# Patient Record
Sex: MALE | Race: BLACK OR AFRICAN AMERICAN | Employment: UNEMPLOYED | URBAN - METROPOLITAN AREA
[De-identification: names, ages, dates, MRNs, and addresses within clinical notes are randomized per-mention and may not be internally consistent; named-entity substitution may affect disease eponyms.]

---

## 2021-07-22 ENCOUNTER — HOSPITAL ENCOUNTER (EMERGENCY)
Age: 56
Discharge: HOME | End: 2021-07-22
Attending: EMERGENCY MEDICINE
Payer: COMMERCIAL

## 2021-07-22 VITALS
HEART RATE: 84 BPM | HEIGHT: 73 IN | SYSTOLIC BLOOD PRESSURE: 173 MMHG | WEIGHT: 315 LBS | BODY MASS INDEX: 41.75 KG/M2 | RESPIRATION RATE: 16 BRPM | TEMPERATURE: 99.1 F | OXYGEN SATURATION: 96 % | DIASTOLIC BLOOD PRESSURE: 93 MMHG

## 2021-07-22 DIAGNOSIS — K85.90 ACUTE PANCREATITIS, UNSPECIFIED COMPLICATION STATUS, UNSPECIFIED PANCREATITIS TYPE: ICD-10-CM

## 2021-07-22 DIAGNOSIS — R10.13 EPIGASTRIC PAIN: Primary | ICD-10-CM

## 2021-07-22 LAB
A/G RATIO: 0.9 (ref 1.1–2.2)
ALBUMIN SERPL-MCNC: 3.9 G/DL (ref 3.4–5)
ALP BLD-CCNC: 61 U/L (ref 40–129)
ALT SERPL-CCNC: 7 U/L (ref 10–40)
ANION GAP SERPL CALCULATED.3IONS-SCNC: 14 MMOL/L (ref 3–16)
AST SERPL-CCNC: 9 U/L (ref 15–37)
BILIRUB SERPL-MCNC: 0.5 MG/DL (ref 0–1)
BUN BLDV-MCNC: 14 MG/DL (ref 7–20)
CALCIUM SERPL-MCNC: 9.6 MG/DL (ref 8.3–10.6)
CHLORIDE BLD-SCNC: 98 MMOL/L (ref 99–110)
CO2: 23 MMOL/L (ref 21–32)
CREAT SERPL-MCNC: 1.1 MG/DL (ref 0.9–1.3)
EKG ATRIAL RATE: 88 BPM
EKG DIAGNOSIS: NORMAL
EKG P AXIS: 29 DEGREES
EKG P-R INTERVAL: 144 MS
EKG Q-T INTERVAL: 388 MS
EKG QRS DURATION: 94 MS
EKG QTC CALCULATION (BAZETT): 469 MS
EKG R AXIS: -12 DEGREES
EKG T AXIS: 183 DEGREES
EKG VENTRICULAR RATE: 88 BPM
GFR AFRICAN AMERICAN: >60
GFR NON-AFRICAN AMERICAN: >60
GLOBULIN: 4.3 G/DL
GLUCOSE BLD-MCNC: 280 MG/DL (ref 70–99)
LACTIC ACID: 1.2 MMOL/L (ref 0.4–2)
LIPASE: 433 U/L (ref 13–60)
POTASSIUM REFLEX MAGNESIUM: 3.9 MMOL/L (ref 3.5–5.1)
SODIUM BLD-SCNC: 135 MMOL/L (ref 136–145)
TOTAL PROTEIN: 8.2 G/DL (ref 6.4–8.2)
TROPONIN: <0.01 NG/ML

## 2021-07-22 PROCEDURE — 99285 EMERGENCY DEPT VISIT HI MDM: CPT

## 2021-07-22 PROCEDURE — 93005 ELECTROCARDIOGRAM TRACING: CPT | Performed by: EMERGENCY MEDICINE

## 2021-07-22 PROCEDURE — 6370000000 HC RX 637 (ALT 250 FOR IP): Performed by: EMERGENCY MEDICINE

## 2021-07-22 PROCEDURE — 84484 ASSAY OF TROPONIN QUANT: CPT

## 2021-07-22 PROCEDURE — 93010 ELECTROCARDIOGRAM REPORT: CPT | Performed by: INTERNAL MEDICINE

## 2021-07-22 PROCEDURE — 80053 COMPREHEN METABOLIC PANEL: CPT

## 2021-07-22 PROCEDURE — 36415 COLL VENOUS BLD VENIPUNCTURE: CPT

## 2021-07-22 PROCEDURE — 83690 ASSAY OF LIPASE: CPT

## 2021-07-22 PROCEDURE — 83605 ASSAY OF LACTIC ACID: CPT

## 2021-07-22 RX ORDER — ONDANSETRON 4 MG/1
4 TABLET, ORALLY DISINTEGRATING ORAL EVERY 8 HOURS PRN
Qty: 20 TABLET | Refills: 0 | Status: ON HOLD | OUTPATIENT
Start: 2021-07-22 | End: 2021-07-31 | Stop reason: HOSPADM

## 2021-07-22 RX ORDER — OMEPRAZOLE 20 MG/1
20 CAPSULE, DELAYED RELEASE ORAL DAILY
COMMUNITY
End: 2021-07-22 | Stop reason: SDUPTHER

## 2021-07-22 RX ORDER — FAMOTIDINE 20 MG/1
20 TABLET, FILM COATED ORAL 2 TIMES DAILY
Qty: 60 TABLET | Refills: 0 | Status: SHIPPED | OUTPATIENT
Start: 2021-07-22 | End: 2021-08-25 | Stop reason: SDUPTHER

## 2021-07-22 RX ORDER — POLYETHYLENE GLYCOL 3350 17 G/17G
17 POWDER, FOR SOLUTION ORAL DAILY
Qty: 1530 G | Refills: 1 | Status: ON HOLD | OUTPATIENT
Start: 2021-07-22 | End: 2021-07-31 | Stop reason: SDUPTHER

## 2021-07-22 RX ORDER — OMEPRAZOLE 20 MG/1
20 CAPSULE, DELAYED RELEASE ORAL DAILY
Qty: 30 CAPSULE | Refills: 0 | Status: SHIPPED | OUTPATIENT
Start: 2021-07-22 | End: 2021-08-25 | Stop reason: SDUPTHER

## 2021-07-22 RX ORDER — CARVEDILOL 25 MG/1
25 TABLET ORAL 2 TIMES DAILY WITH MEALS
COMMUNITY
Start: 2020-08-21 | End: 2021-07-22 | Stop reason: SDUPTHER

## 2021-07-22 RX ORDER — FAMOTIDINE 20 MG/1
20 TABLET, FILM COATED ORAL ONCE
Status: COMPLETED | OUTPATIENT
Start: 2021-07-22 | End: 2021-07-22

## 2021-07-22 RX ORDER — ONDANSETRON 4 MG/1
4 TABLET, ORALLY DISINTEGRATING ORAL ONCE
Status: COMPLETED | OUTPATIENT
Start: 2021-07-22 | End: 2021-07-22

## 2021-07-22 RX ORDER — CARVEDILOL 25 MG/1
25 TABLET ORAL 2 TIMES DAILY WITH MEALS
Qty: 60 TABLET | Refills: 0 | Status: SHIPPED | OUTPATIENT
Start: 2021-07-22 | End: 2021-08-25 | Stop reason: SDUPTHER

## 2021-07-22 RX ADMIN — FAMOTIDINE 20 MG: 20 TABLET, FILM COATED ORAL at 04:43

## 2021-07-22 RX ADMIN — ONDANSETRON 4 MG: 4 TABLET, ORALLY DISINTEGRATING ORAL at 04:44

## 2021-07-22 ASSESSMENT — ENCOUNTER SYMPTOMS
SHORTNESS OF BREATH: 0
BACK PAIN: 0
DIARRHEA: 0
ABDOMINAL PAIN: 1
RHINORRHEA: 0
CONSTIPATION: 1
COLOR CHANGE: 0
NAUSEA: 0
WHEEZING: 0
PHOTOPHOBIA: 0
VOMITING: 0

## 2021-07-22 ASSESSMENT — PAIN DESCRIPTION - ONSET: ONSET: GRADUAL

## 2021-07-22 ASSESSMENT — PAIN DESCRIPTION - FREQUENCY: FREQUENCY: CONTINUOUS

## 2021-07-22 ASSESSMENT — PAIN DESCRIPTION - LOCATION
LOCATION: ABDOMEN
LOCATION: ABDOMEN

## 2021-07-22 ASSESSMENT — PAIN DESCRIPTION - DESCRIPTORS: DESCRIPTORS: SHARP;ACHING

## 2021-07-22 ASSESSMENT — PAIN DESCRIPTION - PAIN TYPE: TYPE: ACUTE PAIN

## 2021-07-22 ASSESSMENT — PAIN SCALES - GENERAL
PAINLEVEL_OUTOF10: 7
PAINLEVEL_OUTOF10: 4

## 2021-07-22 NOTE — ED PROVIDER NOTES
[address] [facility]  EMERGENCY DEPARTMENTENCOUNTER      Pt Name: Vera Gillespie  MRN: [id number]  [employer] 1965  Date ofevaluation: 7/22/2021  Provider: Rob Bolaños MD    CHIEF COMPLAINT       Chief Complaint   Patient presents with    Abdominal Pain     pt brought in by EMS for abd pain X1 week, pt states he has been out of all of his medications for months. HISTORY OF PRESENT ILLNESS   (Location/Symptom, Timing/Onset,Context/Setting, Quality, Duration, Modifying Factors, Severity)  Note limiting factors. Vera Gillespie is a 61 y.o. male  who  has a past medical history of CHF (congestive heart failure) ([facility] [address] Hyperlipidemia, and Hypertension. who presents to the emergency department for evaluation of abdominal pain. Patient reports epigastric abdominal pain has been ongoing for the past month. He had associated nausea. He reports that it is worse with eating. He denies fevers. He reports that he has been having fewer bowel movements and his last bowel movement was yesterday. He denies fevers chest pains or shortness of breath. Patient reports he was previously on a medication for stomach but is not currently taking it. He reports he has been out of all of his medications over the past month. He states his medications were being filled by a cardiologist from [facility] but he has not followed up or seen the physician in a long time. He does not have a PCP. Patient denies shortness of breath or lower extremity swelling. HPI    NursingNotes were reviewed. REVIEW OF SYSTEMS    (2-9 systems for level 4, 10 or more for level 5)     Review of Systems   Constitutional: Negative for activity change, chills, fatigue and fever. HENT: Negative for congestion and rhinorrhea. Eyes: Negative for photophobia and visual disturbance. Respiratory: Negative for shortness of breath and wheezing. Cardiovascular: Negative for palpitations and leg swelling. Gastrointestinal: Positive for abdominal pain and constipation. Negative for diarrhea, nausea and vomiting. Endocrine: Negative for polydipsia and polyuria. Genitourinary: Negative for difficulty urinating and frequency. Musculoskeletal: Negative for back pain and gait problem. Skin: Negative for color change and rash. Neurological: Negative for light-headedness and headaches. Psychiatric/Behavioral: Negative for confusion. The patient is not nervous/anxious. All other systems reviewed and are negative. Except as noted above the remainder of the review of systems was reviewed and negative. PAST MEDICAL HISTORY     Past Medical History:   Diagnosis Date    CHF (congestive heart failure) (HCC)     Hyperlipidemia     Hypertension          SURGICALHISTORY     History reviewed. No pertinent surgical history. CURRENT MEDICATIONS       Discharge Medication List as of 7/22/2021  5:31 AM               Patient has no known allergies. FAMILY HISTORY     History reviewed. No pertinent family history. SOCIAL HISTORY       Social History     Socioeconomic History    Marital status: Unknown     Spouse name: None    Number of children: None    Years of education: None    Highest education level: None   Occupational History    None   Tobacco Use    Smoking status: Current Every Day Smoker     Packs/day: 0.50     Types: Cigarettes    Smokeless tobacco: Never Used   Substance and Sexual Activity    Alcohol use: None    Drug use: Yes     Types: Marijuana    Sexual activity: None   Other Topics Concern    None   Social History Narrative    None     Social Determinants of Health     Financial Resource Strain:     Difficulty of Paying Living Expenses:    Food Insecurity:     Worried About Running Out of Food in the Last Year:     Ran Out of Food in the Last Year:    Transportation Needs:     Lack of Transportation (Medical):      Lack of Transportation (Non-Medical):    Physical Activity:     Days of Exercise per Week:     Minutes of Exercise per Session:    Stress:     Feeling of Stress :    Social Connections:     Frequency of Communication with Friends and Family:     Frequency of Social Gatherings with Friends and Family:     Attends [employer] Services:     Active Member of Clubs or Organizations:     Attends Club or Organization Meetings:     Marital Status:    Intimate Partner Violence:     Fear of Current or Ex-Partner:     Emotionally Abused:     Physically Abused:     Sexually Abused:        SCREENINGS    Chanelle Coma Scale  Eye Opening: Spontaneous  Best Verbal Response: Oriented  Best Motor Response: Obeys commands  Chanelle Coma Scale Score: 15        PHYSICAL EXAM    (up to 7 for level 4, 8 or more for level 5)     ED Triage Vitals [07/22/21 0431]   BP Temp Temp Source Pulse Resp SpO2 Height Weight   (!) 190/123 99.1 °F (37.3 °C) Oral 92 18 99 % 6' 1\" (1.854 m) (!) 323 lb 9.6 oz (146.8 kg)       Physical Exam  Vitals and nursing note reviewed. Constitutional:       General: He is not in acute distress. Appearance: He is well-developed. HENT:      Head: Normocephalic and atraumatic. Eyes:      Conjunctiva/sclera: Conjunctivae normal.   Neck:      Trachea: No tracheal deviation. Cardiovascular:      Rate and Rhythm: Normal rate and regular rhythm. Pulmonary:      Effort: Pulmonary effort is normal.      Breath sounds: Normal breath sounds. No wheezing or rales. Abdominal:      General: There is no distension. Palpations: Abdomen is soft. Tenderness: There is no abdominal tenderness. Hernia: A hernia is present. Hernia is present in the umbilical area. Comments: Reducible umbilical hernia that was reduced. Musculoskeletal:         General: No deformity. Normal range of motion. Cervical back: Normal range of motion. Skin:     General: Skin is warm and dry.    Neurological:      Mental Status: He is alert and oriented to person, place, and time. RESULTS     EKG: All EKG's are interpreted by the Emergency Department Physician who either signs or Co-signsthis chart in the absence of a cardiologist.    EKG shows a sinus rhythm with a ventricular rate of 88 bpm.  WI interval and QTc interval within normal limits. Patient has normal axis. There are no significant ST elevations or depressions EKG is nondiagnostic for ACS. There are T wave inversions in the lateral leads as well as the high lateral leads of unclear chronicity. No previous EKG to compare to.      RADIOLOGY:   Non-plain filmimages such as CT, Ultrasound and MRI are read by the radiologist. Plain radiographic images are visualized and preliminarily interpreted by the emergency physician with the below findings:        Interpretation per the Radiologist below, if available at the time ofthis note:    No orders to display         ED BEDSIDE ULTRASOUND:   Performed by ED Physician - none    LABS:  Labs Reviewed   COMPREHENSIVE METABOLIC PANEL W/ REFLEX TO MG FOR LOW K - Abnormal; Notable for the following components:       Result Value    Sodium 135 (*)     Chloride 98 (*)     Glucose 280 (*)     Albumin/Globulin Ratio 0.9 (*)     ALT 7 (*)     AST 9 (*)     All other components within normal limits    Narrative:     Performed at:  [facility]  [address] [place], [place]   Phone [phone]   LIPASE - Abnormal; Notable for the following components:    Lipase 433.0 (*)     All other components within normal limits    Narrative:     Performed at:  [facility]  [address] [place], [facility]   Phone [phone]   TROPONIN    Narrative:     Performed at:  [facility]  [address], [place]   Phone [phone]   LACTIC ACID, PLASMA    Narrative:     Performed at:  [facility]  [address], [place] [facility]   Phone [phone]   URINE RT REFLEX TO CULTURE       All other labs were within normal range or not returned as of this dictation. EMERGENCY DEPARTMENT COURSE and DIFFERENTIAL DIAGNOSIS/MDM:   Vitals:    Vitals:    07/22/21 0431 07/22/21 0514   BP: (!) 190/123 (!) 173/93   Pulse: 92 84   Resp: 18 16   Temp: 99.1 °F (37.3 °C)    TempSrc: Oral    SpO2: 99% 96%   Weight: (!) 323 lb 9.6 oz (146.8 kg)    Height: 6' 1\" (1.854 m)        Patient was given thefollowing medications:  Medications   ondansetron (ZOFRAN-ODT) disintegrating tablet 4 mg (4 mg Oral Given 7/22/21 0444)   famotidine (PEPCID) tablet 20 mg (20 mg Oral Given 7/22/21 0443)       ED COURSE & MEDICAL DECISION MAKING    Pertinent Labs & Imaging studies reviewed. (See chart for details)   -  Patient seen and evaluated in the emergency department. -  Triage and nursing notes reviewed and incorporated. -  Old chart records reviewed and incorporated. -  Differential diagnosis includes: Differential diagnosis: Abdominal Aortic Aneurysm, Acute Coronary Syndrome, Ischemic Bowel, Bowel Obstruction (including Gastric Outlet Obstruction), PUD, GERD, Acute Cholecystitis, Pancreatitis, Hepatitis, Colitis, SMA Syndrome, Mesenteric Steal Syndrome, Splanchnic Vein Thrombosis, other    -  Work-up included:  See above  -  ED treatment included: See above  -  Results discussed with patient. Patient presents the ED for evaluation of upper abdominal pain. On exam he does have a reducible umbilical hernia that was able to reduce. He has a negative Dawson sign and no significant tenderness. Labs show elevated lipase. LFTs and alk phosphatase within normal limits. Laboratory work-up otherwise unremarkable. Patient is tolerating p.o. and feels improved and reevaluation.   Discussed the patient the importance of reestablishing care with a primary care physician I did refill some of his long-term medications for short duration until he can be seen. Patient feels improved on reevaluation. Symptomatic treatment with expectant management discussed with the patient and they and/or family members present are amenable to treatment plan and outpatient follow-up. Strict return precautions were discussed with the patient and those present. They demonstrated understanding of when to return to the emergency department for new or worsening symptoms. .  The patient is agreeable with plan of care and disposition. REASSESSMENT          CRITICAL CARE TIME   Total Critical Care time was 0 minutes, excluding separately reportable procedures. There was a high probability of clinically significant/life threatening deterioration in the patient's condition which required my urgent intervention. CONSULTS:  None    PROCEDURES:  Unless otherwise noted below, none     Procedures    FINAL IMPRESSION      1. Epigastric pain    2.  Acute pancreatitis, unspecified complication status, unspecified pancreatitis type          DISPOSITION/PLAN   DISPOSITION Decision To Discharge 07/22/2021 05:26:55 AM      PATIENT REFERREDTO:  [facility]  [phone]          DISCHARGEMEDICATIONS:  Discharge Medication List as of 7/22/2021  5:31 AM      START taking these medications    Details   ondansetron (ZOFRAN ODT) 4 MG disintegrating tablet Take 1 tablet by mouth every 8 hours as needed for Nausea, Disp-20 tablet, R-0Print      famotidine (PEPCID) 20 MG tablet Take 1 tablet by mouth 2 times daily, Disp-60 tablet, R-0Print      polyethylene glycol (GLYCOLAX) 17 GM/SCOOP powder Take 17 g by mouth daily, Disp-1530 g, R-1Print                (Please note that portions of this note were completed with a voice recognition program.  Efforts were made to edit the dictations but occasionally words are mis-transcribed.)    Fransisca Reece MD (electronically signed)  Attending Emergency Physician          Fransisca Reece MD  07/22/21 2091

## 2021-07-22 NOTE — ED NOTES
MD Gus Collazo returns to bedside to discuss testing results and f/u care plan for pt.       Kelsy Berger RN  07/22/21 7277

## 2021-07-29 ENCOUNTER — APPOINTMENT (OUTPATIENT)
Dept: CT IMAGING | Age: 56
DRG: 282 | End: 2021-07-29
Payer: COMMERCIAL

## 2021-07-29 ENCOUNTER — HOSPITAL ENCOUNTER (INPATIENT)
Age: 56
LOS: 2 days | Discharge: HOME | DRG: 282 | End: 2021-07-31
Attending: EMERGENCY MEDICINE | Admitting: HOSPITALIST
Payer: COMMERCIAL

## 2021-07-29 DIAGNOSIS — R10.9 ACUTE ABDOMINAL PAIN: Primary | ICD-10-CM

## 2021-07-29 DIAGNOSIS — E11.9 NEW ONSET TYPE 2 DIABETES MELLITUS (HCC): ICD-10-CM

## 2021-07-29 DIAGNOSIS — K85.90 ACUTE PANCREATITIS, UNSPECIFIED COMPLICATION STATUS, UNSPECIFIED PANCREATITIS TYPE: ICD-10-CM

## 2021-07-29 LAB
A/G RATIO: 0.9 (ref 1.1–2.2)
ALBUMIN SERPL-MCNC: 3.8 G/DL (ref 3.4–5)
ALP BLD-CCNC: 74 U/L (ref 40–129)
ALT SERPL-CCNC: 6 U/L (ref 10–40)
ANION GAP SERPL CALCULATED.3IONS-SCNC: 12 MMOL/L (ref 3–16)
AST SERPL-CCNC: 8 U/L (ref 15–37)
BASOPHILS ABSOLUTE: 0.1 K/UL (ref 0–0.2)
BASOPHILS RELATIVE PERCENT: 1.2 %
BILIRUB SERPL-MCNC: 0.3 MG/DL (ref 0–1)
BUN BLDV-MCNC: 17 MG/DL (ref 7–20)
CALCIUM SERPL-MCNC: 9.6 MG/DL (ref 8.3–10.6)
CHLORIDE BLD-SCNC: 101 MMOL/L (ref 99–110)
CO2: 24 MMOL/L (ref 21–32)
CREAT SERPL-MCNC: 1 MG/DL (ref 0.9–1.3)
EKG ATRIAL RATE: 77 BPM
EKG DIAGNOSIS: NORMAL
EKG P AXIS: 16 DEGREES
EKG P-R INTERVAL: 148 MS
EKG Q-T INTERVAL: 396 MS
EKG QRS DURATION: 98 MS
EKG QTC CALCULATION (BAZETT): 448 MS
EKG R AXIS: 2 DEGREES
EKG T AXIS: 218 DEGREES
EKG VENTRICULAR RATE: 77 BPM
EOSINOPHILS ABSOLUTE: 0.1 K/UL (ref 0–0.6)
EOSINOPHILS RELATIVE PERCENT: 2 %
ETHANOL: NORMAL MG/DL (ref 0–0.08)
GFR AFRICAN AMERICAN: >60
GFR NON-AFRICAN AMERICAN: >60
GLOBULIN: 4.3 G/DL
GLUCOSE BLD-MCNC: 376 MG/DL (ref 70–99)
HCT VFR BLD CALC: 42.2 % (ref 40.5–52.5)
HEMOGLOBIN: 14.4 G/DL (ref 13.5–17.5)
LACTIC ACID: 0.9 MMOL/L (ref 0.4–2)
LIPASE: 369 U/L (ref 13–60)
LYMPHOCYTES ABSOLUTE: 1.3 K/UL (ref 1–5.1)
LYMPHOCYTES RELATIVE PERCENT: 22.5 %
MCH RBC QN AUTO: 30.1 PG (ref 26–34)
MCHC RBC AUTO-ENTMCNC: 34.1 G/DL (ref 31–36)
MCV RBC AUTO: 88.4 FL (ref 80–100)
MONOCYTES ABSOLUTE: 0.6 K/UL (ref 0–1.3)
MONOCYTES RELATIVE PERCENT: 9.6 %
NEUTROPHILS ABSOLUTE: 3.7 K/UL (ref 1.7–7.7)
NEUTROPHILS RELATIVE PERCENT: 64.7 %
PDW BLD-RTO: 12.9 % (ref 12.4–15.4)
PLATELET # BLD: 281 K/UL (ref 135–450)
PMV BLD AUTO: 8.9 FL (ref 5–10.5)
POTASSIUM REFLEX MAGNESIUM: 4.2 MMOL/L (ref 3.5–5.1)
RBC # BLD: 4.78 M/UL (ref 4.2–5.9)
SODIUM BLD-SCNC: 137 MMOL/L (ref 136–145)
TOTAL PROTEIN: 8.1 G/DL (ref 6.4–8.2)
TROPONIN: <0.01 NG/ML
WBC # BLD: 5.8 K/UL (ref 4–11)

## 2021-07-29 PROCEDURE — 2580000003 HC RX 258: Performed by: EMERGENCY MEDICINE

## 2021-07-29 PROCEDURE — 6360000004 HC RX CONTRAST MEDICATION: Performed by: EMERGENCY MEDICINE

## 2021-07-29 PROCEDURE — 2580000003 HC RX 258: Performed by: HOSPITALIST

## 2021-07-29 PROCEDURE — 83605 ASSAY OF LACTIC ACID: CPT

## 2021-07-29 PROCEDURE — 6360000002 HC RX W HCPCS: Performed by: HOSPITALIST

## 2021-07-29 PROCEDURE — 80053 COMPREHEN METABOLIC PANEL: CPT

## 2021-07-29 PROCEDURE — 6370000000 HC RX 637 (ALT 250 FOR IP): Performed by: HOSPITALIST

## 2021-07-29 PROCEDURE — 83690 ASSAY OF LIPASE: CPT

## 2021-07-29 PROCEDURE — 85025 COMPLETE CBC W/AUTO DIFF WBC: CPT

## 2021-07-29 PROCEDURE — 96376 TX/PRO/DX INJ SAME DRUG ADON: CPT

## 2021-07-29 PROCEDURE — 74177 CT ABD & PELVIS W/CONTRAST: CPT

## 2021-07-29 PROCEDURE — 93005 ELECTROCARDIOGRAM TRACING: CPT | Performed by: EMERGENCY MEDICINE

## 2021-07-29 PROCEDURE — 94761 N-INVAS EAR/PLS OXIMETRY MLT: CPT

## 2021-07-29 PROCEDURE — 99285 EMERGENCY DEPT VISIT HI MDM: CPT

## 2021-07-29 PROCEDURE — 84484 ASSAY OF TROPONIN QUANT: CPT

## 2021-07-29 PROCEDURE — 36415 COLL VENOUS BLD VENIPUNCTURE: CPT

## 2021-07-29 PROCEDURE — 1200000000 HC SEMI PRIVATE

## 2021-07-29 PROCEDURE — 93010 ELECTROCARDIOGRAM REPORT: CPT | Performed by: INTERNAL MEDICINE

## 2021-07-29 PROCEDURE — 82077 ASSAY SPEC XCP UR&BREATH IA: CPT

## 2021-07-29 PROCEDURE — 6360000002 HC RX W HCPCS: Performed by: EMERGENCY MEDICINE

## 2021-07-29 PROCEDURE — 96374 THER/PROPH/DIAG INJ IV PUSH: CPT

## 2021-07-29 PROCEDURE — 96375 TX/PRO/DX INJ NEW DRUG ADDON: CPT

## 2021-07-29 PROCEDURE — 2500000003 HC RX 250 WO HCPCS: Performed by: EMERGENCY MEDICINE

## 2021-07-29 RX ORDER — POLYETHYLENE GLYCOL 3350 17 G/17G
17 POWDER, FOR SOLUTION ORAL DAILY
Status: DISCONTINUED | OUTPATIENT
Start: 2021-07-29 | End: 2021-07-31 | Stop reason: HOSPADM

## 2021-07-29 RX ORDER — SODIUM CHLORIDE 0.9 % (FLUSH) 0.9 %
5-40 SYRINGE (ML) INJECTION EVERY 12 HOURS SCHEDULED
Status: DISCONTINUED | OUTPATIENT
Start: 2021-07-29 | End: 2021-07-31 | Stop reason: HOSPADM

## 2021-07-29 RX ORDER — POTASSIUM CHLORIDE 20 MEQ/1
40 TABLET, EXTENDED RELEASE ORAL PRN
Status: DISCONTINUED | OUTPATIENT
Start: 2021-07-29 | End: 2021-07-31 | Stop reason: HOSPADM

## 2021-07-29 RX ORDER — ONDANSETRON 4 MG/1
4 TABLET, ORALLY DISINTEGRATING ORAL EVERY 8 HOURS PRN
Status: DISCONTINUED | OUTPATIENT
Start: 2021-07-29 | End: 2021-07-31 | Stop reason: HOSPADM

## 2021-07-29 RX ORDER — 0.9 % SODIUM CHLORIDE 0.9 %
500 INTRAVENOUS SOLUTION INTRAVENOUS ONCE
Status: COMPLETED | OUTPATIENT
Start: 2021-07-29 | End: 2021-07-29

## 2021-07-29 RX ORDER — SODIUM CHLORIDE 9 MG/ML
25 INJECTION, SOLUTION INTRAVENOUS PRN
Status: DISCONTINUED | OUTPATIENT
Start: 2021-07-29 | End: 2021-07-31 | Stop reason: HOSPADM

## 2021-07-29 RX ORDER — SODIUM CHLORIDE, SODIUM LACTATE, POTASSIUM CHLORIDE, CALCIUM CHLORIDE 600; 310; 30; 20 MG/100ML; MG/100ML; MG/100ML; MG/100ML
INJECTION, SOLUTION INTRAVENOUS CONTINUOUS
Status: DISCONTINUED | OUTPATIENT
Start: 2021-07-29 | End: 2021-07-29

## 2021-07-29 RX ORDER — MORPHINE SULFATE 4 MG/ML
4 INJECTION, SOLUTION INTRAMUSCULAR; INTRAVENOUS
Status: DISCONTINUED | OUTPATIENT
Start: 2021-07-29 | End: 2021-07-29 | Stop reason: ALTCHOICE

## 2021-07-29 RX ORDER — ACETAMINOPHEN 325 MG/1
650 TABLET ORAL EVERY 6 HOURS PRN
Status: DISCONTINUED | OUTPATIENT
Start: 2021-07-29 | End: 2021-07-31 | Stop reason: HOSPADM

## 2021-07-29 RX ORDER — SODIUM CHLORIDE 9 MG/ML
INJECTION, SOLUTION INTRAVENOUS CONTINUOUS
Status: DISCONTINUED | OUTPATIENT
Start: 2021-07-29 | End: 2021-07-31

## 2021-07-29 RX ORDER — ONDANSETRON 2 MG/ML
4 INJECTION INTRAMUSCULAR; INTRAVENOUS EVERY 6 HOURS PRN
Status: DISCONTINUED | OUTPATIENT
Start: 2021-07-29 | End: 2021-07-31 | Stop reason: HOSPADM

## 2021-07-29 RX ORDER — POTASSIUM CHLORIDE 7.45 MG/ML
10 INJECTION INTRAVENOUS PRN
Status: DISCONTINUED | OUTPATIENT
Start: 2021-07-29 | End: 2021-07-31 | Stop reason: HOSPADM

## 2021-07-29 RX ORDER — ACETAMINOPHEN 650 MG/1
650 SUPPOSITORY RECTAL EVERY 6 HOURS PRN
Status: DISCONTINUED | OUTPATIENT
Start: 2021-07-29 | End: 2021-07-31 | Stop reason: HOSPADM

## 2021-07-29 RX ORDER — POLYETHYLENE GLYCOL 3350 17 G/17G
17 POWDER, FOR SOLUTION ORAL DAILY
Status: DISCONTINUED | OUTPATIENT
Start: 2021-07-29 | End: 2021-07-29 | Stop reason: SDUPTHER

## 2021-07-29 RX ORDER — ONDANSETRON 2 MG/ML
4 INJECTION INTRAMUSCULAR; INTRAVENOUS
Status: DISCONTINUED | OUTPATIENT
Start: 2021-07-29 | End: 2021-07-29 | Stop reason: SDUPTHER

## 2021-07-29 RX ORDER — CARVEDILOL 25 MG/1
25 TABLET ORAL 2 TIMES DAILY WITH MEALS
Status: DISCONTINUED | OUTPATIENT
Start: 2021-07-29 | End: 2021-07-31 | Stop reason: HOSPADM

## 2021-07-29 RX ORDER — ONDANSETRON 4 MG/1
4 TABLET, ORALLY DISINTEGRATING ORAL EVERY 8 HOURS PRN
Status: DISCONTINUED | OUTPATIENT
Start: 2021-07-29 | End: 2021-07-29 | Stop reason: SDUPTHER

## 2021-07-29 RX ORDER — SODIUM CHLORIDE 0.9 % (FLUSH) 0.9 %
10 SYRINGE (ML) INJECTION PRN
Status: DISCONTINUED | OUTPATIENT
Start: 2021-07-29 | End: 2021-07-31 | Stop reason: HOSPADM

## 2021-07-29 RX ORDER — POLYETHYLENE GLYCOL 3350 17 G/17G
17 POWDER, FOR SOLUTION ORAL DAILY PRN
Status: DISCONTINUED | OUTPATIENT
Start: 2021-07-29 | End: 2021-07-31 | Stop reason: HOSPADM

## 2021-07-29 RX ADMIN — ONDANSETRON 4 MG: 2 INJECTION INTRAMUSCULAR; INTRAVENOUS at 05:27

## 2021-07-29 RX ADMIN — SODIUM CHLORIDE, PRESERVATIVE FREE 10 ML: 5 INJECTION INTRAVENOUS at 20:46

## 2021-07-29 RX ADMIN — MORPHINE SULFATE 4 MG: 4 INJECTION, SOLUTION INTRAMUSCULAR; INTRAVENOUS at 08:18

## 2021-07-29 RX ADMIN — IOHEXOL 50 ML: 240 INJECTION, SOLUTION INTRATHECAL; INTRAVASCULAR; INTRAVENOUS; ORAL at 05:50

## 2021-07-29 RX ADMIN — IOPAMIDOL 100 ML: 755 INJECTION, SOLUTION INTRAVENOUS at 06:59

## 2021-07-29 RX ADMIN — FAMOTIDINE 20 MG: 10 INJECTION, SOLUTION INTRAVENOUS at 08:13

## 2021-07-29 RX ADMIN — MORPHINE SULFATE 4 MG: 4 INJECTION, SOLUTION INTRAMUSCULAR; INTRAVENOUS at 05:27

## 2021-07-29 RX ADMIN — SODIUM CHLORIDE, POTASSIUM CHLORIDE, SODIUM LACTATE AND CALCIUM CHLORIDE: 600; 310; 30; 20 INJECTION, SOLUTION INTRAVENOUS at 09:20

## 2021-07-29 RX ADMIN — SODIUM CHLORIDE: 9 INJECTION, SOLUTION INTRAVENOUS at 16:36

## 2021-07-29 RX ADMIN — CARVEDILOL 25 MG: 25 TABLET, FILM COATED ORAL at 17:54

## 2021-07-29 RX ADMIN — ENOXAPARIN SODIUM 40 MG: 40 INJECTION SUBCUTANEOUS at 16:53

## 2021-07-29 RX ADMIN — SODIUM CHLORIDE 500 ML: 9 INJECTION, SOLUTION INTRAVENOUS at 08:14

## 2021-07-29 RX ADMIN — CARVEDILOL 25 MG: 25 TABLET, FILM COATED ORAL at 16:37

## 2021-07-29 RX ADMIN — ONDANSETRON 4 MG: 2 INJECTION INTRAMUSCULAR; INTRAVENOUS at 08:18

## 2021-07-29 RX ADMIN — POLYETHYLENE GLYCOL 3350 17 G: 17 POWDER, FOR SOLUTION ORAL at 16:36

## 2021-07-29 ASSESSMENT — PAIN DESCRIPTION - LOCATION
LOCATION: ABDOMEN

## 2021-07-29 ASSESSMENT — PAIN DESCRIPTION - ORIENTATION
ORIENTATION: MID

## 2021-07-29 ASSESSMENT — PAIN SCALES - GENERAL
PAINLEVEL_OUTOF10: 2
PAINLEVEL_OUTOF10: 2
PAINLEVEL_OUTOF10: 4
PAINLEVEL_OUTOF10: 6
PAINLEVEL_OUTOF10: 6
PAINLEVEL_OUTOF10: 3
PAINLEVEL_OUTOF10: 2
PAINLEVEL_OUTOF10: 4
PAINLEVEL_OUTOF10: 2
PAINLEVEL_OUTOF10: 2

## 2021-07-29 ASSESSMENT — PAIN DESCRIPTION - DESCRIPTORS
DESCRIPTORS: SHARP;ACHING

## 2021-07-29 ASSESSMENT — PAIN DESCRIPTION - FREQUENCY
FREQUENCY: CONTINUOUS

## 2021-07-29 ASSESSMENT — PAIN DESCRIPTION - ONSET
ONSET: GRADUAL

## 2021-07-29 ASSESSMENT — PAIN DESCRIPTION - PAIN TYPE
TYPE: OTHER (COMMENT)
TYPE: ACUTE PAIN

## 2021-07-29 NOTE — PROGRESS NOTES
Patient was admitted to the [address] [place] [address] room 4117 at [phone] from [facility] ED. Patient oriented to room and taught how to use call light. Patient is resting comfortably in bed. Will continue to monitor.

## 2021-07-29 NOTE — ED PROVIDER NOTES
I received this patient in signout from Dr. Renetta Coulter. We discussed the patient's initial history, physical, workup thus far, and medical decision making to this point. Briefly, Kallie Amaya is a 61 y.o. male  who presents to the ED complaining of abdominal pain. Initial history/exam also pertinent for recent ED visit for similar. Pending studies at time of signout include: CT abdomen    The patient will be reassessed after workup above is completed. Anticipated disposition at time of signout is admission       ED COURSE/MDM  I introduced myself to the patient and performed my initial assessment on Kallie Amaya. There is no significant change in the patient's history from what is documented initially by Dr. Renetta Coulter  after my evaluation. Old records reviewed. Labs and imaging reviewed and results discussed with patient. Since time of sign out, the patient's ED workup was notable for CT imaging without acute finding    During the patient's ED course, the patient was given:  Medications   morphine (PF) injection 4 mg (4 mg Intravenous Given 7/29/21 0818)   ondansetron (ZOFRAN) injection 4 mg (4 mg Intravenous Given 7/29/21 0818)   0.9 % sodium chloride bolus (500 mLs Intravenous New Bag 7/29/21 0814)   lactated ringers infusion (has no administration in time range)   iohexol (OMNIPAQUE 240) injection 50 mL (50 mLs Oral Given 7/29/21 0550)   iopamidol (ISOVUE-370) 76 % injection 100 mL (100 mLs Intravenous Given 7/29/21 0659)   famotidine (PEPCID) injection 20 mg (20 mg Intravenous Given 7/29/21 0813)        CLINICAL IMPRESSION  1. Acute abdominal pain    2. Acute pancreatitis, unspecified complication status, unspecified pancreatitis type    3. New onset type 2 diabetes mellitus (HCC)        Blood pressure (!) 186/108, pulse 80, temperature 98 °F (36.7 °C), temperature source Oral, resp. rate 17, height 6' 1\" (1.854 m), weight (!) 320 lb (145.2 kg), SpO2 95 %.     DISPOSITION  Kallie Amaya was admitted in stable condition. Patient afebrile and nontoxic. No distress. No peritoneal signs on abdominal exam, no focal findings to suggest acute appendicitis or cholecystitis. CT imaging was reviewed and without evidence of obstruction, perforation or other acute process. Radiographic normal appearance of the pancreas. Lab work does have elevated lipase and patient is most severely tender in midepigastrium, concern for early pancreatitis given these findings. Nothing to suggest infection, no indication for antibiotics. Blood glucose is also significantly elevated, patient denies any history of diabetes and I suspect new onset type II DM. Nothing to suggest DKA/HHS. Patient received gentle IV fluids given his history of CHF. Case discussed with internal medicine team, given this is a recurrent emergency department visit with potential pancreatitis and new onset diabetes I do feel admission is prudent. Patient is agreeable to this plan. Patient remained alert, no distress and hemodynamically stable over his emergency department course. Patient was given scripts for the following medications. I counseled patient how to take these medications. New Prescriptions    No medications on file       This chart was created using Dragon dictation software. Efforts were made by me to ensure accuracy, however some errors may be present due to limitations of this technology.        [address] [place] [address],   07/29/21 0762

## 2021-07-29 NOTE — ED PROVIDER NOTES
Emergency Physician Note        Note Open Time: 5:23 AM EDT    Chief Complaint  Abdominal Pain (reports cont abdominal pain from last visit )       History of Present Illness  Hugo Del Cid is a 61 y.o. male who presents to the ED for abdominal pain. Patient reports that he had continued upper abdominal pain since his last emergency department visit. He denies any vomiting but does have nausea. He states he is continued to have bowel movements but feels constipated. He denies any fevers, chills or sweats. No chest pain or shortness of breath. No urinary symptoms. Patient states when he eats or drinks anything he gets to his abdomen and causes pain. 10 systems reviewed, pertinent positives per HPI otherwise noted to be negative    I have reviewed the following from the nursing documentation:      Prior to Admission medications    Medication Sig Start Date End Date Taking? Authorizing Provider   ondansetron (ZOFRAN ODT) 4 MG disintegrating tablet Take 1 tablet by mouth every 8 hours as needed for Nausea 7/22/21  Yes Chente Akins MD   famotidine (PEPCID) 20 MG tablet Take 1 tablet by mouth 2 times daily 7/22/21  Yes Chente Akins MD   polyethylene glycol [facility]) 17 GM/SCOOP powder Take 17 g by mouth daily 7/22/21 8/21/21 Yes Chente Akins MD   carvedilol (COREG) 25 MG tablet Take 1 tablet by mouth 2 times daily (with meals) 7/22/21  Yes Chente Akins MD   omeprazole (PRILOSEC) 20 MG delayed release capsule Take 1 capsule by mouth daily 7/22/21  Yes Chente Akins MD       Allergies as of 07/29/2021    (No Known Allergies)       Past Medical History:   Diagnosis Date    CHF (congestive heart failure) ([facility] [address]     Hyperlipidemia     Hypertension         Surgical History: History reviewed. No pertinent surgical history. Family History:  History reviewed. No pertinent family history.     Social History     Socioeconomic History    Marital status: Unknown     Spouse name: Not on file    Number of children: Not on file    Years of education: Not on file    Highest education level: Not on file   Occupational History    Not on file   Tobacco Use    Smoking status: Current Every Day Smoker     Packs/day: 0.50     Types: Cigarettes    Smokeless tobacco: Never Used   Vaping Use    Vaping Use: Never used   Substance and Sexual Activity    Alcohol use: Not Currently    Drug use: Yes     Types: Marijuana    Sexual activity: Not Currently   Other Topics Concern    Not on file   Social History Narrative    Not on file     Social Determinants of Health     Financial Resource Strain:     Difficulty of Paying Living Expenses:    Food Insecurity:     Worried About Running Out of Food in the Last Year:     Ran Out of Food in the Last Year:    Transportation Needs:     Lack of Transportation (Medical):  Lack of Transportation (Non-Medical):    Physical Activity:     Days of Exercise per Week:     Minutes of Exercise per Session:    Stress:     Feeling of Stress :    Social Connections:     Frequency of Communication with Friends and Family:     Frequency of Social Gatherings with Friends and Family:     Attends [employer] Services:     Active Member of Clubs or Organizations:     Attends Club or Organization Meetings:     Marital Status:    Intimate Partner Violence:     Fear of Current or Ex-Partner:     Emotionally Abused:     Physically Abused:     Sexually Abused:        Nursing notes reviewed. ED Triage Vitals   Enc Vitals Group      BP 07/29/21 0508 (!) 195/113      Pulse 07/29/21 0506 80      Resp 07/29/21 0506 18      Temp 07/29/21 0506 98 °F (36.7 °C)      Temp Source 07/29/21 0506 Oral      SpO2 07/29/21 0506 98 %      Weight 07/29/21 0506 (!) 320 lb (145.2 kg)      Height 07/29/21 0506 6' 1\" (1.854 m)      Head Circumference --       Peak Flow --       Pain Score --       Pain Loc --       Pain Edu? --       Excl. in GC? --        GENERAL:  Awake, alert.  Well developed, well nourished with no apparent distress. HENT:  Normocephalic, Atraumatic, moist mucous membranes. EYES:  Pupils equal round and reactive to light, Conjunctiva normal, extraocular movements normal.  NECK:  No meningeal signs, Supple. CHEST:  Regular rate and rhythm, chest wall non-tender. LUNGS:  Clear to auscultation bilaterally. ABDOMEN:  Soft, midepigastric tenderness, no rebound, rigidity or guarding, non-distended, normal bowel sounds. No costovertebral angle tenderness to palpation. BACK:  No tenderness. EXTREMITIES:  Normal range of motion, no edema, no bony tenderness, no deformity, distal pulses present. SKIN: Warm, dry and intact. NEUROLOGIC: Normal mental status. Moving all extremities to command. LABS and DIAGNOSTIC RESULTS  EKG  The Ekg interpreted by me shows  normal sinus rhythm with a rate of 77  Axis is   Normal  QTc is  normal  Intervals and Durations are unremarkable. ST Segments: TWI in inferolateral leads  Delta waves, Brugada Syndrome, and Short [place] are not present. No significant change from prior EKG dated 7/22/21    RADIOLOGY  X-RAYS:  I have reviewed radiologic plain film image(s). ALL OTHER NON-PLAIN FILM IMAGES SUCH AS CT, ULTRASOUND AND MRI HAVE BEEN READ BY THE RADIOLOGIST.   CT ABDOMEN PELVIS W IV CONTRAST Additional Contrast? Oral    (Results Pending)        LABS  Labs Reviewed   COMPREHENSIVE METABOLIC PANEL W/ REFLEX TO MG FOR LOW K - Abnormal; Notable for the following components:       Result Value    Glucose 376 (*)     Albumin/Globulin Ratio 0.9 (*)     ALT 6 (*)     AST 8 (*)     All other components within normal limits    Narrative:     Performed at:  [facility]  [address] [facility]   Phone [phone]   LIPASE - Abnormal; Notable for the following components:    Lipase 369.0 (*)     All other components within normal limits    Narrative:     Performed at:  [facility] [address] [facility]  [address], [place]   Phone [phone]   CBC WITH AUTO DIFFERENTIAL    Narrative:     Performed at:  [facility]  [address], [place]   Phone [phone]   ETHANOL    Narrative:     Performed at:  [facility]  [address], [place]   Phone [phone]   LACTIC ACID, PLASMA    Narrative:     Performed at:  [facility]  [address], [place]   Phone [phone]   TROPONIN    Narrative:     Performed at:  [facility]  [address], [place]   Phone [phone]       MEDICAL DECISION MAKING          7:00 AM: I discussed the history, physical, and treatment plan with Dr. Karine Sotelo. Ernestine Clemente was signed out in stable condition. Please see Dr. Sarai Morin note for further details, including diagnosis and disposition. At the time of signout the CT scan is pending. Clinical impression: Abdominal pain and elevated lipase. This chart was generated using the [address] [place] [address] [address]  dictation system. I created this record but it may contain dictation errors.           Pawel Richmond MD  07/29/21 7283

## 2021-07-29 NOTE — Clinical Note
Patient Class: Inpatient [101]   REQUIRED: Diagnosis: Abdominal pain [121964]   Estimated Length of Stay: Estimated stay of more than 2 midnights   Admitting Provider: Rose Abdi [[id number]]   Telemetry/Cardiac Monitoring Required?: No

## 2021-07-29 NOTE — ED NOTES
Report given to [facility] transport team.  Patient transported to [facility] room 6182.      Marilia Arenas RN  07/29/21 [address] MA[address] Rashid  07/29/21 1011

## 2021-07-29 NOTE — PROGRESS NOTES
4 Eyes Skin Assessment     NAME:  Margy Torres  YOB: 1965  MEDICAL RECORD NUMBER:  [id number]    The patient is being assess for  Admission    I agree that 2 RN's have performed a thorough Head to Toe Skin Assessment on the patient. ALL assessment sites listed below have been assessed. Areas assessed by both nurses:    Head, Face, Ears, Shoulders, Back, Chest, Arms, Elbows, Hands, Sacrum. Buttock, Coccyx, Ischium and Legs. Feet and Heels        Does the Patient have a Wound?  No noted wound(s)       Hudson Prevention initiated:  No   Wound Care Orders initiated:  No    Pressure Injury (Stage 3,4, Unstageable, DTI, NWPT, and Complex wounds) if present place consult order under [de-identified] No    New and Established Ostomies if present place consult order under : No      Nurse 1 eSignature: Electronically signed by Shawn Ramirez RN on 7/29/21 at 11:21 AM EDT    **SHARE this note so that the co-signing nurse is able to place an eSignature**    Nurse 2 eSignature: {Esignature:[id number]}

## 2021-07-29 NOTE — H&P
Hospitalist  History and Physical    Patient:  Emily Crawley  MRN: [id number]  PCP: No primary care provider on file. CHIEF COMPLAINT: Abdominal Pain      HISTORY OF PRESENT ILLNESS:   The patient Emily Crawley is a 61 y. o.male with medical history significant for CHF hypertension and hyperlipidemia who presented to the emergency room with nausea and abdominal pain. Patient visited emergency room 2 times in the last 1 week. Patient reports that he has been having upper abdominal pain for the last 1 week. Patient denies any vomiting. No history of hematemesis or melena no history of urinary symptoms patient denies any fever chills no history of abdominal surgeries and patient denies any history of previous episode of pancreatitis. Patient does report that he drinks occasionally. Past Medical History:        Diagnosis Date    CHF (congestive heart failure) (HCC)     Hyperlipidemia     Hypertension        Past Surgical History:    History reviewed. No pertinent surgical history. Medications Prior to Admission:    Prior to Admission medications    Medication Sig Start Date End Date Taking? Authorizing Provider   ondansetron (ZOFRAN ODT) 4 MG disintegrating tablet Take 1 tablet by mouth every 8 hours as needed for Nausea 7/22/21  Yes Tiffany Lr MD   famotidine (PEPCID) 20 MG tablet Take 1 tablet by mouth 2 times daily 7/22/21  Yes Tiffany Lr MD   polyethylene glycol [facility]) 17 GM/SCOOP powder Take 17 g by mouth daily 7/22/21 8/21/21 Yes Tiffany Lr MD   carvedilol (COREG) 25 MG tablet Take 1 tablet by mouth 2 times daily (with meals) 7/22/21  Yes Tiffany Lr MD   omeprazole (PRILOSEC) 20 MG delayed release capsule Take 1 capsule by mouth daily 7/22/21  Yes Tiffany Lr MD       Allergies:  Patient has no known allergies. Social History:   TOBACCO:   reports that he has been smoking cigarettes. He has been smoking about 0.50 packs per day.  He has never used smokeless tobacco.  ETOH: reports previous alcohol use. Family History:   Patient denies history of pancreatitis in the family        REVIEW OF SYSTEMS:     patients reported symptoms are in BOLD all other symptoms are negative. CONSTITUTIONAL:      fatigue, fever, chills or night sweats, recent weight gain, recent wt loss, insomnia,  General weakness, poor appetite, muscle aches and pains    HEAD: headache, dizziness    EYES:      blurriness,  double vision, dryness,  discharge, irritation,diplopia    EARS:      hearing loss, vertigo, ear discharge,  Earache. Ringing in the ears. NOSE:      Rhinorrhea, sneezing, epistaxis. Discharge, sinusitis,     MOUTH/THROAT:         sore throat, mouth ulcers, Hoarseness    RESPIRATORY:        Shortness of breath, wheezing,  cough, sputum, hemoptysis, obstructive sleep apnea,    CARDIOVASCULAR :      chest pain, palpitations, dyspnea on exercise, Lower extrimity edema (swelling),     GASTROINTESTINAL:       Dysphagia, Poor appetite,  Nausea, Vomiting, diarrhea, heartburn, abdominal pain. Blood in the stools, hematemesis. Pain with swallowing, constipation    GENITOURINARY:       Urinary frequency, hesitancy,  urgency, Dysuria, hematuria,  Urinary Incontinence. Urinary Retention. GYNECOLOGICAL: vaginal bleeding , vaginal discharge, menopause    MUSCULOSKELETAL:       joint swelling or stiffness, joint pain, muscle pain, balance problems, low back pain. NEUROLOGICAL:      Gait problems. Tremor. Dizziness. Pain and paresthesias, weakness in extremities. Seizures, memory loss    PSYCHLOGICAL:        Anxiety, depression    SKIN :      Rashes ulcers, skin color changes, easy bruisability, lymphadenopathy      Physical Exam:      Vitals: BP (!) 174/93   Pulse 64   Temp 98 °F (36.7 °C) (Oral)   Resp 17   Ht 6' 1\" (1.854 m)   Wt (!) 320 lb (145.2 kg)   SpO2 98%   BMI 42.22 kg/m²     Gen:          Alert and oriented x 3  Eyes: PERRL. No sclera icterus. No conjunctival injection.    ENT: No discharge. Pharynx clear. External appearance of ears and nose normal.  Neck: Trachea midline. No obvious mass. Resp: No accessory muscle use. No crackles. No wheezes. No rhonchi. CV: Regular rate. Regular rhythm. No murmur or rub. No edema. GI: Abdomen is soft and tender to palpation in the epigastric area  Skin: Warm, dry, normal texture and turgor. Lymph: No cervical LAD. No supraclavicular LAD. M/S: / Ext. No cyanosis. No clubbing. No joint deformity. Neuro: Moves all four extremities. CN 2-12 tested, no deficits noted. Peripheral pulses and capillary refill is intact. CBC:   Recent Labs     07/29/21 0515   WBC 5.8   HGB 14.4        BMP:    Recent Labs     07/29/21 0515      K 4.2      CO2 24   BUN 17   CREATININE 1.0   GLUCOSE 376*     Hepatic:   Recent Labs     07/29/21 0515   AST 8*   ALT 6*   BILITOT 0.3   ALKPHOS 74     Troponin:   Recent Labs     07/29/21 0515   TROPONINI <0.01     BNP: No results for input(s): BNP in the last 72 hours. INR: No results for input(s): INR in the last 72 hours. No results found for: LABA1C        No results for input(s): CKTOTAL in the last 72 hours. -----------------------------------------------------------------  CT ABDOMEN PELVIS W IV CONTRAST  Diverticulosis. EKG  Sinus rhythm with Premature supraventricular complexesNonspecific T wave      Assessment / Plan     Acute pancreatitis k85.90  Start patient on IV fluids  Monitor lipase and liver enzymes  Keep patient nothing by mouth consult GI      Pain management  R52  Continue with the IV and oral pain medication      Hypertension  Continue on home medication      History of CHF  Check echocardiogram          DVT and GI prophylaxis      No Order      Francine Vazquez MD M.D    This note was transcribed using [id number] [employer]. Please disregard any translational errors.

## 2021-07-30 ENCOUNTER — APPOINTMENT (OUTPATIENT)
Dept: ULTRASOUND IMAGING | Age: 56
DRG: 282 | End: 2021-07-30
Payer: COMMERCIAL

## 2021-07-30 LAB
ANION GAP SERPL CALCULATED.3IONS-SCNC: 11 MMOL/L (ref 3–16)
BUN BLDV-MCNC: 14 MG/DL (ref 7–20)
CALCIUM SERPL-MCNC: 8.9 MG/DL (ref 8.3–10.6)
CHLORIDE BLD-SCNC: 102 MMOL/L (ref 99–110)
CHOLESTEROL, TOTAL: 196 MG/DL (ref 0–199)
CO2: 23 MMOL/L (ref 21–32)
CREAT SERPL-MCNC: 1.1 MG/DL (ref 0.9–1.3)
GFR AFRICAN AMERICAN: >60
GFR NON-AFRICAN AMERICAN: >60
GLUCOSE BLD-MCNC: 205 MG/DL (ref 70–99)
GLUCOSE BLD-MCNC: 225 MG/DL (ref 70–99)
GLUCOSE BLD-MCNC: 276 MG/DL (ref 70–99)
HCT VFR BLD CALC: 42.6 % (ref 40.5–52.5)
HDLC SERPL-MCNC: 28 MG/DL (ref 40–60)
HEMOGLOBIN: 14.5 G/DL (ref 13.5–17.5)
LDL CHOLESTEROL CALCULATED: 142 MG/DL
MCH RBC QN AUTO: 30.2 PG (ref 26–34)
MCHC RBC AUTO-ENTMCNC: 34 G/DL (ref 31–36)
MCV RBC AUTO: 88.6 FL (ref 80–100)
PDW BLD-RTO: 13 % (ref 12.4–15.4)
PERFORMED ON: ABNORMAL
PERFORMED ON: ABNORMAL
PLATELET # BLD: 238 K/UL (ref 135–450)
PLATELET SLIDE REVIEW: ADEQUATE
PMV BLD AUTO: 9.5 FL (ref 5–10.5)
POTASSIUM REFLEX MAGNESIUM: 4.1 MMOL/L (ref 3.5–5.1)
RBC # BLD: 4.81 M/UL (ref 4.2–5.9)
SLIDE REVIEW: NORMAL
SODIUM BLD-SCNC: 136 MMOL/L (ref 136–145)
TRIGL SERPL-MCNC: 128 MG/DL (ref 0–150)
VLDLC SERPL CALC-MCNC: 26 MG/DL
WBC # BLD: 4.3 K/UL (ref 4–11)

## 2021-07-30 PROCEDURE — 80048 BASIC METABOLIC PNL TOTAL CA: CPT

## 2021-07-30 PROCEDURE — 85027 COMPLETE CBC AUTOMATED: CPT

## 2021-07-30 PROCEDURE — 80061 LIPID PANEL: CPT

## 2021-07-30 PROCEDURE — 83036 HEMOGLOBIN GLYCOSYLATED A1C: CPT

## 2021-07-30 PROCEDURE — 94761 N-INVAS EAR/PLS OXIMETRY MLT: CPT

## 2021-07-30 PROCEDURE — 2580000003 HC RX 258: Performed by: HOSPITALIST

## 2021-07-30 PROCEDURE — 76705 ECHO EXAM OF ABDOMEN: CPT

## 2021-07-30 PROCEDURE — 6360000002 HC RX W HCPCS: Performed by: HOSPITALIST

## 2021-07-30 PROCEDURE — 1200000000 HC SEMI PRIVATE

## 2021-07-30 PROCEDURE — 36415 COLL VENOUS BLD VENIPUNCTURE: CPT

## 2021-07-30 PROCEDURE — 6370000000 HC RX 637 (ALT 250 FOR IP): Performed by: HOSPITALIST

## 2021-07-30 RX ORDER — ASPIRIN 81 MG/1
81 TABLET, CHEWABLE ORAL DAILY
Status: DISCONTINUED | OUTPATIENT
Start: 2021-07-30 | End: 2021-07-31 | Stop reason: HOSPADM

## 2021-07-30 RX ORDER — HYDRALAZINE HYDROCHLORIDE 25 MG/1
25 TABLET, FILM COATED ORAL 3 TIMES DAILY
Status: DISCONTINUED | OUTPATIENT
Start: 2021-07-30 | End: 2021-07-31 | Stop reason: HOSPADM

## 2021-07-30 RX ORDER — ATORVASTATIN CALCIUM 80 MG/1
80 TABLET, FILM COATED ORAL NIGHTLY
Status: DISCONTINUED | OUTPATIENT
Start: 2021-07-30 | End: 2021-07-31 | Stop reason: HOSPADM

## 2021-07-30 RX ORDER — ATORVASTATIN CALCIUM 80 MG/1
80 TABLET, FILM COATED ORAL NIGHTLY
Status: ON HOLD | COMMUNITY
Start: 2020-08-21 | End: 2021-07-31 | Stop reason: SDUPTHER

## 2021-07-30 RX ORDER — TORSEMIDE 20 MG/1
20 TABLET ORAL DAILY PRN
Status: ON HOLD | COMMUNITY
Start: 2020-08-24 | End: 2021-07-31 | Stop reason: SDUPTHER

## 2021-07-30 RX ORDER — HYDRALAZINE HYDROCHLORIDE 25 MG/1
25 TABLET, FILM COATED ORAL EVERY 8 HOURS
Status: ON HOLD | COMMUNITY
Start: 2020-09-10 | End: 2021-07-31 | Stop reason: SDUPTHER

## 2021-07-30 RX ORDER — ASPIRIN 81 MG/1
81 TABLET, CHEWABLE ORAL DAILY
Status: ON HOLD | COMMUNITY
Start: 2020-09-10 | End: 2021-07-31 | Stop reason: SDUPTHER

## 2021-07-30 RX ADMIN — SODIUM CHLORIDE, PRESERVATIVE FREE 10 ML: 5 INJECTION INTRAVENOUS at 08:51

## 2021-07-30 RX ADMIN — ASPIRIN 81 MG: 81 TABLET, CHEWABLE ORAL at 13:04

## 2021-07-30 RX ADMIN — HYDROMORPHONE HYDROCHLORIDE 0.5 MG: 1 INJECTION, SOLUTION INTRAMUSCULAR; INTRAVENOUS; SUBCUTANEOUS at 00:18

## 2021-07-30 RX ADMIN — SODIUM CHLORIDE, PRESERVATIVE FREE 10 ML: 5 INJECTION INTRAVENOUS at 21:20

## 2021-07-30 RX ADMIN — HYDRALAZINE HYDROCHLORIDE 25 MG: 25 TABLET, FILM COATED ORAL at 21:15

## 2021-07-30 RX ADMIN — CARVEDILOL 25 MG: 25 TABLET, FILM COATED ORAL at 08:49

## 2021-07-30 RX ADMIN — ATORVASTATIN CALCIUM 80 MG: 80 TABLET, FILM COATED ORAL at 21:19

## 2021-07-30 RX ADMIN — INSULIN LISPRO 6 UNITS: 100 INJECTION, SOLUTION INTRAVENOUS; SUBCUTANEOUS at 18:00

## 2021-07-30 RX ADMIN — HYDRALAZINE HYDROCHLORIDE 25 MG: 25 TABLET, FILM COATED ORAL at 14:45

## 2021-07-30 RX ADMIN — POLYETHYLENE GLYCOL 3350 17 G: 17 POWDER, FOR SOLUTION ORAL at 08:49

## 2021-07-30 RX ADMIN — ACETAMINOPHEN 650 MG: 325 TABLET ORAL at 21:21

## 2021-07-30 RX ADMIN — SODIUM CHLORIDE: 9 INJECTION, SOLUTION INTRAVENOUS at 00:21

## 2021-07-30 RX ADMIN — ENOXAPARIN SODIUM 40 MG: 40 INJECTION SUBCUTANEOUS at 08:49

## 2021-07-30 RX ADMIN — SODIUM CHLORIDE: 9 INJECTION, SOLUTION INTRAVENOUS at 14:44

## 2021-07-30 RX ADMIN — CARVEDILOL 25 MG: 25 TABLET, FILM COATED ORAL at 18:00

## 2021-07-30 RX ADMIN — INSULIN LISPRO 2 UNITS: 100 INJECTION, SOLUTION INTRAVENOUS; SUBCUTANEOUS at 21:19

## 2021-07-30 ASSESSMENT — PAIN DESCRIPTION - LOCATION: LOCATION: ABDOMEN

## 2021-07-30 ASSESSMENT — PAIN DESCRIPTION - PROGRESSION: CLINICAL_PROGRESSION: NOT CHANGED

## 2021-07-30 ASSESSMENT — PAIN DESCRIPTION - DESCRIPTORS: DESCRIPTORS: ACHING;SHARP

## 2021-07-30 ASSESSMENT — PAIN SCALES - GENERAL
PAINLEVEL_OUTOF10: 4
PAINLEVEL_OUTOF10: 4

## 2021-07-30 ASSESSMENT — PAIN - FUNCTIONAL ASSESSMENT: PAIN_FUNCTIONAL_ASSESSMENT: PREVENTS OR INTERFERES SOME ACTIVE ACTIVITIES AND ADLS

## 2021-07-30 ASSESSMENT — PAIN DESCRIPTION - FREQUENCY: FREQUENCY: CONTINUOUS

## 2021-07-30 ASSESSMENT — PAIN DESCRIPTION - PAIN TYPE: TYPE: ACUTE PAIN

## 2021-07-30 ASSESSMENT — PAIN DESCRIPTION - ORIENTATION: ORIENTATION: MID

## 2021-07-30 ASSESSMENT — PAIN DESCRIPTION - ONSET: ONSET: GRADUAL

## 2021-07-30 NOTE — PROGRESS NOTES
Hospitalist Progress Note  7/30/2021 10:22 AM    PCP: No primary care provider on file. [phone]     Date of Admission: 7/29/2021                                                                                                                     HOSPITAL COURSE    Patient demographics:  The patient  Felicita Minor is a 61 y.o. male     Significant past medical history:   Patient Active Problem List   Diagnosis    Abdominal pain    Acute pancreatitis         Presenting symptoms:      Diagnostic workup:      CONSULTS DURING ADMISSION :   IP CONSULT TO HOSPITALIST  IP CONSULT TO GI  IP CONSULT TO Coy      Patient was diagnosed with:        Treatment while inpatient:                                                                                         ----------------------------------------------------------      SUBJECTIVE COMPLAINTS- Acute pancreatitis    Diet: Diet NPO      OBJECTIVE:   Patient Active Problem List   Diagnosis    Abdominal pain    Acute pancreatitis       Allergies  Patient has no known allergies.     Medications    Scheduled Meds:   carvedilol  25 mg Oral BID WC    sodium chloride flush  5-40 mL Intravenous 2 times per day    enoxaparin  40 mg Subcutaneous Daily    polyethylene glycol  17 g Oral Daily     Continuous Infusions:   sodium chloride 75 mL/hr at 07/30/21 0731    sodium chloride       PRN Meds:  sodium chloride flush, sodium chloride, potassium chloride **OR** potassium alternative oral replacement **OR** potassium chloride, ondansetron **OR** ondansetron, polyethylene glycol, acetaminophen **OR** acetaminophen, HYDROmorphone **OR** HYDROmorphone    Vitals   Vitals /wt   Patient Vitals for the past 8 hrs:   BP Temp Temp src Pulse Resp SpO2 Weight   07/30/21 0727 -- -- -- -- -- 98 % --   07/30/21 0455 (!) 190/90 -- -- -- -- -- (!) 314 lb 9.5 oz (142.7 kg)   07/30/21 0451 (!) 177/112 98.3 °F (36.8 °C) Oral 71 18 98 % --        72HR INTAKE/OUTPUT: Intake/Output Summary (Last 24 hours) at 7/30/2021 1022  Last data filed at 7/30/2021 0851  Gross per 24 hour   Intake 2081.38 ml   Output --   Net 2081.38 ml       Exam:    Gen:   Alert and oriented ×3  Eyes: PERRL. No sclera icterus. No conjunctival injection. ENT: No discharge. Pharynx clear. External appearance of ears and nose normal.  Neck: Trachea midline. No obvious mass. Resp: No accessory muscle use. No crackles. No wheezes. No rhonchi. CV: Regular rate. Regular rhythm. No murmur or rub. No edema. GI: Abdomen is soft tender in the epigastric area  Skin: Warm, dry, normal texture and turgor. Lymph: No cervical LAD. No supraclavicular LAD. M/S: / Ext. No cyanosis. No clubbing. No joint deformity. Neuro: CN 2-12 are intact,  no neurologic deficits noted. PT/INR: No results for input(s): PROTIME, INR in the last 72 hours. APTT: No results for input(s): APTT in the last 72 hours. CBC:   Recent Labs     07/29/21  0515 07/30/21  0551   WBC 5.8 4.3   HGB 14.4 14.5   HCT 42.2 42.6   MCV 88.4 88.6    238       BMP:   Recent Labs     07/29/21  0515 07/30/21  0551    136   K 4.2 4.1    102   CO2 24 23   BUN 17 14   CREATININE 1.0 1.1       LIVER PROFILE:   Recent Labs     07/29/21 0515   ALKPHOS 74   AST 8*   ALT 6*   BILITOT 0.3     No results for input(s): AMYLASE in the last 72 hours. Recent Labs     07/29/21 0515   LIPASE 369.0*       UA:No results for input(s): NITRITE, LABCAST, WBCUA, RBCUA, MUCUS in the last 72 hours. TROPONIN:   Recent Labs     07/29/21 0515   TROPONINI <0.01       No results found for: URRFLXCULT    No results for input(s): TSHREFLEX in the last 72 hours. No components found for: [id number]  POC GLUCOSE:  No results for input(s): POCGLU in the last 72 hours. No results for input(s): LABA1C in the last 72 hours.  No results found for: LABA1C      ASSESSMENT AND PLAN    Acute pancreatitis k85.90  Start patient on IV fluids  Monitor lipase

## 2021-07-30 NOTE — PLAN OF CARE
Nutrition Problem #1: Inadequate oral intake  Intervention: Food and/or Nutrient Delivery:  (Advance diet as appropriate per MD)  Nutritional Goals:  Tolerate diet advancement with intakes >50%

## 2021-07-30 NOTE — ACP (ADVANCE CARE PLANNING)
Advance Care Planning     Advance Care Planning Activator (Inpatient)  Conversation Note      Date of ACP Conversation: 7/30/2021     Conversation Conducted with: Patient with Decision Making Capacity    ACP Activator: Cecile Escobar, [place]      Health Care Decision Maker:     Current Designated Health Care Decision Maker: Today we discussed Healthcare Decision Makers. The patient is considering options. Care Preferences    Ventilation: \"If you were in your present state of health and suddenly became very ill and were unable to breathe on your own, what would your preference be about the use of a ventilator (breathing machine) if it were available to you? \"      Would the patient desire the use of ventilator (breathing machine)?: yes    \"If your health worsens and it becomes clear that your chance of recovery is unlikely, what would your preference be about the use of a ventilator (breathing machine) if it were available to you? \"     Would the patient desire the use of ventilator (breathing machine)?: Yes      Resuscitation  \"CPR works best to restart the heart when there is a sudden event, like a heart attack, in someone who is otherwise healthy. Unfortunately, CPR does not typically restart the heart for people who have serious health conditions or who are very sick. \"    \"In the event your heart stopped as a result of an underlying serious health condition, would you want attempts to be made to restart your heart (answer \"yes\" for attempt to resuscitate) or would you prefer a natural death (answer \"no\" for do not attempt to resuscitate)? \" yes       [x] Yes   [] No   Educated Patient / Madelyn Ego regarding differences between Advance Directives and portable DNR orders.     Length of ACP Conversation in minutes:  15    Conversation Outcomes:  [] ACP discussion completed  [] Existing advance directive reviewed with patient; no changes to patient's previously recorded wishes  [] New Advance Directive completed  [] Portable Do Not Rescitate prepared for Provider review and signature  [] POLST/POST/MOLST/MOST prepared for Provider review and signature  X   Discussed with pt the needs for his HPOA to be in writing as pt has 3 children and no POA and requesting his brother be is decision maker. Referral to spiritual services. Follow-up plan:    [x] Schedule follow-up conversation to continue planning-referral to spiritual services.   [] Referred individual to Provider for additional questions/concerns   [] Advised patient/agent/surrogate to review completed ACP document and update if needed with changes in condition, patient preferences or care setting    [x] This note routed to one or more involved healthcare providers    Electronically signed by [id number] CHLOE Laguna on 7/30/2021 at 10:00 AM

## 2021-07-30 NOTE — PROGRESS NOTES
Physician Progress Note      PATIENT:               Franco Curtis  CSN #:                  [id number]  :                       1965  ADMIT DATE:       2021 5:09 AM  DISCH DATE:  RESPONDING  PROVIDER #:        Aníbal Plummer MD          QUERY TEXT:    Dear Dr Thelma Maldonado,    Patient admitted with BMI 41.51. If possible, please document in progress   notes and discharge summary if you are evaluating and /or treating any of the   following: The medical record reflects the following:  Risk Factors: hld, chf  Clinical Indicators: Documentation per nursing of Ht-6'1, Wt-314lbs and   calculated BMI-41.51  Treatment: monitor    Thank You, Emperatriz Hernandez RN CDS CRCR  [email or web address]  Options provided:  -- Obesity  -- Morbid obesity  -- Overweight  -- BMI not clinically significant  -- Other - I will add my own diagnosis  -- Disagree - Not applicable / Not valid  -- Disagree - Clinically unable to determine / Unknown  -- Refer to Clinical Documentation Reviewer    PROVIDER RESPONSE TEXT:    This patient has morbid obesity. Query created by:  Trista Hernandez on 2021 8:42 AM      Electronically signed by:  Aníbal Plummer MD 2021 10:17 AM

## 2021-07-30 NOTE — PROGRESS NOTES
Medication Reconciliation    List of medications for Elfego Jara is currently taking is complete. Source of Information:   Epic records  Conversation with patient at bedside     Allergies  Allergy list not thoroughly reviewed with patient at this time  Allergies listed in Epic as follows: Patient has no known allergies. Notes Regarding Home Medications:    Added aspirin, atorvastatin, hydralazine, and PRN torsemide  Patient stated that he does not have a PCP and will need refills of all medications at discharge      Fady Page [facility], PharmD   7/30/2021 10:19 AM

## 2021-07-30 NOTE — CARE COORDINATION
[facility]  Diabetes Education   Progress Note       NAME:  Andrea Puga RECORD NUMBER:  [id number]  AGE: 61 y.o. GENDER: male  : 1965  TODAY'S DATE:  2021    Subjective   Reason for Diabetic Education Evaluation and Assessment: hyperglycemia     Levy agrees to meet with me for diabetes education. Visit Type: evaluation      Angela Alegria is a 61 y.o. male referred by:     [] Physician  [x] Nursing  [] Chart Review   [] Other:     PAST MEDICAL HISTORY        Diagnosis Date    CHF (congestive heart failure) ([facility] [address]     Hyperlipidemia     Hypertension        PAST SURGICAL HISTORY    History reviewed. No pertinent surgical history. FAMILY HISTORY    History reviewed. No pertinent family history. SOCIAL HISTORY    Social History     Tobacco Use    Smoking status: Current Every Day Smoker     Packs/day: 0.50     Types: Cigarettes    Smokeless tobacco: Never Used   Vaping Use    Vaping Use: Never used   Substance Use Topics    Alcohol use: Not Currently    Drug use: Yes     Types: Marijuana       ALLERGIES    No Known Allergies    MEDICATIONS     aspirin  81 mg Oral Daily    atorvastatin  80 mg Oral Nightly    hydrALAZINE  25 mg Oral TID    carvedilol  25 mg Oral BID WC    sodium chloride flush  5-40 mL Intravenous 2 times per day    enoxaparin  40 mg Subcutaneous Daily    polyethylene glycol  17 g Oral Daily       Objective        Patient Active Problem List   Diagnosis Code    Abdominal pain R10.9    Acute pancreatitis K85.90        BP (!) 190/90   Pulse 71   Temp 98.3 °F (36.8 °C) (Oral)   Resp 18   Ht 6' 1\" (1.854 m)   Wt (!) 314 lb 9.5 oz (142.7 kg)   SpO2 98%   BMI 41.51 kg/m²     HgBA1c:  No results found for: LABA1C    No results for input(s): POCGLU in the last 72 hours.     BUN/Creatinine:    Lab Results   Component Value Date    BUN 14 2021    CREATININE 1.1 2021       Assessment        Diabetes Management and Education    Does the patient have a Primary Care Physician? No     Does the patient require new medication instruction? TBD       Does the patient/caregiver monitor Blood Glucoses? No:     Does the patient/caregiver follow a Meal Plan? No:   Recommend making water, unsweetened tea or coffee primary drinks. Reviewed importance of eating three meals per day and plate method for consistent carb intake. Introduced low fat guidelines. Level of patient/caregiver understanding able to:       [] Verbalized Understanding   [] Demonstrated Understanding       [] Teach Back       [x] Needs Reinforcement     []  Other:                    Does the patient/caregiver understand S/S of Hyperglycemia? No:    Reviewed symptoms, prevention and treatment. Level of patient/caregiver understanding able to:        [] Verbalized Understanding   [] Demonstrated Understanding       [] Teach Back       [x] Needs Reinforcement     []  Other:           Plan        Ongoing diabetes education and blood glucose monitoring. Recommend adding A1c.       The following educational and support materials were provided:  · My contact information  · The Diabetes Education Program:  Planning Healthy Meals   · Academy of Nutrition and Dietetics handout - Carbohydrate Counting for People with Diabetes       Teaching Time Diabetes Education:  20 minutes     Electronically signed by Arthur Houston on 7/30/2021 at 1:15 PM

## 2021-07-30 NOTE — PLAN OF CARE
Problem: Falls - Risk of:  Goal: Will remain free from falls  Description: Will remain free from falls  7/30/2021 1104 by Martine Torres RN  Outcome: Met This Shift     Problem: Falls - Risk of:  Goal: Absence of physical injury  Description: Absence of physical injury  7/30/2021 1104 by Martine Torres RN  Outcome: Met This Shift     Problem: Activity:  Goal: Risk for activity intolerance will decrease  Description: Risk for activity intolerance will decrease  7/30/2021 1104 by Martine Torres RN  Outcome: Ongoing     Problem: Bowel/Gastric:  Goal: Bowel function will improve  Description: Bowel function will improve  7/30/2021 1104 by Martine Torres RN  Outcome: Ongoing     Problem: Bowel/Gastric:  Goal: Diagnostic test results will improve  Description: Diagnostic test results will improve  7/30/2021 1104 by Martine Torres RN  Outcome: Ongoing     Problem: Bowel/Gastric:  Goal: Occurrences of nausea will decrease  Description: Occurrences of nausea will decrease  7/30/2021 1104 by Martine Torres RN  Outcome: Ongoing     Problem:  Bowel/Gastric:  Goal: Occurrences of vomiting will decrease  Description: Occurrences of vomiting will decrease  7/30/2021 1104 by Martine Torres RN  Outcome: Ongoing     Problem: Fluid Volume:  Goal: Maintenance of adequate hydration will improve  Description: Maintenance of adequate hydration will improve  7/30/2021 1104 by Martine Torres RN  Outcome: Ongoing     Problem: Health Behavior:  Goal: Ability to state signs and symptoms to report to health care provider will improve  Description: Ability to state signs and symptoms to report to health care provider will improve  7/30/2021 1104 by Martine Torres RN  Outcome: Ongoing     Problem: Physical Regulation:  Goal: Complications related to the disease process, condition or treatment will be avoided or minimized  Description: Complications related to the disease process, condition or treatment will be avoided or minimized  7/30/2021 1104 by Pavel Crowley RN  Outcome: Ongoing     Problem: Physical Regulation:  Goal: Ability to maintain clinical measurements within normal limits will improve  Description: Ability to maintain clinical measurements within normal limits will improve  7/30/2021 1104 by Pavel Crowley RN  Outcome: Ongoing     Problem: Sensory:  Goal: Ability to identify factors that increase the pain will improve  Description: Ability to identify factors that increase the pain will improve  7/30/2021 1104 by Pavel Crowley RN  Outcome: Ongoing     Problem: Sensory:  Goal: Ability to notify healthcare provider of pain before it becomes unmanageable or unbearable will improve  Description: Ability to notify healthcare provider of pain before it becomes unmanageable or unbearable will improve  7/30/2021 1104 by Pavel Crowley RN  Outcome: Ongoing     Problem: Sensory:  Goal: Pain level will decrease  Description: Pain level will decrease  7/30/2021 1104 by Pavel Crowley RN  Outcome: Met This Shift     Problem: Pain:  Description: Pain management should include both nonpharmacologic and pharmacologic interventions. Goal: Pain level will decrease  Description: Pain level will decrease  7/30/2021 1104 by Pavel Crowley RN  Outcome: Met This Shift     Problem: Pain:  Description: Pain management should include both nonpharmacologic and pharmacologic interventions. Goal: Control of acute pain  Description: Control of acute pain  Outcome: Met This Shift     Problem: Pain:  Description: Pain management should include both nonpharmacologic and pharmacologic interventions.   Goal: Control of chronic pain  Description: Control of chronic pain  Outcome: Met This Shift

## 2021-07-30 NOTE — PLAN OF CARE
Problem: Bowel/Gastric:  Goal: Bowel function will improve  Description: Bowel function will improve  Outcome: Ongoing     Problem:  Activity:  Goal: Risk for activity intolerance will decrease  Description: Risk for activity intolerance will decrease  Outcome: Ongoing     Problem: Falls - Risk of:  Goal: Will remain free from falls  Description: Will remain free from falls  7/29/2021 2359 by Susan Siddiqui RN  Outcome: Ongoing  7/29/2021 2359 by Susan Siddiqui RN  Outcome: Ongoing  7/29/2021 2023 by Wanda Sheehan RN  Outcome: Met This Shift

## 2021-07-30 NOTE — ACP (ADVANCE CARE PLANNING)
Advance Care Planning     Advance Care Planning Inpatient Note  [facility] Department    Today's Date: 7/30/2021  Unit: [facility]W MED SURG    Received request from [employer]. Upon review of chart and communication with care team, patient's decision making abilities are not in question. Patientwas/were present in the room during visit. Goals of ACP Conversation:  Discuss advance care planning documents    Health Care Decision Makers:      Click here to complete [place] including selection of the Healthcare Decision Maker Relationship (ie \"Primary\")     Today we:  Next of Kin, per patient report    Gerber 52 (Patient Wishes):  None     Assessment:  Visit w/pt/  Pt stated he wished to take the AD docs home to complete. Pt did say he wants his brother to be the primary decision maker and his children to be secondary. Interventions:  Provided education on documents for clarity and greater understanding. Discussed and provided education on state decision maker hierarchy. Reviewed but did not complete ACP document.     Outcomes/Plan:  ACP Discussion: Delayed  Pt to follow-up on AD docs    Electronically signed by Bard Madrigal on 7/30/2021 at 3:00 PM

## 2021-07-30 NOTE — PROGRESS NOTES
Comprehensive Nutrition Assessment    Type and Reason for Visit:  Initial, Positive Nutrition Screen    Nutrition Recommendations/Plan:   - Advance diet as appropriate per MD  - Monitor tolerance to diet advancement and PO intakes for possible need of ONS    Nutrition Assessment:  Positive nutrition screen. No wt hx provided in EMR. Poor intakes over the past week d/t abdominal pain with eating and drinking. Pt with acute pancreatitis. NPO until GI consult. Will monitor intakes when diet advanced for possible need of ONS. Malnutrition Assessment:  Malnutrition Status:  Insufficient data (No wt hx provided)    Context:  Chronic Illness       Estimated Daily Nutrient Needs:  Energy (kcal):  0470-0754 (8-11kcal/143kg); Weight Used for Energy Requirements:  Current     Protein (g):  101-118 (1.2-1.4g/84kg); Weight Used for Protein Requirements:  Ideal        Fluid (ml/day):1 ml/kcal      Nutrition Related Findings:  Active BS. No edema. Wounds:  None       Current Nutrition Therapies:    Diet NPO    Anthropometric Measures:  · Height: 6' 1\" (185.4 cm)  · Current Body Weight: 314 lb (142.4 kg)   · Admission Body Weight: 320 lb (145.2 kg)    · Ideal Body Weight: 184 lbs; % Ideal Body Weight 170.7 %   · BMI: 41.4  · BMI Categories: Obese Class 3 (BMI 40.0 or greater)       Nutrition Diagnosis:   · Inadequate oral intake related to pain as evidenced by poor intake prior to admission    Nutrition Interventions:   Food and/or Nutrient Delivery:   (Advance diet as appropriate per MD)  Nutrition Education/Counseling:  Education not indicated   Coordination of Nutrition Care:  Continue to monitor while inpatient    Goals:   Tolerate diet advancement with intakes >50%       Nutrition Monitoring and Evaluation:   Behavioral-Environmental Outcomes:  None Identified   Food/Nutrient Intake Outcomes:  Diet Advancement/Tolerance, Food and Nutrient Intake  Physical Signs/Symptoms Outcomes:  Biochemical Data, GI Status, Nausea

## 2021-07-30 NOTE — CARE COORDINATION
INITIAL CASE MANAGEMENT ASSESSMENT    Reviewed chart, met with patient at bedside to assess possible discharge needs. Explained Case Management role/services. Living Situation: lives in apt with steps alone, no issues with steps    ADLs: independent     DME: no equipment    PT/OT Recs: not seeing     Active Services: none     Transportation: takes the bus most places or caresource transport     Medications: [facility] [place], no issues taking or obtaining meds    PCP: no PCP, PCP list provided      HD/PD: na    PLAN/COMMENTS: pt reports he has family support from his brother Issa Jain and his 3 children. Doesn't anticipate any dc needs, will need transport home. Home care list provided if needed, currently pt just wants to be able to order food. The Plan for Transition of Care is related to the following treatment goals: home care    The Patient was provided with a choice of provider and agrees   with the discharge plan. [x] Yes [] No    Freedom of choice list was provided with basic dialogue that supports the patient's individualized plan of care/goals, treatment preferences and shares the quality data associated with the providers. [x] Yes [] No    SW/CM provided contact information for patient or family to call with any questions. SW/CM will follow and assist as needed.   Electronically signed by [id number] CHLOE See on 7/30/2021 at 9:52 AM

## 2021-07-30 NOTE — CONSULTS
[facility]   GI Consult Note      Reason for Consult:  Abdominal pain  Requesting Physician:  Lopez Haynes COMPLAINT:  Abdominal pain    History Obtained From:  patient    HISTORY OF PRESENT ILLNESS:                The patient is a 61 y.o. male with significant past medical history of CHF,HTN and elevated lipids. He is admitted with abdominal pain. US and CT are unremarkable. Lipase was elevated. He used to drink heavy. Past Medical History:        Diagnosis Date    CHF (congestive heart failure) (HCC)     Hyperlipidemia     Hypertension      Past Surgical History:    History reviewed. No pertinent surgical history.   Current Medications:    Current Facility-Administered Medications: aspirin chewable tablet 81 mg, 81 mg, Oral, Daily  atorvastatin (LIPITOR) tablet 80 mg, 80 mg, Oral, Nightly  hydrALAZINE (APRESOLINE) tablet 25 mg, 25 mg, Oral, TID  carvedilol (COREG) tablet 25 mg, 25 mg, Oral, BID WC  0.9 % sodium chloride infusion, , Intravenous, Continuous  sodium chloride flush 0.9 % injection 5-40 mL, 5-40 mL, Intravenous, 2 times per day  sodium chloride flush 0.9 % injection 10 mL, 10 mL, Intravenous, PRN  0.9 % sodium chloride infusion, 25 mL, Intravenous, PRN  potassium chloride (KLOR-CON M) extended release tablet 40 mEq, 40 mEq, Oral, PRN **OR** potassium bicarb-citric acid (EFFER-K) effervescent tablet 40 mEq, 40 mEq, Oral, PRN **OR** potassium chloride 10 mEq/100 mL IVPB (Peripheral Line), 10 mEq, Intravenous, PRN  enoxaparin (LOVENOX) injection 40 mg, 40 mg, Subcutaneous, Daily  ondansetron (ZOFRAN-ODT) disintegrating tablet 4 mg, 4 mg, Oral, Q8H PRN **OR** ondansetron (ZOFRAN) injection 4 mg, 4 mg, Intravenous, Q6H PRN  polyethylene glycol (GLYCOLAX) packet 17 g, 17 g, Oral, Daily PRN  acetaminophen (TYLENOL) tablet 650 mg, 650 mg, Oral, Q6H PRN **OR** acetaminophen (TYLENOL) suppository 650 mg, 650 mg, Rectal, Q6H PRN  polyethylene glycol (GLYCOLAX) packet 17 g, 17 g, Oral, Daily  HYDROmorphone (DILAUDID) injection 0.5 mg, 0.5 mg, Intravenous, Q3H PRN **OR** HYDROmorphone (DILAUDID) injection 1 mg, 1 mg, Intravenous, Q3H PRN  Allergies:  Patient has no known allergies. Social History:    Social History     Socioeconomic History    Marital status: Single     Spouse name: Not on file    Number of children: 3    Years of education: Not on file    Highest education level: Not on file   Occupational History     Employer: DISABLED   Tobacco Use    Smoking status: Current Every Day Smoker     Packs/day: 0.50     Types: Cigarettes    Smokeless tobacco: Never Used   Vaping Use    Vaping Use: Never used   Substance and Sexual Activity    Alcohol use: Not Currently    Drug use: Yes     Types: Marijuana    Sexual activity: Not Currently   Other Topics Concern    Not on file   Social History Narrative    Not on file     Social Determinants of Health     Financial Resource Strain:     Difficulty of Paying Living Expenses:    Food Insecurity:     Worried About Running Out of Food in the Last Year:     Ran Out of Food in the Last Year:    Transportation Needs:     Lack of Transportation (Medical):  Lack of Transportation (Non-Medical):    Physical Activity:     Days of Exercise per Week:     Minutes of Exercise per Session:    Stress:     Feeling of Stress :    Social Connections:     Frequency of Communication with Friends and Family:     Frequency of Social Gatherings with Friends and Family:     Attends Spirit[employer] Services:     Active Member of Clubs or Organizations:     Attends Club or Organization Meetings:     Marital Status:    Intimate Partner Violence:     Fear of Current or Ex-Partner:     Emotionally Abused:     Physically Abused:     Sexually Abused:        Family History:   History reviewed. No pertinent family history.   REVIEW OF SYSTEMS:    CONSTITUTIONAL:  negative  EYES:  negative  HEENT:  negative  RESPIRATORY:  negative  CARDIOVASCULAR: 07/29/2021    BASOSABS 0.1 07/29/2021     CMP:    Lab Results   Component Value Date     07/30/2021    K 4.1 07/30/2021     07/30/2021    CO2 23 07/30/2021    BUN 14 07/30/2021    CREATININE 1.1 07/30/2021    GFRAA >60 07/30/2021    AGRATIO 0.9 07/29/2021    LABGLOM >60 07/30/2021    GLUCOSE 205 07/30/2021    PROT 8.1 07/29/2021    LABALBU 3.8 07/29/2021    CALCIUM 8.9 07/30/2021    BILITOT 0.3 07/29/2021    ALKPHOS 74 07/29/2021    AST 8 07/29/2021    ALT 6 07/29/2021     Hepatic Function Panel:    Lab Results   Component Value Date    ALKPHOS 74 07/29/2021    ALT 6 07/29/2021    AST 8 07/29/2021    PROT 8.1 07/29/2021    BILITOT 0.3 07/29/2021    LABALBU 3.8 07/29/2021     Albumin:    Lab Results   Component Value Date    LABALBU 3.8 07/29/2021     Calcium:    Lab Results   Component Value Date    CALCIUM 8.9 07/30/2021     Ionized Calcium:  No results found for: IONCA  Magnesium:  No results found for: MG  Phosphorus:  No results found for: PHOS  AMYLASE:  No results found for: AMYLASE  LIPASE:    Lab Results   Component Value Date    LIPASE 369.0 07/29/2021       IMPRESSION/RECOMMENDATIONS:      1. Abdominal pain with elevated lipase. CT and US are unremarkable. He wants to eat. Get fasting lipid profile and then start low fat diet. This was discussed with his nurse.     Electronically signed by Randene Soulier, MD on 7/30/2021 at 12:25 PM

## 2021-07-31 VITALS
RESPIRATION RATE: 18 BRPM | SYSTOLIC BLOOD PRESSURE: 177 MMHG | OXYGEN SATURATION: 97 % | HEIGHT: 73 IN | DIASTOLIC BLOOD PRESSURE: 92 MMHG | TEMPERATURE: 98.5 F | WEIGHT: 315 LBS | HEART RATE: 70 BPM | BODY MASS INDEX: 41.75 KG/M2

## 2021-07-31 LAB
ANION GAP SERPL CALCULATED.3IONS-SCNC: 12 MMOL/L (ref 3–16)
BUN BLDV-MCNC: 13 MG/DL (ref 7–20)
CALCIUM SERPL-MCNC: 8.7 MG/DL (ref 8.3–10.6)
CHLORIDE BLD-SCNC: 103 MMOL/L (ref 99–110)
CO2: 22 MMOL/L (ref 21–32)
CREAT SERPL-MCNC: 1 MG/DL (ref 0.9–1.3)
ESTIMATED AVERAGE GLUCOSE: 254.7 MG/DL
GFR AFRICAN AMERICAN: >60
GFR NON-AFRICAN AMERICAN: >60
GLUCOSE BLD-MCNC: 188 MG/DL (ref 70–99)
GLUCOSE BLD-MCNC: 191 MG/DL (ref 70–99)
GLUCOSE BLD-MCNC: 202 MG/DL (ref 70–99)
GLUCOSE BLD-MCNC: 289 MG/DL (ref 70–99)
HBA1C MFR BLD: 10.5 %
HCT VFR BLD CALC: 42.2 % (ref 40.5–52.5)
HEMOGLOBIN: 14.5 G/DL (ref 13.5–17.5)
MCH RBC QN AUTO: 30.3 PG (ref 26–34)
MCHC RBC AUTO-ENTMCNC: 34.4 G/DL (ref 31–36)
MCV RBC AUTO: 88.2 FL (ref 80–100)
PDW BLD-RTO: 12.8 % (ref 12.4–15.4)
PERFORMED ON: ABNORMAL
PLATELET # BLD: 240 K/UL (ref 135–450)
PMV BLD AUTO: 8.8 FL (ref 5–10.5)
POTASSIUM SERPL-SCNC: 4 MMOL/L (ref 3.5–5.1)
RBC # BLD: 4.79 M/UL (ref 4.2–5.9)
SODIUM BLD-SCNC: 137 MMOL/L (ref 136–145)
WBC # BLD: 4.9 K/UL (ref 4–11)

## 2021-07-31 PROCEDURE — 6370000000 HC RX 637 (ALT 250 FOR IP): Performed by: HOSPITALIST

## 2021-07-31 PROCEDURE — 85027 COMPLETE CBC AUTOMATED: CPT

## 2021-07-31 PROCEDURE — 36415 COLL VENOUS BLD VENIPUNCTURE: CPT

## 2021-07-31 PROCEDURE — 94760 N-INVAS EAR/PLS OXIMETRY 1: CPT

## 2021-07-31 PROCEDURE — 6360000002 HC RX W HCPCS: Performed by: HOSPITALIST

## 2021-07-31 PROCEDURE — 2580000003 HC RX 258: Performed by: HOSPITALIST

## 2021-07-31 PROCEDURE — 80048 BASIC METABOLIC PNL TOTAL CA: CPT

## 2021-07-31 RX ORDER — LANCETS 30 GAUGE
EACH MISCELLANEOUS
Qty: 100 EACH | Refills: 0 | Status: ON HOLD | OUTPATIENT
Start: 2021-07-31 | End: 2022-10-11 | Stop reason: HOSPADM

## 2021-07-31 RX ORDER — DEXTROSE MONOHYDRATE 25 G/50ML
12.5 INJECTION, SOLUTION INTRAVENOUS PRN
Status: DISCONTINUED | OUTPATIENT
Start: 2021-07-31 | End: 2021-07-31 | Stop reason: HOSPADM

## 2021-07-31 RX ORDER — DEXTROSE MONOHYDRATE 50 MG/ML
100 INJECTION, SOLUTION INTRAVENOUS PRN
Status: DISCONTINUED | OUTPATIENT
Start: 2021-07-31 | End: 2021-07-31 | Stop reason: HOSPADM

## 2021-07-31 RX ORDER — NICOTINE POLACRILEX 4 MG
15 LOZENGE BUCCAL PRN
Status: DISCONTINUED | OUTPATIENT
Start: 2021-07-31 | End: 2021-07-31 | Stop reason: HOSPADM

## 2021-07-31 RX ORDER — HYDRALAZINE HYDROCHLORIDE 25 MG/1
25 TABLET, FILM COATED ORAL EVERY 8 HOURS
Qty: 90 TABLET | Refills: 3 | Status: SHIPPED | OUTPATIENT
Start: 2021-07-31 | End: 2021-08-25 | Stop reason: SDUPTHER

## 2021-07-31 RX ORDER — POLYETHYLENE GLYCOL 3350 17 G/17G
17 POWDER, FOR SOLUTION ORAL DAILY
Qty: 1530 G | Refills: 1 | Status: SHIPPED | OUTPATIENT
Start: 2021-07-31 | End: 2021-08-30

## 2021-07-31 RX ORDER — LANCING DEVICE
EACH MISCELLANEOUS
Qty: 100 EACH | Refills: 0 | Status: ON HOLD | OUTPATIENT
Start: 2021-07-31 | End: 2022-10-11 | Stop reason: HOSPADM

## 2021-07-31 RX ORDER — TORSEMIDE 20 MG/1
20 TABLET ORAL DAILY PRN
Qty: 30 TABLET | Refills: 3 | Status: SHIPPED | OUTPATIENT
Start: 2021-07-31 | End: 2021-09-01 | Stop reason: SDUPTHER

## 2021-07-31 RX ORDER — INSULIN GLARGINE 100 [IU]/ML
10 INJECTION, SOLUTION SUBCUTANEOUS DAILY
Status: DISCONTINUED | OUTPATIENT
Start: 2021-07-31 | End: 2021-07-31 | Stop reason: HOSPADM

## 2021-07-31 RX ORDER — ATORVASTATIN CALCIUM 80 MG/1
80 TABLET, FILM COATED ORAL NIGHTLY
Qty: 30 TABLET | Refills: 3 | Status: SHIPPED | OUTPATIENT
Start: 2021-07-31 | End: 2021-08-25 | Stop reason: SDUPTHER

## 2021-07-31 RX ORDER — HYDRALAZINE HYDROCHLORIDE 20 MG/ML
10 INJECTION INTRAMUSCULAR; INTRAVENOUS EVERY 4 HOURS PRN
Status: DISCONTINUED | OUTPATIENT
Start: 2021-07-31 | End: 2021-07-31 | Stop reason: HOSPADM

## 2021-07-31 RX ORDER — METFORMIN HYDROCHLORIDE EXTENDED-RELEASE TABLETS 1000 MG/1
1000 TABLET, FILM COATED, EXTENDED RELEASE ORAL
Qty: 30 TABLET | Refills: 1 | Status: SHIPPED | OUTPATIENT
Start: 2021-08-01 | End: 2021-08-25 | Stop reason: SDUPTHER

## 2021-07-31 RX ORDER — ASPIRIN 81 MG/1
81 TABLET, CHEWABLE ORAL DAILY
Qty: 30 TABLET | Refills: 3 | Status: SHIPPED | OUTPATIENT
Start: 2021-07-31 | End: 2021-08-25 | Stop reason: SDUPTHER

## 2021-07-31 RX ORDER — PEN NEEDLE, DIABETIC 32GX 5/32"
1 NEEDLE, DISPOSABLE MISCELLANEOUS 3 TIMES DAILY
Qty: 100 PACKAGE | Refills: 0 | Status: SHIPPED | OUTPATIENT
Start: 2021-07-31

## 2021-07-31 RX ORDER — INSULIN GLARGINE 100 [IU]/ML
10 INJECTION, SOLUTION SUBCUTANEOUS NIGHTLY
Qty: 5 PEN | Refills: 0 | Status: SHIPPED | OUTPATIENT
Start: 2021-07-31 | End: 2021-09-01

## 2021-07-31 RX ORDER — METFORMIN HYDROCHLORIDE 500 MG/1
1000 TABLET, EXTENDED RELEASE ORAL
Status: DISCONTINUED | OUTPATIENT
Start: 2021-08-01 | End: 2021-07-31 | Stop reason: HOSPADM

## 2021-07-31 RX ORDER — CALCIUM CITRATE/VITAMIN D3 200MG-6.25
1 TABLET ORAL DAILY
Qty: 100 EACH | Refills: 0 | Status: SHIPPED | OUTPATIENT
Start: 2021-07-31 | End: 2021-09-01 | Stop reason: SDUPTHER

## 2021-07-31 RX ADMIN — INSULIN GLARGINE 10 UNITS: 100 INJECTION, SOLUTION SUBCUTANEOUS at 13:59

## 2021-07-31 RX ADMIN — INSULIN LISPRO 6 UNITS: 100 INJECTION, SOLUTION INTRAVENOUS; SUBCUTANEOUS at 12:53

## 2021-07-31 RX ADMIN — HYDRALAZINE HYDROCHLORIDE 25 MG: 25 TABLET, FILM COATED ORAL at 08:06

## 2021-07-31 RX ADMIN — ENOXAPARIN SODIUM 40 MG: 40 INJECTION SUBCUTANEOUS at 08:06

## 2021-07-31 RX ADMIN — ASPIRIN 81 MG: 81 TABLET, CHEWABLE ORAL at 08:06

## 2021-07-31 RX ADMIN — INSULIN LISPRO 4 UNITS: 100 INJECTION, SOLUTION INTRAVENOUS; SUBCUTANEOUS at 08:05

## 2021-07-31 RX ADMIN — POLYETHYLENE GLYCOL 3350 17 G: 17 POWDER, FOR SOLUTION ORAL at 08:08

## 2021-07-31 RX ADMIN — SODIUM CHLORIDE: 9 INJECTION, SOLUTION INTRAVENOUS at 04:10

## 2021-07-31 RX ADMIN — HYDRALAZINE HYDROCHLORIDE 25 MG: 25 TABLET, FILM COATED ORAL at 13:59

## 2021-07-31 RX ADMIN — CARVEDILOL 25 MG: 25 TABLET, FILM COATED ORAL at 08:06

## 2021-07-31 RX ADMIN — HYDRALAZINE HYDROCHLORIDE 10 MG: 20 INJECTION INTRAMUSCULAR; INTRAVENOUS at 10:13

## 2021-07-31 NOTE — PLAN OF CARE
Problem: Nutrition  Goal: Optimal nutrition therapy  7/31/2021 0057 by Sarai Gibson RN  Outcome: Ongoing  7/31/2021 0054 by Sarai Gibson RN  Outcome: Ongoing  7/30/2021 1155 by Evaristo Andrews RD, LD  Outcome: Ongoing     Problem: Pain:  Goal: Control of acute pain  Description: Control of acute pain  7/31/2021 0057 by Sarai Gibson RN  Outcome: Ongoing  7/31/2021 0054 by Sarai Gibson RN  Outcome: Ongoing  7/30/2021 1104 by Ilsa Lundborg, RN  Outcome: Met This Shift

## 2021-07-31 NOTE — PROGRESS NOTES
Reviewed dc instructions with pt. Pt verbalized understanding. PIV removed. Dressing clean dry and intact. Pt dc to private residence via [place]. Pt dc with personal belongings.

## 2021-07-31 NOTE — PROGRESS NOTES
Pt /123 this morning. Scheduled morning coreg and hydralazine given. Pt denies any discomfort. Notified Dr. Radha Ibanez. Will continue to monitor.

## 2021-07-31 NOTE — CARE COORDINATION
Spoke with RN, pt is ready for a ride today. Scheduled with nidia.   Pt normally takes the bus for transportation but feels he cannot today  Electronically signed by [id number] CHLOE Duffy on 7/31/2021 at 3:25 PM

## 2021-07-31 NOTE — PROGRESS NOTES
Micky Vargas  Patient is eating solid food  No abdominal pain, N/V  LFTs normal  GB U/S, CT scan normal  TGs normal    IMP:  Probable idiopathic acute pancreatitis, mild; resolving/resolved    REC:  OK for discharge  The patient can f/u with Dr. Pamela Shaw in the office for any recurrent symptoms

## 2021-07-31 NOTE — PROGRESS NOTES
Hospitalist Progress Note  7/31/2021 12:09 PM    PCP: No primary care provider on file. [phone]     Date of Admission: 7/29/2021                                                                                                                     HOSPITAL COURSE    Patient demographics:  The patient  Angela Alegria is a 61 y.o. male     Significant past medical history:   Patient Active Problem List   Diagnosis    Abdominal pain    Acute pancreatitis         Presenting symptoms:  Abdominal pain    Diagnostic workup:      CONSULTS DURING ADMISSION :   IP CONSULT TO HOSPITALIST  IP CONSULT TO GI  IP CONSULT TO SPIRITUAL SERVICES  IP CONSULT TO DIABETES EDUCATOR      Patient was diagnosed with:  Acute pancreatitis      Treatment while inpatient:                                                                                         ----------------------------------------------------------      SUBJECTIVE COMPLAINTS- Acute pancreatitis    Diet: ADULT DIET; Regular; 4 carb choices (60 gm/meal); Low Fat/Low Chol/High Fiber/2 gm Na      OBJECTIVE:   Patient Active Problem List   Diagnosis    Abdominal pain    Acute pancreatitis       Allergies  Patient has no known allergies.     Medications    Scheduled Meds:   aspirin  81 mg Oral Daily    atorvastatin  80 mg Oral Nightly    hydrALAZINE  25 mg Oral TID    insulin lispro  0-12 Units Subcutaneous TID WC    insulin lispro  0-6 Units Subcutaneous Nightly    carvedilol  25 mg Oral BID WC    sodium chloride flush  5-40 mL Intravenous 2 times per day    enoxaparin  40 mg Subcutaneous Daily    polyethylene glycol  17 g Oral Daily     Continuous Infusions:   sodium chloride Stopped (07/31/21 0838)     PRN Meds:  hydrALAZINE, sodium chloride flush, sodium chloride, potassium chloride **OR** potassium alternative oral replacement **OR** potassium chloride, ondansetron **OR** ondansetron, polyethylene glycol, acetaminophen **OR** acetaminophen, HYDROmorphone **OR** HYDROmorphone    Vitals   Vitals /wt   Patient Vitals for the past 8 hrs:   BP Temp Temp src Pulse Resp SpO2 Height Weight   07/31/21 1141 -- -- -- -- 18 99 % -- --   07/31/21 1045 (!) 164/93 -- -- 66 -- -- -- --   07/31/21 1003 (!) 181/97 98.3 °F (36.8 °C) Oral 67 18 97 % -- --   07/31/21 0806 (!) 198/123 -- -- 70 -- -- -- --   07/31/21 0600 (!) 180/90 98.4 °F (36.9 °C) Oral 76 16 96 % 6' 1\" (1.854 m) (!) 326 lb 4.5 oz (148 kg)        72HR INTAKE/OUTPUT:      Intake/Output Summary (Last 24 hours) at 7/31/2021 1209  Last data filed at 7/31/2021 1003  Gross per 24 hour   Intake 2518.28 ml   Output --   Net 2518.28 ml       Exam:    Gen:   Alert and oriented ×3  Eyes: PERRL. No sclera icterus. No conjunctival injection. ENT: No discharge. Pharynx clear. External appearance of ears and nose normal.  Neck: Trachea midline. No obvious mass. Resp: No accessory muscle use. No crackles. No wheezes. No rhonchi. CV: Regular rate. Regular rhythm. No murmur or rub. No edema. GI: Abdomen is soft tender in the epigastric area  Skin: Warm, dry, normal texture and turgor. Lymph: No cervical LAD. No supraclavicular LAD. M/S: / Ext. No cyanosis. No clubbing. No joint deformity. Neuro: CN 2-12 are intact,  no neurologic deficits noted. PT/INR: No results for input(s): PROTIME, INR in the last 72 hours. APTT: No results for input(s): APTT in the last 72 hours. CBC:   Recent Labs     07/29/21  0515 07/30/21  0551 07/31/21  0545   WBC 5.8 4.3 4.9   HGB 14.4 14.5 14.5   HCT 42.2 42.6 42.2   MCV 88.4 88.6 88.2    238 240       BMP:   Recent Labs     07/29/21  0515 07/30/21  0551 07/31/21  0545    136 137   K 4.2 4.1 4.0    102 103   CO2 24 23 22   BUN 17 14 13   CREATININE 1.0 1.1 1.0       LIVER PROFILE:   Recent Labs     07/29/21  0515   ALKPHOS 74   AST 8*   ALT 6*   BILITOT 0.3     No results for input(s): AMYLASE in the last 72 hours.     Recent Labs     07/29/21  0515   LIPASE 369.0*

## 2021-07-31 NOTE — PLAN OF CARE
Problem: Activity:  Goal: Risk for activity intolerance will decrease  Description: Risk for activity intolerance will decrease  7/31/2021 0054 by Steve Marcano RN  Outcome: Ongoing  7/30/2021 1104 by Marian Alba RN  Outcome: Ongoing     Problem:  Bowel/Gastric:  Goal: Bowel function will improve  Description: Bowel function will improve  7/31/2021 0054 by Steve Marcano RN  Outcome: Ongoing  7/30/2021 1104 by Marian Alba RN  Outcome: Ongoing     Problem: Pain:  Goal: Control of acute pain  Description: Control of acute pain  7/31/2021 0054 by Steve Marcano RN  Outcome: Ongoing  7/30/2021 1104 by Marian Alba RN  Outcome: Met This Shift     Problem: Physical Regulation:  Goal: Ability to maintain clinical measurements within normal limits will improve  Description: Ability to maintain clinical measurements within normal limits will improve  7/31/2021 0054 by Steve Marcano RN  Outcome: Ongoing  7/30/2021 1104 by Marian Alba RN  Outcome: Ongoing

## 2021-08-01 NOTE — DISCHARGE SUMMARY
Hospital Medicine Discharge Summary      Patient ID: Darius Ervin , [id number]     Patient's PCP: No primary care provider on file. Admit Date: 7/29/2021     Discharge Date: 7/31/2021      Admitting Physician: Maxi Butler MD    Discharge Physician: Tania Wong MD     Discharge Diagnoses: Active Hospital Problems    Diagnosis Date Noted    Abdominal pain [R10.9] 07/29/2021    Acute pancreatitis [K85.90] 07/29/2021         The patient was seen and examined on the day of discharge and this discharge summary is in conjunction with any daily progress note from day of discharge. HOSPITAL COURSE  Patient demographics:  The patient  Darius Ervin is a 61 y.o. male      Significant past medical history:       Patient Active Problem List   Diagnosis    Abdominal pain    Acute pancreatitis            Presenting symptoms:  Abdominal pain     Diagnostic workup:          CT ABDOMEN PELVIS W IV CONTRAST          US GALLBLADDER RUQ       CONSULTS DURING ADMISSION :   IP CONSULT TO HOSPITALIST  IP CONSULT TO GI  IP CONSULT TO SPIRITUAL SERVICES  IP CONSULT TO DIABETES EDUCATOR        Patient was diagnosed with:  Acute pancreatitis-idiopathic  New onset diabetes mellitus with A1c of 10.5     Treatment while inpatient:                Pt presented to the emergency room with nausea and abdominal pain. Patient's lipase levels were elevated. CT scan and ultrasound were unremarkable. Patient's diet was gradually advanced. Patient was also diagnosed with new onset diabetes mellitus with hemoglobin A1c of 10.5. Diabetic nurse educator was consulted to educate the patient. Patient will be discharged home on both insulin and Metformin. Discharge Condition:  stable     Discharged to:  Home     Activity:   as tolerated: Follow Up: Follow-up with PCP in 1-2 weeks                        Labs:  For convenience and continuity at follow-up the following most recent labs are provided:      CBC: 1 capsule by mouth daily             polyethylene glycol (GLYCOLAX) 17 GM/SCOOP powder  Take 17 g by mouth daily             torsemide (DEMADEX) 20 MG tablet  Take 1 tablet by mouth daily as needed (swelling or weight gain)                    Time Spent on discharge is more than 30 min in the examination, evaluation, counseling and review of medications and discharge plan. Signed:  Kash Levine MD   8/1/2021      Thank you No primary care provider on file. for the opportunity to be involved in this patient's care. If you have any questions or concerns please feel free to contact me at [phone]. This note was transcribed using [id number] [employer]. Please disregard any translational errors.

## 2021-08-25 ENCOUNTER — OFFICE VISIT (OUTPATIENT)
Dept: INTERNAL MEDICINE CLINIC | Age: 56
End: 2021-08-25
Payer: COMMERCIAL

## 2021-08-25 VITALS
HEIGHT: 72 IN | OXYGEN SATURATION: 98 % | SYSTOLIC BLOOD PRESSURE: 156 MMHG | BODY MASS INDEX: 42.39 KG/M2 | DIASTOLIC BLOOD PRESSURE: 90 MMHG | WEIGHT: 313 LBS | HEART RATE: 86 BPM

## 2021-08-25 DIAGNOSIS — I10 DIABETES MELLITUS WITH COINCIDENT HYPERTENSION (HCC): ICD-10-CM

## 2021-08-25 DIAGNOSIS — Z87.891 PERSONAL HISTORY OF TOBACCO USE, PRESENTING HAZARDS TO HEALTH: ICD-10-CM

## 2021-08-25 DIAGNOSIS — I50.9 CHRONIC CONGESTIVE HEART FAILURE, UNSPECIFIED HEART FAILURE TYPE (HCC): ICD-10-CM

## 2021-08-25 DIAGNOSIS — E11.9 DIABETES MELLITUS WITH COINCIDENT HYPERTENSION (HCC): ICD-10-CM

## 2021-08-25 DIAGNOSIS — E11.69 OBESITY, DIABETES, AND HYPERTENSION SYNDROME (HCC): Primary | ICD-10-CM

## 2021-08-25 DIAGNOSIS — E11.59 OBESITY, DIABETES, AND HYPERTENSION SYNDROME (HCC): Primary | ICD-10-CM

## 2021-08-25 DIAGNOSIS — E66.9 OBESITY, DIABETES, AND HYPERTENSION SYNDROME (HCC): Primary | ICD-10-CM

## 2021-08-25 DIAGNOSIS — F17.210 CIGARETTE SMOKER: ICD-10-CM

## 2021-08-25 DIAGNOSIS — Z76.89 ENCOUNTER TO ESTABLISH CARE WITH NEW DOCTOR: ICD-10-CM

## 2021-08-25 DIAGNOSIS — I15.2 OBESITY, DIABETES, AND HYPERTENSION SYNDROME (HCC): Primary | ICD-10-CM

## 2021-08-25 DIAGNOSIS — K21.9 GASTROESOPHAGEAL REFLUX DISEASE, UNSPECIFIED WHETHER ESOPHAGITIS PRESENT: ICD-10-CM

## 2021-08-25 PROBLEM — E78.5 TYPE 2 DIABETES MELLITUS WITH HYPERLIPIDEMIA (HCC): Status: ACTIVE | Noted: 2021-08-25

## 2021-08-25 PROCEDURE — 4004F PT TOBACCO SCREEN RCVD TLK: CPT | Performed by: FAMILY MEDICINE

## 2021-08-25 PROCEDURE — 2022F DILAT RTA XM EVC RTNOPTHY: CPT | Performed by: FAMILY MEDICINE

## 2021-08-25 PROCEDURE — 99406 BEHAV CHNG SMOKING 3-10 MIN: CPT | Performed by: FAMILY MEDICINE

## 2021-08-25 PROCEDURE — G8427 DOCREV CUR MEDS BY ELIG CLIN: HCPCS | Performed by: FAMILY MEDICINE

## 2021-08-25 PROCEDURE — 1111F DSCHRG MED/CURRENT MED MERGE: CPT | Performed by: FAMILY MEDICINE

## 2021-08-25 PROCEDURE — G8417 CALC BMI ABV UP PARAM F/U: HCPCS | Performed by: FAMILY MEDICINE

## 2021-08-25 PROCEDURE — 3017F COLORECTAL CA SCREEN DOC REV: CPT | Performed by: FAMILY MEDICINE

## 2021-08-25 PROCEDURE — 99205 OFFICE O/P NEW HI 60 MIN: CPT | Performed by: FAMILY MEDICINE

## 2021-08-25 PROCEDURE — 3046F HEMOGLOBIN A1C LEVEL >9.0%: CPT | Performed by: FAMILY MEDICINE

## 2021-08-25 RX ORDER — CARVEDILOL 25 MG/1
25 TABLET ORAL 2 TIMES DAILY WITH MEALS
Qty: 60 TABLET | Refills: 0 | Status: SHIPPED | OUTPATIENT
Start: 2021-08-25 | End: 2021-09-17

## 2021-08-25 RX ORDER — METFORMIN HYDROCHLORIDE EXTENDED-RELEASE TABLETS 1000 MG/1
1000 TABLET, FILM COATED, EXTENDED RELEASE ORAL
Qty: 30 TABLET | Refills: 1 | Status: SHIPPED | OUTPATIENT
Start: 2021-08-25 | End: 2021-09-01 | Stop reason: SDUPTHER

## 2021-08-25 RX ORDER — ASPIRIN 81 MG/1
81 TABLET, CHEWABLE ORAL DAILY
Qty: 30 TABLET | Refills: 3 | Status: ON HOLD | OUTPATIENT
Start: 2021-08-25 | End: 2022-03-22 | Stop reason: SDUPTHER

## 2021-08-25 RX ORDER — OMEPRAZOLE 20 MG/1
20 CAPSULE, DELAYED RELEASE ORAL DAILY
Qty: 30 CAPSULE | Refills: 0 | Status: SHIPPED | OUTPATIENT
Start: 2021-08-25 | End: 2021-09-17

## 2021-08-25 RX ORDER — ATORVASTATIN CALCIUM 80 MG/1
80 TABLET, FILM COATED ORAL NIGHTLY
Qty: 30 TABLET | Refills: 3 | Status: SHIPPED | OUTPATIENT
Start: 2021-08-25 | End: 2022-01-13

## 2021-08-25 RX ORDER — HYDRALAZINE HYDROCHLORIDE 25 MG/1
25 TABLET, FILM COATED ORAL EVERY 8 HOURS
Qty: 90 TABLET | Refills: 3 | Status: ON HOLD | OUTPATIENT
Start: 2021-08-25 | End: 2022-03-22 | Stop reason: HOSPADM

## 2021-08-25 RX ORDER — FAMOTIDINE 20 MG/1
20 TABLET, FILM COATED ORAL 2 TIMES DAILY
Qty: 60 TABLET | Refills: 0 | Status: SHIPPED | OUTPATIENT
Start: 2021-08-25 | End: 2021-09-17

## 2021-08-25 ASSESSMENT — PATIENT HEALTH QUESTIONNAIRE - PHQ9
SUM OF ALL RESPONSES TO PHQ QUESTIONS 1-9: 0
1. LITTLE INTEREST OR PLEASURE IN DOING THINGS: 0
2. FEELING DOWN, DEPRESSED OR HOPELESS: 0
SUM OF ALL RESPONSES TO PHQ QUESTIONS 1-9: 0
SUM OF ALL RESPONSES TO PHQ9 QUESTIONS 1 & 2: 0
SUM OF ALL RESPONSES TO PHQ QUESTIONS 1-9: 0

## 2021-08-25 ASSESSMENT — ENCOUNTER SYMPTOMS
SHORTNESS OF BREATH: 0
COUGH: 0
VOMITING: 0
NAUSEA: 0
CONSTIPATION: 0
DIARRHEA: 0

## 2021-08-25 NOTE — PATIENT INSTRUCTIONS
Stopping Smoking: Care Instructions  Your Care Instructions     Cigarette smokers crave the nicotine in cigarettes. Giving it up is much harder than simply changing a habit. Your body has to stop craving the nicotine. It is hard to quit, but you can do it. There are many tools that people use to quit smoking. You may find that combining tools works best for you. There are several steps to quitting. First you get ready to quit. Then you get support to help you. After that, you learn new skills and behaviors to become a nonsmoker. For many people, a necessary step is getting and using medicine. Your doctor will help you set up the plan that best meets your needs. You may want to attend a smoking cessation program to help you quit smoking. When you choose a program, look for one that has proven success. Ask your doctor for ideas. You will greatly increase your chances of success if you take medicine as well as get counseling or join a cessation program.  Some of the changes you feel when you first quit tobacco are uncomfortable. Your body will miss the nicotine at first, and you may feel short-tempered and grumpy. You may have trouble sleeping or concentrating. Medicine can help you deal with these symptoms. You may struggle with changing your smoking habits and rituals. The last step is the tricky one: Be prepared for the smoking urge to continue for a time. This is a lot to deal with, but keep at it. You will feel better. Follow-up care is a key part of your treatment and safety. Be sure to make and go to all appointments, and call your doctor if you are having problems. It's also a good idea to know your test results and keep a list of the medicines you take. How can you care for yourself at home? · Ask your family, friends, and coworkers for support. You have a better chance of quitting if you have help and support.   · Join a support group, such as Nicotine Anonymous, for people who are trying to quit smoking. · Consider signing up for a smoking cessation program, such as the American Lung Association's Freedom from Smoking program.  · Get text messaging support. Go to the website at www.smokefree. gov to sign up for the [facility] program.  · Set a quit date. Pick your date carefully so that it is not right in the middle of a big deadline or stressful time. Once you quit, do not even take a puff. Get rid of all ashtrays and lighters after your last cigarette. Clean your house and your clothes so that they do not smell of smoke. · Learn how to be a nonsmoker. Think about ways you can avoid those things that make you reach for a cigarette. ? Avoid situations that put you at greatest risk for smoking. For some people, it is hard to have a drink with friends without smoking. For others, they might skip a coffee break with coworkers who smoke. ? Change your daily routine. Take a different route to work or eat a meal in a different place. · Cut down on stress. Calm yourself or release tension by doing an activity you enjoy, such as reading a book, taking a hot bath, or gardening. · Talk to your doctor or pharmacist about nicotine replacement therapy, which replaces the nicotine in your body. You still get nicotine but you do not use tobacco. Nicotine replacement products help you slowly reduce the amount of nicotine you need. These products come in several forms, many of them available over-the-counter:  ? Nicotine patches  ? Nicotine gum and lozenges  ? Nicotine inhaler  · Ask your doctor about bupropion (Wellbutrin) or varenicline (Chantix), which are prescription medicines. They do not contain nicotine. They help you by reducing withdrawal symptoms, such as stress and anxiety. · Some people find hypnosis, acupuncture, and massage helpful for ending the smoking habit. · Eat a healthy diet and get regular exercise. Having healthy habits will help your body move past its craving for nicotine.   · Be prepared to keep trying. Most people are not successful the first few times they try to quit. Do not get mad at yourself if you smoke again. Make a list of things you learned and think about when you want to try again, such as next week, next month, or next year. Where can you learn more? Go to https://chpemadiha.AlixaRx. org and sign in to your Cheers In account. Enter M283 in the [employer] box to learn more about \"Stopping Smoking: Care Instructions. \"     If you do not have an account, please click on the \"Sign Up Now\" link. Current as of: February 11, 2021               Content Version: 12.9  © 2006-2021 [employer]. Care instructions adapted under license by [facility] [address] If you have questions about a medical condition or this instruction, always ask your healthcare professional. [place] [address] any warranty or liability for your use of this information. Learning About Benefits From Quitting Smoking  How does quitting smoking make you healthier? If you're thinking about quitting smoking, you may have a few reasons to be smoke-free. Your health may be one of them. · When you quit smoking, you lower your risks for cancer, lung disease, heart attack, stroke, blood vessel disease, and blindness from macular degeneration. · When you're smoke-free, you get sick less often, and you heal faster. You are less likely to get colds, flu, bronchitis, and pneumonia. · As a nonsmoker, you may find that your mood is better and you are less stressed. When and how will you feel healthier? Quitting has real health benefits that start from day 1 of being smoke-free. And the longer you stay smoke-free, the healthier you get and the better you feel. The first hours  · After just 20 minutes, your blood pressure and heart rate go down. That means there's less stress on your heart and blood vessels.   · Within 12 hours, the level of carbon monoxide in your blood drops back to normal. That makes room for more oxygen. With more oxygen in your body, you may notice that you have more energy than when you smoked. After 2 weeks  · Your lungs start to work better. · Your risk of heart attack starts to drop. After 1 month  · When your lungs are clear, you cough less and breathe deeper, so it's easier to be active. · Your sense of taste and smell return. That means you can enjoy food more than you have since you started smoking. Over the years  · Over the years, your risks of heart disease, heart attack, and stroke are lower. · After 10 years, your risk of dying from lung cancer is cut by about half. And your risk for many other types of cancer is lower too. How would quitting help others in your life? When you quit smoking, you improve the health of everyone who now breathes in your smoke. · Their heart, lung, and cancer risks drop, much like yours. · They are sick less. For babies and small children, living smoke-free means they're less likely to have ear infections, pneumonia, and bronchitis. · If you're a woman who is or will be pregnant someday, quitting smoking means a healthier . · Children who are close to you are less likely to become adult smokers. Where can you learn more? Go to https://LoadSpring Solutions.Targazyme. org and sign in to your DriveFactor account. Enter [phone] in the [facility] box to learn more about \"Learning About Benefits From Quitting Smoking. \"     If you do not have an account, please click on the \"Sign Up Now\" link. Current as of: 2021               Content Version: 12.9  © 3932-1200 [employer]. Care instructions adapted under license by [facility] ([facility]). If you have questions about a medical condition or this instruction, always ask your healthcare professional. [place] [address] any warranty or liability for your use of this information.

## 2021-08-25 NOTE — PROGRESS NOTES
Kaur Mena (:  1965) is a 61 y.o. male,New patient, here for evaluation of the following chief complaint(s):  New Patient      SUBJECTIVE:  Was told that he was diabetic when he was in the hospital in July for an episode of pancreatitis. Was unsure about most of the medications that he was supposed to be taking, therefore he did not start them or ran out of the medications and has only been taking the Hydralazine for his blood pressure  Does have hx of CHF, unsure if systolic or diastolic. Diabetes  He presents for his initial diabetic visit. He has type 2 diabetes mellitus. No MedicAlert identification noted. Pertinent negatives for diabetes include no polyphagia and no polyuria. Symptoms are worsening. Diabetic complications include heart disease. Risk factors for coronary artery disease include diabetes mellitus. OBJECTIVE:  Review of Systems   Constitutional: Negative for chills and fever. Respiratory: Negative for cough and shortness of breath. Cardiovascular: Negative for leg swelling. Gastrointestinal: Negative for constipation, diarrhea, nausea and vomiting. Endocrine: Negative for polyphagia and polyuria. Genitourinary: Negative for decreased urine volume, dysuria, frequency, hematuria and urgency. Skin: Negative for rash. Vitals:    21 1335   BP: (!) 156/90   Site: Right Upper Arm   Pulse: 86   SpO2: 98%   Weight: (!) 313 lb (142 kg)   Height: 6' (1.829 m)      Body mass index is 42.45 kg/m². Physical Exam  Constitutional:       Appearance: Normal appearance. He is obese. Cardiovascular:      Rate and Rhythm: Normal rate and regular rhythm. Pulses: Normal pulses. Heart sounds: Normal heart sounds. Pulmonary:      Effort: Pulmonary effort is normal.      Breath sounds: Normal breath sounds. Musculoskeletal:      Right lower leg: No edema. Left lower leg: No edema. Neurological:      General: No focal deficit present.       Mental Status: He is alert. Mental status is at baseline. Psychiatric:         Mood and Affect: Mood normal.         Thought Content: Thought content normal.           1. Obesity, diabetes, and hypertension syndrome (HCC)  -     CBC Auto Differential; Future  -     Hemoglobin A1C; Future  -     Lipid Panel; Future  -     Comprehensive Metabolic Panel; Future  -     Ambulatory Referral to Clinical Pharmacy  -     metFORMIN, OSM, (FORTAMET) 1000 MG extended release tablet; Take 1 tablet by mouth daily (with breakfast), Disp-30 tablet, R-1Normal  -     MICROALBUMIN / CREATININE URINE RATIO; Future  2. Diabetes mellitus with coincident hypertension ([facility]  -     Ambulatory Referral to Clinical Pharmacy  -     aspirin 81 MG chewable tablet; Take 1 tablet by mouth daily, Disp-30 tablet, R-3Normal  -     atorvastatin (LIPITOR) 80 MG tablet; Take 1 tablet by mouth nightly, Disp-30 tablet, R-3Normal  -     metFORMIN, OSM, (FORTAMET) 1000 MG extended release tablet; Take 1 tablet by mouth daily (with breakfast), Disp-30 tablet, R-1Normal  -     MICROALBUMIN / CREATININE URINE RATIO; Future  3. Encounter to establish care with new doctor  -     Hepatitis C Antibody; Future  -     HIV Screen; Future  4. Chronic congestive heart failure, unspecified heart failure type ([facility]  -     Hemoglobin A1C; Future  -     BRAIN NATRIURETIC PEPTIDE (BNP); Future  -     [place] TOBACCO USE CESSATION INTERMEDIATE 3-10 MINUTES [86017]  -     aspirin 81 MG chewable tablet; Take 1 tablet by mouth daily, Disp-30 tablet, R-3Normal  -     carvedilol (COREG) 25 MG tablet; Take 1 tablet by mouth 2 times daily (with meals), Disp-60 tablet, R-0Normal  -     hydrALAZINE (APRESOLINE) 25 MG tablet; Take 1 tablet by mouth every 8 hours, Disp-90 tablet, R-3Normal  5. Gastroesophageal reflux disease, unspecified whether esophagitis present  -     famotidine (PEPCID) 20 MG tablet;  Take 1 tablet by mouth 2 times daily, Disp-60 tablet, R-0Normal  -     omeprazole (PRILOSEC) 20 MG delayed release capsule; Take 1 capsule by mouth daily, Disp-30 capsule, R-0Normal  6. Personal history of tobacco use, presenting hazards to health  -     [place] TOBACCO USE CESSATION INTERMEDIATE 3-10 MINUTES [28761]  7. Cigarette smoker  -     OK TOBACCO USE CESSATION INTERMEDIATE 3-10 MINUTES [25021]    Has been taking the hydralazine only for his BP. Hold off on re prescribing on the insulin and the torsemide. No swelling.     - I independently reviewed any relevant imaging/PFT/EKG and agree with documented report. 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 Return in about 1 week (around 9/1/2021).     [facility] - Internal Medicine and Pediatrics  Dr. Jayna Oakes D.O.  - Family Medicine and OMT      Electronically signed by Jayna Oakes DO on 8/25/2021 at 2:09 PM.

## 2021-08-26 PROBLEM — E11.69 OBESITY, DIABETES, AND HYPERTENSION SYNDROME (HCC): Chronic | Status: ACTIVE | Noted: 2021-08-25

## 2021-08-26 PROBLEM — E66.9 OBESITY, DIABETES, AND HYPERTENSION SYNDROME (HCC): Chronic | Status: ACTIVE | Noted: 2021-08-25

## 2021-08-26 PROBLEM — E78.2 MIXED HYPERLIPIDEMIA: Status: ACTIVE | Noted: 2019-03-28

## 2021-08-26 PROBLEM — E11.59 OBESITY, DIABETES, AND HYPERTENSION SYNDROME (HCC): Chronic | Status: ACTIVE | Noted: 2021-08-25

## 2021-08-26 PROBLEM — K27.9 PUD (PEPTIC ULCER DISEASE): Status: ACTIVE | Noted: 2019-03-01

## 2021-08-26 PROBLEM — E78.5 TYPE 2 DIABETES MELLITUS WITH HYPERLIPIDEMIA (HCC): Chronic | Status: ACTIVE | Noted: 2021-08-25

## 2021-08-26 PROBLEM — I10 DIABETES MELLITUS WITH COINCIDENT HYPERTENSION (HCC): Chronic | Status: ACTIVE | Noted: 2021-08-25

## 2021-08-26 PROBLEM — R73.03 PRE-DIABETES: Status: RESOLVED | Noted: 2017-03-09 | Resolved: 2021-08-26

## 2021-08-26 PROBLEM — K85.90 ACUTE PANCREATITIS: Status: RESOLVED | Noted: 2021-07-29 | Resolved: 2021-08-26

## 2021-08-26 PROBLEM — I50.22 CHRONIC SYSTOLIC HEART FAILURE (HCC): Chronic | Status: ACTIVE | Noted: 2019-06-28

## 2021-08-26 PROBLEM — E11.9 DIABETES MELLITUS WITH COINCIDENT HYPERTENSION (HCC): Chronic | Status: ACTIVE | Noted: 2021-08-25

## 2021-08-26 PROBLEM — R10.9 ABDOMINAL PAIN: Status: RESOLVED | Noted: 2021-07-29 | Resolved: 2021-08-26

## 2021-08-26 PROBLEM — K27.9 PUD (PEPTIC ULCER DISEASE): Status: RESOLVED | Noted: 2019-03-01 | Resolved: 2021-08-26

## 2021-08-26 PROBLEM — K25.5 GASTRIC ULCER WITH PERFORATION (HCC): Status: ACTIVE | Noted: 2018-02-06

## 2021-08-26 PROBLEM — R73.03 PRE-DIABETES: Status: ACTIVE | Noted: 2017-03-09

## 2021-08-26 PROBLEM — E11.69 TYPE 2 DIABETES MELLITUS WITH HYPERLIPIDEMIA (HCC): Chronic | Status: ACTIVE | Noted: 2021-08-25

## 2021-08-26 PROBLEM — F17.210 CIGARETTE SMOKER: Status: RESOLVED | Noted: 2021-08-25 | Resolved: 2021-08-26

## 2021-08-26 PROBLEM — R94.39 ABNORMAL STRESS TEST: Status: ACTIVE | Noted: 2019-09-24

## 2021-08-26 PROBLEM — I15.2 OBESITY, DIABETES, AND HYPERTENSION SYNDROME (HCC): Chronic | Status: ACTIVE | Noted: 2021-08-25

## 2021-08-26 PROBLEM — I50.22 CHRONIC SYSTOLIC HEART FAILURE (HCC): Status: ACTIVE | Noted: 2019-06-28

## 2021-08-26 PROBLEM — K21.9 GASTROESOPHAGEAL REFLUX DISEASE: Chronic | Status: ACTIVE | Noted: 2021-08-25

## 2021-08-27 DIAGNOSIS — E11.59 OBESITY, DIABETES, AND HYPERTENSION SYNDROME (HCC): ICD-10-CM

## 2021-08-27 DIAGNOSIS — E11.69 OBESITY, DIABETES, AND HYPERTENSION SYNDROME (HCC): ICD-10-CM

## 2021-08-27 DIAGNOSIS — E11.9 DIABETES MELLITUS WITH COINCIDENT HYPERTENSION (HCC): Primary | ICD-10-CM

## 2021-08-27 DIAGNOSIS — I15.2 OBESITY, DIABETES, AND HYPERTENSION SYNDROME (HCC): ICD-10-CM

## 2021-08-27 DIAGNOSIS — E66.9 OBESITY, DIABETES, AND HYPERTENSION SYNDROME (HCC): ICD-10-CM

## 2021-08-27 DIAGNOSIS — I10 DIABETES MELLITUS WITH COINCIDENT HYPERTENSION (HCC): Primary | ICD-10-CM

## 2021-09-01 ENCOUNTER — OFFICE VISIT (OUTPATIENT)
Dept: INTERNAL MEDICINE CLINIC | Age: 56
End: 2021-09-01
Payer: COMMERCIAL

## 2021-09-01 VITALS
DIASTOLIC BLOOD PRESSURE: 92 MMHG | BODY MASS INDEX: 42.45 KG/M2 | RESPIRATION RATE: 12 BRPM | WEIGHT: 313 LBS | OXYGEN SATURATION: 97 % | HEART RATE: 78 BPM | TEMPERATURE: 98.2 F | SYSTOLIC BLOOD PRESSURE: 163 MMHG

## 2021-09-01 DIAGNOSIS — E11.69 TYPE 2 DIABETES MELLITUS WITH HYPERLIPIDEMIA (HCC): Chronic | ICD-10-CM

## 2021-09-01 DIAGNOSIS — I10 DIABETES MELLITUS WITH COINCIDENT HYPERTENSION (HCC): ICD-10-CM

## 2021-09-01 DIAGNOSIS — E11.9 DIABETES MELLITUS WITH COINCIDENT HYPERTENSION (HCC): ICD-10-CM

## 2021-09-01 DIAGNOSIS — E78.5 TYPE 2 DIABETES MELLITUS WITH HYPERLIPIDEMIA (HCC): Chronic | ICD-10-CM

## 2021-09-01 DIAGNOSIS — E11.9 DIABETES MELLITUS TYPE 2, INSULIN DEPENDENT (HCC): ICD-10-CM

## 2021-09-01 DIAGNOSIS — E66.9 OBESITY, DIABETES, AND HYPERTENSION SYNDROME (HCC): Primary | ICD-10-CM

## 2021-09-01 DIAGNOSIS — I50.22 CHRONIC SYSTOLIC HEART FAILURE (HCC): Chronic | ICD-10-CM

## 2021-09-01 DIAGNOSIS — E11.59 OBESITY, DIABETES, AND HYPERTENSION SYNDROME (HCC): Primary | ICD-10-CM

## 2021-09-01 DIAGNOSIS — I50.9 CHRONIC CONGESTIVE HEART FAILURE, UNSPECIFIED HEART FAILURE TYPE (HCC): ICD-10-CM

## 2021-09-01 DIAGNOSIS — I15.2 OBESITY, DIABETES, AND HYPERTENSION SYNDROME (HCC): Primary | ICD-10-CM

## 2021-09-01 DIAGNOSIS — Z79.4 DIABETES MELLITUS TYPE 2, INSULIN DEPENDENT (HCC): ICD-10-CM

## 2021-09-01 DIAGNOSIS — K21.9 GASTROESOPHAGEAL REFLUX DISEASE, UNSPECIFIED WHETHER ESOPHAGITIS PRESENT: Chronic | ICD-10-CM

## 2021-09-01 DIAGNOSIS — E11.69 OBESITY, DIABETES, AND HYPERTENSION SYNDROME (HCC): Primary | ICD-10-CM

## 2021-09-01 PROCEDURE — 3017F COLORECTAL CA SCREEN DOC REV: CPT | Performed by: FAMILY MEDICINE

## 2021-09-01 PROCEDURE — 4004F PT TOBACCO SCREEN RCVD TLK: CPT | Performed by: FAMILY MEDICINE

## 2021-09-01 PROCEDURE — 99214 OFFICE O/P EST MOD 30 MIN: CPT | Performed by: FAMILY MEDICINE

## 2021-09-01 PROCEDURE — 2022F DILAT RTA XM EVC RTNOPTHY: CPT | Performed by: FAMILY MEDICINE

## 2021-09-01 PROCEDURE — G8417 CALC BMI ABV UP PARAM F/U: HCPCS | Performed by: FAMILY MEDICINE

## 2021-09-01 PROCEDURE — 3046F HEMOGLOBIN A1C LEVEL >9.0%: CPT | Performed by: FAMILY MEDICINE

## 2021-09-01 PROCEDURE — G8427 DOCREV CUR MEDS BY ELIG CLIN: HCPCS | Performed by: FAMILY MEDICINE

## 2021-09-01 RX ORDER — INSULIN GLARGINE 100 [IU]/ML
20 INJECTION, SOLUTION SUBCUTANEOUS NIGHTLY
Qty: 5 PEN | Refills: 0 | Status: ON HOLD | OUTPATIENT
Start: 2021-09-01 | End: 2022-03-22 | Stop reason: HOSPADM

## 2021-09-01 RX ORDER — CALCIUM CITRATE/VITAMIN D3 200MG-6.25
1 TABLET ORAL DAILY
Qty: 100 EACH | Refills: 0 | Status: ON HOLD | OUTPATIENT
Start: 2021-09-01 | End: 2022-03-22 | Stop reason: HOSPADM

## 2021-09-01 RX ORDER — METFORMIN HYDROCHLORIDE EXTENDED-RELEASE TABLETS 1000 MG/1
1000 TABLET, FILM COATED, EXTENDED RELEASE ORAL
Qty: 30 TABLET | Refills: 1 | Status: SHIPPED | OUTPATIENT
Start: 2021-09-01 | End: 2021-09-03

## 2021-09-01 RX ORDER — TORSEMIDE 20 MG/1
20 TABLET ORAL DAILY PRN
Qty: 30 TABLET | Refills: 1 | Status: ON HOLD | OUTPATIENT
Start: 2021-09-01 | End: 2022-03-22 | Stop reason: HOSPADM

## 2021-09-01 RX ORDER — LOSARTAN POTASSIUM 25 MG/1
50 TABLET ORAL DAILY
Qty: 30 TABLET | Refills: 0 | Status: SHIPPED | OUTPATIENT
Start: 2021-09-01 | End: 2021-09-16 | Stop reason: SDUPTHER

## 2021-09-01 ASSESSMENT — ENCOUNTER SYMPTOMS
VOMITING: 0
SHORTNESS OF BREATH: 0
CONSTIPATION: 0
DIARRHEA: 0
NAUSEA: 0
COUGH: 0

## 2021-09-01 NOTE — ASSESSMENT & PLAN NOTE
Uncontrolled, continue current medications and will start losartan 50mg to help BP control.  Also referring to cardiology due to hx of MI and heart failure

## 2021-09-01 NOTE — PROGRESS NOTES
Reza Singletary (:  1965) is a 61 y.o. male,Established patient, here for evaluation of the following chief complaint(s):  Diabetes (f/u)      SUBJECTIVE:   Was told that he was diabetic when he was in the hospital in July for an episode of pancreatitis. Was unsure about most of the medications that he was supposed to be taking, therefore he did not start them or ran out of the medications and has only been taking the Hydralazine for his blood pressure  Does have hx of CHF, unsure if systolic or diastolic. Was supposed to have appt  Needs metformin and Farxiga  Needs strips    Diabetes  He presents for his initial diabetic visit. He has type 2 diabetes mellitus. No MedicAlert identification noted. Pertinent negatives for diabetes include no polyphagia and no polyuria. Symptoms are worsening. Diabetic complications include heart disease. Risk factors for coronary artery disease include diabetes mellitus. OBJECTIVE:  Review of Systems   Constitutional: Negative for chills and fever. Respiratory: Negative for cough and shortness of breath. Cardiovascular: Negative for leg swelling. Gastrointestinal: Negative for constipation, diarrhea, nausea and vomiting. Endocrine: Negative for polyphagia and polyuria. Genitourinary: Negative for decreased urine volume, dysuria, frequency, hematuria and urgency. Skin: Negative for rash. Vitals:    21 1323   BP: (!) 163/92   Pulse: 78   Resp: 12   Temp: 98.2 °F (36.8 °C)   TempSrc: Infrared   SpO2: 97%   Weight: (!) 313 lb (142 kg)      Body mass index is 42.45 kg/m². Physical Exam  Constitutional:       Appearance: Normal appearance. He is obese. Cardiovascular:      Rate and Rhythm: Normal rate and regular rhythm. Pulses: Normal pulses. Heart sounds: Normal heart sounds. Pulmonary:      Effort: Pulmonary effort is normal.      Breath sounds: Normal breath sounds. Musculoskeletal:      Right lower leg: No edema.       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 Return in about 3 weeks (around 9/22/2021) for recheck.     [facility] - Internal Medicine and Pediatrics  Dr. Pearlie Bence D.O.  - Family Medicine and OMT      Electronically signed by Pearlie Bence, DO on 9/1/2021 at 1:51 PM.

## 2021-09-01 NOTE — ASSESSMENT & PLAN NOTE
Uncontrolled, changes made today: start back on the long acting insuin, 20 units every night.   Sent test strips, refilled the [place] and Metformin

## 2021-09-03 DIAGNOSIS — I10 DIABETES MELLITUS WITH COINCIDENT HYPERTENSION (HCC): ICD-10-CM

## 2021-09-03 DIAGNOSIS — E11.69 OBESITY, DIABETES, AND HYPERTENSION SYNDROME (HCC): Primary | ICD-10-CM

## 2021-09-03 DIAGNOSIS — I15.2 OBESITY, DIABETES, AND HYPERTENSION SYNDROME (HCC): Primary | ICD-10-CM

## 2021-09-03 DIAGNOSIS — E78.5 TYPE 2 DIABETES MELLITUS WITH HYPERLIPIDEMIA (HCC): ICD-10-CM

## 2021-09-03 DIAGNOSIS — E66.9 OBESITY, DIABETES, AND HYPERTENSION SYNDROME (HCC): Primary | ICD-10-CM

## 2021-09-03 DIAGNOSIS — E11.59 OBESITY, DIABETES, AND HYPERTENSION SYNDROME (HCC): Primary | ICD-10-CM

## 2021-09-03 DIAGNOSIS — E11.69 TYPE 2 DIABETES MELLITUS WITH HYPERLIPIDEMIA (HCC): ICD-10-CM

## 2021-09-03 DIAGNOSIS — E11.9 DIABETES MELLITUS WITH COINCIDENT HYPERTENSION (HCC): ICD-10-CM

## 2021-09-16 DIAGNOSIS — I10 DIABETES MELLITUS WITH COINCIDENT HYPERTENSION (HCC): ICD-10-CM

## 2021-09-16 DIAGNOSIS — E11.9 DIABETES MELLITUS WITH COINCIDENT HYPERTENSION (HCC): ICD-10-CM

## 2021-09-16 DIAGNOSIS — I15.2 OBESITY, DIABETES, AND HYPERTENSION SYNDROME (HCC): ICD-10-CM

## 2021-09-16 DIAGNOSIS — E11.59 OBESITY, DIABETES, AND HYPERTENSION SYNDROME (HCC): ICD-10-CM

## 2021-09-16 DIAGNOSIS — E66.9 OBESITY, DIABETES, AND HYPERTENSION SYNDROME (HCC): ICD-10-CM

## 2021-09-16 DIAGNOSIS — E11.69 OBESITY, DIABETES, AND HYPERTENSION SYNDROME (HCC): ICD-10-CM

## 2021-09-16 RX ORDER — LOSARTAN POTASSIUM 25 MG/1
50 TABLET ORAL DAILY
Qty: 180 TABLET | Refills: 0 | Status: SHIPPED | OUTPATIENT
Start: 2021-09-16 | End: 2021-09-29 | Stop reason: SDUPTHER

## 2021-09-16 NOTE — TELEPHONE ENCOUNTER
Requested Prescriptions     Pending Prescriptions Disp Refills    losartan (COZAAR) 25 MG tablet 180 tablet 0     Sig: Take 2 tablets by mouth daily     Patient requesting a medication refill.     Next office visit: 9/22/2021    Last regular office visit: 9/1/2021

## 2021-09-17 DIAGNOSIS — K21.9 GASTROESOPHAGEAL REFLUX DISEASE, UNSPECIFIED WHETHER ESOPHAGITIS PRESENT: ICD-10-CM

## 2021-09-17 DIAGNOSIS — I50.9 CHRONIC CONGESTIVE HEART FAILURE, UNSPECIFIED HEART FAILURE TYPE (HCC): ICD-10-CM

## 2021-09-17 RX ORDER — CARVEDILOL 25 MG/1
TABLET ORAL
Qty: 60 TABLET | Refills: 0 | Status: SHIPPED | OUTPATIENT
Start: 2021-09-17 | End: 2021-10-21 | Stop reason: SDUPTHER

## 2021-09-17 RX ORDER — FAMOTIDINE 20 MG/1
TABLET, FILM COATED ORAL
Qty: 60 TABLET | Refills: 0 | Status: SHIPPED | OUTPATIENT
Start: 2021-09-17 | End: 2021-10-21 | Stop reason: SDUPTHER

## 2021-09-17 RX ORDER — OMEPRAZOLE 20 MG/1
CAPSULE, DELAYED RELEASE ORAL
Qty: 30 CAPSULE | Refills: 0 | Status: SHIPPED | OUTPATIENT
Start: 2021-09-17 | End: 2021-10-21 | Stop reason: SDUPTHER

## 2021-09-20 DIAGNOSIS — E66.9 OBESITY, DIABETES, AND HYPERTENSION SYNDROME (HCC): ICD-10-CM

## 2021-09-20 DIAGNOSIS — I15.2 OBESITY, DIABETES, AND HYPERTENSION SYNDROME (HCC): ICD-10-CM

## 2021-09-20 DIAGNOSIS — I10 DIABETES MELLITUS WITH COINCIDENT HYPERTENSION (HCC): ICD-10-CM

## 2021-09-20 DIAGNOSIS — E11.59 OBESITY, DIABETES, AND HYPERTENSION SYNDROME (HCC): ICD-10-CM

## 2021-09-20 DIAGNOSIS — E11.9 DIABETES MELLITUS WITH COINCIDENT HYPERTENSION (HCC): ICD-10-CM

## 2021-09-20 DIAGNOSIS — E11.69 OBESITY, DIABETES, AND HYPERTENSION SYNDROME (HCC): ICD-10-CM

## 2021-09-21 RX ORDER — LOSARTAN POTASSIUM 25 MG/1
TABLET ORAL
Qty: 30 TABLET | Refills: 1 | OUTPATIENT
Start: 2021-09-21

## 2021-09-23 ENCOUNTER — APPOINTMENT (OUTPATIENT)
Dept: GENERAL RADIOLOGY | Age: 56
DRG: 201 | End: 2021-09-23
Payer: COMMERCIAL

## 2021-09-23 ENCOUNTER — HOSPITAL ENCOUNTER (INPATIENT)
Age: 56
LOS: 2 days | Discharge: HOME | DRG: 201 | End: 2021-09-25
Attending: EMERGENCY MEDICINE | Admitting: INTERNAL MEDICINE
Payer: COMMERCIAL

## 2021-09-23 DIAGNOSIS — I50.9 CONGESTIVE HEART FAILURE, UNSPECIFIED HF CHRONICITY, UNSPECIFIED HEART FAILURE TYPE (HCC): ICD-10-CM

## 2021-09-23 DIAGNOSIS — I48.91 ATRIAL FIBRILLATION WITH RAPID VENTRICULAR RESPONSE (HCC): Primary | ICD-10-CM

## 2021-09-23 DIAGNOSIS — E11.69 TYPE 2 DIABETES MELLITUS WITH HYPERLIPIDEMIA (HCC): ICD-10-CM

## 2021-09-23 DIAGNOSIS — E87.6 HYPOKALEMIA: ICD-10-CM

## 2021-09-23 DIAGNOSIS — E83.42 HYPOMAGNESEMIA: ICD-10-CM

## 2021-09-23 DIAGNOSIS — E78.5 TYPE 2 DIABETES MELLITUS WITH HYPERLIPIDEMIA (HCC): ICD-10-CM

## 2021-09-23 DIAGNOSIS — R79.89 ELEVATED BRAIN NATRIURETIC PEPTIDE (BNP) LEVEL: ICD-10-CM

## 2021-09-23 LAB
AMPHETAMINE SCREEN, URINE: ABNORMAL
ANION GAP SERPL CALCULATED.3IONS-SCNC: 15 MMOL/L (ref 3–16)
BARBITURATE SCREEN URINE: ABNORMAL
BASOPHILS ABSOLUTE: 0 K/UL (ref 0–0.2)
BASOPHILS RELATIVE PERCENT: 0.6 %
BENZODIAZEPINE SCREEN, URINE: ABNORMAL
BILIRUBIN URINE: NEGATIVE
BLOOD, URINE: NEGATIVE
BUN BLDV-MCNC: 11 MG/DL (ref 7–20)
CALCIUM SERPL-MCNC: 8.3 MG/DL (ref 8.3–10.6)
CANNABINOID SCREEN URINE: POSITIVE
CHLORIDE BLD-SCNC: 99 MMOL/L (ref 99–110)
CLARITY: CLEAR
CO2: 23 MMOL/L (ref 21–32)
COCAINE METABOLITE SCREEN URINE: ABNORMAL
COLOR: YELLOW
CREAT SERPL-MCNC: 0.9 MG/DL (ref 0.9–1.3)
EKG ATRIAL RATE: 174 BPM
EKG DIAGNOSIS: NORMAL
EKG Q-T INTERVAL: 276 MS
EKG QRS DURATION: 94 MS
EKG QTC CALCULATION (BAZETT): 455 MS
EKG R AXIS: -15 DEGREES
EKG T AXIS: 192 DEGREES
EKG VENTRICULAR RATE: 164 BPM
EOSINOPHILS ABSOLUTE: 0.2 K/UL (ref 0–0.6)
EOSINOPHILS RELATIVE PERCENT: 2.8 %
GFR AFRICAN AMERICAN: >60
GFR NON-AFRICAN AMERICAN: >60
GLUCOSE BLD-MCNC: 240 MG/DL (ref 70–99)
GLUCOSE BLD-MCNC: 319 MG/DL (ref 70–99)
GLUCOSE URINE: >=1000 MG/DL
HCT VFR BLD CALC: 39.2 % (ref 40.5–52.5)
HEMOGLOBIN: 13.1 G/DL (ref 13.5–17.5)
KETONES, URINE: NEGATIVE MG/DL
LEUKOCYTE ESTERASE, URINE: NEGATIVE
LYMPHOCYTES ABSOLUTE: 1.8 K/UL (ref 1–5.1)
LYMPHOCYTES RELATIVE PERCENT: 27.3 %
Lab: ABNORMAL
MAGNESIUM: 1.6 MG/DL (ref 1.8–2.4)
MCH RBC QN AUTO: 30.4 PG (ref 26–34)
MCHC RBC AUTO-ENTMCNC: 33.5 G/DL (ref 31–36)
MCV RBC AUTO: 91 FL (ref 80–100)
METHADONE SCREEN, URINE: ABNORMAL
MICROSCOPIC EXAMINATION: ABNORMAL
MONOCYTES ABSOLUTE: 0.5 K/UL (ref 0–1.3)
MONOCYTES RELATIVE PERCENT: 7.9 %
NEUTROPHILS ABSOLUTE: 4 K/UL (ref 1.7–7.7)
NEUTROPHILS RELATIVE PERCENT: 61.4 %
NITRITE, URINE: NEGATIVE
OPIATE SCREEN URINE: ABNORMAL
OXYCODONE URINE: ABNORMAL
PDW BLD-RTO: 14 % (ref 12.4–15.4)
PERFORMED ON: ABNORMAL
PH UA: 6
PH UA: 6 (ref 5–8)
PHENCYCLIDINE SCREEN URINE: ABNORMAL
PLATELET # BLD: 243 K/UL (ref 135–450)
PMV BLD AUTO: 9.6 FL (ref 5–10.5)
POTASSIUM REFLEX MAGNESIUM: 3.2 MMOL/L (ref 3.5–5.1)
PRO-BNP: 4071 PG/ML (ref 0–124)
PROPOXYPHENE SCREEN: ABNORMAL
PROTEIN UA: NEGATIVE MG/DL
RBC # BLD: 4.3 M/UL (ref 4.2–5.9)
SODIUM BLD-SCNC: 137 MMOL/L (ref 136–145)
SPECIFIC GRAVITY UA: >1.03 (ref 1–1.03)
TROPONIN: <0.01 NG/ML
TROPONIN: <0.01 NG/ML
TSH REFLEX: 1.29 UIU/ML (ref 0.27–4.2)
TSH REFLEX: 1.64 UIU/ML (ref 0.27–4.2)
URINE REFLEX TO CULTURE: ABNORMAL
URINE TYPE: ABNORMAL
UROBILINOGEN, URINE: 1 E.U./DL
WBC # BLD: 6.5 K/UL (ref 4–11)

## 2021-09-23 PROCEDURE — 2500000003 HC RX 250 WO HCPCS: Performed by: INTERNAL MEDICINE

## 2021-09-23 PROCEDURE — 84443 ASSAY THYROID STIM HORMONE: CPT

## 2021-09-23 PROCEDURE — 2580000003 HC RX 258: Performed by: INTERNAL MEDICINE

## 2021-09-23 PROCEDURE — 96365 THER/PROPH/DIAG IV INF INIT: CPT

## 2021-09-23 PROCEDURE — 93010 ELECTROCARDIOGRAM REPORT: CPT | Performed by: INTERNAL MEDICINE

## 2021-09-23 PROCEDURE — 80307 DRUG TEST PRSMV CHEM ANLYZR: CPT

## 2021-09-23 PROCEDURE — 81003 URINALYSIS AUTO W/O SCOPE: CPT

## 2021-09-23 PROCEDURE — 2580000003 HC RX 258: Performed by: EMERGENCY MEDICINE

## 2021-09-23 PROCEDURE — 2500000003 HC RX 250 WO HCPCS: Performed by: EMERGENCY MEDICINE

## 2021-09-23 PROCEDURE — 6370000000 HC RX 637 (ALT 250 FOR IP): Performed by: EMERGENCY MEDICINE

## 2021-09-23 PROCEDURE — 6370000000 HC RX 637 (ALT 250 FOR IP): Performed by: INTERNAL MEDICINE

## 2021-09-23 PROCEDURE — 6360000002 HC RX W HCPCS: Performed by: INTERNAL MEDICINE

## 2021-09-23 PROCEDURE — 6370000000 HC RX 637 (ALT 250 FOR IP): Performed by: NURSE PRACTITIONER

## 2021-09-23 PROCEDURE — 85025 COMPLETE CBC W/AUTO DIFF WBC: CPT

## 2021-09-23 PROCEDURE — 80048 BASIC METABOLIC PNL TOTAL CA: CPT

## 2021-09-23 PROCEDURE — 83735 ASSAY OF MAGNESIUM: CPT

## 2021-09-23 PROCEDURE — 83880 ASSAY OF NATRIURETIC PEPTIDE: CPT

## 2021-09-23 PROCEDURE — 36415 COLL VENOUS BLD VENIPUNCTURE: CPT

## 2021-09-23 PROCEDURE — 71045 X-RAY EXAM CHEST 1 VIEW: CPT

## 2021-09-23 PROCEDURE — 6360000002 HC RX W HCPCS: Performed by: NURSE PRACTITIONER

## 2021-09-23 PROCEDURE — 93005 ELECTROCARDIOGRAM TRACING: CPT | Performed by: EMERGENCY MEDICINE

## 2021-09-23 PROCEDURE — 2060000000 HC ICU INTERMEDIATE R&B

## 2021-09-23 PROCEDURE — 99285 EMERGENCY DEPT VISIT HI MDM: CPT

## 2021-09-23 PROCEDURE — 84484 ASSAY OF TROPONIN QUANT: CPT

## 2021-09-23 PROCEDURE — 96361 HYDRATE IV INFUSION ADD-ON: CPT

## 2021-09-23 RX ORDER — MAGNESIUM SULFATE IN WATER 40 MG/ML
2000 INJECTION, SOLUTION INTRAVENOUS ONCE
Status: DISCONTINUED | OUTPATIENT
Start: 2021-09-24 | End: 2021-09-23

## 2021-09-23 RX ORDER — ONDANSETRON 4 MG/1
4 TABLET, ORALLY DISINTEGRATING ORAL EVERY 8 HOURS PRN
Status: DISCONTINUED | OUTPATIENT
Start: 2021-09-23 | End: 2021-09-25 | Stop reason: HOSPADM

## 2021-09-23 RX ORDER — 0.9 % SODIUM CHLORIDE 0.9 %
500 INTRAVENOUS SOLUTION INTRAVENOUS ONCE
Status: COMPLETED | OUTPATIENT
Start: 2021-09-23 | End: 2021-09-23

## 2021-09-23 RX ORDER — POTASSIUM CHLORIDE 7.45 MG/ML
10 INJECTION INTRAVENOUS PRN
Status: DISCONTINUED | OUTPATIENT
Start: 2021-09-23 | End: 2021-09-25 | Stop reason: HOSPADM

## 2021-09-23 RX ORDER — SODIUM CHLORIDE 0.9 % (FLUSH) 0.9 %
5-40 SYRINGE (ML) INJECTION EVERY 12 HOURS SCHEDULED
Status: DISCONTINUED | OUTPATIENT
Start: 2021-09-23 | End: 2021-09-25 | Stop reason: HOSPADM

## 2021-09-23 RX ORDER — ONDANSETRON 2 MG/ML
4 INJECTION INTRAMUSCULAR; INTRAVENOUS EVERY 6 HOURS PRN
Status: DISCONTINUED | OUTPATIENT
Start: 2021-09-23 | End: 2021-09-25 | Stop reason: HOSPADM

## 2021-09-23 RX ORDER — PANTOPRAZOLE SODIUM 40 MG/1
40 TABLET, DELAYED RELEASE ORAL
Status: DISCONTINUED | OUTPATIENT
Start: 2021-09-24 | End: 2021-09-25 | Stop reason: HOSPADM

## 2021-09-23 RX ORDER — POLYETHYLENE GLYCOL 3350 17 G/17G
17 POWDER, FOR SOLUTION ORAL DAILY PRN
COMMUNITY
Start: 2021-08-30

## 2021-09-23 RX ORDER — SODIUM CHLORIDE 0.9 % (FLUSH) 0.9 %
5-40 SYRINGE (ML) INJECTION PRN
Status: DISCONTINUED | OUTPATIENT
Start: 2021-09-23 | End: 2021-09-25 | Stop reason: HOSPADM

## 2021-09-23 RX ORDER — DIGOXIN 0.25 MG/ML
500 INJECTION INTRAMUSCULAR; INTRAVENOUS ONCE
Status: DISCONTINUED | OUTPATIENT
Start: 2021-09-23 | End: 2021-09-23

## 2021-09-23 RX ORDER — LANOLIN ALCOHOL/MO/W.PET/CERES
400 CREAM (GRAM) TOPICAL DAILY
Status: DISCONTINUED | OUTPATIENT
Start: 2021-09-23 | End: 2021-09-23

## 2021-09-23 RX ORDER — ASPIRIN 81 MG/1
81 TABLET, CHEWABLE ORAL DAILY
Status: DISCONTINUED | OUTPATIENT
Start: 2021-09-23 | End: 2021-09-25 | Stop reason: HOSPADM

## 2021-09-23 RX ORDER — SODIUM CHLORIDE 9 MG/ML
25 INJECTION, SOLUTION INTRAVENOUS PRN
Status: DISCONTINUED | OUTPATIENT
Start: 2021-09-23 | End: 2021-09-25 | Stop reason: HOSPADM

## 2021-09-23 RX ORDER — ACETAMINOPHEN 650 MG/1
650 SUPPOSITORY RECTAL EVERY 6 HOURS PRN
Status: DISCONTINUED | OUTPATIENT
Start: 2021-09-23 | End: 2021-09-25 | Stop reason: HOSPADM

## 2021-09-23 RX ORDER — FUROSEMIDE 10 MG/ML
40 INJECTION INTRAMUSCULAR; INTRAVENOUS ONCE
Status: COMPLETED | OUTPATIENT
Start: 2021-09-23 | End: 2021-09-23

## 2021-09-23 RX ORDER — MAGNESIUM SULFATE IN WATER 40 MG/ML
4000 INJECTION, SOLUTION INTRAVENOUS ONCE
Status: COMPLETED | OUTPATIENT
Start: 2021-09-23 | End: 2021-09-24

## 2021-09-23 RX ORDER — CARVEDILOL 25 MG/1
25 TABLET ORAL 2 TIMES DAILY WITH MEALS
Status: DISCONTINUED | OUTPATIENT
Start: 2021-09-23 | End: 2021-09-25 | Stop reason: HOSPADM

## 2021-09-23 RX ORDER — FLUTICASONE PROPIONATE 50 MCG
2 SPRAY, SUSPENSION (ML) NASAL DAILY
Status: ON HOLD | COMMUNITY
Start: 2021-08-27 | End: 2022-03-22 | Stop reason: SDUPTHER

## 2021-09-23 RX ORDER — ACETAMINOPHEN 325 MG/1
650 TABLET ORAL EVERY 6 HOURS PRN
Status: DISCONTINUED | OUTPATIENT
Start: 2021-09-23 | End: 2021-09-25 | Stop reason: HOSPADM

## 2021-09-23 RX ORDER — POLYETHYLENE GLYCOL 3350 17 G/17G
17 POWDER, FOR SOLUTION ORAL DAILY PRN
Status: DISCONTINUED | OUTPATIENT
Start: 2021-09-23 | End: 2021-09-25 | Stop reason: HOSPADM

## 2021-09-23 RX ORDER — 0.9 % SODIUM CHLORIDE 0.9 %
1000 INTRAVENOUS SOLUTION INTRAVENOUS ONCE
Status: COMPLETED | OUTPATIENT
Start: 2021-09-23 | End: 2021-09-23

## 2021-09-23 RX ORDER — HYDRALAZINE HYDROCHLORIDE 50 MG/1
50 TABLET, FILM COATED ORAL EVERY 8 HOURS SCHEDULED
Status: DISCONTINUED | OUTPATIENT
Start: 2021-09-23 | End: 2021-09-25 | Stop reason: HOSPADM

## 2021-09-23 RX ORDER — LOSARTAN POTASSIUM 50 MG/1
50 TABLET ORAL DAILY
Status: DISCONTINUED | OUTPATIENT
Start: 2021-09-24 | End: 2021-09-25 | Stop reason: HOSPADM

## 2021-09-23 RX ORDER — METOPROLOL TARTRATE 5 MG/5ML
5 INJECTION INTRAVENOUS EVERY 8 HOURS PRN
Status: DISCONTINUED | OUTPATIENT
Start: 2021-09-23 | End: 2021-09-25 | Stop reason: HOSPADM

## 2021-09-23 RX ORDER — ATORVASTATIN CALCIUM 80 MG/1
80 TABLET, FILM COATED ORAL NIGHTLY
Status: DISCONTINUED | OUTPATIENT
Start: 2021-09-23 | End: 2021-09-25 | Stop reason: HOSPADM

## 2021-09-23 RX ORDER — POTASSIUM CHLORIDE 750 MG/1
40 TABLET, FILM COATED, EXTENDED RELEASE ORAL
Status: DISCONTINUED | OUTPATIENT
Start: 2021-09-24 | End: 2021-09-25 | Stop reason: HOSPADM

## 2021-09-23 RX ORDER — INSULIN GLARGINE 100 [IU]/ML
25 INJECTION, SOLUTION SUBCUTANEOUS NIGHTLY
Status: DISCONTINUED | OUTPATIENT
Start: 2021-09-23 | End: 2021-09-25 | Stop reason: HOSPADM

## 2021-09-23 RX ADMIN — POTASSIUM BICARBONATE 40 MEQ: 782 TABLET, EFFERVESCENT ORAL at 14:35

## 2021-09-23 RX ADMIN — MAGNESIUM SULFATE HEPTAHYDRATE 4000 MG: 40 INJECTION, SOLUTION INTRAVENOUS at 23:09

## 2021-09-23 RX ADMIN — DILTIAZEM HYDROCHLORIDE 5 MG/HR: 5 INJECTION INTRAVENOUS at 16:03

## 2021-09-23 RX ADMIN — HYDRALAZINE HYDROCHLORIDE 50 MG: 50 TABLET, FILM COATED ORAL at 23:00

## 2021-09-23 RX ADMIN — FUROSEMIDE 40 MG: 10 INJECTION, SOLUTION INTRAMUSCULAR; INTRAVENOUS at 23:00

## 2021-09-23 RX ADMIN — ASPIRIN 81 MG CHEWABLE TABLET 81 MG: 81 TABLET CHEWABLE at 23:00

## 2021-09-23 RX ADMIN — ATORVASTATIN CALCIUM 80 MG: 80 TABLET, FILM COATED ORAL at 23:00

## 2021-09-23 RX ADMIN — INSULIN GLARGINE 25 UNITS: 100 INJECTION, SOLUTION SUBCUTANEOUS at 23:03

## 2021-09-23 RX ADMIN — Medication 10 ML: at 23:00

## 2021-09-23 RX ADMIN — ENOXAPARIN SODIUM 150 MG: 150 INJECTION SUBCUTANEOUS at 23:09

## 2021-09-23 RX ADMIN — CARVEDILOL 25 MG: 25 TABLET, FILM COATED ORAL at 23:00

## 2021-09-23 RX ADMIN — INSULIN LISPRO 2 UNITS: 100 INJECTION, SOLUTION INTRAVENOUS; SUBCUTANEOUS at 23:03

## 2021-09-23 RX ADMIN — SODIUM CHLORIDE 1000 ML: 9 INJECTION, SOLUTION INTRAVENOUS at 13:14

## 2021-09-23 RX ADMIN — POTASSIUM BICARBONATE 40 MEQ: 782 TABLET, EFFERVESCENT ORAL at 22:59

## 2021-09-23 RX ADMIN — SODIUM CHLORIDE 500 ML: 9 INJECTION, SOLUTION INTRAVENOUS at 13:16

## 2021-09-23 RX ADMIN — Medication 400 MG: at 15:24

## 2021-09-23 RX ADMIN — DILTIAZEM HYDROCHLORIDE 15 MG/HR: 5 INJECTION INTRAVENOUS at 23:48

## 2021-09-23 ASSESSMENT — PAIN SCALES - GENERAL: PAINLEVEL_OUTOF10: 0

## 2021-09-23 NOTE — ED NOTES
Bed: C-29  Expected date:   Expected time:   Means of arrival: [facility]  Comments:  [id number] [facility]  09/23/21 9436

## 2021-09-23 NOTE — ED PROVIDER NOTES
[address] [facility]      Pt Name: Obed Humphrey  MRN: [id number]  [place] 1965  Date of evaluation: 9/23/2021  Provider: Alexa Velasquez MD    CHIEF COMPLAINT     I've been feeling dizzy as hell for a week, having diarrhea, it all started with taking metformin. I thought it was just side effects of medicine but I was supposed to have a follow up appointment with my doctor tomorrow and they called today and I told them what I'd been having and they told me to come in. HISTORY OF PRESENT ILLNESS  (Location/Symptom, Timing/Onset,Context/Setting, Quality, Duration, Modifying Factors, Severity). Note limiting factors. Chief Complaint   Patient presents with   Debbie Burrell is a 61 y.o. male who presents to the emergency department secondary to concern for multiple symptoms as noted above. He reports he came in today because after talking to his primary care he was told to do so. Here he reports he continues to feel overall unwell. He states he has been feeling this way for a week. He tells me he has no known history of atrial fibrillation and has never had those was before. He states he is not on any blood thinners. He denies chest pain, trouble breathing. No abdominal pain. Denies any urinary symptoms. States overall he has been feeling kind of weak and fatigued. Past medical history noted below, significant for abdominal pain, heart failure, cigarettes smoker, cocaine abuse, dehydration, DM, MI, HLD, HTN, postural dizziness, PUD. Does currently smoking. Aside from what is stated above denies any other symptoms or modifying factors. Nursing Notes reviewed. REVIEW OF SYSTEMS  (2-9 systems for level 4, 10 or more for level 5)   Review of Systems  Pertinent positive and negative findings as documented in the HPI; otherwise all other systems were reviewed and were negative.    PAST MEDICAL HISTORY     Past Medical History:   Diagnosis Date    Abdominal pain 7/29/2021    Abnormal EKG 7/28/2016    Acute pancreatitis 7/29/2021    CHF [facility]congestive heart failure) ([facility])     Cigarette smoker 8/25/2021    Cocaine abuse ([facility] 3/28/2015    Last Assessment & Plan:  Formatting of this note might be different from the original. Counseled on cessation as increases risk of coronary vasospasm and further worsening of heart function.  Dehydration 7/28/2016    Diabetes mellitus ([facility]     History of cocaine abuse ([facility] 7/28/2016    History of MI (myocardial infarction) 7/28/2016    Hyperglycemia 7/28/2016    Hyperlipidemia     Hypertension     Morbid obesity with BMI of 45.0-49.9, adult ([facility] 7/28/2016    Postural dizziness 7/28/2016    Pre-diabetes 3/9/2017    Last Assessment & Plan:  Formatting of this note might be different from the original. Discussed lifestyle changes Refer to [facility] program    PUD (peptic ulcer disease) 3/1/2019       SURGICALHISTORY     No past surgical history on file.   CURRENT MEDICATIONS       Previous Medications    ASPIRIN 81 MG CHEWABLE TABLET    Take 1 tablet by mouth daily    ATORVASTATIN (LIPITOR) 80 MG TABLET    Take 1 tablet by mouth nightly    BLOOD GLUCOSE MONITORING SUPPL (TRUE METRIX METER) W/DEVICE KIT    1 kit by Does not apply route 4 times daily Dispense one kit  Pharmacy cannot do brand substitutions    BLOOD GLUCOSE TEST STRIPS (TRUE METRIX BLOOD GLUCOSE TEST) STRIP    1 each by In Vitro route daily Check blood sugar four times daily  Pharmacy can do brand substitutions    CARVEDILOL (COREG) 25 MG TABLET    TAKE 1 TABLET BY MOUTH TWICE A DAY WITH MEALS    DAPAGLIFLOZIN (FARXIGA) 5 MG TABLET    Take 1 tablet by mouth every morning    FAMOTIDINE (PEPCID) 20 MG TABLET    TAKE 1 TABLET BY MOUTH TWICE A DAY    HYDRALAZINE (APRESOLINE) 25 MG TABLET    Take 1 tablet by mouth every 8 hours    INSULIN GLARGINE (LANTUS SOLOSTAR) 100 UNIT/ML INJECTION PEN    Inject 20 Units into the skin nightly Pharmacy can do brand substitutions    INSULIN PEN NEEDLE (CAREFINE PEN NEEDLES) 32G X 4 MM MISC    1 each by Does not apply route 3 times daily Pharmacy can do brand substitutions    LANCET DEVICES (LANCING DEVICE) MISC    1 lancet device  Pharmacy cannot do brand substitutions    LANCETS MISC    Check blood sugar four times. Pharmacy can do brand substitutions    LOSARTAN (COZAAR) 25 MG TABLET    Take 2 tablets by mouth daily    METFORMIN (GLUCOPHAGE) 1000 MG TABLET    Take 1 tablet by mouth 2 times daily (with meals)    OMEPRAZOLE (PRILOSEC) 20 MG DELAYED RELEASE CAPSULE    TAKE 1 CAPSULE BY MOUTH EVERY DAY    TORSEMIDE (DEMADEX) 20 MG TABLET    Take 1 tablet by mouth daily as needed (swelling or weight gain) Patient states he is taking this 3 days a week MWF      ALLERGIES     Patient has no known allergies. FAMILY HISTORY     No family history on file. SOCIAL HISTORY       Social History     Socioeconomic History    Marital status: Single     Spouse name: Not on file    Number of children: 3    Years of education: Not on file    Highest education level: Not on file   Occupational History     Employer: DISABLED   Tobacco Use    Smoking status: Current Every Day Smoker     Packs/day: 0.50     Years: 25.00     Pack years: 12.50     Types: Cigarettes    Smokeless tobacco: Never Used   Vaping Use    Vaping Use: Never used   Substance and Sexual Activity    Alcohol use: Not Currently    Drug use: Yes     Types: Marijuana    Sexual activity: Not Currently   Other Topics Concern    Not on file   Social History Narrative    Not on file     Social Determinants of Health     Financial Resource Strain:     Difficulty of Paying Living Expenses:    Food Insecurity:     Worried About Running Out of Food in the Last Year:     Ran Out of Food in the Last Year:    Transportation Needs:     Lack of Transportation (Medical):      Lack of Transportation (Non-Medical):    Physical Activity:     Days of Exercise per Week:     Minutes of Exercise per Session:    Stress:     Feeling of Stress :    Social Connections:     Frequency of Communication with Friends and Family:     Frequency of Social Gatherings with Friends and Family:     Attends Yazidism Services:     Active Member of Clubs or Organizations:     Attends Club or Organization Meetings:     Marital Status:    Intimate Partner Violence:     Fear of Current or Ex-Partner:     Emotionally Abused:     Physically Abused:     Sexually Abused:      SCREENINGS         PHYSICAL EXAM  (up to 7 for level 4, 8 or more for level 5)   INITIAL VITALS: BP: (!) 128/103, Temp: 98.5 °F (36.9 °C), Pulse: 165, Resp: 20, SpO2: 100 %   Physical Exam  Vitals reviewed. Constitutional:       Appearance: He is obese. He is not ill-appearing, toxic-appearing or diaphoretic. HENT:      Head: Normocephalic and atraumatic. Right Ear: External ear normal.      Left Ear: External ear normal.      Nose: Nose normal.   Eyes:      General: No scleral icterus. Right eye: No discharge. Left eye: No discharge. Conjunctiva/sclera: Conjunctivae normal.   Neck:      Trachea: No tracheal deviation. Cardiovascular:      Rate and Rhythm: Tachycardia present. Rhythm irregular. Comments: Radial pulses palpable  Pulmonary:      Effort: Pulmonary effort is normal. No respiratory distress. Breath sounds: Rhonchi (Faint bilateral, improved after being asked to cough) present. No wheezing. Chest:      Chest wall: No tenderness. Abdominal:      Tenderness: There is no abdominal tenderness. There is no guarding or rebound. Musculoskeletal:      Cervical back: Normal range of motion. Right lower leg: No edema. Left lower leg: No edema. Skin:     General: Skin is dry. Capillary Refill: Capillary refill takes 2 to 3 seconds. Neurological:      General: No focal deficit present.       Mental Status: He is alert and oriented to person, place, and time. Psychiatric:         Mood and Affect: Mood normal.       DIAGNOSTIC RESULTS   EKG: interpreted by the Emergency Department Physician who either signs or Co-signs this chart in the absence of a cardiologist.  Indication: tachycardia  Interpretation: Rate tachycardic 164 with rhythm atrial fibrillation with RVR, axis normal.  QRS/QTc within normal limits. Nonspecific ST and T wave abnormalities. Comparison to prior EKG from July 29, 2021 shows at that time he was in sinus rhythm with a rate of 77, he did have T wave abnormalities noted then both in the inferior, anterior, and lateral leads. RADIOLOGY:   Interpretation per Radiologist below, if available at the time of this note:  XR CHEST PORTABLE   Final Result   Moderate cardiomegaly. No acute cardiopulmonary process.            LABS:  Labs Reviewed   CBC WITH AUTO DIFFERENTIAL - Abnormal; Notable for the following components:       Result Value    Hemoglobin 13.1 (*)     Hematocrit 39.2 (*)     All other components within normal limits    Narrative:     Performed at:  [facility]  [employer] [place] [address]   Phone [phone]   BASIC METABOLIC PANEL W/ REFLEX TO MG FOR LOW K - Abnormal; Notable for the following components:    Potassium reflex Magnesium 3.2 (*)     Glucose 319 (*)     All other components within normal limits    Narrative:     Performed at:  [facility]  [address] [place], [facility] [place] [address]   Phone [phone]   BRAIN NATRIURETIC PEPTIDE - Abnormal; Notable for the following components:    Pro-BNP 4,071 (*)     All other components within normal limits    Narrative:     Performed at:  [facility]  [address] [place], [facility] [place] [address]   Phone [phone]   URINE RT REFLEX TO CULTURE - Abnormal; Notable for the following components:    Glucose, Ur >=1000 (*)     All other components within normal limits    Narrative:     Performed at:  [facility]  [address] [place], [place] [address]   Phone [phone]   URINE DRUG SCREEN - Abnormal; Notable for the following components:    Cannabinoid Scrn, Ur POSITIVE (*)     All other components within normal limits    Narrative:     Performed at:  [facility]  [address] [facility] [place] [address]   Phone [phone]   MAGNESIUM - Abnormal; Notable for the following components:    Magnesium 1.60 (*)     All other components within normal limits    Narrative:     Performed at:  [facility]  [address] [facility] [address]   Phone [phone]   TROPONIN    Narrative:     Performed at:  [facility]  [address] [place] [address]   Phone [phone]   TSH WITH REFLEX    Narrative:     Performed at:  [address] [address]   Phone [phone]       EMERGENCY DEPARTMENT COURSE and DIFFERENTIAL DIAGNOSIS/MDM:   Patient was given the following medications:  Orders Placed This Encounter   Medications    0.9 % sodium chloride bolus    0.9 % sodium chloride bolus    potassium bicarb-citric acid (EFFER-K) effervescent tablet 40 mEq    magnesium oxide (MAG-OX) tablet 400 mg    dilTIAZem 125 mg in dextrose 5 % 125 mL infusion     CONSULTS:  None    INITIAL VITALS: BP: (!) 128/103, Temp: 98.5 °F (36.9 °C), Pulse: 165, Resp: 20, SpO2: 100 %   RECENT VITALS:  BP: (!) 135/111,Temp: 98.5 °F (36.9 °C), Pulse: 143, Resp: 20, SpO2: 99 %     Misael Rocha is a 61 y.o. male who presents to the emergency department secondary to concern for symptoms as noted in HPI. On arrival he is awake, alert, oriented. Vitals notable for A. fib with RVR rate 165.   Despite his elevated heart rate his blood pressure is stable and he is awake, alert, oriented and able to answer questions appropriately. His face is grossly symmetric, he moves all 4 extremities to command with equal strength and has sensation intact bilaterally. With his history of being on Metformin and diarrhea this week I am concerned for both dehydration as well as electrolyte abnormalities potentially causing the new A. fib with RVR. He also has a history of smoking, 1 pack/day, which increases his risk. A peripheral IV was placed, labs were ordered along with EKG, chest x-ray, IV fluids. EKG A. fib with RVR. Chest x-ray shows moderate cardiomegaly without other acute cardiopulmonary process. Labs mild hypokalemia 3.2, ordered 40 p.o. potassium. BNP elevated 4071 which is increased from his prior. Troponin is negative. He is likely in acute heart failure exacerbation most likely secondary to new A. fib with RVR. In the emergency department despite fluid resuscitation and potassium replacement his heart rate remained elevated in the 150s at which time he was ordered a diltiazem drip. On reassessment discussed results of his labs and imaging with the patient. At that time repeat vitals showed he continued to have good blood pressure with systolic in the 768X. His heart rate had improved though remained elevated 140s to 150s. Discussed starting him on a diltiazem drip and recommendation for admission given he had been having symptoms for a week and it is unclear how long he has been in atrial fibrillation. He is in agreement with admission. I consulted the hospitalist, Dr. Zavala Held at 15:58, for admission to telemetry. CRITICAL CARE TIME   Due to the immediate potential for life-threatening deterioration due to new a.fib with RVR with elevated BNP, I spent 36 minutes providing critical care. There was a high probability of clinically significant/life threatening deterioration in the patient's condition which required my urgent intervention.  This time excludes time spent performing procedures but includes time spent on direct patient care, history retrieval, review of the chart, and discussions with patient, family, and consultant(s). FINAL IMPRESSION      1. Atrial fibrillation with rapid ventricular response (HCC)    2. Elevated brain natriuretic peptide (BNP) level    3. Congestive heart failure, unspecified HF chronicity, unspecified heart failure type ([facility]    4. Hypokalemia    5.  Hypomagnesemia        DISPOSITION/PLAN   DISPOSITION Decision To Admit 09/23/2021 03:55:52 PMadmission          (Please note that portions of this note were completed with a voice recognition program. Efforts were made to edit the dictations but occasionally words are mis-transcribed.)    Cony Robertson MD (electronically signed)  Attending Emergency Physician       Cony Robertson MD  09/23/21 9711

## 2021-09-23 NOTE — ED NOTES
cardizem increased to 15mg/hr at this time HR remains at [address], [place]  09/23/21 5291

## 2021-09-23 NOTE — PROGRESS NOTES
Medication Reconciliation    List of medications for Adrienne Shoemaker is currently taking is complete. Source of Information:   Epic records  Conversation with patient at bedside     Allergies  Allergy list not thoroughly reviewed with patient at this time  Allergies listed in Epic as follows: Patient has no known allergies.      Current Medications:    Current Facility-Administered Medications:     magnesium oxide (MAG-OX) tablet 400 mg, 400 mg, Oral, Daily, Nestor Arellano MD, 400 mg at 09/23/21 1524    dilTIAZem 125 mg in dextrose 5 % 125 mL infusion, 5-15 mg/hr, IntraVENous, Continuous, Nestor Arellano MD, Last Rate: 5 mL/hr at 09/23/21 1603, 5 mg/hr at 09/23/21 1603    Current Outpatient Medications:     fluticasone (FLONASE) 50 MCG/ACT nasal spray, 2 sprays by Nasal route daily, Disp: , Rfl:     polyethylene glycol (GLYCOLAX) 17 GM/SCOOP powder, Take 17 g by mouth daily as needed for Constipation, Disp: , Rfl:     omeprazole (PRILOSEC) 20 MG delayed release capsule, TAKE 1 CAPSULE BY MOUTH EVERY DAY (Patient taking differently: Take 20 mg by mouth Daily ), Disp: 30 capsule, Rfl: 0    carvedilol (COREG) 25 MG tablet, TAKE 1 TABLET BY MOUTH TWICE A DAY WITH MEALS (Patient taking differently: Take 25 mg by mouth 2 times daily (with meals) ), Disp: 60 tablet, Rfl: 0    famotidine (PEPCID) 20 MG tablet, TAKE 1 TABLET BY MOUTH TWICE A DAY (Patient taking differently: Take 20 mg by mouth 2 times daily ), Disp: 60 tablet, Rfl: 0    losartan (COZAAR) 25 MG tablet, Take 2 tablets by mouth daily, Disp: 180 tablet, Rfl: 0    metFORMIN (GLUCOPHAGE) 1000 MG tablet, Take 1 tablet by mouth 2 times daily (with meals), Disp: 60 tablet, Rfl: 0    torsemide (DEMADEX) 20 MG tablet, Take 1 tablet by mouth daily as needed (swelling or weight gain) Patient states he is taking this 3 days a week MWF (Patient taking differently: Take 20 mg by mouth three times a week Patient states he is taking this 3 days a week MWF), Disp: 30 tablet,

## 2021-09-24 ENCOUNTER — APPOINTMENT (OUTPATIENT)
Dept: CARDIAC CATH/INVASIVE PROCEDURES | Age: 56
DRG: 201 | End: 2021-09-24
Payer: COMMERCIAL

## 2021-09-24 LAB
A/G RATIO: 1 (ref 1.1–2.2)
ALBUMIN SERPL-MCNC: 3.3 G/DL (ref 3.4–5)
ALP BLD-CCNC: 61 U/L (ref 40–129)
ALT SERPL-CCNC: 7 U/L (ref 10–40)
ANION GAP SERPL CALCULATED.3IONS-SCNC: 10 MMOL/L (ref 3–16)
AST SERPL-CCNC: 11 U/L (ref 15–37)
BILIRUB SERPL-MCNC: 0.6 MG/DL (ref 0–1)
BUN BLDV-MCNC: 12 MG/DL (ref 7–20)
CALCIUM SERPL-MCNC: 8 MG/DL (ref 8.3–10.6)
CHLORIDE BLD-SCNC: 104 MMOL/L (ref 99–110)
CO2: 25 MMOL/L (ref 21–32)
CREAT SERPL-MCNC: 0.9 MG/DL (ref 0.9–1.3)
EKG ATRIAL RATE: 75 BPM
EKG DIAGNOSIS: NORMAL
EKG P AXIS: 10 DEGREES
EKG P-R INTERVAL: 156 MS
EKG Q-T INTERVAL: 418 MS
EKG QRS DURATION: 98 MS
EKG QTC CALCULATION (BAZETT): 466 MS
EKG R AXIS: 8 DEGREES
EKG T AXIS: 233 DEGREES
EKG VENTRICULAR RATE: 75 BPM
ESTIMATED AVERAGE GLUCOSE: 260.4 MG/DL
GFR AFRICAN AMERICAN: >60
GFR NON-AFRICAN AMERICAN: >60
GLOBULIN: 3.3 G/DL
GLUCOSE BLD-MCNC: 140 MG/DL (ref 70–99)
GLUCOSE BLD-MCNC: 195 MG/DL (ref 70–99)
GLUCOSE BLD-MCNC: 199 MG/DL (ref 70–99)
GLUCOSE BLD-MCNC: 208 MG/DL (ref 70–99)
GLUCOSE BLD-MCNC: 342 MG/DL (ref 70–99)
HBA1C MFR BLD: 10.7 %
INR BLD: 1.05 (ref 0.88–1.12)
MAGNESIUM: 2.4 MG/DL (ref 1.8–2.4)
PERFORMED ON: ABNORMAL
POTASSIUM SERPL-SCNC: 3.4 MMOL/L (ref 3.5–5.1)
PRO-BNP: 2384 PG/ML (ref 0–124)
PROTHROMBIN TIME: 11.9 SEC (ref 9.9–12.7)
SARS-COV-2, NAAT: NOT DETECTED
SODIUM BLD-SCNC: 139 MMOL/L (ref 136–145)
TOTAL PROTEIN: 6.6 G/DL (ref 6.4–8.2)
TROPONIN: <0.01 NG/ML

## 2021-09-24 PROCEDURE — 36415 COLL VENOUS BLD VENIPUNCTURE: CPT

## 2021-09-24 PROCEDURE — 93312 ECHO TRANSESOPHAGEAL: CPT | Performed by: FAMILY MEDICINE

## 2021-09-24 PROCEDURE — 2060000000 HC ICU INTERMEDIATE R&B

## 2021-09-24 PROCEDURE — 84484 ASSAY OF TROPONIN QUANT: CPT

## 2021-09-24 PROCEDURE — 99254 IP/OBS CNSLTJ NEW/EST MOD 60: CPT | Performed by: NURSE PRACTITIONER

## 2021-09-24 PROCEDURE — B24BZZ4 ULTRASONOGRAPHY OF HEART WITH AORTA, TRANSESOPHAGEAL: ICD-10-PCS | Performed by: INTERNAL MEDICINE

## 2021-09-24 PROCEDURE — 92960 CARDIOVERSION ELECTRIC EXT: CPT | Performed by: FAMILY MEDICINE

## 2021-09-24 PROCEDURE — 2500000003 HC RX 250 WO HCPCS: Performed by: INTERNAL MEDICINE

## 2021-09-24 PROCEDURE — 83036 HEMOGLOBIN GLYCOSYLATED A1C: CPT

## 2021-09-24 PROCEDURE — 6360000002 HC RX W HCPCS

## 2021-09-24 PROCEDURE — 6370000000 HC RX 637 (ALT 250 FOR IP): Performed by: NURSE PRACTITIONER

## 2021-09-24 PROCEDURE — 83880 ASSAY OF NATRIURETIC PEPTIDE: CPT

## 2021-09-24 PROCEDURE — 2580000003 HC RX 258: Performed by: INTERNAL MEDICINE

## 2021-09-24 PROCEDURE — 85610 PROTHROMBIN TIME: CPT

## 2021-09-24 PROCEDURE — 99152 MOD SED SAME PHYS/QHP 5/>YRS: CPT | Performed by: INTERNAL MEDICINE

## 2021-09-24 PROCEDURE — 6370000000 HC RX 637 (ALT 250 FOR IP): Performed by: INTERNAL MEDICINE

## 2021-09-24 PROCEDURE — 83735 ASSAY OF MAGNESIUM: CPT

## 2021-09-24 PROCEDURE — 80053 COMPREHEN METABOLIC PANEL: CPT

## 2021-09-24 PROCEDURE — 92960 CARDIOVERSION ELECTRIC EXT: CPT | Performed by: INTERNAL MEDICINE

## 2021-09-24 PROCEDURE — 2500000003 HC RX 250 WO HCPCS

## 2021-09-24 PROCEDURE — 93325 DOPPLER ECHO COLOR FLOW MAPG: CPT | Performed by: FAMILY MEDICINE

## 2021-09-24 PROCEDURE — 94761 N-INVAS EAR/PLS OXIMETRY MLT: CPT

## 2021-09-24 PROCEDURE — 87635 SARS-COV-2 COVID-19 AMP PRB: CPT

## 2021-09-24 PROCEDURE — 2580000003 HC RX 258

## 2021-09-24 PROCEDURE — 93005 ELECTROCARDIOGRAM TRACING: CPT | Performed by: INTERNAL MEDICINE

## 2021-09-24 PROCEDURE — 93010 ELECTROCARDIOGRAM REPORT: CPT | Performed by: INTERNAL MEDICINE

## 2021-09-24 PROCEDURE — 93321 DOPPLER ECHO F-UP/LMTD STD: CPT | Performed by: FAMILY MEDICINE

## 2021-09-24 PROCEDURE — 5A2204Z RESTORATION OF CARDIAC RHYTHM, SINGLE: ICD-10-PCS | Performed by: INTERNAL MEDICINE

## 2021-09-24 RX ORDER — SODIUM CHLORIDE 9 MG/ML
25 INJECTION, SOLUTION INTRAVENOUS PRN
Status: DISCONTINUED | OUTPATIENT
Start: 2021-09-24 | End: 2021-09-25 | Stop reason: HOSPADM

## 2021-09-24 RX ORDER — AMIODARONE HYDROCHLORIDE 200 MG/1
200 TABLET ORAL DAILY
Status: DISCONTINUED | OUTPATIENT
Start: 2021-09-24 | End: 2021-09-25 | Stop reason: HOSPADM

## 2021-09-24 RX ORDER — CALCIUM CITRATE/VITAMIN D3 200MG-6.25
1 TABLET ORAL 2 TIMES DAILY
Qty: 100 EACH | Refills: 3 | Status: ON HOLD | OUTPATIENT
Start: 2021-09-24 | End: 2022-03-22 | Stop reason: HOSPADM

## 2021-09-24 RX ORDER — SODIUM CHLORIDE 0.9 % (FLUSH) 0.9 %
5-40 SYRINGE (ML) INJECTION EVERY 12 HOURS SCHEDULED
Status: DISCONTINUED | OUTPATIENT
Start: 2021-09-24 | End: 2021-09-25 | Stop reason: HOSPADM

## 2021-09-24 RX ORDER — SODIUM CHLORIDE 0.9 % (FLUSH) 0.9 %
5-40 SYRINGE (ML) INJECTION PRN
Status: DISCONTINUED | OUTPATIENT
Start: 2021-09-24 | End: 2021-09-25 | Stop reason: HOSPADM

## 2021-09-24 RX ADMIN — Medication 10 ML: at 21:46

## 2021-09-24 RX ADMIN — POTASSIUM CHLORIDE 40 MEQ: 750 TABLET, FILM COATED, EXTENDED RELEASE ORAL at 06:00

## 2021-09-24 RX ADMIN — INSULIN GLARGINE 25 UNITS: 100 INJECTION, SOLUTION SUBCUTANEOUS at 21:46

## 2021-09-24 RX ADMIN — POTASSIUM CHLORIDE 40 MEQ: 750 TABLET, FILM COATED, EXTENDED RELEASE ORAL at 09:09

## 2021-09-24 RX ADMIN — DILTIAZEM HYDROCHLORIDE 15 MG/HR: 5 INJECTION INTRAVENOUS at 10:39

## 2021-09-24 RX ADMIN — APIXABAN 5 MG: 5 TABLET, FILM COATED ORAL at 21:45

## 2021-09-24 RX ADMIN — AMIODARONE HYDROCHLORIDE 200 MG: 200 TABLET ORAL at 14:52

## 2021-09-24 RX ADMIN — INSULIN LISPRO 2 UNITS: 100 INJECTION, SOLUTION INTRAVENOUS; SUBCUTANEOUS at 18:53

## 2021-09-24 RX ADMIN — CARVEDILOL 25 MG: 25 TABLET, FILM COATED ORAL at 18:52

## 2021-09-24 RX ADMIN — CARVEDILOL 25 MG: 25 TABLET, FILM COATED ORAL at 09:09

## 2021-09-24 RX ADMIN — APIXABAN 5 MG: 5 TABLET, FILM COATED ORAL at 09:40

## 2021-09-24 RX ADMIN — ATORVASTATIN CALCIUM 80 MG: 80 TABLET, FILM COATED ORAL at 21:45

## 2021-09-24 RX ADMIN — ASPIRIN 81 MG CHEWABLE TABLET 81 MG: 81 TABLET CHEWABLE at 09:09

## 2021-09-24 RX ADMIN — INSULIN LISPRO 4 UNITS: 100 INJECTION, SOLUTION INTRAVENOUS; SUBCUTANEOUS at 21:46

## 2021-09-24 RX ADMIN — PANTOPRAZOLE SODIUM 40 MG: 40 TABLET, DELAYED RELEASE ORAL at 05:59

## 2021-09-24 RX ADMIN — INSULIN LISPRO 4 UNITS: 100 INJECTION, SOLUTION INTRAVENOUS; SUBCUTANEOUS at 09:10

## 2021-09-24 RX ADMIN — LOSARTAN POTASSIUM 50 MG: 50 TABLET, FILM COATED ORAL at 09:10

## 2021-09-24 RX ADMIN — HYDRALAZINE HYDROCHLORIDE 50 MG: 50 TABLET, FILM COATED ORAL at 14:52

## 2021-09-24 ASSESSMENT — PAIN SCALES - GENERAL
PAINLEVEL_OUTOF10: 0

## 2021-09-24 ASSESSMENT — PAIN - FUNCTIONAL ASSESSMENT: PAIN_FUNCTIONAL_ASSESSMENT: ACTIVITIES ARE NOT PREVENTED

## 2021-09-24 NOTE — PROGRESS NOTES
4 Eyes Skin Assessment     NAME:  Obed Humphrey  YOB: 1965  MEDICAL RECORD NUMBER:  [id number]    The patient is being assess for  Admission    I agree that 2 RN's have performed a thorough Head to Toe Skin Assessment on the patient. ALL assessment sites listed below have been assessed. Areas assessed by both nurses:    Head, Face, Ears, Shoulders, Back, Chest, Arms, Elbows, Hands, Sacrum. Buttock, Coccyx, Ischium and Legs. Feet and Heels        Does the Patient have a Wound?  No noted wound(s)       [place] Prevention initiated:  No   Wound Care Orders initiated:  No    Pressure Injury (Stage 3,4, Unstageable, DTI, NWPT, and Complex wounds) if present place consult order under [de-identified] No    New and Established Ostomies if present place consult order under : No      Nurse 1 eSignature: Electronically signed by Genie Blanco RN on 9/23/21 at 10:02 PM EDT    **SHARE this note so that the co-signing nurse is able to place an eSignature**    Nurse 2 eSignature: Electronically signed by Richardson Valente RN on 9/24/21 at 5:39 AM EDT

## 2021-09-24 NOTE — PROGRESS NOTES
Pt arrived via stretcher from ED to room . Heart monitor connected and verified with [facility]. VS, assessment, and admission complete. 4 eyes assessment complete. Pt oriented to unit and room. Call light and bedside table in reach. All questions answered. Pt resting quietly in bed with no complaints or voiced needs at this time.  Electronically signed by Kirby Thomas RN on 9/23/2021 at 10:02 PM

## 2021-09-24 NOTE — PLAN OF CARE
Problem: OXYGENATION/RESPIRATORY FUNCTION  Goal: Patient will maintain patent airway  Outcome: Ongoing  Goal: Patient will achieve/maintain normal respiratory rate/effort  Description: Respiratory rate and effort will be within normal limits for the patient  Outcome: Ongoing     Problem: HEMODYNAMIC STATUS  Goal: Patient has stable vital signs and fluid balance  Outcome: Ongoing     Problem: FLUID AND ELECTROLYTE IMBALANCE  Goal: Fluid and electrolyte balance are achieved/maintained  Outcome: Ongoing     Problem: ACTIVITY INTOLERANCE/IMPAIRED MOBILITY  Goal: Mobility/activity is maintained at optimum level for patient  Outcome: Ongoing     Problem:  Activity:  Goal: Ability to tolerate increased activity will improve  Description: Ability to tolerate increased activity will improve  Outcome: Ongoing  Goal: Expression of feelings of increased energy will increase  Description: Expression of feelings of increased energy will increase  Outcome: Ongoing  Goal: Risk for activity intolerance will decrease  Description: Risk for activity intolerance will decrease  Outcome: Ongoing     Problem: Cardiac:  Goal: Ability to maintain an adequate cardiac output will improve  Description: Ability to maintain an adequate cardiac output will improve  Outcome: Ongoing  Goal: Complications related to the disease process, condition or treatment will be avoided or minimized  Description: Complications related to the disease process, condition or treatment will be avoided or minimized  Outcome: Ongoing     Problem: Coping:  Goal: Level of anxiety will decrease  Description: Level of anxiety will decrease  Outcome: Ongoing  Goal: General experience of comfort will improve  Description: General experience of comfort will improve  Outcome: Ongoing  Goal: Ability to adjust to condition or change in health will improve  Description: Ability to adjust to condition or change in health will improve  Outcome: Ongoing     Problem: Health will improve  Description: Adequacy of tissue perfusion will improve  Outcome: Ongoing

## 2021-09-24 NOTE — CONSULTS
The NP's [facility], EP NP) documentation has been prepared under my direction and personally reviewed by me in its entirety. I confirm that the consultation note created by the NP accurately reflects all work, physical examination, the discussion of treatments and procedures, and medical decision making by the NP. In addition, I have personally met with; performed a physical examination on; discussed the diagnosis (-es), treatment options including procedures, and formulated medical decisions for this patient. In brief, Danilo Nip 61 y.o. male h/o NICM, LVEF 30%, NYHA II-III presents with dizziness and fatigue and dyspnea and is diagnosed with new onset persistent atrial fibrillation with v-rates 150-160 bpm. Will start Eliquis 5mg po BID ([id number] score of at least 4 and warrants OAC) and plan for FRANTZ/CV today. Afterwards, he will be started on amiodarone 200mg po daily. Follow-up with General Cardiology to establish care at [facility] and follow-up with me in EP clinic to discuss other treatment options such as ablation. Please refer to the NP's consult note for full details on the assessment and plan. Thank you for allowing us to participate in the care of your patient. If you have any questions, please do not hesitate to contact us.      Emmy Randhawa MD, MS, [facility], [facility]  Cardiac Electrophysiology  [address]  [address] [facility]  [place] [address]  [phone]

## 2021-09-24 NOTE — H&P
H&P Update    I have reviewed the history and physical from [facility] -  [facility], LETICIA NP, on 2021 and examined the patient and find no relevant changes. I have reviewed with the patient and/or family the risks, benefits, and alternatives to the procedure. Pre-sedation Assessment    Patient:  Kallie Amaya   :   1965  Intended Procedure: FRANTZ/CV for persistent atrial fibrillation      Juliana Rincon nurses notes reviewed and agreed. Medications reviewed  Allergies: No Known Allergies      Pre-Procedure Assessment/Plan:  ASA 2 - Patient with mild systemic disease with no functional limitations    Level of Sedation Plan: Moderate sedation    Post Procedure plan: Return to same level of care

## 2021-09-24 NOTE — PROCEDURES
[address]     Electrophysiology Procedure Note       Date of Procedure: 9/24/2021  Patient's Name: Ama Sheppard  YOB: 1965   Medical Record Number: [id number]  Referring Physician: Karen Mcconnell MD  Procedure Performed by: Sherry Guillen MD    Procedures performed:  · Anesthesia: Monitored Anesthesia Care  · Level of sedation plan: Moderate sedation (conscious sedation) with intravenous Midazolam 4 mg, Fentanyl 50 mcg, and Methohexital 40 mg  · Sedation start time: 1344  · Sedation stop time: 1350  · Mallampati airway assessment class: I  · ASA class: 1  · Trans-esophageal echocardiography  · External Electrical cardioversion     Indication of the procedure: Symptomatic, new onset persistent atrial fibrillation      Details of procedure: The patient was brought to the cath lab area in a fasting and non-sedated state. The risks, benefits and alternatives of the procedure were discussed with the patient. The risks including, but not limited to, the risks of vascular injury, bleeding, infection, any pre-existing cardiac implantable electronic device malfunction, any pre-existing cardiac implantable electronic device's lead dislodgement, injury to cardiac and surrounding structures (including pneumothorax), stroke, myocardial infarction and death were discussed in detail. The patient was also counseled at length about the risks of mani Covid-19 in the oneyda-operative and post-operative states including the recovery window of their procedure. The patient was made aware that mani Covid-19 after a surgical procedure may worsen their prognosis for recovering from the virus and lend to a higher morbidity and or mortality risk. The patient was given the option of postponing their procedure. The patient was also presented reasonable alternatives to the proposed care, treatment, and services.  The discussion I have had with the patient encompassed risks, benefits, and side effects related to the alternatives and the risks related to not receiving the proposed care, treatment and services. The patient opted to proceed with the procedure. Written informed consent was signed and placed in the chart. A timeout protocol was completed to identify the patient and the procedure being performed. An independent trained observer assumed the sole responsibility of administering IV sedation medication - Versed, Fentanyl - at my direction and closely monitored the patient. A FRANTZ was performed which did not show any SUZY/LA clot/thrombus. Full FRANTZ reports will be dictated. Patient just started chronic anticoagulation therapy with Eliquis 5mg po BID. The patient was monitored continuously with ECG, pulse oximetry, blood pressure monitoring, and direct observation. We then administered intravenous Methohexital for sedation, and electrical DC cardioversion was performed using 200J, synchronized shock. Patient was converted to sinus rhythm immediately. Specimen collected: none       The patient tolerated the procedure well and there were no complications. Conclusion:   Successful external DC cardioversion of symptomatic, persistent atrial fibrillation. Plan:   The patient can be discharged if remains stable. Will continue with Eliquis 5mg po BID and will start Amiodarone 200mg daily for rhythm control. The patient will follow-up with Dr. Andrew Navarro as an outpatient to discuss other therapeutic options for atrial fibrillation, namely atrial fibrillation ablation. He will also establish general cardiology care as he is transferring care from [facility] to [address] [place] [address]. Thank you for allowing me to participate in the care of this patient. If you have any questions, please feel free to contact me.     Crys Clark MD, MS, [facility], [facility]  Cardiac Electrophysiology  [address]  [address] [facility], [address] [place] [address]  Denilson Quiñones [facility] [address]  [phone]

## 2021-09-24 NOTE — PROGRESS NOTES
Hospitalist Progress Note      PCP: Marilyn Giron DO    Date of Admission: 9/23/2021    Subjective: underwent cardioversion for a fib    Medications:  Reviewed    Infusion Medications    sodium chloride      sodium chloride       Scheduled Medications    apixaban  5 mg Oral BID    amiodarone  200 mg Oral Daily    sodium chloride flush  5-40 mL IntraVENous 2 times per day    aspirin  81 mg Oral Daily    atorvastatin  80 mg Oral Nightly    carvedilol  25 mg Oral BID WC    dapagliflozin  5 mg Oral QAM    hydrALAZINE  50 mg Oral 3 times per day    insulin glargine  25 Units SubCUTAneous Nightly    losartan  50 mg Oral Daily    pantoprazole  40 mg Oral QAM AC    sodium chloride flush  5-40 mL IntraVENous 2 times per day    insulin lispro  0-12 Units SubCUTAneous TID WC    insulin lispro  0-6 Units SubCUTAneous Nightly    potassium chloride  40 mEq Oral Daily with breakfast     PRN Meds: sodium chloride flush, sodium chloride, sodium chloride flush, sodium chloride, ondansetron **OR** ondansetron, polyethylene glycol, acetaminophen **OR** acetaminophen, perflutren lipid microspheres, potassium chloride, metoprolol      Intake/Output Summary (Last 24 hours) at 9/24/2021 1758  Last data filed at 9/24/2021 1158  Gross per 24 hour   Intake 1369.27 ml   Output 2950 ml   Net -1580.73 ml       Physical Exam Performed:    /85   Pulse 75   Temp 97.4 °F (36.3 °C) (Oral)   Resp 20   Ht 6' 1\" (1.854 m)   Wt (!) 320 lb 15.8 oz (145.6 kg)   SpO2 98%   BMI 42.35 kg/m²        General appearance: No apparent distress appears stated age and cooperative. HEENT Normal cephalic, atraumatic without obvious deformity. Pupils equal, round, and reactive to light. Extra ocular muscles intact. Conjunctivae/corneas clear. Neck: Supple, No jugular venous distention/bruits. Trachea midline without thyromegaly or adenopathy with full range of motion.   Lungs: Clear to auscultation, bilaterally without 10.7     HTN - on cardizem gtt, cont home coreg/hydralazine/losartan. Prn IV metoprolol        DVT Prophylaxis: eliquis  Diet: ADULT DIET;  Regular; Low Fat/Low Chol/High Fiber/2 gm Na  Code Status: Full Code    PT/OT Eval Status: ordered    Dispo - poss dc in am    Kleber Ramirez MD

## 2021-09-24 NOTE — PROGRESS NOTES
Patient's cardizem gtt was titrated from 15mg/hr to now 10mg/hr. His heart rate bounces from 's.  Electronically signed by Rebeca Pantoja RN on 9/24/2021 at 3:37 AM

## 2021-09-24 NOTE — PROGRESS NOTES
Hr still elevated in the 140-180's, on full dose lovenox, Cardizem maxed at 15mg/hr, lasix 40 mg iv x1 given. CONSUELO Romo CNP 9/23/2021 10:31 PM     Hr improved to 100-120's.  Diuresed ~ 1.9L     CONSUELO Romo CNP 9/24/2021 1:58 AM

## 2021-09-24 NOTE — H&P
Hospital Medicine History & Physical      PCP: Bladimir Gaytan DO    Date of Admission: 9/23/2021    Date of Service: Pt seen/examined on 9/23/21 and Admitted to Inpatient     Chief Complaint: dizziness    History Of Present Illness: The patient is a 61 y.o. male who presents to [facility] with dizziness. Pt states he has been feeling dizzy for the past week. Was recently started on metformin and has been having diarrhea from that and thought he was dizzy from that. Seen his PCP who found out that pt had irregular rhythm and hence sent to the ER. Pt has no h/o a fib, was found to be in a fib with RVR, started on cardizem gtt and admitted. Past Medical History:        Diagnosis Date    Abdominal pain 7/29/2021    Abnormal EKG 7/28/2016    Acute pancreatitis 7/29/2021    CHF (congestive heart failure) (HCC)     Cigarette smoker 8/25/2021    Cocaine abuse ([facility] 3/28/2015    Last Assessment & Plan:  Formatting of this note might be different from the original. Counseled on cessation as increases risk of coronary vasospasm and further worsening of heart function.  Dehydration 7/28/2016    Diabetes mellitus ([address]     History of cocaine abuse ([facility] 7/28/2016    History of MI (myocardial infarction) 7/28/2016    Hyperglycemia 7/28/2016    Hyperlipidemia     Hypertension     Morbid obesity with BMI of 45.0-49.9, adult ([address] 7/28/2016    Postural dizziness 7/28/2016    Pre-diabetes 3/9/2017    Last Assessment & Plan:  Formatting of this note might be different from the original. Discussed lifestyle changes Refer to [facility] program    PUD (peptic ulcer disease) 3/1/2019       Past Surgical History:    No past surgical history on file. Medications Prior to Admission:    Prior to Admission medications    Medication Sig Start Date End Date Taking? Authorizing Provider   fluticasone (FLONASE) 50 MCG/ACT nasal spray 2 sprays by Nasal route daily 8/27/21  Yes Historical Provider, MD   polyethylene glycol (GLYCOLAX) 17 GM/SCOOP powder Take 17 g by mouth daily as needed for Constipation 8/30/21  Yes Historical Provider, MD   omeprazole (PRILOSEC) 20 MG delayed release capsule TAKE [address]  Patient taking differently: Take 20 mg by mouth Daily  9/17/21  Yes Presley Portillo DO   carvedilol (COREG) 25 MG tablet TAKE 1 TABLET BY MOUTH TWICE A DAY WITH MEALS  Patient taking differently: Take 25 mg by mouth 2 times daily (with meals)  9/17/21  Yes Presley Portillo DO   famotidine (PEPCID) 20 MG tablet TAKE 1 TABLET BY MOUTH TWICE A DAY  Patient taking differently: Take 20 mg by mouth 2 times daily  9/17/21  Yes Presley Portillo DO   losartan (COZAAR) 25 MG tablet Take 2 tablets by mouth daily 9/16/21  Yes Presley Portillo DO   metFORMIN (GLUCOPHAGE) 1000 MG tablet Take 1 tablet by mouth 2 times daily (with meals) 9/3/21  Yes Presley Portillo DO   torsemide (DEMADEX) 20 MG tablet Take 1 tablet by mouth daily as needed (swelling or weight gain) Patient states he is taking this 3 days a week [facility]  Patient taking differently: Take 20 mg by mouth three times a week Patient states he is taking this 3 days a week [facility] 9/1/21  Yes Presley Portillo DO   insulin glargine (LANTUS SOLOSTAR) 100 UNIT/ML injection pen Inject 20 Units into the skin nightly Pharmacy can do brand substitutions  Patient taking differently: Inject 20 Units into the skin every morning Pharmacy can do brand substitutions 9/1/21  Yes Presley Portillo DO   dapagliflozin (FARXIGA) 5 MG tablet Take 1 tablet by mouth every morning 9/1/21  Yes Presley Portillo DO   aspirin 81 MG chewable tablet Take 1 tablet by mouth daily 8/25/21  Yes Presley Portillo DO   atorvastatin (LIPITOR) 80 MG tablet Take 1 tablet by mouth nightly 8/25/21  Yes Presley Portillo DO   hydrALAZINE (APRESOLINE) 25 MG tablet Take 1 tablet by mouth every 8 hours 8/25/21  Yes Daryl Sinclair, DO   blood glucose test strips (TRUE METRIX BLOOD GLUCOSE TEST) strip 1 each by In Vitro route daily Check blood sugar four times daily  Pharmacy can do brand substitutions 9/1/21   Daryl Sinclair, DO   Lancet Devices (LANCING DEVICE) MISC 1 lancet device  Pharmacy cannot do brand substitutions 7/31/21   Caitlin Garcia MD   Lancets MISC Check blood sugar four times. Pharmacy can do brand substitutions 7/31/21   Caitlin Garcia MD   Blood Glucose Monitoring Suppl (TRUE METRIX METER) w/Device KIT 1 kit by Does not apply route 4 times daily Dispense one kit  Pharmacy cannot do brand substitutions 7/31/21   Caitlin Garcia MD   Insulin Pen Needle (CAREFINE PEN NEEDLES) 32G X 4 MM MISC 1 each by Does not apply route 3 times daily Pharmacy can do brand substitutions 7/31/21   Caitlin Garcia MD       Allergies:  Patient has no known allergies. Social History:  The patient currently lives at home    TOBACCO:   reports that he has been smoking cigarettes. He has a 12.50 pack-year smoking history. He has never used smokeless tobacco.  ETOH:   reports previous alcohol use. Family History:  Reviewed in detail and negative for DM, Early CAD, Cancer, CVA. Positive as follows:    No family history on file. REVIEW OF SYSTEMS:   Positive for dizziness and as noted in the HPI. All other systems reviewed and negative. PHYSICAL EXAM:    BP (!) 200/100   Pulse 132   Temp 98.5 °F (36.9 °C) (Oral)   Resp 21   Ht 6' (1.829 m)   Wt (!) 318 lb 9 oz (144.5 kg)   SpO2 98%   BMI 43.20 kg/m²     General appearance: No apparent distress appears stated age and cooperative. HEENT Normal cephalic, atraumatic without obvious deformity. Pupils equal, round, and reactive to light. Extra ocular muscles intact. Conjunctivae/corneas clear. Neck: Supple, No jugular venous distention/bruits.   Trachea midline without thyromegaly or adenopathy with full range of motion. Lungs: Clear to auscultation, bilaterally without Rales/Wheezes/Rhonchi with good respiratory effort. Heart: irregular rate and rhythm with Normal S1/S2  Abdomen: Soft, non-tender or non-distended without rigidity or guarding and positive bowel sounds  Extremities: No clubbing, cyanosis, or edema bilaterally. Skin: Skin color, texture, turgor normal.  No rashes or lesions. Neurologic: Alert and oriented X 3, neurovascularly intact with sensory/motor intact upper extremities/lower extremities, bilaterally. Cranial nerves: II-XII intact, grossly non-focal.  Mental status: Alert, oriented, thought content appropriate. Capillary Refill: Acceptable  < 3 seconds  Peripheral Pulses: +3 Easily felt, not easily obliterated with pressure      CXR:  I have reviewed the CXR with the following interpretation: cardiomegaly    CBC   Recent Labs     09/23/21  1250   WBC 6.5   HGB 13.1*   HCT 39.2*         RENAL  Recent Labs     09/23/21  1250      K 3.2*   CL 99   CO2 23   BUN 11   CREATININE 0.9     LFT'S  No results for input(s): AST, ALT, ALB, BILIDIR, BILITOT, ALKPHOS in the last 72 hours. COAG  No results for input(s): INR in the last 72 hours.   CARDIAC ENZYMES  Recent Labs     09/23/21  1250   TROPONINI <0.01       U/A:    Lab Results   Component Value Date    COLORU YELLOW 09/23/2021    CLARITYU Clear 09/23/2021    SPECGRAV >1.030 09/23/2021    LEUKOCYTESUR Negative 09/23/2021    BLOODU Negative 09/23/2021    GLUCOSEU >=1000 09/23/2021       ABG  No results found for: CMS3JGE, BEART, W6KDKRGF, PHART, THGBART, PBX3XSH, PO2ART, NRG4SFE        Active Hospital Problems    Diagnosis Date Noted    Atrial fibrillation with RVR ([facility] [I48.91] 09/23/2021         PHYSICIANS CERTIFICATION:    I certify that Ama Sheppard is expected to be hospitalized for > than 2 midnights based on the following assessment and plan:      ASSESSMENT/PLAN:    New onset a fib with RVR -

## 2021-09-24 NOTE — PRE SEDATION
Sedation Pre-Procedure Note    Patient Name: Aaliyah Pacheco   YOB: 1965  Room/Bed: E8Q-6333/5125-01  Medical Record Number: [id number]  Date: 9/24/2021   Time: 10:42 AM       Indication:  New-onset persistent atrial fibrillation    Consent: I have discussed with the patient and/or the patient representative the indication, alternatives, and the possible risks and/or complications of the planned procedure and the anesthesia methods. The patient and/or patient representative appear to understand and agree to proceed. Vital Signs:   Vitals:    09/24/21 0914   BP:    Pulse:    Resp:    Temp:    SpO2: 97%       Past Medical History:   has a past medical history of Abdominal pain, Abnormal EKG, Acute pancreatitis, CHF (congestive heart failure) ([facility] Cigarette smoker, Cocaine abuse ([facility] Dehydration, Diabetes mellitus ([facility] History of cocaine abuse ([facility] History of MI (myocardial infarction), Hyperglycemia, Hyperlipidemia, Hypertension, Morbid obesity with BMI of 45.0-49.9, adult ([facility] Postural dizziness, Pre-diabetes, and PUD (peptic ulcer disease). Past Surgical History:   has a past surgical history that includes Cardiac catheterization and Upper gastrointestinal endoscopy.     Medications:   Scheduled Meds:    apixaban  5 mg Oral BID    aspirin  81 mg Oral Daily    atorvastatin  80 mg Oral Nightly    carvedilol  25 mg Oral BID WC    dapagliflozin  5 mg Oral QAM    hydrALAZINE  50 mg Oral 3 times per day    insulin glargine  25 Units SubCUTAneous Nightly    losartan  50 mg Oral Daily    pantoprazole  40 mg Oral QAM AC    sodium chloride flush  5-40 mL IntraVENous 2 times per day    insulin lispro  0-12 Units SubCUTAneous TID WC    insulin lispro  0-6 Units SubCUTAneous Nightly    potassium chloride  40 mEq Oral Daily with breakfast     Continuous Infusions:    dilTIAZem 15 mg/hr (09/24/21 1039)    sodium chloride       PRN Meds: sodium chloride flush, sodium chloride, ondansetron **OR** ondansetron, polyethylene glycol, acetaminophen **OR** acetaminophen, perflutren lipid microspheres, potassium chloride, metoprolol  Home Meds:   Prior to Admission medications    Medication Sig Start Date End Date Taking?  Authorizing Provider   fluticasone (FLONASE) 50 MCG/ACT nasal spray 2 sprays by Nasal route daily 8/27/21  Yes Historical Provider, MD   polyethylene glycol (GLYCOLAX) 17 GM/SCOOP powder Take 17 g by mouth daily as needed for Constipation 8/30/21  Yes Historical Provider, MD   omeprazole (PRILOSEC) 20 MG delayed release capsule TAKE 1 CAPSULE BY MOUTH EVERY DAY  Patient taking differently: Take 20 mg by mouth Daily  9/17/21  Yes Nestor Lockhart DO   carvedilol (COREG) 25 MG tablet TAKE 1 TABLET BY MOUTH TWICE A DAY WITH MEALS  Patient taking differently: Take 25 mg by mouth 2 times daily (with meals)  9/17/21  Yes Nestor Lockhart DO   famotidine (PEPCID) 20 MG tablet TAKE 1 TABLET BY MOUTH TWICE A DAY  Patient taking differently: Take 20 mg by mouth 2 times daily  9/17/21  Yes Nestor Lockhart DO   losartan (COZAAR) 25 MG tablet Take 2 tablets by mouth daily 9/16/21  Yes Nestor Lockhart DO   metFORMIN (GLUCOPHAGE) 1000 MG tablet Take 1 tablet by mouth 2 times daily (with meals) 9/3/21  Yes Nestor Lockhart DO   torsemide (DEMADEX) 20 MG tablet Take 1 tablet by mouth daily as needed (swelling or weight gain) Patient states he is taking this 3 days a week [facility]  Patient taking differently: Take 20 mg by mouth three times a week Patient states he is taking this 3 days a week [facility] 9/1/21  Yes Nestor Lockhart DO   insulin glargine (LANTUS SOLOSTAR) 100 UNIT/ML injection pen Inject 20 Units into the skin nightly Pharmacy can do brand substitutions  Patient taking differently: Inject 20 Units into the skin every morning Pharmacy can do brand substitutions 9/1/21  Yes Nestor Lockhart DO   dapagliflozin (FARXIGA) 5 MG tablet Take 1 tablet by mouth every morning 9/1/21  Yes Nestor Lockhart DO   aspirin 81 MG chewable tablet Take 1 tablet by mouth daily 8/25/21  Yes Roslyn Escobar, DO   atorvastatin (LIPITOR) 80 MG tablet Take 1 tablet by mouth nightly 8/25/21  Yes Roslyn Escobar DO   hydrALAZINE (APRESOLINE) 25 MG tablet Take 1 tablet by mouth every 8 hours 8/25/21  Yes Roslyn Escobar, DO   blood glucose test strips (TRUE METRIX BLOOD GLUCOSE TEST) strip 1 each by In Vitro route daily Check blood sugar four times daily  Pharmacy can do brand substitutions 9/1/21   Roslyn Escobar, DO   Lancet Devices (LANCING DEVICE) MISC 1 lancet device  Pharmacy cannot do brand substitutions 7/31/21   Randal Zavala MD   Lancets MISC Check blood sugar four times. Pharmacy can do brand substitutions 7/31/21   Randal Zavala MD   Blood Glucose Monitoring Suppl (TRUE METRIX METER) w/Device KIT 1 kit by Does not apply route 4 times daily Dispense one kit  Pharmacy cannot do brand substitutions 7/31/21   Randal Zavala MD   Insulin Pen Needle (CAREFINE PEN NEEDLES) 32G X 4 MM MISC 1 each by Does not apply route 3 times daily Pharmacy can do brand substitutions 7/31/21   Randal Zavala MD     Coumadin Use Last 7 Days:  no  Antiplatelet drug therapy use last 7 days: yes - ASA  Other anticoagulant use last 7 days: yes - just started Eliquis 5mg po BID  Additional Medication Information:  n/a      Pre-Sedation Documentation and Exam:   I have personally completed a history, physical exam & review of systems for this patient (see notes).     Mallampati Airway Assessment:  Mallampati Class I - (soft palate, fauces, uvula & anterior/posterior tonsillar pillars are visible)    Prior History of Anesthesia Complications:   none    ASA Classification:  Class 2 - A normal healthy patient with mild systemic disease    Sedation/ Anesthesia Plan:   intravenous sedation    Medications Planned:   midazolam (Versed) intravenously, fentanyl intravenously and Methohexital    Patient is an appropriate candidate for plan of sedation: yes    Electronically signed by Stephon Nascimento MD on 9/24/2021 at 10:42 AM

## 2021-09-24 NOTE — CARE COORDINATION
Attempted to meet with patient to assess for discharge needs. Patient off the floor for procedure. Sw to follow up and assist as needed.    NAE Santiago, CHLOE, Social Work/Case Management   [phone]  Electronically signed by NAE Santiago, CHLOE on 9/24/2021 at 1:44 PM

## 2021-09-24 NOTE — CONSULTS
Cardiac Electrophysiology Consultation     Date: 9/24/2021  Admit Date:  9/23/2021  Admission Diagnosis: Hypokalemia [E87.6]  Hypomagnesemia [E83.42]  Atrial fibrillation with rapid ventricular response (HCC) [I48.91]  Atrial fibrillation with RVR (HCC) [I48.91]  Elevated brain natriuretic peptide (BNP) level [R79.89]  Congestive heart failure, unspecified HF chronicity, unspecified heart failure type ([address] [I50.9]     Reason for Consultation: New A fib with RVR  Consult Requesting Physician: Garrett Alston MD       History of Present Illness  Kelli Quiroz is a 61y.o. year old male with past medical history significant for non-obstructive CAD, [address] (felt to be secondary to HTN and past cocaine abuse), systolic HF, HTN, VENITA on CPAP, DM, DVT and PVD who presented to the ED complaining of dizziness. He has also been more SOB and fatigued for about the last week. Denies any syncope. No chest pain. No increased edema. He was noted to be in A fib with RVR while in the ED. He was started on a Cardizem drip with some improvement in his rates from the 160s down to the 80s at times but continues to have RVR. His IV went bad this morning and his v-rate is currently in the 150s-160s. He was following with cardiology at [facility] but has not been seen in over 2 years. He had an abnormal stress test in 2019 showed reversibility and was supposed to be scheduled for a [facility] but never did so. Past Medical History:   Diagnosis Date    Abdominal pain 7/29/2021    Abnormal EKG 7/28/2016    Acute pancreatitis 7/29/2021    CHF (congestive heart failure) (HCC)     Cigarette smoker 8/25/2021    Cocaine abuse ([address] 3/28/2015    Last Assessment & Plan:  Formatting of this note might be different from the original. Counseled on cessation as increases risk of coronary vasospasm and further worsening of heart function.     Dehydration 7/28/2016    Diabetes mellitus ([address]     History of cocaine abuse ([address] 7/28/2016    History of MI (myocardial infarction) 7/28/2016    Hyperglycemia 7/28/2016    Hyperlipidemia     Hypertension     Morbid obesity with BMI of 45.0-49.9, adult ([address] 7/28/2016    Postural dizziness 7/28/2016    Pre-diabetes 3/9/2017    Last Assessment & Plan:  Formatting of this note might be different from the original. Discussed lifestyle changes Refer to [facility] program    PUD (peptic ulcer disease) 3/1/2019        Past Surgical History:   Procedure Laterality Date    CARDIAC CATHETERIZATION      UPPER GASTROINTESTINAL ENDOSCOPY         Current Outpatient Medications   Medication Instructions    aspirin 81 mg, Oral, DAILY    atorvastatin (LIPITOR) 80 mg, Oral, NIGHTLY    Blood Glucose Monitoring Suppl (TRUE METRIX METER) w/Device KIT 1 kit, Does not apply, 4 TIMES DAILY, Dispense one kitPharmacy cannot do brand substitutions    blood glucose test strips (TRUE METRIX BLOOD GLUCOSE TEST) strip 1 each, In Vitro, DAILY, Check blood sugar four times dailyPharmacy can do brand substitutions    carvedilol (COREG) 25 MG tablet TAKE 1 TABLET BY MOUTH TWICE A DAY WITH MEALS    dapagliflozin (FARXIGA) 5 mg, Oral, EVERY MORNING    famotidine (PEPCID) 20 MG tablet TAKE 1 TABLET BY MOUTH TWICE A DAY    fluticasone (FLONASE) 50 MCG/ACT nasal spray 2 sprays, Nasal, DAILY    hydrALAZINE (APRESOLINE) 25 mg, Oral, EVERY 8 HOURS    Insulin Pen Needle (CAREFINE PEN NEEDLES) 32G X 4 MM MISC 1 each, Does not apply, 3 TIMES DAILY, Pharmacy can do brand substitutions    Lancet Devices (LANCING DEVICE) MISC 1 lancet devicePharmacy cannot do brand substitutions    Lancets MISC Check blood sugar four times. Pharmacy can do brand substitutions    Lantus SoloStar 20 Units, SubCUTAneous, NIGHTLY, Pharmacy can do brand substitutions    losartan (COZAAR) 50 mg, Oral, DAILY    metFORMIN (GLUCOPHAGE) 1,000 mg, Oral, 2 TIMES DAILY WITH MEALS    omeprazole (PRILOSEC) 20 MG delayed release capsule TAKE 1 CAPSULE BY MOUTH EVERY DAY    polyethylene glycol (GLYCOLAX) 17 g, Oral, DAILY PRN    torsemide (DEMADEX) 20 mg, Oral, DAILY PRN, Patient states he is taking this 3 days a week MWF        No Known Allergies    Social History:   reports that he has been smoking cigarettes. He has a 12.50 pack-year smoking history. He has never used smokeless tobacco. He reports previous alcohol use. He reports current drug use. Drug: Marijuana. Family History:  family history is not on file. Review of Systems:  · General: positive for fatigue, negative for fever, chills   · Ophthalmic ROS: negative for eye pain or loss of vision  · ENT ROS: negative for headaches, sore throat, nasal drainage  · Respiratory: positive for SOB, negative for cough, sputum  · Cardiovascular: positive for dizziness, negative for chest pain, palpitations.   · Gastrointestinal: negative for abdominal pain, diarrhea, N/V  · Hematology: negative for bleeding, blood clots, bruising or jaundice  · Genito-Urinary:  negative for dysuria or incontinence  · Musculoskeletal: negative for joint swelling, muscle pain  · Neurological: negative for confusion, headaches   · Psychiatric: negative anxiety, depression  · Dermatological: negative for rash    Medications:  Scheduled Meds:   aspirin  81 mg Oral Daily    atorvastatin  80 mg Oral Nightly    carvedilol  25 mg Oral BID WC    dapagliflozin  5 mg Oral QAM    hydrALAZINE  50 mg Oral 3 times per day    insulin glargine  25 Units SubCUTAneous Nightly    losartan  50 mg Oral Daily    pantoprazole  40 mg Oral QAM AC    sodium chloride flush  5-40 mL IntraVENous 2 times per day    enoxaparin  1 mg/kg SubCUTAneous BID    insulin lispro  0-12 Units SubCUTAneous TID WC    insulin lispro  0-6 Units SubCUTAneous Nightly    potassium chloride  40 mEq Oral Daily with breakfast      Continuous Infusions:   dilTIAZem 10 mg/hr (09/24/21 0426)    sodium chloride       PRN Meds:.sodium chloride flush, sodium chloride, ondansetron **OR** ondansetron, polyethylene glycol, acetaminophen **OR** acetaminophen, perflutren lipid microspheres, potassium chloride, metoprolol     Physical Examination:  Vitals:    21 0708   BP: 106/75   Pulse: 96   Resp:    Temp:    SpO2:         Intake/Output Summary (Last 24 hours) at 2021 0913  Last data filed at 2021 0547  Gross per 24 hour   Intake 1369.27 ml   Output 2500 ml   Net -1130.73 ml     In: 1369.3 [P.O.:240; I.V.:167.7]  Out: 2500    Wt Readings from Last 3 Encounters:   21 (!) 320 lb 15.8 oz (145.6 kg)   21 (!) 313 lb (142 kg)   21 (!) 313 lb (142 kg)     Temp  Av.1 °F (36.7 °C)  Min: 97.8 °F (36.6 °C)  Max: 98.5 °F (36.9 °C)  Pulse  Av.2  Min: 61  Max: 175  BP  Min: 92/59  Max: 200/100  SpO2  Av.9 %  Min: 93 %  Max: 100 %    · Telemetry: Atrial fibrillation v-rates 150s. · Constitutional: Alert, in no acute distress. Appears stated age. · Head: Normocephalic and atraumatic. · Eyes: Conjunctivae normal. EOM are normal.   · Neck: Neck supple. No lymphadenopathy. No rigidity. No JVD present. · Cardiovascular: Fast rate, IRR. No murmurs, rubs or gallops. No S3 or S4.  · Pulmonary/Chest: Clear breath sounds bilaterally. No crackles, wheezes or rhonchi. No respiratory accessory muscle use. · Abdominal: Soft. Normal bowel sounds present. No distension, No tenderness. · Musculoskeletal: No tenderness. No edema    · Lymphadenopathy: Has no cervical adenopathy. · Neurological: Alert and oriented. No gross deficits. · Skin: Skin is warm and dry. No rash, lesions, ulcerations noted. · Psychiatric: No anxiety nor agitation. Labs:  Reviewed.    Recent Labs     21  1250 21  0533    139   K 3.2* 3.4*   CL 99 104   CO2 23 25   BUN 11 12   CREATININE 0.9 0.9     Recent Labs     21  1250   WBC 6.5   HGB 13.1*   HCT 39.2*   MCV 91.0        Lab Results   Component Value Date    TROPONINI <0.01 2021     No results found for: BNP  Lab Results   Component Value Date    PROTIME 11.9 2021    INR 1.05 2021     Lab Results   Component Value Date    CHOL 90 2021    HDL 30 2021    TRIG 92 2021       Diagnostic and imaging results reviewed. EC21  Atrial fibrillation at 164 BPM. Non-specific ST-T wave changes. Echo: 3/4/19   - Left ventricle: The cavity size was moderately dilated. Wall     thickness was normal. Systolic function was moderately to severely     reduced. The estimated ejection fraction was in the range of 30%     to 35%. Severe diffuse hypokinesis. The study is not technically     sufficient to allow evaluation of LV diastolic function.   - Mitral valve: Mild regurgitation.   - Left atrium: The atrium was mildly dilated. - Right ventricle: Systolic function was low normal. TAPSE:     1.8cm. Tricuspid annular systolic velocity: 41UM/F.   - Right atrium: The atrium was mildly dilated. - Atrial septum: A shunt cannot be excluded. - Tricuspid valve: Mild-moderate regurgitation directed along the     right atrial wall. - Pulmonary arteries: Systolic pressure was moderately increased,     estimated to be 53mm Hg assuming that the right atrial pressure     was 5 mmHg. Stress: 19   There is a medium to large sized area of mild to moderately decreased perfusion in the basal inferior, basal inferolateral, mid inferior, mid inferolateral, and apical inferior segments at stress with extensive improvement at rest, consistent with reversible ischemia. Cath: 16    PROCEDURAL FINDINGS:   1. Left main coronary artery was free of any angiographically   significant disease. It gave of the left anterior descending   artery and the left circumflex artery. 2. The left anterior descending had a normal anatomic course.  It   gave off 2 diagonals. 30-40% mid-LAD stenosis.      3. The left circumflex artery gives off 1 large obtuse marginals   and there is 30-40% ostial stenosis of OM1. Left dominant and   supplies posterior descending artery and posterolateral branches   distally. 4. The right coronary artery is a small, non-dominant vessel and   with no angiographically significant disease.      HEMODYNAMICS:   LVEDP: 25   No gradient across the aortic valve       CONCLUSION:   Non-obstructive CAD     PLAN:   -Medical management of CAD     Assessment & Plan:    New atrial fibrillation with RVR   - first seen on EKG on 9/23/21   - rates continue to be rapid, now off Cardizem drip due to loss of IV access but was not well controlled even with Cardizem    - symptomatic with dizziness, SOB and fatigue, started about 1 week ago   - given history of SHF, would recommend restoring sinus rhythm as soon as possible in order to avoid decompensation    - discussed FRANTZ/cardioversion with pt including risks and benefits and he is agreeable to proceed   - likely add AAD after cardioversion, likely amiodarone given abnormal EF   - CHADS2-VASc 4 (HTN, DM, HF and PVD/CAD) and should be anticoagulated, add DOAC, Eliquis 5mg BID   - ultimately should consider ablation as an outpatient, will see Dr. Junito Jay in the office to discuss    Non-ischemic cardiomyopathy   - EF was 30-35% on last echo   - no ICD, likely as he has been non-compliant with both medications and follow ups in the past   - denies any medication non-compliance recently   - echo ordered, if EF still <35% and has been on GDMT, may be candidate for ICD     Chronic systolic heart failure   - currently appears well compensated, proBNP is up and did get a dose of IV Lasix in the ED   - on Coreg 25mg BID, losartan 50mg QD, torsemide 20mg three times a week   - he is wanting to switch from  to here, will arrange outpatient general cardiology f/u    CAD   - non-obstructive disease on LHC in 2016   - had abnormal stress in 2019 and LHC was recommended but pt did not complete   - on aspirin, statin, beta blocker    Discussed with  CONSUELO Felipe  [facility], [address] [facility]  Phone: [phone]  Fax: [phone]    Electronically signed by CONSUELO Wilder CNP on 9/24/2021 at 9:13 AM

## 2021-09-25 VITALS
HEIGHT: 73 IN | HEART RATE: 76 BPM | DIASTOLIC BLOOD PRESSURE: 90 MMHG | RESPIRATION RATE: 18 BRPM | SYSTOLIC BLOOD PRESSURE: 135 MMHG | BODY MASS INDEX: 41.75 KG/M2 | OXYGEN SATURATION: 98 % | TEMPERATURE: 97.4 F | WEIGHT: 315 LBS

## 2021-09-25 LAB
A/G RATIO: 1 (ref 1.1–2.2)
ALBUMIN SERPL-MCNC: 3.8 G/DL (ref 3.4–5)
ALP BLD-CCNC: 63 U/L (ref 40–129)
ALT SERPL-CCNC: 9 U/L (ref 10–40)
ANION GAP SERPL CALCULATED.3IONS-SCNC: 12 MMOL/L (ref 3–16)
AST SERPL-CCNC: 10 U/L (ref 15–37)
BILIRUB SERPL-MCNC: 0.9 MG/DL (ref 0–1)
BUN BLDV-MCNC: 14 MG/DL (ref 7–20)
CALCIUM SERPL-MCNC: 8.7 MG/DL (ref 8.3–10.6)
CHLORIDE BLD-SCNC: 102 MMOL/L (ref 99–110)
CO2: 25 MMOL/L (ref 21–32)
CREAT SERPL-MCNC: 1.1 MG/DL (ref 0.9–1.3)
GFR AFRICAN AMERICAN: >60
GFR NON-AFRICAN AMERICAN: >60
GLOBULIN: 3.8 G/DL
GLUCOSE BLD-MCNC: 142 MG/DL (ref 70–99)
GLUCOSE BLD-MCNC: 151 MG/DL (ref 70–99)
GLUCOSE BLD-MCNC: 216 MG/DL (ref 70–99)
LV EF: 33 %
LVEF MODALITY: NORMAL
PERFORMED ON: ABNORMAL
PERFORMED ON: ABNORMAL
POTASSIUM REFLEX MAGNESIUM: 4.2 MMOL/L (ref 3.5–5.1)
SODIUM BLD-SCNC: 139 MMOL/L (ref 136–145)
TOTAL PROTEIN: 7.6 G/DL (ref 6.4–8.2)

## 2021-09-25 PROCEDURE — 80053 COMPREHEN METABOLIC PANEL: CPT

## 2021-09-25 PROCEDURE — 2580000003 HC RX 258: Performed by: INTERNAL MEDICINE

## 2021-09-25 PROCEDURE — 93306 TTE W/DOPPLER COMPLETE: CPT

## 2021-09-25 PROCEDURE — 6370000000 HC RX 637 (ALT 250 FOR IP): Performed by: NURSE PRACTITIONER

## 2021-09-25 PROCEDURE — 94760 N-INVAS EAR/PLS OXIMETRY 1: CPT

## 2021-09-25 PROCEDURE — 6370000000 HC RX 637 (ALT 250 FOR IP): Performed by: INTERNAL MEDICINE

## 2021-09-25 PROCEDURE — 36415 COLL VENOUS BLD VENIPUNCTURE: CPT

## 2021-09-25 PROCEDURE — 99232 SBSQ HOSP IP/OBS MODERATE 35: CPT | Performed by: INTERNAL MEDICINE

## 2021-09-25 RX ORDER — AMIODARONE HYDROCHLORIDE 200 MG/1
200 TABLET ORAL DAILY
Qty: 30 TABLET | Refills: 2 | Status: ON HOLD | OUTPATIENT
Start: 2021-09-26 | End: 2022-03-22 | Stop reason: SDUPTHER

## 2021-09-25 RX ADMIN — ASPIRIN 81 MG CHEWABLE TABLET 81 MG: 81 TABLET CHEWABLE at 08:16

## 2021-09-25 RX ADMIN — Medication 10 ML: at 08:16

## 2021-09-25 RX ADMIN — AMIODARONE HYDROCHLORIDE 200 MG: 200 TABLET ORAL at 08:16

## 2021-09-25 RX ADMIN — CARVEDILOL 25 MG: 25 TABLET, FILM COATED ORAL at 08:16

## 2021-09-25 RX ADMIN — LOSARTAN POTASSIUM 50 MG: 50 TABLET, FILM COATED ORAL at 08:16

## 2021-09-25 RX ADMIN — PANTOPRAZOLE SODIUM 40 MG: 40 TABLET, DELAYED RELEASE ORAL at 06:17

## 2021-09-25 RX ADMIN — INSULIN LISPRO 2 UNITS: 100 INJECTION, SOLUTION INTRAVENOUS; SUBCUTANEOUS at 08:13

## 2021-09-25 RX ADMIN — APIXABAN 5 MG: 5 TABLET, FILM COATED ORAL at 08:16

## 2021-09-25 RX ADMIN — POTASSIUM CHLORIDE 40 MEQ: 750 TABLET, FILM COATED, EXTENDED RELEASE ORAL at 08:16

## 2021-09-25 ASSESSMENT — PAIN SCALES - GENERAL
PAINLEVEL_OUTOF10: 0

## 2021-09-25 NOTE — CARE COORDINATION
CASE MANAGEMENT DISCHARGE SUMMARY:    DISCHARGE DATE: 09/25/21     DISCHARGED TO HOME     TRANSPORTATION: Lyft ride              TIME: RN to call when patient is ready for dc      PREFERRED PHARMACY: [facility] [address] [place] [address], ERIN SONI Case Management  [phone]  Electronically signed by NAE Yepez, DARYLW on 9/25/2021 at 12:35 PM

## 2021-09-25 NOTE — PROGRESS NOTES
Cardiac Electrophysiology Progress Note     Admit Date: 2021     Reason for follow up: new onset atrial fibrillation with v-rates 150-180 bpm    HPI and Interval History:   Patient seen and examined. Clinical notes reviewed. Telemetry reviewed - SR 70's bpm after FRANTZ/CV yesterday. No new complaint today. No major events overnight. Denies having angina, shortness of breath, dyspnea on exertion, Orthopnea, PND at the time of this visit. Review of System:  All other systems reviewed except for that noted above. Pertinent negatives and positives are:     · General: negative for fever, chills   · Ophthalmic ROS: negative for - eye pain or loss of vision  · ENT ROS: negative for - headaches, sore throat   · Respiratory: negative for - cough, sputum  · Cardiovascular: Reviewed in HPI  · Gastrointestinal: negative for - abdominal pain, diarrhea, N/V  · Hematology: negative for - bleeding, blood clots, bruising or jaundice  · Genito-Urinary:  negative for - Dysuria or incontinence  · Musculoskeletal: negative for - Joint swelling, muscle pain  · Neurological: negative for - confusion, dizziness, headaches   · Psychiatric: No anxiety, no depression. · Dermatological: negative for - rash      Physical Examination:  Vitals:    21 0815   BP: 137/89   Pulse: 81   Resp: 18   Temp: 97.8 °F (36.6 °C)   SpO2: 95%        Intake/Output Summary (Last 24 hours) at 2021 1050  Last data filed at 2021 0945  Gross per 24 hour   Intake 600 ml   Output 1050 ml   Net -450 ml     In: 600 [P.O.:600]  Out: 600    Wt Readings from Last 3 Encounters:   21 (!) 319 lb 3.2 oz (144.8 kg)   21 (!) 313 lb (142 kg)   21 (!) 313 lb (142 kg)     Temp  Av.7 °F (36.5 °C)  Min: 97.2 °F (36.2 °C)  Max: 98.3 °F (36.8 °C)  Pulse  Av.5  Min: 75  Max: 118  BP  Min: 117/82  Max: 137/89  SpO2  Av %  Min: 95 %  Max: 99 %    · Telemetry: Sinus rhythm with v-rates 70's bpm   · Constitutional: Alert.  Oriented to person, place, and time. No distress. · Head: Normocephalic and atraumatic. · Mouth/Throat: Lips appear moist. Oropharynx is clear and moist.  · Eyes: Conjunctivae normal. EOM are normal.   · Neck: Neck supple. No lymphadenopathy. No rigidity. No JVD present. · Cardiovascular: Normal rate, regular rhythm. Normal S1&S2. Carotid pulse 2+ bilaterally. · Pulmonary/Chest: Bilateral respiratory sounds present. No respiratory accessory muscle use. No wheezes, No rhonchi. No rales. · Abdominal: Soft. Normal bowel sounds present. No distension, No tenderness. No splenomegaly. No hernia. · Musculoskeletal: No tenderness. Full range of motion in bilateral upper and lower extremities. No pitting BLE edema    · Lymphadenopathy: Has no cervical adenopathy. · Neurological: Alert and oriented. Cranial nerve II-XII grossly intact, No gross deficit to touch. · Skin: Skin is warm and dry. No rash, lesions, ulcerations noted. · Psychiatric: No anxiety nor agitation. Telemetry, labs, diagnostic and imaging results personally reviewed and interpreted. Recent Labs     09/23/21  1250 09/24/21  0533 09/25/21  0844    139 139   K 3.2* 3.4* 4.2   CL 99 104 102   CO2 23 25 25   BUN 11 12 14   CREATININE 0.9 0.9 1.1     Recent Labs     09/23/21  1250   WBC 6.5   HGB 13.1*   HCT 39.2*   MCV 91.0        Lab Results   Component Value Date    TROPONINI <0.01 09/24/2021     Estimated Creatinine Clearance: 111 mL/min (based on SCr of 1.1 mg/dL). No results found for: BNP  Lab Results   Component Value Date    PROTIME 11.9 09/24/2021    INR 1.05 09/24/2021     Lab Results   Component Value Date    CHOL 90 08/25/2021    HDL 30 08/25/2021    TRIG 92 08/25/2021       I independently reviewed and interpreted the ECG, telemetry, and serology results.     Scheduled Meds:   apixaban  5 mg Oral BID    amiodarone  200 mg Oral Daily    sodium chloride flush  5-40 mL IntraVENous 2 times per day    aspirin  81 mg Oral Daily    atorvastatin  80 mg Oral Nightly    carvedilol  25 mg Oral BID WC    dapagliflozin  5 mg Oral QAM    hydrALAZINE  50 mg Oral 3 times per day    insulin glargine  25 Units SubCUTAneous Nightly    losartan  50 mg Oral Daily    pantoprazole  40 mg Oral QAM AC    sodium chloride flush  5-40 mL IntraVENous 2 times per day    insulin lispro  0-12 Units SubCUTAneous TID WC    insulin lispro  0-6 Units SubCUTAneous Nightly    potassium chloride  40 mEq Oral Daily with breakfast     Continuous Infusions:   sodium chloride      sodium chloride       PRN Meds:sodium chloride flush, sodium chloride, sodium chloride flush, sodium chloride, ondansetron **OR** ondansetron, polyethylene glycol, acetaminophen **OR** acetaminophen, perflutren lipid microspheres, potassium chloride, metoprolol     Patient Active Problem List    Diagnosis Date Noted    Atrial fibrillation with RVR ([facility] 09/23/2021    Type 2 diabetes mellitus with hyperlipidemia ([facility] 08/25/2021    Diabetes mellitus with coincident hypertension ([facility] 08/25/2021    Gastroesophageal reflux disease 08/25/2021    Obesity, diabetes, and hypertension syndrome ([facility] 08/25/2021    Abnormal stress test 09/24/2019    Chronic systolic heart failure ([facility] 06/28/2019    Gastric ulcer with perforation ([facility] 02/06/2018    History of MI (myocardial infarction) 07/28/2016    Tobacco abuse 07/28/2016    Atherosclerosis of coronary artery 07/19/2016    Chronic kidney disease (CKD) stage G2/A2, mildly decreased glomerular filtration rate (GFR) between 60-89 mL/min/1.73 square meter and albuminuria creatinine ratio between  mg/g 03/28/2015      Active Hospital Problems    Diagnosis Date Noted    Atrial fibrillation with RVR ([facility] [I48.91] 09/23/2021       Assessment and Plan:     New onset atrial fibrillation  -s/p FRANTZ/CV yesterday with successful conversion to SR. Now on amiodarone and Eliquis.   -recommend follow-up with me in clinic to discuss ablation for atrial fibrillation and also ICD for primary prevention of sudden cardiac arrest after 3 months of GDMT. ICM  -echo pending but results would not change our management at this time. May also be done as outpatient.  -cont coreg, losartan, atorva, asa 81    Will sign off. Thank you for allowing me to participate in the care of this patient. If you have any questions, please do not hesitate to contact me.     Nj Darden MD, MS, [facility]  Cardiac Electrophysiology  [address]  [address] [facility], [address] [place] [address]  Denilson Quiñones [facility] [address]  [phone]

## 2021-09-25 NOTE — PROGRESS NOTES
D/C order acknowledged. D/C instructions, including new medications, medication changes, future appointments, and education regarding diet and exercise, provided to patient. Pt VU. D/C paperwork reviewed with patient, and placed in hospital-provided belongings bag. IV removed w/o complications. Tele monitor removed, and returned to [facility] ([place], [place]). Pt's belongings were collected from cupboard, closest,  and safe, and returned to patient. Pt instructed to double check room to ensure all belongings were collected. Phone number provided in d/c paperwork in the event questions, concerns arise. Pt's gown removed, and patient's clothing donned. Pt's belongings gathered, including belongings bag w/ d/c paperwork, and taken by patient. Pt confirmed home address provided in Epic. Pt escorted to front of hospital in  by staff, and picked up by Ko.    Electronically signed by Trisha Clifford RN on 9/25/2021 at 2:27 PM

## 2021-09-27 NOTE — PROGRESS NOTES
Physician Progress Note      PATIENT:               Judith Hammans  CSN #:                  [id number]  :                       1965  ADMIT DATE:       2021 12:39 PM  [address] DATE:        2021 2:46 PM  RESPONDING  PROVIDER #:        Bridger Barragan MD          QUERY TEXT:    Dear Dr Leslie Rhodes,    Patient admitted with dizziness , noted to have atrial fibrillation. If   possible, please document in progress notes and discharge summary further   specificity regarding the type of atrial fibrillation: The medical record reflects the following:  Risk Factors: Current admission for Atrial Fib with RVR  Clinical Indicators: P 175, RR 34. .EKG- AF RVR  Treatment: Cardizem IV and GTT, Cardiology consult, Telemetry, Lovenox,    Chronic: nonspecific term that could be referring to paroxysmal, persistent,   or permanent  Longstanding persistent: persistent and continuous, lasting > 1 year. Paroxysmal - self-terminating or intermittent; resolves with or without   intervention within 7 days of onset; may recur with various frequency. Persistent - Fails to terminate within 7 days; Often requires meds or   cardioversion to restore to NSR. Permanent - longstanding & persistent; Medication has been ineffective in   restoring NSR &/or cardioversion is contraindicated    Definitions per MS-[employer] Training Guide and Quick Reference Guide, J[address]yn [address] 5   Diseases and Disorders of the Circulatory System; 2019; [employer]. Software content   from the [employer]? [employer] Program    Thank You,  Darlene Sauceda RN BSN CDS CRCR  [email or web address]  Options provided:  -- Paroxysmal Atrial Fibrillation  -- Longstanding Persistent Atrial Fibrillation  -- Permanent Atrial Fibrillation  -- Persistent Atrial Fibrillation  -- Chronic Atrial Fibrillation, unspecified  -- Other - I will add my own diagnosis  -- Disagree - Not applicable / Not valid  -- Disagree - Clinically unable to determine / Unknown  -- Refer to Clinical Documentation Reviewer    PROVIDER RESPONSE TEXT:    This patient has paroxysmal atrial fibrillation.     Query created by: Thiago Blake on 9/24/2021 12:20 PM      Electronically signed by:  Freda Coles MD 9/27/2021 1:40 AM

## 2021-09-28 DIAGNOSIS — I10 DIABETES MELLITUS WITH COINCIDENT HYPERTENSION (HCC): ICD-10-CM

## 2021-09-28 DIAGNOSIS — E66.9 OBESITY, DIABETES, AND HYPERTENSION SYNDROME (HCC): ICD-10-CM

## 2021-09-28 DIAGNOSIS — E11.69 OBESITY, DIABETES, AND HYPERTENSION SYNDROME (HCC): ICD-10-CM

## 2021-09-28 DIAGNOSIS — E11.9 DIABETES MELLITUS WITH COINCIDENT HYPERTENSION (HCC): ICD-10-CM

## 2021-09-28 DIAGNOSIS — I15.2 OBESITY, DIABETES, AND HYPERTENSION SYNDROME (HCC): ICD-10-CM

## 2021-09-28 DIAGNOSIS — E11.59 OBESITY, DIABETES, AND HYPERTENSION SYNDROME (HCC): ICD-10-CM

## 2021-09-28 NOTE — TELEPHONE ENCOUNTER
Requested Prescriptions     Pending Prescriptions Disp Refills    losartan (COZAAR) 25 MG tablet 180 tablet 0     Sig: Take 2 tablets by mouth daily     Patient requesting a medication refill.   [facility]  Next office visit: Visit date not found  Last regular office visit: 9/1/2021

## 2021-09-29 ENCOUNTER — TELEPHONE (OUTPATIENT)
Dept: INTERNAL MEDICINE CLINIC | Age: 56
End: 2021-09-29

## 2021-09-29 RX ORDER — LOSARTAN POTASSIUM 25 MG/1
50 TABLET ORAL DAILY
Qty: 180 TABLET | Refills: 0 | Status: ON HOLD | OUTPATIENT
Start: 2021-09-29 | End: 2022-03-22 | Stop reason: HOSPADM

## 2021-09-29 NOTE — TELEPHONE ENCOUNTER
----- Message from Jadon Minor DO sent at 9/27/2021 11:28 AM EDT -----  Regarding: call patient for hospital follow up appt  Please call patient to schedule hospital follow up --  to be seen (Mychart or in-person) within 7 days of their discharge date or 14 days maximum.

## 2021-10-01 ENCOUNTER — TELEPHONE (OUTPATIENT)
Dept: INTERNAL MEDICINE CLINIC | Age: 56
End: 2021-10-01

## 2021-10-01 NOTE — TELEPHONE ENCOUNTER
----- Message from Timmy Badillo DO sent at 9/27/2021 11:28 AM EDT -----  Regarding: call patient for hospital follow up appt  Please call patient to schedule hospital follow up --  to be seen (Mychart or in-person) within 7 days of their discharge date or 14 days maximum.

## 2021-10-01 NOTE — TELEPHONE ENCOUNTER
I called patient again this time no answer but I was able to leave a message to call back to schedule a follow up after recent hospitalization.

## 2021-10-19 ENCOUNTER — TELEPHONE (OUTPATIENT)
Dept: PHARMACY | Age: 56
End: 2021-10-19

## 2021-10-19 NOTE — TELEPHONE ENCOUNTER
New Diabetes referral from Dr. Naman Thomas. Reached out to schedule an appointment. No answer. Left message to please return my call.      Kristina Yang, PharmD  [facility]  Diabetes Service  [phone]

## 2021-10-20 NOTE — DISCHARGE SUMMARY
Hospital Medicine Discharge Summary    Patient ID: Huan Fairbanks      Patient's PCP: Patricia Mishra DO    Admit Date: 9/23/2021     Discharge Date: 9/25/2021      Admitting Physician: Kayla Hall MD     Discharge Physician: Kayla Hall MD     Discharge Diagnoses: Active Hospital Problems    Diagnosis     Atrial fibrillation with RVR ([address] [I48.91]        The patient was seen and examined on day of discharge and this discharge summary is in conjunction with any daily progress note from day of discharge. Hospital Course: The patient is a 61 y.o. male who presents to [facility] with dizziness. Pt states he has been feeling dizzy for the past week. Was recently started on metformin and has been having diarrhea from that and thought he was dizzy from that. Seen his PCP who found out that pt had irregular rhythm and hence sent to the ER. Pt has no h/o a fib, was found to be in a fib with RVR, started on cardizem gtt and admitted. New onset a fib with RVR - started on cardizem gtt, admit to PCU, placed on tele. Cardio consulted, check echo when HR better controlled, on lovenox BID-->eliquis. Elevated BNP. Check TSH. S/p cardioversion 9/24/21 and converted to sinus     Hypokalemia/hypomag - repleted     DM II - uncontrolled, cont lantus, on SSI. Diabetic educator consulted. Dced metformin. A1c 10.7     HTN - on cardizem gtt, cont home coreg/hydralazine/losartan. Prn IV metoprolol       Physical Exam Performed:     BP (!) 135/90   Pulse 76   Temp 97.4 °F (36.3 °C) (Oral)   Resp 18   Ht 6' 1\" (1.854 m)   Wt (!) 319 lb 3.2 oz (144.8 kg)   SpO2 98%   BMI 42.11 kg/m²       General appearance: No apparent distress appears stated age and cooperative. HEENT Normal cephalic, atraumatic without obvious deformity.  Pupils equal, round, and reactive to light.  Extra ocular muscles intact.  Conjunctivae/corneas clear.   Neck: Supple, No jugular venous distention/bruits. Arnett Corporal midline without thyromegaly or adenopathy with full range of motion. Lungs: Clear to auscultation, bilaterally without Rales/Wheezes/Rhonchi with good respiratory effort. Heart: regular rate and rhythm with Normal S1/S2  Abdomen: Soft, non-tender or non-distended without rigidity or guarding and positive bowel sounds  Extremities: No clubbing, cyanosis, or edema bilaterally. Skin: Skin color, texture, turgor normal.  No rashes or lesions. Neurologic: Alert and oriented X 3, neurovascularly intact with sensory/motor intact upper extremities/lower extremities, bilaterally.  Cranial nerves: II-XII intact, grossly non-focal.  Mental status: Alert, oriented, thought content appropriate. Capillary Refill: Acceptable  < 3 seconds  Peripheral Pulses: +3 Easily felt, not easily obliterated with pressure       Labs: For convenience and continuity at follow-up the following most recent labs are provided:      CBC:    Lab Results   Component Value Date    WBC 6.5 09/23/2021    HGB 13.1 09/23/2021    HCT 39.2 09/23/2021     09/23/2021       Renal:    Lab Results   Component Value Date     09/25/2021    K 4.2 09/25/2021     09/25/2021    CO2 25 09/25/2021    BUN 14 09/25/2021    CREATININE 1.1 09/25/2021    CALCIUM 8.7 09/25/2021         Significant Diagnostic Studies    Radiology:   XR CHEST PORTABLE   Final Result   Moderate cardiomegaly. No acute cardiopulmonary process.                 Consults:     IP CONSULT TO CARDIOLOGY  IP CONSULT TO DIABETES EDUCATOR  IP CONSULT TO DIABETES EDUCATOR    Disposition:  home     Condition at Discharge: Stable    Discharge Instructions/Follow-up:  pcp in 1 week    Code Status:  Prior     Activity: activity as tolerated    Diet: diabetic diet      Discharge Medications:     Discharge Medication List as of 9/25/2021 12:04 PM           Details   amiodarone (CORDARONE) 200 MG tablet Take 1 tablet by mouth daily, Disp-30 tablet, R-2Normal      apixaban (ELIQUIS) 5 MG TABS tablet Take 1 tablet by mouth 2 times daily, Disp-60 tablet, R-2Normal      !! blood glucose test strips (TRUE METRIX BLOOD GLUCOSE TEST) strip 1 each by In Vitro route 2 times daily As needed. , Disp-100 each, R-3Normal       !! - Potential duplicate medications found. Please discuss with provider.            Details   fluticasone (FLONASE) 50 MCG/ACT nasal spray 2 sprays by Nasal route dailyHistorical Med      polyethylene glycol (GLYCOLAX) 17 GM/SCOOP powder Take 17 g by mouth daily as needed for ConstipationHistorical Med      omeprazole (PRILOSEC) 20 MG delayed release capsule TAKE 1 CAPSULE BY MOUTH EVERY DAY, Disp-30 capsule, R-0Normal      carvedilol (COREG) 25 MG tablet TAKE 1 TABLET BY MOUTH TWICE A DAY WITH MEALS, Disp-60 tablet, R-0Normal      famotidine (PEPCID) 20 MG tablet TAKE 1 TABLET BY MOUTH TWICE A DAY, Disp-60 tablet, R-0Normal      losartan (COZAAR) 25 MG tablet Take 2 tablets by mouth daily, Disp-180 tablet, R-0Normal      torsemide (DEMADEX) 20 MG tablet Take 1 tablet by mouth daily as needed (swelling or weight gain) Patient states he is taking this 3 days a week MWF, Disp-30 tablet, R-1Normal      !! blood glucose test strips (TRUE METRIX BLOOD GLUCOSE TEST) strip 1 each by In Vitro route daily Check blood sugar four times daily  Pharmacy can do brand substitutions, Disp-100 each, R-0Normal      insulin glargine (LANTUS SOLOSTAR) 100 UNIT/ML injection pen Inject 20 Units into the skin nightly Pharmacy can do brand substitutions, Disp-5 pen, R-0Normal      dapagliflozin (FARXIGA) 5 MG tablet Take 1 tablet by mouth every morning, Disp-30 tablet, R-0Normal      aspirin 81 MG chewable tablet Take 1 tablet by mouth daily, Disp-30 tablet, R-3Normal      atorvastatin (LIPITOR) 80 MG tablet Take 1 tablet by mouth nightly, Disp-30 tablet, R-3Normal      hydrALAZINE (APRESOLINE) 25 MG tablet Take 1 tablet by mouth every 8 hours, Disp-90 tablet, R-3Normal      Lancet Devices (LANCING DEVICE) MISC Disp-100 each, R-0, Print1 lancet device Pharmacy cannot do brand substitutions      Lancets MISC Disp-100 each, R-0, PrintCheck blood sugar four times. Pharmacy can do brand substitutions      Blood Glucose Monitoring Suppl (TRUE METRIX METER) w/Device KIT 1 kit by Does not apply route 4 times daily Dispense one kit  Pharmacy cannot do brand substitutions, Disp-1 kit, R-0Print      Insulin Pen Needle (CAREFINE PEN NEEDLES) 32G X 4 MM MISC 1 each by Does not apply route 3 times daily Pharmacy can do brand substitutions, Disp-100 Package, R-0Print       !! - Potential duplicate medications found. Please discuss with provider. Time Spent on discharge is more than 30 minutes in the examination, evaluation, counseling and review of medications and discharge plan. Signed:    Harmony Reid MD   10/19/2021      Thank you June Eddy DO for the opportunity to be involved in this patient's care. If you have any questions or concerns please feel free to contact me at [phone].

## 2021-10-21 DIAGNOSIS — K21.9 GASTROESOPHAGEAL REFLUX DISEASE, UNSPECIFIED WHETHER ESOPHAGITIS PRESENT: ICD-10-CM

## 2021-10-21 DIAGNOSIS — I50.9 CHRONIC CONGESTIVE HEART FAILURE, UNSPECIFIED HEART FAILURE TYPE (HCC): ICD-10-CM

## 2021-10-21 RX ORDER — FAMOTIDINE 20 MG/1
20 TABLET, FILM COATED ORAL 2 TIMES DAILY
Qty: 90 TABLET | Refills: 1 | Status: SHIPPED | OUTPATIENT
Start: 2021-10-21 | End: 2022-10-06

## 2021-10-21 RX ORDER — OMEPRAZOLE 20 MG/1
20 CAPSULE, DELAYED RELEASE ORAL DAILY
Qty: 90 CAPSULE | Refills: 1 | Status: ON HOLD | OUTPATIENT
Start: 2021-10-21 | End: 2022-10-19 | Stop reason: HOSPADM

## 2021-10-21 RX ORDER — CARVEDILOL 25 MG/1
25 TABLET ORAL 2 TIMES DAILY WITH MEALS
Qty: 60 TABLET | Refills: 1 | Status: ON HOLD | OUTPATIENT
Start: 2021-10-21 | End: 2022-03-22 | Stop reason: SDUPTHER

## 2021-10-22 ENCOUNTER — TELEPHONE (OUTPATIENT)
Dept: PHARMACY | Age: 56
End: 2021-10-22

## 2021-10-22 NOTE — TELEPHONE ENCOUNTER
Reached out to reschedule missed appt. Number busy.     Isamar Burton, PharmD  [facility]  Diabetes Service  [phone]

## 2021-11-04 ENCOUNTER — TELEPHONE (OUTPATIENT)
Dept: PHARMACY | Age: 56
End: 2021-11-04

## 2021-11-04 NOTE — TELEPHONE ENCOUNTER
Attempted to reach Mr. Bevely Cheadle to schedule an appointment for Diabetes services. No answer. We have made several attempts.  I will close his referral.     Wesley Matt, PharmD  [address] [place] [address]  Diabetes Service  [phone]

## 2021-11-10 NOTE — TELEPHONE ENCOUNTER
Reached out and left a message to please call to reschedule missed appt. I now see that this is his second missed appt. I see that he also missed his appt with [facility].     I will close his referral.     Connor Brandon, PharmD  [address] [place] [address]  Diabetes Service  [phone]

## 2022-01-13 DIAGNOSIS — E11.9 DIABETES MELLITUS WITH COINCIDENT HYPERTENSION (HCC): ICD-10-CM

## 2022-01-13 DIAGNOSIS — I10 DIABETES MELLITUS WITH COINCIDENT HYPERTENSION (HCC): ICD-10-CM

## 2022-01-13 RX ORDER — ATORVASTATIN CALCIUM 80 MG/1
TABLET, FILM COATED ORAL
Qty: 90 TABLET | Refills: 1 | Status: ON HOLD | OUTPATIENT
Start: 2022-01-13 | End: 2022-03-22 | Stop reason: HOSPADM

## 2022-01-13 NOTE — TELEPHONE ENCOUNTER
Requested Prescriptions     Pending Prescriptions Disp Refills    atorvastatin (LIPITOR) 80 MG tablet [Pharmacy Med Name: ATORVASTATIN 80 MG TABLET] 90 tablet 1     Sig: TAKE 1 TABLET BY MOUTH EVERY DAY AT NIGHT       Patient requesting a medication refill.   Last filled on: 10.01.2021  [facility]  Next office visit: Visit date not found  Last regular office visit: 9/1/2021

## 2022-03-19 ENCOUNTER — APPOINTMENT (OUTPATIENT)
Dept: GENERAL RADIOLOGY | Age: 57
DRG: 194 | End: 2022-03-19
Payer: COMMERCIAL

## 2022-03-19 ENCOUNTER — HOSPITAL ENCOUNTER (INPATIENT)
Age: 57
LOS: 3 days | Discharge: HOME | DRG: 194 | End: 2022-03-22
Attending: EMERGENCY MEDICINE | Admitting: INTERNAL MEDICINE
Payer: COMMERCIAL

## 2022-03-19 DIAGNOSIS — I10 DIABETES MELLITUS WITH COINCIDENT HYPERTENSION (HCC): ICD-10-CM

## 2022-03-19 DIAGNOSIS — E11.9 DIABETES MELLITUS WITH COINCIDENT HYPERTENSION (HCC): ICD-10-CM

## 2022-03-19 DIAGNOSIS — I50.9 CONGESTIVE HEART FAILURE, UNSPECIFIED HF CHRONICITY, UNSPECIFIED HEART FAILURE TYPE (HCC): Primary | ICD-10-CM

## 2022-03-19 DIAGNOSIS — I50.9 CHRONIC CONGESTIVE HEART FAILURE, UNSPECIFIED HEART FAILURE TYPE (HCC): ICD-10-CM

## 2022-03-19 DIAGNOSIS — E66.9 OBESITY, DIABETES, AND HYPERTENSION SYNDROME (HCC): ICD-10-CM

## 2022-03-19 DIAGNOSIS — I16.1 HYPERTENSIVE EMERGENCY: ICD-10-CM

## 2022-03-19 DIAGNOSIS — I15.2 OBESITY, DIABETES, AND HYPERTENSION SYNDROME (HCC): ICD-10-CM

## 2022-03-19 DIAGNOSIS — Z91.199 CURRENT NONADHERENCE TO MEDICAL TREATMENT: ICD-10-CM

## 2022-03-19 DIAGNOSIS — Z86.69 HISTORY OF SLEEP APNEA: ICD-10-CM

## 2022-03-19 DIAGNOSIS — E11.65 HYPERGLYCEMIA DUE TO DIABETES MELLITUS (HCC): ICD-10-CM

## 2022-03-19 DIAGNOSIS — E11.69 OBESITY, DIABETES, AND HYPERTENSION SYNDROME (HCC): ICD-10-CM

## 2022-03-19 DIAGNOSIS — E11.59 OBESITY, DIABETES, AND HYPERTENSION SYNDROME (HCC): ICD-10-CM

## 2022-03-19 PROBLEM — I50.43 CHF (CONGESTIVE HEART FAILURE), NYHA CLASS I, ACUTE ON CHRONIC, COMBINED (HCC): Status: ACTIVE | Noted: 2022-03-19

## 2022-03-19 LAB
A/G RATIO: 1.2 (ref 1.1–2.2)
ALBUMIN SERPL-MCNC: 3.9 G/DL (ref 3.4–5)
ALP BLD-CCNC: 118 U/L (ref 40–129)
ALT SERPL-CCNC: 28 U/L (ref 10–40)
ANION GAP SERPL CALCULATED.3IONS-SCNC: 16 MMOL/L (ref 3–16)
AST SERPL-CCNC: 30 U/L (ref 15–37)
BASOPHILS ABSOLUTE: 0.1 K/UL (ref 0–0.2)
BASOPHILS RELATIVE PERCENT: 1.3 %
BILIRUB SERPL-MCNC: 1 MG/DL (ref 0–1)
BILIRUBIN URINE: NEGATIVE
BLOOD, URINE: NEGATIVE
BUN BLDV-MCNC: 15 MG/DL (ref 7–20)
CALCIUM SERPL-MCNC: 9.7 MG/DL (ref 8.3–10.6)
CHLORIDE BLD-SCNC: 101 MMOL/L (ref 99–110)
CLARITY: CLEAR
CO2: 22 MMOL/L (ref 21–32)
COLOR: YELLOW
CREAT SERPL-MCNC: 1.1 MG/DL (ref 0.9–1.3)
EOSINOPHILS ABSOLUTE: 0.1 K/UL (ref 0–0.6)
EOSINOPHILS RELATIVE PERCENT: 2.8 %
EPITHELIAL CELLS, UA: ABNORMAL /HPF (ref 0–5)
GFR AFRICAN AMERICAN: >60
GFR NON-AFRICAN AMERICAN: >60
GLUCOSE BLD-MCNC: 240 MG/DL (ref 70–99)
GLUCOSE BLD-MCNC: 259 MG/DL (ref 70–99)
GLUCOSE BLD-MCNC: 288 MG/DL (ref 70–99)
GLUCOSE BLD-MCNC: 308 MG/DL (ref 70–99)
GLUCOSE URINE: 500 MG/DL
HCT VFR BLD CALC: 41.6 % (ref 40.5–52.5)
HEMOGLOBIN: 13.6 G/DL (ref 13.5–17.5)
HYALINE CASTS: ABNORMAL /LPF (ref 0–2)
KETONES, URINE: ABNORMAL MG/DL
LEUKOCYTE ESTERASE, URINE: NEGATIVE
LYMPHOCYTES ABSOLUTE: 1.2 K/UL (ref 1–5.1)
LYMPHOCYTES RELATIVE PERCENT: 26 %
MCH RBC QN AUTO: 29.9 PG (ref 26–34)
MCHC RBC AUTO-ENTMCNC: 32.8 G/DL (ref 31–36)
MCV RBC AUTO: 91.3 FL (ref 80–100)
MICROSCOPIC EXAMINATION: YES
MONOCYTES ABSOLUTE: 0.4 K/UL (ref 0–1.3)
MONOCYTES RELATIVE PERCENT: 9.5 %
MUCUS: ABNORMAL /LPF
NEUTROPHILS ABSOLUTE: 2.8 K/UL (ref 1.7–7.7)
NEUTROPHILS RELATIVE PERCENT: 60.4 %
NITRITE, URINE: NEGATIVE
PDW BLD-RTO: 15.1 % (ref 12.4–15.4)
PERFORMED ON: ABNORMAL
PH UA: 6 (ref 5–8)
PLATELET # BLD: 213 K/UL (ref 135–450)
PMV BLD AUTO: 9.1 FL (ref 5–10.5)
POTASSIUM REFLEX MAGNESIUM: 4.6 MMOL/L (ref 3.5–5.1)
PRO-BNP: 3443 PG/ML (ref 0–124)
PROTEIN UA: 100 MG/DL
RBC # BLD: 4.56 M/UL (ref 4.2–5.9)
RBC UA: ABNORMAL /HPF (ref 0–4)
SARS-COV-2, NAAT: ABNORMAL
SODIUM BLD-SCNC: 139 MMOL/L (ref 136–145)
SPECIFIC GRAVITY UA: >=1.03 (ref 1–1.03)
TOTAL PROTEIN: 7.2 G/DL (ref 6.4–8.2)
TRICHOMONAS: ABNORMAL /HPF
TROPONIN: 0.01 NG/ML
URINE REFLEX TO CULTURE: ABNORMAL
URINE TYPE: ABNORMAL
UROBILINOGEN, URINE: 2 E.U./DL
WBC # BLD: 4.6 K/UL (ref 4–11)
WBC UA: ABNORMAL /HPF (ref 0–5)

## 2022-03-19 PROCEDURE — 2580000003 HC RX 258: Performed by: NURSE PRACTITIONER

## 2022-03-19 PROCEDURE — 6370000000 HC RX 637 (ALT 250 FOR IP): Performed by: EMERGENCY MEDICINE

## 2022-03-19 PROCEDURE — 36415 COLL VENOUS BLD VENIPUNCTURE: CPT

## 2022-03-19 PROCEDURE — 83036 HEMOGLOBIN GLYCOSYLATED A1C: CPT

## 2022-03-19 PROCEDURE — 96374 THER/PROPH/DIAG INJ IV PUSH: CPT

## 2022-03-19 PROCEDURE — 84484 ASSAY OF TROPONIN QUANT: CPT

## 2022-03-19 PROCEDURE — 87635 SARS-COV-2 COVID-19 AMP PRB: CPT

## 2022-03-19 PROCEDURE — 6360000002 HC RX W HCPCS: Performed by: EMERGENCY MEDICINE

## 2022-03-19 PROCEDURE — 71045 X-RAY EXAM CHEST 1 VIEW: CPT

## 2022-03-19 PROCEDURE — 6370000000 HC RX 637 (ALT 250 FOR IP): Performed by: NURSE PRACTITIONER

## 2022-03-19 PROCEDURE — 1200000000 HC SEMI PRIVATE

## 2022-03-19 PROCEDURE — 99285 EMERGENCY DEPT VISIT HI MDM: CPT

## 2022-03-19 PROCEDURE — 83880 ASSAY OF NATRIURETIC PEPTIDE: CPT

## 2022-03-19 PROCEDURE — 93005 ELECTROCARDIOGRAM TRACING: CPT | Performed by: EMERGENCY MEDICINE

## 2022-03-19 PROCEDURE — 81001 URINALYSIS AUTO W/SCOPE: CPT

## 2022-03-19 PROCEDURE — 80053 COMPREHEN METABOLIC PANEL: CPT

## 2022-03-19 PROCEDURE — 85025 COMPLETE CBC W/AUTO DIFF WBC: CPT

## 2022-03-19 RX ORDER — DEXTROSE MONOHYDRATE 50 MG/ML
100 INJECTION, SOLUTION INTRAVENOUS PRN
Status: DISCONTINUED | OUTPATIENT
Start: 2022-03-19 | End: 2022-03-22 | Stop reason: HOSPADM

## 2022-03-19 RX ORDER — HYDRALAZINE HYDROCHLORIDE 25 MG/1
25 TABLET, FILM COATED ORAL ONCE
Status: DISCONTINUED | OUTPATIENT
Start: 2022-03-19 | End: 2022-03-19

## 2022-03-19 RX ORDER — ASPIRIN 81 MG/1
81 TABLET, CHEWABLE ORAL ONCE
Status: COMPLETED | OUTPATIENT
Start: 2022-03-19 | End: 2022-03-19

## 2022-03-19 RX ORDER — LOSARTAN POTASSIUM 50 MG/1
50 TABLET ORAL DAILY
Status: DISCONTINUED | OUTPATIENT
Start: 2022-03-19 | End: 2022-03-19

## 2022-03-19 RX ORDER — ONDANSETRON 4 MG/1
4 TABLET, ORALLY DISINTEGRATING ORAL EVERY 8 HOURS PRN
Status: DISCONTINUED | OUTPATIENT
Start: 2022-03-19 | End: 2022-03-22 | Stop reason: HOSPADM

## 2022-03-19 RX ORDER — SODIUM CHLORIDE 0.9 % (FLUSH) 0.9 %
5-40 SYRINGE (ML) INJECTION EVERY 12 HOURS SCHEDULED
Status: DISCONTINUED | OUTPATIENT
Start: 2022-03-19 | End: 2022-03-22 | Stop reason: HOSPADM

## 2022-03-19 RX ORDER — FUROSEMIDE 10 MG/ML
40 INJECTION INTRAMUSCULAR; INTRAVENOUS DAILY
Status: DISCONTINUED | OUTPATIENT
Start: 2022-03-20 | End: 2022-03-20

## 2022-03-19 RX ORDER — ACETAMINOPHEN 650 MG/1
650 SUPPOSITORY RECTAL EVERY 6 HOURS PRN
Status: DISCONTINUED | OUTPATIENT
Start: 2022-03-19 | End: 2022-03-22 | Stop reason: HOSPADM

## 2022-03-19 RX ORDER — DEXTROSE MONOHYDRATE 25 G/50ML
12.5 INJECTION, SOLUTION INTRAVENOUS PRN
Status: DISCONTINUED | OUTPATIENT
Start: 2022-03-19 | End: 2022-03-22 | Stop reason: HOSPADM

## 2022-03-19 RX ORDER — ATORVASTATIN CALCIUM 80 MG/1
80 TABLET, FILM COATED ORAL ONCE
Status: COMPLETED | OUTPATIENT
Start: 2022-03-19 | End: 2022-03-19

## 2022-03-19 RX ORDER — SODIUM CHLORIDE 9 MG/ML
25 INJECTION, SOLUTION INTRAVENOUS PRN
Status: DISCONTINUED | OUTPATIENT
Start: 2022-03-19 | End: 2022-03-22 | Stop reason: HOSPADM

## 2022-03-19 RX ORDER — ACETAMINOPHEN 325 MG/1
650 TABLET ORAL EVERY 6 HOURS PRN
Status: DISCONTINUED | OUTPATIENT
Start: 2022-03-19 | End: 2022-03-22 | Stop reason: HOSPADM

## 2022-03-19 RX ORDER — PANTOPRAZOLE SODIUM 40 MG/1
40 TABLET, DELAYED RELEASE ORAL ONCE
Status: COMPLETED | OUTPATIENT
Start: 2022-03-19 | End: 2022-03-19

## 2022-03-19 RX ORDER — HYDRALAZINE HYDROCHLORIDE 25 MG/1
25 TABLET, FILM COATED ORAL EVERY 8 HOURS SCHEDULED
Status: DISCONTINUED | OUTPATIENT
Start: 2022-03-19 | End: 2022-03-19

## 2022-03-19 RX ORDER — CARVEDILOL 6.25 MG/1
25 TABLET ORAL ONCE
Status: DISCONTINUED | OUTPATIENT
Start: 2022-03-19 | End: 2022-03-19

## 2022-03-19 RX ORDER — AMIODARONE HYDROCHLORIDE 200 MG/1
200 TABLET ORAL DAILY
Status: DISCONTINUED | OUTPATIENT
Start: 2022-03-19 | End: 2022-03-19

## 2022-03-19 RX ORDER — NICOTINE POLACRILEX 4 MG
15 LOZENGE BUCCAL PRN
Status: DISCONTINUED | OUTPATIENT
Start: 2022-03-19 | End: 2022-03-22 | Stop reason: HOSPADM

## 2022-03-19 RX ORDER — ONDANSETRON 2 MG/ML
4 INJECTION INTRAMUSCULAR; INTRAVENOUS EVERY 6 HOURS PRN
Status: DISCONTINUED | OUTPATIENT
Start: 2022-03-19 | End: 2022-03-22 | Stop reason: HOSPADM

## 2022-03-19 RX ORDER — SODIUM CHLORIDE 0.9 % (FLUSH) 0.9 %
5-40 SYRINGE (ML) INJECTION PRN
Status: DISCONTINUED | OUTPATIENT
Start: 2022-03-19 | End: 2022-03-22 | Stop reason: HOSPADM

## 2022-03-19 RX ORDER — POLYETHYLENE GLYCOL 3350 17 G/17G
17 POWDER, FOR SOLUTION ORAL DAILY PRN
Status: DISCONTINUED | OUTPATIENT
Start: 2022-03-19 | End: 2022-03-22 | Stop reason: HOSPADM

## 2022-03-19 RX ORDER — FUROSEMIDE 10 MG/ML
40 INJECTION INTRAMUSCULAR; INTRAVENOUS ONCE
Status: COMPLETED | OUTPATIENT
Start: 2022-03-19 | End: 2022-03-19

## 2022-03-19 RX ORDER — LOSARTAN POTASSIUM 50 MG/1
50 TABLET ORAL ONCE
Status: DISCONTINUED | OUTPATIENT
Start: 2022-03-19 | End: 2022-03-19

## 2022-03-19 RX ORDER — CARVEDILOL 6.25 MG/1
25 TABLET ORAL 2 TIMES DAILY WITH MEALS
Status: DISCONTINUED | OUTPATIENT
Start: 2022-03-19 | End: 2022-03-19

## 2022-03-19 RX ORDER — TORSEMIDE 20 MG/1
20 TABLET ORAL DAILY
Status: DISCONTINUED | OUTPATIENT
Start: 2022-03-19 | End: 2022-03-19

## 2022-03-19 RX ORDER — AMIODARONE HYDROCHLORIDE 200 MG/1
200 TABLET ORAL ONCE
Status: COMPLETED | OUTPATIENT
Start: 2022-03-19 | End: 2022-03-19

## 2022-03-19 RX ADMIN — PANTOPRAZOLE SODIUM 40 MG: 40 TABLET, DELAYED RELEASE ORAL at 21:01

## 2022-03-19 RX ADMIN — ASPIRIN 81 MG 81 MG: 81 TABLET ORAL at 21:01

## 2022-03-19 RX ADMIN — CARVEDILOL 25 MG: 6.25 TABLET, FILM COATED ORAL at 18:25

## 2022-03-19 RX ADMIN — AMIODARONE HYDROCHLORIDE 200 MG: 200 TABLET ORAL at 23:35

## 2022-03-19 RX ADMIN — FUROSEMIDE 40 MG: 10 INJECTION, SOLUTION INTRAMUSCULAR; INTRAVENOUS at 19:12

## 2022-03-19 RX ADMIN — ATORVASTATIN CALCIUM 80 MG: 80 TABLET, FILM COATED ORAL at 23:35

## 2022-03-19 RX ADMIN — INSULIN HUMAN 5 UNITS: 100 INJECTION, SOLUTION PARENTERAL at 21:02

## 2022-03-19 RX ADMIN — HYDRALAZINE HYDROCHLORIDE 25 MG: 25 TABLET, FILM COATED ORAL at 18:59

## 2022-03-19 RX ADMIN — INSULIN LISPRO 2 UNITS: 100 INJECTION, SOLUTION INTRAVENOUS; SUBCUTANEOUS at 23:37

## 2022-03-19 RX ADMIN — SODIUM CHLORIDE, PRESERVATIVE FREE 10 ML: 5 INJECTION INTRAVENOUS at 23:36

## 2022-03-19 RX ADMIN — APIXABAN 5 MG: 5 TABLET, FILM COATED ORAL at 21:01

## 2022-03-19 NOTE — ED PROVIDER NOTES
Emergency Physician Note        Note Open Time: 6:57 PM EDT    Chief Complaint  Medication Refill and Shortness of Breath (out of meds for over 2 months  Diabetic not checking sugar)       History of Present Illness  Rik Perez is a 61 y.o. male who presents to the ED for medication refill. Patient reports that he has been out of his meds for a few months and is here to have them refilled if possible. He also states he is short of breath. He states he can sleep lying flat at night on his CPAP but gets short of breath with activity during the day. He denies any chest pain. No fevers, chills or sweats. No abdominal pain. He does have pitting lower extremity edema. He is not taking his water pill. He states he was out of town for some time with family taking care of some business matters. 10 systems reviewed, pertinent positives per HPI otherwise noted to be negative    I have reviewed the following from the nursing documentation:      Prior to Admission medications    Medication Sig Start Date End Date Taking? Authorizing Provider   atorvastatin (LIPITOR) 80 MG tablet TAKE 1 TABLET BY MOUTH EVERY DAY AT NIGHT 1/13/22   Gildardo Baxter, DO   omeprazole (PRILOSEC) 20 MG delayed release capsule Take 1 capsule by mouth Daily 10/21/21   Gildardo Baxter, DO   famotidine (PEPCID) 20 MG tablet Take 1 tablet by mouth 2 times daily 10/21/21   Gildardo Baxter, DO   carvedilol (COREG) 25 MG tablet Take 1 tablet by mouth 2 times daily (with meals) 10/21/21   Gildardo Baxter, DO   losartan (COZAAR) 25 MG tablet Take 2 tablets by mouth daily 9/29/21   Gildardo Baxter, DO   amiodarone (CORDARONE) 200 MG tablet Take 1 tablet by mouth daily 9/26/21   Abbe Osullivan MD   apixaban (ELIQUIS) 5 MG TABS tablet Take 1 tablet by mouth 2 times daily 9/25/21   Abbe Osullivan MD   blood glucose test strips (TRUE METRIX BLOOD GLUCOSE TEST) strip 1 each by In Vitro route 2 times daily As needed.  9/24/21   Karen SAHNI Angel Lobo MD   fluticasone (FLONASE) 50 MCG/ACT nasal spray 2 sprays by Nasal route daily 8/27/21   Historical Provider, MD   polyethylene glycol (GLYCOLAX) 17 GM/SCOOP powder Take 17 g by mouth daily as needed for Constipation 8/30/21   Historical Provider, MD   torsemide (DEMADEX) 20 MG tablet Take 1 tablet by mouth daily as needed (swelling or weight gain) Patient states he is taking this 3 days a week [facility]  Patient taking differently: Take 20 mg by mouth three times a week Patient states he is taking this 3 days a week [facility] 9/1/21   Ladonna Miller, DO   blood glucose test strips (TRUE METRIX BLOOD GLUCOSE TEST) strip 1 each by In Vitro route daily Check blood sugar four times daily  Pharmacy can do brand substitutions 9/1/21   Ladonna Miller, DO   insulin glargine (LANTUS SOLOSTAR) 100 UNIT/ML injection pen Inject 20 Units into the skin nightly Pharmacy can do brand substitutions  Patient taking differently: Inject 20 Units into the skin every morning Pharmacy can do brand substitutions 9/1/21   Ladonna Miller, DO   dapagliflozin (FARXIGA) 5 MG tablet Take 1 tablet by mouth every morning 9/1/21   Ladonna Miller, DO   aspirin 81 MG chewable tablet Take 1 tablet by mouth daily 8/25/21   Ladonna Miller, DO   hydrALAZINE (APRESOLINE) 25 MG tablet Take 1 tablet by mouth every 8 hours 8/25/21   Ladonna Miller, DO   Lancet Devices (LANCING DEVICE) MISC 1 lancet device  Pharmacy cannot do brand substitutions 7/31/21   Ian Buchanan MD   Lancets MISC Check blood sugar four times.   Pharmacy can do brand substitutions 7/31/21   Ian Buchanan MD   Blood Glucose Monitoring Suppl (TRUE METRIX METER) w/Device KIT 1 kit by Does not apply route 4 times daily Dispense one kit  Pharmacy cannot do brand substitutions 7/31/21   Ian Buchanan MD   Insulin Pen Needle (CAREFINE PEN NEEDLES) 32G X 4 MM MISC 1 each by Does not apply route 3 times daily Pharmacy can do brand substitutions 7/31/21   [facility] Claudio Ruff MD       Allergies as of 03/19/2022    (No Known Allergies)       Past Medical History:   Diagnosis Date    Abdominal pain 7/29/2021    Abnormal EKG 7/28/2016    Acute pancreatitis 7/29/2021    CHF (congestive heart failure) (HCC)     Cigarette smoker 8/25/2021    Cocaine abuse ([facility] 3/28/2015    Last Assessment & Plan:  Formatting of this note might be different from the original. Counseled on cessation as increases risk of coronary vasospasm and further worsening of heart function.  Dehydration 7/28/2016    Diabetes mellitus ([facility]     History of cocaine abuse ([facility] 7/28/2016    History of MI (myocardial infarction) 7/28/2016    Hyperglycemia 7/28/2016    Hyperlipidemia     Hypertension     Morbid obesity with BMI of 45.0-49.9, adult ([facility] 7/28/2016    Postural dizziness 7/28/2016    Pre-diabetes 3/9/2017    Last Assessment & Plan:  Formatting of this note might be different from the original. Discussed lifestyle changes Refer to [facility] program    PUD (peptic ulcer disease) 3/1/2019        Surgical History:   Past Surgical History:   Procedure Laterality Date    CARDIAC CATHETERIZATION      UPPER GASTROINTESTINAL ENDOSCOPY          Family History:  History reviewed. No pertinent family history.     Social History     Socioeconomic History    Marital status: Single     Spouse name: Not on file    Number of children: 3    Years of education: Not on file    Highest education level: Not on file   Occupational History     Employer: DISABLED   Tobacco Use    Smoking status: Current Every Day Smoker     Packs/day: 0.50     Years: 25.00     Pack years: 12.50     Types: Cigarettes    Smokeless tobacco: Never Used   Vaping Use    Vaping Use: Never used   Substance and Sexual Activity    Alcohol use: Not Currently    Drug use: Yes     Types: Marijuana Lyndel Jessee)    Sexual activity: Not Currently   Other Topics Concern    Not on file   Social History Narrative    Not on file     Social Determinants of Health     Financial Resource Strain:     Difficulty of Paying Living Expenses: Not on file   Food Insecurity:     Worried About Running Out of Food in the Last Year: Not on file    Dayna of Food in the Last Year: Not on file   Transportation Needs:     Lack of Transportation (Medical): Not on file    Lack of Transportation (Non-Medical): Not on file   Physical Activity:     Days of Exercise per Week: Not on file    Minutes of Exercise per Session: Not on file   Stress:     Feeling of Stress : Not on file   Social Connections:     Frequency of Communication with Friends and Family: Not on file    Frequency of Social Gatherings with Friends and Family: Not on file    Attends Restoration Services: Not on file    Active Member of [address] [place] [address] [employer] or Organizations: Not on file    Attends Club or Organization Meetings: Not on file    Marital Status: Not on file   Intimate Partner Violence:     Fear of Current or Ex-Partner: Not on file    Emotionally Abused: Not on file    Physically Abused: Not on file    Sexually Abused: Not on file   Housing Stability:     Unable to Pay for Housing in the Last Year: Not on file    Number of [place] in the Last Year: Not on file    Unstable Housing in the Last Year: Not on file       Nursing notes reviewed. ED Triage Vitals [03/19/22 1608]   Enc Vitals Group      BP (!) 174/115      Pulse 94      Resp 18      Temp 98.6 °F (37 °C)      Temp Source Oral      SpO2 99 %      Weight (!) 354 lb (160.6 kg)      Height 6' (1.829 m)      Head Circumference       Peak Flow       Pain Score       Pain Loc       Pain Edu? Excl. in [address]? GENERAL:  Awake, alert. Well developed, obese with no apparent distress. HENT:  Normocephalic, Atraumatic, moist mucous membranes. EYES:  Pupils equal round and reactive to light, Conjunctiva normal, extraocular movements normal.  NECK:  No meningeal signs, Supple.   CHEST:  Regular rate and rhythm, chest wall non-tender. LUNGS:  Clear to auscultation bilaterally. ABDOMEN:  Soft, non-tender, no rebound, rigidity or guarding, non-distended, normal bowel sounds. No costovertebral angle tenderness to palpation. BACK:  No tenderness. EXTREMITIES:  Normal range of motion, pitting bilateral lower extremity edema, no bony tenderness, no deformity, distal pulses present. SKIN: Warm, dry and intact. NEUROLOGIC: Normal mental status. Moving all extremities to command. LABS and DIAGNOSTIC RESULTS  EKG  The Ekg interpreted by me shows  normal sinus rhythm with a rate of 95  Axis is   Normal  QTc is  normal  Intervals and Durations are unremarkable. ST Segments: no acute change  Delta waves, Brugada Syndrome, and Short VA are not present. No significant change from prior EKG dated 24 sep 21    RADIOLOGY  X-RAYS:  I have reviewed radiologic plain film image(s). ALL OTHER NON-PLAIN FILM IMAGES SUCH AS CT, ULTRASOUND AND MRI HAVE BEEN READ BY THE RADIOLOGIST.   XR CHEST PORTABLE    (Results Pending)        LABS  Labs Reviewed   COMPREHENSIVE METABOLIC PANEL W/ REFLEX TO MG FOR LOW K - Abnormal; Notable for the following components:       Result Value    Glucose 308 (*)     All other components within normal limits   BRAIN NATRIURETIC PEPTIDE - Abnormal; Notable for the following components:    Pro-BNP 3,443 (*)     All other components within normal limits   URINALYSIS WITH REFLEX TO CULTURE - Abnormal; Notable for the following components:    Glucose, Ur 500 (*)     Ketones, Urine TRACE (*)     Protein,  (*)     Urobilinogen, Urine 2.0 (*)     All other components within normal limits   MICROSCOPIC URINALYSIS - Abnormal; Notable for the following components:    Mucus, UA 1+ (*)     All other components within normal limits   POCT GLUCOSE - Abnormal; Notable for the following components:    POC Glucose 288 (*)     All other components within normal limits   CBC WITH AUTO DIFFERENTIAL   TROPONIN       MEDICAL DECISION MAKING          7:06 PM: I discussed the history, physical, and treatment plan with Dr. Gigi Flynn. Debbie Whitten was signed out in stable condition. Please see Dr. Wade Pineda note for further details, including diagnosis and disposition. Recheck BP and respiratory status and chest x-ray. Clinical Impression    1. Congestive heart failure, unspecified HF chronicity, unspecified heart failure type ([facility]    2. Hypertension, unspecified type    3. Current nonadherence to medical treatment        Blood pressure (!) 170/119, pulse 97, temperature 98.6 °F (37 °C), temperature source Oral, resp. rate (!) 32, height 6' (1.829 m), weight (!) 354 lb (160.6 kg), SpO2 100 %. This chart was generated using the [address] [place] [address] dictation system. I created this record but it may contain dictation errors.           Avril Romero MD  03/19/22 8280

## 2022-03-19 NOTE — ED PROVIDER NOTES
[address] [facility] ED signout note:  I received this patient in signout from Dr. Zarina Sigala. We discussed the patient's initial history, physical, workup thus far, and medical decision making to this point. Briefly, Sally Weller is a 61 y.o. male  who presents to the ED complaining of shortness of breath. Initial history/exam also pertinent for out of medication, dyspnea on exertion, lower extremity pitting edema. Pending studies at time of signout include: CXR    The patient will be reassessed after workup above is completed. Anticipated disposition at time of signout is undetermined    Narrative & Impression  EXAMINATION:  ONE XRAY VIEW OF THE CHEST     3/19/2022 5:59 pm     COMPARISON:  September 23, 2021.     HISTORY:  ORDERING SYSTEM PROVIDED HISTORY: SOB  TECHNOLOGIST PROVIDED HISTORY:  Reason for exam:->SOB  Reason for Exam: PT. C/O SOB  Relevant Medical/Surgical History: HX CHF, HX MI, HX HTN, HX CARDIAC CATH     FINDINGS:  A portable upright frontal view chest radiograph was obtained.     The heart is enlarged. The mediastinal contour and pleural spaces are  otherwise within normal limits. The pulmonary vascular pattern is increased. There is no focal consolidation or pneumothorax. No acute thoracic osseous  abnormality.     IMPRESSION:  Moderate pulmonary vascular congestive change. Cardiomegaly. No significant  pleural effusion. ED COURSE/MDM  I introduced myself to the patient and performed my initial assessment on Sally Weller. There is no significant change in the patient's history from what is documented initially by Dr. Zarina Sigala after my evaluation. Old records reviewed. Labs and imaging reviewed and results discussed with patient.   Since time of sign out, the patient's ED workup was notable for elevated BNP, hyperglycemia, hypertension, initially Dr. Zarina Sigala had planned to give his home meds, but depending on the chest x-ray if any significant pulmonary edema, consider admission since he is significantly dyspneic on minimal exertion, denies chest pain, negative troponin, no significant ST elevations or depressions, but there were some nonspecific ST/T wave changes, we did not carry some of his home meds, and opted for IV Lasix since we do not have Demadex, monitoring urine output, there was moderate pulmonary vascular congestion, and he did appear fluid overloaded, based on echo last year, his EF is only 30 to 35%, I have concern for acute systolic congestive heart failure primarily due to medication noncompliance, he is not hypoxic at this time, but respiratory rate is in the 20s at rest, 30s with exertion, not requiring BiPAP at this time, not requiring supplemental oxygen, I am concerned because the patient lives alone, his significant other had passed away about 7 months ago, also states he does not know how to use his glucometer, has not been taking care of his diabetes either, he would likely benefit from some diabetes education and making sure he has his refills and follow-up with primary care after this acute episode, reviewed patient's home meds, initiated ones we have here in this freestanding ER, patient was given a snack, requested food but I explained he would need to wait until he got to [facility] where they have more options/cafeteria, given 5 units of regular insulin, rechecking glucose prior to transport, down to 240, Covid swab ordered, but low suspicion for infectious process, normal white count, no fever, he does use CPAP at home for sleep apnea, blood pressure had improved a little initially after the meds given, but given the acute CHF, concern for hypertensive crisis, BP had gone up to 190/120 before medication here, may benefit from nitro drip if BP remains elevated, patient was agreeable to stay in the hospital, hospitalist at [facility] agreed to accept. Transport arranged.       Orders Placed This Encounter   Procedures    COVID-19, Rapid    XR CHEST PORTABLE    CBC with Auto Differential    Comprehensive Metabolic Panel w/ Reflex to MG    Troponin    Brain Natriuretic Peptide    Urinalysis with Reflex to Culture    Microscopic Urinalysis    Inpatient consult to Hospitalist    POCT Glucose    POCT Glucose    POCT Glucose    EKG 12 Lead    Saline lock IV     Orders Placed This Encounter   Medications    furosemide (LASIX) injection 40 mg    insulin regular (HUMULIN R;NOVOLIN R) injection 5 Units    losartan (COZAAR) tablet 50 mg    amiodarone (CORDARONE) tablet 200 mg    hydrALAZINE (APRESOLINE) tablet 25 mg    carvedilol (COREG) tablet 25 mg    apixaban (ELIQUIS) tablet 5 mg    atorvastatin (LIPITOR) tablet 80 mg    aspirin chewable tablet 81 mg    pantoprazole (PROTONIX) tablet 40 mg     ED Course as of 03/19/22 2236   Sat Mar 19, 2022   1900 I received the patient from Dr. Su James in signout, pending chest x-ray, and further discussion regarding potential admission, patient is agreeable if necessary, but he is willing to try to diurese and go home with refills if possible. [SY]   2018 Called transfer center, page out to hospitalist at [facility] [SY]   2028 Dr. Mendel Host agreed to accept for admission to [facility]. [SY]   2159 About 1500 mL of urine output so far at time of transport [SY]      ED Course User Index  [SY] Jeffery Bumps, DO       CLINICAL IMPRESSION  1. Congestive heart failure, unspecified HF chronicity, unspecified heart failure type ([facility] [address]    2. Current nonadherence to medical treatment    3. Hyperglycemia due to diabetes mellitus ([facility] [address]    4. History of sleep apnea    5. Hypertensive crisis        Blood pressure (!) 161/126, pulse 85, temperature 98.6 °F (37 °C), temperature source Oral, resp. rate 26, height 6' (1.829 m), weight (!) 354 lb (160.6 kg), SpO2 98 %. The total critical care time spent while evaluating and treating this patient was   32 minutes. This excludes time spent doing separately billable procedures.   This includes time at the bedside, data interpretation, medication management, obtaining critical history from collateral sources if the patient is unable to provide it directly, and physician consultation. Specifics of interventions taken and potentially life-threatening diagnostic considerations are listed in the medical decision making. DISPOSITION  Danya Perez was admitted in stable condition. This chart was created using Dragon dictation software. Efforts were made by me to ensure accuracy, however some errors may be present due to limitations of this technology.                           Manolo Avendano,   03/19/22 5722

## 2022-03-20 LAB
ANION GAP SERPL CALCULATED.3IONS-SCNC: 15 MMOL/L (ref 3–16)
BUN BLDV-MCNC: 15 MG/DL (ref 7–20)
CALCIUM SERPL-MCNC: 8.5 MG/DL (ref 8.3–10.6)
CHLORIDE BLD-SCNC: 100 MMOL/L (ref 99–110)
CO2: 21 MMOL/L (ref 21–32)
CREAT SERPL-MCNC: 1.1 MG/DL (ref 0.9–1.3)
ESTIMATED AVERAGE GLUCOSE: 208.7 MG/DL
GFR AFRICAN AMERICAN: >60
GFR NON-AFRICAN AMERICAN: >60
GLUCOSE BLD-MCNC: 232 MG/DL (ref 70–99)
GLUCOSE BLD-MCNC: 242 MG/DL (ref 70–99)
GLUCOSE BLD-MCNC: 253 MG/DL (ref 70–99)
GLUCOSE BLD-MCNC: 274 MG/DL (ref 70–99)
GLUCOSE BLD-MCNC: 333 MG/DL (ref 70–99)
HBA1C MFR BLD: 8.9 %
PERFORMED ON: ABNORMAL
POTASSIUM SERPL-SCNC: 3.6 MMOL/L (ref 3.5–5.1)
SODIUM BLD-SCNC: 136 MMOL/L (ref 136–145)

## 2022-03-20 PROCEDURE — 36415 COLL VENOUS BLD VENIPUNCTURE: CPT

## 2022-03-20 PROCEDURE — 6370000000 HC RX 637 (ALT 250 FOR IP): Performed by: NURSE PRACTITIONER

## 2022-03-20 PROCEDURE — 6360000002 HC RX W HCPCS: Performed by: NURSE PRACTITIONER

## 2022-03-20 PROCEDURE — 6360000002 HC RX W HCPCS: Performed by: INTERNAL MEDICINE

## 2022-03-20 PROCEDURE — 94660 CPAP INITIATION&MGMT: CPT

## 2022-03-20 PROCEDURE — 1200000000 HC SEMI PRIVATE

## 2022-03-20 PROCEDURE — 94760 N-INVAS EAR/PLS OXIMETRY 1: CPT

## 2022-03-20 PROCEDURE — 99223 1ST HOSP IP/OBS HIGH 75: CPT | Performed by: INTERNAL MEDICINE

## 2022-03-20 PROCEDURE — 2580000003 HC RX 258: Performed by: NURSE PRACTITIONER

## 2022-03-20 PROCEDURE — U0003 INFECTIOUS AGENT DETECTION BY NUCLEIC ACID (DNA OR RNA); SEVERE ACUTE RESPIRATORY SYNDROME CORONAVIRUS 2 (SARS-COV-2) (CORONAVIRUS DISEASE [COVID-19]), AMPLIFIED PROBE TECHNIQUE, MAKING USE OF HIGH THROUGHPUT TECHNOLOGIES AS DESCRIBED BY CMS-2020-01-R: HCPCS

## 2022-03-20 PROCEDURE — 83036 HEMOGLOBIN GLYCOSYLATED A1C: CPT

## 2022-03-20 PROCEDURE — 6370000000 HC RX 637 (ALT 250 FOR IP): Performed by: INTERNAL MEDICINE

## 2022-03-20 PROCEDURE — 80048 BASIC METABOLIC PNL TOTAL CA: CPT

## 2022-03-20 PROCEDURE — U0005 INFEC AGEN DETEC AMPLI PROBE: HCPCS

## 2022-03-20 RX ORDER — CARVEDILOL 25 MG/1
25 TABLET ORAL 2 TIMES DAILY WITH MEALS
Status: DISCONTINUED | OUTPATIENT
Start: 2022-03-20 | End: 2022-03-22 | Stop reason: HOSPADM

## 2022-03-20 RX ORDER — INSULIN GLARGINE 100 [IU]/ML
10 INJECTION, SOLUTION SUBCUTANEOUS
Status: DISCONTINUED | OUTPATIENT
Start: 2022-03-20 | End: 2022-03-22 | Stop reason: HOSPADM

## 2022-03-20 RX ORDER — LOSARTAN POTASSIUM 25 MG/1
25 TABLET ORAL DAILY
Status: DISCONTINUED | OUTPATIENT
Start: 2022-03-20 | End: 2022-03-22 | Stop reason: HOSPADM

## 2022-03-20 RX ORDER — ATORVASTATIN CALCIUM 80 MG/1
80 TABLET, FILM COATED ORAL NIGHTLY
Status: DISCONTINUED | OUTPATIENT
Start: 2022-03-20 | End: 2022-03-22 | Stop reason: HOSPADM

## 2022-03-20 RX ORDER — FUROSEMIDE 10 MG/ML
40 INJECTION INTRAMUSCULAR; INTRAVENOUS 2 TIMES DAILY
Status: DISCONTINUED | OUTPATIENT
Start: 2022-03-20 | End: 2022-03-21

## 2022-03-20 RX ORDER — AMIODARONE HYDROCHLORIDE 200 MG/1
200 TABLET ORAL DAILY
Status: DISCONTINUED | OUTPATIENT
Start: 2022-03-20 | End: 2022-03-22 | Stop reason: HOSPADM

## 2022-03-20 RX ADMIN — ATORVASTATIN CALCIUM 80 MG: 80 TABLET, FILM COATED ORAL at 20:50

## 2022-03-20 RX ADMIN — CARVEDILOL 25 MG: 25 TABLET, FILM COATED ORAL at 10:38

## 2022-03-20 RX ADMIN — CARVEDILOL 25 MG: 25 TABLET, FILM COATED ORAL at 17:10

## 2022-03-20 RX ADMIN — FUROSEMIDE 40 MG: 10 INJECTION, SOLUTION INTRAMUSCULAR; INTRAVENOUS at 09:16

## 2022-03-20 RX ADMIN — INSULIN LISPRO 4 UNITS: 100 INJECTION, SOLUTION INTRAVENOUS; SUBCUTANEOUS at 17:31

## 2022-03-20 RX ADMIN — INSULIN LISPRO 2 UNITS: 100 INJECTION, SOLUTION INTRAVENOUS; SUBCUTANEOUS at 09:32

## 2022-03-20 RX ADMIN — LOSARTAN POTASSIUM 25 MG: 25 TABLET, FILM COATED ORAL at 10:38

## 2022-03-20 RX ADMIN — ENOXAPARIN SODIUM 40 MG: 40 INJECTION SUBCUTANEOUS at 09:16

## 2022-03-20 RX ADMIN — INSULIN LISPRO 3 UNITS: 100 INJECTION, SOLUTION INTRAVENOUS; SUBCUTANEOUS at 14:09

## 2022-03-20 RX ADMIN — SODIUM CHLORIDE, PRESERVATIVE FREE 10 ML: 5 INJECTION INTRAVENOUS at 09:16

## 2022-03-20 RX ADMIN — FUROSEMIDE 40 MG: 10 INJECTION, SOLUTION INTRAMUSCULAR; INTRAVENOUS at 17:10

## 2022-03-20 RX ADMIN — AMIODARONE HYDROCHLORIDE 200 MG: 200 TABLET ORAL at 10:38

## 2022-03-20 RX ADMIN — SODIUM CHLORIDE, PRESERVATIVE FREE 10 ML: 5 INJECTION INTRAVENOUS at 20:52

## 2022-03-20 RX ADMIN — INSULIN LISPRO 1 UNITS: 100 INJECTION, SOLUTION INTRAVENOUS; SUBCUTANEOUS at 20:50

## 2022-03-20 RX ADMIN — INSULIN GLARGINE 10 UNITS: 100 INJECTION, SOLUTION SUBCUTANEOUS at 10:39

## 2022-03-20 RX ADMIN — APIXABAN 5 MG: 5 TABLET, FILM COATED ORAL at 10:38

## 2022-03-20 RX ADMIN — APIXABAN 5 MG: 5 TABLET, FILM COATED ORAL at 20:50

## 2022-03-20 ASSESSMENT — PAIN SCALES - GENERAL: PAINLEVEL_OUTOF10: 0

## 2022-03-20 NOTE — PROGRESS NOTES
Progress Note  Admit Date: 3/19/2022      PCP: Tessie Jorge DO     CC: F/U for shortness of breath    Days in hospital:  1    SUBJECTIVE / Interval History:  Feels  better since admission. Still has orthopnea  -1.5 L      Allergies  Patient has no known allergies. Medications    Scheduled Meds:   sodium chloride flush  5-40 mL IntraVENous 2 times per day    enoxaparin  40 mg SubCUTAneous Daily    furosemide  40 mg IntraVENous Daily    insulin lispro  0-6 Units SubCUTAneous TID WC    insulin lispro  0-3 Units SubCUTAneous Nightly     Continuous Infusions:   sodium chloride      dextrose         PRN Meds:  sodium chloride flush, sodium chloride, ondansetron **OR** ondansetron, polyethylene glycol, acetaminophen **OR** acetaminophen, glucose, dextrose, glucagon (rDNA), dextrose    Vitals    BP (!) 152/84   Pulse 76   Temp 97.5 °F (36.4 °C) (Oral)   Resp 22   Ht 6' (1.829 m)   Wt (!) 354 lb (160.6 kg)   SpO2 95%   BMI 48.01 kg/m²     Exam:    Gen: No distress. Eyes: PERRL. No sclera icterus. No conjunctival injection. ENT: No discharge. Pharynx clear. External appearance of ears and nose normal.  Neck: Trachea midline. No obvious mass. Resp: No accessory muscle use. Few crackles. No wheezes. No rhonchi. No dullness on percussion. CV: Regular rate. Regular rhythm. No murmur or rub. + edema. GI: Non-tender. Non-distended. No hernia. Skin: Warm, dry, normal texture and turgor. No nodule on exposed extremities. Lymph: No cervical LAD. No supraclavicular LAD. M/S: No cyanosis. No clubbing. No joint deformity. Neuro: Moves all four extremities. CN 2-12 tested, no defect noted. Psych: Oriented x 3. No anxiety. Awake. Alert. Intact judgement and insight.     Data    LABS  CBC:   Recent Labs     03/19/22  1650   WBC 4.6   HGB 13.6   HCT 41.6   MCV 91.3        BMP:   Recent Labs     03/19/22  1650      K 4.6      CO2 22   BUN 15   CREATININE 1.1   GLUCOSE 308* POC GLUCOSE:    Recent Labs     03/19/22  1606 03/19/22  2155 03/19/22  2246 03/20/22  0744   POCGLU 288* 240* 259* 232*     LIVER PROFILE:   Recent Labs     03/19/22  1650   AST 30   ALT 28   LABALBU 3.9   BILITOT 1.0   ALKPHOS 118     PT/INR: No results for input(s): PROTIME, INR in the last 72 hours. APTT: No results for input(s): APTT in the last 72 hours. UA:  Recent Labs     03/19/22  1710   COLORU Yellow   PHUR 6.0   WBCUA 0-2   RBCUA 0-2   MUCUS 1+*   TRICHOMONAS None Seen   CLARITYU Clear   SPECGRAV >=1.030   LEUKOCYTESUR Negative   UROBILINOGEN 2.0*   BILIRUBINUR Negative   BLOODU Negative   GLUCOSEU 500*   KETUA TRACE*     Microbiology:  Wound Culture: No results for input(s): WNDABS, ORG in the last 72 hours. Invalid input(s):  LABGRAM  Nasal Culture: No results for input(s): ORG, MRSAPCR in the last 72 hours. Blood Culture: No results for input(s): BC, BLOODCULT2 in the last 72 hours. Fungal Culture:   No results for input(s): FUNGSM in the last 72 hours. No results for input(s): FUNCXBLD in the last 72 hours. CSF Culture:  No results for input(s): COLORCSF, APPEARCSF, CFTUBE, CLOTCSF, WBCCSF, RBCCSF, NEUTCSF, NUMCELLSCSF, LYMPHSCSF, MONOCSF, GLUCCSF, VOLCSF in the last 72 hours. Respiratory Culture:  No results for input(s): Lorretta Stef in the last 72 hours. AFB:No results for input(s): AFBSMEAR in the last 72 hours. Urine Culture  No results for input(s): LABURIN in the last 72 hours. RADIOLOGY:    XR CHEST PORTABLE   Final Result   Moderate pulmonary vascular congestive change. Cardiomegaly. No significant   pleural effusion. CONSULTS:    IP CONSULT TO HOSPITALIST  IP CONSULT TO RESPIRATORY CARE    ASSESSMENT AND PLAN:      Principal Problem:    Acute heart failure (HCC)  Active Problems:    CHF (congestive heart failure), NYHA class I, acute on chronic, combined ([address]  Resolved Problems:    * No resolved hospital problems.  *    Patient is a 79-year-old male with a past medical history of CHF, hypertension, hyperlipidemia, VENITA who presented with shortness of breath. Patient ran out of his medications for the last few months. He was admitted with acute on chronic systolic CHF,    Acute on chronic and diastolic systolic CHF  -Secondary to noncompliance with medication(ran out since the last few months)  -Last echo showed a EF of 30 to 35% with grade 2 diastolic dysfunction  -Continue IV Lasix  -Monitor KIMI and daily weight  -Restart Coreg and losartan  - Farxiga to be started on discharge  -Monitor blood pressure. Add Aldactone if blood pressure stable tomorrow  -Dry weight per chart review appears to be around 319 pounds  -Consult cardiology needs for close follow-up for both CHF and A. fib    Hypertensive urgency  -Resume Coreg and losartan  -Monitor blood pressure    Diabetes mellitus  -On Lantus at home, please start at a low-dose  -Add sliding scale insulin  -A1c 8.6      Paroxysmal A. Fib-presented in sinus  -Resume amiodarone and Eliquis    VENITA  -Continue home CPAP    Morbid obesity  -Complicating current management and treatment    Intermediate positive Covid test-PCR pending    DVT Prophylaxis: Eliquis  Diet: ADULT DIET; Regular; Low Sodium (2 gm)  Code Status: Full Code        Discharge plan -ct  diuresis    The patient and / or the family were informed of the results of any tests, a time was given to answer questions, a plan was proposed and they agreed with plan. Discussed with consulting physicians, nursing and social work     The note was completed using EMR. Every effort was made to ensure accuracy; however, inadvertent computerized transcription errors may be present.        Es Nair MD

## 2022-03-20 NOTE — CONSULTS
2016    Postural dizziness 2016    Pre-diabetes 3/9/2017    Last Assessment & Plan:  Formatting of this note might be different from the original. Discussed lifestyle changes Refer to [facility] program    PUD (peptic ulcer disease) 3/1/2019        Past Surgical History:   Procedure Laterality Date    CARDIAC CATHETERIZATION      UPPER GASTROINTESTINAL ENDOSCOPY         No Known Allergies    Social History:  Reviewed. reports that he has been smoking cigarettes. He has a 12.50 pack-year smoking history. He has never used smokeless tobacco. He reports previous alcohol use. He reports current drug use. Drug: Marijuana Karina Chrissy). Family History:  Reviewed. family history is not on file. No premature CAD. Review of System:  Pertinent positive and negatives are in the HPI, the rest are negative. Physical Examination:  Vitals:    22 0911   BP: (!) 172/87   Pulse: 77   Resp: 15   Temp: 97.7 °F (36.5 °C)   SpO2: 99%        Intake/Output Summary (Last 24 hours) at 3/20/2022 1216  Last data filed at 3/20/2022 1104  Gross per 24 hour   Intake 680 ml   Output 2250 ml   Net -1570 ml     In: 680 [P.O.:680]  Out: 2250    Wt Readings from Last 3 Encounters:   22 (!) 354 lb (160.6 kg)   21 (!) 319 lb 3.2 oz (144.8 kg)   21 (!) 313 lb (142 kg)     Temp  Av °F (36.7 °C)  Min: 97.5 °F (36.4 °C)  Max: 98.6 °F (37 °C)  Pulse  Av.5  Min: 76  Max: 101  BP  Min: 137/94  Max: 190/120  SpO2  Av.5 %  Min: 90 %  Max: 100 %    · Telemetry: Sinus rhythm with periods of nonsustained VT. · Constitutional: Alert. Oriented to person, place, and time. No distress. · Head: Normocephalic and atraumatic. · Mouth/Throat: Lips appear moist. Oropharynx is clear and moist.  · Eyes: Conjunctivae normal. EOM are normal.   · Neck: Neck supple. No rigidity. Positive JVD present. · Cardiovascular: Normal rate, regular rhythm. Normal S1&S2.  .    · Pulmonary/Chest: Bilateral respiratory sounds diminished at bases. · Abdominal: Soft. Normal bowel sounds present. No tenderness. · Musculoskeletal: No tenderness. Has edema    · Lymphadenopathy: Has no obvious cervical adenopathy. · Neurological: Alert and oriented. Grossly intact with no obvious focal deficit. · Skin: Skin is warm and dry. · Psychiatric: Normal mood and affect. Labs:  Reviewed. Recent Labs     03/19/22  1650 03/20/22  1001    136   K 4.6 3.6    100   CO2 22 21   BUN 15 15   CREATININE 1.1 1.1     Recent Labs     03/19/22  1650   WBC 4.6   HGB 13.6   HCT 41.6   MCV 91.3        Lab Results   Component Value Date    TROPONINI 0.01 03/19/2022     No results found for: BNP  Lab Results   Component Value Date    PROTIME 11.9 09/24/2021    INR 1.05 09/24/2021     Lab Results   Component Value Date    CHOL 90 08/25/2021    HDL 30 08/25/2021    TRIG 92 08/25/2021       Diagnostic and imaging results reviewed. ECG: Normal sinus rhythm, chronic lateral ST depressions. Echo: 9/25/2021  Conclusions      Summary   There is mildly increased left ventricular wall thickness. Overall left ventricular systolic function appears severely reduced. Ejection fraction is visually estimated to be 30-35%. No regional wall motion abnormalities are noted. Grade II diastolic dysfunction with elevated LV filling pressures. Normal right ventricular size and function. Echocardiogram 3/4/19   - Left ventricle: The cavity size was moderately dilated. Wall     thickness was normal. Systolic function was moderately to severely     reduced. The estimated ejection fraction was in the range of 30%     to 35%. Severe diffuse hypokinesis. The study is not technically     sufficient to allow evaluation of LV diastolic function.   - Mitral valve: Mild regurgitation.   - Left atrium: The atrium was mildly dilated. - Right ventricle: Systolic function was low normal. TAPSE:     1.8cm. Tricuspid annular systolic velocity: 41UV/K. - Right atrium: The atrium was mildly dilated. - Atrial septum: A shunt cannot be excluded. - Tricuspid valve: Mild-moderate regurgitation directed along the     right atrial wall. - Pulmonary arteries: Systolic pressure was moderately increased,     estimated to be 53mm Hg assuming that the right atrial pressure     was 5 mmHg.         Stress: 7/25/19   There is a medium to large sized area of mild to moderately decreased perfusion in the basal inferior, basal inferolateral, mid inferior, mid inferolateral, and apical inferior segments at stress with extensive improvement at rest, consistent with reversible ischemia.      Cath: 4/2/16    PROCEDURAL FINDINGS:   1. Left main coronary artery was free of any angiographically   significant disease. It gave of the left anterior descending   artery and the left circumflex artery. 2. The left anterior descending had a normal anatomic course.  It   gave off 2 diagonals. 30-40% mid-LAD stenosis. 3. The left circumflex artery gives off 1 large obtuse marginals   and there is 30-40% ostial stenosis of OM1. Left dominant and   supplies posterior descending artery and posterolateral branches   distally. 4. The right coronary artery is a small, non-dominant vessel and   with no angiographically significant disease. HEMODYNAMICS:   LVEDP: 25   No gradient across the aortic valve       CONCLUSION:   Non-obstructive CAD     I independently reviewed the ECG, telemetry, serology, echocardiographic results.     Scheduled Meds:   carvedilol  25 mg Oral BID     losartan  25 mg Oral Daily    apixaban  5 mg Oral BID    insulin glargine  10 Units SubCUTAneous Daily with breakfast    amiodarone  200 mg Oral Daily    furosemide  40 mg IntraVENous BID    atorvastatin  80 mg Oral Nightly    sodium chloride flush  5-40 mL IntraVENous 2 times per day    insulin lispro  0-6 Units SubCUTAneous TID     insulin lispro  0-3 Units SubCUTAneous Nightly Continuous Infusions:   sodium chloride      dextrose       PRN Meds:.sodium chloride flush, sodium chloride, ondansetron **OR** ondansetron, polyethylene glycol, acetaminophen **OR** acetaminophen, glucose, dextrose, glucagon (rDNA), dextrose     Problem List:   Patient Active Problem List    Diagnosis Date Noted    Acute heart failure ([facility] 03/19/2022    CHF (congestive heart failure), NYHA class I, acute on chronic, combined ([facility] 03/19/2022    Atrial fibrillation [facility] RVR ([facility] 09/23/2021    Type 2 diabetes mellitus with hyperlipidemia ([facility] [address] 08/25/2021    Diabetes mellitus with coincident hypertension ([facility] 08/25/2021    Gastroesophageal reflux disease 08/25/2021    Obesity, diabetes, and hypertension syndrome ([facility] 08/25/2021    Abnormal stress test 09/24/2019    Chronic systolic heart failure ([facility] 06/28/2019    Gastric ulcer with perforation ([facility] 02/06/2018    History of MI (myocardial infarction) 07/28/2016    Tobacco abuse 07/28/2016    Atherosclerosis of coronary artery 07/19/2016    Chronic kidney disease (CKD) stage G2/A2, mildly decreased glomerular filtration rate (GFR) between 60-89 mL/min/1.73 square meter and albuminuria creatinine ratio between  mg/g 03/28/2015      Active Hospital Problems    Diagnosis Date Noted    Acute heart failure ([facility] [I50.9] 03/19/2022    CHF (congestive heart failure), NYHA class I, acute on chronic, combined (HCC) [I50.43] 03/19/2022     [id number] Score for Atrial Fibrillation Stroke Risk   Risk   Factors  Component Value   C CHF Yes 1   H HTN Yes 1   A2 Age >= 76 No,  (57 y.o.) 0   D DM Yes 1   S2 Prior Stroke/TIA No 0   V Vascular Disease Yes 1   A Age 74-69 No,  (57 y.o.) 0   Sc Sex male 0    [id number]  Score  4   Score last updated 3/20/22 27:84 PM EDT    Click here for a link to the UpToDate guideline \"Atrial Fibrillation: Anticoagulation therapy to prevent embolization    Disclaimer: Risk Score calculation is dependent on accuracy of patient problem list and past encounter diagnosis. Assessment and Recommendation(s):  1. Acute on chronic heart failure with severely reduced LV systolic function from previous echocardiogram.   2. Paroxysmal atrial fibrillation with RVR. 3. Medication non compliance. 4. Hypertension. 5. DM2  6. Kevin on CPAP  7. Obese    Mr. Catia Poole comes in with symptoms of heart failure after \"running out of his medications and not taking them for a few months. \" Cardiology last saw the patient for atrial fibrillation with RVR and heart failure September 2021 where he underwent DCCV and was started on amiodarone and eliquis. He was to take GDMT for 3 months then repeat TTE. He also has a history of reversible ischemia from a astress test done at [facility] and was supposed to have a LHC but he's never had this done. - for now treat heart failure with lasix 40 mg IV bid.    - strict I/O and daily standing weights. - resume coreg and losartan. - restart amiodarone 200 mg daily. If he goes to atrial fibrillation with RVR will be hard to control rates in decompensated heart failure. - resume BP medications. - once able to lie flat, can have Cor angiogram done in hospital.    - once discharged same plan of care as before, GDMT some of which can be started as outpatient (aldactone/SGLT2i). - repeat TTE after 90 days of good therapy to see if candidate for ICD (but has to be compliant with medications). He's having runs of NSVT not suprising given current heart failure. - f/u with Dr. Addie Key as he had previously discussed with the patient to address atrial fibrillation. If has uncontrolled rates, this can be exacerbating heart failure. Thank you for allowing me to participate in the care of Danya Perez . If you have any questions/comments, please do not hesitate to contact us.       Sasha Byrne MD  Cardiac Electrophysiology  [address]

## 2022-03-20 NOTE — ED NOTES
Report called to [address] [place] [address] Report given to [facility] who assumes care.  Pt transferred     Kellen Cabrera RN  03/19/22 8885

## 2022-03-20 NOTE — H&P
Hospital Medicine History & Physical      PCP: Nicholas Lai DO    Date of Admission: 3/19/2022    Date of Service: Pt seen/examined on 3/19/2022 and Admitted to Inpatient. Chief Complaint:  SOB      History Of Present Illness: The patient is a 61 y.o. male with a history of VENITA on CPAP, CHF, hypertension, hyperlipidemia and diabetes who presents to [facility] with complaints of shortness of breath. He states that he ran out of his medications, all of them, for the past \"few months. \"  He states that he has had ongoing shortness of breath but noticed an increased difficulty with any type of activity, especially climbing stairs. He also had associated dizziness. He has a 2 pillow orthopnea with PND noted. Denies headaches fevers or chills. No chest pain or palpitations. No wheezing. No nausea vomiting or abdominal pain. No other symptoms noted at present. No other aggravating or alleviating factors. Past Medical History:        Diagnosis Date    Abdominal pain 7/29/2021    Abnormal EKG 7/28/2016    Acute pancreatitis 7/29/2021    CHF (congestive heart failure) (HCC)     Cigarette smoker 8/25/2021    Cocaine abuse ([facility] [address] 3/28/2015    Last Assessment & Plan:  Formatting of this note might be different from the original. Counseled on cessation as increases risk of coronary vasospasm and further worsening of heart function.     Dehydration 7/28/2016    Diabetes mellitus ([address]     History of cocaine abuse ([facility] [address] 7/28/2016    History of MI (myocardial infarction) 7/28/2016    Hyperglycemia 7/28/2016    Hyperlipidemia     Hypertension     Morbid obesity with BMI of 45.0-49.9, adult ([facility] [address] 7/28/2016    Postural dizziness 7/28/2016    Pre-diabetes 3/9/2017    Last Assessment & Plan:  Formatting of this note might be different from the original. Discussed lifestyle changes Refer to [facility] program    PUD (peptic ulcer disease) 3/1/2019       Past Surgical History:        Procedure Laterality Date    CARDIAC CATHETERIZATION      UPPER GASTROINTESTINAL ENDOSCOPY         Medications Prior to Admission:    Prior to Admission medications    Medication Sig Start Date End Date Taking? Authorizing Provider   atorvastatin (LIPITOR) 80 MG tablet TAKE 1 TABLET BY MOUTH EVERY DAY AT NIGHT 1/13/22   Judee Osler, DO   omeprazole (PRILOSEC) 20 MG delayed release capsule Take 1 capsule by mouth Daily 10/21/21   Judee Osler, DO   famotidine (PEPCID) 20 MG tablet Take 1 tablet by mouth 2 times daily 10/21/21   Judee Osler, DO   carvedilol (COREG) 25 MG tablet Take 1 tablet by mouth 2 times daily (with meals) 10/21/21   Judee Osler, DO   losartan (COZAAR) 25 MG tablet Take 2 tablets by mouth daily 9/29/21   Judee Osler, DO   amiodarone (CORDARONE) 200 MG tablet Take 1 tablet by mouth daily 9/26/21   Kate Grant MD   apixaban (ELIQUIS) 5 MG TABS tablet Take 1 tablet by mouth 2 times daily 9/25/21   Kate Grant MD   blood glucose test strips (TRUE METRIX BLOOD GLUCOSE TEST) strip 1 each by In Vitro route 2 times daily As needed.  9/24/21   Kate Grant MD   fluticasone (FLONASE) 50 MCG/ACT nasal spray 2 sprays by Nasal route daily 8/27/21   Historical Provider, MD   polyethylene glycol (GLYCOLAX) 17 GM/SCOOP powder Take 17 g by mouth daily as needed for Constipation 8/30/21   Historical Provider, MD   torsemide (DEMADEX) 20 MG tablet Take 1 tablet by mouth daily as needed (swelling or weight gain) Patient states he is taking this 3 days a week [facility]  Patient taking differently: Take 20 mg by mouth three times a week Patient states he is taking this 3 days a week [facility] 9/1/21   Judee Osler, DO   blood glucose test strips (TRUE METRIX BLOOD GLUCOSE TEST) strip 1 each by In Vitro route daily Check blood sugar four times daily  Pharmacy can do brand substitutions 9/1/21   Tessie Jorge, DO   insulin glargine (LANTUS SOLOSTAR) 100 UNIT/ML injection pen Inject 20 Units into the skin nightly Pharmacy can do brand substitutions  Patient taking differently: Inject 20 Units into the skin every morning Pharmacy can do brand substitutions 9/1/21   Tessie Uriarteizzle, DO   dapagliflozin (FARXIGA) 5 MG tablet Take 1 tablet by mouth every morning 9/1/21 Doreen Grizzle, DO   aspirin 81 MG chewable tablet Take 1 tablet by mouth daily 8/25/21   Tessie Luisito, DO   hydrALAZINE (APRESOLINE) 25 MG tablet Take 1 tablet by mouth every 8 hours 8/25/21 Doreen Grizzle, DO   Lancet Devices (LANCING DEVICE) MISC 1 lancet device  Pharmacy cannot do brand substitutions 7/31/21   Shelbie Ramirez MD   Lancets MISC Check blood sugar four times. Pharmacy can do brand substitutions 7/31/21   Shelbie Ramirez MD   Blood Glucose Monitoring Suppl (TRUE METRIX METER) w/Device KIT 1 kit by Does not apply route 4 times daily Dispense one kit  Pharmacy cannot do brand substitutions 7/31/21   Shelbie Ramirez MD   Insulin Pen Needle (CAREFINE PEN NEEDLES) 32G X 4 MM MISC 1 each by Does not apply route 3 times daily Pharmacy can do brand substitutions 7/31/21   Shelbie Ramirez MD       Allergies:  Patient has no known allergies. Social History:  The patient currently lives independently    TOBACCO:   reports that he has been smoking cigarettes. He has a 12.50 pack-year smoking history. He has never used smokeless tobacco.  ETOH:   reports previous alcohol use. Family History:  Reviewed in detail and negative for DM, Early CAD, Cancer, CVA. Positive as follows:    History reviewed. No pertinent family history. REVIEW OF SYSTEMS:   Positive for shortness of breath, dizziness, PND and orthopnea and as noted in the HPI. All other systems reviewed and negative.     PHYSICAL EXAM:    BP (!) 137/94   Pulse 82   Temp 98.3 °F (36.8 °C) (Oral)   Resp 20   Ht 6' (1.829 m)   Wt (!) 354 lb (160.6 kg)   SpO2 95%   BMI 48.01 kg/m²     General appearance: No apparent distress appears stated age and cooperative. Obese  HEENT Normal cephalic, atraumatic without obvious deformity. Pupils equal, round, and reactive to light. Extra ocular muscles intact. Conjunctivae/corneas clear. Neck: Supple, No jugular venous distention/bruits. Trachea midline without thyromegaly or adenopathy with full range of motion. Lungs: Clear to auscultation, bilaterally without Rales/Wheezes/Rhonchi with good respiratory effort. Diminished throughout all fields  Heart: Regular rate and rhythm with Normal S1/S2 without murmurs, rubs or gallops, point of maximum impulse non-displaced  Abdomen: Soft, non-tender or non-distended without rigidity or guarding and positive bowel sounds all four quadrants. Extremities: No clubbing, cyanosis, or edema bilaterally. Full range of motion without deformity and normal gait intact. Skin: Skin color, texture, turgor normal.  No rashes or lesions. Neurologic: Alert and oriented X 3, neurovascularly intact with sensory/motor intact upper extremities/lower extremities, bilaterally. Cranial nerves: II-XII intact, grossly non-focal.  Mental status: Alert, oriented, thought content appropriate. Capillary Refill: Acceptable  < 3 seconds  Peripheral Pulses: +3 Easily felt, not easily obliterated with pressure      CXR:  I have reviewed the CXR with the following interpretation: Noted pulmonary edema  EKG:  I have reviewed the EKG with the following interpretation: Nonacute    CBC   Recent Labs     03/19/22  1650   WBC 4.6   HGB 13.6   HCT 41.6         RENAL  Recent Labs     03/19/22  1650      K 4.6      CO2 22   BUN 15   CREATININE 1.1     LFT'S  Recent Labs     03/19/22  1650   AST 30   ALT 28   BILITOT 1.0   ALKPHOS 118     COAG  No results for input(s): INR in the last 72 hours.   CARDIAC ENZYMES  Recent Labs 03/19/22  1650   TROPONINI 0.01       U/A:    Lab Results   Component Value Date    COLORU Yellow 03/19/2022    WBCUA 0-2 03/19/2022    RBCUA 0-2 03/19/2022    MUCUS 1+ 03/19/2022    CLARITYU Clear 03/19/2022    SPECGRAV >=1.030 03/19/2022    LEUKOCYTESUR Negative 03/19/2022    BLOODU Negative 03/19/2022    GLUCOSEU 500 03/19/2022       ABG  No results found for: MZT8GTT, BEART, C5PYXMIG, PHART, THGBART, ZYT4WDC, PO2ART, YJD8TMS        Active Hospital Problems    Diagnosis Date Noted    Acute heart failure ([facility] [address] [I50.9] 03/19/2022    CHF (congestive heart failure), NYHA class I, acute on chronic, combined ([facility] [address] [I50.43] 03/19/2022         PHYSICIANS CERTIFICATION:    I certify that Vanda Pepe is expected to be hospitalized for more than 2 midnights based on the following assessment and plan:      ASSESSMENT/PLAN:  Acute on chronic combined heart failure  -Strict I's and O's  -Lasix 40 mg daily  -Continue goal-directed therapy with Coreg losartan  -Consider consultation with cardiology in a.m.  -Echo in September 2021 with a EF of 30 to 35% with G2D    Medical noncompliance  -Has not been following up with his primary care physician    Accelerated hypertension with a history of essential hypertension  -Continue hydralazine, losartan    Hyperglycemia with a history of diabetes mellitus type 2  -Takes oral medications with dapagliflozin  -Holding oral medications  -Started on low sliding scale  -Check A1c    Paroxysmal atrial fibrillation  -Monitor on telemetry  -Continue amiodarone  -Anticoagulated with Eliquis    VENITA on CPAP  - order home dose    Obese   - BMI 50    Levy will need all of his prescriptions at discharge. DVT Prophylaxis: eliquis  Diet: ADULT DIET; Regular; Low Sodium (2 gm)  Code Status: Full Code  PT/OT Eval Status: as able    Dispo - inpatient       CONSUELO Segovia - CNP    Thank you Laura Hernandez DO for the opportunity to be involved in this patient's care.  If you have any questions or concerns please feel free to contact me at [phone].

## 2022-03-20 NOTE — ED NOTES
Awake alert man who lives at home alone. States has had to go out of town and has not kept up with pmd appt. Has not learned to use his glucometer. When walking upstairs has had sob and when he gets SOB he becomes dizzy.   Has still been giving himself his lantus at night     Cuauhtemoc Blount RN  03/20/22 1919

## 2022-03-21 LAB
ANION GAP SERPL CALCULATED.3IONS-SCNC: 12 MMOL/L (ref 3–16)
BUN BLDV-MCNC: 18 MG/DL (ref 7–20)
CALCIUM SERPL-MCNC: 8.6 MG/DL (ref 8.3–10.6)
CHLORIDE BLD-SCNC: 101 MMOL/L (ref 99–110)
CO2: 23 MMOL/L (ref 21–32)
CREAT SERPL-MCNC: 1.2 MG/DL (ref 0.9–1.3)
EKG ATRIAL RATE: 95 BPM
EKG DIAGNOSIS: NORMAL
EKG P AXIS: 37 DEGREES
EKG P-R INTERVAL: 142 MS
EKG Q-T INTERVAL: 390 MS
EKG QRS DURATION: 100 MS
EKG QTC CALCULATION (BAZETT): 490 MS
EKG R AXIS: -23 DEGREES
EKG T AXIS: 180 DEGREES
EKG VENTRICULAR RATE: 95 BPM
GFR AFRICAN AMERICAN: >60
GFR NON-AFRICAN AMERICAN: >60
GLUCOSE BLD-MCNC: 225 MG/DL (ref 70–99)
GLUCOSE BLD-MCNC: 228 MG/DL (ref 70–99)
GLUCOSE BLD-MCNC: 252 MG/DL (ref 70–99)
GLUCOSE BLD-MCNC: 277 MG/DL (ref 70–99)
GLUCOSE BLD-MCNC: 304 MG/DL (ref 70–99)
PERFORMED ON: ABNORMAL
POTASSIUM SERPL-SCNC: 3.8 MMOL/L (ref 3.5–5.1)
SARS-COV-2: NOT DETECTED
SODIUM BLD-SCNC: 136 MMOL/L (ref 136–145)

## 2022-03-21 PROCEDURE — 99233 SBSQ HOSP IP/OBS HIGH 50: CPT | Performed by: NURSE PRACTITIONER

## 2022-03-21 PROCEDURE — 6370000000 HC RX 637 (ALT 250 FOR IP): Performed by: INTERNAL MEDICINE

## 2022-03-21 PROCEDURE — 36415 COLL VENOUS BLD VENIPUNCTURE: CPT

## 2022-03-21 PROCEDURE — 80048 BASIC METABOLIC PNL TOTAL CA: CPT

## 2022-03-21 PROCEDURE — 6360000002 HC RX W HCPCS: Performed by: INTERNAL MEDICINE

## 2022-03-21 PROCEDURE — 93010 ELECTROCARDIOGRAM REPORT: CPT | Performed by: INTERNAL MEDICINE

## 2022-03-21 PROCEDURE — 1200000000 HC SEMI PRIVATE

## 2022-03-21 PROCEDURE — 94660 CPAP INITIATION&MGMT: CPT

## 2022-03-21 PROCEDURE — 2580000003 HC RX 258: Performed by: NURSE PRACTITIONER

## 2022-03-21 PROCEDURE — 6370000000 HC RX 637 (ALT 250 FOR IP): Performed by: NURSE PRACTITIONER

## 2022-03-21 RX ORDER — FUROSEMIDE 40 MG/1
40 TABLET ORAL DAILY
Status: DISCONTINUED | OUTPATIENT
Start: 2022-03-22 | End: 2022-03-22 | Stop reason: HOSPADM

## 2022-03-21 RX ORDER — SPIRONOLACTONE 25 MG/1
25 TABLET ORAL DAILY
Status: DISCONTINUED | OUTPATIENT
Start: 2022-03-22 | End: 2022-03-22 | Stop reason: HOSPADM

## 2022-03-21 RX ADMIN — ATORVASTATIN CALCIUM 80 MG: 80 TABLET, FILM COATED ORAL at 20:42

## 2022-03-21 RX ADMIN — INSULIN GLARGINE 10 UNITS: 100 INJECTION, SOLUTION SUBCUTANEOUS at 08:53

## 2022-03-21 RX ADMIN — APIXABAN 5 MG: 5 TABLET, FILM COATED ORAL at 20:42

## 2022-03-21 RX ADMIN — SODIUM CHLORIDE, PRESERVATIVE FREE 10 ML: 5 INJECTION INTRAVENOUS at 20:43

## 2022-03-21 RX ADMIN — AMIODARONE HYDROCHLORIDE 200 MG: 200 TABLET ORAL at 08:43

## 2022-03-21 RX ADMIN — INSULIN LISPRO 4 UNITS: 100 INJECTION, SOLUTION INTRAVENOUS; SUBCUTANEOUS at 17:23

## 2022-03-21 RX ADMIN — APIXABAN 5 MG: 5 TABLET, FILM COATED ORAL at 08:43

## 2022-03-21 RX ADMIN — INSULIN LISPRO 2 UNITS: 100 INJECTION, SOLUTION INTRAVENOUS; SUBCUTANEOUS at 20:43

## 2022-03-21 RX ADMIN — CARVEDILOL 25 MG: 25 TABLET, FILM COATED ORAL at 17:15

## 2022-03-21 RX ADMIN — FUROSEMIDE 40 MG: 10 INJECTION, SOLUTION INTRAMUSCULAR; INTRAVENOUS at 10:02

## 2022-03-21 RX ADMIN — LOSARTAN POTASSIUM 25 MG: 25 TABLET, FILM COATED ORAL at 08:43

## 2022-03-21 RX ADMIN — CARVEDILOL 25 MG: 25 TABLET, FILM COATED ORAL at 08:43

## 2022-03-21 RX ADMIN — FUROSEMIDE 40 MG: 10 INJECTION, SOLUTION INTRAMUSCULAR; INTRAVENOUS at 17:15

## 2022-03-21 RX ADMIN — INSULIN LISPRO 2 UNITS: 100 INJECTION, SOLUTION INTRAVENOUS; SUBCUTANEOUS at 08:54

## 2022-03-21 RX ADMIN — INSULIN LISPRO 3 UNITS: 100 INJECTION, SOLUTION INTRAVENOUS; SUBCUTANEOUS at 14:09

## 2022-03-21 ASSESSMENT — PAIN SCALES - GENERAL
PAINLEVEL_OUTOF10: 0
PAINLEVEL_OUTOF10: 0

## 2022-03-21 NOTE — PROGRESS NOTES
Hospitalist Progress Note      PCP: Ml Wilson DO    Date of Admission: 3/19/2022    Chief Complaint:   Chief Complaint   Patient presents with    Medication Refill    Shortness of Breath     out of meds for over 2 months  Diabetic not checking sugar     Hospital Course: The patient is a 61 y.o. male with a history of VENITA on CPAP, CHF, hypertension, hyperlipidemia and diabetes who presents to [facility] with complaints of shortness of breath. He states that he ran out of his medications, all of them, for the past \"few months. \"  He states that he has had ongoing shortness of breath but noticed an increased difficulty with any type of activity, especially climbing stairs. He also had associated dizziness. He has a 2 pillow orthopnea with PND noted. Denies headaches fevers or chills. No chest pain or palpitations. No wheezing. No nausea vomiting or abdominal pain. No other symptoms noted at present. No other aggravating or alleviating factors. Subjective: Patient is sitting up in the chair during my exam. He tells me he is feeling a lot better today. He states the cardiologist was just in - states they have plans for outpatient angiogram possibly. States his swelling has improved significantly and not peeing as much now.       Medications:  Reviewed    Infusion Medications    sodium chloride      dextrose       Scheduled Medications    carvedilol  25 mg Oral BID WC    losartan  25 mg Oral Daily    apixaban  5 mg Oral BID    insulin glargine  10 Units SubCUTAneous Daily with breakfast    amiodarone  200 mg Oral Daily    furosemide  40 mg IntraVENous BID    atorvastatin  80 mg Oral Nightly    sodium chloride flush  5-40 mL IntraVENous 2 times per day    insulin lispro  0-6 Units SubCUTAneous TID WC    insulin lispro  0-3 Units SubCUTAneous Nightly     PRN Meds: sodium chloride flush, sodium chloride, ondansetron **OR** ondansetron, polyethylene glycol, acetaminophen **OR** acetaminophen, glucose, dextrose, glucagon (rDNA), dextrose      Intake/Output Summary (Last 24 hours) at 3/21/2022 0928  Last data filed at 3/21/2022 0427  Gross per 24 hour   Intake 1598 ml   Output 2800 ml   Net -1202 ml       Physical Exam Performed:    /85   Pulse 64   Temp 97.6 °F (36.4 °C) (Oral)   Resp 18   Ht 6' (1.829 m)   Wt (!) 343 lb 14.7 oz (156 kg)   SpO2 94%   BMI 46.64 kg/m²     General appearance: No apparent distress, appears stated age and cooperative. HEENT: Pupils equal, round, and reactive to light. Conjunctivae/corneas clear. Neck: Supple, with full range of motion. No jugular venous distention. Trachea midline. Respiratory:  Normal respiratory effort. Clear to auscultation, bilaterally without Rales/Wheezes/Rhonchi. Cardiovascular: Regular rate and rhythm with normal S1/S2 without murmurs, rubs or gallops. Abdomen: Soft, non-tender, non-distended with normal bowel sounds. Musculoskeletal: No clubbing, cyanosis bilaterally. Full range of motion without deformity. Non-pitting edema to bilateral lower extremities. Skin: Skin color, texture, turgor normal.  No rashes or lesions. Neurologic:  Neurovascularly intact without any focal sensory/motor deficits. Cranial nerves: II-XII intact, grossly non-focal.  Psychiatric: Alert and oriented, thought content appropriate, normal insight  Capillary Refill: Brisk,< 3 seconds   Peripheral Pulses: +2 palpable, equal bilaterally       Labs:   Recent Labs     03/19/22  1650   WBC 4.6   HGB 13.6   HCT 41.6        Recent Labs     03/19/22  1650 03/20/22  1001 03/21/22  0603    136 136   K 4.6 3.6 3.8    100 101   CO2 22 21 23   BUN 15 15 18   CREATININE 1.1 1.1 1.2   CALCIUM 9.7 8.5 8.6     Recent Labs     03/19/22  1650   AST 30   ALT 28   BILITOT 1.0   ALKPHOS 118     No results for input(s): INR in the last 72 hours.   Recent Labs     03/19/22  1650   TROPONINI 0.01       Urinalysis:      Lab Results   Component Value Date    NITRU Negative 03/19/2022    WBCUA 0-2 03/19/2022    RBCUA 0-2 03/19/2022    BLOODU Negative 03/19/2022    SPECGRAV >=1.030 03/19/2022    GLUCOSEU 500 03/19/2022       Radiology:  XR CHEST PORTABLE   Final Result   Moderate pulmonary vascular congestive change. Cardiomegaly. No significant   pleural effusion. Assessment/Plan:    Active Hospital Problems    Diagnosis     Acute heart failure ([facility] [address] [I50.9]     CHF (congestive heart failure), NYHA class I, acute on chronic, combined (HCC) [I50.43]      Acute on chronic combined heart failure 2/2 to noncompliance with medications (he has not had them for last few months)  - Last echo showed a EF of 30 to 35% with grade 2 diastolic dysfunction  - Continue IV Lasix  - Monitor strict I's and O's and daily weight  - Continue Coreg and losartan  - Farxiga to be started on discharge  - Dry weight per chart review appears to be around 319 pounds  - Consult cardiology needs for close follow-up for both CHF and A. Fib    Abnormal stress test in 2019 with reversible ischemia. Moderate LV enlargement with mildly reduced global left ventricular wall motion. Abnormal LV regional wall motion with mild inferior and septal wall hypokinesis. LVH.    - Was following with  Cardiology and did not follow up after abnormal stress test results  - Cardiology consulted here - will defer to them for additional testing required    Indeterminate rapid covid test  - PCR pending     Medical noncompliance  - Has not been following up with his primary care physician     Accelerated hypertension with a history of essential hypertension  -Continue hydralazine, losartan     Hyperglycemia with a history of diabetes mellitus type 2  -Takes oral medications with dapagliflozin  -Holding oral medications  -Started on low sliding scale  -HgbA1c 8.9     Paroxysmal atrial fibrillation S/P FRANTZ/DCCV  -Monitor on telemetry  -Continue amiodarone  -Anticoagulated with Eliquis     VENITA on CPAP  - order home dose     Morbid Obesity -  With Body mass index is 46.64 kg/m². Complicating assessment and treatment. Placing patient at risk for multiple co-morbidities as well as early death and contributing to the patient's presentation. Counseled on weight loss     Collins Stokes will need all of his prescriptions at discharge. DVT Prophylaxis: eliquis  Diet: ADULT DIET; Regular; 5 carb choices (75 gm/meal); Low Sodium (2 gm); 1500 ml  Code Status: Full Code    PT/OT Eval Status: not ordered    Dispo - possible dc tomorrow    Rere Finnegan - NP

## 2022-03-21 NOTE — PLAN OF CARE
Problem: Falls - Risk of:  Goal: Will remain free from falls  Description: Will remain free from falls  Outcome: Ongoing  Goal: Absence of physical injury  Description: Absence of physical injury  Outcome: Ongoing     Problem: SAFETY  Goal: Free from accidental physical injury  Outcome: Ongoing  Goal: Free from intentional harm  Outcome: Ongoing     Problem: DAILY CARE  Goal: Daily care needs are met  Outcome: Ongoing     Problem: DISCHARGE BARRIERS  Goal: Patient's continuum of care needs are met  Outcome: Ongoing     Problem: FLUID AND ELECTROLYTE IMBALANCE  Goal: Fluid and electrolyte balance are achieved/maintained  Outcome: Ongoing     Problem: ACTIVITY INTOLERANCE/IMPAIRED MOBILITY  Goal: Mobility/activity is maintained at optimum level for patient  Outcome: Ongoing

## 2022-03-21 NOTE — PLAN OF CARE
Problem: Falls - Risk of:  Goal: Will remain free from falls  Description: Will remain free from falls  3/21/2022 1515 by Rosa Elena Colbert RN  Outcome: Ongoing  3/21/2022 0139 by Alonzo Bee RN  Outcome: Ongoing  Goal: Absence of physical injury  Description: Absence of physical injury  3/21/2022 1515 by Rosa Elena Colbert RN  Outcome: Ongoing  3/21/2022 0139 by Alonzo Bee RN  Outcome: Ongoing     Problem: SAFETY  Goal: Free from accidental physical injury  3/21/2022 1515 by Rosa Elena Colbert RN  Outcome: Ongoing  3/21/2022 0139 by Alonzo Bee RN  Outcome: Ongoing  Goal: Free from intentional harm  3/21/2022 1515 by Rosa Elena Colbert RN  Outcome: Ongoing  3/21/2022 0139 by Alonzo Bee RN  Outcome: Ongoing     Problem: DAILY CARE  Goal: Daily care needs are met  3/21/2022 1515 by Rosa Elena Colbert RN  Outcome: Ongoing  3/21/2022 0139 by Alonzo Bee RN  Outcome: Ongoing     Problem: PAIN  Goal: Patient's pain/discomfort is manageable  3/21/2022 1515 by Rosa Elena Colbert RN  Outcome: Ongoing  3/21/2022 0139 by Alonzo Bee RN  Outcome: Ongoing     Problem: SKIN INTEGRITY  Goal: Skin integrity is maintained or improved  3/21/2022 1515 by Rosa Elena Colbert RN  Outcome: Ongoing  3/21/2022 0139 by Alonzo Bee RN  Outcome: Ongoing     Problem: KNOWLEDGE DEFICIT  Goal: Patient/S.O. demonstrates understanding of disease process, treatment plan, medications, and discharge instructions.   3/21/2022 1515 by Rosa Elena Colbert RN  Outcome: Ongoing  3/21/2022 0139 by Alonzo Bee RN  Outcome: Ongoing     Problem: DISCHARGE BARRIERS  Goal: Patient's continuum of care needs are met  3/21/2022 1515 by Rosa Elena Colbert RN  Outcome: Ongoing  3/21/2022 0139 by Alonzo Bee RN  Outcome: Ongoing     Problem: OXYGENATION/RESPIRATORY FUNCTION  Goal: Patient will maintain patent airway  3/21/2022 1515 by Rosa Elena Colbert RN  Outcome: Ongoing  3/21/2022 0139 by Alonzo Bee RN  Outcome: Ongoing  Goal: Patient will achieve/maintain normal respiratory rate/effort  Description: Respiratory rate and effort will be within normal limits for the patient  3/21/2022 1515 by Dale Coleman RN  Outcome: Ongoing  3/21/2022 0139 by Wyn Homans, RN  Outcome: Ongoing     Problem: HEMODYNAMIC STATUS  Goal: Patient has stable vital signs and fluid balance  3/21/2022 1515 by Dale Coleman RN  Outcome: Ongoing  3/21/2022 0139 by Wyn Homans, RN  Outcome: Ongoing     Problem: FLUID AND ELECTROLYTE IMBALANCE  Goal: Fluid and electrolyte balance are achieved/maintained  3/21/2022 1515 by Dale Coleman RN  Outcome: Ongoing  3/21/2022 0139 by Wyn Homans, RN  Outcome: Ongoing     Problem: ACTIVITY INTOLERANCE/IMPAIRED MOBILITY  Goal: Mobility/activity is maintained at optimum level for patient  3/21/2022 1515 by Dale Coleman RN  Outcome: Ongoing  3/21/2022 0139 by Wyn Homans, RN  Outcome: Ongoing

## 2022-03-21 NOTE — PROGRESS NOTES
Nutrition Note    RD triggered for CHF nutrition education. Hx CHF. Unable to provide verbal education at this time as pt is in droplet plus isolation. Will provide education in discharge instructions and follow-up if pt is removed from isolation. RD did not conduct direct, in-person nutrition evaluation in efforts to reduce exposure and use of PPE for high risk persons, PUI persons, patients who have tested positive for Covid-19 or those awaiting respiratory panel results. EMR was screened for nutrition risk factors, as defined per nutrition standards of care.        Electronically signed by Ramírez Diane RD, LD on 3/21/22 at 8:42 AM EDT    Contact: [phone]

## 2022-03-21 NOTE — ACP (ADVANCE CARE PLANNING)
Advance Care Planning     Advance Care Planning Activator (Inpatient)  Conversation Note      Date of ACP Conversation: 3/21/2022     Conversation Conducted with: Patient with Decision Making Capacity    ACP Activator: NAE Ly, [facility] Decision Maker:     Current Designated Health Care Decision Maker:     Primary Decision Maker: Lanre Bridges - [phone]    Today we documented Decision Maker(s) consistent with Legal Next of Kin hierarchy. Care Preferences    Ventilation: \"If you were in your present state of health and suddenly became very ill and were unable to breathe on your own, what would your preference be about the use of a ventilator (breathing machine) if it were available to you? \"      Would the patient desire the use of ventilator (breathing machine)?: yes    \"If your health worsens and it becomes clear that your chance of recovery is unlikely, what would your preference be about the use of a ventilator (breathing machine) if it were available to you? \"     Would the patient desire the use of ventilator (breathing machine)?: Yes      Resuscitation  \"CPR works best to restart the heart when there is a sudden event, like a heart attack, in someone who is otherwise healthy. Unfortunately, CPR does not typically restart the heart for people who have serious health conditions or who are very sick. \"    \"In the event your heart stopped as a result of an underlying serious health condition, would you want attempts to be made to restart your heart (answer \"yes\" for attempt to resuscitate) or would you prefer a natural death (answer \"no\" for do not attempt to resuscitate)? \" yes       [] Yes   [x] No   Educated Patient / Leonel Huerta regarding differences between Advance Directives and portable DNR orders.     Length of ACP Conversation in minutes:  10    Conversation Outcomes:  [x] ACP discussion completed  [] Existing advance directive reviewed with patient; no changes to patient's previously recorded wishes  [] New Advance Directive completed  [] Portable Do Not Rescitate prepared for Provider review and signature  [] POLST/POST/MOLST/MOST prepared for Provider review and signature      Follow-up plan:    [] Schedule follow-up conversation to continue planning  [] Referred individual to Provider for additional questions/concerns   [] Advised patient/agent/surrogate to review completed ACP document and update if needed with changes in condition, patient preferences or care setting    [x] This note routed to one or more involved healthcare providers     Electronically signed by NAE Roque, DARYLW on 3/21/2022 at 9:01 AM

## 2022-03-21 NOTE — PROGRESS NOTES
Cardiac Electrophysiology Progress Note     Admit Date: 3/19/2022     Reason for follow up: HF    HPI and Interval History:   Vanda Pepe is a 61 y.o. male with a prior history of obstructive sleep apnea treated with CPAP, heart failure with severely depressed left ventricular systolic function, CAD, hypertension, hyperlipidemia, diabetes, paroxysmal atrial fibrillation with RVR and medication noncompliance who came into the hospital due to progressive worsening shortness of breath. Noted to be in acute heart failure and has been diuresed. He is feeling better today but not at baseline. Was off his meds and no showed to 2 appointments in our office because he was staying in [place] dealing with his late uncle's estate. Physical Examination:  Vitals:    22 0427   BP: 131/85   Pulse: 64   Resp: 18   Temp: 97.6 °F (36.4 °C)   SpO2: 94%        Intake/Output Summary (Last 24 hours) at 3/21/2022 0859  Last data filed at 3/21/2022 0427  Gross per 24 hour   Intake 1598 ml   Output 2800 ml   Net -1202 ml     In: 1598 [P.O.:1598]  Out: 2800    Wt Readings from Last 3 Encounters:   22 (!) 343 lb 14.7 oz (156 kg)   21 (!) 319 lb 3.2 oz (144.8 kg)   21 (!) 313 lb (142 kg)     Temp  Av.9 °F (36.6 °C)  Min: 97.6 °F (36.4 °C)  Max: 98.1 °F (36.7 °C)  Pulse  Av.8  Min: 64  Max: 79  BP  Min: 123/81  Max: 172/87  SpO2  Av.1 %  Min: 94 %  Max: 99 %  FiO2   Av %  Min: 21 %  Max: 21 %    · Telemetry: Sinus rhythm in the 70s. · Constitutional: Alert, in no acute distress. Appears stated age. · Head: Normocephalic and atraumatic. · Eyes: Conjunctivae normal. EOM are normal.   · Neck: Neck supple. No lymphadenopathy. No rigidity. No JVD present. · Cardiovascular: Normal rate, regular rhythm. No murmurs, rubs or gallops. No S3 or S4.  · Pulmonary/Chest: Clear breath sounds bilaterally. No crackles, wheezes or rhonchi. No respiratory accessory muscle use. · Abdominal: Soft. Normal bowel sounds present. No distension, No tenderness. · Musculoskeletal: No tenderness. 2+ BLE edema    · Lymphadenopathy: Has no cervical adenopathy. · Neurological: Alert and oriented. No gross deficits. · Skin: Skin is warm and dry. No rash, lesions, ulcerations noted. · Psychiatric: No anxiety or agitation. Labs, diagnostic and imaging results reviewed. Reviewed. Recent Labs     03/19/22  1650 03/20/22  1001 03/21/22  0603    136 136   K 4.6 3.6 3.8    100 101   CO2 22 21 23   BUN 15 15 18   CREATININE 1.1 1.1 1.2     Recent Labs     03/19/22  1650   WBC 4.6   HGB 13.6   HCT 41.6   MCV 91.3        Lab Results   Component Value Date    TROPONINI 0.01 03/19/2022     Estimated Creatinine Clearance: 105 mL/min (based on SCr of 1.2 mg/dL). No results found for: BNP  Lab Results   Component Value Date    PROTIME 11.9 09/24/2021    INR 1.05 09/24/2021     Lab Results   Component Value Date    CHOL 90 08/25/2021    HDL 30 08/25/2021    TRIG 92 08/25/2021       Scheduled Meds:   carvedilol  25 mg Oral BID WC    losartan  25 mg Oral Daily    apixaban  5 mg Oral BID    insulin glargine  10 Units SubCUTAneous Daily with breakfast    amiodarone  200 mg Oral Daily    furosemide  40 mg IntraVENous BID    atorvastatin  80 mg Oral Nightly    sodium chloride flush  5-40 mL IntraVENous 2 times per day    insulin lispro  0-6 Units SubCUTAneous TID WC    insulin lispro  0-3 Units SubCUTAneous Nightly     Continuous Infusions:   sodium chloride      dextrose       PRN Meds:sodium chloride flush, sodium chloride, ondansetron **OR** ondansetron, polyethylene glycol, acetaminophen **OR** acetaminophen, glucose, dextrose, glucagon (rDNA), dextrose     ECG: 3/19/22  SR with PACs. LA enlargement. LVH. Non-specific ST-T wave changes. Echo:3/4/19 [facility]  - Left ventricle: The cavity size was moderately dilated.  Wall     thickness was normal. Systolic function was moderately to severely   reduced. The estimated ejection fraction was in the range of 30%     to 35%. Severe diffuse hypokinesis. The study is not technically     sufficient to allow evaluation of LV diastolic function.   - Mitral valve: Mild regurgitation.   - Left atrium: The atrium was mildly dilated. - Right ventricle: Systolic function was low normal. TAPSE:     1.8cm. Tricuspid annular systolic velocity: 46XV/F.   - Right atrium: The atrium was mildly dilated. - Atrial septum: A shunt cannot be excluded. - Tricuspid valve: Mild-moderate regurgitation directed along the     right atrial wall. - Pulmonary arteries: Systolic pressure was moderately increased,     estimated to be 53mm Hg assuming that the right atrial pressure     was 5 mmHg.       Echo: 9/25/21   There is mildly increased left ventricular wall thickness. Overall left ventricular systolic function appears severely reduced. Ejection fraction is visually estimated to be 30-35%. No regional wall motion abnormalities are noted. Grade II diastolic dysfunction with elevated LV filling pressures. Normal right ventricular size and function. Stress: 7/25/19   There is a medium to large sized area of mild to moderately decreased perfusion in the basal inferior, basal inferolateral, mid inferior, mid inferolateral, and apical inferior segments at stress with extensive improvement at rest, consistent with reversible ischemia.      Cath: 4/2/16    PROCEDURAL FINDINGS:   1. Left main coronary artery was free of any angiographically   significant disease. It gave of the left anterior descending   artery and the left circumflex artery. 2. The left anterior descending had a normal anatomic course.  It   gave off 2 diagonals. 30-40% mid-LAD stenosis. 3. The left circumflex artery gives off 1 large obtuse marginals   and there is 30-40% ostial stenosis of OM1. Left dominant and   supplies posterior descending artery and posterolateral branches   distally. 4. The right coronary artery is a small, non-dominant vessel and   with no angiographically significant disease. HEMODYNAMICS:   LVEDP: 25   No gradient across the aortic valve       CONCLUSION:   Non-obstructive CAD     PLAN:   -Medical management of CAD     Assessment and Plan:     PAF   - first noted in September 2021, s/p FRANTZ/DCCV   - restarted on amiodarone 200mg QD, also on Coreg   - CHADS2-VASc 4 (HF, HTN, DM, CAD) on Eliquis 5mg BID   - has been in sinus since admitted    Acute on chronic systolic heart failure    - EF 30-35%, NYHA class III   - secondary to noncompliance    - net -2.7L, weight is down 11 lbs   - on Lasix 40mg BID    Nonischemic cardiomyopathy   - longstanding history of cardiomyopathy, felt to be secondary to drug (cocaine) use, does have CAD but was nonobstructive on [facility] in 2016   - has been noncompliant with meds and follow up in the past which is why he does not have an ICD   - continue GDMT    CAD   - nonobstructive on [facility] in 2016   - denies any angina   - on statin, beta blocker   - had abnormal stress test in 2019 but never followed up, interventional cardiology to see today    EP issues are stable, will sign off and arrange follow up.     CONSUELO Birch  [facility], [address] [place] [address], [address] [place] [address]  Phone: [phone]  Fax: [phone]    Electronically signed by CONSUELO Caro - CNP on 3/21/2022 at 8:59 AM

## 2022-03-22 VITALS
DIASTOLIC BLOOD PRESSURE: 98 MMHG | TEMPERATURE: 97.7 F | RESPIRATION RATE: 15 BRPM | OXYGEN SATURATION: 96 % | BODY MASS INDEX: 42.66 KG/M2 | WEIGHT: 315 LBS | HEART RATE: 70 BPM | SYSTOLIC BLOOD PRESSURE: 145 MMHG | HEIGHT: 72 IN

## 2022-03-22 LAB
ANION GAP SERPL CALCULATED.3IONS-SCNC: 13 MMOL/L (ref 3–16)
BUN BLDV-MCNC: 22 MG/DL (ref 7–20)
CALCIUM SERPL-MCNC: 8.5 MG/DL (ref 8.3–10.6)
CHLORIDE BLD-SCNC: 96 MMOL/L (ref 99–110)
CO2: 24 MMOL/L (ref 21–32)
CREAT SERPL-MCNC: 1.2 MG/DL (ref 0.9–1.3)
GFR AFRICAN AMERICAN: >60
GFR NON-AFRICAN AMERICAN: >60
GLUCOSE BLD-MCNC: 225 MG/DL (ref 70–99)
GLUCOSE BLD-MCNC: 266 MG/DL (ref 70–99)
GLUCOSE BLD-MCNC: 292 MG/DL (ref 70–99)
PERFORMED ON: ABNORMAL
PERFORMED ON: ABNORMAL
POTASSIUM SERPL-SCNC: 3.2 MMOL/L (ref 3.5–5.1)
SODIUM BLD-SCNC: 133 MMOL/L (ref 136–145)

## 2022-03-22 PROCEDURE — 6370000000 HC RX 637 (ALT 250 FOR IP): Performed by: INTERNAL MEDICINE

## 2022-03-22 PROCEDURE — 94760 N-INVAS EAR/PLS OXIMETRY 1: CPT

## 2022-03-22 PROCEDURE — 2580000003 HC RX 258: Performed by: NURSE PRACTITIONER

## 2022-03-22 PROCEDURE — 6370000000 HC RX 637 (ALT 250 FOR IP): Performed by: NURSE PRACTITIONER

## 2022-03-22 PROCEDURE — 36415 COLL VENOUS BLD VENIPUNCTURE: CPT

## 2022-03-22 PROCEDURE — 80048 BASIC METABOLIC PNL TOTAL CA: CPT

## 2022-03-22 RX ORDER — AMIODARONE HYDROCHLORIDE 200 MG/1
200 TABLET ORAL DAILY
Qty: 30 TABLET | Refills: 2 | Status: SHIPPED | OUTPATIENT
Start: 2022-03-22 | End: 2022-04-21 | Stop reason: SDUPTHER

## 2022-03-22 RX ORDER — POTASSIUM CHLORIDE 20 MEQ/1
40 TABLET, EXTENDED RELEASE ORAL ONCE
Status: COMPLETED | OUTPATIENT
Start: 2022-03-22 | End: 2022-03-22

## 2022-03-22 RX ORDER — LOSARTAN POTASSIUM 25 MG/1
25 TABLET ORAL DAILY
Qty: 30 TABLET | Refills: 3 | Status: SHIPPED | OUTPATIENT
Start: 2022-03-22 | End: 2022-04-21 | Stop reason: SDUPTHER

## 2022-03-22 RX ORDER — INSULIN GLARGINE 100 [IU]/ML
10 INJECTION, SOLUTION SUBCUTANEOUS
Qty: 10 ML | Refills: 3 | Status: ON HOLD | OUTPATIENT
Start: 2022-03-22 | End: 2022-10-10 | Stop reason: HOSPADM

## 2022-03-22 RX ORDER — FUROSEMIDE 40 MG/1
40 TABLET ORAL DAILY
Qty: 60 TABLET | Refills: 0 | Status: SHIPPED | OUTPATIENT
Start: 2022-03-22 | End: 2022-04-21 | Stop reason: SDUPTHER

## 2022-03-22 RX ORDER — SPIRONOLACTONE 25 MG/1
25 TABLET ORAL DAILY
Qty: 30 TABLET | Refills: 1 | Status: SHIPPED | OUTPATIENT
Start: 2022-03-22 | End: 2022-04-21 | Stop reason: SDUPTHER

## 2022-03-22 RX ORDER — ASPIRIN 81 MG/1
81 TABLET, CHEWABLE ORAL DAILY
Qty: 30 TABLET | Refills: 3 | Status: SHIPPED | OUTPATIENT
Start: 2022-03-22 | End: 2022-04-21 | Stop reason: SDUPTHER

## 2022-03-22 RX ORDER — FLUTICASONE PROPIONATE 50 MCG
2 SPRAY, SUSPENSION (ML) NASAL DAILY
Qty: 16 G | Refills: 0 | Status: ON HOLD | OUTPATIENT
Start: 2022-03-22 | End: 2022-10-19 | Stop reason: SDUPTHER

## 2022-03-22 RX ORDER — ATORVASTATIN CALCIUM 80 MG/1
80 TABLET, FILM COATED ORAL NIGHTLY
Qty: 30 TABLET | Refills: 3 | Status: SHIPPED | OUTPATIENT
Start: 2022-03-22 | End: 2022-04-21 | Stop reason: SDUPTHER

## 2022-03-22 RX ORDER — CARVEDILOL 25 MG/1
25 TABLET ORAL 2 TIMES DAILY WITH MEALS
Qty: 60 TABLET | Refills: 1 | Status: SHIPPED | OUTPATIENT
Start: 2022-03-22 | End: 2022-04-21 | Stop reason: SDUPTHER

## 2022-03-22 RX ORDER — POTASSIUM CHLORIDE 750 MG/1
10 TABLET, EXTENDED RELEASE ORAL 2 TIMES DAILY
Qty: 60 TABLET | Refills: 0 | Status: SHIPPED | OUTPATIENT
Start: 2022-03-22 | End: 2022-04-21 | Stop reason: SDUPTHER

## 2022-03-22 RX ADMIN — INSULIN LISPRO 3 UNITS: 100 INJECTION, SOLUTION INTRAVENOUS; SUBCUTANEOUS at 08:47

## 2022-03-22 RX ADMIN — FUROSEMIDE 40 MG: 40 TABLET ORAL at 08:47

## 2022-03-22 RX ADMIN — SPIRONOLACTONE 25 MG: 25 TABLET ORAL at 08:47

## 2022-03-22 RX ADMIN — AMIODARONE HYDROCHLORIDE 200 MG: 200 TABLET ORAL at 08:47

## 2022-03-22 RX ADMIN — LOSARTAN POTASSIUM 25 MG: 25 TABLET, FILM COATED ORAL at 08:47

## 2022-03-22 RX ADMIN — INSULIN LISPRO 3 UNITS: 100 INJECTION, SOLUTION INTRAVENOUS; SUBCUTANEOUS at 12:32

## 2022-03-22 RX ADMIN — SODIUM CHLORIDE, PRESERVATIVE FREE 10 ML: 5 INJECTION INTRAVENOUS at 08:48

## 2022-03-22 RX ADMIN — POTASSIUM CHLORIDE 40 MEQ: 20 TABLET, EXTENDED RELEASE ORAL at 10:45

## 2022-03-22 RX ADMIN — CARVEDILOL 25 MG: 25 TABLET, FILM COATED ORAL at 08:47

## 2022-03-22 RX ADMIN — APIXABAN 5 MG: 5 TABLET, FILM COATED ORAL at 08:48

## 2022-03-22 RX ADMIN — INSULIN GLARGINE 10 UNITS: 100 INJECTION, SOLUTION SUBCUTANEOUS at 08:47

## 2022-03-22 NOTE — PLAN OF CARE
Problem: Falls - Risk of:  Goal: Will remain free from falls  Description: Will remain free from falls  3/22/2022 0037 by Bradly Jeans, RN  Outcome: Ongoing  3/21/2022 1515 by Zechariah Wheat RN  Outcome: Ongoing  Goal: Absence of physical injury  Description: Absence of physical injury  3/22/2022 0037 by Bradly Jeans, RN  Outcome: Ongoing  3/21/2022 1515 by Zechariah Wheat RN  Outcome: Ongoing   Patient uses call light appropriately to express needs. Bed to lowest position with door open and call light in reach. All fall precautions implemented at this time. Siderails up x2. Non skid footwear in place. Patient has had no falls this shift. Will continue to monitor. Problem: SAFETY  Goal: Free from accidental physical injury  3/22/2022 0037 by Bradly Jeans, RN  Outcome: Ongoing  3/21/2022 1515 by Zechariah Wheat RN  Outcome: Ongoing  Goal: Free from intentional harm  3/22/2022 0037 by Bradly Jeans, RN  Outcome: Ongoing  3/21/2022 1515 by Zechariah Wheat RN  Outcome: Ongoing     Problem: DAILY CARE  Goal: Daily care needs are met  3/22/2022 0037 by Bradly Jeans, RN  Outcome: Ongoing  3/21/2022 1515 by Zechariah Wheat RN  Outcome: Ongoing     Problem: PAIN  Goal: Patient's pain/discomfort is manageable  3/22/2022 0037 by Bradly Jeans, RN  Outcome: Ongoing  3/21/2022 1515 by Zechariah Wheat RN  Outcome: Ongoing   Pain/discomfort being managed with PRN analgesics per MD orders. Pt able to express presence and absence of pain and rate pain appropriately using numerical scale. Problem: SKIN INTEGRITY  Goal: Skin integrity is maintained or improved  3/22/2022 0037 by Bradly Jeans, RN  Outcome: Ongoing  3/21/2022 1515 by Zechariah Wheat RN  Outcome: Ongoing   Skin care protocal in place. Problem: KNOWLEDGE DEFICIT  Goal: Patient/S.O. demonstrates understanding of disease process, treatment plan, medications, and discharge instructions.   3/22/2022 0037 by Bradly Jeans, RN  Outcome: Ongoing  3/21/2022 1515 by Pablo Yuen MAURISIO Trevizo  Outcome: Ongoing     Problem: DISCHARGE BARRIERS  Goal: Patient's continuum of care needs are met  3/22/2022 0037 by Germain Lopez RN  Outcome: Ongoing  3/21/2022 1515 by Fartun Buchanan RN  Outcome: Ongoing     Problem: OXYGENATION/RESPIRATORY FUNCTION  Goal: Patient will maintain patent airway  3/22/2022 0037 by Germain Lopez RN  Outcome: Ongoing  3/21/2022 1515 by Fartun Buchanan RN  Outcome: Ongoing  Goal: Patient will achieve/maintain normal respiratory rate/effort  Description: Respiratory rate and effort will be within normal limits for the patient  3/22/2022 0037 by Germain Lopez RN  Outcome: Ongoing  3/21/2022 1515 by Fartun Buchanan RN  Outcome: Ongoing  Pt will maintain absence of respiratory complications. Oxygen saturations >90% at all times with supplemental O2 as needed. Will assess respiratory status every shift and PRN. Encourage to cough and deep breath. Medications as ordered. Problem: HEMODYNAMIC STATUS  Goal: Patient has stable vital signs and fluid balance  3/22/2022 0037 by Germain Lopez RN  Outcome: Ongoing  3/21/2022 1515 by Fartun Buchanan RN  Outcome: Ongoing     Problem: FLUID AND ELECTROLYTE IMBALANCE  Goal: Fluid and electrolyte balance are achieved/maintained  3/22/2022 0037 by Germain Lopez RN  Outcome: Ongoing  3/21/2022 1515 by Fartun Buchanan RN  Outcome: Ongoing   Monitor I/O. Monitor labs as ordered. VS every 4 hours and PRN. Cardiac monitor.   Problem: ACTIVITY INTOLERANCE/IMPAIRED MOBILITY  Goal: Mobility/activity is maintained at optimum level for patient  3/22/2022 0037 by Germain Lopez RN  Outcome: Ongoing  3/21/2022 1515 by Fartun Buchanan RN  Outcome: Ongoing

## 2022-03-22 NOTE — PROGRESS NOTES
Pharmacy Heart Failure Medication Reconciliation Note    Pt discharged from [facility] today. Chief Complaint   Patient presents with    Medication Refill    Shortness of Breath     out of meds for over 2 months  Diabetic not checking sugar       Levy has a diagnosis of combined systolic and diastolic heart failure (last EF = 30-35% on 9/25/2021). Pertinent Labs:  BMP:   Lab Results   Component Value Date     03/22/2022    K 3.2 03/22/2022    K 4.6 03/19/2022    CL 96 03/22/2022    CO2 24 03/22/2022    BUN 22 03/22/2022    CREATININE 1.2 03/22/2022     BNP:   Lab Results   Component Value Date    PROBNP 3,443 03/19/2022    PROBNP 2,384 09/24/2021    PROBNP 4,071 09/23/2021       Patient taking an ACEI / ARB / Entresto for EF </= 40%:  Yes     Patient taking a BETA BLOCKER (Coreg, Toprol XL or bisoprolol) for EF </= 40%:  Yes  Patient taking a LOOP DIURETIC: Yes  Patient taking a ALDOSTERONE RECEPTOR ANTAGONIST for EF </= 35%: Yes  Patient taking an SGLT2I for EF </= 40%: Yes    Patient has a diagnosis of Atrial Fibrillation: Yes. If yes, patient is on appropriate anticoagulation: Yes    Patient has a diagnosis of type 2 diabetes:  Yes.  If yes, patient prescribed the following anti-hyperglycemic medication at discharge: Yes, Long acting insulin and SGLT2 Inhibitor      Corrections to discharge medications include:  None made     Discharge Medications:         Medication List      START taking these medications    furosemide 40 MG tablet  Commonly known as: LASIX  Take 1 tablet by mouth daily     insulin glargine 100 UNIT/ML injection vial  Commonly known as: LANTUS  Inject 10 Units into the skin daily (with breakfast)  Replaces: Lantus SoloStar 100 UNIT/ML injection pen     potassium chloride 10 MEQ extended release tablet  Commonly known as: KLOR-CON M  Take 1 tablet by mouth 2 times daily     spironolactone 25 MG tablet  Commonly known as: ALDACTONE  Take 1 tablet by mouth daily        CHANGE how you take these medications    atorvastatin 80 MG tablet  Commonly known as: LIPITOR  Take 1 tablet by mouth nightly  What changed: See the new instructions. * dapagliflozin 5 MG tablet  Commonly known as: Farxiga  Take 1 tablet by mouth every morning  What changed: Another medication with the same name was added. Make sure you understand how and when to take each. * dapagliflozin 5 MG tablet  Commonly known as: Farxiga  Take 1 tablet by mouth every morning  What changed: You were already taking a medication with the same name, and this prescription was added. Make sure you understand how and when to take each. losartan 25 MG tablet  Commonly known as: COZAAR  Take 1 tablet by mouth daily  What changed: how much to take         * This list has 2 medication(s) that are the same as other medications prescribed for you. Read the directions carefully, and ask your doctor or other care provider to review them with you. CONTINUE taking these medications    amiodarone 200 MG tablet  Commonly known as: CORDARONE  Take 1 tablet by mouth daily     apixaban 5 MG Tabs tablet  Commonly known as: ELIQUIS  Take 1 tablet by mouth 2 times daily     aspirin 81 MG chewable tablet  Take 1 tablet by mouth daily     CareFine Pen Needles 32G X 4 MM Misc  Generic drug: Insulin Pen Needle  1 each by Does not apply route 3 times daily Pharmacy can do brand substitutions     carvedilol 25 MG tablet  Commonly known as: COREG  Take 1 tablet by mouth 2 times daily (with meals)     famotidine 20 MG tablet  Commonly known as: PEPCID  Take 1 tablet by mouth 2 times daily     fluticasone 50 MCG/ACT nasal spray  Commonly known as: FLONASE  2 sprays by Nasal route daily     Lancets Misc  Check blood sugar four times.   Pharmacy can do brand substitutions     Lancing Device Misc  1 lancet device  Pharmacy cannot do brand substitutions     omeprazole 20 MG delayed release capsule  Commonly known as: PRILOSEC  Take 1 capsule by mouth Daily     polyethylene glycol 17 GM/SCOOP powder  Commonly known as: GLYCOLAX     True Metrix Meter w/Device Kit  1 kit by Does not apply route 4 times daily Dispense one kit  Pharmacy cannot do brand substitutions        STOP taking these medications    hydrALAZINE 25 MG tablet  Commonly known as: APRESOLINE     Lantus SoloStar 100 UNIT/ML injection pen  Generic drug: insulin glargine  Replaced by: insulin glargine 100 UNIT/ML injection vial     torsemide 20 MG tablet  Commonly known as: DEMADEX     True Metrix Blood Glucose Test strip  Generic drug: blood glucose test strips           Where to Get Your Medications      These medications were sent to [address] [place] [address], [facility] [phone] [address] [address] [address] [address]    Phone: [phone]   · amiodarone 200 MG tablet  · apixaban 5 MG Tabs tablet  · aspirin 81 MG chewable tablet  · atorvastatin 80 MG tablet  · carvedilol 25 MG tablet  · dapagliflozin 5 MG tablet  · dapagliflozin 5 MG tablet  · fluticasone 50 MCG/ACT nasal spray  · furosemide 40 MG tablet  · insulin glargine 100 UNIT/ML injection vial  · losartan 25 MG tablet  · potassium chloride 10 MEQ extended release tablet  · spironolactone 25 MG tablet         LUIGI Restrepo [facility]  Heart Failure Discharge Medication Reconciliation Program  [phone]

## 2022-03-22 NOTE — CARE COORDINATION
A quick chart review reveals that this patient is from home alone. Cardiology will follow as an outpatient. He is also on PO lasix. If there are no other needs he should be a likely discharge for this afternoon. SW will continue to follow along. Respectfully submitted,    ERIN Ramirez  [facility]   [phone]    Electronically signed by ERIN Downing on 3/22/2022 at 9:28 AM

## 2022-03-22 NOTE — PROGRESS NOTES
Pt discharged to home. Meds delivered to bedside. AVS/prescription reviewed with patient and all questions/concerns answered. PIV removed per protocol without complication.

## 2022-03-22 NOTE — CARE COORDINATION
DISCHARGE SUMMARY     DATE OF DISCHARGE: 3/22/2022    DISCHARGE DESTINATION:     FACILITY: None    TRANSPORTATION: Family    Company Name:  [employer]  Relationship: Brother   Time: TBD by patient, family and/or medical staff. Phone Number: [phone]    COMMENTS: SW met with patient alone at bedside regarding discharge needs. Patient informed that he was having difficulty obtaining medications. He [facility]. SW encouraged patient to call [facility] store and ask to be switched to [facility]. SW did inform that he will have to go in locally to receive pain medication or other controlled substances but that shouldn't be often. Patient is agreeable to using [place] today. Respectfully submitted,    ERIN Noble  [facility]   [phone]    Electronically signed by ERIN Purvis on 3/22/2022 at 11:07 AM

## 2022-03-22 NOTE — FLOWSHEET NOTE
O2 placed 2L NC per patient request.  Stated that he can not tolerate C-pap machine from here and requested to only use O2 at night instead.

## 2022-03-22 NOTE — DISCHARGE SUMMARY
Hospital Medicine Discharge Summary    Patient ID: Law Ward      Patient's PCP: Janice Jackson DO    Admit Date: 3/19/2022     Discharge Date:   03/22/22    Admitting Physician: Beltran Karimi DO     Discharge Physician: Shavonne Miranda. Mena Gomes - NP     Discharge Diagnoses: Active Hospital Problems    Diagnosis     Acute heart failure (HCC) [I50.9]     CHF (congestive heart failure), NYHA class I, acute on chronic, combined ([facility] [address] [I50.43]        The patient was seen and examined on day of discharge and this discharge summary is in conjunction with any daily progress note from day of discharge. Hospital Course: The patient is a 60 y. o. male with a history of VENITA on CPAP, CHF, hypertension, hyperlipidemia and diabetes who presents to [facility] with complaints of shortness of breath.  He states that he ran out of his medications, all of them, for the past \"few months. \" Amanda Richards states that he has had ongoing shortness of breath but noticed an increased difficulty with any type of activity, especially climbing stairs.  He also had associated dizziness.  He has a 2 pillow orthopnea with PND noted.  Denies headaches fevers or chills.  No chest pain or palpitations.  No wheezing.  No nausea vomiting or abdominal pain.  No other symptoms noted at present.  No other aggravating or alleviating factors.     Acute on chronic combined heart failure 2/2 to noncompliance with medications (he has not had them for last few months)- Wrote prescription for all of his medications  - Last echo showed a EF of 30 to 35% with grade 2 diastolic dysfunction  - Continue IV Lasix - transitioned to PO lasix + aldactone per Cardiology recs   - Monitor strict I's and O's and daily weight  - Continue Coreg and losartan, amiodarone  - Cardiology follow up after discharge     Abnormal stress test in 2019 with reversible ischemia. Moderate LV enlargement with mildly reduced global left ventricular wall motion.  Abnormal LV normal.  No rashes or lesions. Neurologic:  Neurovascularly intact without any focal sensory/motor deficits. Cranial nerves: II-XII intact, grossly non-focal.  Psychiatric:  Alert and oriented, thought content appropriate, normal insight  Capillary Refill: Brisk,< 3 seconds   Peripheral Pulses: +2 palpable, equal bilaterally       Labs: For convenience and continuity at follow-up the following most recent labs are provided:      CBC:    Lab Results   Component Value Date    WBC 4.6 03/19/2022    HGB 13.6 03/19/2022    HCT 41.6 03/19/2022     03/19/2022       Renal:    Lab Results   Component Value Date     03/22/2022    K 3.2 03/22/2022    K 4.6 03/19/2022    CL 96 03/22/2022    CO2 24 03/22/2022    BUN 22 03/22/2022    CREATININE 1.2 03/22/2022    CALCIUM 8.5 03/22/2022         Significant Diagnostic Studies    Radiology:   XR CHEST PORTABLE   Final Result   Moderate pulmonary vascular congestive change. Cardiomegaly. No significant   pleural effusion.                    Consults:     IP CONSULT TO HOSPITALIST  IP CONSULT TO CARDIOLOGY    Disposition:  Home     Condition at Discharge: Stable    Discharge Instructions/Follow-up:      Follow up with PCP in 1 week for hospital follow up    Follow up with cardiology in 7-10 days with Dr. Isamar Hutchinson and EP team    Code Status:  Full Code     Activity: activity as tolerated    Diet: cardiac diet      Discharge Medications:     Current Discharge Medication List           Details   atorvastatin (LIPITOR) 80 MG tablet TAKE 1 TABLET BY MOUTH EVERY DAY AT NIGHT  Qty: 90 tablet, Refills: 1    Associated Diagnoses: Diabetes mellitus with coincident hypertension ([address]      omeprazole (PRILOSEC) 20 MG delayed release capsule Take 1 capsule by mouth Daily  Qty: 90 capsule, Refills: 1    Associated Diagnoses: Gastroesophageal reflux disease, unspecified whether esophagitis present      famotidine (PEPCID) 20 MG tablet Take 1 tablet by mouth 2 times daily  Qty: 90 tablet, aspirin 81 MG chewable tablet Take 1 tablet by mouth daily  Qty: 30 tablet, Refills: 3    Associated Diagnoses: Diabetes mellitus with coincident hypertension ([address] Chronic congestive heart failure, unspecified heart failure type (HCC)      hydrALAZINE (APRESOLINE) 25 MG tablet Take 1 tablet by mouth every 8 hours  Qty: 90 tablet, Refills: 3    Associated Diagnoses: Chronic congestive heart failure, unspecified heart failure type (HCC)      Lancet Devices (LANCING DEVICE) MISC 1 lancet device  Pharmacy cannot do brand substitutions  Qty: 100 each, Refills: 0      Lancets MISC Check blood sugar four times. Pharmacy can do brand substitutions  Qty: 100 each, Refills: 0      Blood Glucose Monitoring Suppl (TRUE METRIX METER) w/Device KIT 1 kit by Does not apply route 4 times daily Dispense one kit  Pharmacy cannot do brand substitutions  Qty: 1 kit, Refills: 0      Insulin Pen Needle (CAREFINE PEN NEEDLES) 32G X 4 MM MISC 1 each by Does not apply route 3 times daily Pharmacy can do brand substitutions  Qty: 100 Package, Refills: 0       !! - Potential duplicate medications found. Please discuss with provider. Time Spent on discharge is more than 30 minutes in the examination, evaluation, counseling and review of medications and discharge plan. Signed:    Jodie Rivera NP   3/22/2022      Thank you Howard Verduzco DO for the opportunity to be involved in this patient's care. If you have any questions or concerns please feel free to contact me at [phone].

## 2022-03-22 NOTE — PLAN OF CARE
Problem: Falls - Risk of:  Goal: Will remain free from falls  Description: Will remain free from falls  3/22/2022 1234 by Yadira Guzman RN  Outcome: Completed  3/22/2022 0037 by Ten Eden RN  Outcome: Ongoing  Goal: Absence of physical injury  Description: Absence of physical injury  3/22/2022 1234 by Yadira Guzman RN  Outcome: Completed  3/22/2022 0037 by Ten Eden RN  Outcome: Ongoing     Problem: SAFETY  Goal: Free from accidental physical injury  3/22/2022 1234 by Yadira Guzman RN  Outcome: Completed  3/22/2022 0037 by Ten Eden RN  Outcome: Ongoing  Goal: Free from intentional harm  3/22/2022 1234 by Yadira Guzman RN  Outcome: Completed  3/22/2022 0037 by Ten Eden RN  Outcome: Ongoing     Problem: DAILY CARE  Goal: Daily care needs are met  3/22/2022 1234 by Yadira Guzman RN  Outcome: Completed  3/22/2022 0037 by Ten Eden RN  Outcome: Ongoing     Problem: PAIN  Goal: Patient's pain/discomfort is manageable  3/22/2022 1234 by Yadira Guzman RN  Outcome: Completed  3/22/2022 0037 by Ten Eden RN  Outcome: Ongoing     Problem: SKIN INTEGRITY  Goal: Skin integrity is maintained or improved  3/22/2022 1234 by Yadira Guzman RN  Outcome: Completed  3/22/2022 0037 by Ten Eden RN  Outcome: Ongoing     Problem: KNOWLEDGE DEFICIT  Goal: Patient/S.O. demonstrates understanding of disease process, treatment plan, medications, and discharge instructions.   3/22/2022 1234 by Zuleyma Aguilera RN  Outcome: Completed  3/22/2022 0037 by Ten Eden RN  Outcome: Ongoing     Problem: DISCHARGE BARRIERS  Goal: Patient's continuum of care needs are met  3/22/2022 1234 by Zuleyma Aguilera RN  Outcome: Completed  3/22/2022 0037 by Ten Eden RN  Outcome: Ongoing     Problem: OXYGENATION/RESPIRATORY FUNCTION  Goal: Patient will maintain patent airway  3/22/2022 1234 by Yadira Guzman RN  Outcome: Completed  3/22/2022 0037 by Ten Eden RN  Outcome: Ongoing  Goal: Patient will achieve/maintain normal respiratory rate/effort  Description: Respiratory rate and effort will be within normal limits for the patient  3/22/2022 1234 by Yadira Guzman RN  Outcome: Completed  3/22/2022 0037 by Ailyn Sheth RN  Outcome: Ongoing     Problem: HEMODYNAMIC STATUS  Goal: Patient has stable vital signs and fluid balance  3/22/2022 1234 by Yadira Guzman RN  Outcome: Completed  3/22/2022 0037 by Ailyn Sheth RN  Outcome: Ongoing     Problem: FLUID AND ELECTROLYTE IMBALANCE  Goal: Fluid and electrolyte balance are achieved/maintained  3/22/2022 1234 by Yadira Guzman RN  Outcome: Completed  3/22/2022 0037 by Ailyn Sheth RN  Outcome: Ongoing     Problem: ACTIVITY INTOLERANCE/IMPAIRED MOBILITY  Goal: Mobility/activity is maintained at optimum level for patient  3/22/2022 1234 by Nidia Sales RN  Outcome: Completed  3/22/2022 0037 by Ailyn Sheth RN  Outcome: Ongoing

## 2022-03-22 NOTE — PROGRESS NOTES
INTERVENTIONAL CARDIOLOGY   As by EP division to comment on patients LV dysfunction. Echo with areas of RWMA. NM PET 2019 with large area of ischemia. Admitted with covid pna. I do recommend a LHC/RHC as outpatient, discussed with patient at length and can be arranged in the next few weeks. Will add aldactone 25mg qd and changed IV lasix to PO. Cleared for d/c from cardiology. Outpatient follow up in 7 -10 days.      Brian Monsivais MD [address], Interventional Cardiology, and Peripheral Vascular Disease   [facility]   (O): [phone]  (F): [phone]

## 2022-03-22 NOTE — PROGRESS NOTES
DC order noted. Pt has received 60 mins of HF education at bedside. HF dc instructions are on AVS. Follow up appt is in place with EP NP for 3/29 which is also on the AVS. Pls inform pt.

## 2022-03-23 ENCOUNTER — TELEPHONE (OUTPATIENT)
Dept: INTERNAL MEDICINE CLINIC | Age: 57
End: 2022-03-23

## 2022-03-23 NOTE — TELEPHONE ENCOUNTER
[facility] called wondering if you could send in a BP Cuff for pt.               Fax # [phone] - for bp cuff

## 2022-03-24 ENCOUNTER — FOLLOWUP TELEPHONE ENCOUNTER (OUTPATIENT)
Dept: INPATIENT UNIT | Age: 57
End: 2022-03-24

## 2022-03-25 PROBLEM — I50.23 ACUTE ON CHRONIC SYSTOLIC HEART FAILURE (HCC): Status: ACTIVE | Noted: 2022-03-19

## 2022-04-01 PROBLEM — I48.0 PAF (PAROXYSMAL ATRIAL FIBRILLATION) (HCC): Status: ACTIVE | Noted: 2022-04-01

## 2022-04-21 ENCOUNTER — OFFICE VISIT (OUTPATIENT)
Dept: INTERNAL MEDICINE CLINIC | Age: 57
End: 2022-04-21
Payer: COMMERCIAL

## 2022-04-21 VITALS
DIASTOLIC BLOOD PRESSURE: 98 MMHG | OXYGEN SATURATION: 95 % | BODY MASS INDEX: 43.4 KG/M2 | WEIGHT: 315 LBS | HEART RATE: 72 BPM | SYSTOLIC BLOOD PRESSURE: 156 MMHG

## 2022-04-21 DIAGNOSIS — Z79.899 LONG TERM CURRENT USE OF AMIODARONE: ICD-10-CM

## 2022-04-21 DIAGNOSIS — I10 DIABETES MELLITUS WITH COINCIDENT HYPERTENSION (HCC): ICD-10-CM

## 2022-04-21 DIAGNOSIS — Z09 HOSPITAL DISCHARGE FOLLOW-UP: Primary | ICD-10-CM

## 2022-04-21 DIAGNOSIS — I50.9 CHRONIC CONGESTIVE HEART FAILURE, UNSPECIFIED HEART FAILURE TYPE (HCC): ICD-10-CM

## 2022-04-21 DIAGNOSIS — E11.9 DIABETES MELLITUS WITH COINCIDENT HYPERTENSION (HCC): ICD-10-CM

## 2022-04-21 DIAGNOSIS — I48.0 PAF (PAROXYSMAL ATRIAL FIBRILLATION) (HCC): ICD-10-CM

## 2022-04-21 DIAGNOSIS — I50.43 CHF (CONGESTIVE HEART FAILURE), NYHA CLASS I, ACUTE ON CHRONIC, COMBINED (HCC): ICD-10-CM

## 2022-04-21 LAB — TSH REFLEX FT4: 3.12 UIU/ML (ref 0.27–4.2)

## 2022-04-21 PROCEDURE — 1111F DSCHRG MED/CURRENT MED MERGE: CPT | Performed by: FAMILY MEDICINE

## 2022-04-21 PROCEDURE — G8417 CALC BMI ABV UP PARAM F/U: HCPCS | Performed by: FAMILY MEDICINE

## 2022-04-21 PROCEDURE — 2022F DILAT RTA XM EVC RTNOPTHY: CPT | Performed by: FAMILY MEDICINE

## 2022-04-21 PROCEDURE — 3052F HG A1C>EQUAL 8.0%<EQUAL 9.0%: CPT | Performed by: FAMILY MEDICINE

## 2022-04-21 PROCEDURE — G8427 DOCREV CUR MEDS BY ELIG CLIN: HCPCS | Performed by: FAMILY MEDICINE

## 2022-04-21 PROCEDURE — 3017F COLORECTAL CA SCREEN DOC REV: CPT | Performed by: FAMILY MEDICINE

## 2022-04-21 PROCEDURE — 99214 OFFICE O/P EST MOD 30 MIN: CPT | Performed by: FAMILY MEDICINE

## 2022-04-21 PROCEDURE — 4004F PT TOBACCO SCREEN RCVD TLK: CPT | Performed by: FAMILY MEDICINE

## 2022-04-21 RX ORDER — BLOOD PRESSURE TEST KIT
1 KIT MISCELLANEOUS DAILY
Qty: 1 KIT | Refills: 0 | Status: SHIPPED | OUTPATIENT
Start: 2022-04-21

## 2022-04-21 RX ORDER — CARVEDILOL 25 MG/1
25 TABLET ORAL 2 TIMES DAILY WITH MEALS
Qty: 60 TABLET | Refills: 0 | Status: SHIPPED | OUTPATIENT
Start: 2022-04-21 | End: 2022-05-18

## 2022-04-21 RX ORDER — ATORVASTATIN CALCIUM 80 MG/1
80 TABLET, FILM COATED ORAL NIGHTLY
Qty: 30 TABLET | Refills: 0 | Status: SHIPPED | OUTPATIENT
Start: 2022-04-21 | End: 2022-05-18

## 2022-04-21 RX ORDER — FUROSEMIDE 40 MG/1
40 TABLET ORAL DAILY
Qty: 30 TABLET | Refills: 0 | Status: SHIPPED | OUTPATIENT
Start: 2022-04-21 | End: 2022-05-18

## 2022-04-21 RX ORDER — ASPIRIN 81 MG/1
81 TABLET, CHEWABLE ORAL DAILY
Qty: 30 TABLET | Refills: 0 | Status: SHIPPED | OUTPATIENT
Start: 2022-04-21 | End: 2022-05-18

## 2022-04-21 RX ORDER — AMIODARONE HYDROCHLORIDE 200 MG/1
200 TABLET ORAL DAILY
Qty: 30 TABLET | Refills: 0 | Status: SHIPPED | OUTPATIENT
Start: 2022-04-21 | End: 2022-06-24

## 2022-04-21 RX ORDER — SPIRONOLACTONE 25 MG/1
25 TABLET ORAL DAILY
Qty: 30 TABLET | Refills: 0 | Status: SHIPPED | OUTPATIENT
Start: 2022-04-21 | End: 2022-05-18

## 2022-04-21 RX ORDER — BLOOD PRESSURE TEST KIT
KIT MISCELLANEOUS
COMMUNITY
End: 2022-04-21 | Stop reason: SDUPTHER

## 2022-04-21 RX ORDER — POTASSIUM CHLORIDE 750 MG/1
10 TABLET, EXTENDED RELEASE ORAL 2 TIMES DAILY
Qty: 60 TABLET | Refills: 0 | Status: SHIPPED | OUTPATIENT
Start: 2022-04-21 | End: 2022-05-18

## 2022-04-21 RX ORDER — LOSARTAN POTASSIUM 25 MG/1
25 TABLET ORAL DAILY
Qty: 30 TABLET | Refills: 0 | Status: SHIPPED | OUTPATIENT
Start: 2022-04-21 | End: 2022-05-18

## 2022-04-21 NOTE — PROGRESS NOTES
Post-Discharge Transitional Care Follow Up      Huan Fairbanks   YOB: 1965    Date of Office Visit:  4/21/2022  Date of Hospital Admission: 3/19/22  Date of Hospital Discharge: 3/22/22  Readmission Risk Score (high >=14%. Medium >=10%):Readmission Risk Score: 13 ( )      Care management risk score Rising risk (score 2-5) and Complex Care (Scores >=6): 2     Non face to face  following discharge, date last encounter closed (first attempt may have been earlier): *No documented post hospital discharge outreach found in the last 14 days     Call initiated 2 business days of discharge: *No response recorded in the last 14 days     Hospital discharge follow-up  -     KS DISCHARGE MEDS RECONCILED W/ CURRENT OUTPATIENT MED LIST  CHF (congestive heart failure), NYHA class I, acute on chronic, combined (HCC)  -     potassium chloride (KLOR-CON M) 10 MEQ extended release tablet; Take 1 tablet by mouth 2 times daily, Disp-60 tablet, R-0Normal  -     aspirin 81 MG chewable tablet; Take 1 tablet by mouth daily, Disp-30 tablet, R-0Normal  -     amiodarone (CORDARONE) 200 MG tablet; Take 1 tablet by mouth daily, Disp-30 tablet, R-0Normal  -     carvedilol (COREG) 25 MG tablet; Take 1 tablet by mouth 2 times daily (with meals), Disp-60 tablet, R-0Normal  -     atorvastatin (LIPITOR) 80 MG tablet; Take 1 tablet by mouth nightly, Disp-30 tablet, R-0Normal  -     spironolactone (ALDACTONE) 25 MG tablet; Take 1 tablet by mouth daily, Disp-30 tablet, R-0Normal  PAF (paroxysmal atrial fibrillation) (HCC)  -     amiodarone (CORDARONE) 200 MG tablet; Take 1 tablet by mouth daily, Disp-30 tablet, R-0Normal  -     apixaban (ELIQUIS) 5 MG TABS tablet; Take 1 tablet by mouth 2 times daily, Disp-60 tablet, R-0Normal  Diabetes mellitus with coincident hypertension (HCC)  -     aspirin 81 MG chewable tablet; Take 1 tablet by mouth daily, Disp-30 tablet, R-0Normal  -     atorvastatin (LIPITOR) 80 MG tablet;  Take 1 tablet by mouth nightly, Disp-30 tablet, R-0Normal  -     spironolactone (ALDACTONE) 25 MG tablet; Take 1 tablet by mouth daily, Disp-30 tablet, R-0Normal  -     losartan (COZAAR) 25 MG tablet; Take 1 tablet by mouth daily, Disp-30 tablet, R-0Normal  Chronic congestive heart failure, unspecified heart failure type ([address]  -     potassium chloride (KLOR-CON M) 10 MEQ extended release tablet; Take 1 tablet by mouth 2 times daily, Disp-60 tablet, R-0Normal  -     aspirin 81 MG chewable tablet; Take 1 tablet by mouth daily, Disp-30 tablet, R-0Normal  -     dapagliflozin (FARXIGA) 5 MG tablet; Take 1 tablet by mouth every morning, Disp-30 tablet, R-0Normal  -     carvedilol (COREG) 25 MG tablet; Take 1 tablet by mouth 2 times daily (with meals), Disp-60 tablet, R-0Normal  Long term current use of amiodarone  -     amiodarone (CORDARONE) 200 MG tablet; Take 1 tablet by mouth daily, Disp-30 tablet, R-0Normal  -     TSH with Reflex to FT4; Future      Medical Decision Making: moderate complexity  Return in 2 weeks (on 5/5/2022) for 30 min visit -- establish care visit. Subjective:   pt with follow up with cardiology on 4.28.22 with Dr. Andrew Navarro. Refilled all medications with no refills as patient only had 2 days left of medications and this would not get him to that appt. Inpatient course: Discharge summary reviewed- see chart. history of VENITA on CPAP, CHF, hypertension, hyperlipidemia and diabetes who presents to [facility] with complaints of shortness of breath.  He states that he ran out of his medications, all of them, for the past \"few months. \" Skip Mendenhall states that he has had ongoing shortness of breath but noticed an increased difficulty with any type of activity, especially climbing stairs.  He also had associated dizziness.  He has a 2 pillow orthopnea with PND noted.  Denies headaches fevers or chills.  No chest pain or palpitations.  No wheezing.  No nausea vomiting or abdominal pain.  No other symptoms noted at present.  No other aggravating or alleviating factors.     Acute on chronic combined heart failure 2/2 to noncompliance with medications (he has not had them for last few months)- Wrote prescription for all of his medications  - Last echo showed a EF of 30 to 35% with grade 2 diastolic dysfunction  - Continue IV Lasix - transitioned to PO lasix + aldactone per Cardiology recs   - Monitor strict I's and O's and daily weight  - Continue Coreg and losartan, amiodarone  - Cardiology follow up after discharge     Abnormal stress test in 2019 with reversible ischemia. Moderate LV enlargement with mildly reduced global left ventricular wall motion. Abnormal LV regional wall motion with mild inferior and septal wall hypokinesis. LVH. - Was following with  Cardiology and did not follow up after abnormal stress test results  - Cardiology consulted here - plans for [facility] outpatient with Dr. Polly Mott noncompliance  - Has not been following up with his primary care physician - instructed the patient he needs follow up after discharge     Accelerated hypertension with a history of essential hypertension  -Continue hydralazine, losartan     Hyperglycemia with a history of diabetes mellitus type 2  -Continue home lantus  -HgbA1c 8.9     Paroxysmal atrial fibrillation S/P FRANTZ/DCCV  -Continue amiodarone  -Anticoagulated with Eliquis     VENITA on CPAP  - continue     Morbid Obesity -  With Body mass index is 85.24 kg/m². Complicating assessment and treatment. Placing patient at risk for multiple co-morbidities as well as early death and contributing to the patient's presentation. Counseled on weight loss      Interval history/Current status: pt with follow up with cardiology on 4.28.22 with Dr. Seble Haley. Refilled all medications with no refills as patient only had 2 days left of medications and this would not get him to that appt.      Patient Active Problem List   Diagnosis    Type 2 diabetes mellitus with [facility]lipidemia [facility]    Diabetes mellitus with coincident hypertension ([facility]    Gastroesophageal reflux disease    Obesity, diabetes, and hypertension syndrome (HCC)    Abnormal stress test    Atherosclerosis of coronary artery    Chronic kidney disease (CKD) stage G2/A2, mildly decreased glomerular filtration rate (GFR) between 60-89 mL/min/1.73 square meter and albuminuria creatinine ratio between  mg/g    Gastric ulcer with perforation ([facility]    History of MI (myocardial infarction)    Tobacco abuse    Chronic systolic heart failure (HCC)    Atrial fibrillation with RVR (HCC)    Acute on chronic systolic heart failure (HCC)    CHF (congestive heart failure), NYHA class I, acute on chronic, combined ([facility]    PAF (paroxysmal atrial fibrillation) ([facility]       Medications listed as ordered at the time of discharge from hospital     Medication List          Accurate as of April 21, 2022 11:59 PM. If you have any questions, ask your nurse or doctor. CHANGE how you take these medications    Blood Pressure Monitor Kit  1 Device by Does not apply route daily  What changed:   · how much to take  · when to take this  Changed by: Mylene Sullivan, DO     dapagliflozin 5 MG tablet  Commonly known as: Farxiga  Take 1 tablet by mouth every morning  What changed: Another medication with the same name was removed. Continue taking this medication, and follow the directions you see here.   Changed by: Mylene Sullivan, DO        CONTINUE taking these medications    amiodarone 200 MG tablet  Commonly known as: CORDARONE  Take 1 tablet by mouth daily     apixaban 5 MG Tabs tablet  Commonly known as: ELIQUIS  Take 1 tablet by mouth 2 times daily     aspirin 81 MG chewable tablet  Take 1 tablet by mouth daily     atorvastatin 80 MG tablet  Commonly known as: LIPITOR  Take 1 tablet by mouth nightly     CareFine Pen Needles 32G X 4 MM Misc  Generic drug: Insulin Pen Needle  1 each by Does not apply route 3 times daily Pharmacy can do brand substitutions     carvedilol 25 MG tablet  Commonly known as: COREG  Take 1 tablet by mouth 2 times daily (with meals)     famotidine 20 MG tablet  Commonly known as: PEPCID  Take 1 tablet by mouth 2 times daily     fluticasone 50 MCG/ACT nasal spray  Commonly known as: FLONASE  2 sprays by Nasal route daily     furosemide 40 MG tablet  Commonly known as: LASIX  Take 1 tablet by mouth daily     insulin glargine 100 UNIT/ML injection vial  Commonly known as: LANTUS  Inject 10 Units into the skin daily (with breakfast)     Lancets Misc  Check blood sugar four times. Pharmacy can do brand substitutions     Lancing Device Misc  1 lancet device  Pharmacy cannot do brand substitutions     losartan 25 MG tablet  Commonly known as: COZAAR  Take 1 tablet by mouth daily     omeprazole 20 MG delayed release capsule  Commonly known as: PRILOSEC  Take 1 capsule by mouth Daily     polyethylene glycol 17 GM/SCOOP powder  Commonly known as: GLYCOLAX     potassium chloride 10 MEQ extended release tablet  Commonly known as: KLOR-CON M  Take 1 tablet by mouth 2 times daily     spironolactone 25 MG tablet  Commonly known as: ALDACTONE  Take 1 tablet by mouth daily     True Metrix Meter w/Device Kit  1 kit by Does not apply route 4 times daily Dispense one kit  Pharmacy cannot do brand substitutions           Where to Get Your Medications      These medications were sent to [facility]/pharmacy #[id number] [place] [address], [address] [place], [place] [address]prashant Menendez.  Joanne Antoine [phone] [facility] [address]    Phone: [phone]   · amiodarone 200 MG tablet  · apixaban 5 MG Tabs tablet  · aspirin 81 MG chewable tablet  · atorvastatin 80 MG tablet  · carvedilol 25 MG tablet  · dapagliflozin 5 MG tablet  · furosemide 40 MG tablet  · losartan 25 MG tablet  · potassium chloride 10 MEQ extended release tablet  · spironolactone 25 MG tablet     These medications were sent to [address] [place] [address] [facility]  [address], [address] [facility]    Phone: [phone]   · Blood Pressure Monitor Kit          Medications marked \"taking\" at this time  Outpatient Medications Marked as Taking for the 4/21/22 encounter (Office Visit) with Keiry Smith DO   Medication Sig Dispense Refill    potassium chloride (KLOR-CON M) 10 MEQ extended release tablet Take 1 tablet by mouth 2 times daily 60 tablet 0    furosemide (LASIX) 40 MG tablet Take 1 tablet by mouth daily 30 tablet 0    aspirin 81 MG chewable tablet Take 1 tablet by mouth daily 30 tablet 0    amiodarone (CORDARONE) 200 MG tablet Take 1 tablet by mouth daily 30 tablet 0    dapagliflozin (FARXIGA) 5 MG tablet Take 1 tablet by mouth every morning 30 tablet 0    carvedilol (COREG) 25 MG tablet Take 1 tablet by mouth 2 times daily (with meals) 60 tablet 0    atorvastatin (LIPITOR) 80 MG tablet Take 1 tablet by mouth nightly 30 tablet 0    apixaban (ELIQUIS) 5 MG TABS tablet Take 1 tablet by mouth 2 times daily 60 tablet 0    spironolactone (ALDACTONE) 25 MG tablet Take 1 tablet by mouth daily 30 tablet 0    losartan (COZAAR) 25 MG tablet Take 1 tablet by mouth daily 30 tablet 0        Medications patient taking as of now reconciled against medications ordered at time of hospital discharge: Yes    Review of Systems   Constitutional: Negative for chills and fever. Respiratory: Negative for cough and shortness of breath. Cardiovascular: Negative for leg swelling. Gastrointestinal: Negative for constipation, diarrhea, nausea and vomiting. Endocrine: Negative for polyuria. Genitourinary: Negative for frequency. Skin: Negative for rash. Objective:    BP (!) 156/98   Pulse 72   Wt (!) 320 lb (145.2 kg)   SpO2 95%   BMI 43.40 kg/m²   Physical Exam  Constitutional:       Appearance: Normal appearance.    Cardiovascular:      Rate and Rhythm: Normal rate and regular rhythm. Pulses: Normal pulses. Heart sounds: Normal heart sounds. Pulmonary:      Effort: Pulmonary effort is normal.      Breath sounds: Normal breath sounds. Musculoskeletal:      Right lower leg: No edema. Left lower leg: No edema. Neurological:      General: No focal deficit present. Mental Status: He is alert. Mental status is at baseline. An electronic signature was used to authenticate this note.   --Christian Montiel, DO

## 2022-04-22 ASSESSMENT — ENCOUNTER SYMPTOMS
VOMITING: 0
SHORTNESS OF BREATH: 0
COUGH: 0
DIARRHEA: 0
CONSTIPATION: 0
NAUSEA: 0

## 2022-04-28 ENCOUNTER — OFFICE VISIT (OUTPATIENT)
Dept: CARDIOLOGY CLINIC | Age: 57
End: 2022-04-28
Payer: COMMERCIAL

## 2022-04-28 ENCOUNTER — TELEPHONE (OUTPATIENT)
Dept: CARDIOLOGY CLINIC | Age: 57
End: 2022-04-28

## 2022-04-28 VITALS
OXYGEN SATURATION: 96 % | WEIGHT: 313 LBS | HEART RATE: 71 BPM | HEIGHT: 72 IN | BODY MASS INDEX: 42.39 KG/M2 | DIASTOLIC BLOOD PRESSURE: 68 MMHG | SYSTOLIC BLOOD PRESSURE: 122 MMHG

## 2022-04-28 DIAGNOSIS — E78.5 TYPE 2 DIABETES MELLITUS WITH HYPERLIPIDEMIA (HCC): ICD-10-CM

## 2022-04-28 DIAGNOSIS — I50.22 CHRONIC SYSTOLIC HEART FAILURE (HCC): ICD-10-CM

## 2022-04-28 DIAGNOSIS — E11.69 TYPE 2 DIABETES MELLITUS WITH HYPERLIPIDEMIA (HCC): ICD-10-CM

## 2022-04-28 DIAGNOSIS — I48.0 PAF (PAROXYSMAL ATRIAL FIBRILLATION) (HCC): Primary | ICD-10-CM

## 2022-04-28 DIAGNOSIS — Z09 HOSPITAL DISCHARGE FOLLOW-UP: ICD-10-CM

## 2022-04-28 DIAGNOSIS — Z79.01 CURRENT USE OF ANTICOAGULANT THERAPY: ICD-10-CM

## 2022-04-28 PROCEDURE — 3017F COLORECTAL CA SCREEN DOC REV: CPT | Performed by: INTERNAL MEDICINE

## 2022-04-28 PROCEDURE — 1111F DSCHRG MED/CURRENT MED MERGE: CPT | Performed by: INTERNAL MEDICINE

## 2022-04-28 PROCEDURE — G8427 DOCREV CUR MEDS BY ELIG CLIN: HCPCS | Performed by: INTERNAL MEDICINE

## 2022-04-28 PROCEDURE — 2022F DILAT RTA XM EVC RTNOPTHY: CPT | Performed by: INTERNAL MEDICINE

## 2022-04-28 PROCEDURE — 93000 ELECTROCARDIOGRAM COMPLETE: CPT | Performed by: INTERNAL MEDICINE

## 2022-04-28 PROCEDURE — 4004F PT TOBACCO SCREEN RCVD TLK: CPT | Performed by: INTERNAL MEDICINE

## 2022-04-28 PROCEDURE — 3052F HG A1C>EQUAL 8.0%<EQUAL 9.0%: CPT | Performed by: INTERNAL MEDICINE

## 2022-04-28 PROCEDURE — 99215 OFFICE O/P EST HI 40 MIN: CPT | Performed by: INTERNAL MEDICINE

## 2022-04-28 PROCEDURE — G8417 CALC BMI ABV UP PARAM F/U: HCPCS | Performed by: INTERNAL MEDICINE

## 2022-04-28 NOTE — LETTER
[address] HEART INST [facility]  Phone: [phone]  Fax: [phone]    May 6, 2022    [address] [place] [address]      Dear Kyle Blackmon:    We have been trying to contact you to schedule your ablation with Dr. Balnche Barroso. If you are interested in proceeding with scheduling your ablation please call our office at [phone].         Sincerely,    Nasim Valdes

## 2022-04-28 NOTE — PROGRESS NOTES
Cardiac Electrophysiology Consultation   Date: 4/28/2022  Reason for Consultation: Atrial fibrillation  Consult Requesting Physician: Keiry Smith DO   Primary Care Physician: Keiry Smith DO     Chief Complaint:   Chief Complaint   Patient presents with    Follow-Up from Hospital     afib        HPI: Emily Crawley is a 61 y.o. patient with a history of obstructive sleep apnea treated with CPAP, heart failure with severely depressed left ventricular systolic function, CAD, hypertension, hyperlipidemia, diabetes, paroxysmal atrial fibrillation with RVR and medication noncompliance who came into the hospital due to progressive worsening shortness of breath and noted to be in acute heart failure and has been diuresed. Today, he presents to office do establish care and discuss treatment options for his atrial fibrillation. He reports symptoms of lightheadedness when in atrial fibrillation. Patient does not endorse any identifiable exacerbating or alleviating factors for the atrial fibrillation. He is compliant with his medications and tolerating them well. He denies chest pain/pressure, tightness, edema, shortness of breath, heart racing, palpitations, lightheadedness, dizziness, syncope, presyncope,  PND or orthopnea. Past Medical History:   Diagnosis Date    Abdominal pain 7/29/2021    Abnormal EKG 7/28/2016    Acute pancreatitis 7/29/2021    CHF (congestive heart failure) (HCC)     Cigarette smoker 8/25/2021    Cocaine abuse ([facility] [address] 3/28/2015    Last Assessment & Plan:  Formatting of this note might be different from the original. Counseled on cessation as increases risk of coronary vasospasm and further worsening of heart function.     Dehydration 7/28/2016    Diabetes mellitus ([address]     History of cocaine abuse ([facility] [address] 7/28/2016    History of MI (myocardial infarction) 7/28/2016    Hyperglycemia 7/28/2016    Hyperlipidemia     Hypertension     Morbid obesity with BMI of 45.0-49.9, adult [facility]) 7/28/2016    Postural dizziness 7/28/2016    Pre-diabetes 3/9/2017    Last Assessment & Plan:  Formatting of this note might be different from the original. Discussed lifestyle changes Refer to [facility] program    PUD (peptic ulcer disease) 3/1/2019        Past Surgical History:   Procedure Laterality Date    CARDIAC CATHETERIZATION      UPPER GASTROINTESTINAL ENDOSCOPY         Allergies:  No Known Allergies    Medication:   Prior to Admission medications    Medication Sig Start Date End Date Taking?  Authorizing Provider   potassium chloride (KLOR-CON M) 10 MEQ extended release tablet Take 1 tablet by mouth 2 times daily 4/21/22  Yes Oz Munozbing, DO   furosemide (LASIX) 40 MG tablet Take 1 tablet by mouth daily 4/21/22  Yes Oz Munozbing, DO   aspirin 81 MG chewable tablet Take 1 tablet by mouth daily 4/21/22  Yes Oz Munozbing, DO   amiodarone (CORDARONE) 200 MG tablet Take 1 tablet by mouth daily 4/21/22  Yes Oz Munozbing, DO   dapagliflozin (FARXIGA) 5 MG tablet Take 1 tablet by mouth every morning 4/21/22  Yes Oz Munozbing, DO   carvedilol (COREG) 25 MG tablet Take 1 tablet by mouth 2 times daily (with meals) 4/21/22  Yes Oz Munozbing, DO   atorvastatin (LIPITOR) 80 MG tablet Take 1 tablet by mouth nightly 4/21/22  Yes Oz Munozbing, DO   apixaban (ELIQUIS) 5 MG TABS tablet Take 1 tablet by mouth 2 times daily 4/21/22  Yes Oz Munozbing, DO   spironolactone (ALDACTONE) 25 MG tablet Take 1 tablet by mouth daily 4/21/22  Yes Oz Munozbing, DO   losartan (COZAAR) 25 MG tablet Take 1 tablet by mouth daily 4/21/22  Yes Oz Munozbing, DO   Blood Pressure Monitor KIT 1 Device by Does not apply route daily 4/21/22  Yes Oz Hearn, DO   insulin glargine (LANTUS) 100 UNIT/ML injection vial Inject 10 Units into the skin daily (with breakfast) 3/22/22  Yes CONSUELO Gonzalez NP   fluticasone (FLONASE) 50 MCG/ACT nasal spray 2 sprays by Nasal route daily 3/22/22  Yes Micheal Grijalva, APRN - NP   omeprazole (PRILOSEC) 20 MG delayed release capsule Take 1 capsule by mouth Daily 10/21/21  Yes Soraya Vanna, DO   famotidine (PEPCID) 20 MG tablet Take 1 tablet by mouth 2 times daily 10/21/21  Yes Soraya Vanna, DO   polyethylene glycol (GLYCOLAX) 17 GM/SCOOP powder Take 17 g by mouth daily as needed for Constipation 8/30/21  Yes Historical Provider, MD   Lancet Devices (LANCING DEVICE) MISC 1 lancet device  Pharmacy cannot do brand substitutions 7/31/21  Yes Areli Wilkinson MD   Lancets MISC Check blood sugar four times. Pharmacy can do brand substitutions 7/31/21  Yes Areli Wilkinson MD   Blood Glucose Monitoring Suppl (TRUE METRIX METER) w/Device KIT 1 kit by Does not apply route 4 times daily Dispense one kit  Pharmacy cannot do brand substitutions 7/31/21  Yes Areli Wilkinson MD   Insulin Pen Needle (CAREFINE PEN NEEDLES) 32G X 4 MM MISC 1 each by Does not apply route 3 times daily Pharmacy can do brand substitutions 7/31/21  Yes Areli Wilkinson MD       Social History:   reports that he has been smoking cigarettes. He has a 12.50 pack-year smoking history. He has never used smokeless tobacco. He reports previous alcohol use. He reports current drug use. Drug: Marijuana Harvinder Cullen). Family History:  family history is not on file. Reviewed.  Denies family history of sudden cardiac death, arrhythmia, premature CAD    Review of System:    · General ROS: negative for - chills, fever   · Psychological ROS: negative for - anxiety or depression  · Ophthalmic ROS: negative for - eye pain or loss of vision  · ENT ROS: negative for - epistaxis, headaches, nasal discharge, sore throat   · Allergy and Immunology ROS: negative for - hives, nasal congestion   · Hematological and Lymphatic ROS: negative for - bleeding problems, blood clots, bruising or jaundice  · Endocrine ROS: negative for - skin changes, temperature intolerance or unexpected weight changes  · Respiratory ROS: negative for - cough, hemoptysis, pleuritic pain, SOB, sputum changes or wheezing  · Cardiovascular ROS: Per HPI. · Gastrointestinal ROS: negative for - abdominal pain, blood in stools, diarrhea, hematemesis, melena, nausea/vomiting or swallowing difficulty/pain  · Genito-Urinary ROS: negative for - dysuria or incontinence  · Musculoskeletal ROS: negative for - joint swelling or muscle pain  · Neurological ROS: negative for - confusion, dizziness, gait disturbance, headaches, numbness/tingling, seizures, speech problems, tremors, visual changes or weakness  · Dermatological ROS: negative for - rash    Physical Examination:  Vitals:    04/28/22 1122   BP: 122/68   Pulse: 71   SpO2: 96%       · Constitutional: Oriented. No distress. · Head: Normocephalic and atraumatic. · Mouth/Throat: Oropharynx is clear and moist.   · Eyes: Conjunctivae normal. EOM are normal.   · Neck: Normal range of motion. Neck supple. No rigidity. No JVD present. · Cardiovascular: Normal rate, regular rhythm, S1&S2 and intact distal pulses. · Pulmonary/Chest: Bilateral respiratory sounds. No wheezes. No rhonchi. · Abdominal: Soft. Bowel sounds present. No distension, No tenderness. · Musculoskeletal: No tenderness. No edema    · Lymphadenopathy: Has no cervical adenopathy. · Neurological: Alert and oriented. Cranial nerve appears intact, No Gross deficit   · Skin: Skin is warm and dry. No rash noted. · Psychiatric: Has a normal mood, affect and behavior     Labs:  Reviewed. ECG: Sinus  rhythm with v-rate of 67 bpm with QRS duration 110 ms. No pathologic Q waves, ventricular pre-excitation, or QT prolongation. T wave inversion in inferolateral leads. Studies:   1. Event monitor:       2. Echo: 9/25/2021  Summary  There is mildly increased left ventricular wall thickness. Overall left ventricular systolic function appears severely reduced.   Ejection fraction is visually estimated to be 30-35%. No regional wall motion abnormalities are noted. Grade II diastolic dysfunction with elevated LV filling pressures. Normal right ventricular size and function. LA volume/Index: 138 ml /53 ml/m2    3. Stress Test:        4. Cath:     I independently reviewed and interpreted the ECG, MCOT, echocardiogram, stress test, and coronary angiography/PCI results and used them for my plan of care. Procedures:  1. Assessment/Plan:     Paroxysmal atrial fibrillation  -First noted on EKG 9/23/2021 @ 12:50:09  -Continue Amiodarone 200 mg daily. TSH 1.64 on 9/23/2021. Repeat TSH ordered. AST 30 & ALT 28 on 3/19/2022    We educated the patient that atrial fibrillation is a worsening and progressive disease, with more frequent episodes that will ensue. Subsequent episodes usually become more sustained to the extent that many individuals would then develop persistent atrial fibrillation. Once persistence is reached, permanent atrial fibrillation is inevitable. We also discussed the fact that atrial fibrillation is associated with stroke, including life-threatening stroke, and therefore oral anticoagulation is warranted depending on the patient's [id number] score. We discussed different management options for atrial fibrillation including their risks and benefits. These options include use of cardioversion (mainly for persisting atrial fibrillation or atrial flutter) which provides an effective immediate therapy with success rates of 75% or higher, but it provides no short nor long term efficacy.   Anti-arrhythmic medications provide a very effective short term therapy, but even with our most potent anti-arrhythmic medication there is limited long term efficacy (clinical studies have shown that 40% of patients remain atrial fibrillation-free after 4 years of follow-up after starting one of the more powerful anti-arrhythmic medication (amiodarone), and, if extrapolated, may have further diminishing success as time goes on). Atrial fibrillation ablation is a potentially curative therapy with very reasonable success rate after a first time procedure and with improving success rates with subsequent procedures. The risks, benefits and alternatives of the atrial fibrillation ablation procedure were discussed with the patient. The risks including, but not limited to, bleeding, infection, radiation exposure, injury to vascular, cardiac and surrounding structures (including pneumothorax), stroke, cardiac perforation, tamponade, need for emergent open heart surgery, need for pacemaker implantation, injury to the phrenic nerve, injury to the esophagus, myocardial infarction and death were discussed in detail. The patient was also counseled at length about the risks of mani Covid-19 in the oneyda-operative and post-operative states including the recovery window of their procedure. The patient was made aware that mani Covid-19 after a surgical procedure may worsen their prognosis for recovering from the virus and lend to a higher morbidity and or mortality risk. The patient was given the option of postponing their procedure. The patient was also presented reasonable alternatives to the proposed care, treatment, and services. The discussion I have had with the patient encompassed risks, benefits, and side effects related to the alternatives and the risks related to not receiving the proposed care, treatment and services. I spent 40 minutes face to face with the patient, with greater than 50% of that time spent in counseling on the above. The patient opted to proceed with the atrial fibrillation ablation. We will schedule for a radiofrequency ablation with [employer] system with a FRANTZ procedure immediately prior to this ablation. A cardiac CTA will be ordered for pulmonary vein mapping prior to this procedure. We will hold Eliquis for 12 hours prior to procedure.  We will order BMP, CBC and Type & Screen prior to the procedure. Chronic systolic heart failure  EF 30-35 % per echo 9/25/2021. On guideline directed medical therapy. Beta Blocker: Carvedilol 25 mg BID              Aldosterone Antagonist: Spironolactone 25 mg daily. Diuretic: Furosemide 40 mg daily. ACE/ARB/ARNI: Losartan 25 mg daily.  -Euvolemic on today's exam.  -Referral to general cardiology for management. Type 2 Diabetes Mellitus  -Continue current medical management.  -Hb A1c 8.9 on 3/20/2022. Follow up three months after procedure with Aparna Sharif CNP. Thank you for allowing me to participate in the care of Misael Rocha. All questions and concerns were addressed to the/ patient/family. Alternatives to my treatment were discussed. This note was scribed in the presence of Dr. Lucia Arechiga MD by Ran Rasmussen RN. The scribe's documentation has been prepared under my direction and personally reviewed by me in its entirety. I confirm that the note above accurately reflects all work, physical examination, the discussion of treatments and procedures, and medical decision making performed by me.     Lucia Arechiga MD, MS, [facility], [facility]  Cardiac Electrophysiology  [address]  [address] [facility], [address] [place] [address] Denilson Leary [address]  [phone]

## 2022-04-28 NOTE — TELEPHONE ENCOUNTER
Please schedule FRANTZ atrial fibrillation ablation with Dr. Viviana Quigley. Echo tech NOT needed. I asked patient to stop at [address] and schedule his CTA please check to make sure this has been scheduled and if it has not please schedule. CTA Pre procedure instructions      As part of your pre procedure work up you will need to get a CT scan of your heart. This scan is completed in order to give Dr. Viviana Quigley a map of the atrium (chamber of your heart) where he will be doing the ablation. Date:_________________________________    Time:_________________________________    [place] Eva 20 minutes prior to scheduled testing time  -Nothing to eat or drink for at least 4 hours prior to test    Atrial Fibrillation Ablation Pre procedure Instructions    Date:______________________________    Arrive at:__________________________    Procedure time:____________________    The morning of your procedure you will park in the hospital parking lot and report directly to the cath lab to check in. At the information desk stay right and go all the way to the end of the ivy, this will take you directly to your check in desk for the cath lab. Pre-Procedure Instructions   1. You will need to fast (nothing to eat or drink) after midnight the day of your procedure  2. Do NOT chew gum or eat mints the day of your procedure. 3. You will need to hold your amiodarone for 7 days prior to the procedure. 4. You will need to hold your Eliquis or 12 hours prior to the procedure. 5. You will need to hold your Aspirin for 7 days prior to the procedure. 6. You may hold your Furosemide the morning of the procedure for comfort to decrease urinary urgency. 7. You will need to hold all diabetic medications including your morning insulin dose. If you take Lantus/Levemir only take ½ your normal dose the evening before. 8. Do not use any lotions, creams or perfume the morning of procedure.    9. You will need to complete pre-procedure lab work 5-7 days prior to your procedure. 10. Please have a responsible adult to drive you home after procedure, you should go home same day, but there is always a possibility of an overnight stay. 11. Cath lab will provide you with all post procedure instructions  12. A 3 month follow up will be scheduled with Dr. Amita Jackson Nurse practitioner Arlene Velasquez CNP post procedure                                        [address] [facility]  [address] [place] [address] Drive. [address] [place] [address]  Phone: [phone]  The hours are Mon -Fri.   6:30 am - 4:00 pm   Saturday 8:00 am - noon  No appointment necessary

## 2022-04-28 NOTE — PATIENT INSTRUCTIONS
If you have any question regarding your ablation please contact Dr. Yayo Sotelo at [phone]. If you would like to proceed with scheduling please contact  Trish Sagastume at [phone]. CTA Pre procedure instructions      As part of your pre procedure work up you will need to get a CT scan of your heart. This scan is completed in order to give Dr. Addie Martinez a map of the atrium (chamber of your heart) where he will be doing the ablation. Date:_________________________________    Time:_________________________________    Serenity Salazar 20 minutes prior to scheduled testing time  -Nothing to eat or drink for at least 4 hours prior to test    Atrial Fibrillation Ablation Pre procedure Instructions    Date:______________________________    Arrive at:__________________________    Procedure time:____________________    The morning of your procedure you will park in the hospital parking lot and report directly to the cath lab to check in. At the information desk stay right and go all the way to the end of the ivy, this will take you directly to your check in desk for the cath lab. Pre-Procedure Instructions   1. You will need to fast (nothing to eat or drink) after midnight the day of your procedure  2. Do NOT chew gum or eat mints the day of your procedure. 3. You will need to hold your amiodarone for 7 days prior to the procedure. 4. You will need to hold your Eliquis or 12 hours prior to the procedure. 5. You will need to hold your Aspirin for 7 days prior to the procedure. 6. You may hold your Furosemide the morning of the procedure for comfort to decrease urinary urgency. 7. You will need to hold all diabetic medications including your morning insulin dose. If you take Lantus/Levemir only take ½ your normal dose the evening before. 8. Do not use any lotions, creams or perfume the morning of procedure.    9. You will need to complete pre-procedure lab work 5-7 days prior to your procedure. 10. Please have a responsible adult to drive you home after procedure, you should go home same day, but there is always a possibility of an overnight stay. 11. Cath lab will provide you with all post procedure instructions  12. A 3 month follow up will be scheduled with Dr. Zayra Cevallos Nurse practitioner Lin Perez CNP post procedure                                        [facility]  [address] [place] [address] Drive. [place], Denilson Swenson [address]  Phone: [phone]  The hours are Mon -Fri. 6:30 am  4:00 pm   Saturday 8:00 am  noon  No appointment necessary              Patient Education        Learning About Atrial Fibrillation  What is atrial fibrillation? Atrial fibrillation (say \"AY-tree-enio fkj-shvx-WWG-shun\") is a common type of irregular heartbeat (arrhythmia). Normally, the heart beats in a strong, steady rhythm. In atrial fibrillation, a problem with the heart's electrical system causes the two upper chambers of the heart (called the atria) to quiver, orfibrillate. Atrial fibrillation can be dangerous. This is because if the heartbeat isn't strong and steady, blood can collect, or pool, in the atria. And pooled blood is more likely to form clots. Clots can travel to the brain, block blood flow,and cause a stroke. Atrial fibrillation can also lead to heart failure. This condition also upsets the normal rhythm between the atria and the lower chambers of the heart. (These chambers are called the ventricles.) Theventricles may beat fast and without a regular rhythm. What are the symptoms? Some people feel symptoms when they have episodes of atrial fibrillation. Butother people don't notice any symptoms. If you have symptoms, you may feel:   A fluttering, racing, or pounding feeling in your chest called palpitations.  Weak or tired.  Dizzy or lightheaded.  Short of breath.  Chest pain.  Confused.   You may notice signs of atrial fibrillation when you check your pulse. Yourpulse may seem uneven or fast.  What can you expect when you have atrial fibrillation? At first, spells of atrial fibrillation may come on suddenly and last a short time. They may go away on their own or with treatment. Over time, the spellsmay last longer and occur more often. They often don't go away on their own. How is it treated? Treatments can help you feel better and prevent future problems, especiallystroke and heart failure. Your treatment will depend on the cause of your atrial fibrillation, yoursymptoms, and your risk for stroke. Types of treatment include:   Heart rate treatment. Medicine may be used to slow your heart rate. Your heartbeat may still be irregular. But these medicines keep your heart from beating too fast. They may also help relieve symptoms.  Heart rhythm treatment. Different treatments may be used to try to stop atrial fibrillation and keep it from returning. They can also relieve symptoms. These treatments include medicine, electrical cardioversion to shock the heart back to a normal rhythm, a procedure called catheter ablation, and heart surgery.  Stroke prevention. You and your doctor can decide how to lower your risk. You may decide to take a blood-thinning medicine called an anticoagulant. What is a heart-healthy lifestyle for atrial fibrillation? You can live well and help manage atrial fibrillation by having a heart-healthy lifestyle. This lifestyle may help reduce how often you have episodes of atrialfibrillation. Don't smoke. Avoid secondhand smoke too. Quitting smoking is the best thing you can do for your heart. Be active. Talk to your doctor about what type and level of exercise is safe for you. Eat a heart-healthy diet. These foods include vegetables, fruits, nuts, beans, lean meat, fish, and wholegrains. Limit sodium, alcohol, and sugar. Avoid alcohol if it triggers symptoms. Stay at a healthy weight. Lose weight if you need to. Losing weight can help relieve symptoms. Manage other health problems. These problems include diabetes, high blood pressure, and high cholesterol. If you think you may have a problem with alcohol or drug use, talk to your doctor. Manage stress. Options like yoga, biofeedback, and meditation may help. Where can you learn more? Go to https://chpepicewoliva.PSafe. org and sign in to your Borderfree account. Enter M032 in the Buffer box to learn more about \"Learning About Atrial Fibrillation. \"     If you do not have an account, please click on the \"Sign Up Now\" link. Current as of: January 10, 2022               Content Version: 13.2  © 2006-2022 [employer]. Care instructions adapted under license by [facility] ([facility]). If you have questions about a medical condition or this instruction, always ask your healthcare professional. [place] [address] any warranty or liability for your use of this information. Patient Education        Atrial Fibrillation: Care Instructions  Your Care Instructions     Atrial fibrillation is an irregular and often fast heartbeat. Treating this condition is important for several reasons. It can cause blood clots, which can travel from your heart to your brain and cause a stroke. If you have a fast heartbeat, you may feel lightheaded, dizzy, and weak. An irregular heartbeatcan also increase your risk for heart failure. Atrial fibrillation is often the result of another heart condition, such as high blood pressure or coronary artery disease. Making changes to improve yourheart condition will help you stay healthy and active. Follow-up care is a key part of your treatment and safety. Be sure to make and go to all appointments, and call your doctor if you are having problems. It's also a good idea to know your test results and keep alist of the medicines you take.   How can you care for yourself at home?  Medicines     Take your medicines exactly as prescribed. Call your doctor if you think you are having a problem with your medicine. You will get more details on the specific medicines your doctor prescribes.      If your doctor has given you a blood thinner to prevent a stroke, be sure you get instructions about how to take your medicine safely. Blood thinners can cause serious bleeding problems.      Do not take any vitamins, over-the-counter drugs, or herbal products without talking to your doctor first.   Lifestyle changes     Do not smoke. Smoking can increase your chance of a stroke and heart attack. If you need help quitting, talk to your doctor about stop-smoking programs and medicines. These can increase your chances of quitting for good.      Eat a heart-healthy diet.      Stay at a healthy weight. Lose weight if you need to.      Limit alcohol to 2 drinks a day for men and 1 drink a day for women. Too much alcohol can cause health problems.      Avoid colds and flu. Get a pneumococcal vaccine shot. If you have had one before, ask your doctor whether you need another dose. Get a flu shot every year. If you must be around people with colds or flu, wash your hands often. Activity     If your doctor recommends it, get more exercise. Walking is a good choice. Bit by bit, increase the amount you walk every day. Try for at least 30 minutes on most days of the week. You also may want to swim, bike, or do other activities. Your doctor may suggest that you join a cardiac rehabilitation program so that you can have help increasing your physical activity safely.      Start light exercise if your doctor says it is okay. Even a small amount will help you get stronger, have more energy, and manage stress. Walking is an easy way to get exercise.  Start out by walking a little more than you did in the hospital. Gradually increase the amount you walk.      When you exercise, watch for signs that your heart is working too hard. You are pushing too hard if you cannot talk while you are exercising. If you become short of breath or dizzy or have chest pain, sit down and rest immediately.      Check your pulse regularly. Place two fingers on the artery at the palm side of your wrist, in line with your thumb. If your heartbeat seems uneven or fast, talk to your doctor. When should you call for help? Call 911 anytime you think you may need emergency care. For example, call if:     You have symptoms of a heart attack. These may include:  ? Chest pain or pressure, or a strange feeling in the chest.  ? Sweating. ? Shortness of breath. ? Nausea or vomiting. ? Pain, pressure, or a strange feeling in the back, neck, jaw, or upper belly or in one or both shoulders or arms. ? Lightheadedness or sudden weakness. ? A fast or irregular heartbeat. After you call 911, the  may tell you to chew 1 adult-strength or 2 to 4 low-dose aspirin. Wait for an ambulance. Do not try to drive yourself.      You have symptoms of a stroke. These may include:  ? Sudden numbness, tingling, weakness, or loss of movement in your face, arm, or leg, especially on only one side of your body. ? Sudden vision changes. ? Sudden trouble speaking. ? Sudden confusion or trouble understanding simple statements. ? Sudden problems with walking or balance. ? A sudden, severe headache that is different from past headaches.      You passed out (lost consciousness). Call your doctor now or seek immediate medical care if:     You have new or increased shortness of breath.      You feel dizzy or lightheaded, or you feel like you may faint.      Your heart rate becomes irregular.      You can feel your heart flutter in your chest or skip heartbeats. Tell your doctor if these symptoms are new or worse. Watch closely for changes in your health, and be sure to contact your doctor ifyou have any problems. Where can you learn more?   Go to https://chpepiceweb.U4EA. org and sign in to your "[employer] account. Enter U020 in the [facility] box to learn more about \"Atrial Fibrillation: Care Instructions. \"     If you do not have an account, please click on the \"Sign Up Now\" link. Current as of: January 10, 2022               Content Version: 13.2  © 2006-2022 [employer]. Care instructions adapted under license by [facility] ([facility]). If you have questions about a medical condition or this instruction, always ask your healthcare professional. [place] [address] any warranty or liability for your use of this information. Patient Education        Learning About Catheter Ablation for Heart Rhythm Problems  What is catheter ablation? Catheter ablation is a procedure that treats heart rhythm problems. These problems include atrial fibrillation, supraventricular tachycardia (SVT),atrial flutter, and ventricular tachycardia. Your heart should have a strong, steady beat. That beat is controlled by the heart's electrical system. Sometimes that system misfires. This causes aheartbeat that is too fast and isn't steady. Catheter ablation is a way to get into your heart and fix the problem. Ablationis not surgery. How is catheter ablation done? Your doctor inserts thin tubes called catheters into a blood vessel in your groin, arm, or neck. Then your doctor feeds them into the heart. Wires in the catheters help the doctor find the problem areas. Then the doctor uses the wires to send energy to destroy the tiny areas of heart tissue that are causingthe problems. It may seem like a bad idea to destroy parts of your heart on purpose. But the areas that are destroyed are very tiny. They should not affect your heart'sability to do its job. You may be awake during the procedure. Or you may be asleep. The doctor will give you medicines to help you feel relaxed and to numb the areas where the catheters go in.  You may feel a little uncomfortable, but you should not feelpain. What can you expect after catheter ablation? You may stay overnight in the hospital. How long you stay in the hospitaldepends on the type of ablation you have. Do not exercise hard or lift anything heavy for a week. You will probably beable to go back to work and to your normal routine in 1 or 2 days. You may have swelling, bruising, or a small lump around the site where thecatheters went into your body. These should go away in 3 to 4 weeks. You may have to take some medicines for a while. Follow-up care is a key part of your treatment and safety. Be sure to make and go to all appointments, and call your doctor if you are having problems. It's also a good idea to know your test results and keep alist of the medicines you take. Where can you learn more? Go to https://Alexis Bittar.Citizen Sports. org and sign in to your [employer] account. Enter I233 in the Hubspan box to learn more about \"Learning About Catheter Ablation for Heart Rhythm Problems. \"     If you do not have an account, please click on the \"Sign Up Now\" link. Current as of: January 10, 2022               Content Version: 13.2  © 2006-2022 [employer]. Care instructions adapted under license by [facility] ([facility]). If you have questions about a medical condition or this instruction, always ask your healthcare professional. [place] [address] any warranty or liability for your use of this information. Patient Education        Catheter Ablation: Before Your Procedure  What is a catheter ablation? A catheter ablation may be done if there is a problem with your heartbeat (heart rhythm). This procedure destroys (ablates) tiny areas of the heart that are causing your heart rhythm problem. This should not affect the heart'sability to do its job. The doctor puts thin tubes called catheters into blood vessels in your groin, arm, or neck.  The tubes are guided to your heart. There is an electrode at the tip of each tube. The electrode helps the doctor find the problem areas. Then the doctor uses the electrode to send energy to destroy the areas of hearttissue that are causing the problem. You may get medicine that relaxes you or puts you in a light sleep. Or you might be asleep during the procedure. The places where the catheters go in willbe numb. After an ablation, you may stay overnight in the hospital.  How do you prepare for the procedure? Procedures can be stressful. This information will help you understand what youcan expect. And it will help you safely prepare for your procedure. Preparing for the procedure     Be sure you have someone to take you home. Anesthesia and pain medicine will make it unsafe for you to drive or get home on your own.      Understand exactly what procedure is planned, along with the risks, benefits, and other options.      Tell your doctor ALL the medicines, vitamins, supplements, and herbal remedies you take. Some may increase the risk of problems during your procedure. Your doctor will tell you if you should stop taking any of them before the procedure and how soon to do it.      If you take aspirin or some other blood thinner, ask your doctor if you should stop taking it before your procedure. Make sure that you understand exactly what your doctor wants you to do. These medicines increase the risk of bleeding.      Make sure your doctor and the hospital have a copy of your advance directive. If you don't have one, you may want to prepare one. It lets others know your health care wishes. It's a good thing to have before any type of surgery or procedure. What happens on the day of the procedure?     Follow the instructions exactly about when to stop eating and drinking. If you don't, your surgery may be canceled.  If your doctor told you to take your medicines on the day of surgery, take them with only a sip of water.      Follow your doctor's instructions about when to bathe or shower before your surgery. Do not apply lotions, perfumes, deodorants, or nail polish.      Take off all jewelry and piercings. And take out contact lenses, if you wear them. At the hospital or surgery center     Bring a picture ID.      You will be kept comfortable and safe by your anesthesia provider. The anesthesia may make you sleep. Or it may just numb the area being worked on.      This procedure can take 2 to 6 hours. In rare cases, it can take longer.      After the procedure, pressure may be applied to the areas where a catheter was put in a blood vessel. This will help prevent bleeding. A small device may also be used to close the blood vessel. You may have a bandage or a compression device on each catheter site.      Nurses will check your heart rate and blood pressure. The nurse will also check the catheter site for bleeding.      If the catheter was put in your groin, you will need to lie still and keep your leg straight for up to a few hours. The nurse may put a weighted bag on your leg to help you keep it still.      If the catheter was put in your neck or arm, you may be able to sit up right away. If it was in your arm, you will need to keep your arm still for at least 1 hour.      You may have a bruise or a small lump where the catheter was put in your blood vessel. This is normal and will go away. When should you call your doctor?  You have questions or concerns.      You don't understand how to prepare for your procedure.      You become ill before the procedure (such as fever, flu, or a cold).      You need to reschedule or have changed your mind about having the procedure. Where can you learn more? Go to https://SAFCellkdai.Allclasses. org and sign in to your [employer] account. Enter C459 in the [employer] box to learn more about \"Catheter Ablation: Before Your Procedure. \"     If you do not have an account, please click on the \"Sign Up Now\" link. Current as of: January 10, 2022               Content Version: 13.2  © 2006-2022 [employer]. Care instructions adapted under license by [facility] ([facility]). If you have questions about a medical condition or this instruction, always ask your healthcare professional. [address] any warranty or liability for your use of this information. Patient Education        Catheter Ablation: What to Expect at [address] [place] [address] Drive had a catheter ablation to try to correct a problem with your heartbeat (heart rhythm). You may have swelling, bruising, or a small lump around the sites where the catheters went into your body. These should go away in 3 to 4weeks. You can do light activities at home. Don't do anything strenuous until yourdoctor says it is okay. This may be for several days. This care sheet gives you a general idea about how long it will take for you to recover. But each person recovers at a different pace. Follow the steps belowto get better as quickly as possible. How can you care for yourself at home? Activity     If the doctor gave you a sedative:  ? For 24 hours, don't do anything that requires attention to detail, such as going to work, making important decisions, or signing any legal documents. It takes time for the medicine's effects to completely wear off.  ? For your safety, do not drive or operate any machinery that could be dangerous. Wait until the medicine wears off and you can think clearly and react easily.      Do not do strenuous exercise and do not lift, pull, or push anything heavy until your doctor says it is okay. This may be for several days.      Ask your doctor when it is okay to have sex. Diet     You can eat your normal diet.  If your stomach is upset, try bland, low-fat foods like plain rice, broiled chicken, toast, and yogurt.      Drink plenty of fluids (unless your doctor tells you not to). Medicines     Your doctor will tell you if and when you can restart your medicines. You will also get instructions about taking any new medicines.      If you take aspirin or some other blood thinner, ask your doctor if and when to start taking it again. Make sure that you understand exactly what your doctor wants you to do.      Be safe with medicines. Take your medicines exactly as prescribed. Call your doctor if you think you are having a problem with your medicine. Catheter site care     For 1 or 2 days, keep a bandage over the spot where the catheter was inserted. The bandage probably will fall off in this time.      Put ice or a cold pack on the area for 10 to 20 minutes at a time to help with soreness or swelling. Put a thin cloth between the ice and your skin.      You may shower 24 to 48 hours after the procedure, if your doctor okays it. Pat the incision dry.      Do not soak the catheter site until it is healed. Don't take a bath for 1 week, or until your doctor tells you it is okay.      Watch for bleeding from the site. A small amount of blood (up to the size of a quarter) on the bandage can be normal.      If you are bleeding, lie down and press on the area for 15 minutes to try to make it stop. If the bleeding does not stop, call your doctor or seek immediate medical care. Follow-up care is a key part of your treatment and safety. Be sure to make and go to all appointments, and call your doctor if you are having problems. It's also a good idea to know your test results and keep alist of the medicines you take. When should you call for help? Call 911  anytime you think you may need emergency care. For example, call if:     You passed out (lost consciousness).      You have symptoms of a heart attack. These may include:  ? Chest pain or pressure, or a strange feeling in the chest.  ? Sweating. ? Shortness of breath. ? Nausea or vomiting.   ? Pain, pressure, or a strange feeling in the back, neck, jaw, or upper belly, or in one or both shoulders or arms. ? Lightheadedness or sudden weakness. ? A fast or irregular heartbeat. After you call 911, the  may tell you to chew 1 adult-strength or 2 to 4 low-dose aspirin. Wait for an ambulance. Do not try to drive yourself.      You have symptoms of a stroke. These may include:  ? Sudden numbness, tingling, weakness, or loss of movement in your face, arm, or leg, especially on only one side of your body. ? Sudden vision changes. ? Sudden trouble speaking. ? Sudden confusion or trouble understanding simple statements. ? Sudden problems with walking or balance. ? A sudden, severe headache that is different from past headaches. Call your doctor now or seek immediate medical care if:     You are bleeding from the area where the catheter was put in your blood vessel.      You have a fast-growing, painful lump at the catheter site.      You have signs of infection, such as:  ? Increased pain, swelling, warmth, or redness. ? Red streaks leading from the catheter site. ? Pus draining from the catheter site. ? A fever.      Your leg, arm, or hand is painful, looks blue, or feels cold, numb, or tingly. Watch closely for any changes in your health, and be sure to contact yourdoctor if you have any problems. Current as of: January 10, 2022               Content Version: 13.2  © 2006-2022 [employer]. Care instructions adapted under license by [facility] ([facility]). If you have questions about a medical condition or this instruction, always ask your healthcare professional. [place] [address] any warranty or liability for your use of this information.

## 2022-04-29 NOTE — TELEPHONE ENCOUNTER
Atrial Fibrillation Ablation Pre procedure Instructions     Date: 5/17/22     Arrive at: 10:00      Procedure time: 11:30     The morning of your procedure you will park in the hospital parking lot and report directly to the cath lab to check in. At the information desk stay right and go all the way to the end of the ivy, this will take you directly to your check in desk for the cath lab.     Pre-Procedure Instructions   1. You will need to fast (nothing to eat or drink) after midnight the day of your procedure  2. Do NOT chew gum or eat mints the day of your procedure. 3. You will need to hold your amiodarone for 7 days prior to the procedure. 4. You will need to hold your Eliquis or 12 hours prior to the procedure. 5. You will need to hold your Aspirin for 7 days prior to the procedure. 6. You may hold your Furosemide the morning of the procedure for comfort to decrease urinary urgency. 7. You will need to hold all diabetic medications including your morning insulin dose. If you take Lantus/Levemir only take ½ your normal dose the evening before. 8. Do not use any lotions, creams or perfume the morning of procedure. 9. You will need to complete pre-procedure lab work 5-7 days prior to your procedure. 10. Please have a responsible adult to drive you home after procedure, you should go home same day, but there is always a possibility of an overnight stay. 11. Cath lab will provide you with all post procedure instructions  12.  A 3 month follow up will be scheduled with Dr. Angelica Fletcher Nurse practitioner Fabian Feliciano CNP post procedure

## 2022-05-05 NOTE — TELEPHONE ENCOUNTER
Attempted to reach pt. \"the person you are trying to reach is unavailable at this time, please try again later\" Will try again later.

## 2022-05-06 NOTE — TELEPHONE ENCOUNTER
Unable to reach. Pt did not show up for cardiac CTA. Cancelled EP procedure and sent a letter to pts address advising him to call if he wants to set up an ablation appointment. Cancelled on Qgenda and emailed cath lab.

## 2022-05-18 DIAGNOSIS — I10 DIABETES MELLITUS WITH COINCIDENT HYPERTENSION (HCC): ICD-10-CM

## 2022-05-18 DIAGNOSIS — I50.43 CHF (CONGESTIVE HEART FAILURE), NYHA CLASS I, ACUTE ON CHRONIC, COMBINED (HCC): ICD-10-CM

## 2022-05-18 DIAGNOSIS — E11.9 DIABETES MELLITUS WITH COINCIDENT HYPERTENSION (HCC): ICD-10-CM

## 2022-05-18 DIAGNOSIS — I50.9 CHRONIC CONGESTIVE HEART FAILURE, UNSPECIFIED HEART FAILURE TYPE (HCC): ICD-10-CM

## 2022-05-18 RX ORDER — SPIRONOLACTONE 25 MG/1
TABLET ORAL
Qty: 30 TABLET | Refills: 0 | Status: SHIPPED | OUTPATIENT
Start: 2022-05-18 | End: 2022-06-20

## 2022-05-18 RX ORDER — LOSARTAN POTASSIUM 25 MG/1
TABLET ORAL
Qty: 30 TABLET | Refills: 0 | Status: SHIPPED | OUTPATIENT
Start: 2022-05-18 | End: 2022-06-20

## 2022-05-18 RX ORDER — ATORVASTATIN CALCIUM 80 MG/1
80 TABLET, FILM COATED ORAL NIGHTLY
Qty: 30 TABLET | Refills: 0 | Status: SHIPPED | OUTPATIENT
Start: 2022-05-18 | End: 2022-06-20

## 2022-05-18 RX ORDER — POTASSIUM CHLORIDE 750 MG/1
TABLET, EXTENDED RELEASE ORAL
Qty: 60 TABLET | Refills: 0 | Status: SHIPPED | OUTPATIENT
Start: 2022-05-18 | End: 2022-06-20

## 2022-05-18 RX ORDER — FUROSEMIDE 40 MG/1
TABLET ORAL
Qty: 30 TABLET | Refills: 0 | Status: SHIPPED | OUTPATIENT
Start: 2022-05-18 | End: 2022-06-20

## 2022-05-18 RX ORDER — LORATADINE 10 MG
TABLET ORAL
Qty: 30 TABLET | Refills: 3 | Status: ON HOLD | OUTPATIENT
Start: 2022-05-18 | End: 2022-10-10 | Stop reason: SDUPTHER

## 2022-05-18 RX ORDER — CARVEDILOL 25 MG/1
TABLET ORAL
Qty: 60 TABLET | Refills: 0 | Status: SHIPPED | OUTPATIENT
Start: 2022-05-18 | End: 2022-06-20

## 2022-05-18 NOTE — TELEPHONE ENCOUNTER
Requested Prescriptions     Pending Prescriptions Disp Refills    furosemide (LASIX) 40 MG tablet [Pharmacy Med Name: FUROSEMIDE 40 MG TABLET] 30 tablet 0     Sig: TAKE 1 TABLET BY MOUTH EVERY DAY    spironolactone (ALDACTONE) 25 MG tablet [Pharmacy Med Name: SPIRONOLACTONE 25 MG TABLET] 30 tablet 0     Sig: TAKE 1 TABLET BY MOUTH EVERY DAY    carvedilol (COREG) 25 MG tablet [Pharmacy Med Name: CARVEDILOL 25 MG TABLET] 60 tablet 0     Sig: TAKE 1 TABLET BY MOUTH TWICE A DAY WITH MEALS       Patient requesting a medication refill.   Last filled on:   [facility]  Next office visit: Visit date not found  Last regular office visit: 4/21/2022

## 2022-05-18 NOTE — TELEPHONE ENCOUNTER
Requested Prescriptions     Pending Prescriptions Disp Refills    KLOR-CON M10 10 MEQ extended release tablet [Pharmacy Med Name: KLOR-CON M10 TABLET] 60 tablet 0     Sig: TAKE 1 TABLET BY MOUTH TWICE A DAY    [facility] ASPIRIN ADULT LOW DOSE 81 MG chewable tablet [Pharmacy Med Name: [facility] ASPIRIN 81 MG CHEWABLE TAB] 30 tablet 3     Sig: TAKE 1 TABLET BY MOUTH EVERY DAY       Patient requesting a medication refill.   Last filled on:   [facility]  Next office visit: Visit date not found  Last regular office visit: 4/21/2022

## 2022-05-18 NOTE — TELEPHONE ENCOUNTER
Requested Prescriptions     Pending Prescriptions Disp Refills    losartan (COZAAR) 25 MG tablet [Pharmacy Med Name: LOSARTAN POTASSIUM 25 MG TAB] 30 tablet 0     Sig: TAKE 1 TABLET BY MOUTH EVERY DAY    atorvastatin (LIPITOR) 80 MG tablet [Pharmacy Med Name: ATORVASTATIN 80 MG TABLET] 30 tablet 0     Sig: TAKE 1 TABLET BY MOUTH NIGHTLY       Patient requesting a medication refill.   Last filled on:   [facility]  Next office visit: Visit date not found  Last regular office visit: 4/21/2022

## 2022-05-29 DIAGNOSIS — I50.9 CHRONIC CONGESTIVE HEART FAILURE, UNSPECIFIED HEART FAILURE TYPE (HCC): ICD-10-CM

## 2022-05-29 DIAGNOSIS — I48.0 PAF (PAROXYSMAL ATRIAL FIBRILLATION) (HCC): ICD-10-CM

## 2022-06-02 RX ORDER — DAPAGLIFLOZIN 5 MG/1
TABLET, FILM COATED ORAL
Qty: 30 TABLET | Refills: 2 | Status: ON HOLD | OUTPATIENT
Start: 2022-06-02 | End: 2022-10-19 | Stop reason: SDUPTHER

## 2022-06-02 RX ORDER — APIXABAN 5 MG/1
TABLET, FILM COATED ORAL
Qty: 60 TABLET | Refills: 2 | Status: ON HOLD | OUTPATIENT
Start: 2022-06-02 | End: 2022-10-10 | Stop reason: SDUPTHER

## 2022-06-20 DIAGNOSIS — E11.9 DIABETES MELLITUS WITH COINCIDENT HYPERTENSION (HCC): ICD-10-CM

## 2022-06-20 DIAGNOSIS — I50.9 CHRONIC CONGESTIVE HEART FAILURE, UNSPECIFIED HEART FAILURE TYPE (HCC): ICD-10-CM

## 2022-06-20 DIAGNOSIS — I50.43 CHF (CONGESTIVE HEART FAILURE), NYHA CLASS I, ACUTE ON CHRONIC, COMBINED (HCC): ICD-10-CM

## 2022-06-20 DIAGNOSIS — I10 DIABETES MELLITUS WITH COINCIDENT HYPERTENSION (HCC): ICD-10-CM

## 2022-06-20 RX ORDER — FUROSEMIDE 40 MG/1
TABLET ORAL
Qty: 90 TABLET | Refills: 0 | Status: ON HOLD | OUTPATIENT
Start: 2022-06-20 | End: 2022-10-19 | Stop reason: SDUPTHER

## 2022-06-20 RX ORDER — POTASSIUM CHLORIDE 750 MG/1
TABLET, EXTENDED RELEASE ORAL
Qty: 180 TABLET | Refills: 0 | Status: ON HOLD | OUTPATIENT
Start: 2022-06-20 | End: 2022-10-11 | Stop reason: SDUPTHER

## 2022-06-20 RX ORDER — SPIRONOLACTONE 25 MG/1
TABLET ORAL
Qty: 90 TABLET | Refills: 0 | Status: ON HOLD | OUTPATIENT
Start: 2022-06-20 | End: 2022-10-10 | Stop reason: HOSPADM

## 2022-06-20 RX ORDER — ATORVASTATIN CALCIUM 80 MG/1
80 TABLET, FILM COATED ORAL NIGHTLY
Qty: 90 TABLET | Refills: 0 | Status: SHIPPED | OUTPATIENT
Start: 2022-06-20 | End: 2022-10-13 | Stop reason: SDUPTHER

## 2022-06-20 RX ORDER — CARVEDILOL 25 MG/1
TABLET ORAL
Qty: 90 TABLET | Refills: 0 | Status: ON HOLD | OUTPATIENT
Start: 2022-06-20 | End: 2022-10-19 | Stop reason: SDUPTHER

## 2022-06-20 RX ORDER — LOSARTAN POTASSIUM 25 MG/1
TABLET ORAL
Qty: 90 TABLET | Refills: 0 | Status: ON HOLD | OUTPATIENT
Start: 2022-06-20 | End: 2022-10-19 | Stop reason: SDUPTHER

## 2022-06-20 NOTE — TELEPHONE ENCOUNTER
Requested Prescriptions     Pending Prescriptions Disp Refills    furosemide (LASIX) 40 MG tablet [Pharmacy Med Name: FUROSEMIDE 40 MG TABLET] 30 tablet 0     Sig: TAKE 1 TABLET BY MOUTH EVERY DAY    atorvastatin (LIPITOR) 80 MG tablet [Pharmacy Med Name: ATORVASTATIN 80 MG TABLET] 30 tablet 0     Sig: TAKE 1 TABLET BY MOUTH NIGHTLY       Patient requesting a medication refill.   Last filled on:   [facility]  Next office visit: Visit date not found  Last regular office visit: 4/21/2022

## 2022-06-20 NOTE — TELEPHONE ENCOUNTER
Requested Prescriptions     Pending Prescriptions Disp Refills    carvedilol (COREG) 25 MG tablet [Pharmacy Med Name: CARVEDILOL 25 MG TABLET] 60 tablet 0     Sig: TAKE 1 TABLET BY MOUTH TWICE A DAY WITH MEALS       Patient requesting a medication refill.   Last filled on: 05.18.22  [facility]  Next office visit: Visit date not found  Last regular office visit: 4/21/2022

## 2022-06-20 NOTE — TELEPHONE ENCOUNTER
Requested Prescriptions     Pending Prescriptions Disp Refills    losartan (COZAAR) 25 MG tablet [Pharmacy Med Name: LOSARTAN POTASSIUM 25 MG TAB] 30 tablet 0     Sig: TAKE 1 TABLET BY MOUTH EVERY DAY    spironolactone (ALDACTONE) 25 MG tablet [Pharmacy Med Name: SPIRONOLACTONE 25 MG TABLET] 30 tablet 0     Sig: TAKE 1 TABLET BY MOUTH EVERY DAY    KLOR-CON M10 10 MEQ extended release tablet [Pharmacy Med Name: KLOR-CON M10 TABLET] 60 tablet 0     Sig: TAKE 1 TABLET BY MOUTH TWICE A DAY       Patient requesting a medication refill.   Last filled on:   [facility]  Next office visit: Visit date not found  Last regular office visit: 4/21/2022

## 2022-06-24 DIAGNOSIS — Z79.899 LONG TERM CURRENT USE OF AMIODARONE: ICD-10-CM

## 2022-06-24 DIAGNOSIS — I50.43 CHF (CONGESTIVE HEART FAILURE), NYHA CLASS I, ACUTE ON CHRONIC, COMBINED (HCC): ICD-10-CM

## 2022-06-24 DIAGNOSIS — I48.0 PAF (PAROXYSMAL ATRIAL FIBRILLATION) (HCC): ICD-10-CM

## 2022-06-24 RX ORDER — AMIODARONE HYDROCHLORIDE 200 MG/1
TABLET ORAL
Qty: 30 TABLET | Refills: 0 | Status: ON HOLD | OUTPATIENT
Start: 2022-06-24 | End: 2022-10-19 | Stop reason: SDUPTHER

## 2022-07-16 DIAGNOSIS — Z79.899 LONG TERM CURRENT USE OF AMIODARONE: ICD-10-CM

## 2022-07-16 DIAGNOSIS — I50.43 CHF (CONGESTIVE HEART FAILURE), NYHA CLASS I, ACUTE ON CHRONIC, COMBINED (HCC): ICD-10-CM

## 2022-07-16 DIAGNOSIS — I48.0 PAF (PAROXYSMAL ATRIAL FIBRILLATION) (HCC): ICD-10-CM

## 2022-07-18 RX ORDER — AMIODARONE HYDROCHLORIDE 200 MG/1
TABLET ORAL
Qty: 30 TABLET | Refills: 0 | OUTPATIENT
Start: 2022-07-18

## 2022-07-18 NOTE — TELEPHONE ENCOUNTER
Requested Prescriptions     Pending Prescriptions Disp Refills    amiodarone (CORDARONE) 200 MG tablet [Pharmacy Med Name: AMIODARONE  MG TABLET] 30 tablet 0     Sig: TAKE 1 TABLET BY MOUTH EVERY DAY     Patient requesting a medication refill.       Next office visit:       Last regular office visit: Visit date not found

## 2022-07-23 ENCOUNTER — HOSPITAL ENCOUNTER (EMERGENCY)
Age: 57
Discharge: HOME | End: 2022-07-23
Attending: EMERGENCY MEDICINE
Payer: COMMERCIAL

## 2022-07-23 VITALS
BODY MASS INDEX: 40.63 KG/M2 | RESPIRATION RATE: 16 BRPM | SYSTOLIC BLOOD PRESSURE: 150 MMHG | HEART RATE: 84 BPM | DIASTOLIC BLOOD PRESSURE: 77 MMHG | HEIGHT: 72 IN | WEIGHT: 300 LBS | TEMPERATURE: 98.5 F | OXYGEN SATURATION: 99 %

## 2022-07-23 DIAGNOSIS — J02.9 ACUTE PHARYNGITIS, UNSPECIFIED ETIOLOGY: Primary | ICD-10-CM

## 2022-07-23 LAB
S PYO AG THROAT QL: NEGATIVE
SARS-COV-2, NAAT: NOT DETECTED

## 2022-07-23 PROCEDURE — 99283 EMERGENCY DEPT VISIT LOW MDM: CPT

## 2022-07-23 PROCEDURE — 6370000000 HC RX 637 (ALT 250 FOR IP): Performed by: EMERGENCY MEDICINE

## 2022-07-23 PROCEDURE — 87635 SARS-COV-2 COVID-19 AMP PRB: CPT

## 2022-07-23 PROCEDURE — 87880 STREP A ASSAY W/OPTIC: CPT

## 2022-07-23 PROCEDURE — 87081 CULTURE SCREEN ONLY: CPT

## 2022-07-23 RX ORDER — IBUPROFEN 400 MG/1
400 TABLET ORAL ONCE
Status: COMPLETED | OUTPATIENT
Start: 2022-07-23 | End: 2022-07-23

## 2022-07-23 RX ORDER — NAPROXEN 500 MG/1
500 TABLET ORAL 2 TIMES DAILY
Qty: 15 TABLET | Refills: 0 | Status: SHIPPED | OUTPATIENT
Start: 2022-07-23 | End: 2022-07-23

## 2022-07-23 RX ORDER — AMOXICILLIN 500 MG/1
500 CAPSULE ORAL 3 TIMES DAILY
Qty: 30 CAPSULE | Refills: 0 | Status: SHIPPED | OUTPATIENT
Start: 2022-07-23 | End: 2022-08-02

## 2022-07-23 RX ORDER — ACETAMINOPHEN 500 MG
1000 TABLET ORAL ONCE
Status: COMPLETED | OUTPATIENT
Start: 2022-07-23 | End: 2022-07-23

## 2022-07-23 RX ORDER — AMOXICILLIN 250 MG/1
500 CAPSULE ORAL ONCE
Status: COMPLETED | OUTPATIENT
Start: 2022-07-23 | End: 2022-07-23

## 2022-07-23 RX ADMIN — AMOXICILLIN 500 MG: 250 CAPSULE ORAL at 17:42

## 2022-07-23 RX ADMIN — IBUPROFEN 400 MG: 400 TABLET, FILM COATED ORAL at 17:42

## 2022-07-23 RX ADMIN — ACETAMINOPHEN 1000 MG: 500 TABLET ORAL at 17:41

## 2022-07-23 ASSESSMENT — PAIN - FUNCTIONAL ASSESSMENT
PAIN_FUNCTIONAL_ASSESSMENT: NONE - DENIES PAIN
PAIN_FUNCTIONAL_ASSESSMENT: NONE - DENIES PAIN

## 2022-07-23 NOTE — ED PROVIDER NOTES
[facility] ENCOUNTER      Pt Name: Krishan Fraser  MRN: [id number]  [place] 1965  Date of evaluation: 7/23/2022  Provider: Dina Diaz, [address] [place] [address]       Chief Complaint   Patient presents with    Pharyngitis     Sore through for 3 days with difficulty swallowing. HISTORY OF PRESENT ILLNESS   (Location/Symptom, Timing/Onset, Context/Setting, Quality, Duration, Modifying Factors, Severity)  Note limiting factors. Krishan Fraser is a 61 y.o. male who presents to the emergency department with a complaint of a sore throat since Thursday, 2 days ago. He complains of pain with swallowing. He denies any fever or chills. No exposure to illness. He denies any headache, neck pain, neck stiffness, body aches, chest pain, heaviness pressure or tightness. He denies any diaphoresis. No dyspnea on exertion. He denies any shortness of breath. He denies any abdominal pain nausea or vomiting. He has had some slight loose stools but no diarrhea. He denies any body aches. He denies any choking or aspiration. He is able to drink liquids but complains of some slight pain with swallowing. Nursing Notes were reviewed. HPI        REVIEW OF SYSTEMS    (2-9 systems for level 4, 10 or more for level 5)       Constitutional: Negative for fever or chills. Respiratory: Negative for shortness of breath or dyspnea on exertion. Cardiovascular: Negative for chest pain. Gastrointestinal: Negative for abdominal pain. Negative for vomiting or diarrhea. Genitourinary: Negative for flank pain. Negative for dysuria. Negative for hematuria. Neurological: Negative for headache. Musculoskeletal:  Negative edema. Hematological: Negative for adenopathy. All systems are reviewed and are negative except for those listed above in the history of present illness and ROS.         PAST MEDICAL HISTORY     Past Medical History:   Diagnosis Date daily    FUROSEMIDE (LASIX) 40 MG TABLET    TAKE 1 TABLET BY MOUTH EVERY DAY    INSULIN GLARGINE (LANTUS) 100 UNIT/ML INJECTION VIAL    Inject 10 Units into the skin daily (with breakfast)    INSULIN PEN NEEDLE (CAREFINE PEN NEEDLES) 32G X 4 MM MISC    1 each by Does not apply route 3 times daily Pharmacy can do brand substitutions    KLOR-CON M10 10 MEQ EXTENDED RELEASE TABLET    TAKE 1 TABLET BY MOUTH TWICE A DAY    LANCET DEVICES (LANCING DEVICE) MISC    1 lancet device  Pharmacy cannot do brand substitutions    LANCETS MISC    Check blood sugar four times. Pharmacy can do brand substitutions    LOSARTAN (COZAAR) 25 MG TABLET    TAKE 1 TABLET BY MOUTH EVERY DAY    OMEPRAZOLE (PRILOSEC) 20 MG DELAYED RELEASE CAPSULE    Take 1 capsule by mouth Daily    POLYETHYLENE GLYCOL (GLYCOLAX) 17 GM/SCOOP POWDER    Take 17 g by mouth daily as needed for Constipation    SPIRONOLACTONE (ALDACTONE) 25 MG TABLET    TAKE 1 TABLET BY MOUTH EVERY DAY       ALLERGIES     Patient has no known allergies. FAMILY HISTORY     No family history on file.        SOCIAL HISTORY       Social History     Socioeconomic History    Marital status: Single    Number of children: 4   Occupational History     Employer: [employer]   Tobacco Use    Smoking status: Every Day     Packs/day: 0.50     Years: 25.00     Pack years: 12.50     Types: Cigarettes    Smokeless tobacco: Never   Vaping Use    Vaping Use: Never used   Substance and Sexual Activity    Alcohol use: Not Currently    Drug use: Yes     Types: Marijuana ([place])    Sexual activity: Not Currently       SCREENINGS    [place] Coma Scale  Eye Opening: Spontaneous  Best Verbal Response: Oriented  Best Motor Response: Obeys commands  [place] Coma Scale Score: 15        PHYSICAL EXAM    (up to 7 for level 4, 8 or more for level 5)     ED Triage Vitals   BP Temp Temp Source Heart Rate Resp SpO2 Height Weight   07/23/22 1614 07/23/22 1630 07/23/22 1630 07/23/22 1630 07/23/22 1614 07/23/22 1614 07/23/22 1614 07/23/22 1614   (!) 152/78 98.5 °F (36.9 °C) Oral 84 16 98 % 6' (1.829 m) 300 lb (136.1 kg)         Physical Exam   Constitutional: Awake and alert. Very pleasant. Not ill-appearing. Head: No visible evidence of trauma. Normocephalic. Eyes: Pupils equal and reactive. No photophobia. Conjunctiva normal.    HENT: Oral mucosa moist.  Airway patent. Pharynx reveals moderate erythema. No exudates. Tonsils were slightly enlarged bilaterally but symmetrical.  Uvula is midline. There is no trismus. Voice is normal.  He just finished eating a bag of potato chips prior to my examination. He is able to swallow food and liquids without difficulty. Mild pain with swallowing. Nares were clear. Neck:  Soft and supple. Nontender. Sternal neck was nontender. No lymphadenopathy. Heart:  Regular rate and rhythm. No murmur. Lungs:  Clear to auscultation. No wheezes, rales, or ronchi. No conversational dyspnea or accessory muscle use. Chest: Chest wall non-tender. No evidence of trauma. Abdomen:  Soft, nondistended, bowel sounds present. Nontender. No guarding rigidity or rebound. No masses. Musculoskeletal: Extremities non-tender with full range of motion. Radial and dorsalis pedis pulses were intact. No calf tenderness erythema or edema. Neurological: Alert and oriented x 3. Speech clear. Cranial nerves II-XII intact. No facial droop. No acute focal motor or sensory deficits. Skin: Skin is warm and dry. No rash. Lymphatic:  No lympadenopathy. Psychiatric: Normal mood and affect.  Behavior is normal.         DIAGNOSTIC RESULTS     EKG: All EKG's are interpreted by the Emergency Department Physician who either signs or Co-signs this chart in the absence of a cardiologist.        RADIOLOGY:   Non-plain film images such as CT, Ultrasound and MRI are read by the radiologist. Plain radiographic images are visualized and preliminarily interpreted by the emergency physician with the DISPOSITION/PLAN   DISPOSITION Decision To Discharge 07/23/2022 05:36:40 PM      PATIENT REFERRED TO:  Mellissa Christianson DO  [address] [place] [address] [address] [facility] [address]. [phone]    Call today      DISCHARGE MEDICATIONS:  New Prescriptions    AMOXICILLIN (AMOXIL) 500 MG CAPSULE    Take 1 capsule by mouth in the morning and 1 capsule at noon and 1 capsule before bedtime. Do all this for 10 days. Controlled Substances Monitoring:     No flowsheet data found. (Please note that portions of this note were completed with a voice recognition program.  Efforts were made to edit the dictations but occasionally words are mis-transcribed. )    2029 Faustino Kwok DO (electronically signed)  Attending Emergency Physician           Dixon Rojo DO  07/23/22 [id number]

## 2022-07-24 DIAGNOSIS — E11.9 DIABETES MELLITUS WITH COINCIDENT HYPERTENSION (HCC): ICD-10-CM

## 2022-07-24 DIAGNOSIS — I50.43 CHF (CONGESTIVE HEART FAILURE), NYHA CLASS I, ACUTE ON CHRONIC, COMBINED (HCC): ICD-10-CM

## 2022-07-24 DIAGNOSIS — I10 DIABETES MELLITUS WITH COINCIDENT HYPERTENSION (HCC): ICD-10-CM

## 2022-07-24 DIAGNOSIS — I50.9 CHRONIC CONGESTIVE HEART FAILURE, UNSPECIFIED HEART FAILURE TYPE (HCC): ICD-10-CM

## 2022-07-25 LAB — S PYO THROAT QL CULT: NORMAL

## 2022-07-25 RX ORDER — POTASSIUM CHLORIDE 750 MG/1
TABLET, EXTENDED RELEASE ORAL
Qty: 180 TABLET | Refills: 0 | OUTPATIENT
Start: 2022-07-25

## 2022-07-25 RX ORDER — LOSARTAN POTASSIUM 25 MG/1
TABLET ORAL
Qty: 90 TABLET | Refills: 0 | OUTPATIENT
Start: 2022-07-25

## 2022-07-25 RX ORDER — FUROSEMIDE 40 MG/1
TABLET ORAL
Qty: 90 TABLET | Refills: 0 | OUTPATIENT
Start: 2022-07-25

## 2022-07-25 RX ORDER — CARVEDILOL 25 MG/1
TABLET ORAL
Qty: 90 TABLET | Refills: 0 | OUTPATIENT
Start: 2022-07-25

## 2022-07-25 RX ORDER — SPIRONOLACTONE 25 MG/1
TABLET ORAL
Qty: 90 TABLET | Refills: 0 | OUTPATIENT
Start: 2022-07-25

## 2022-07-25 RX ORDER — ATORVASTATIN CALCIUM 80 MG/1
80 TABLET, FILM COATED ORAL NIGHTLY
Qty: 90 TABLET | Refills: 0 | OUTPATIENT
Start: 2022-07-25

## 2022-08-04 DIAGNOSIS — I50.9 CHRONIC CONGESTIVE HEART FAILURE, UNSPECIFIED HEART FAILURE TYPE (HCC): ICD-10-CM

## 2022-08-04 DIAGNOSIS — I50.43 CHF (CONGESTIVE HEART FAILURE), NYHA CLASS I, ACUTE ON CHRONIC, COMBINED (HCC): ICD-10-CM

## 2022-08-04 RX ORDER — CARVEDILOL 25 MG/1
TABLET ORAL
Qty: 90 TABLET | Refills: 0 | OUTPATIENT
Start: 2022-08-04

## 2022-09-08 DIAGNOSIS — I50.9 CHRONIC CONGESTIVE HEART FAILURE, UNSPECIFIED HEART FAILURE TYPE (HCC): ICD-10-CM

## 2022-09-08 DIAGNOSIS — I50.43 CHF (CONGESTIVE HEART FAILURE), NYHA CLASS I, ACUTE ON CHRONIC, COMBINED (HCC): ICD-10-CM

## 2022-09-08 RX ORDER — CARVEDILOL 25 MG/1
TABLET ORAL
Qty: 90 TABLET | Refills: 0 | OUTPATIENT
Start: 2022-09-08

## 2022-10-06 ENCOUNTER — APPOINTMENT (OUTPATIENT)
Dept: GENERAL RADIOLOGY | Age: 57
DRG: 045 | End: 2022-10-06
Payer: COMMERCIAL

## 2022-10-06 ENCOUNTER — APPOINTMENT (OUTPATIENT)
Dept: CT IMAGING | Age: 57
DRG: 045 | End: 2022-10-06
Payer: COMMERCIAL

## 2022-10-06 ENCOUNTER — HOSPITAL ENCOUNTER (INPATIENT)
Age: 57
LOS: 5 days | Discharge: HOME | DRG: 045 | End: 2022-10-11
Attending: INTERNAL MEDICINE | Admitting: INTERNAL MEDICINE
Payer: COMMERCIAL

## 2022-10-06 DIAGNOSIS — I48.0 PAF (PAROXYSMAL ATRIAL FIBRILLATION) (HCC): ICD-10-CM

## 2022-10-06 DIAGNOSIS — I50.43 CHF (CONGESTIVE HEART FAILURE), NYHA CLASS I, ACUTE ON CHRONIC, COMBINED (HCC): ICD-10-CM

## 2022-10-06 DIAGNOSIS — E11.9 DIABETES MELLITUS WITH COINCIDENT HYPERTENSION (HCC): ICD-10-CM

## 2022-10-06 DIAGNOSIS — I50.9 CHRONIC CONGESTIVE HEART FAILURE, UNSPECIFIED HEART FAILURE TYPE (HCC): ICD-10-CM

## 2022-10-06 DIAGNOSIS — R42 DIZZINESS: Primary | ICD-10-CM

## 2022-10-06 DIAGNOSIS — E11.65 POORLY CONTROLLED DIABETES MELLITUS (HCC): ICD-10-CM

## 2022-10-06 DIAGNOSIS — F19.10 POLYSUBSTANCE ABUSE (HCC): ICD-10-CM

## 2022-10-06 DIAGNOSIS — Z91.14 NONCOMPLIANCE WITH MEDICATION REGIMEN: ICD-10-CM

## 2022-10-06 DIAGNOSIS — E78.5 TYPE 2 DIABETES MELLITUS WITH HYPERLIPIDEMIA (HCC): Chronic | ICD-10-CM

## 2022-10-06 DIAGNOSIS — E11.69 TYPE 2 DIABETES MELLITUS WITH HYPERLIPIDEMIA (HCC): Chronic | ICD-10-CM

## 2022-10-06 DIAGNOSIS — I10 DIABETES MELLITUS WITH COINCIDENT HYPERTENSION (HCC): ICD-10-CM

## 2022-10-06 PROBLEM — I63.9 ACUTE CVA (CEREBROVASCULAR ACCIDENT) (HCC): Status: ACTIVE | Noted: 2022-10-06

## 2022-10-06 LAB
A/G RATIO: 1.1 (ref 1.1–2.2)
ALBUMIN SERPL-MCNC: 4 G/DL (ref 3.4–5)
ALP BLD-CCNC: 74 U/L (ref 40–129)
ALT SERPL-CCNC: 12 U/L (ref 10–40)
AMPHETAMINE SCREEN, URINE: ABNORMAL
ANION GAP SERPL CALCULATED.3IONS-SCNC: 12 MMOL/L (ref 3–16)
AST SERPL-CCNC: 12 U/L (ref 15–37)
BARBITURATE SCREEN URINE: ABNORMAL
BASOPHILS ABSOLUTE: 0.1 K/UL (ref 0–0.2)
BASOPHILS RELATIVE PERCENT: 1 %
BENZODIAZEPINE SCREEN, URINE: ABNORMAL
BETA-HYDROXYBUTYRATE: 0.65 MMOL/L (ref 0–0.27)
BILIRUB SERPL-MCNC: 0.3 MG/DL (ref 0–1)
BILIRUBIN URINE: NEGATIVE
BLOOD, URINE: NEGATIVE
BUN BLDV-MCNC: 16 MG/DL (ref 7–20)
CALCIUM SERPL-MCNC: 9.2 MG/DL (ref 8.3–10.6)
CANNABINOID SCREEN URINE: POSITIVE
CHLORIDE BLD-SCNC: 97 MMOL/L (ref 99–110)
CLARITY: CLEAR
CO2: 22 MMOL/L (ref 21–32)
COCAINE METABOLITE SCREEN URINE: POSITIVE
COLOR: YELLOW
CREAT SERPL-MCNC: 1 MG/DL (ref 0.9–1.3)
EOSINOPHILS ABSOLUTE: 0.1 K/UL (ref 0–0.6)
EOSINOPHILS RELATIVE PERCENT: 1.7 %
FENTANYL SCREEN, URINE: ABNORMAL
GFR AFRICAN AMERICAN: >60
GFR NON-AFRICAN AMERICAN: >60
GLUCOSE BLD-MCNC: 344 MG/DL (ref 70–99)
GLUCOSE BLD-MCNC: 372 MG/DL (ref 70–99)
GLUCOSE BLD-MCNC: 473 MG/DL (ref 70–99)
GLUCOSE BLD-MCNC: 474 MG/DL (ref 70–99)
GLUCOSE URINE: 250 MG/DL
HCT VFR BLD CALC: 44.1 % (ref 40.5–52.5)
HEMOGLOBIN: 14.9 G/DL (ref 13.5–17.5)
INR BLD: 0.97 (ref 0.87–1.14)
KETONES, URINE: ABNORMAL MG/DL
LEUKOCYTE ESTERASE, URINE: NEGATIVE
LYMPHOCYTES ABSOLUTE: 1.4 K/UL (ref 1–5.1)
LYMPHOCYTES RELATIVE PERCENT: 20.4 %
Lab: ABNORMAL
MCH RBC QN AUTO: 31.2 PG (ref 26–34)
MCHC RBC AUTO-ENTMCNC: 33.8 G/DL (ref 31–36)
MCV RBC AUTO: 92.2 FL (ref 80–100)
METHADONE SCREEN, URINE: ABNORMAL
MICROSCOPIC EXAMINATION: ABNORMAL
MONOCYTES ABSOLUTE: 0.4 K/UL (ref 0–1.3)
MONOCYTES RELATIVE PERCENT: 6 %
NEUTROPHILS ABSOLUTE: 4.7 K/UL (ref 1.7–7.7)
NEUTROPHILS RELATIVE PERCENT: 70.9 %
NITRITE, URINE: NEGATIVE
OPIATE SCREEN URINE: ABNORMAL
OXYCODONE URINE: ABNORMAL
PDW BLD-RTO: 12.5 % (ref 12.4–15.4)
PERFORMED ON: ABNORMAL
PH UA: 6
PH UA: 6 (ref 5–8)
PHENCYCLIDINE SCREEN URINE: ABNORMAL
PLATELET # BLD: 231 K/UL (ref 135–450)
PMV BLD AUTO: 9 FL (ref 5–10.5)
POTASSIUM REFLEX MAGNESIUM: 4.3 MMOL/L (ref 3.5–5.1)
PRO-BNP: 397 PG/ML (ref 0–124)
PROTEIN UA: NEGATIVE MG/DL
PROTHROMBIN TIME: 12.8 SEC (ref 11.7–14.5)
RBC # BLD: 4.78 M/UL (ref 4.2–5.9)
SODIUM BLD-SCNC: 131 MMOL/L (ref 136–145)
SPECIFIC GRAVITY UA: 1.06 (ref 1–1.03)
TOTAL PROTEIN: 7.8 G/DL (ref 6.4–8.2)
TROPONIN: <0.01 NG/ML
URINE REFLEX TO CULTURE: ABNORMAL
URINE TYPE: ABNORMAL
UROBILINOGEN, URINE: 0.2 E.U./DL
WBC # BLD: 6.7 K/UL (ref 4–11)

## 2022-10-06 PROCEDURE — 70496 CT ANGIOGRAPHY HEAD: CPT

## 2022-10-06 PROCEDURE — 99285 EMERGENCY DEPT VISIT HI MDM: CPT

## 2022-10-06 PROCEDURE — 6370000000 HC RX 637 (ALT 250 FOR IP): Performed by: INTERNAL MEDICINE

## 2022-10-06 PROCEDURE — 80307 DRUG TEST PRSMV CHEM ANLYZR: CPT

## 2022-10-06 PROCEDURE — 84484 ASSAY OF TROPONIN QUANT: CPT

## 2022-10-06 PROCEDURE — 2580000003 HC RX 258: Performed by: GENERAL ACUTE CARE HOSPITAL

## 2022-10-06 PROCEDURE — 6370000000 HC RX 637 (ALT 250 FOR IP): Performed by: GENERAL ACUTE CARE HOSPITAL

## 2022-10-06 PROCEDURE — 93005 ELECTROCARDIOGRAM TRACING: CPT | Performed by: GENERAL ACUTE CARE HOSPITAL

## 2022-10-06 PROCEDURE — 71045 X-RAY EXAM CHEST 1 VIEW: CPT

## 2022-10-06 PROCEDURE — 6360000004 HC RX CONTRAST MEDICATION: Performed by: GENERAL ACUTE CARE HOSPITAL

## 2022-10-06 PROCEDURE — 85025 COMPLETE CBC W/AUTO DIFF WBC: CPT

## 2022-10-06 PROCEDURE — 2060000000 HC ICU INTERMEDIATE R&B

## 2022-10-06 PROCEDURE — 83036 HEMOGLOBIN GLYCOSYLATED A1C: CPT

## 2022-10-06 PROCEDURE — 83880 ASSAY OF NATRIURETIC PEPTIDE: CPT

## 2022-10-06 PROCEDURE — 85610 PROTHROMBIN TIME: CPT

## 2022-10-06 PROCEDURE — 80053 COMPREHEN METABOLIC PANEL: CPT

## 2022-10-06 PROCEDURE — 96374 THER/PROPH/DIAG INJ IV PUSH: CPT

## 2022-10-06 PROCEDURE — 82010 KETONE BODYS QUAN: CPT

## 2022-10-06 PROCEDURE — 70450 CT HEAD/BRAIN W/O DYE: CPT

## 2022-10-06 PROCEDURE — 81003 URINALYSIS AUTO W/O SCOPE: CPT

## 2022-10-06 RX ORDER — POLYETHYLENE GLYCOL 3350 17 G/17G
17 POWDER, FOR SOLUTION ORAL DAILY PRN
Status: DISCONTINUED | OUTPATIENT
Start: 2022-10-06 | End: 2022-10-11 | Stop reason: HOSPADM

## 2022-10-06 RX ORDER — LABETALOL HYDROCHLORIDE 5 MG/ML
10 INJECTION, SOLUTION INTRAVENOUS EVERY 10 MIN PRN
Status: DISCONTINUED | OUTPATIENT
Start: 2022-10-06 | End: 2022-10-11 | Stop reason: HOSPADM

## 2022-10-06 RX ORDER — ONDANSETRON 4 MG/1
4 TABLET, ORALLY DISINTEGRATING ORAL EVERY 8 HOURS PRN
Status: DISCONTINUED | OUTPATIENT
Start: 2022-10-06 | End: 2022-10-11 | Stop reason: HOSPADM

## 2022-10-06 RX ORDER — PANTOPRAZOLE SODIUM 40 MG/1
40 TABLET, DELAYED RELEASE ORAL
Status: DISCONTINUED | OUTPATIENT
Start: 2022-10-07 | End: 2022-10-11 | Stop reason: HOSPADM

## 2022-10-06 RX ORDER — AMIODARONE HYDROCHLORIDE 200 MG/1
200 TABLET ORAL DAILY
Status: DISCONTINUED | OUTPATIENT
Start: 2022-10-06 | End: 2022-10-11 | Stop reason: HOSPADM

## 2022-10-06 RX ORDER — INSULIN GLARGINE 100 [IU]/ML
10 INJECTION, SOLUTION SUBCUTANEOUS DAILY
Status: DISCONTINUED | OUTPATIENT
Start: 2022-10-07 | End: 2022-10-07

## 2022-10-06 RX ORDER — ASPIRIN 81 MG/1
324 TABLET, CHEWABLE ORAL ONCE
Status: COMPLETED | OUTPATIENT
Start: 2022-10-06 | End: 2022-10-06

## 2022-10-06 RX ORDER — POLYETHYLENE GLYCOL 3350 17 G/17G
17 POWDER, FOR SOLUTION ORAL DAILY PRN
Status: DISCONTINUED | OUTPATIENT
Start: 2022-10-06 | End: 2022-10-06

## 2022-10-06 RX ORDER — FUROSEMIDE 40 MG/1
40 TABLET ORAL DAILY
Status: DISCONTINUED | OUTPATIENT
Start: 2022-10-06 | End: 2022-10-11 | Stop reason: HOSPADM

## 2022-10-06 RX ORDER — FLUTICASONE PROPIONATE 50 MCG
2 SPRAY, SUSPENSION (ML) NASAL DAILY
Status: DISCONTINUED | OUTPATIENT
Start: 2022-10-06 | End: 2022-10-11 | Stop reason: HOSPADM

## 2022-10-06 RX ORDER — ATORVASTATIN CALCIUM 80 MG/1
80 TABLET, FILM COATED ORAL NIGHTLY
Status: DISCONTINUED | OUTPATIENT
Start: 2022-10-06 | End: 2022-10-11 | Stop reason: HOSPADM

## 2022-10-06 RX ORDER — INSULIN LISPRO 100 [IU]/ML
0-8 INJECTION, SOLUTION INTRAVENOUS; SUBCUTANEOUS
Status: DISCONTINUED | OUTPATIENT
Start: 2022-10-06 | End: 2022-10-07

## 2022-10-06 RX ORDER — INSULIN LISPRO 100 [IU]/ML
0-4 INJECTION, SOLUTION INTRAVENOUS; SUBCUTANEOUS NIGHTLY
Status: DISCONTINUED | OUTPATIENT
Start: 2022-10-06 | End: 2022-10-07

## 2022-10-06 RX ORDER — ASPIRIN 81 MG/1
81 TABLET, CHEWABLE ORAL DAILY
Status: DISCONTINUED | OUTPATIENT
Start: 2022-10-07 | End: 2022-10-11 | Stop reason: HOSPADM

## 2022-10-06 RX ORDER — 0.9 % SODIUM CHLORIDE 0.9 %
500 INTRAVENOUS SOLUTION INTRAVENOUS ONCE
Status: COMPLETED | OUTPATIENT
Start: 2022-10-06 | End: 2022-10-06

## 2022-10-06 RX ORDER — DEXTROSE MONOHYDRATE 100 MG/ML
INJECTION, SOLUTION INTRAVENOUS CONTINUOUS PRN
Status: DISCONTINUED | OUTPATIENT
Start: 2022-10-06 | End: 2022-10-11 | Stop reason: HOSPADM

## 2022-10-06 RX ORDER — POLYETHYLENE GLYCOL 3350 17 G/17G
17 POWDER, FOR SOLUTION ORAL DAILY PRN
Status: DISCONTINUED | OUTPATIENT
Start: 2022-10-06 | End: 2022-10-06 | Stop reason: SDUPTHER

## 2022-10-06 RX ORDER — ONDANSETRON 2 MG/ML
4 INJECTION INTRAMUSCULAR; INTRAVENOUS EVERY 6 HOURS PRN
Status: DISCONTINUED | OUTPATIENT
Start: 2022-10-06 | End: 2022-10-11 | Stop reason: HOSPADM

## 2022-10-06 RX ADMIN — INSULIN HUMAN 10 UNITS: 100 INJECTION, SOLUTION PARENTERAL at 14:46

## 2022-10-06 RX ADMIN — APIXABAN 5 MG: 5 TABLET, FILM COATED ORAL at 20:10

## 2022-10-06 RX ADMIN — FLUTICASONE PROPIONATE 2 SPRAY: 50 SPRAY, METERED NASAL at 18:53

## 2022-10-06 RX ADMIN — ASPIRIN 81 MG 324 MG: 81 TABLET ORAL at 18:52

## 2022-10-06 RX ADMIN — IOPAMIDOL 75 ML: 755 INJECTION, SOLUTION INTRAVENOUS at 14:32

## 2022-10-06 RX ADMIN — INSULIN LISPRO 4 UNITS: 100 INJECTION, SOLUTION INTRAVENOUS; SUBCUTANEOUS at 21:33

## 2022-10-06 RX ADMIN — FUROSEMIDE 40 MG: 40 TABLET ORAL at 18:53

## 2022-10-06 RX ADMIN — INSULIN LISPRO 8 UNITS: 100 INJECTION, SOLUTION INTRAVENOUS; SUBCUTANEOUS at 19:20

## 2022-10-06 RX ADMIN — ATORVASTATIN CALCIUM 80 MG: 80 TABLET, FILM COATED ORAL at 20:10

## 2022-10-06 RX ADMIN — AMIODARONE HYDROCHLORIDE 200 MG: 200 TABLET ORAL at 18:53

## 2022-10-06 RX ADMIN — SODIUM CHLORIDE 500 ML: 9 INJECTION, SOLUTION INTRAVENOUS at 14:43

## 2022-10-06 ASSESSMENT — ENCOUNTER SYMPTOMS
VOMITING: 0
VOICE CHANGE: 0
WHEEZING: 0
SHORTNESS OF BREATH: 0
ABDOMINAL PAIN: 0
CHEST TIGHTNESS: 0
NAUSEA: 0
SORE THROAT: 0
COUGH: 0
BACK PAIN: 0

## 2022-10-06 ASSESSMENT — PAIN - FUNCTIONAL ASSESSMENT
PAIN_FUNCTIONAL_ASSESSMENT: NONE - DENIES PAIN
PAIN_FUNCTIONAL_ASSESSMENT: NONE - DENIES PAIN

## 2022-10-06 NOTE — ED NOTES
Reminded patient of need for urine specimen and again provided urinal     Viridiana Cooper RN  10/06/22 0975

## 2022-10-06 NOTE — ED PROVIDER NOTES
[address] [facility]        Pt Name: Viann Boast  MRN: [id number]  [place] 1965  Date of evaluation: 10/6/2022  Provider: CONSUELO Bhatia CNP  PCP: Laura Moy DO  Note Started: 3:50 PM EDT       MARGARET. I have evaluated this patient. My supervising physician was available for consultation. CHIEF COMPLAINT       Chief Complaint   Patient presents with    Dizziness     States dizzy since this past weekend states when he gets up he is dizzy, states he has not taken any of his home medication because he is out       HISTORY OF PRESENT ILLNESS   (Location, Timing/Onset, Context/Setting, Quality, Duration, Modifying Factors, Severity, Associated Signs and Symptoms)  Note limiting factors. Chief Complaint: Dizziness    Shaneka Caraballo is a 61 y.o. male who presents to the emergency department today reporting dizziness and weakness. Patient states that he began having the symptoms approximately 5 days ago. His dizziness is worse with standing. Patient with history of multiple comorbids including CHF, diabetes, hyperlipidemia, hypertension, and recently diagnosed with paroxysmal atrial fibrillation. He is a current smoker with history of polysubstance abuse. He has been out of all of his medications for the past 2 weeks. Patient states that he has been unable to follow-up because he is currently homeless. Patient denies headache. He denies having any unilateral extremity weakness. He does report feeling unsteady on his feet. He denies chest pain or shortness of breath. He denies having any increased lower extremity pain or swelling. Patient states \"I just do not feel well\". He denies recent travel or known sick contacts. He denies fever, chills, or other symptoms. Nursing Notes were all reviewed and agreed with or any disagreements were addressed in the HPI.     REVIEW OF SYSTEMS    (2-9 systems for level 4, 10 or more for level 5)     Review of Systems   Constitutional:  Positive for fatigue. Negative for chills and fever. HENT:  Negative for congestion, sore throat and voice change. Eyes:  Negative for visual disturbance. Respiratory:  Negative for cough, chest tightness, shortness of breath and wheezing. Cardiovascular:  Negative for chest pain and palpitations. Gastrointestinal:  Negative for abdominal pain, nausea and vomiting. Endocrine: Negative for polydipsia and polyuria. Genitourinary:  Negative for difficulty urinating, dysuria and flank pain. Musculoskeletal:  Negative for back pain, neck pain and neck stiffness. Skin:  Negative for rash and wound. Allergic/Immunologic: Negative for immunocompromised state. Neurological:  Positive for dizziness, weakness and light-headedness. Negative for syncope, facial asymmetry and headaches. Hematological:  Does not bruise/bleed easily. Psychiatric/Behavioral:  Negative for sleep disturbance and suicidal ideas. Positives and Pertinent negatives as per HPI. Except as noted above in the ROS, all other systems were reviewed and negative. PAST MEDICAL HISTORY     Past Medical History:   Diagnosis Date    Abdominal pain 7/29/2021    Abnormal EKG 7/28/2016    Acute pancreatitis 7/29/2021    CHF (congestive heart failure) ([facility] [address]     Cigarette smoker 8/25/2021    Cocaine abuse ([facility] [address] 3/28/2015    Last Assessment & Plan:  Formatting of this note might be different from the original. Counseled on cessation as increases risk of coronary vasospasm and further worsening of heart function.     Dehydration 7/28/2016    Diabetes mellitus ([address]     History of cocaine abuse ([facility] [address] 7/28/2016    History of MI (myocardial infarction) 7/28/2016    Hyperglycemia 7/28/2016    Hyperlipidemia     Hypertension     Morbid obesity with BMI of 45.0-49.9, adult ([facility] [address] 7/28/2016    Postural dizziness 7/28/2016    Pre-diabetes 3/9/2017    Last Assessment & Plan:  Formatting of this note might be different from the original. Discussed lifestyle changes Refer to [facility] program    PUD (peptic ulcer disease) 3/1/2019         SURGICAL HISTORY     Past Surgical History:   Procedure Laterality Date    CARDIAC CATHETERIZATION      UPPER GASTROINTESTINAL ENDOSCOPY           CURRENTMEDICATIONS       Previous Medications    AMIODARONE (CORDARONE) 200 MG TABLET    TAKE 1 TABLET BY MOUTH EVERY DAY    ATORVASTATIN (LIPITOR) 80 MG TABLET    TAKE 1 TABLET BY MOUTH NIGHTLY    BLOOD GLUCOSE MONITORING SUPPL (TRUE METRIX METER) W/DEVICE KIT    1 kit by Does not apply route 4 times daily Dispense one kit  Pharmacy cannot do brand substitutions    BLOOD PRESSURE MONITOR KIT    1 Device by Does not apply route daily    CARVEDILOL (COREG) 25 MG TABLET    TAKE 1 TABLET BY MOUTH TWICE A DAY WITH MEALS    [facility] ASPIRIN ADULT LOW DOSE 81 MG CHEWABLE TABLET    TAKE 1 TABLET BY MOUTH EVERY DAY    ELIQUIS 5 MG TABS TABLET    TAKE 1 TABLET BY MOUTH TWICE A DAY    FARXIGA 5 MG TABLET    TAKE 1 TABLET BY MOUTH EVERY DAY IN THE MORNING    FLUTICASONE (FLONASE) 50 MCG/ACT NASAL SPRAY    2 sprays by Nasal route daily    FUROSEMIDE (LASIX) 40 MG TABLET    TAKE 1 TABLET BY MOUTH EVERY DAY    INSULIN GLARGINE (LANTUS) 100 UNIT/ML INJECTION VIAL    Inject 10 Units into the skin daily (with breakfast)    INSULIN PEN NEEDLE (ShowNearbyE PEN NEEDLES) 32G X 4 MM MISC    1 each by Does not apply route 3 times daily Pharmacy can do brand substitutions    KLOR-CON M10 10 MEQ EXTENDED RELEASE TABLET    TAKE 1 TABLET BY MOUTH TWICE A DAY    LANCET DEVICES (LANCING DEVICE) MISC    1 lancet device  Pharmacy cannot do brand substitutions    LANCETS MISC    Check blood sugar four times.   Pharmacy can do brand substitutions    LOSARTAN (COZAAR) 25 MG TABLET    TAKE 1 TABLET BY MOUTH EVERY DAY    OMEPRAZOLE (PRILOSEC) 20 MG DELAYED RELEASE CAPSULE    Take 1 capsule by mouth Daily    POLYETHYLENE GLYCOL (GLYCOLAX) 17 GM/SCOOP POWDER    Take 17 g by mouth daily as needed for Constipation    SPIRONOLACTONE (ALDACTONE) 25 MG TABLET    TAKE 1 TABLET BY MOUTH EVERY DAY         ALLERGIES     Patient has no known allergies. FAMILYHISTORY     No family history on file. SOCIAL HISTORY       Social History     Tobacco Use    Smoking status: Every Day     Packs/day: 0.50     Years: 25.00     Pack years: 12.50     Types: Cigarettes    Smokeless tobacco: Never   Vaping Use    Vaping Use: Never used   Substance Use Topics    Alcohol use: Not Currently    Drug use: Yes     Types: Marijuana (Weed)       SCREENINGS   NIH Stroke Scale  Interval: Baseline  Level of Consciousness (1a): Alert  LOC Questions (1b): Answers both correctly  LOC Commands (1c): Performs both tasks correctly  Best Gaze (2): Normal  Visual (3): No visual loss  Facial Palsy (4): Normal symmetrical movement  Motor Arm, Left (5a): No drift  Motor Arm, Right (5b): No drift  Motor Leg, Left (6a): No drift  Motor Leg, Right (6b): No drift  Limb Ataxia (7): Absent  Sensory (8): Normal  Best Language (9): No aphasia  Dysarthria (10): Normal  Extinction and Inattention (11): No abnormality  Total: 0Glasgow Coma Scale  Eye Opening: Spontaneous  Best Verbal Response: Oriented  Best Motor Response: Obeys commands  Chanelle Coma Scale Score: 15        PHYSICAL EXAM    (up to 7 for level 4, 8 or more for level 5)     ED Triage Vitals   BP Temp Temp Source Heart Rate Resp SpO2 Height Weight   10/06/22 1201 10/06/22 1157 10/06/22 1157 10/06/22 1157 10/06/22 1157 10/06/22 1157 10/06/22 1157 10/06/22 1157   (!) 181/91 97 °F (36.1 °C) Tympanic 65 18 100 % 6' (1.829 m) 299 lb 2.6 oz (135.7 kg)       Physical Exam  Vitals and nursing note reviewed. Constitutional:       General: He is not in acute distress. Appearance: Normal appearance. He is ill-appearing. Comments: Obese, appears chronically ill, unkempt   HENT:      Head: Normocephalic and atraumatic.       Right Ear: External ear normal.      Left Ear: External ear normal.      Nose: Nose normal.      Mouth/Throat:      Mouth: Mucous membranes are dry. Pharynx: Oropharynx is clear. Eyes:      General:         Right eye: No discharge. Left eye: No discharge. Extraocular Movements: Extraocular movements intact. Conjunctiva/sclera: Conjunctivae normal.      Pupils: Pupils are equal, round, and reactive to light. Cardiovascular:      Rate and Rhythm: Normal rate and regular rhythm. Pulses: Normal pulses. Heart sounds: Normal heart sounds. Pulmonary:      Effort: Pulmonary effort is normal. No respiratory distress. Breath sounds: Normal breath sounds. Abdominal:      General: Bowel sounds are normal.      Palpations: Abdomen is soft. Tenderness: There is no abdominal tenderness. There is no right CVA tenderness or left CVA tenderness. Musculoskeletal:         General: Normal range of motion. Cervical back: Normal range of motion and neck supple. No rigidity or tenderness. Right lower leg: No edema. Left lower leg: No edema. Skin:     General: Skin is warm and dry. Capillary Refill: Capillary refill takes less than 2 seconds. Neurological:      General: No focal deficit present. Mental Status: He is alert and oriented to person, place, and time. Psychiatric:         Mood and Affect: Mood normal.         Behavior: Behavior normal.         Thought Content:  Thought content normal.         Judgment: Judgment normal.       DIAGNOSTIC RESULTS   LABS:    Labs Reviewed   COMPREHENSIVE METABOLIC PANEL W/ REFLEX TO MG FOR LOW K - Abnormal; Notable for the following components:       Result Value    Sodium 131 (*)     Chloride 97 (*)     Glucose 474 (*)     AST 12 (*)     All other components within normal limits   URINE DRUG SCREEN - Abnormal; Notable for the following components:    Cannabinoid Scrn, Ur POSITIVE (*)     Cocaine Metabolite Screen, Urine POSITIVE (*)     All other components within normal limits   URINALYSIS WITH REFLEX TO CULTURE - Abnormal; Notable for the following components:    Glucose, Ur 250 (*)     Ketones, Urine TRACE (*)     All other components within normal limits   BRAIN NATRIURETIC PEPTIDE - Abnormal; Notable for the following components:    Pro- (*)     All other components within normal limits   BETA-HYDROXYBUTYRATE - Abnormal; Notable for the following components:    Beta-Hydroxybutyrate 0.65 (*)     All other components within normal limits   POCT GLUCOSE - Abnormal; Notable for the following components:    POC Glucose 473 (*)     All other components within normal limits   CBC WITH AUTO DIFFERENTIAL   TROPONIN   PROTIME-INR   POCT GLUCOSE       When ordered only abnormal lab results are displayed. All other labs were within normal range or not returned as of this dictation. EKG: When ordered, EKG's are interpreted by the Emergency Department Physician in the absence of a cardiologist.  Please see their note for interpretation of EKG. RADIOLOGY:   Non-plain film images such as CT, Ultrasound and MRI are read by the radiologist. Plain radiographic images are visualized and preliminarily interpreted by the ED Provider with the below findings:        Interpretation per the Radiologist below, if available at the time of this note:    CT HEAD WO CONTRAST   Preliminary Result   1. Subtle focus of asymmetric low attenuation in the inferomedial right   cerebellar hemisphere. Finding may be artifactual.  Subtle area of recent   ischemic change cannot be excluded. Follow-up MRI examination with diffusion   imaging may be helpful for more complete evaluation. 2. Otherwise, no evidence of acute intracranial abnormality. CTA HEAD NECK W CONTRAST   Final Result   1.  Hemodynamically significant stenosis within the right cervical ICA with   approximately 90% stenosis per NASCET criteria related to noncalcified and   calcified plaque, most pronounced 1.5 cm distal to the bifurcation. 2. Fibrocalcific plaque within the left proximal cervical ICA resulting in   approximately 45-50% stenosis per NASCET criteria. 3. Patent cervical vertebral arteries. 4.  No apparent arterial high grade stenosis, occlusion or aneurysm within   the head. XR CHEST PORTABLE   Preliminary Result   No evidence of acute cardiopulmonary disease. CT HEAD WO CONTRAST    Result Date: 10/6/2022  EXAMINATION: CT OF THE HEAD WITHOUT CONTRAST  10/6/2022 2:16 pm TECHNIQUE: CT of the head was performed without the administration of intravenous contrast. Automated exposure control, iterative reconstruction, and/or weight based adjustment of the mA/kV was utilized to reduce the radiation dose to as low as reasonably achievable. COMPARISON: None. HISTORY: ORDERING SYSTEM PROVIDED HISTORY: Stroke Symptoms TECHNOLOGIST PROVIDED HISTORY: Has a \"code stroke\" or \"stroke alert\" been called? ->No Reason for exam:->Stroke Symptoms Decision Support Exception - unselect if not a suspected or confirmed emergency medical condition->Emergency Medical Condition (MA) FINDINGS: BRAIN/VENTRICLES: Mild streak/quantum artifact is present on the examination. The ventricular system is normal in appearance. No evidence of mass effect or midline shift. Subtle foci of low attenuation within periventricular white matter are nonspecific, however may represent changes of minimal ischemic leukoencephalopathy. Tiny focus of low attenuation approaching density of CSF near the frontal horn of the right lateral ventricle (image 34) could represent tiny remote lacunar infarct. Small focal area of asymmetric low attenuation identified along the inferomedial right cerebellar hemisphere (image 11). Finding may be artifactual.  Subtle focus of recent ischemic change cannot be excluded. No abnormal extra-axial fluid collection is identified. There is minimal atherosclerotic calcification of distal internal carotid arteries. ORBITS: The visualized portion of the orbits demonstrate no acute abnormality. There is mild nonspecific dysconjugate gaze. SINUSES: The visualized paranasal sinuses and mastoid air cells demonstrate no acute abnormality. SOFT TISSUES/SKULL:  No acute abnormality of the visualized skull or soft tissues. 1. Subtle focus of asymmetric low attenuation in the inferomedial right cerebellar hemisphere. Finding may be artifactual.  Subtle area of recent ischemic change cannot be excluded. Follow-up MRI examination with diffusion imaging may be helpful for more complete evaluation. 2. Otherwise, no evidence of acute intracranial abnormality. XR CHEST PORTABLE    Result Date: 10/6/2022  EXAMINATION: ONE XRAY VIEW OF THE CHEST 10/6/2022 12:19 pm COMPARISON: Prior studies, most recent 03/19/2022. HISTORY: ORDERING SYSTEM PROVIDED HISTORY: stroke symptoms TECHNOLOGIST PROVIDED HISTORY: Reason for exam:->stroke symptoms FINDINGS: Electrocardiographic monitor leads overlie the patient's chest.  There are multilevel degenerative changes of the thoracic spine. Cardiopericardial silhouette is within normal limits for an AP film. There are atherosclerotic changes of the aorta. Pulmonary vasculature is normal.  No focal confluent pulmonary infiltrate is identified. No evidence of pleural effusion or pneumothorax. No evidence of acute cardiopulmonary disease. PROCEDURES   Unless otherwise noted below, none     Procedures    CRITICAL CARE TIME   I personally saw the patient and independently provided 24 minutes of non-concurrent critical care time out of the total critical care time provided. This excludes time spent doing separately billable procedures. This includes time at the bedside, data interpretation, medication management, obtaining critical history from collateral sources if the patient is unable to provide it directly, and physician consultation.   Specifics of interventions taken and potentially life-threatening diagnostic considerations are listed above in the medical decision making. CONSULTS:  IP CONSULT TO PHARMACY  PHARMACY TO CHANGE BASE FLUIDS  IP CONSULT TO HOSPITALIST  IP CONSULT TO VASCULAR SURGERY  IP CONSULT TO NEUROLOGY      EMERGENCY DEPARTMENT COURSE and DIFFERENTIAL DIAGNOSIS/MDM:   Vitals:    Vitals:    10/06/22 1157 10/06/22 1201 10/06/22 1330   BP:  (!) 181/91 (!) 181/82   Pulse: 65  67   Resp: 18  12   Temp: 97 °F (36.1 °C)     TempSrc: Tympanic     SpO2: 100%  97%   Weight: 299 lb 2.6 oz (135.7 kg)     Height: 6' (1.829 m)         Patient was given the following medications:  Medications   aspirin chewable tablet 324 mg (has no administration in time range)   0.9 % sodium chloride bolus (500 mLs IntraVENous New Bag 10/6/22 1443)   insulin regular (HUMULIN R;NOVOLIN R) injection 10 Units (10 Units IntraVENous Given 10/6/22 1446)   iopamidol (ISOVUE-370) 76 % injection 75 mL (75 mLs IntraVENous Given 10/6/22 1432)         Is this patient to be included in the SEP-1 Core Measure due to severe sepsis or septic shock? No   Exclusion criteria - the patient is NOT to be included for SEP-1 Core Measure due to: Infection is not suspected    Previous records reviewed in order to gain further information regarding patient's PMH as well as his HPI. Nursing notes reviewed. This is a 69-year-old -American male with history of multiple comorbid's including diabetes, previous MI, hyperlipidemia, hypertension, paroxysmal atrial fibrillation who presents to the emergency department today reporting 5-day history of dizziness. Patient states that he has not taken any of his prescribed medications over the past 2 weeks. EKG is negative for acute ST elevation. Laboratory studies notable for a glucose of 473 without evidence of DKA. Drug screen is positive for cocaine and marijuana.      Stroke imaging shows subtle focus of asymmetric low attenuation in the inferomedial right cerebellar hemisphere. Finding may be artifactual. Subtle area of recent ischemic change cannot be excluded. Follow-up MRI examination with diffusion imaging may be helpful for more complete evaluation. 2. Otherwise, no evidence of acute intracranial abnormality. Patient reassessed. He has remained stable throughout ED visit. At this time there is no evidence of any life-threatening or emergent conditions requiring immediate intervention however given patient's history and ED work-up I do feel he will benefit from admission for further evaluation and treatment. Patient is in agreement with plan of care. Approximately 6 minutes of smoking cessation counseling provided during this ED visit. Benefits of smoking cessation discussed. Outpatient resources for help with smoking cessation provided. Patient admitted under hospitalist service. He is a full code at the time of this admission. FINAL IMPRESSION      1. Dizziness    2. Poorly controlled diabetes mellitus ([facility] [address]    3. Noncompliance with medication regimen    4. Polysubstance abuse ([facility]          DISPOSITION/PLAN   DISPOSITION Admitted 10/06/2022 04:18:26 PM      PATIENT REFERRED TO:  No follow-up provider specified.     DISCHARGE MEDICATIONS:  New Prescriptions    No medications on file       DISCONTINUED MEDICATIONS:  Discontinued Medications    FAMOTIDINE (PEPCID) 20 MG TABLET    Take 1 tablet by mouth 2 times daily              (Please note that portions of this note were completed with a voice recognition program.  Efforts were made to edit the dictations but occasionally words are mis-transcribed.)    CONSUELO Reece CNP (electronically signed)           CONSUELO Reece CNP  10/06/22 7215

## 2022-10-06 NOTE — PROGRESS NOTES
Medication Reconciliation    List of medications patient is currently taking is complete. Source of information: 1. Conversation with patient at bedside                                      2. EPIC records      Allergies  Patient has no known allergies. Notes regarding home medications:   1. Patient has been out of all of his medications for quite some time.  However, based on dispense history and patient's confirmation, current list seems to reflect what patient is SUPPOSED to be taking

## 2022-10-06 NOTE — H&P
Hospital Medicine History & Physical      PCP: Gera Reyna DO    Date of Admission: 10/6/2022    Date of Service: Pt seen/examined on 10/6/2022 and Admitted to Inpatient    Chief Complaint: Imbalance, dizziness      History Of Present Illness: The patient is a 61 y.o. male with history of cocaine abuse, tobacco abuse, hypertension, lipidemia, morbid obesity, MI, type 2 diabetes, and diastolic heart failure who presents to [facility] with imbalance and dizziness. Patient states that starting on Saturday, he started to notice some imbalance that has been progressive throughout the last several days. Patient notes that he is sort of leaning more towards the right when he walks, and feels like he has to catch himself as he is walking. Patient notes that this is a new symptom for him. Patient denies visual disturbances, chest pain, fever, chills, shortness of breath, abdominal pain, nausea, vomiting, constipation, diarrhea, and dysuria. Patient notes that he has been out of his medications for a few weeks, and he has not taken any medication in the last few weeks. In the ED, labs were significant for sodium of 131, chloride of 97, glucose of 474, proBNP of 397. Chest x-ray was unremarkable. EKG showed normal sinus rhythm and possible LVH. CT head without contrast showed a subtle focus of asymmetric low attenuation in the inferior medial right cerebellar hemisphere. CTA head and neck with contrast showed a hemodynamically significant stenosis within the right cervical ICA with approximately 90% stenosis, as well as a left proximal cervical ICA resulting in approximately 45 to 50% stenosis.     Past Medical History:        Diagnosis Date    Abdominal pain 7/29/2021    Abnormal EKG 7/28/2016    Acute pancreatitis 7/29/2021    CHF (congestive heart failure) [facility])     Cigarette smoker 8/25/2021    Cocaine abuse ([facility] [address] 3/28/2015    Last Assessment & Plan:  Formatting of this note might be different from the original. Henry Frankel on cessation as increases risk of coronary vasospasm and further worsening of heart function. Dehydration 7/28/2016    Diabetes mellitus ([facility] [address]     History of cocaine abuse ([facility] 7/28/2016    History of MI (myocardial infarction) 7/28/2016    Hyperglycemia 7/28/2016    Hyperlipidemia     Hypertension     Morbid obesity with BMI of 45.0-49.9, adult ([facility] [address] 7/28/2016    Postural dizziness 7/28/2016    Pre-diabetes 3/9/2017    Last Assessment & Plan:  Formatting of this note might be different from the original. Discussed lifestyle changes Refer to [facility] program    PUD (peptic ulcer disease) 3/1/2019       Past Surgical History:        Procedure Laterality Date    CARDIAC CATHETERIZATION      UPPER GASTROINTESTINAL ENDOSCOPY         Medications Prior to Admission:    Prior to Admission medications    Medication Sig Start Date End Date Taking?  Authorizing Provider   amiodarone (CORDARONE) 200 MG tablet TAKE 1 TABLET BY MOUTH EVERY DAY 6/24/22   Grace Lasstier APRN - CNP   carvedilol (COREG) 25 MG tablet TAKE 1 TABLET BY MOUTH TWICE A DAY WITH MEALS 6/20/22   Tania Varela MD   losartan (COZAAR) 25 MG tablet TAKE 1 TABLET BY MOUTH EVERY DAY 6/20/22   Tania aVrela MD   spironolactone (ALDACTONE) 25 MG tablet TAKE 1 TABLET BY MOUTH EVERY DAY 6/20/22   Tania Varela MD   KLOR-CON M10 10 MEQ extended release tablet TAKE 1 TABLET BY MOUTH TWICE A DAY 6/20/22   Tania Varela MD   furosemide (LASIX) 40 MG tablet TAKE 1 TABLET BY MOUTH EVERY DAY 6/20/22   Tania Varela MD   atorvastatin (LIPITOR) 80 MG tablet TAKE 1 TABLET BY MOUTH NIGHTLY 6/20/22   Tania Varela MD   FARXIGA 5 MG tablet TAKE 1 TABLET BY MOUTH EVERY DAY IN THE MORNING 6/2/22   Tania Varela MD   ELIQUIS 5 MG TABS tablet TAKE 1 TABLET BY MOUTH TWICE A DAY 6/2/22 Malcolm Tinajero MD   [facility] ASPIRIN ADULT LOW DOSE 81 MG chewable tablet TAKE 1 TABLET BY MOUTH EVERY DAY 5/18/22   Megan Both, DO   Blood Pressure Monitor KIT 1 Device by Does not apply route daily 4/21/22   Megan Both, DO   insulin glargine (LANTUS) 100 UNIT/ML injection vial Inject 10 Units into the skin daily (with breakfast) 3/22/22   CONSUELO Arrington NP   fluticasone (FLONASE) 50 MCG/ACT nasal spray 2 sprays by Nasal route daily 3/22/22   CONSUELO Arrington NP   omeprazole (PRILOSEC) 20 MG delayed release capsule Take 1 capsule by mouth Daily 10/21/21   Megan Both, DO   polyethylene glycol (GLYCOLAX) 17 GM/SCOOP powder Take 17 g by mouth daily as needed for Constipation 8/30/21   Historical Provider, MD   Lancet Devices (LANCING DEVICE) MISC 1 lancet device  Pharmacy cannot do brand substitutions 7/31/21   Zeus Lovell MD   Lancets MISC Check blood sugar four times. Pharmacy can do brand substitutions 7/31/21   Zeus Lovell MD   Blood Glucose Monitoring Suppl (TRUE METRIX METER) w/Device KIT 1 kit by Does not apply route 4 times daily Dispense one kit  Pharmacy cannot do brand substitutions 7/31/21   Zeus Lovell MD   Insulin Pen Needle (CAREFINE PEN NEEDLES) 32G X 4 MM MISC 1 each by Does not apply route 3 times daily Pharmacy can do brand substitutions 7/31/21   Zeus Lovell MD       Allergies:  Patient has no known allergies. Social History:  The patient currently lives at home. TOBACCO:   reports that he has been smoking cigarettes. He has a 12.50 pack-year smoking history. He has never used smokeless tobacco.  ETOH:   reports that he does not currently use alcohol. Family History:  Reviewed in detail and negative for DM, Early CAD, Cancer, CVA. Positive as follows:    No family history on file. REVIEW OF SYSTEMS:   Positive for and as noted in the HPI. All other systems reviewed and negative.     PHYSICAL EXAM:    BP (!) 181/82 Pulse 67   Temp 97 °F (36.1 °C) (Tympanic)   Resp 12   Ht 6' (1.829 m)   Wt 299 lb 2.6 oz (135.7 kg)   SpO2 97%   BMI 40.57 kg/m²     General appearance: No apparent distress appears stated age and cooperative. HEENT Normal cephalic, atraumatic without obvious deformity. Pupils equal, round, and reactive to light. Extra ocular muscles intact. Conjunctivae/corneas clear. Neck: Supple, No jugular venous distention/bruits. Trachea midline without thyromegaly or adenopathy with full range of motion. Lungs: Clear to auscultation, bilaterally without Rales/Wheezes/Rhonchi with good respiratory effort. Heart: Regular rate and rhythm with Normal S1/S2 without murmurs, rubs or gallops, point of maximum impulse non-displaced  Abdomen: Soft, non-tender or non-distended without rigidity or guarding and positive bowel sounds all four quadrants. Extremities: No clubbing, cyanosis, or edema bilaterally. Full range of motion without deformity and normal gait intact. Skin: Skin color, texture, turgor normal.  No rashes or lesions. Neurologic: Alert and oriented X 3, neurovascularly intact with sensory/motor intact upper extremities/lower extremities, bilaterally. Cranial nerves: II-XII intact, grossly non-focal.  Impaired finger-to-nose, slow heel-to-shin transfer. Mental status: Alert, oriented, thought content appropriate. Capillary Refill: Acceptable  < 3 seconds  Peripheral Pulses: +3 Easily felt, not easily obliterated with pressure      CXR:  I have reviewed the CXR with the following interpretation: Unremarkable  EKG:  I have reviewed the EKG with the following interpretation: Normal sinus rhythm with possible LVH    CT HEAD WO CONTRAST   Preliminary Result   1. Subtle focus of asymmetric low attenuation in the inferomedial right   cerebellar hemisphere. Finding may be artifactual.  Subtle area of recent   ischemic change cannot be excluded.   Follow-up MRI examination with diffusion   imaging may be helpful for more complete evaluation. 2. Otherwise, no evidence of acute intracranial abnormality. CTA HEAD NECK W CONTRAST   Final Result   1. Hemodynamically significant stenosis within the right cervical ICA with   approximately 90% stenosis per NASCET criteria related to noncalcified and   calcified plaque, most pronounced 1.5 cm distal to the bifurcation. 2. Fibrocalcific plaque within the left proximal cervical ICA resulting in   approximately 45-50% stenosis per NASCET criteria. 3. Patent cervical vertebral arteries. 4.  No apparent arterial high grade stenosis, occlusion or aneurysm within   the head. XR CHEST PORTABLE   Preliminary Result   No evidence of acute cardiopulmonary disease.                CBC   Recent Labs     10/06/22  1236   WBC 6.7   HGB 14.9   HCT 44.1         RENAL  Recent Labs     10/06/22  1235   *   K 4.3   CL 97*   CO2 22   BUN 16   CREATININE 1.0     LFT'S  Recent Labs     10/06/22  1235   AST 12*   ALT 12   BILITOT 0.3   ALKPHOS 74     COAG  Recent Labs     10/06/22  1236   INR 0.97     CARDIAC ENZYMES  Recent Labs     10/06/22  1235   TROPONINI <0.01       U/A:    Lab Results   Component Value Date/Time    COLORU Yellow 10/06/2022 03:00 PM    WBCUA 0-2 03/19/2022 05:10 PM    RBCUA 0-2 03/19/2022 05:10 PM    MUCUS 1+ 03/19/2022 05:10 PM    CLARITYU Clear 10/06/2022 03:00 PM    SPECGRAV 1.056 10/06/2022 03:00 PM    LEUKOCYTESUR Negative 10/06/2022 03:00 PM    BLOODU Negative 10/06/2022 03:00 PM    GLUCOSEU 250 10/06/2022 03:00 PM       ABG  No results found for: CRR5BZW, BEART, Z5NVIEVR, PHART, THGBART, LVR0LUH, PO2ART, CEO2VKY        Active Hospital Problems    Diagnosis Date Noted    Acute CVA (cerebrovascular accident) ([facility] [address] [I63.9] 10/06/2022     Priority: Medium         PHYSICIANS CERTIFICATION:    I certify that Gustavo Cantrell is expected to be hospitalized for more than 2 midnights based on the following assessment and plan:      ASSESSMENT/PLAN:      Imbalance, incoordination -suspect acute CVA to the right cerebellar hemisphere  -Obtain MRI brain without contrast  -Neurology consulted, recs appreciated  -Aspirin and high intensity statin  -Continue Eliquis  -Telemetry monitoring  -PT OT evaluation  -Bedside swallow evaluation  -Lipid panel  -Check Hgb A1c  -Obtain echo with bubble study  -Neurochecks  -Allow permissive hypertension  -Labetalol as needed    Right ICA stenosis  -Vascular surgery consulted, recs appreciated    Hypertensive urgency  -Allow permissive hypertension  -Labetalol as needed for SBP > 220    Type 2 diabetes  -Lantus  -Medium dose SSI  -Hypoglycemia protocol  -POCT glucose checks  -Carb controlled diet    Paroxysmal atrial fibrillation  -Continue home amiodarone and Eliquis  -Telemetry monitoring    Cocaine abuse  -Abstinence encouraged  -Contributing to blood pressure issues    Morbid obesity due to excess calories-BMI 40.57  -Nutritional counseling provided  -Weight loss encouraged  -Complicating medical management    Hyperlipidemia  -Continue high intensity statin    Chronic diastolic CHF  -Continue home medications  -Telemetry monitoring    Tobacco abuse  -Smoking cessation counseling provided  -Nicotine patch is offered      DVT Prophylaxis: Eliquis  Diet: Diet NPO  Code Status: Full  PT/OT Eval Status: ordered    Dispo - admit PCU tele inpatient       Beata Powers MD    Thank you Jorge Dean DO for the opportunity to be involved in this patient's care. If you have any questions or concerns please feel free to contact me at [phone].

## 2022-10-06 NOTE — PROGRESS NOTES
Patient admitted to 5111 per stretcher from ED, patient able to ambulate to bed. Patient admitted to running out of medications and has not had any for the past \"several\" weeks. Recently moved in with his sister and states him and his sister did some cocaine this past Saturday but it had been 2 years prior to that since he had done cocaine. Admits to smoking THC yesterday. Speech evaluation completed, and upgraded diet to regular with 4 carb choices, due to uncontrolled  diabetes. No skin issues noted. Alert and oriented with no complaints at this time. Oriented to room, call light within reach, and non-skid footwear on while out of bed.

## 2022-10-07 ENCOUNTER — APPOINTMENT (OUTPATIENT)
Dept: MRI IMAGING | Age: 57
DRG: 045 | End: 2022-10-07
Payer: COMMERCIAL

## 2022-10-07 PROBLEM — I65.21 SYMPTOMATIC CAROTID ARTERY STENOSIS, RIGHT: Status: ACTIVE | Noted: 2022-10-07

## 2022-10-07 PROBLEM — R42 DIZZINESS: Status: ACTIVE | Noted: 2022-10-07

## 2022-10-07 LAB
A/G RATIO: 1.2 (ref 1.1–2.2)
ALBUMIN SERPL-MCNC: 3.8 G/DL (ref 3.4–5)
ALP BLD-CCNC: 64 U/L (ref 40–129)
ALT SERPL-CCNC: 11 U/L (ref 10–40)
ANION GAP SERPL CALCULATED.3IONS-SCNC: 13 MMOL/L (ref 3–16)
AST SERPL-CCNC: 12 U/L (ref 15–37)
BASOPHILS ABSOLUTE: 0 K/UL (ref 0–0.2)
BASOPHILS RELATIVE PERCENT: 0.8 %
BILIRUB SERPL-MCNC: 0.3 MG/DL (ref 0–1)
BUN BLDV-MCNC: 15 MG/DL (ref 7–20)
CALCIUM SERPL-MCNC: 8.5 MG/DL (ref 8.3–10.6)
CHLORIDE BLD-SCNC: 99 MMOL/L (ref 99–110)
CHOLESTEROL, TOTAL: 125 MG/DL (ref 0–199)
CO2: 20 MMOL/L (ref 21–32)
CREAT SERPL-MCNC: 1.1 MG/DL (ref 0.9–1.3)
EKG ATRIAL RATE: 80 BPM
EKG DIAGNOSIS: NORMAL
EKG P AXIS: 34 DEGREES
EKG P-R INTERVAL: 140 MS
EKG Q-T INTERVAL: 396 MS
EKG QRS DURATION: 90 MS
EKG QTC CALCULATION (BAZETT): 456 MS
EKG R AXIS: -10 DEGREES
EKG T AXIS: 224 DEGREES
EKG VENTRICULAR RATE: 80 BPM
EOSINOPHILS ABSOLUTE: 0.3 K/UL (ref 0–0.6)
EOSINOPHILS RELATIVE PERCENT: 4.1 %
ESTIMATED AVERAGE GLUCOSE: 323.5 MG/DL
ESTIMATED AVERAGE GLUCOSE: 329.3 MG/DL
GFR AFRICAN AMERICAN: >60
GFR NON-AFRICAN AMERICAN: >60
GLUCOSE BLD-MCNC: 201 MG/DL (ref 70–99)
GLUCOSE BLD-MCNC: 243 MG/DL (ref 70–99)
GLUCOSE BLD-MCNC: 295 MG/DL (ref 70–99)
GLUCOSE BLD-MCNC: 352 MG/DL (ref 70–99)
GLUCOSE BLD-MCNC: 512 MG/DL (ref 70–99)
HBA1C MFR BLD: 12.9 %
HBA1C MFR BLD: 13.1 %
HCT VFR BLD CALC: 40.8 % (ref 40.5–52.5)
HCT VFR BLD CALC: 41.1 % (ref 40.5–52.5)
HDLC SERPL-MCNC: 32 MG/DL (ref 40–60)
HEMOGLOBIN: 13.7 G/DL (ref 13.5–17.5)
HEMOGLOBIN: 14 G/DL (ref 13.5–17.5)
LDL CHOLESTEROL CALCULATED: 76 MG/DL
LV EF: 43 %
LVEF MODALITY: NORMAL
LYMPHOCYTES ABSOLUTE: 2.1 K/UL (ref 1–5.1)
LYMPHOCYTES RELATIVE PERCENT: 33.8 %
MCH RBC QN AUTO: 31 PG (ref 26–34)
MCH RBC QN AUTO: 31.7 PG (ref 26–34)
MCHC RBC AUTO-ENTMCNC: 33.7 G/DL (ref 31–36)
MCHC RBC AUTO-ENTMCNC: 34.1 G/DL (ref 31–36)
MCV RBC AUTO: 92.1 FL (ref 80–100)
MCV RBC AUTO: 93 FL (ref 80–100)
MONOCYTES ABSOLUTE: 0.5 K/UL (ref 0–1.3)
MONOCYTES RELATIVE PERCENT: 8.1 %
NEUTROPHILS ABSOLUTE: 3.4 K/UL (ref 1.7–7.7)
NEUTROPHILS RELATIVE PERCENT: 53.2 %
PDW BLD-RTO: 12.4 % (ref 12.4–15.4)
PDW BLD-RTO: 12.4 % (ref 12.4–15.4)
PERFORMED ON: ABNORMAL
PLATELET # BLD: 209 K/UL (ref 135–450)
PLATELET # BLD: 220 K/UL (ref 135–450)
PMV BLD AUTO: 8.6 FL (ref 5–10.5)
PMV BLD AUTO: 9.1 FL (ref 5–10.5)
POTASSIUM REFLEX MAGNESIUM: 4.2 MMOL/L (ref 3.5–5.1)
RBC # BLD: 4.42 M/UL (ref 4.2–5.9)
RBC # BLD: 4.44 M/UL (ref 4.2–5.9)
SODIUM BLD-SCNC: 132 MMOL/L (ref 136–145)
TOTAL PROTEIN: 6.9 G/DL (ref 6.4–8.2)
TRIGL SERPL-MCNC: 86 MG/DL (ref 0–150)
VLDLC SERPL CALC-MCNC: 17 MG/DL
WBC # BLD: 6.2 K/UL (ref 4–11)
WBC # BLD: 6.3 K/UL (ref 4–11)

## 2022-10-07 PROCEDURE — C8929 TTE W OR WO FOL WCON,DOPPLER: HCPCS

## 2022-10-07 PROCEDURE — 83036 HEMOGLOBIN GLYCOSYLATED A1C: CPT

## 2022-10-07 PROCEDURE — 97116 GAIT TRAINING THERAPY: CPT | Performed by: PHYSICAL THERAPIST

## 2022-10-07 PROCEDURE — 6360000004 HC RX CONTRAST MEDICATION: Performed by: INTERNAL MEDICINE

## 2022-10-07 PROCEDURE — 80053 COMPREHEN METABOLIC PANEL: CPT

## 2022-10-07 PROCEDURE — 36415 COLL VENOUS BLD VENIPUNCTURE: CPT

## 2022-10-07 PROCEDURE — 6370000000 HC RX 637 (ALT 250 FOR IP): Performed by: INTERNAL MEDICINE

## 2022-10-07 PROCEDURE — 85027 COMPLETE CBC AUTOMATED: CPT

## 2022-10-07 PROCEDURE — APPNB30 APP NON BILLABLE TIME 0-30 MINS: Performed by: NURSE PRACTITIONER

## 2022-10-07 PROCEDURE — 99222 1ST HOSP IP/OBS MODERATE 55: CPT | Performed by: STUDENT IN AN ORGANIZED HEALTH CARE EDUCATION/TRAINING PROGRAM

## 2022-10-07 PROCEDURE — 97530 THERAPEUTIC ACTIVITIES: CPT

## 2022-10-07 PROCEDURE — 80061 LIPID PANEL: CPT

## 2022-10-07 PROCEDURE — 92523 SPEECH SOUND LANG COMPREHEN: CPT

## 2022-10-07 PROCEDURE — 97530 THERAPEUTIC ACTIVITIES: CPT | Performed by: PHYSICAL THERAPIST

## 2022-10-07 PROCEDURE — 99254 IP/OBS CNSLTJ NEW/EST MOD 60: CPT | Performed by: INTERNAL MEDICINE

## 2022-10-07 PROCEDURE — 70551 MRI BRAIN STEM W/O DYE: CPT

## 2022-10-07 PROCEDURE — 97165 OT EVAL LOW COMPLEX 30 MIN: CPT

## 2022-10-07 PROCEDURE — 97162 PT EVAL MOD COMPLEX 30 MIN: CPT | Performed by: PHYSICAL THERAPIST

## 2022-10-07 PROCEDURE — 93010 ELECTROCARDIOGRAM REPORT: CPT | Performed by: INTERNAL MEDICINE

## 2022-10-07 PROCEDURE — 85025 COMPLETE CBC W/AUTO DIFF WBC: CPT

## 2022-10-07 PROCEDURE — 2060000000 HC ICU INTERMEDIATE R&B

## 2022-10-07 PROCEDURE — 94760 N-INVAS EAR/PLS OXIMETRY 1: CPT

## 2022-10-07 PROCEDURE — 92610 EVALUATE SWALLOWING FUNCTION: CPT

## 2022-10-07 RX ORDER — INSULIN GLARGINE 100 [IU]/ML
20 INJECTION, SOLUTION SUBCUTANEOUS DAILY
Status: DISCONTINUED | OUTPATIENT
Start: 2022-10-08 | End: 2022-10-08

## 2022-10-07 RX ORDER — SPIRONOLACTONE 25 MG/1
25 TABLET ORAL DAILY
Status: DISCONTINUED | OUTPATIENT
Start: 2022-10-07 | End: 2022-10-08

## 2022-10-07 RX ORDER — INSULIN LISPRO 100 [IU]/ML
0-16 INJECTION, SOLUTION INTRAVENOUS; SUBCUTANEOUS
Status: DISCONTINUED | OUTPATIENT
Start: 2022-10-07 | End: 2022-10-11 | Stop reason: HOSPADM

## 2022-10-07 RX ORDER — INSULIN LISPRO 100 [IU]/ML
0-4 INJECTION, SOLUTION INTRAVENOUS; SUBCUTANEOUS NIGHTLY
Status: DISCONTINUED | OUTPATIENT
Start: 2022-10-07 | End: 2022-10-11 | Stop reason: HOSPADM

## 2022-10-07 RX ORDER — INSULIN LISPRO 100 [IU]/ML
7 INJECTION, SOLUTION INTRAVENOUS; SUBCUTANEOUS
Status: DISCONTINUED | OUTPATIENT
Start: 2022-10-07 | End: 2022-10-08

## 2022-10-07 RX ORDER — LOSARTAN POTASSIUM 25 MG/1
25 TABLET ORAL DAILY
Status: DISCONTINUED | OUTPATIENT
Start: 2022-10-07 | End: 2022-10-08

## 2022-10-07 RX ORDER — CARVEDILOL 25 MG/1
25 TABLET ORAL 2 TIMES DAILY WITH MEALS
Status: DISCONTINUED | OUTPATIENT
Start: 2022-10-07 | End: 2022-10-11 | Stop reason: HOSPADM

## 2022-10-07 RX ADMIN — INSULIN GLARGINE 10 UNITS: 100 INJECTION, SOLUTION SUBCUTANEOUS at 10:39

## 2022-10-07 RX ADMIN — ASPIRIN 81 MG 81 MG: 81 TABLET ORAL at 10:38

## 2022-10-07 RX ADMIN — PERFLUTREN 1.5 ML: 6.52 INJECTION, SUSPENSION INTRAVENOUS at 09:02

## 2022-10-07 RX ADMIN — FUROSEMIDE 40 MG: 40 TABLET ORAL at 10:38

## 2022-10-07 RX ADMIN — FLUTICASONE PROPIONATE 2 SPRAY: 50 SPRAY, METERED NASAL at 10:39

## 2022-10-07 RX ADMIN — AMIODARONE HYDROCHLORIDE 200 MG: 200 TABLET ORAL at 10:38

## 2022-10-07 RX ADMIN — INSULIN LISPRO 7 UNITS: 100 INJECTION, SOLUTION INTRAVENOUS; SUBCUTANEOUS at 12:58

## 2022-10-07 RX ADMIN — PANTOPRAZOLE SODIUM 40 MG: 40 TABLET, DELAYED RELEASE ORAL at 05:06

## 2022-10-07 RX ADMIN — INSULIN LISPRO 4 UNITS: 100 INJECTION, SOLUTION INTRAVENOUS; SUBCUTANEOUS at 18:01

## 2022-10-07 RX ADMIN — SPIRONOLACTONE 25 MG: 25 TABLET ORAL at 12:58

## 2022-10-07 RX ADMIN — INSULIN LISPRO 8 UNITS: 100 INJECTION, SOLUTION INTRAVENOUS; SUBCUTANEOUS at 10:39

## 2022-10-07 RX ADMIN — INSULIN LISPRO 16 UNITS: 100 INJECTION, SOLUTION INTRAVENOUS; SUBCUTANEOUS at 12:57

## 2022-10-07 RX ADMIN — ATORVASTATIN CALCIUM 80 MG: 80 TABLET, FILM COATED ORAL at 20:09

## 2022-10-07 RX ADMIN — LOSARTAN POTASSIUM 25 MG: 25 TABLET, FILM COATED ORAL at 12:58

## 2022-10-07 RX ADMIN — INSULIN LISPRO 7 UNITS: 100 INJECTION, SOLUTION INTRAVENOUS; SUBCUTANEOUS at 18:01

## 2022-10-07 RX ADMIN — CARVEDILOL 25 MG: 25 TABLET, FILM COATED ORAL at 18:01

## 2022-10-07 ASSESSMENT — ENCOUNTER SYMPTOMS
ABDOMINAL PAIN: 0
SHORTNESS OF BREATH: 0
VOMITING: 0
NAUSEA: 0

## 2022-10-07 ASSESSMENT — PAIN SCALES - GENERAL: PAINLEVEL_OUTOF10: 0

## 2022-10-07 NOTE — PLAN OF CARE
Problem: Discharge Planning  Goal: Discharge to home or other facility with appropriate resources  Outcome: Progressing  Flowsheets (Taken 10/6/2022 9383 by Ursula Linares RN)  Discharge to home or other facility with appropriate resources: Identify barriers to discharge with patient and caregiver     Problem: Safety - Adult  Goal: Free from fall injury  Outcome: Progressing     Problem: ABCDS Injury Assessment  Goal: Absence of physical injury  Outcome: Progressing     Problem: Cardiovascular - Adult  Goal: Maintains optimal cardiac output and hemodynamic stability  Outcome: Progressing

## 2022-10-07 NOTE — PLAN OF CARE
Problem: Neurosensory - Adult  Goal: Achieves stable or improved neurological status  Outcome: Progressing  Goal: Achieves maximal functionality and self care  Outcome: Progressing

## 2022-10-07 NOTE — CARE COORDINATION
INITIAL CASE MANAGEMENT ASSESSMENT     Reviewed chart, spoke with patient to assess possible discharge needs. Explained Case Management role/services. Patient on telephone with sister Phuong Guajardo during discussion. Living Situation: Verified demographics. Updated in chart. Patient resides with sister in apartment. Patient reports no concerns with mobility in the home. ADLs: Independent at baselin      DME: none  Elevator in apartment building     PT/OT Recs: Ordered - await recommendations. Active Services: none      Transportation: [employer] transportation, bus or Brother      Medications: [facility] [place]     PCP: Dr. Ryan Harding - will need follow up appointment scheduled, patient will need to obtain [facility] transport to and from. HD/PD: n/a     PLAN/COMMENTS: return home with sister. ACP Completed. 1) Need to review PT/OT recommendations  2) Need PCP appointment scheduled prior to discharge with transportation through [address] [place] [address]. Consult for addiction treatment. Discussed substance treatment. Patient declined needs at this time. SW/CM provided contact information for patient or family to call with any questions. SW/CM will follow and assist as needed.     NAE Mckeon, DARYLW, Social Work/Case Management   [phone]  Electronically signed by NAE Mckeon, CHLOE on 10/7/2022 at 11:27 AM

## 2022-10-07 NOTE — CONSULTS
Neurology Consult Note  Reason for Consult: Right cerebellar stroke     Chief complaint: Dizziness     Dr Viktor Vallejo MD asked me to see Erika So in consultation for evaluation of dizziness and weakness. I obtained my information via discussion w/ the patient at bedside supplemented by chart review. History of Present Illness:    Erika So  is a 61 y.o. male who presents with dizziness and lightheadedness that started Sunday morning after waking up. He initially contributed his symptoms to his partying, marijuana and cocaine use the night prior. When his symptoms did not fully resolve he decided to come in for evaluation. Sunday morning he endorses being able to walk, but leaning to right and needing to hold on to something. He has never experienced this before. He states his symptoms get better at rest and increase with walking. Currently he is able to get up and walk independently without needing to hold on but still feels dizzy and lightheaded. He reports not taking and being out of his medications for the past two weeks. ED work up included a blood glucose of 474, /101, A1C 12.9. CT head demonstrated low attenuation of right cerebellar. CTA demonstrated significant stenosis in the right and left ICA. Urine tox screen positive for Cannabinoid and cocaine. Medical History:  Past Medical History:   Diagnosis Date    Abdominal pain 7/29/2021    Abnormal EKG 7/28/2016    Acute pancreatitis 7/29/2021    CHF (congestive heart failure) ([address]     Cigarette smoker 8/25/2021    Cocaine abuse ([facility] [address] 3/28/2015    Last Assessment & Plan:  Formatting of this note might be different from the original. Counseled on cessation as increases risk of coronary vasospasm and further worsening of heart function.     Dehydration 7/28/2016    Diabetes mellitus ([address]     History of cocaine abuse ([facility] [address] 7/28/2016    History of MI (myocardial infarction) 7/28/2016    Hyperglycemia 7/28/2016 Hyperlipidemia     Hypertension     Morbid obesity with BMI of 45.0-49.9, adult ([address] 7/28/2016    Postural dizziness 7/28/2016    Pre-diabetes 3/9/2017    Last Assessment & Plan:  Formatting of this note might be different from the original. Discussed lifestyle changes Refer to [facility] program    PUD (peptic ulcer disease) 3/1/2019     Past Surgical History:   Procedure Laterality Date    CARDIAC CATHETERIZATION      UPPER GASTROINTESTINAL ENDOSCOPY       Scheduled Meds:   amiodarone  200 mg Oral Daily    atorvastatin  80 mg Oral Nightly    aspirin  81 mg Oral Daily    apixaban  5 mg Oral BID    fluticasone  2 spray Nasal Daily    furosemide  40 mg Oral Daily    insulin glargine  10 Units SubCUTAneous Daily    pantoprazole  40 mg Oral QAM AC    insulin lispro  0-8 Units SubCUTAneous TID WC    insulin lispro  0-4 Units SubCUTAneous Nightly     Continuous Infusions:   dextrose       PRN Meds:.ondansetron **OR** ondansetron, labetalol, glucose, dextrose bolus **OR** dextrose bolus, glucagon (rDNA), dextrose, polyethylene glycol    Medications Prior to Admission: amiodarone (CORDARONE) 200 MG tablet, TAKE 1 TABLET BY MOUTH EVERY DAY  carvedilol (COREG) 25 MG tablet, TAKE 1 TABLET BY MOUTH TWICE A DAY WITH MEALS  losartan (COZAAR) 25 MG tablet, TAKE 1 TABLET BY MOUTH EVERY DAY  spironolactone (ALDACTONE) 25 MG tablet, TAKE 1 TABLET BY MOUTH EVERY DAY  KLOR-CON M10 10 MEQ extended release tablet, TAKE 1 TABLET BY MOUTH TWICE A DAY  furosemide (LASIX) 40 MG tablet, TAKE 1 TABLET BY MOUTH EVERY DAY  atorvastatin (LIPITOR) 80 MG tablet, TAKE 1 TABLET BY MOUTH NIGHTLY  FARXIGA 5 MG tablet, TAKE 1 TABLET BY MOUTH EVERY DAY IN THE MORNING  ELIQUIS 5 MG TABS tablet, TAKE 1 TABLET BY MOUTH TWICE A DAY  [facility] ASPIRIN ADULT LOW DOSE 81 MG chewable tablet, TAKE 1 TABLET BY MOUTH EVERY DAY  Blood Pressure Monitor KIT, 1 Device by Does not apply route daily  insulin glargine (LANTUS) 100 UNIT/ML injection vial, Inject 10 Units into the skin daily (with breakfast)  fluticasone (FLONASE) 50 MCG/ACT nasal spray, 2 sprays by Nasal route daily  omeprazole (PRILOSEC) 20 MG delayed release capsule, Take 1 capsule by mouth Daily  polyethylene glycol (GLYCOLAX) 17 GM/SCOOP powder, Take 17 g by mouth daily as needed for Constipation  Lancet Devices (LANCING DEVICE) MISC, 1 lancet device Pharmacy cannot do brand substitutions  Lancets MISC, Check blood sugar four times. Pharmacy can do brand substitutions  Blood Glucose Monitoring Suppl (TRUE METRIX METER) w/Device KIT, 1 kit by Does not apply route 4 times daily Dispense one kit Pharmacy cannot do brand substitutions  Insulin Pen Needle (CAREFINE PEN NEEDLES) 32G X 4 MM MISC, 1 each by Does not apply route 3 times daily Pharmacy can do brand substitutions    No Known Allergies    No family history on file. Social History     Tobacco Use   Smoking Status Every Day    Packs/day: 0.50    Years: 25.00    Pack years: 12.50    Types: Cigarettes   Smokeless Tobacco Never     Social History     Substance and Sexual Activity   Drug Use Yes    Types: Marijuana (Mayte Breech), Cocaine    Comment: last use 10/5/2022 marijuana; last use of cocaine 10/1/2022     Social History     Substance and Sexual Activity   Alcohol Use Not Currently       ROS:  Constitutional- + fatigue. No weight loss or fevers  Eyes- No diplopia. No photophobia. Ears/nose/throat- No dysphagia. No Dysarthria  Cardiovascular- No palpitations. No chest pain  Respiratory- No dyspnea. No Cough  Gastrointestinal- No Abdominal pain. No Vomiting. Genitourinary- No incontinence. No urinary retention  Musculoskeletal- No myalgia. No arthralgia  Skin- No rash. No easy bruising. Psychiatric- No depression. No anxiety  Endocrine- No diabetes. No thyroid issues. Hematologic- No bleeding difficulty. No fatigue  Neurologic- + dizziness, weakness, lightheaded.      Exam:  Blood pressure (!) 175/93, pulse 83, temperature 98.2 °F (36.8 °C), temperature source Oral, resp. rate 26, height 6' 2\" (1.88 m), weight (!) 302 lb 0.5 oz (137 kg), SpO2 97 %. Constitutional   Vital signs: BP, HR, and RR reviewed   General Alert, no distress, well-nourished  Eyes: unable to visualize the fundi  Cardiovascular: pulses symmetric in all 4 extremities. No peripheral edema. Psychiatric: cooperative with examination, no  psychotic behavior noted. Neurologic  Mental status:   orientation to person and place   General fund of knowledge grossly intact   Memory grossly intact   Attention intact as able to attend well to the exam     Language fluent in conversation   Comprehension intact; follows simple commands  Cranial nerves:   CN2: Visual Fields full w/o extinction on confrontational testing,   CN 3,4,6: extraocular muscles intact,  CN5: facial sensation symmetric   CN7:face symmetric without dysarthria,   CN8: hearing grossly intact  CN9: palate elevated symmetrically  CN11: trap full strength on shoulder shrug  CN12: tongue midline with protrusion  Strength: No pronator drift. Good strength in all 4 extremities   Deep tendon reflexes: normal in all 4 extremities  Sensory: light touch intact in all 4 extremities. No sensory extinction on double simultaneous stimulation  Cerebellar/coordination: finger nose finger normal without ataxia  Tone: normal in all 4 extremities  Gait: normal gait    Labs  Na 131  K 4.3  Cr 1.0  Glucose 474  Ca 9.2      Trop <0.01    Cholesterol total 125  HDL 32  LDL 76    ALT 12  AST 12    Hgb A1C 12.9    WBC 6.7  Hgb 14.9  Plt 231    Strep neg  Cov neg   UA neg   Urine tox + Cocaine, cannabinoid     Studies    CTA head neck w contrast  10/06/2022    Impression   1. Hemodynamically significant stenosis within the right cervical ICA with   approximately 90% stenosis per NASCET criteria related to noncalcified and   calcified plaque, most pronounced 1.5 cm distal to the bifurcation.    2. Fibrocalcific plaque within the left proximal cervical ICA resulting in approximately 45-50% stenosis per NASCET criteria. 3. Patent cervical vertebral arteries. 4.  No apparent arterial high grade stenosis, occlusion or aneurysm within   the head. MRI brain wo contrast 10/07/2022    Impression   1. Acute right posteroinferior cerebellar artery territory infarct involving   the medial aspect of the right cerebellar hemisphere as well as the right   lateral margin of the medulla. 2. No acute intracranial hemorrhage. 3. Mild chronic small vessel ischemic changes. The findings were sent to the Radiology Results [address] at 8:19   am on 10/7/2022 to be communicated to a licensed caregiver. TTE 10/07/2022   Summary   Suboptimal image quality. Definity contrast administered. Overall left ventricular systolic function appears mild to moderately   reduced. Ejection fraction is visually estimated to be 40-45% with diffuse   hypokinesis. Left ventricular cavity size is normal. There is moderately   increased left ventricular wall thickness. Diastolic filling parameters   suggest grade I diastolic dysfunction. Normal right ventricular size and function. No significant valve abnormalities noted. A bubble study was performed and fails to show evidence of shunting. Impression  1. Acute right cerebellar infarct, involving the right lateral medulla. Embolic in nature, patient has a h/o HTN, HLD, DM, afib, and smoking 0.5 packs per day. He also reports not taking any of his medications for the past two weeks and consuming cocaine the night prior. Which is a likely cause of his stroke. Recommendations  Resume Eliquis in 10 days. Follow up with PCP outpatient for management of risk factors.        [address] [place] [address] [address] Neurology    A copy of this note was provided for Dr Deepthi Lamb MD

## 2022-10-07 NOTE — ACP (ADVANCE CARE PLANNING)
Advance Care Planning     Advance Care Planning Activator (Inpatient)  Conversation Note      Date of ACP Conversation: 10/7/2022     Conversation Conducted with: Patient with Decision Making Capacity    ACP Activator: NAE Das, Simone Reynolds    Health Care Decision Maker:     Current Designated Health Care Decision Maker:     Primary Decision Maker: Patricia Caban - [phone]    Secondary Decision Maker: Zafar Whitley - Brother/Sister - [phone] we documented Decision Maker(s) consistent with ACP documents on file. Care Preferences    Ventilation: \"If you were in your present state of health and suddenly became very ill and were unable to breathe on your own, what would your preference be about the use of a ventilator (breathing machine) if it were available to you? \"      Would the patient desire the use of ventilator (breathing machine)?: yes    \"If your health worsens and it becomes clear that your chance of recovery is unlikely, what would your preference be about the use of a ventilator (breathing machine) if it were available to you? \"     Would the patient desire the use of ventilator (breathing machine)?: No      Resuscitation  \"CPR works best to restart the heart when there is a sudden event, like a heart attack, in someone who is otherwise healthy. Unfortunately, CPR does not typically restart the heart for people who have serious health conditions or who are very sick. \"    \"In the event your heart stopped as a result of an underlying serious health condition, would you want attempts to be made to restart your heart (answer \"yes\" for attempt to resuscitate) or would you prefer a natural death (answer \"no\" for do not attempt to resuscitate)? \" yes       [] Yes   [x] No   Educated Patient / Tanesha Romero regarding differences between Advance Directives and portable DNR orders.     Length of ACP Conversation in minutes:  5    Conversation Outcomes:  [] ACP discussion completed  [x] Existing advance directive reviewed with patient; no changes to patient's previously recorded wishes  [] New Advance Directive completed  [] Portable Do Not Rescitate prepared for Provider review and signature  [] POLST/POST/MOLST/MOST prepared for Provider review and signature      Follow-up plan:    [] Schedule follow-up conversation to continue planning  [] Referred individual to Provider for additional questions/concerns   [] Advised patient/agent/surrogate to review completed ACP document and update if needed with changes in condition, patient preferences or care setting    [] This note routed to one or more involved healthcare providers       Electronically signed by NAE De Luna, DARYLW on 10/7/2022 at 11:28 AM

## 2022-10-07 NOTE — PROGRESS NOTES
NAME:  Gustavo Cantrell  YOB: 1965  MEDICAL RECORD NUMBER:  [id number]  TODAYS DATE:  10/7/2022    Discussed personal risk factors for Stroke/TIA with patient/family, and ways to reduce the risk for a recurrent stroke. Patient's personal risk factors which were identified are:     [] Alcohol Abuse: check with your physician before any alcohol consumption. [x] Atrial fibrillation: may cause blood clots. [x] Drug Abuse: Seek help, talk with your doctor  [] Clotting Disorder  [x] Diabetes  [] Family history of stroke or heart disease  [x] High Blood Pressure/Hypertension: work with your physician. [x] High cholesterol: monitor cholesterol levels with your physician. [x] Overweight/Obesity: work with your physician for your ideal body weight. [x] Physical Inactivity: get regular exercise as directed by your physician. [] Personal history of previous TIA or stroke  [x] Poor Diet; decrease salt (sodium) in your diet, follow diet directed by physician. [x] Smoking: Cigarette/Cigar: stop smoking. Advised pt. that you can reduce your risk for stroke/TIA by modifying/controlling the risk factors that you have. Pt.advised to take the medications as prescribed, which will be detailed in the discharge instructions, and to not stop taking them without consulting their physician. In addition, pt. advised to maintain a healthy diet, exercise regularly and to not smoke. [facility]'s Stroke treatment and prevention, Managing your recovery  notebook  provided and/or reviewed  with patient/family. The notebook includes, but not limited to, sections addressing warning signs & symptoms of a stroke, which are: sudden numbness or weakness especially on one side of the body, sudden confusion, difficulty speaking or understanding, sudden changes in vision, sudden dizziness or loss of balance/ coordination, sudden severe headache, syncope and seizure.   The need to call EMS (911) immediately if signs & symptoms occur is emphasized . The notebook also provides education on Stroke community resources and stroke advocacy. The need for follow-up after discharge was highlighted with patient/family with them being able to repeat understanding of the importance of this.       Electronically signed by Rosario Zarate RN on 10/7/2022 at 1:13 AM

## 2022-10-07 NOTE — PROGRESS NOTES
Hospitalist Progress Note      PCP: Romaine Alvarez DO    Date of Admission: 10/6/2022    Chief Complaint: imbalance, dizziness    Hospital Course: The patient is a 61 y.o. male with history of cocaine abuse, tobacco abuse, hypertension, lipidemia, morbid obesity, MI, type 2 diabetes, and diastolic heart failure who presents to [facility] with imbalance and dizziness. Patient states that starting on Saturday, he started to notice some imbalance that has been progressive throughout the last several days. Patient notes that he is sort of leaning more towards the right when he walks, and feels like he has to catch himself as he is walking. Patient notes that this is a new symptom for him. Patient denies visual disturbances, chest pain, fever, chills, shortness of breath, abdominal pain, nausea, vomiting, constipation, diarrhea, and dysuria. Patient notes that he has been out of his medications for a few weeks, and he has not taken any medication in the last few weeks. In the ED, labs were significant for sodium of 131, chloride of 97, glucose of 474, proBNP of 397. Chest x-ray was unremarkable. EKG showed normal sinus rhythm and possible LVH. CT head without contrast showed a subtle focus of asymmetric low attenuation in the inferior medial right cerebellar hemisphere. CTA head and neck with contrast showed a hemodynamically significant stenosis within the right cervical ICA with approximately 90% stenosis, as well as a left proximal cervical ICA resulting in approximately 45 to 50% stenosis. Subjective: Pt seen and examined. Feels better and has no complaints. MRI confirmed cerebellar stroke.         Medications:  Reviewed    Infusion Medications    dextrose       Scheduled Medications    carvedilol  25 mg Oral BID WC    losartan  25 mg Oral Daily    spironolactone  25 mg Oral Daily    [START ON 10/8/2022] insulin glargine  20 Units SubCUTAneous Daily    insulin lispro  7 Units SubCUTAneous TID     insulin lispro  0-16 Units SubCUTAneous TID     insulin lispro  0-4 Units SubCUTAneous Nightly    amiodarone  200 mg Oral Daily    atorvastatin  80 mg Oral Nightly    aspirin  81 mg Oral Daily    [Held by provider] apixaban  5 mg Oral BID    fluticasone  2 spray Nasal Daily    furosemide  40 mg Oral Daily    pantoprazole  40 mg Oral QAM AC     PRN Meds: ondansetron **OR** ondansetron, labetalol, glucose, dextrose bolus **OR** dextrose bolus, glucagon (rDNA), dextrose, polyethylene glycol      Intake/Output Summary (Last 24 hours) at 10/7/2022 1354  Last data filed at 10/7/2022 1056  Gross per 24 hour   Intake 1324 ml   Output 1550 ml   Net -226 ml       Exam:    BP (!) 154/104   Pulse 78   Temp 97.9 °F (36.6 °C) (Oral)   Resp 26   Ht 6' 2\" (1.88 m)   Wt (!) 302 lb 0.5 oz (137 kg)   SpO2 98%   BMI 38.78 kg/m²     General appearance: No apparent distress appears stated age and cooperative. HEENT Normal cephalic, atraumatic without obvious deformity. Pupils equal, round, and reactive to light. Extra ocular muscles intact. Conjunctivae/corneas clear. Neck: Supple, No jugular venous distention/bruits. Trachea midline without thyromegaly or adenopathy with full range of motion. Lungs: Clear to auscultation, bilaterally without Rales/Wheezes/Rhonchi with good respiratory effort. Heart: Regular rate and rhythm with Normal S1/S2 without murmurs, rubs or gallops, point of maximum impulse non-displaced  Abdomen: Soft, non-tender or non-distended without rigidity or guarding and positive bowel sounds all four quadrants. Extremities: No clubbing, cyanosis, or edema bilaterally. Full range of motion without deformity and normal gait intact. Skin: Skin color, texture, turgor normal.  No rashes or lesions. Neurologic: Alert and oriented X 3, neurovascularly intact with sensory/motor intact upper extremities/lower extremities, bilaterally.   Cranial nerves: II-XII intact, grossly non-focal. Impaired finger-to-nose, slow heel-to-shin transfer. Mental status: Alert, oriented, thought content appropriate. Capillary Refill: Acceptable  < 3 seconds  Peripheral Pulses: +3 Easily felt, not easily obliterated with pressure      Labs:   Recent Labs     10/06/22  1236 10/07/22  0441 10/07/22  0442   WBC 6.7 6.3 6.2   HGB 14.9 13.7 14.0   HCT 44.1 40.8 41.1    220 209     Recent Labs     10/06/22  1235 10/07/22  0441   * 132*   K 4.3 4.2   CL 97* 99   CO2 22 20*   BUN 16 15   CREATININE 1.0 1.1   CALCIUM 9.2 8.5     Recent Labs     10/06/22  1235 10/07/22  0441   AST 12* 12*   ALT 12 11   BILITOT 0.3 0.3   ALKPHOS 74 64     Recent Labs     10/06/22  1236   INR 0.97     Recent Labs     10/06/22  1235   TROPONINI <0.01       Urinalysis:      Lab Results   Component Value Date/Time    NITRU Negative 10/06/2022 03:00 PM    WBCUA 0-2 03/19/2022 05:10 PM    RBCUA 0-2 03/19/2022 05:10 PM    BLOODU Negative 10/06/2022 03:00 PM    SPECGRAV 1.056 10/06/2022 03:00 PM    GLUCOSEU 250 10/06/2022 03:00 PM       Radiology:  MRI brain without contrast   Final Result   1. Acute right posteroinferior cerebellar artery territory infarct involving   the medial aspect of the right cerebellar hemisphere as well as the right   lateral margin of the medulla. 2. No acute intracranial hemorrhage. 3. Mild chronic small vessel ischemic changes. The findings were sent to the Radiology Results [address] at 8:19   am on 10/7/2022 to be communicated to a licensed caregiver. CT HEAD WO CONTRAST   Final Result   1. Subtle focus of asymmetric low attenuation in the inferomedial right   cerebellar hemisphere. Finding may be artifactual.  Subtle area of recent   ischemic change cannot be excluded. Follow-up MRI examination with diffusion   imaging may be helpful for more complete evaluation. 2. Otherwise, no evidence of acute intracranial abnormality. CTA HEAD NECK W CONTRAST   Final Result   1. Hemodynamically significant stenosis within the right cervical ICA with   approximately 90% stenosis per NASCET criteria related to noncalcified and   calcified plaque, most pronounced 1.5 cm distal to the bifurcation. 2. Fibrocalcific plaque within the left proximal cervical ICA resulting in   approximately 45-50% stenosis per NASCET criteria. 3. Patent cervical vertebral arteries. 4.  No apparent arterial high grade stenosis, occlusion or aneurysm within   the head. XR CHEST PORTABLE   Final Result   No evidence of acute cardiopulmonary disease.                  Assessment/Plan:    Active Hospital Problems    Diagnosis Date Noted    Symptomatic carotid artery stenosis, right [I65.21] 10/07/2022     Priority: Medium    Acute CVA (cerebrovascular accident) [facility]) [I63.9] 10/06/2022     Priority: Medium       Imbalance, incoordination -suspect acute CVA to the right cerebellar hemisphere  -Obtain MRI brain without contrast  -Neurology consulted, recs appreciated  -Aspirin and high intensity statin  -Continue Eliquis  -Telemetry monitoring  -PT OT evaluation  -Bedside swallow evaluation  -Lipid panel  -Check Hgb A1c  -Obtain echo with bubble study  -Neurochecks  -restart home anti-hypertensive meds  -Labetalol as needed     Right ICA stenosis  -Vascular surgery consulted, recs appreciated  -discussing with cardiology timing of CEA     Hypertensive urgency  -restart home anti-hypertensives  -Labetalol as needed for SBP > 220    Chronic combined systolic and diastolic CHF -  - was supposed to have an ischemic evaluation with cardiology as an outpatient but was never pursued  - will consult cardiology as patient has poor follow-up and medical compliance     Type 2 diabetes  -Lantus - increase dose  -Medium dose SSI -> high dose SSi  -add prandial Humalog  -Hypoglycemia protocol  -POCT glucose checks  -Carb controlled diet     Paroxysmal atrial fibrillation  -Continue home amiodarone and Eliquis  -Telemetry monitoring     Cocaine abuse  -Abstinence encouraged  -Contributing to blood pressure issues     Morbid obesity due to excess calories-BMI 40.57  -Nutritional counseling provided  -Weight loss encouraged  -Complicating medical management     Hyperlipidemia  -Continue high intensity statin     Chronic diastolic CHF  -Continue home medications  -Telemetry monitoring     Tobacco abuse  -Smoking cessation counseling provided  -Nicotine patch is offered      DVT Prophylaxis: Eliquis - on hold  Diet: ADULT DIET;  Regular; 4 carb choices (60 gm/meal)  Code Status: Full Code    PT/OT Eval Status: ordered    Dispo - continue care    June Martinez MD

## 2022-10-07 NOTE — CONSULTS
[facility] Vascular and Endovascular Surgery  Consultation Note    10/7/2022 7:28 AM    Chief Complaint: Dizziness and Imbalance     Reason for Consultation: JAQUELINE Stenosis of 90%    History of Present Illness:  Patient is a 61 y.o. male who presented to the ED on 10/6/2022 with complaints of Imbalance and Dizziness. He has a significant PMH of A-fib, CHF, Cocaine Abuse, DM II, MI, HLD, HTN, Obesity, and Dizziness. The patient apparently used Cocaine last Saturday for the first time in 2 years and began experiencing symptoms of imbalance and dizziness shortly after. Of note, the patient was admitted in March 2022 for CHF exacerbation at which time he was seen by Cardiology. At that time, Dr. Parish Walter recommended a LHC/RHC for further evaluation of his LV dysfunction. The patient had a Stress Test at [facility] in 2019 which showed reversible ischemia; however, it does not appear that he has undergone a Cardiac Catheterization since that time. The patient was also seen by Dr. Roger Lindsay with EP in April 2022 at which time the patient was scheduled to undergo a A-fib Cardiac Ablation procedure; however, it does not appear he completed the appropriate pre-operative testing and it does not appear that he underwent this procedure either. In the ED, the patient reported he has been out of his medications for the past 2 weeks. A CTA Head Neck was performed in the ED which revealed JAQUELINE stenosis of 35% and LICA stenosis of 80-31%. Neurology has been consulted to evaluate the patient and a MRI Brain has been ordered. We have been consulted to evaluate the patient for JAQUELINE stenosis of 90%. At present, the patient is seen sitting on the edge of the bed, he is alert and oriented and appears to be in stable condition. Review of Systems  Review of Systems   Constitutional: Negative. HENT: Negative. Respiratory:  Negative for shortness of breath. Cardiovascular:  Negative for chest pain.    Gastrointestinal:  Negative for abdominal pain, nausea and vomiting. Musculoskeletal: Negative. Skin: Negative. Neurological:  Positive for dizziness. Dizziness and Imbalance d/t Dizziness, not d/t weakness of extremities   Psychiatric/Behavioral:  Negative for confusion. Past Medical History:   Diagnosis Date    Abdominal pain 7/29/2021    Abnormal EKG 7/28/2016    Acute pancreatitis 7/29/2021    CHF (congestive heart failure) ([facility]     Cigarette smoker 8/25/2021    Cocaine abuse ([facility] 3/28/2015    Last Assessment & Plan:  Formatting of this note might be different from the original. Counseled on cessation as increases risk of coronary vasospasm and further worsening of heart function.     Dehydration 7/28/2016    Diabetes mellitus ([facility]     History of cocaine abuse ([facility] 7/28/2016    History of MI (myocardial infarction) 7/28/2016    Hyperglycemia 7/28/2016    Hyperlipidemia     Hypertension     Morbid obesity with BMI of 45.0-49.9, adult ([facility] 7/28/2016    Postural dizziness 7/28/2016    Pre-diabetes 3/9/2017    Last Assessment & Plan:  Formatting of this note might be different from the original. Discussed lifestyle changes Refer to [facility] program    PUD (peptic ulcer disease) 3/1/2019       Past Surgical History:   Procedure Laterality Date    CARDIAC CATHETERIZATION      UPPER GASTROINTESTINAL ENDOSCOPY         No Known Allergies    Social History     Socioeconomic History    Marital status: Single     Spouse name: Not on file    Number of children: 4    Years of education: Not on file    Highest education level: Not on file   Occupational History     Employer: DISABLED   Tobacco Use    Smoking status: Every Day     Packs/day: 0.50     Years: 25.00     Pack years: 12.50     Types: Cigarettes    Smokeless tobacco: Never   Vaping Use    Vaping Use: Never used   Substance and Sexual Activity    Alcohol use: Not Currently    Drug use: Yes     Types: Marijuana (Nimesh Hotter), Cocaine     Comment: last use 10/5/2022 marijuana; last use of cocaine 10/1/2022    Sexual activity: Not Currently   Other Topics Concern    Not on file   Social History Narrative    Not on file     Social Determinants of Health     Financial Resource Strain: Not on file   Food Insecurity: Not on file   Transportation Needs: Not on file   Physical Activity: Not on file   Stress: Not on file   Social Connections: Not on file   Intimate Partner Violence: Not on file   Housing Stability: Not on file       No family history on file.     Vital Signs  Vitals:    10/06/22 2000 10/07/22 0017 10/07/22 0300 10/07/22 0447   BP: (!) 174/98 (!) 191/103  (!) 175/93   Pulse: 71 73  78   Resp: 23 20  18   Temp:  98.9 °F (37.2 °C)  98.2 °F (36.8 °C)   TempSrc:  Oral  Oral   SpO2:  98%  96%   Weight:   (!) 302 lb 0.5 oz (137 kg)    Height:           I/O:    Intake/Output Summary (Last 24 hours) at 10/7/2022 3110  Last data filed at 10/7/2022 4617  Gross per 24 hour   Intake 924 ml   Output 1550 ml   Net -626 ml       Physical Exam:  General: no apparent distress, alert and oriented, afebrile  Psychiatric: mood and affect are within normal limits  Head/Eyes/Ears/Nose/Throat: normocephalic and atraumatic, face symmetric, normal hearing, nose - negative  Neck: normal appearance and no deformity, supple, trachea midline  Chest/Lungs: clear to auscultation bilaterally, non labored breathing no tachypnea, retractions or cyanosis  Cardiac: Heart regular rate and rhythm  Abdomen: soft, nondistended, active bowel sounds, and nontender  Extremities: normal strength, tone, and muscle mass, no deformities, no significant edema to BLE  Neuro: denies vision changes, denies numbness or tingling, speech clear, adequate  strength, flexion and extension of BUE's, adequate dorsiflexion and plantarflexion of bilateral feet, moving all extremities without difficulty    Labs  Lab Results   Component Value Date/Time    WBC 6.2 10/07/2022 04:42 AM    HGB 14.0 10/07/2022 04:42 AM    HCT 41.1 10/07/2022 04:42 AM    MCV 93.0 10/07/2022 04:42 AM     10/07/2022 04:42 AM     Lab Results   Component Value Date/Time     10/06/2022 12:35 PM    K 4.3 10/06/2022 12:35 PM    CL 97 10/06/2022 12:35 PM    CO2 22 10/06/2022 12:35 PM    BUN 16 10/06/2022 12:35 PM    CREATININE 1.0 10/06/2022 12:35 PM      No results found for: GLU    Scheduled Meds:    amiodarone  200 mg Oral Daily    atorvastatin  80 mg Oral Nightly    aspirin  81 mg Oral Daily    apixaban  5 mg Oral BID    fluticasone  2 spray Nasal Daily    furosemide  40 mg Oral Daily    insulin glargine  10 Units SubCUTAneous Daily    pantoprazole  40 mg Oral QAM AC    insulin lispro  0-8 Units SubCUTAneous TID WC    insulin lispro  0-4 Units SubCUTAneous Nightly     Continuous Infusions:    dextrose         Imaging:   CTA Head Neck - 10/6/2022  Impression  1. Hemodynamically significant stenosis within the right cervical ICA with approximately 90% stenosis per NASCET criteria related to noncalcified and calcified plaque, most pronounced 1.5 cm distal to the bifurcation. 2. Fibrocalcific plaque within the left proximal cervical ICA resulting in approximately 45-50% stenosis per NASCET criteria. 3. Patent cervical vertebral arteries. 4.  No apparent arterial high grade stenosis, occlusion or aneurysm  within the head. MRI Brain - 10/7/2022  Impression  1. Acute right posteroinferior cerebellar artery territory infarct involving  the medial aspect of the right cerebellar hemisphere as well as the right  lateral margin of the medulla. 2. No acute intracranial hemorrhage. 3. Mild chronic small vessel ischemic changes.     Assessment:  -CTA Neck with 90% stenosis of JAQUELINE and 88-72% stenosis of LICA  -Complaints of Dizziness and Imbalance - Imbalance related to Dizziness and not d/t weakness of extremities  -MRI Brain with acute right posteroinferior cerebellar infarct    Plan:   -Neurology to evaluate pt  -Echo - reading pending  -Right CEA will likely need to be performed at some point in the future - will discuss timing with Dr. Valenzuela Bolinas    All pertinent information and plan of care discussed with Dr. Pa Marinelli. All questions and concerns were addressed with the patient. I have discussed the above stated plan with the patient and the nurse. The patient verbalized understanding and agreed with the plan. Thank you for allowing to us to participate in the care of Ed Magdaleno      Electronically signed by CONSUELO Link CNP on 10/7/2022 at 7:28 AM    In conjunction with the advance practice clinician I independently performed a history and physical examination of Ed Magdaleno. Based on my evaluation the following findings were present:    Per my exam patient exhibits no gross motor or sensory deficits. He does endorse a history of dizziness and imbalance. He underwent neurologic testing which demonstrates right sided stroke with associated severe carotid stenosis. He has what appears to be a posterior circulation stroke with evidence of anterior circulation stenosis. The patient has a history of medication non-compliance. He was also supposed to have a heart catheterization but never followed up. Will ask Cardiology to evaluate. Will determine timing of surgery with either CEA or TCAR based on those discussions. Discussed the risks and benefits of management of carotid disease with surgery. Patient understands and seems agreeable. All questions answered. Greater than 50 minutes of my time of which 50% of that time was spent counseling the patient/family about the condition's pathology and proposed/planned treatment as well as reviewing radiologic imaging and other relevant patient data. In addition I performed greater than 50% of the above history and/or physical examination.     Pa Marinelli DO, FSVS, [address] [facility] Vascular and Endovascular Surgery

## 2022-10-07 NOTE — PROGRESS NOTES
Occupational Therapy  Facility/Department: [facility] 4O PROGRESSIVE CARE  Occupational Therapy Initial Assessment    Name: Lorena Sherman  : 1965  MRN: [id number]  Date of Service: 10/7/2022    Discharge Recommendations:  Home with assist PRN  OT Equipment Recommendations  Equipment Needed: No     Lorena Sherman scored a  on the AM-PAC ADL Inpatient form. At this time, no further OT is recommended upon discharge. Recommend patient returns to prior setting with prior services. Patient Diagnosis(es): The primary encounter diagnosis was Dizziness. Diagnoses of Poorly controlled diabetes mellitus ([facility] Noncompliance with medication regimen, and Polysubstance abuse ([facility] [address] were also pertinent to this visit. Past Medical History:  has a past medical history of Abdominal pain, Abnormal EKG, Acute pancreatitis, CHF (congestive heart failure) ([address] Cigarette smoker, Cocaine abuse ([address] Dehydration, Diabetes mellitus ([facility] [address] History of cocaine abuse ([facility] [facility] History of MI (myocardial infarction), Hyperglycemia, Hyperlipidemia, Hypertension, Morbid obesity with BMI of 45.0-49.9, adult ([address] Postural dizziness, Pre-diabetes, and PUD (peptic ulcer disease). Past Surgical History:  has a past surgical history that includes Cardiac catheterization and Upper gastrointestinal endoscopy. Assessment   Performance deficits / Impairments: Decreased functional mobility ; Decreased safe awareness;Decreased balance;Decreased ADL status; Decreased high-level IADLs;Decreased endurance;Decreased strength  Assessment: 62 y/o male admitted 10/6/2022 with CVA. PTA pt lives at home with sister and was independent with ADLs and functional mobility. Today, pt completed ADL transfers and mobility around room with CGA. Pt reports feeling slightly wobbly with ambulation. Pt with functional UE ROM for self care and anticipate will be CGA for balance during standing components of ADLs.  Pt is functioning slightly below baseline and benefit from skilled therapy while in hospital. Anticipate pt will be safe to return home at discharge. Prognosis: Good  Decision Making: Low Complexity  REQUIRES OT FOLLOW-UP: Yes  Activity Tolerance  Activity Tolerance: Patient Tolerated treatment well        Plan   Occupational Therapy Plan  Times Per Week: 2-3x  Current Treatment Recommendations: Strengthening, Balance training, Functional mobility training, Endurance training, Gait training, Self-Care / ADL, Patient/Caregiver education & training     Restrictions  Restrictions/Precautions  Restrictions/Precautions: Fall Risk    Subjective   General  Chart Reviewed: Yes  Patient assessed for rehabilitation services?: Yes  Additional Pertinent Hx: 62 y/o male admitted 10/6/2022 with c/o feeling imbalance and dizziness. CT head without contrast showed a subtle focus of asymmetric low attenuation in the inferior medial right cerebellar hemisphere. CTA head and neck with contrast showed a hemodynamically significant stenosis within the right cervical ICA with approximately 90% stenosis, as well as a left proximal cervical ICA resulting in approximately 45 to 50% stenosis. MRI confirmed cerebellar stroke. Family / Caregiver Present: No  Referring Practitioner: Dr. Marco A Gambino  Subjective  Subjective: Pt seen bedside and agreeable to therapy. Pt reports feels slightly below baseline. General Comment  Comments: Per RN ok for therapy     Social/Functional History  Social/Functional History  Lives With: Family (sister)  Type of Home: Apartment  Home Layout: One level  Home Access: Elevator  Bathroom Shower/Tub: Tub/Shower unit  Bathroom Toilet: Handicap height  Bathroom Equipment: Grab bars in shower  ADL Assistance: Independent  Ambulation Assistance: Independent  Transfer Assistance: Independent       Objective   Safety Devices  Type of Devices: Call light within reach;Nurse notified; Left in bed        AROM: Within functional limits  Strength: Generally decreased, functional  Coordination: Within functional limits    ADL  Additional Comments: Pt declined ADLs. Anticipate pt will be CGA for balance during standing components of ADLs. Activity Tolerance  Activity Tolerance: Patient tolerated treatment well  Activity Tolerance Comments: pt reports being a little overwhelmed by his current medical condition (vascular was in to see pt during session)    Bed mobility  Supine to Sit: Stand by assistance  Sit to Supine: Stand by assistance    Transfers  Sit to stand: Stand by assistance  Stand to sit: Stand by assistance  Transfer Comments: pt ambulated around room without AD and CGA. Pt with slight limp and one small LOB    Vision  Vision: Within Functional Limits  Hearing  Hearing: Within functional limits    Cognition  Overall Cognitive Status: WFL  Orientation  Overall Orientation Status: Within Functional Limits           Education Given To: Patient  Education Provided: Role of Therapy;Plan of Care;Transfer Training  Education Method: Demonstration;Verbal  Barriers to Learning: None  Education Outcome: Verbalized understanding;Demonstrated understanding    LUE AROM (degrees)  LUE AROM : WFL  Left Hand AROM (degrees)  Left Hand AROM: WFL  RUE AROM (degrees)  RUE AROM : WFL  Right Hand AROM (degrees)  Right Hand AROM: WFL         AM-PAC Score  AM-[facility] Inpatient Daily Activity Raw Score: 21 (10/07/22 1440)  AM-PAC Inpatient ADL T-Scale Score : 44.27 (10/07/22 1440)  ADL Inpatient CMS 0-100% Score: 32.79 (10/07/22 1440)  ADL Inpatient CMS G-Code Modifier : CJ (10/07/22 1440)    Goals  Short Term Goals  Time Frame for Short Term Goals: Prior to DC:   Short Term Goal 1: Pt will complete ADL transfers/mobility with supervision  Short Term Goal 2: Pt will complete toileting with supervision  Short Term Goal 3: Pt will complete LB Dressing with supervision  Short Term Goal 4: Pt will tolerate standing > 5 min for functional task with supervision  Patient Goals   Patient goals : to

## 2022-10-07 NOTE — PROGRESS NOTES
Speech Language/Pathology   SPEECH LANGUAGE AND CLINICAL BEDSIDE SWALLOWING EVALUATION   Speech Therapy Department     James Lockhart  AGE: 61 y.o. GENDER: male  : 1965  [id number]  EPISODE DATE:  10/6/2022    MEDICAL DIAGNOSIS: CVA  ONSET: 10/6/2022     Chief Complaint   Patient presents with    Dizziness     States dizzy since this past weekend states when he gets up he is dizzy, states he has not taken any of his home medication because he is out       PAST MEDICAL HISTORY        Diagnosis Date    Abdominal pain 2021    Abnormal EKG 2016    Acute pancreatitis 2021    CHF (congestive heart failure) ([address]     Cigarette smoker 2021    Cocaine abuse ([facility] 3/28/2015    Last Assessment & Plan:  Formatting of this note might be different from the original. Counseled on cessation as increases risk of coronary vasospasm and further worsening of heart function. Dehydration 2016    Diabetes mellitus ([address]     History of cocaine abuse ([facility] 2016    History of MI (myocardial infarction) 2016    Hyperglycemia 2016    Hyperlipidemia     Hypertension     Morbid obesity with BMI of 45.0-49.9, adult ([address] 2016    Postural dizziness 2016    Pre-diabetes 3/9/2017    Last Assessment & Plan:  Formatting of this note might be different from the original. Discussed lifestyle changes Refer to [facility] program    PUD (peptic ulcer disease) 3/1/2019       PAST SURGICAL HISTORY    Past Surgical History:   Procedure Laterality Date    CARDIAC CATHETERIZATION      UPPER GASTROINTESTINAL ENDOSCOPY         ALLERGIES    No Known Allergies    CHART REVIEW:  10/6/2022 admitted with CVA    BRAIN MRI: 10/7/2022  Impression   1. Acute right posteroinferior cerebellar artery territory infarct involving   the medial aspect of the right cerebellar hemisphere as well as the right   lateral margin of the medulla. 2. No acute intracranial hemorrhage.    3. Mild chronic small vessel ischemic changes. Prior Status: Independent prior to admission; completed high school; on disability; does not     Reason for referral:  Erika So was referred for a Speech-Language and Bedside Swallow Evaluation to assess the efficiency of communicative effectiveness; the efficiency of swallow function, rule out aspiration and make recommendations regarding safe dietary consistencies, effective compensatory strategies, and safe eating environment. Dysphagia Treatment Diagnosis: Oropharyngeal Dysphagia   Speech Language Treatment Diagnosis:  Cognitive-Language, Dysarthria      IMPRESSIONS  Dysphagia Diagnosis: Swallow function appears WFL  Cognitive Diagnosis: Mild cognitive language impairments in the domains for recent recall of novel information and divided/alternating attention. Problem solving and reasoning appears adequate for daily needs. Aphasia Diagnosis: No receptive/expressive language imps  Speech Diagnosis: Mild dysarthria characterized by articulatory imprecision 2/2 R labial and lingual weakness that does not appear to significantly impact speech intelligibility. Recommended Diet:  Solids: IDDSI 7 Regular Solids;    Liquids: IDDSI 0 Thin Liquids;  Meds: Meds PO as tolerated    Strategies:   Compensatory Swallowing Strategies : Upright as possible for all oral intake, Remain upright for 30-45 minutes after meals      PLAN  Requires SLP Intervention: Yes  Duration of Treatment: ST to tx 3-5 times per week during acute admission  Therapeutic Interventions: cognitive-communication remediation, motor speech remediation    Speech and Language Goals  Short Term Goals  Goal 1: Patient will improve novel recall for graded lengths over graded durations to independent  Goal 2: Patient will improve conversational speech clarity to 100%  Goal 3: Patient will improve alternating/divided attention for executive function tasks to independent    Prognosis  Speech Therapy Prognosis  Prognosis: Good    Education  Patient Education: completed on results/recs/plan  Patient Education Response: Verbalizes understanding      Discharge Recommendations:    Pt will benefit from continued skilled Speech Therapy for Speech and Language services      OBJECTIVE  ASSESSMENT: Included Clinical Evaluation of Swallowing; Oral Motor Speech Evaluation and Cognitive-Linguistic Assessment  Oral/Motor  Labial:  (R weakness)  Lingual:  (R weakness)  Velum: No Impairment  Mandible: No impairment  Gag: No Impairment  Motor Speech  Dysarthric Characteristics: Imprecise  Overall Impairment Severity: Mild  Auditory Comprehension  Comprehension: Within Functional Limits  Reading Comprehension  Reading Status: Within functional limits  Verbal Expression  Verbal Expression: Within functional limits  Written Expression  Dominant Hand: Right  Written Expression: Within Functional Limits  Pragmatics/Social Functioning  Pragmatics: Within functional limits  Cognitive-Language  Overall Orientation Status: Within Functional Limits  Alternating Attention: Moderate  Divided Attention: Moderate  Selective Attention: WFL  Sustained Attention: WFL  Memory - Daily Routines: WFL  Short-term Memory: Mild  Working Memory: WFL  Problem Solving: Within Functional Limits  Numeric Reasoning: Within Functional Limits  Abstract Reasoning: Within Functional Limits  Safety/Judgment: Within Functional Limits  Dysphagia  Oral Phase: WFL  Pharyngeal Phase: WFL      Please accept this as Speech Therapy Discharge status, if pt is discharged prior to next therapy session. Timed Code Treatment: 0  Total Treatment Time: 40       SIGNED:   Xiomara Bowen.  Agustín Renee, #[id number]  Speech-Language Pathologist  Portable phone: [phone]  10/07/22 10:50 AM

## 2022-10-07 NOTE — PLAN OF CARE
Problem: Discharge Planning  Goal: Discharge to home or other facility with appropriate resources  10/7/2022 1207 by Freda Laboy RN  Outcome: Progressing  10/7/2022 0007 by Jong Nicole RN  Outcome: Progressing  Flowsheets (Taken 10/6/2022 1743 by Yadira Levine RN)  Discharge to home or other facility with appropriate resources: Identify barriers to discharge with patient and caregiver     Problem: Safety - Adult  Goal: Free from fall injury  10/7/2022 1207 by Freda Laboy RN  Outcome: Progressing  10/7/2022 0007 by Jong Nicole RN  Outcome: Progressing     Problem: ABCDS Injury Assessment  Goal: Absence of physical injury  10/7/2022 1207 by Freda Laboy RN  Outcome: Progressing  10/7/2022 0007 by Jong Nicole RN  Outcome: Progressing     Problem: Cardiovascular - Adult  Goal: Maintains optimal cardiac output and hemodynamic stability  10/7/2022 1207 by Freda Laboy RN  Outcome: Progressing  10/7/2022 0007 by Jong Nicole RN  Outcome: Progressing     Problem: Neurosensory - Adult  Goal: Achieves stable or improved neurological status  10/7/2022 1207 by Freda Laboy RN  Outcome: Progressing  10/7/2022 0112 by Jong Nicole RN  Outcome: Progressing  Goal: Achieves maximal functionality and self care  10/7/2022 1207 by Freda Laboy RN  Outcome: Progressing  10/7/2022 0112 by Jong Nicole RN  Outcome: Progressing

## 2022-10-07 NOTE — PROGRESS NOTES
Physical Therapy  Facility/Department: [address] PROGRESSIVE CARE  Physical Therapy Initial Assessment    Name: Francine Hunter  : 1965  MRN: [id number]  Date of Service: 10/7/2022    Discharge Recommendations:  Home with assist PRN, Outpatient PT (if balance issues are not fully resolved)   PT Equipment Recommendations  Equipment Needed: No    Francine Hunter scored a 21/24 on the AM-PAC short mobility form. Current research shows that an AM-PAC score of 18 or greater is typically associated with a discharge to the patient's home setting. Based on the patient's AM-PAC score and their current functional mobility deficits, if his balance issues are not fully resolved it is recommended that the patient have 2-3 sessions per week of Physical Therapy at d/c to increase the patient's independence. At this time, this patient demonstrates the endurance and safety to discharge home with OP PT and a follow up treatment frequency of 2-3x/wk. Please see assessment section for further patient specific details. If patient discharges prior to next session this note will serve as a discharge summary. Please see below for the latest assessment towards goals. Patient Diagnosis(es): The primary encounter diagnosis was Dizziness. Diagnoses of Poorly controlled diabetes mellitus ([address] Noncompliance with medication regimen, and Polysubstance abuse ([address] were also pertinent to this visit. Past Medical History:  has a past medical history of Abdominal pain, Abnormal EKG, Acute pancreatitis, CHF (congestive heart failure) ([address] Cigarette smoker, Cocaine abuse ([address] Dehydration, Diabetes mellitus ([address] History of cocaine abuse ([address] History of MI (myocardial infarction), Hyperglycemia, Hyperlipidemia, Hypertension, Morbid obesity with BMI of 45.0-49.9, adult ([address] Postural dizziness, Pre-diabetes, and PUD (peptic ulcer disease).   Past Surgical History:  has a past surgical history that includes Cardiac catheterization and Upper gastrointestinal endoscopy. Assessment   Assessment: pt is a 62 yo male who was adm to hosp with acute CVA; pt is slightly below his baseline but feel he will be safe to return home at discharge; pt may benefit from OP PT to address higher level balance activities if he continues to feel like he is leaning R  Therapy Prognosis: Good  Decision Making: Medium Complexity  Requires PT Follow-Up: Yes  Activity Tolerance  Activity Tolerance: Patient tolerated treatment well  Activity Tolerance Comments: pt reports being a little overwhelmed by his current medical condition (vascular was in to see pt during session)     Plan   Physcial Therapy Plan  General Plan: 3-5 times per week  Current Treatment Recommendations: Functional mobility training, Balance training  Safety Devices  Type of Devices: Call light within reach, Left in chair, Nurse notified     Restrictions        Subjective   General  Chart Reviewed: Yes  Additional Pertinent Hx: per H&P note: \"The patient is a 61 y.o. male with history of cocaine abuse, tobacco abuse, hypertension, lipidemia, morbid obesity, MI, type 2 diabetes, and diastolic heart failure who presents to [facility] with imbalance and dizziness. Patient states that starting on Saturday, he started to notice some imbalance that has been progressive throughout the last several days. Patient notes that he is sort of leaning more towards the right when he walks, and feels like he has to catch himself as he is walking. Patient notes that this is a new symptom for him. Patient denies visual disturbances, chest pain, fever, chills, shortness of breath, abdominal pain, nausea, vomiting, constipation, diarrhea, and dysuria. Patient notes that he has been out of his medications for a few weeks, and he has not taken any medication in the last few weeks. In the ED, labs were significant for sodium of 131, chloride of 97, glucose of 474, proBNP of 397.   Chest x-ray was unremarkable. EKG showed normal sinus rhythm and possible LVH. CT head without contrast showed a subtle focus of asymmetric low attenuation in the inferior medial right cerebellar hemisphere. CTA head and neck with contrast showed a hemodynamically significant stenosis within the right cervical ICA with approximately 90% stenosis, as well as a left proximal cervical ICA resulting in approximately 45 to 50% stenosis. \" MRI (+) Acute right posteroinferior cerebellar artery territory infarct involving   the medial aspect of the right cerebellar hemisphere as well as the right  lateral margin of the medulla.   Response To Previous Treatment: Not applicable  Family / Caregiver Present: No  Referring Practitioner: Dr Lopez Styles  Referral Date : 10/06/22  Follows Commands: Within Functional Limits  Subjective  Subjective: pt very pleasant and agreeable to working with therapy         Social/Functional History  Social/Functional History  Lives With: Family (sister)  Type of Home: Apartment  Home Layout: One level  Home Access: Elevator  Bathroom Shower/Tub: Tub/Shower unit  Bathroom Toilet: Handicap height  Bathroom Equipment: Grab bars in shower  ADL Assistance: Independent  Ambulation Assistance: Independent  Transfer Assistance: Independent  Vision/Hearing  Vision  Vision: Within Functional Limits  Hearing  Hearing: Within functional limits    Cognition   Orientation  Overall Orientation Status: Within Functional Limits  Cognition  Overall Cognitive Status: WFL     Objective   Heart Rate: 78  Heart Rate Source: Telemetry  BP: (!) 154/104  BP Location: Left upper arm  BP Method: Automatic  Patient Position: Sitting  MAP (Calculated): 120.67  Resp: 26  SpO2: 98 %  O2 Device: None (Room air)              AROM RLE (degrees)  RLE AROM: WFL  AROM LLE (degrees)  LLE AROM : WFL  Strength RLE  Strength RLE: WFL  Strength LLE  Strength LLE: WFL           Bed mobility  Bed Mobility Comments: sitting on EOB upon arrival and stayed in chair at end of session  Transfers  Sit to Stand: Stand by assistance  Stand to Sit: Stand by assistance  Ambulation  Surface: Level tile  Device: No Device  Assistance: Stand by assistance  Quality of Gait: increased lateral sway due to body habitus; veered slightly off course but no LOB  Distance: 200'  Comments: pt was slightly more wobbly by end of walk; no c/o dizziness     Balance  Sitting - Static: Good  Sitting - Dynamic: Good  Standing - Static: Good;-  Standing - Dynamic: Good;-;Fair;+           OutComes Score                                                  AM-PAC Score  AM-PAC Inpatient Mobility Raw Score : 21 (10/07/22 1244)  AM-PAC Inpatient T-Scale Score : 50.25 (10/07/22 1244)  Mobility Inpatient CMS 0-100% Score: 28.97 (10/07/22 1244)  Mobility Inpatient CMS G-Code Modifier : CJ (10/07/22 1244)          Tinneti Score       Goals  Short Term Goals  Time Frame for Short Term Goals: by discharge  Short Term Goal 1: transfers Ind  Short Term Goal 2: amb 100-200' Ind  Patient Goals   Patient Goals : to return to his sister's apt       Education  Patient Education  Education Given To: Patient  Education Provided: Role of Therapy  Education Method: Verbal  Education Outcome: Verbalized understanding      Therapy Time   Individual Concurrent Group Co-treatment   Time In 1130         Time Out 1210         Minutes 40                 LOLY VEE PT   Electronically signed by LOLY VEE PT on 10/7/2022 at 12:45 PM

## 2022-10-07 NOTE — PROGRESS NOTES
4 Eyes Skin Assessment     NAME:  Luis Macdonald  YOB: 1965  MEDICAL RECORD NUMBER:  [id number]    The patient is being assess for  Admission    I agree that 2 RN's have performed a thorough Head to Toe Skin Assessment on the patient. ALL assessment sites listed below have been assessed. Areas assessed by both nurses:    Head, Face, Ears, Shoulders, Back, Chest, Arms, Elbows, Hands, Sacrum. Buttock, Coccyx, Ischium, and Legs. Feet and Heels        Does the Patient have a Wound?  No noted wound(s)       [place] Prevention initiated:  NA   Wound Care Orders initiated:  NA    Pressure Injury (Stage 3,4, Unstageable, DTI, NWPT, and Complex wounds) if present place referral/consult order under [de-identified] NA    New and Established Ostomies if present place consult order under : NA      Nurse 1 eSignature: Electronically signed by Pb Faustin RN on 10/7/22 at 4:51 AM EDT    **SHARE this note so that the co-signing nurse is able to place an eSignature**    Nurse 2 eSignature: Electronically signed by Quinton Bal RN on 10/7/22 at 4:51 AM EDT

## 2022-10-08 LAB
A/G RATIO: 1.2 (ref 1.1–2.2)
ALBUMIN SERPL-MCNC: 3.8 G/DL (ref 3.4–5)
ALP BLD-CCNC: 63 U/L (ref 40–129)
ALT SERPL-CCNC: 10 U/L (ref 10–40)
ANION GAP SERPL CALCULATED.3IONS-SCNC: 11 MMOL/L (ref 3–16)
AST SERPL-CCNC: 11 U/L (ref 15–37)
BASOPHILS ABSOLUTE: 0 K/UL (ref 0–0.2)
BASOPHILS RELATIVE PERCENT: 0.7 %
BILIRUB SERPL-MCNC: 0.6 MG/DL (ref 0–1)
BUN BLDV-MCNC: 14 MG/DL (ref 7–20)
CALCIUM SERPL-MCNC: 8.8 MG/DL (ref 8.3–10.6)
CHLORIDE BLD-SCNC: 100 MMOL/L (ref 99–110)
CO2: 22 MMOL/L (ref 21–32)
CREAT SERPL-MCNC: 0.9 MG/DL (ref 0.9–1.3)
EOSINOPHILS ABSOLUTE: 0.2 K/UL (ref 0–0.6)
EOSINOPHILS RELATIVE PERCENT: 3.4 %
GFR AFRICAN AMERICAN: >60
GFR NON-AFRICAN AMERICAN: >60
GLUCOSE BLD-MCNC: 196 MG/DL (ref 70–99)
GLUCOSE BLD-MCNC: 293 MG/DL (ref 70–99)
GLUCOSE BLD-MCNC: 300 MG/DL (ref 70–99)
GLUCOSE BLD-MCNC: 316 MG/DL (ref 70–99)
GLUCOSE BLD-MCNC: 340 MG/DL (ref 70–99)
HCT VFR BLD CALC: 40.6 % (ref 40.5–52.5)
HEMOGLOBIN: 13.9 G/DL (ref 13.5–17.5)
LYMPHOCYTES ABSOLUTE: 1.6 K/UL (ref 1–5.1)
LYMPHOCYTES RELATIVE PERCENT: 27.5 %
MCH RBC QN AUTO: 31.6 PG (ref 26–34)
MCHC RBC AUTO-ENTMCNC: 34.3 G/DL (ref 31–36)
MCV RBC AUTO: 92.3 FL (ref 80–100)
MONOCYTES ABSOLUTE: 0.5 K/UL (ref 0–1.3)
MONOCYTES RELATIVE PERCENT: 7.8 %
NEUTROPHILS ABSOLUTE: 3.5 K/UL (ref 1.7–7.7)
NEUTROPHILS RELATIVE PERCENT: 60.6 %
PDW BLD-RTO: 12.5 % (ref 12.4–15.4)
PERFORMED ON: ABNORMAL
PLATELET # BLD: 222 K/UL (ref 135–450)
PMV BLD AUTO: 8.4 FL (ref 5–10.5)
POTASSIUM REFLEX MAGNESIUM: 4.2 MMOL/L (ref 3.5–5.1)
RBC # BLD: 4.4 M/UL (ref 4.2–5.9)
SODIUM BLD-SCNC: 133 MMOL/L (ref 136–145)
TOTAL PROTEIN: 7 G/DL (ref 6.4–8.2)
WBC # BLD: 5.7 K/UL (ref 4–11)

## 2022-10-08 PROCEDURE — 85025 COMPLETE CBC W/AUTO DIFF WBC: CPT

## 2022-10-08 PROCEDURE — 99231 SBSQ HOSP IP/OBS SF/LOW 25: CPT | Performed by: STUDENT IN AN ORGANIZED HEALTH CARE EDUCATION/TRAINING PROGRAM

## 2022-10-08 PROCEDURE — 36415 COLL VENOUS BLD VENIPUNCTURE: CPT

## 2022-10-08 PROCEDURE — 6370000000 HC RX 637 (ALT 250 FOR IP): Performed by: INTERNAL MEDICINE

## 2022-10-08 PROCEDURE — 80053 COMPREHEN METABOLIC PANEL: CPT

## 2022-10-08 PROCEDURE — 2060000000 HC ICU INTERMEDIATE R&B

## 2022-10-08 PROCEDURE — 99233 SBSQ HOSP IP/OBS HIGH 50: CPT | Performed by: STUDENT IN AN ORGANIZED HEALTH CARE EDUCATION/TRAINING PROGRAM

## 2022-10-08 RX ORDER — INSULIN LISPRO 100 [IU]/ML
9 INJECTION, SOLUTION INTRAVENOUS; SUBCUTANEOUS
Status: DISCONTINUED | OUTPATIENT
Start: 2022-10-08 | End: 2022-10-09

## 2022-10-08 RX ORDER — AMLODIPINE BESYLATE 5 MG/1
5 TABLET ORAL DAILY
Status: DISCONTINUED | OUTPATIENT
Start: 2022-10-08 | End: 2022-10-09

## 2022-10-08 RX ORDER — LOSARTAN POTASSIUM 100 MG/1
100 TABLET ORAL DAILY
Status: DISCONTINUED | OUTPATIENT
Start: 2022-10-09 | End: 2022-10-11 | Stop reason: HOSPADM

## 2022-10-08 RX ORDER — INSULIN GLARGINE 100 [IU]/ML
25 INJECTION, SOLUTION SUBCUTANEOUS DAILY
Status: DISCONTINUED | OUTPATIENT
Start: 2022-10-08 | End: 2022-10-09

## 2022-10-08 RX ORDER — ACETAMINOPHEN 325 MG/1
650 TABLET ORAL EVERY 4 HOURS PRN
Status: DISCONTINUED | OUTPATIENT
Start: 2022-10-08 | End: 2022-10-11 | Stop reason: HOSPADM

## 2022-10-08 RX ORDER — INSULIN LISPRO 100 [IU]/ML
9 INJECTION, SOLUTION INTRAVENOUS; SUBCUTANEOUS ONCE
Status: COMPLETED | OUTPATIENT
Start: 2022-10-08 | End: 2022-10-08

## 2022-10-08 RX ORDER — SPIRONOLACTONE 25 MG/1
50 TABLET ORAL DAILY
Status: DISCONTINUED | OUTPATIENT
Start: 2022-10-08 | End: 2022-10-11 | Stop reason: HOSPADM

## 2022-10-08 RX ORDER — LOSARTAN POTASSIUM 50 MG/1
50 TABLET ORAL DAILY
Status: DISCONTINUED | OUTPATIENT
Start: 2022-10-08 | End: 2022-10-08

## 2022-10-08 RX ADMIN — INSULIN LISPRO 9 UNITS: 100 INJECTION, SOLUTION INTRAVENOUS; SUBCUTANEOUS at 18:04

## 2022-10-08 RX ADMIN — CARVEDILOL 25 MG: 25 TABLET, FILM COATED ORAL at 09:15

## 2022-10-08 RX ADMIN — ACETAMINOPHEN 650 MG: 325 TABLET ORAL at 12:19

## 2022-10-08 RX ADMIN — PANTOPRAZOLE SODIUM 40 MG: 40 TABLET, DELAYED RELEASE ORAL at 05:41

## 2022-10-08 RX ADMIN — FUROSEMIDE 40 MG: 40 TABLET ORAL at 09:09

## 2022-10-08 RX ADMIN — INSULIN GLARGINE 25 UNITS: 100 INJECTION, SOLUTION SUBCUTANEOUS at 09:22

## 2022-10-08 RX ADMIN — INSULIN LISPRO 4 UNITS: 100 INJECTION, SOLUTION INTRAVENOUS; SUBCUTANEOUS at 20:35

## 2022-10-08 RX ADMIN — AMIODARONE HYDROCHLORIDE 200 MG: 200 TABLET ORAL at 09:09

## 2022-10-08 RX ADMIN — AMLODIPINE BESYLATE 5 MG: 5 TABLET ORAL at 09:09

## 2022-10-08 RX ADMIN — SPIRONOLACTONE 50 MG: 25 TABLET ORAL at 09:09

## 2022-10-08 RX ADMIN — INSULIN LISPRO 8 UNITS: 100 INJECTION, SOLUTION INTRAVENOUS; SUBCUTANEOUS at 12:21

## 2022-10-08 RX ADMIN — ATORVASTATIN CALCIUM 80 MG: 80 TABLET, FILM COATED ORAL at 20:35

## 2022-10-08 RX ADMIN — FLUTICASONE PROPIONATE 2 SPRAY: 50 SPRAY, METERED NASAL at 09:10

## 2022-10-08 RX ADMIN — INSULIN LISPRO 9 UNITS: 100 INJECTION, SOLUTION INTRAVENOUS; SUBCUTANEOUS at 12:21

## 2022-10-08 RX ADMIN — INSULIN LISPRO 12 UNITS: 100 INJECTION, SOLUTION INTRAVENOUS; SUBCUTANEOUS at 09:19

## 2022-10-08 RX ADMIN — INSULIN LISPRO 9 UNITS: 100 INJECTION, SOLUTION INTRAVENOUS; SUBCUTANEOUS at 09:17

## 2022-10-08 RX ADMIN — CARVEDILOL 25 MG: 25 TABLET, FILM COATED ORAL at 18:04

## 2022-10-08 RX ADMIN — LOSARTAN POTASSIUM 50 MG: 50 TABLET, FILM COATED ORAL at 09:09

## 2022-10-08 ASSESSMENT — PAIN SCALES - GENERAL
PAINLEVEL_OUTOF10: 2
PAINLEVEL_OUTOF10: 2
PAINLEVEL_OUTOF10: 0

## 2022-10-08 ASSESSMENT — PAIN DESCRIPTION - LOCATION: LOCATION: HEAD

## 2022-10-08 NOTE — PROGRESS NOTES
[facility]    Admit Date: 10/6/2022    PCP: Brice Duckworth DO                  : 1965  MRN: [id number]    Subjective: Interval History:   Patient seen and examined. Clinical notes reviewed. Telemetry reviewed. No new complaint today. No major events overnight. Denies having chest pain, shortness of breath, dyspnea on exertion, Orthopnea, PND at the time of this visit. Review of System:  Pertinent positive and negatives are in the HPI and interval above, the rest are negative. Data:   Scheduled Meds:   spironolactone  50 mg Oral Daily    insulin glargine  25 Units SubCUTAneous Daily    insulin lispro  9 Units SubCUTAneous TID WC    amLODIPine  5 mg Oral Daily    [START ON 10/9/2022] losartan  100 mg Oral Daily    carvedilol  25 mg Oral BID WC    insulin lispro  0-16 Units SubCUTAneous TID WC    insulin lispro  0-4 Units SubCUTAneous Nightly    amiodarone  200 mg Oral Daily    atorvastatin  80 mg Oral Nightly    [Held by provider] aspirin  81 mg Oral Daily    [Held by provider] apixaban  5 mg Oral BID    fluticasone  2 spray Nasal Daily    furosemide  40 mg Oral Daily    pantoprazole  40 mg Oral QAM AC     PRN Meds:acetaminophen, ondansetron **OR** ondansetron, labetalol, glucose, dextrose bolus **OR** dextrose bolus, glucagon (rDNA), dextrose, polyethylene glycol  I/O last 3 completed shifts: In: 5689 [P.O.:1730]  Out: 2200 [Urine:2200]  I/O this shift: In: 480 [P.O.:480]  Out: -     Intake/Output Summary (Last 24 hours) at 10/8/2022 1354  Last data filed at 10/8/2022 0955  Gross per 24 hour   Intake 1330 ml   Output 650 ml   Net 680 ml           Objective:     Vitals: BP (!) 159/83   Pulse 62   Temp 97.6 °F (36.4 °C) (Oral)   Resp 18   Ht 6' 2\" (1.88 m)   Wt (!) 301 lb 2.4 oz (136.6 kg)   SpO2 97%   BMI 38.67 kg/m²     Physical Exam  Vitals and nursing note reviewed. Constitutional:       Appearance: Normal appearance. He is not ill-appearing or diaphoretic.    HENT: Head: Normocephalic and atraumatic. Mouth/Throat:      Mouth: Mucous membranes are moist.   Eyes:      Pupils: Pupils are equal, round, and reactive to light. Cardiovascular:      Rate and Rhythm: Normal rate and regular rhythm. Heart sounds: No murmur heard. Pulmonary:      Effort: Pulmonary effort is normal. No respiratory distress. Breath sounds: No wheezing. Abdominal:      General: Abdomen is flat. There is no distension. Tenderness: There is no abdominal tenderness. Musculoskeletal:      Right lower leg: No edema. Left lower leg: No edema. Skin:     General: Skin is warm and dry. Neurological:      General: No focal deficit present. Mental Status: He is alert and oriented to person, place, and time. Psychiatric:         Mood and Affect: Mood normal.         Behavior: Behavior normal.       LABS:    CBC:   Recent Labs     10/07/22  0441 10/07/22  0442 10/08/22  0824   WBC 6.3 6.2 5.7   HGB 13.7 14.0 13.9   HCT 40.8 41.1 40.6   MCV 92.1 93.0 92.3    209 222                                                                BMP:    Recent Labs     10/06/22  1235 10/07/22  0441 10/08/22  0824   * 132* 133*   K 4.3 4.2 4.2   CL 97* 99 100   CO2 22 20* 22   BUN 16 15 14   CREATININE 1.0 1.1 0.9   GLUCOSE 474* 295* 316*       LFT's:   Recent Labs     10/06/22  1235 10/07/22  0441 10/08/22  0824   AST 12* 12* 11*   ALT 12 11 10   BILITOT 0.3 0.3 0.6   ALKPHOS 74 64 63       Troponin:   Recent Labs     10/06/22  1235   TROPONINI <0.01       Lipids:   Recent Labs     10/07/22  0441   CHOL 125   HDL 32*       INR:   Recent Labs     10/06/22  1236   INR 0.97     -----------------------------------------------------------------  RAD:   MRI brain without contrast   Final Result   1. Acute right posteroinferior cerebellar artery territory infarct involving   the medial aspect of the right cerebellar hemisphere as well as the right   lateral margin of the medulla.    2. No acute intracranial hemorrhage. 3. Mild chronic small vessel ischemic changes. The findings were sent to the Radiology Results [id number] at 8:19   am on 10/7/2022 to be communicated to a licensed caregiver. CT HEAD WO CONTRAST   Final Result   1. Subtle focus of asymmetric low attenuation in the inferomedial right   cerebellar hemisphere. Finding may be artifactual.  Subtle area of recent   ischemic change cannot be excluded. Follow-up MRI examination with diffusion   imaging may be helpful for more complete evaluation. 2. Otherwise, no evidence of acute intracranial abnormality. CTA HEAD NECK W CONTRAST   Final Result   1. Hemodynamically significant stenosis within the right cervical ICA with   approximately 90% stenosis per NASCET criteria related to noncalcified and   calcified plaque, most pronounced 1.5 cm distal to the bifurcation. 2. Fibrocalcific plaque within the left proximal cervical ICA resulting in   approximately 45-50% stenosis per NASCET criteria. 3. Patent cervical vertebral arteries. 4.  No apparent arterial high grade stenosis, occlusion or aneurysm within   the head. XR CHEST PORTABLE   Final Result   No evidence of acute cardiopulmonary disease. EKG Interpretation: NSR/LVH      Echo:   10/2022  Conclusions      Summary   Suboptimal image quality. Definity contrast administered. Overall left ventricular systolic function appears mild to moderately   reduced. Ejection fraction is visually estimated to be 40-45% with diffuse   hypokinesis. Left ventricular cavity size is normal. There is moderately   increased left ventricular wall thickness. Diastolic filling parameters   suggest grade I diastolic dysfunction. Normal right ventricular size and function. No significant valve abnormalities noted.    A bubble study was performed and fails to show evidence of shunting.        9/25/2021  Conclusions      Summary   There is mildly increased left ventricular wall thickness. Overall left ventricular systolic function appears severely reduced. Ejection fraction is visually estimated to be 30-35%. No regional wall motion abnormalities are noted. Grade II diastolic dysfunction with elevated LV filling pressures. Normal right ventricular size and function. Echocardiogram 3/4/19   - Left ventricle: The cavity size was moderately dilated. Wall     thickness was normal. Systolic function was moderately to severely     reduced. The estimated ejection fraction was in the range of 30%     to 35%. Severe diffuse hypokinesis. The study is not technically     sufficient to allow evaluation of LV diastolic function.   - Mitral valve: Mild regurgitation.   - Left atrium: The atrium was mildly dilated. - Right ventricle: Systolic function was low normal. TAPSE:     1.8cm. Tricuspid annular systolic velocity: 87KB/I.   - Right atrium: The atrium was mildly dilated. - Atrial septum: A shunt cannot be excluded. - Tricuspid valve: Mild-moderate regurgitation directed along the     right atrial wall. - Pulmonary arteries: Systolic pressure was moderately increased,     estimated to be 53mm Hg assuming that the right atrial pressure     was 5 mmHg. Stress: 7/25/19   There is a medium to large sized area of mild to moderately decreased perfusion in the basal inferior, basal inferolateral, mid inferior, mid inferolateral, and apical inferior segments at stress with extensive improvement at rest, consistent with reversible ischemia. Cath: 4/2/16    PROCEDURAL FINDINGS:   1. Left main coronary artery was free of any angiographically   significant disease. It gave of the left anterior descending   artery and the left circumflex artery. 2. The left anterior descending had a normal anatomic course. It   gave off 2 diagonals. 30-40% mid-LAD stenosis.      3. The left circumflex artery gives off 1 large obtuse marginals   and there is 30-40%

## 2022-10-08 NOTE — PLAN OF CARE
Problem: Discharge Planning  Goal: Discharge to home or other facility with appropriate resources  Outcome: Progressing  Flowsheets (Taken 10/7/2022 2001)  Discharge to home or other facility with appropriate resources:   Identify barriers to discharge with patient and caregiver   Identify discharge learning needs (meds, wound care, etc)     Problem: Safety - Adult  Goal: Free from fall injury  Outcome: Progressing  Flowsheets (Taken 10/7/2022 2321)  Free From Fall Injury:   Instruct family/caregiver on patient safety   Based on caregiver fall risk screen, instruct family/caregiver to ask for assistance with transferring infant if caregiver noted to have fall risk factors     Problem: ABCDS Injury Assessment  Goal: Absence of physical injury  Outcome: Progressing  Flowsheets (Taken 10/7/2022 2321)  Absence of Physical Injury: Implement safety measures based on patient assessment     Problem: Cardiovascular - Adult  Goal: Maintains optimal cardiac output and hemodynamic stability  Outcome: Progressing     Problem: Neurosensory - Adult  Goal: Achieves stable or improved neurological status  Outcome: Progressing  Flowsheets (Taken 10/7/2022 2001)  Achieves stable or improved neurological status: Assess for and report changes in neurological status  Goal: Achieves maximal functionality and self care  Outcome: Progressing     Problem: Pain  Goal: Verbalizes/displays adequate comfort level or baseline comfort level  Outcome: Progressing

## 2022-10-08 NOTE — PROGRESS NOTES
Hospitalist Progress Note      PCP: Magalie Hennessy DO    Date of Admission: 10/6/2022    Chief Complaint: imbalance, dizziness    Hospital Course: The patient is a 61 y.o. male with history of cocaine abuse, tobacco abuse, hypertension, lipidemia, morbid obesity, MI, type 2 diabetes, and diastolic heart failure who presents to [facility] with imbalance and dizziness. Patient states that starting on Saturday, he started to notice some imbalance that has been progressive throughout the last several days. Patient notes that he is sort of leaning more towards the right when he walks, and feels like he has to catch himself as he is walking. Patient notes that this is a new symptom for him. Patient denies visual disturbances, chest pain, fever, chills, shortness of breath, abdominal pain, nausea, vomiting, constipation, diarrhea, and dysuria. Patient notes that he has been out of his medications for a few weeks, and he has not taken any medication in the last few weeks. In the ED, labs were significant for sodium of 131, chloride of 97, glucose of 474, proBNP of 397. Chest x-ray was unremarkable. EKG showed normal sinus rhythm and possible LVH. CT head without contrast showed a subtle focus of asymmetric low attenuation in the inferior medial right cerebellar hemisphere. CTA head and neck with contrast showed a hemodynamically significant stenosis within the right cervical ICA with approximately 90% stenosis, as well as a left proximal cervical ICA resulting in approximately 45 to 50% stenosis. Subjective: Pt seen and examined. Only complaint is a headache. Otherwise feels ok.        Medications:  Reviewed    Infusion Medications    dextrose       Scheduled Medications    spironolactone  50 mg Oral Daily    insulin glargine  25 Units SubCUTAneous Daily    insulin lispro  9 Units SubCUTAneous TID     amLODIPine  5 mg Oral Daily    [START ON 10/9/2022] losartan  100 mg Oral Daily carvedilol  25 mg Oral BID     insulin lispro  0-16 Units SubCUTAneous TID     insulin lispro  0-4 Units SubCUTAneous Nightly    amiodarone  200 mg Oral Daily    atorvastatin  80 mg Oral Nightly    [Held by provider] aspirin  81 mg Oral Daily    [Held by provider] apixaban  5 mg Oral BID    fluticasone  2 spray Nasal Daily    furosemide  40 mg Oral Daily    pantoprazole  40 mg Oral QAM AC     PRN Meds: ondansetron **OR** ondansetron, labetalol, glucose, dextrose bolus **OR** dextrose bolus, glucagon (rDNA), dextrose, polyethylene glycol      Intake/Output Summary (Last 24 hours) at 10/8/2022 1152  Last data filed at 10/8/2022 0955  Gross per 24 hour   Intake 1330 ml   Output 650 ml   Net 680 ml         Exam:    BP (!) 172/82   Pulse 63   Temp 97.5 °F (36.4 °C) (Oral)   Resp 18   Ht 6' 2\" (1.88 m)   Wt (!) 301 lb 2.4 oz (136.6 kg)   SpO2 97%   BMI 38.67 kg/m²     General appearance: No apparent distress appears stated age and cooperative. HEENT Normal cephalic, atraumatic without obvious deformity. Pupils equal, round, and reactive to light. Extra ocular muscles intact. Conjunctivae/corneas clear. Neck: Supple, No jugular venous distention/bruits. Trachea midline without thyromegaly or adenopathy with full range of motion. Lungs: Clear to auscultation, bilaterally without Rales/Wheezes/Rhonchi with good respiratory effort. Heart: Regular rate and rhythm with Normal S1/S2 without murmurs, rubs or gallops, point of maximum impulse non-displaced  Abdomen: Soft, non-tender or non-distended without rigidity or guarding and positive bowel sounds all four quadrants. Extremities: No clubbing, cyanosis, or edema bilaterally. Full range of motion without deformity and normal gait intact. Skin: Skin color, texture, turgor normal.  No rashes or lesions. Neurologic: Alert and oriented X 3, neurovascularly intact with sensory/motor intact upper extremities/lower extremities, bilaterally.   Cranial nerves: II-XII intact, grossly non-focal.  Impaired finger-to-nose, slow heel-to-shin transfer. Mental status: Alert, oriented, thought content appropriate. Capillary Refill: Acceptable  < 3 seconds  Peripheral Pulses: +3 Easily felt, not easily obliterated with pressure      Labs:   Recent Labs     10/07/22  0441 10/07/22  0442 10/08/22  0824   WBC 6.3 6.2 5.7   HGB 13.7 14.0 13.9   HCT 40.8 41.1 40.6    209 222       Recent Labs     10/06/22  1235 10/07/22  0441 10/08/22  0824   * 132* 133*   K 4.3 4.2 4.2   CL 97* 99 100   CO2 22 20* 22   BUN 16 15 14   CREATININE 1.0 1.1 0.9   CALCIUM 9.2 8.5 8.8       Recent Labs     10/06/22  1235 10/07/22  0441 10/08/22  0824   AST 12* 12* 11*   ALT 12 11 10   BILITOT 0.3 0.3 0.6   ALKPHOS 74 64 63       Recent Labs     10/06/22  1236   INR 0.97       Recent Labs     10/06/22  1235   TROPONINI <0.01         Urinalysis:      Lab Results   Component Value Date/Time    NITRU Negative 10/06/2022 03:00 PM    WBCUA 0-2 03/19/2022 05:10 PM    RBCUA 0-2 03/19/2022 05:10 PM    BLOODU Negative 10/06/2022 03:00 PM    SPECGRAV 1.056 10/06/2022 03:00 PM    GLUCOSEU 250 10/06/2022 03:00 PM       Radiology:  MRI brain without contrast   Final Result   1. Acute right posteroinferior cerebellar artery territory infarct involving   the medial aspect of the right cerebellar hemisphere as well as the right   lateral margin of the medulla. 2. No acute intracranial hemorrhage. 3. Mild chronic small vessel ischemic changes. The findings were sent to the Radiology Results [id number] at 8:19   am on 10/7/2022 to be communicated to a licensed caregiver. CT HEAD WO CONTRAST   Final Result   1. Subtle focus of asymmetric low attenuation in the inferomedial right   cerebellar hemisphere. Finding may be artifactual.  Subtle area of recent   ischemic change cannot be excluded. Follow-up MRI examination with diffusion   imaging may be helpful for more complete evaluation. 2. Otherwise, no evidence of acute intracranial abnormality. CTA HEAD NECK W CONTRAST   Final Result   1. Hemodynamically significant stenosis within the right cervical ICA with   approximately 90% stenosis per NASCET criteria related to noncalcified and   calcified plaque, most pronounced 1.5 cm distal to the bifurcation. 2. Fibrocalcific plaque within the left proximal cervical ICA resulting in   approximately 45-50% stenosis per NASCET criteria. 3. Patent cervical vertebral arteries. 4.  No apparent arterial high grade stenosis, occlusion or aneurysm within   the head. XR CHEST PORTABLE   Final Result   No evidence of acute cardiopulmonary disease.                  Assessment/Plan:    Active Hospital Problems    Diagnosis Date Noted    Symptomatic carotid artery stenosis, right [I65.21] 10/07/2022     Priority: Medium    Dizziness [R42] 10/07/2022     Priority: Medium    Acute CVA (cerebrovascular accident) [facility]) [I63.9] 10/06/2022     Priority: Medium       Acute CVA to the right cerebellar hemisphere  -confirmed on MRI Brain without contrast - acute right posteroinferior cerebellar artery territory infarct involving the medial aspect of the right cerebellar hemisphere as well as the right lateral margin of the medulla  -Neurology consulted, recs appreciated  -Continue high intensity statin  -Hold aspirin and  Eliquis to prevent hemorrhagic conversion  -Telemetry monitoring  -PT OT evaluation  -Bedside swallow evaluation  -Lipid panel  -Hgb A1c 12.9%  -Obtained echo with bubble study with no evidence of shunting  -Neurochecks  -restarted home anti-hypertensive meds  -Labetalol as needed     Right ICA stenosis  -Vascular surgery consulted, recs appreciated  -discussing with cardiology timing of CEA     Hypertensive urgency  -restart home anti-hypertensives, adjustments made and amlodipine added today  -Labetalol as needed for SBP > 220    Chronic combined systolic and diastolic CHF -  - was supposed to have an ischemic evaluation with cardiology as an outpatient but was never pursued  - continues to have EF 40-45% with hypokinesis and grade I DD  - cardiology consulted, planning on angiogram on 10/10     Type 2 diabetes  -Lantus - increase dose  -Medium dose SSI -> high dose SSi  -increase prandial Humalog  -Hypoglycemia protocol  -POCT glucose checks  -Carb controlled diet     Paroxysmal atrial fibrillation  -Continue home amiodarone and Eliquis  -Telemetry monitoring     Cocaine abuse  -Abstinence encouraged  -Contributing to blood pressure issues     Morbid obesity due to excess calories-BMI 40.57  -Nutritional counseling provided  -Weight loss encouraged  -Complicating medical management     Hyperlipidemia  -Continue high intensity statin     Chronic diastolic CHF  -Continue home medications  -Telemetry monitoring     Tobacco abuse  -Smoking cessation counseling provided  -Nicotine patch is offered      DVT Prophylaxis: Eliquis - on hold  Diet: ADULT DIET;  Regular; 4 carb choices (60 gm/meal)  Code Status: Full Code    PT/OT Eval Status: ordered    Dispo - continue care    Martha Omalley MD

## 2022-10-08 NOTE — PROGRESS NOTES
Pt states has not slept well, yet when checked on appeared to be sleeping. Refused to stand to be weighed tonight. No complaints of dizziness. Was without need.

## 2022-10-08 NOTE — PLAN OF CARE
Problem: Cardiovascular - Adult  Goal: Maintains optimal cardiac output and hemodynamic stability  10/8/2022 1000 by Yadira Mckeon RN  Outcome: Progressing  10/8/2022 0529 by Kiel Lamb RN  Outcome: Progressing     Problem: Pain  Goal: Verbalizes/displays adequate comfort level or baseline comfort level  10/8/2022 1000 by Yadira Mckeon RN  Outcome: Progressing  10/8/2022 0529 by Kiel Lamb RN  Outcome: Progressing     Problem: ABCDS Injury Assessment  Goal: Absence of physical injury  10/8/2022 1000 by Yadira Mckeon RN  Outcome: Progressing  10/8/2022 0529 by Kiel Lamb RN  Outcome: Progressing  Flowsheets (Taken 10/7/2022 2321)  Absence of Physical Injury: Implement safety measures based on patient assessment

## 2022-10-08 NOTE — PROGRESS NOTES
[facility] Vascular Surgery Progress Note      Chief Complaint:   Chief Complaint   Patient presents with    Dizziness     States dizzy since this past weekend states when he gets up he is dizzy, states he has not taken any of his home medication because he is out        : right PICA circulation stroke    Lorena Sherman is a 61 y.o. male patient. Subjective  Occasional dizziness but mostly improved    1. Dizziness    2. Poorly controlled diabetes mellitus ([address]    3. Noncompliance with medication regimen    4. Polysubstance abuse [facility])      Past Medical History:   Diagnosis Date    Abdominal pain 7/29/2021    Abnormal EKG 7/28/2016    Acute pancreatitis 7/29/2021    CHF (congestive heart failure) ([facility] [address]     Cigarette smoker 8/25/2021    Cocaine abuse ([facility] [address] 3/28/2015    Last Assessment & Plan:  Formatting of this note might be different from the original. Counseled on cessation as increases risk of coronary vasospasm and further worsening of heart function. Dehydration 7/28/2016    Diabetes mellitus ([address]     History of cocaine abuse ([facility] [address] 7/28/2016    History of MI (myocardial infarction) 7/28/2016    Hyperglycemia 7/28/2016    Hyperlipidemia     Hypertension     Morbid obesity with BMI of 45.0-49.9, adult ([address] 7/28/2016    Postural dizziness 7/28/2016    Pre-diabetes 3/9/2017    Last Assessment & Plan:  Formatting of this note might be different from the original. Discussed lifestyle changes Refer to [facility] program    PUD (peptic ulcer disease) 3/1/2019     No past surgical history pertinent negatives on file.   Scheduled Meds:   spironolactone  50 mg Oral Daily    insulin glargine  25 Units SubCUTAneous Daily    insulin lispro  9 Units SubCUTAneous TID WC    amLODIPine  5 mg Oral Daily    [START ON 10/9/2022] losartan  100 mg Oral Daily    carvedilol  25 mg Oral BID WC    insulin lispro  0-16 Units SubCUTAneous TID WC    insulin lispro  0-4 Units SubCUTAneous Nightly    amiodarone  200 mg Oral Daily atorvastatin  80 mg Oral Nightly    [Held by provider] aspirin  81 mg Oral Daily    [Held by provider] apixaban  5 mg Oral BID    fluticasone  2 spray Nasal Daily    furosemide  40 mg Oral Daily    pantoprazole  40 mg Oral QAM AC     Continuous Infusions:   dextrose       PRN Meds:ondansetron **OR** ondansetron, labetalol, glucose, dextrose bolus **OR** dextrose bolus, glucagon (rDNA), dextrose, polyethylene glycol    Principal Problem:    Acute CVA (cerebrovascular accident) ([address]  Active Problems:    Symptomatic carotid artery stenosis, right    Dizziness  Resolved Problems:    * No resolved hospital problems. *    Blood pressure (!) 185/102, pulse 74, temperature 98.4 °F (36.9 °C), temperature source Oral, resp. rate 18, height 6' 2\" (1.88 m), weight (!) 301 lb 2.4 oz (136.6 kg), SpO2 98 %. Objective:  Vital signs (most recent): Blood pressure (!) 185/102, pulse 74, temperature 98.4 °F (36.9 °C), temperature source Oral, resp. rate 18, height 6' 2\" (1.88 m), weight (!) 301 lb 2.4 oz (136.6 kg), SpO2 98 %. General appearance: Comfortable and in no acute distress. Lungs:  Normal effort. Heart: Normal rate. Extremities: There is normal range of motion. Neurological: The patient is alert and oriented to person, place and time. Normal strength.         Assessment & Plan    Severe symptomatic right ICA stenosis  Cath Monday  Discussed with oliva Valerio for DAPT if able to treat  Will finalize plans once that procedure complete but anticipating TCAR (10/18/22) vs CEA (10/15/22) if patient able to take DAPT plus statin for 3 months  Aspirin and eliquis on hold for now due to risk of hemorrhagic conversion  Will follow up Monday after cath to finalize plans  Please call with questions or concerns      Scot Snow DO, FSVS, [address] [facility] Vascular and Endovascular Surgery

## 2022-10-08 NOTE — PROGRESS NOTES
NAME:  Hailey Lacy  YOB: 1965  MEDICAL RECORD NUMBER:  [id number]  TODAYS DATE:  10/8/2022    Discussed personal risk factors for Stroke/TIA with patient/family, and ways to reduce the risk for a recurrent stroke. Patient's personal risk factors which were identified are:     [] Alcohol Abuse: check with your physician before any alcohol consumption. [x] Atrial fibrillation: may cause blood clots. [x] Drug Abuse: Seek help, talk with your doctor  [] Clotting Disorder  [x] Diabetes  [] Family history of stroke or heart disease  [x] High Blood Pressure/Hypertension: work with your physician. [x] High cholesterol: monitor cholesterol levels with your physician. [x] Overweight/Obesity: work with your physician for your ideal body weight. [x] Physical Inactivity: get regular exercise as directed by your physician. [] Personal history of previous TIA or stroke  [] Poor Diet; decrease salt (sodium) in your diet, follow diet directed by physician. [] Smoking: Cigarette/Cigar: stop smoking. Advised pt. that you can reduce your risk for stroke/TIA by modifying/controlling the risk factors that you have. Pt.advised to take the medications as prescribed, which will be detailed in the discharge instructions, and to not stop taking them without consulting their physician. In addition, pt. advised to maintain a healthy diet, exercise regularly and to not smoke. [facility]'s Stroke treatment and prevention, Managing your recovery  notebook  provided and/or reviewed  with patient/family. The notebook includes, but not limited to, sections addressing warning signs & symptoms of a stroke, which are: sudden numbness or weakness especially on one side of the body, sudden confusion, difficulty speaking or understanding, sudden changes in vision, sudden dizziness or loss of balance/ coordination, sudden severe headache, syncope and seizure.   The need to call EMS (911) immediately if signs & symptoms occur is emphasized . The notebook also provides education on Stroke community resources and stroke advocacy. The need for follow-up after discharge was highlighted with patient/family with them being able to repeat understanding of the importance of this.       Electronically signed by Jennyfer Velasquez RN on 10/8/2022 at 5:30 AM

## 2022-10-08 NOTE — CONSULTS
Cardiology Consultation     Ayleencaesar Lisa  1965    PCP: Arnaud Mendieta DO  Referring Physician: Dr. Elijah Gomez  Reason for Referral: pre op vascular surgery   Chief Complaint:   Chief Complaint   Patient presents with    Dizziness     States dizzy since this past weekend states when he gets up he is dizzy, states he has not taken any of his home medication because he is out       Subjective:     History of Present Illness: The patient is 61 y.o. male with a past medical history significant for morbid obesity, polysubstance abuse, hypertension, hyperlipidemia, coronary artery disease, ischemic cardiomyopathy, paroxysmal A. fib who presents with the above complaint. He admits to several days of worsening dizziness. Work-up in the emergency room was consistent with an acute CVA and high-grade internal carotid artery stenosis. Was evaluated by vascular surgery and is considering his revascularization strategy. He has a known abnormal nuclear stress test and was advised to have a coronary angiogram several years ago however never followed up. He is breathless with minimal exertion      Past Medical History:   Diagnosis Date    Abdominal pain 7/29/2021    Abnormal EKG 7/28/2016    Acute pancreatitis 7/29/2021    CHF (congestive heart failure) ([facility]     Cigarette smoker 8/25/2021    Cocaine abuse ([facility] 3/28/2015    Last Assessment & Plan:  Formatting of this note might be different from the original. Counseled on cessation as increases risk of coronary vasospasm and further worsening of heart function.     Dehydration 7/28/2016    Diabetes mellitus ([address]     History of cocaine abuse ([facility] 7/28/2016    History of MI (myocardial infarction) 7/28/2016    Hyperglycemia 7/28/2016    Hyperlipidemia     Hypertension     Morbid obesity with BMI of 45.0-49.9, adult ([facility] 7/28/2016    Postural dizziness 7/28/2016    Pre-diabetes 3/9/2017    Last Assessment & Plan:  Formatting of this note might be different from the original. Discussed lifestyle changes Refer to [facility] program    PUD (peptic ulcer disease) 3/1/2019     Past Surgical History:   Procedure Laterality Date    CARDIAC CATHETERIZATION      UPPER GASTROINTESTINAL ENDOSCOPY       No family history on file.   Social History     Tobacco Use    Smoking status: Every Day     Packs/day: 0.50     Years: 25.00     Pack years: 12.50     Types: Cigarettes    Smokeless tobacco: Never   Vaping Use    Vaping Use: Never used   Substance Use Topics    Alcohol use: Not Currently    Drug use: Yes     Types: Marijuana ([facility]), Cocaine     Comment: last use 10/5/2022 marijuana; last use of cocaine 10/1/2022       No Known Allergies    Current Facility-Administered Medications   Medication Dose Route Frequency Provider Last Rate Last Admin    carvedilol (COREG) tablet 25 mg  25 mg Oral BID  Kimmy Estrada MD   25 mg at 10/07/22 1801    losartan (COZAAR) tablet 25 mg  25 mg Oral Daily Kimmy Estrada MD   25 mg at 10/07/22 1258    spironolactone (ALDACTONE) tablet 25 mg  25 mg Oral Daily Kimmy Estrada MD   25 mg at 10/07/22 1258    [START ON 10/8/2022] insulin glargine (LANTUS) injection vial 20 Units  20 Units SubCUTAneous Daily Kimmy Estrada MD        insulin lispro (HUMALOG) injection vial 7 Units  7 Units SubCUTAneous TID  Kimmy Estrada MD   7 Units at 10/07/22 1801    insulin lispro (HUMALOG) injection vial 0-16 Units  0-16 Units SubCUTAneous TID  Kimmy Estrada MD   4 Units at 10/07/22 1801    insulin lispro (HUMALOG) injection vial 0-4 Units  0-4 Units SubCUTAneous Nightly Kimmy Estrada MD        amiodarone (CORDARONE) tablet 200 mg  200 mg Oral Daily Kimmy Estrada MD   200 mg at 10/07/22 1038    atorvastatin (LIPITOR) tablet 80 mg  80 mg Oral Nightly Kimmy Estrada MD   80 mg at 10/07/22 2009    [Held by provider] aspirin chewable tablet 81 mg  81 mg Oral Daily Kimmy Estrada MD   81 mg at 10/07/22 1038    [Held by provider] apixaban (ELIQUIS) tablet 5 mg  5 mg Oral BID Kelli Gallagher MD   5 mg at 10/06/22 2010    fluticasone (FLONASE) 50 MCG/ACT nasal spray 2 spray  2 spray Nasal Daily Kelli Gallagher MD   2 spray at 10/07/22 1039    furosemide (LASIX) tablet 40 mg  40 mg Oral Daily Kelli Gallagher MD   40 mg at 10/07/22 1038    pantoprazole (PROTONIX) tablet 40 mg  40 mg Oral QAM AC Kelli Gallagher MD   40 mg at 10/07/22 0506    ondansetron (ZOFRAN-ODT) disintegrating tablet 4 mg  4 mg Oral Q8H PRN Kelli Gallagher MD        Or    ondansetron [facility]) injection 4 mg  4 mg IntraVENous Q6H PRN Kelli Gallagher MD        labetalol (NORMODYNE;TRANDATE) injection 10 mg  10 mg IntraVENous Q10 Min PRN Kelli Gallagher MD        glucose chewable tablet 16 g  4 tablet Oral PRN Kelli Gallagher MD        dextrose bolus 10% 125 mL  125 mL IntraVENous PRN Kelli Gallagher MD        Or    dextrose bolus 10% 250 mL  250 mL IntraVENous PRN Kelli Gallagher MD        glucagon (rDNA) injection 1 mg  1 mg SubCUTAneous PRN Kelli Gallagher MD        dextrose 10 % infusion   IntraVENous Continuous PRN Kelli Gallagher MD        polyethylene glycol (GLYCOLAX) packet 17 g  17 g Oral Daily PRN Kelli Gallagher MD           Review of Systems:  Constitutional: No unanticipated weight loss. There's been no change in energy level, sleep pattern, or activity level. No fevers, chills. Eyes: No visual changes or diplopia. No scleral icterus. ENT: No Headaches, hearing loss or vertigo. No mouth sores or sore throat. Cardiovascular: as reviewed in HPI  Respiratory: No cough or wheezing, no sputum production. No hemoptysis. Gastrointestinal: No abdominal pain, appetite loss, blood in stools. No change in bowel or bladder habits. Genitourinary: No dysuria, trouble voiding, or hematuria. Musculoskeletal:  No gait disturbance, no joint complaints. Integumentary: No rash or pruritis.   Neurological: No headache, diplopia, change in muscle strength, numbness or tingling. Psychiatric: No anxiety or depression. Endocrine: No temperature intolerance. No excessive thirst, fluid intake, or urination. No tremor. Hematologic/Lymphatic: No abnormal bruising or bleeding, blood clots or swollen lymph nodes. Allergic/Immunologic: No nasal congestion or hives. Physical Exam:   BP (!) 157/87   Pulse 73   Temp 98.5 °F (36.9 °C) (Oral)   Resp 20   Ht 6' 2\" (1.88 m)   Wt (!) 302 lb 0.5 oz (137 kg)   SpO2 98%   BMI 38.78 kg/m²   Wt Readings from Last 3 Encounters:   10/07/22 (!) 302 lb 0.5 oz (137 kg)   07/23/22 300 lb (136.1 kg)   04/28/22 (!) 313 lb (142 kg)     Constitutional: He is oriented to person, place, and time. He appears well-developed and well-nourished. In no acute distress. Head: Normocephalic and atraumatic. Pupils equal and round. Neck: Neck supple. No JVP or carotid bruit appreciated. No mass and no thyromegaly present. No lymphadenopathy present. Cardiovascular: Normal rate. Normal heart sounds. Exam reveals no gallop and no friction rub. No murmur heard. Pulmonary/Chest: Effort normal and breath sounds normal. No respiratory distress. He has no wheezes, rhonchi or rales. Abdominal: Soft, non-tender. Bowel sounds are normal. He exhibits no organomegaly, mass or bruit. Extremities: No edema. No cyanosis or clubbing. Pulses are 2+ radial/carotid bilaterally. Neurological: No gross cranial nerve deficit. Coordination normal.   Skin: Skin is warm and dry. There is no rash or diaphoresis. Psychiatric: He has a normal mood and affect.  His speech is normal and behavior is normal.     Lab Review:   FLP:    Lab Results   Component Value Date/Time    TRIG 86 10/07/2022 04:41 AM    HDL 32 10/07/2022 04:41 AM    LDLCALC 76 10/07/2022 04:41 AM    LABVLDL 17 10/07/2022 04:41 AM     BUN/Creatinine:    Lab Results   Component Value Date/Time    BUN 15 10/07/2022 04:41 AM    CREATININE 1.1 10/07/2022 04:41 AM     PT/INR, TNI, HGB A1C:   Lab Results   Component Value Date/Time    TROPONINI <0.01 10/06/2022 12:35 PM    LABA1C 13.1 10/07/2022 04:42 AM      No results found for: CBCAUTODIF    EKG Interpretation: NSR/LVH     Echo:   10/2022  Conclusions      Summary   Suboptimal image quality. Definity contrast administered. Overall left ventricular systolic function appears mild to moderately   reduced. Ejection fraction is visually estimated to be 40-45% with diffuse   hypokinesis. Left ventricular cavity size is normal. There is moderately   increased left ventricular wall thickness. Diastolic filling parameters   suggest grade I diastolic dysfunction. Normal right ventricular size and function. No significant valve abnormalities noted. A bubble study was performed and fails to show evidence of shunting.      9/25/2021  Conclusions      Summary   There is mildly increased left ventricular wall thickness. Overall left ventricular systolic function appears severely reduced. Ejection fraction is visually estimated to be 30-35%. No regional wall motion abnormalities are noted. Grade II diastolic dysfunction with elevated LV filling pressures. Normal right ventricular size and function. Echocardiogram 3/4/19 [facility]  - Left ventricle: The cavity size was moderately dilated. Wall     thickness was normal. Systolic function was moderately to severely     reduced. The estimated ejection fraction was in the range of 30%     to 35%. Severe diffuse hypokinesis. The study is not technically     sufficient to allow evaluation of LV diastolic function.   - Mitral valve: Mild regurgitation.   - Left atrium: The atrium was mildly dilated. - Right ventricle: Systolic function was low normal. TAPSE:     1.8cm. Tricuspid annular systolic velocity: 71AP/B.   - Right atrium: The atrium was mildly dilated. - Atrial septum: A shunt cannot be excluded. - Tricuspid valve: Mild-moderate regurgitation directed along the     right atrial wall.    - Pulmonary arteries: Systolic pressure was moderately increased,     estimated to be 53mm Hg assuming that the right atrial pressure     was 5 mmHg. Stress: 7/25/19   There is a medium to large sized area of mild to moderately decreased perfusion in the basal inferior, basal inferolateral, mid inferior, mid inferolateral, and apical inferior segments at stress with extensive improvement at rest, consistent with reversible ischemia. Cath: 4/2/16    PROCEDURAL FINDINGS:   1. Left main coronary artery was free of any angiographically   significant disease. It gave of the left anterior descending   artery and the left circumflex artery. 2. The left anterior descending had a normal anatomic course. It   gave off 2 diagonals. 30-40% mid-LAD stenosis. 3. The left circumflex artery gives off 1 large obtuse marginals   and there is 30-40% ostial stenosis of OM1. Left dominant and   supplies posterior descending artery and posterolateral branches   distally. 4. The right coronary artery is a small, non-dominant vessel and   with no angiographically significant disease. HEMODYNAMICS:   LVEDP: 25   No gradient across the aortic valve       CONCLUSION:   Non-obstructive CAD     All above diagnostic testing and laboratory data was independently visualized and reviewed by me (not simply review of report)       Assessment and Plan   1) preop risk assessment assessment  Discussed with Dr. Margarito Arteaga   Will arrange for coronary angiogram 730 Monday morning  N.p.o. midnight Sunday  Dr. Margarito Arteaga was okay with dual antiplatelet therapy if necessary.     2)ischemic cardiomyopathy  Well compensated  Continue with beta-blockade ,ARB, and Aldactone    3) carotid artery disease  As per vascular surgery    4) atrial fibrillation  Status post ablation  Continue with beta-blockade and amiodarone  Restart DOAC once cleared by neurology    Overall, the problems requiring hospitalization are high in severity     Thank you very much for allowing me to participate in the care of your patient. Please do not hesitate to contact me if you have any questions.       Tello Lovell MD [address] [place], [place] [address], Interventional Cardiology, and Peripheral Vascular Disease   [facility]   Ph: [phone]  Fax: [phone]

## 2022-10-09 LAB
A/G RATIO: 0.9 (ref 1.1–2.2)
ALBUMIN SERPL-MCNC: 3.3 G/DL (ref 3.4–5)
ALP BLD-CCNC: 65 U/L (ref 40–129)
ALT SERPL-CCNC: 10 U/L (ref 10–40)
ANION GAP SERPL CALCULATED.3IONS-SCNC: 11 MMOL/L (ref 3–16)
AST SERPL-CCNC: 10 U/L (ref 15–37)
BASOPHILS ABSOLUTE: 0 K/UL (ref 0–0.2)
BASOPHILS RELATIVE PERCENT: 0.8 %
BILIRUB SERPL-MCNC: 0.6 MG/DL (ref 0–1)
BUN BLDV-MCNC: 16 MG/DL (ref 7–20)
CALCIUM SERPL-MCNC: 9.1 MG/DL (ref 8.3–10.6)
CHLORIDE BLD-SCNC: 99 MMOL/L (ref 99–110)
CO2: 23 MMOL/L (ref 21–32)
CREAT SERPL-MCNC: 1 MG/DL (ref 0.9–1.3)
EOSINOPHILS ABSOLUTE: 0.1 K/UL (ref 0–0.6)
EOSINOPHILS RELATIVE PERCENT: 2.8 %
GFR AFRICAN AMERICAN: >60
GFR NON-AFRICAN AMERICAN: >60
GLUCOSE BLD-MCNC: 184 MG/DL (ref 70–99)
GLUCOSE BLD-MCNC: 209 MG/DL (ref 70–99)
GLUCOSE BLD-MCNC: 265 MG/DL (ref 70–99)
GLUCOSE BLD-MCNC: 286 MG/DL (ref 70–99)
GLUCOSE BLD-MCNC: 303 MG/DL (ref 70–99)
HCT VFR BLD CALC: 40.2 % (ref 40.5–52.5)
HEMOGLOBIN: 13.9 G/DL (ref 13.5–17.5)
LYMPHOCYTES ABSOLUTE: 1.5 K/UL (ref 1–5.1)
LYMPHOCYTES RELATIVE PERCENT: 29.4 %
MCH RBC QN AUTO: 31.9 PG (ref 26–34)
MCHC RBC AUTO-ENTMCNC: 34.6 G/DL (ref 31–36)
MCV RBC AUTO: 92.2 FL (ref 80–100)
MONOCYTES ABSOLUTE: 0.5 K/UL (ref 0–1.3)
MONOCYTES RELATIVE PERCENT: 9.3 %
NEUTROPHILS ABSOLUTE: 3 K/UL (ref 1.7–7.7)
NEUTROPHILS RELATIVE PERCENT: 57.7 %
PDW BLD-RTO: 12.4 % (ref 12.4–15.4)
PERFORMED ON: ABNORMAL
PLATELET # BLD: 214 K/UL (ref 135–450)
PMV BLD AUTO: 8.8 FL (ref 5–10.5)
POTASSIUM REFLEX MAGNESIUM: 4 MMOL/L (ref 3.5–5.1)
RBC # BLD: 4.36 M/UL (ref 4.2–5.9)
SODIUM BLD-SCNC: 133 MMOL/L (ref 136–145)
TOTAL PROTEIN: 7 G/DL (ref 6.4–8.2)
WBC # BLD: 5.2 K/UL (ref 4–11)

## 2022-10-09 PROCEDURE — 6370000000 HC RX 637 (ALT 250 FOR IP): Performed by: INTERNAL MEDICINE

## 2022-10-09 PROCEDURE — 2060000000 HC ICU INTERMEDIATE R&B

## 2022-10-09 PROCEDURE — 94760 N-INVAS EAR/PLS OXIMETRY 1: CPT

## 2022-10-09 PROCEDURE — 6370000000 HC RX 637 (ALT 250 FOR IP): Performed by: STUDENT IN AN ORGANIZED HEALTH CARE EDUCATION/TRAINING PROGRAM

## 2022-10-09 PROCEDURE — 36415 COLL VENOUS BLD VENIPUNCTURE: CPT

## 2022-10-09 PROCEDURE — 85025 COMPLETE CBC W/AUTO DIFF WBC: CPT

## 2022-10-09 PROCEDURE — 80053 COMPREHEN METABOLIC PANEL: CPT

## 2022-10-09 PROCEDURE — 99233 SBSQ HOSP IP/OBS HIGH 50: CPT | Performed by: STUDENT IN AN ORGANIZED HEALTH CARE EDUCATION/TRAINING PROGRAM

## 2022-10-09 RX ORDER — INSULIN LISPRO 100 [IU]/ML
10 INJECTION, SOLUTION INTRAVENOUS; SUBCUTANEOUS ONCE
Status: COMPLETED | OUTPATIENT
Start: 2022-10-09 | End: 2022-10-09

## 2022-10-09 RX ORDER — AMLODIPINE BESYLATE 10 MG/1
10 TABLET ORAL DAILY
Status: DISCONTINUED | OUTPATIENT
Start: 2022-10-09 | End: 2022-10-11 | Stop reason: HOSPADM

## 2022-10-09 RX ORDER — HYDRALAZINE HYDROCHLORIDE 25 MG/1
25 TABLET, FILM COATED ORAL EVERY 8 HOURS SCHEDULED
Status: DISCONTINUED | OUTPATIENT
Start: 2022-10-09 | End: 2022-10-11 | Stop reason: HOSPADM

## 2022-10-09 RX ORDER — INSULIN GLARGINE 100 [IU]/ML
30 INJECTION, SOLUTION SUBCUTANEOUS DAILY
Status: DISCONTINUED | OUTPATIENT
Start: 2022-10-09 | End: 2022-10-10

## 2022-10-09 RX ORDER — INSULIN LISPRO 100 [IU]/ML
10 INJECTION, SOLUTION INTRAVENOUS; SUBCUTANEOUS
Status: DISCONTINUED | OUTPATIENT
Start: 2022-10-09 | End: 2022-10-10

## 2022-10-09 RX ADMIN — FLUTICASONE PROPIONATE 2 SPRAY: 50 SPRAY, METERED NASAL at 09:33

## 2022-10-09 RX ADMIN — INSULIN GLARGINE 30 UNITS: 100 INJECTION, SOLUTION SUBCUTANEOUS at 09:34

## 2022-10-09 RX ADMIN — AMIODARONE HYDROCHLORIDE 200 MG: 200 TABLET ORAL at 09:33

## 2022-10-09 RX ADMIN — INSULIN LISPRO 10 UNITS: 100 INJECTION, SOLUTION INTRAVENOUS; SUBCUTANEOUS at 11:56

## 2022-10-09 RX ADMIN — INSULIN LISPRO 12 UNITS: 100 INJECTION, SOLUTION INTRAVENOUS; SUBCUTANEOUS at 11:55

## 2022-10-09 RX ADMIN — CARVEDILOL 25 MG: 25 TABLET, FILM COATED ORAL at 18:41

## 2022-10-09 RX ADMIN — INSULIN LISPRO 10 UNITS: 100 INJECTION, SOLUTION INTRAVENOUS; SUBCUTANEOUS at 09:35

## 2022-10-09 RX ADMIN — FUROSEMIDE 40 MG: 40 TABLET ORAL at 09:33

## 2022-10-09 RX ADMIN — INSULIN LISPRO 10 UNITS: 100 INJECTION, SOLUTION INTRAVENOUS; SUBCUTANEOUS at 18:41

## 2022-10-09 RX ADMIN — LOSARTAN POTASSIUM 100 MG: 100 TABLET, FILM COATED ORAL at 09:33

## 2022-10-09 RX ADMIN — HYDRALAZINE HYDROCHLORIDE 25 MG: 25 TABLET, FILM COATED ORAL at 14:37

## 2022-10-09 RX ADMIN — CARVEDILOL 25 MG: 25 TABLET, FILM COATED ORAL at 09:33

## 2022-10-09 RX ADMIN — INSULIN LISPRO 8 UNITS: 100 INJECTION, SOLUTION INTRAVENOUS; SUBCUTANEOUS at 09:35

## 2022-10-09 RX ADMIN — SPIRONOLACTONE 50 MG: 25 TABLET ORAL at 09:33

## 2022-10-09 RX ADMIN — PANTOPRAZOLE SODIUM 40 MG: 40 TABLET, DELAYED RELEASE ORAL at 06:11

## 2022-10-09 RX ADMIN — ATORVASTATIN CALCIUM 80 MG: 80 TABLET, FILM COATED ORAL at 20:09

## 2022-10-09 RX ADMIN — HYDRALAZINE HYDROCHLORIDE 25 MG: 25 TABLET, FILM COATED ORAL at 20:08

## 2022-10-09 RX ADMIN — AMLODIPINE BESYLATE 10 MG: 10 TABLET ORAL at 09:34

## 2022-10-09 ASSESSMENT — PAIN SCALES - GENERAL: PAINLEVEL_OUTOF10: 0

## 2022-10-09 NOTE — PROGRESS NOTES
NAME:  Balwinder Wilkes  YOB: 1965  MEDICAL RECORD NUMBER:  [id number]  TODAYS DATE:  10/9/2022    Discussed personal risk factors for Stroke/TIA with patient/family, and ways to reduce the risk for a recurrent stroke. Patient's personal risk factors which were identified are:     [] Alcohol Abuse: check with your physician before any alcohol consumption. [x] Atrial fibrillation: may cause blood clots. [x] Drug Abuse: Seek help, talk with your doctor  [] Clotting Disorder  [x] Diabetes  [] Family history of stroke or heart disease  [x] High Blood Pressure/Hypertension: work with your physician. [x] High cholesterol: monitor cholesterol levels with your physician. [x] Overweight/Obesity: work with your physician for your ideal body weight. [x] Physical Inactivity: get regular exercise as directed by your physician. [] Personal history of previous TIA or stroke  [x] Poor Diet; decrease salt (sodium) in your diet, follow diet directed by physician. Smoking: Cigarette/Cigar: stop smoking. []       Advised pt. that you can reduce your risk for stroke/TIA by modifying/controlling the risk factors that you have. Pt.advised to take the medications as prescribed, which will be detailed in the discharge instructions, and to not stop taking them without consulting their physician. In addition, pt. advised to maintain a healthy diet, exercise regularly and to not smoke. [facility]'s Stroke treatment and prevention, Managing your recovery  notebook  provided and/or reviewed  with patient/family. The notebook includes, but not limited to, sections addressing warning signs & symptoms of a stroke, which are: sudden numbness or weakness especially on one side of the body, sudden confusion, difficulty speaking or understanding, sudden changes in vision, sudden dizziness or loss of balance/ coordination, sudden severe headache, syncope and seizure.   The need to call EMS (911) immediately if signs & symptoms occur is emphasized . The notebook also provides education on Stroke community resources and stroke advocacy. The need for follow-up after discharge was highlighted with patient/family with them being able to repeat understanding of the importance of this.       Electronically signed by Maria Guadalupe Garcia RN on 10/9/2022 at 12:44 AM

## 2022-10-09 NOTE — PLAN OF CARE
Problem: Discharge Planning  Goal: Discharge to home or other facility with appropriate resources  Outcome: Progressing  Flowsheets (Taken 10/8/2022 2022)  Discharge to home or other facility with appropriate resources:   Identify barriers to discharge with patient and caregiver   Identify discharge learning needs (meds, wound care, etc)     Problem: Safety - Adult  Goal: Free from fall injury  Outcome: Progressing  Flowsheets (Taken 10/9/2022 0040)  Free From Fall Injury:   Instruct family/caregiver on patient safety   Based on caregiver fall risk screen, instruct family/caregiver to ask for assistance with transferring infant if caregiver noted to have fall risk factors     Problem: ABCDS Injury Assessment  Goal: Absence of physical injury  Outcome: Progressing  Flowsheets (Taken 10/9/2022 0040)  Absence of Physical Injury: Implement safety measures based on patient assessment     Problem: Cardiovascular - Adult  Goal: Maintains optimal cardiac output and hemodynamic stability  Outcome: Progressing  Flowsheets (Taken 10/8/2022 2022)  Maintains optimal cardiac output and hemodynamic stability: Monitor blood pressure and heart rate     Problem: Neurosensory - Adult  Goal: Achieves stable or improved neurological status  Outcome: Progressing  Flowsheets (Taken 10/8/2022 2022)  Achieves stable or improved neurological status: Assess for and report changes in neurological status  Goal: Achieves maximal functionality and self care  Outcome: Progressing     Problem: Pain  Goal: Verbalizes/displays adequate comfort level or baseline comfort level  Outcome: Progressing  Flowsheets (Taken 10/8/2022 2022)  Verbalizes/displays adequate comfort level or baseline comfort level:   Encourage patient to monitor pain and request assistance   Assess pain using appropriate pain scale

## 2022-10-09 NOTE — PROGRESS NOTES
NAME:  Andreea Wall  YOB: 1965  MEDICAL RECORD NUMBER:  [id number]  TODAYS DATE:  10/9/2022    Discussed personal risk factors for Stroke /TIA with patient/family, and ways to reduce the risk for a recurrent stroke. Patient's personal risk factors which were identified are:     [] Alcohol Abuse: check with your physician before any alcohol consumption. [x] Atrial fibrillation: may cause blood clots. [x] Drug Abuse: Seek help, talk with your doctor  [] Clotting Disorder  [x] Diabetes  [] Family history of stroke or heart disease  [x] High Blood Pressure/Hypertension: work with your physician. [x] High cholesterol: monitor cholesterol levels with your physician. [x] Overweight/Obesity: work with your physician for your ideal body weight. [x] Physical Inactivity: get regular exercise as directed by your physician. [] Personal history of previous TIA or stroke  [x] Poor Diet; decrease salt (sodium) in your diet, follow diet directed by physician. [] Smoking: Cigarette/Cigar: stop smoking. Advised pt. that you can reduce your risk for stroke/TIA by modifying/controlling the risk factors that you have. Pt.advised to take the medications as prescribed, which will be detailed in the discharge instructions, and to not stop taking them without consulting their physician. In addition, pt. advised to maintain a healthy diet, exercise regularly and to not smoke. [facility]'s Stroke treatment and prevention, Managing your recovery  notebook  provided and/or reviewed  with patient/family. The notebook includes, but not limited to, sections addressing warning signs & symptoms of a stroke, which are: sudden numbness or weakness especially on one side of the body, sudden confusion, difficulty speaking or understanding, sudden changes in vision, sudden dizziness or loss of balance/ coordination, or sudden severe headache.   The need to call EMS (911) immediately if signs & symptoms occur is emphasized . The notebook also provides education on Stroke community resources and stroke advocacy. The need for follow-up after discharge was highlighted with patient/family with them being able to repeat understanding of the importance of this.       Electronically signed by Haider D eLa Cruz RN on 10/9/2022 at 10:25 AM

## 2022-10-09 NOTE — PROGRESS NOTES
[facility]    Admit Date: 10/6/2022    PCP: Nat Sadler DO                  : 1965  MRN: [id number]    Subjective: Interval History:   Patient seen and examined. Clinical notes reviewed. Telemetry reviewed. No new complaint today. No major events overnight. Denies having chest pain, shortness of breath, dyspnea on exertion, Orthopnea, PND at the time of this visit. Review of System:  Pertinent positive and negatives are in the HPI and interval above, the rest are negative. Data:   Scheduled Meds:   amLODIPine  10 mg Oral Daily    hydrALAZINE  25 mg Oral 3 times per day    insulin glargine  30 Units SubCUTAneous Daily    insulin lispro  10 Units SubCUTAneous TID WC    spironolactone  50 mg Oral Daily    losartan  100 mg Oral Daily    carvedilol  25 mg Oral BID WC    insulin lispro  0-16 Units SubCUTAneous TID WC    insulin lispro  0-4 Units SubCUTAneous Nightly    amiodarone  200 mg Oral Daily    atorvastatin  80 mg Oral Nightly    [Held by provider] aspirin  81 mg Oral Daily    [Held by provider] apixaban  5 mg Oral BID    fluticasone  2 spray Nasal Daily    furosemide  40 mg Oral Daily    pantoprazole  40 mg Oral QAM AC     PRN Meds:acetaminophen, ondansetron **OR** ondansetron, labetalol, glucose, dextrose bolus **OR** dextrose bolus, glucagon (rDNA), dextrose, polyethylene glycol  I/O last 3 completed shifts: In:  [P.O.:2050]  Out: 1800 [Urine:1800]  I/O this shift: In: 960 [P.O.:960]  Out: 550 [Urine:550]    Intake/Output Summary (Last 24 hours) at 10/9/2022 1622  Last data filed at 10/9/2022 1441  Gross per 24 hour   Intake 1680 ml   Output 2350 ml   Net -670 ml             Objective:     Vitals: BP (!) 157/84   Pulse 65   Temp 98.7 °F (37.1 °C) (Oral)   Resp 18   Ht 6' 2\" (1.88 m)   Wt (!) 301 lb 2.4 oz (136.6 kg)   SpO2 98%   BMI 38.67 kg/m²     Physical Exam  Vitals and nursing note reviewed. Constitutional:       Appearance: Normal appearance.  He is not ill-appearing or diaphoretic. HENT:      Head: Normocephalic and atraumatic. Mouth/Throat:      Mouth: Mucous membranes are moist.   Eyes:      Pupils: Pupils are equal, round, and reactive to light. Cardiovascular:      Rate and Rhythm: Normal rate and regular rhythm. Heart sounds: No murmur heard. Pulmonary:      Effort: Pulmonary effort is normal. No respiratory distress. Breath sounds: No wheezing. Abdominal:      General: Abdomen is flat. There is no distension. Tenderness: There is no abdominal tenderness. Musculoskeletal:      Right lower leg: No edema. Left lower leg: No edema. Skin:     General: Skin is warm and dry. Neurological:      General: No focal deficit present. Mental Status: He is alert and oriented to person, place, and time. Psychiatric:         Mood and Affect: Mood normal.         Behavior: Behavior normal.       LABS:    CBC:   Recent Labs     10/07/22  0442 10/08/22  0824 10/09/22  0618   WBC 6.2 5.7 5.2   HGB 14.0 13.9 13.9   HCT 41.1 40.6 40.2*   MCV 93.0 92.3 92.2    222 214                                                                  BMP:    Recent Labs     10/07/22  0441 10/08/22  0824 10/09/22  0618   * 133* 133*   K 4.2 4.2 4.0   CL 99 100 99   CO2 20* 22 23   BUN 15 14 16   CREATININE 1.1 0.9 1.0   GLUCOSE 295* 316* 286*         LFT's:   Recent Labs     10/07/22  0441 10/08/22  0824 10/09/22  0618   AST 12* 11* 10*   ALT 11 10 10   BILITOT 0.3 0.6 0.6   ALKPHOS 64 63 65         Troponin:   No results for input(s): TROPONINI in the last 72 hours. Lipids:   Recent Labs     10/07/22  0441   CHOL 125   HDL 32*         INR:   No results for input(s): INR in the last 72 hours. -----------------------------------------------------------------  RAD:   MRI brain without contrast   Final Result   1.  Acute right posteroinferior cerebellar artery territory infarct involving   the medial aspect of the right cerebellar hemisphere as well as the right   lateral margin of the medulla. 2. No acute intracranial hemorrhage. 3. Mild chronic small vessel ischemic changes. The findings were sent to the Radiology Results [id number] at 8:19   am on 10/7/2022 to be communicated to a licensed caregiver. CT HEAD WO CONTRAST   Final Result   1. Subtle focus of asymmetric low attenuation in the inferomedial right   cerebellar hemisphere. Finding may be artifactual.  Subtle area of recent   ischemic change cannot be excluded. Follow-up MRI examination with diffusion   imaging may be helpful for more complete evaluation. 2. Otherwise, no evidence of acute intracranial abnormality. CTA HEAD NECK W CONTRAST   Final Result   1. Hemodynamically significant stenosis within the right cervical ICA with   approximately 90% stenosis per NASCET criteria related to noncalcified and   calcified plaque, most pronounced 1.5 cm distal to the bifurcation. 2. Fibrocalcific plaque within the left proximal cervical ICA resulting in   approximately 45-50% stenosis per NASCET criteria. 3. Patent cervical vertebral arteries. 4.  No apparent arterial high grade stenosis, occlusion or aneurysm within   the head. XR CHEST PORTABLE   Final Result   No evidence of acute cardiopulmonary disease. EKG Interpretation: NSR/LVH      Echo:   10/2022  Conclusions      Summary   Suboptimal image quality. Definity contrast administered. Overall left ventricular systolic function appears mild to moderately   reduced. Ejection fraction is visually estimated to be 40-45% with diffuse   hypokinesis. Left ventricular cavity size is normal. There is moderately   increased left ventricular wall thickness. Diastolic filling parameters   suggest grade I diastolic dysfunction. Normal right ventricular size and function. No significant valve abnormalities noted.    A bubble study was performed and fails to show evidence of shunting.        9/25/2021  Conclusions      Summary   There is mildly increased left ventricular wall thickness. Overall left ventricular systolic function appears severely reduced. Ejection fraction is visually estimated to be 30-35%. No regional wall motion abnormalities are noted. Grade II diastolic dysfunction with elevated LV filling pressures. Normal right ventricular size and function. Echocardiogram 3/4/19   - Left ventricle: The cavity size was moderately dilated. Wall     thickness was normal. Systolic function was moderately to severely     reduced. The estimated ejection fraction was in the range of 30%     to 35%. Severe diffuse hypokinesis. The study is not technically     sufficient to allow evaluation of LV diastolic function.   - Mitral valve: Mild regurgitation.   - Left atrium: The atrium was mildly dilated. - Right ventricle: Systolic function was low normal. TAPSE:     1.8cm. Tricuspid annular systolic velocity: 51IT/I.   - Right atrium: The atrium was mildly dilated. - Atrial septum: A shunt cannot be excluded. - Tricuspid valve: Mild-moderate regurgitation directed along the     right atrial wall. - Pulmonary arteries: Systolic pressure was moderately increased,     estimated to be 53mm Hg assuming that the right atrial pressure     was 5 mmHg. Stress: 7/25/19   There is a medium to large sized area of mild to moderately decreased perfusion in the basal inferior, basal inferolateral, mid inferior, mid inferolateral, and apical inferior segments at stress with extensive improvement at rest, consistent with reversible ischemia. Cath: 4/2/16    PROCEDURAL FINDINGS:   1. Left main coronary artery was free of any angiographically   significant disease. It gave of the left anterior descending   artery and the left circumflex artery. 2. The left anterior descending had a normal anatomic course. It   gave off 2 diagonals. 30-40% mid-LAD stenosis.      3. The left circumflex artery gives off 1 large obtuse marginals   and there is 30-40% ostial stenosis of OM1. Left dominant and   supplies posterior descending artery and posterolateral branches   distally. 4. The right coronary artery is a small, non-dominant vessel and   with no angiographically significant disease. HEMODYNAMICS:   LVEDP: 25   No gradient across the aortic valve       CONCLUSION:   Non-obstructive CAD      All above diagnostic testing and laboratory data was independently visualized and reviewed by me (not simply review of report)   Assessment/Plan:   1) preop risk assessment assessment  Will arrange for coronary angiogram 730 Monday morning  N.p.o. midnight Sunday  Dr. Pan Lopez was okay with dual antiplatelet therapy if necessary. 2)ischemic cardiomyopathy  Well compensated  Continue with beta-blockade ,ARB, and Aldactone     3) carotid artery disease  As per vascular surgery     4) atrial fibrillation  Status post ablation  Continue with beta-blockade and amiodarone  Restart DOAC once cleared by neurology    5) Hypertension - difficult to control  - Increase amlo to 10mg  - Continue 25mg Coreg  - Continue hydral 25mg  - Continue Losartan 100mg  - Continue aldactone 50mg  - Likely should be on a thiazide diuretic as next agent    Kwame Lu MD  Interventional Cardiology  10/9/2022  4:22 PM    [facility], [address] [place] [address]  Ph: [phone]  Fax: [phone]      NOTE: This report was transcribed using voice recognition software. Every effort was made to ensure accuracy, however, inadvertent computerized transcription errors may be present.

## 2022-10-09 NOTE — PROGRESS NOTES
Hospitalist Progress Note      PCP: Cathi Zafar DO    Date of Admission: 10/6/2022    Chief Complaint: imbalance, dizziness    Hospital Course: The patient is a 61 y.o. male with history of cocaine abuse, tobacco abuse, hypertension, lipidemia, morbid obesity, MI, type 2 diabetes, and diastolic heart failure who presents to [facility] with imbalance and dizziness. Patient states that starting on Saturday, he started to notice some imbalance that has been progressive throughout the last several days. Patient notes that he is sort of leaning more towards the right when he walks, and feels like he has to catch himself as he is walking. Patient notes that this is a new symptom for him. Patient denies visual disturbances, chest pain, fever, chills, shortness of breath, abdominal pain, nausea, vomiting, constipation, diarrhea, and dysuria. Patient notes that he has been out of his medications for a few weeks, and he has not taken any medication in the last few weeks. In the ED, labs were significant for sodium of 131, chloride of 97, glucose of 474, proBNP of 397. Chest x-ray was unremarkable. EKG showed normal sinus rhythm and possible LVH. CT head without contrast showed a subtle focus of asymmetric low attenuation in the inferior medial right cerebellar hemisphere. CTA head and neck with contrast showed a hemodynamically significant stenosis within the right cervical ICA with approximately 90% stenosis, as well as a left proximal cervical ICA resulting in approximately 45 to 50% stenosis. Subjective: Pt seen and examined. Has no complaints. Otherwise feels ok.        Medications:  Reviewed    Infusion Medications    dextrose       Scheduled Medications    amLODIPine  10 mg Oral Daily    hydrALAZINE  25 mg Oral 3 times per day    insulin glargine  30 Units SubCUTAneous Daily    insulin lispro  10 Units SubCUTAneous TID     spironolactone  50 mg Oral Daily    losartan  100 mg Oral Daily    carvedilol  25 mg Oral BID     insulin lispro  0-16 Units SubCUTAneous TID     insulin lispro  0-4 Units SubCUTAneous Nightly    amiodarone  200 mg Oral Daily    atorvastatin  80 mg Oral Nightly    [Held by provider] aspirin  81 mg Oral Daily    [Held by provider] apixaban  5 mg Oral BID    fluticasone  2 spray Nasal Daily    furosemide  40 mg Oral Daily    pantoprazole  40 mg Oral QAM AC     PRN Meds: acetaminophen, ondansetron **OR** ondansetron, labetalol, glucose, dextrose bolus **OR** dextrose bolus, glucagon (rDNA), dextrose, polyethylene glycol      Intake/Output Summary (Last 24 hours) at 10/9/2022 1356  Last data filed at 10/9/2022 1345  Gross per 24 hour   Intake 1440 ml   Output 1800 ml   Net -360 ml         Exam:    BP (!) 169/94   Pulse 71   Temp 98.7 °F (37.1 °C) (Oral)   Resp 20   Ht 6' 2\" (1.88 m)   Wt (!) 301 lb 2.4 oz (136.6 kg)   SpO2 98%   BMI 38.67 kg/m²     General appearance: No apparent distress appears stated age and cooperative. HEENT Normal cephalic, atraumatic without obvious deformity. Pupils equal, round, and reactive to light. Extra ocular muscles intact. Conjunctivae/corneas clear. Neck: Supple, No jugular venous distention/bruits. Trachea midline without thyromegaly or adenopathy with full range of motion. Lungs: Clear to auscultation, bilaterally without Rales/Wheezes/Rhonchi with good respiratory effort. Heart: Regular rate and rhythm with Normal S1/S2 without murmurs, rubs or gallops, point of maximum impulse non-displaced  Abdomen: Soft, non-tender or non-distended without rigidity or guarding and positive bowel sounds all four quadrants. Extremities: No clubbing, cyanosis, or edema bilaterally. Full range of motion without deformity and normal gait intact. Skin: Skin color, texture, turgor normal.  No rashes or lesions.   Neurologic: Alert and oriented X 3, neurovascularly intact with sensory/motor intact upper extremities/lower extremities, bilaterally. Cranial nerves: II-XII intact, grossly non-focal.  Impaired finger-to-nose, slow heel-to-shin transfer. Mental status: Alert, oriented, thought content appropriate. Capillary Refill: Acceptable  < 3 seconds  Peripheral Pulses: +3 Easily felt, not easily obliterated with pressure      Labs:   Recent Labs     10/07/22  0442 10/08/22  0824 10/09/22  0618   WBC 6.2 5.7 5.2   HGB 14.0 13.9 13.9   HCT 41.1 40.6 40.2*    222 214       Recent Labs     10/07/22  0441 10/08/22  0824 10/09/22  0618   * 133* 133*   K 4.2 4.2 4.0   CL 99 100 99   CO2 20* 22 23   BUN 15 14 16   CREATININE 1.1 0.9 1.0   CALCIUM 8.5 8.8 9.1       Recent Labs     10/07/22  0441 10/08/22  0824 10/09/22  0618   AST 12* 11* 10*   ALT 11 10 10   BILITOT 0.3 0.6 0.6   ALKPHOS 64 63 65       No results for input(s): INR in the last 72 hours. No results for input(s): Flores Smack in the last 72 hours. Urinalysis:      Lab Results   Component Value Date/Time    NITRU Negative 10/06/2022 03:00 PM    WBCUA 0-2 03/19/2022 05:10 PM    RBCUA 0-2 03/19/2022 05:10 PM    BLOODU Negative 10/06/2022 03:00 PM    SPECGRAV 1.056 10/06/2022 03:00 PM    GLUCOSEU 250 10/06/2022 03:00 PM       Radiology:  MRI brain without contrast   Final Result   1. Acute right posteroinferior cerebellar artery territory infarct involving   the medial aspect of the right cerebellar hemisphere as well as the right   lateral margin of the medulla. 2. No acute intracranial hemorrhage. 3. Mild chronic small vessel ischemic changes. The findings were sent to the Radiology Results [id number] at 8:19   am on 10/7/2022 to be communicated to a licensed caregiver. CT HEAD WO CONTRAST   Final Result   1. Subtle focus of asymmetric low attenuation in the inferomedial right   cerebellar hemisphere. Finding may be artifactual.  Subtle area of recent   ischemic change cannot be excluded.   Follow-up MRI examination with diffusion imaging may be helpful for more complete evaluation. 2. Otherwise, no evidence of acute intracranial abnormality. CTA HEAD NECK W CONTRAST   Final Result   1. Hemodynamically significant stenosis within the right cervical ICA with   approximately 90% stenosis per NASCET criteria related to noncalcified and   calcified plaque, most pronounced 1.5 cm distal to the bifurcation. 2. Fibrocalcific plaque within the left proximal cervical ICA resulting in   approximately 45-50% stenosis per NASCET criteria. 3. Patent cervical vertebral arteries. 4.  No apparent arterial high grade stenosis, occlusion or aneurysm within   the head. XR CHEST PORTABLE   Final Result   No evidence of acute cardiopulmonary disease.                  Assessment/Plan:    Active Hospital Problems    Diagnosis Date Noted    Symptomatic carotid artery stenosis, right [I65.21] 10/07/2022     Priority: Medium    Dizziness [R42] 10/07/2022     Priority: Medium    Acute CVA (cerebrovascular accident) [facility]) [I63.9] 10/06/2022     Priority: Medium       Acute CVA to the right cerebellar hemisphere  -confirmed on MRI Brain without contrast - acute right posteroinferior cerebellar artery territory infarct involving the medial aspect of the right cerebellar hemisphere as well as the right lateral margin of the medulla  -Neurology consulted, recs appreciated  -Continue high intensity statin  -Hold aspirin and  Eliquis to prevent hemorrhagic conversion  -Telemetry monitoring  -PT OT evaluation  -Bedside swallow evaluation  -Lipid panel  -Hgb A1c 12.9%  -Obtained echo with bubble study with no evidence of shunting  -Neurochecks  -restarted home anti-hypertensive meds  -Labetalol as needed     Right ICA stenosis  -Vascular surgery consulted, recs appreciated  -discussing with cardiology timing of CEA     Hypertensive urgency  -restart home anti-hypertensives, adjustments made and hydralazine added today  -Labetalol as needed for SBP > 220    Chronic combined systolic and diastolic CHF -  - was supposed to have an ischemic evaluation with cardiology as an outpatient but was never pursued  - continues to have EF 40-45% with hypokinesis and grade I DD  - cardiology consulted, planning on angiogram on 10/10, n.p.o. after midnight     Type 2 diabetes  -Lantus - increase dose  -Medium dose SSI -> high dose SSi  -increase prandial Humalog  -Hypoglycemia protocol  -POCT glucose checks  -Carb controlled diet     Paroxysmal atrial fibrillation  -Continue home amiodarone and Eliquis  -Telemetry monitoring     Cocaine abuse  -Abstinence encouraged  -Contributing to blood pressure issues     Morbid obesity due to excess calories-BMI 40.57  -Nutritional counseling provided  -Weight loss encouraged  -Complicating medical management     Hyperlipidemia  -Continue high intensity statin     Chronic diastolic CHF  -Continue home medications  -Telemetry monitoring     Tobacco abuse  -Smoking cessation counseling provided  -Nicotine patch is offered      DVT Prophylaxis: Eliquis - on hold  Diet: ADULT DIET;  Regular; 4 carb choices (60 gm/meal)  Diet NPO Exceptions are: Sips of Water with Meds, Ice Chips  Code Status: Full Code    PT/OT Eval Status: ordered    Dispo - continue care    Deepthi Lamb MD

## 2022-10-09 NOTE — PLAN OF CARE
Problem: Discharge Planning  Goal: Discharge to home or other facility with appropriate resources  10/9/2022 1023 by Ned Martínez RN  Outcome: Progressing  10/9/2022 0041 by Jas Gonzales RN  Outcome: Iris Coronel (Taken 10/8/2022 2022)  Discharge to home or other facility with appropriate resources:   Identify barriers to discharge with patient and caregiver   Identify discharge learning needs (meds, wound care, etc)     Problem: Safety - Adult  Goal: Free from fall injury  10/9/2022 1023 by Ned Martínez RN  Outcome: Progressing  10/9/2022 0041 by Jas Gonzales RN  Outcome: Progressing  Flowsheets (Taken 10/9/2022 0040)  Free From Fall Injury:   Instruct family/caregiver on patient safety   Based on caregiver fall risk screen, instruct family/caregiver to ask for assistance with transferring infant if caregiver noted to have fall risk factors     Problem: ABCDS Injury Assessment  Goal: Absence of physical injury  10/9/2022 1023 by Ned Martínez RN  Outcome: Progressing  10/9/2022 0041 by Jas Gonzales RN  Outcome: Progressing  Flowsheets (Taken 10/9/2022 0040)  Absence of Physical Injury: Implement safety measures based on patient assessment     Problem: Cardiovascular - Adult  Goal: Maintains optimal cardiac output and hemodynamic stability  10/9/2022 1023 by Ned Martínez RN  Outcome: Progressing  10/9/2022 0041 by Jas Gonzales RN  Outcome: Progressing  Flowsheets (Taken 10/8/2022 2022)  Maintains optimal cardiac output and hemodynamic stability: Monitor blood pressure and heart rate

## 2022-10-10 ENCOUNTER — APPOINTMENT (OUTPATIENT)
Dept: CARDIAC CATH/INVASIVE PROCEDURES | Age: 57
DRG: 045 | End: 2022-10-10
Payer: COMMERCIAL

## 2022-10-10 LAB
A/G RATIO: 1.5 (ref 1.1–2.2)
ALBUMIN SERPL-MCNC: 3.8 G/DL (ref 3.4–5)
ALP BLD-CCNC: 70 U/L (ref 40–129)
ALT SERPL-CCNC: 9 U/L (ref 10–40)
ANION GAP SERPL CALCULATED.3IONS-SCNC: 11 MMOL/L (ref 3–16)
AST SERPL-CCNC: 13 U/L (ref 15–37)
BASOPHILS ABSOLUTE: 0 K/UL (ref 0–0.2)
BASOPHILS RELATIVE PERCENT: 0.5 %
BILIRUB SERPL-MCNC: 0.6 MG/DL (ref 0–1)
BUN BLDV-MCNC: 19 MG/DL (ref 7–20)
CALCIUM SERPL-MCNC: 8.8 MG/DL (ref 8.3–10.6)
CHLORIDE BLD-SCNC: 101 MMOL/L (ref 99–110)
CO2: 24 MMOL/L (ref 21–32)
CREAT SERPL-MCNC: 1.1 MG/DL (ref 0.9–1.3)
EOSINOPHILS ABSOLUTE: 0.2 K/UL (ref 0–0.6)
EOSINOPHILS RELATIVE PERCENT: 2.6 %
GFR AFRICAN AMERICAN: >60
GFR NON-AFRICAN AMERICAN: >60
GLUCOSE BLD-MCNC: 142 MG/DL (ref 70–99)
GLUCOSE BLD-MCNC: 192 MG/DL (ref 70–99)
GLUCOSE BLD-MCNC: 256 MG/DL (ref 70–99)
GLUCOSE BLD-MCNC: 276 MG/DL (ref 70–99)
GLUCOSE BLD-MCNC: 298 MG/DL (ref 70–99)
GLUCOSE BLD-MCNC: 326 MG/DL (ref 70–99)
HCT VFR BLD CALC: 40.8 % (ref 40.5–52.5)
HEMOGLOBIN: 13.7 G/DL (ref 13.5–17.5)
LYMPHOCYTES ABSOLUTE: 1.4 K/UL (ref 1–5.1)
LYMPHOCYTES RELATIVE PERCENT: 22.7 %
MCH RBC QN AUTO: 31.5 PG (ref 26–34)
MCHC RBC AUTO-ENTMCNC: 33.7 G/DL (ref 31–36)
MCV RBC AUTO: 93.6 FL (ref 80–100)
MONOCYTES ABSOLUTE: 0.6 K/UL (ref 0–1.3)
MONOCYTES RELATIVE PERCENT: 9.6 %
NEUTROPHILS ABSOLUTE: 4.1 K/UL (ref 1.7–7.7)
NEUTROPHILS RELATIVE PERCENT: 64.6 %
PDW BLD-RTO: 12.6 % (ref 12.4–15.4)
PERFORMED ON: ABNORMAL
PLATELET # BLD: 207 K/UL (ref 135–450)
PMV BLD AUTO: 8.4 FL (ref 5–10.5)
POTASSIUM REFLEX MAGNESIUM: 4.2 MMOL/L (ref 3.5–5.1)
RBC # BLD: 4.36 M/UL (ref 4.2–5.9)
SODIUM BLD-SCNC: 136 MMOL/L (ref 136–145)
TOTAL PROTEIN: 6.4 G/DL (ref 6.4–8.2)
WBC # BLD: 6.3 K/UL (ref 4–11)

## 2022-10-10 PROCEDURE — 6370000000 HC RX 637 (ALT 250 FOR IP): Performed by: INTERNAL MEDICINE

## 2022-10-10 PROCEDURE — 6370000000 HC RX 637 (ALT 250 FOR IP): Performed by: STUDENT IN AN ORGANIZED HEALTH CARE EDUCATION/TRAINING PROGRAM

## 2022-10-10 PROCEDURE — 93458 L HRT ARTERY/VENTRICLE ANGIO: CPT | Performed by: STUDENT IN AN ORGANIZED HEALTH CARE EDUCATION/TRAINING PROGRAM

## 2022-10-10 PROCEDURE — 99152 MOD SED SAME PHYS/QHP 5/>YRS: CPT | Performed by: STUDENT IN AN ORGANIZED HEALTH CARE EDUCATION/TRAINING PROGRAM

## 2022-10-10 PROCEDURE — 6360000004 HC RX CONTRAST MEDICATION: Performed by: INTERNAL MEDICINE

## 2022-10-10 PROCEDURE — 6360000002 HC RX W HCPCS

## 2022-10-10 PROCEDURE — 2709999900 HC NON-CHARGEABLE SUPPLY

## 2022-10-10 PROCEDURE — 2060000000 HC ICU INTERMEDIATE R&B

## 2022-10-10 PROCEDURE — 94760 N-INVAS EAR/PLS OXIMETRY 1: CPT

## 2022-10-10 PROCEDURE — 4A023N7 MEASUREMENT OF CARDIAC SAMPLING AND PRESSURE, LEFT HEART, PERCUTANEOUS APPROACH: ICD-10-PCS | Performed by: STUDENT IN AN ORGANIZED HEALTH CARE EDUCATION/TRAINING PROGRAM

## 2022-10-10 PROCEDURE — 99233 SBSQ HOSP IP/OBS HIGH 50: CPT | Performed by: STUDENT IN AN ORGANIZED HEALTH CARE EDUCATION/TRAINING PROGRAM

## 2022-10-10 PROCEDURE — 2500000003 HC RX 250 WO HCPCS

## 2022-10-10 PROCEDURE — 80053 COMPREHEN METABOLIC PANEL: CPT

## 2022-10-10 PROCEDURE — 6370000000 HC RX 637 (ALT 250 FOR IP)

## 2022-10-10 PROCEDURE — B2111ZZ FLUOROSCOPY OF MULTIPLE CORONARY ARTERIES USING LOW OSMOLAR CONTRAST: ICD-10-PCS | Performed by: STUDENT IN AN ORGANIZED HEALTH CARE EDUCATION/TRAINING PROGRAM

## 2022-10-10 PROCEDURE — 36415 COLL VENOUS BLD VENIPUNCTURE: CPT

## 2022-10-10 PROCEDURE — C1769 GUIDE WIRE: HCPCS

## 2022-10-10 PROCEDURE — 99231 SBSQ HOSP IP/OBS SF/LOW 25: CPT | Performed by: STUDENT IN AN ORGANIZED HEALTH CARE EDUCATION/TRAINING PROGRAM

## 2022-10-10 PROCEDURE — C1887 CATHETER, GUIDING: HCPCS

## 2022-10-10 PROCEDURE — 99152 MOD SED SAME PHYS/QHP 5/>YRS: CPT

## 2022-10-10 PROCEDURE — 99153 MOD SED SAME PHYS/QHP EA: CPT

## 2022-10-10 PROCEDURE — 2580000003 HC RX 258

## 2022-10-10 PROCEDURE — C1894 INTRO/SHEATH, NON-LASER: HCPCS

## 2022-10-10 PROCEDURE — 85025 COMPLETE CBC W/AUTO DIFF WBC: CPT

## 2022-10-10 PROCEDURE — 93458 L HRT ARTERY/VENTRICLE ANGIO: CPT

## 2022-10-10 RX ORDER — AMLODIPINE BESYLATE 10 MG/1
10 TABLET ORAL DAILY
Qty: 30 TABLET | Refills: 3 | Status: ON HOLD | OUTPATIENT
Start: 2022-10-11 | End: 2022-10-19 | Stop reason: SDUPTHER

## 2022-10-10 RX ORDER — INSULIN LISPRO 100 [IU]/ML
12 INJECTION, SOLUTION INTRAVENOUS; SUBCUTANEOUS
Status: DISCONTINUED | OUTPATIENT
Start: 2022-10-10 | End: 2022-10-10

## 2022-10-10 RX ORDER — INSULIN GLARGINE 100 [IU]/ML
40 INJECTION, SOLUTION SUBCUTANEOUS DAILY
Qty: 10 ML | Refills: 3 | Status: ON HOLD | OUTPATIENT
Start: 2022-10-11 | End: 2022-10-19 | Stop reason: SDUPTHER

## 2022-10-10 RX ORDER — INSULIN GLARGINE 100 [IU]/ML
35 INJECTION, SOLUTION SUBCUTANEOUS DAILY
Status: DISCONTINUED | OUTPATIENT
Start: 2022-10-10 | End: 2022-10-10

## 2022-10-10 RX ORDER — INSULIN GLARGINE 100 [IU]/ML
40 INJECTION, SOLUTION SUBCUTANEOUS DAILY
Status: DISCONTINUED | OUTPATIENT
Start: 2022-10-11 | End: 2022-10-11 | Stop reason: HOSPADM

## 2022-10-10 RX ORDER — INSULIN LISPRO 100 [IU]/ML
15 INJECTION, SOLUTION INTRAVENOUS; SUBCUTANEOUS
Qty: 5 ADJUSTABLE DOSE PRE-FILLED PEN SYRINGE | Refills: 2 | Status: ON HOLD | OUTPATIENT
Start: 2022-10-10 | End: 2022-10-19 | Stop reason: SDUPTHER

## 2022-10-10 RX ORDER — INSULIN LISPRO 100 [IU]/ML
15 INJECTION, SOLUTION INTRAVENOUS; SUBCUTANEOUS
Status: DISCONTINUED | OUTPATIENT
Start: 2022-10-10 | End: 2022-10-11 | Stop reason: HOSPADM

## 2022-10-10 RX ORDER — HYDRALAZINE HYDROCHLORIDE 25 MG/1
25 TABLET, FILM COATED ORAL EVERY 8 HOURS SCHEDULED
Qty: 90 TABLET | Refills: 3 | Status: ON HOLD | OUTPATIENT
Start: 2022-10-10 | End: 2022-10-19 | Stop reason: SDUPTHER

## 2022-10-10 RX ORDER — ASPIRIN 81 MG/1
81 TABLET, CHEWABLE ORAL DAILY
Qty: 30 TABLET | Refills: 3 | Status: SHIPPED | OUTPATIENT
Start: 2022-10-13 | End: 2022-10-11 | Stop reason: SDUPTHER

## 2022-10-10 RX ORDER — CLOPIDOGREL BISULFATE 75 MG/1
75 TABLET ORAL DAILY
Qty: 30 TABLET | Refills: 3 | Status: ON HOLD | OUTPATIENT
Start: 2022-10-13 | End: 2022-10-19 | Stop reason: SDUPTHER

## 2022-10-10 RX ORDER — SPIRONOLACTONE 50 MG/1
50 TABLET, FILM COATED ORAL DAILY
Qty: 30 TABLET | Refills: 3 | Status: ON HOLD | OUTPATIENT
Start: 2022-10-11 | End: 2022-10-19 | Stop reason: SDUPTHER

## 2022-10-10 RX ADMIN — PANTOPRAZOLE SODIUM 40 MG: 40 TABLET, DELAYED RELEASE ORAL at 06:05

## 2022-10-10 RX ADMIN — INSULIN GLARGINE 35 UNITS: 100 INJECTION, SOLUTION SUBCUTANEOUS at 10:48

## 2022-10-10 RX ADMIN — INSULIN LISPRO 8 UNITS: 100 INJECTION, SOLUTION INTRAVENOUS; SUBCUTANEOUS at 10:48

## 2022-10-10 RX ADMIN — HYDRALAZINE HYDROCHLORIDE 25 MG: 25 TABLET, FILM COATED ORAL at 06:05

## 2022-10-10 RX ADMIN — SPIRONOLACTONE 50 MG: 25 TABLET ORAL at 09:55

## 2022-10-10 RX ADMIN — FLUTICASONE PROPIONATE 2 SPRAY: 50 SPRAY, METERED NASAL at 09:55

## 2022-10-10 RX ADMIN — FUROSEMIDE 40 MG: 40 TABLET ORAL at 09:55

## 2022-10-10 RX ADMIN — LOSARTAN POTASSIUM 100 MG: 100 TABLET, FILM COATED ORAL at 09:55

## 2022-10-10 RX ADMIN — INSULIN LISPRO 8 UNITS: 100 INJECTION, SOLUTION INTRAVENOUS; SUBCUTANEOUS at 13:30

## 2022-10-10 RX ADMIN — ATORVASTATIN CALCIUM 80 MG: 80 TABLET, FILM COATED ORAL at 20:33

## 2022-10-10 RX ADMIN — AMIODARONE HYDROCHLORIDE 200 MG: 200 TABLET ORAL at 09:55

## 2022-10-10 RX ADMIN — CARVEDILOL 25 MG: 25 TABLET, FILM COATED ORAL at 09:55

## 2022-10-10 RX ADMIN — IOPAMIDOL 40 ML: 755 INJECTION, SOLUTION INTRAVENOUS at 09:22

## 2022-10-10 RX ADMIN — HYDRALAZINE HYDROCHLORIDE 25 MG: 25 TABLET, FILM COATED ORAL at 20:34

## 2022-10-10 RX ADMIN — CARVEDILOL 25 MG: 25 TABLET, FILM COATED ORAL at 18:00

## 2022-10-10 RX ADMIN — AMLODIPINE BESYLATE 10 MG: 10 TABLET ORAL at 09:55

## 2022-10-10 RX ADMIN — HYDRALAZINE HYDROCHLORIDE 25 MG: 25 TABLET, FILM COATED ORAL at 13:30

## 2022-10-10 RX ADMIN — INSULIN LISPRO 15 UNITS: 100 INJECTION, SOLUTION INTRAVENOUS; SUBCUTANEOUS at 18:00

## 2022-10-10 RX ADMIN — INSULIN LISPRO 12 UNITS: 100 INJECTION, SOLUTION INTRAVENOUS; SUBCUTANEOUS at 13:30

## 2022-10-10 RX ADMIN — INSULIN LISPRO 12 UNITS: 100 INJECTION, SOLUTION INTRAVENOUS; SUBCUTANEOUS at 10:48

## 2022-10-10 ASSESSMENT — PAIN SCALES - GENERAL: PAINLEVEL_OUTOF10: 0

## 2022-10-10 NOTE — PROGRESS NOTES
Made second call to [facility]. Outside of business hours. Paged Donell Mccullough NP for med reconciliation.     Electronically signed by Mindy Estevez RN on 10/10/2022 at 4:44 PM

## 2022-10-10 NOTE — PROGRESS NOTES
Neurology    Called regarding this patient and upcoming vascular procedure. Per discussion w/ RN, my understanding is that the patient would require DAPT on 10/13 prior to procedure, would have the procedure (TCAR) on 10/18, then anticoagulation would be added a week after. Reviewed and discussed w/ attending neurologist.      We would have objection to this plan from a stroke standpoint. If we are understanding this incorrectly, please call to clarify. Thank you.       Sylvie Eduardo NP  [address] [place] [address] [id number] Neurology

## 2022-10-10 NOTE — PLAN OF CARE
Problem: Safety - Adult  Goal: Free from fall injury  10/10/2022 1059 by Lauryn Chou RN  Outcome: Progressing  10/10/2022 0400 by Elver Castillo RN  Outcome: Progressing     Problem: ABCDS Injury Assessment  Goal: Absence of physical injury  Outcome: Progressing     Problem: Cardiovascular - Adult  Goal: Maintains optimal cardiac output and hemodynamic stability  10/10/2022 1059 by Lauryn Chou RN  Outcome: Progressing  10/10/2022 0400 by Elver Castillo RN  Outcome: Progressing     Problem: Neurosensory - Adult  Goal: Achieves stable or improved neurological status  Outcome: Progressing     Problem: Neurosensory - Adult  Goal: Achieves maximal functionality and self care  10/10/2022 1059 by Lauryn Chou RN  Outcome: Progressing  10/10/2022 0400 by Elver Castillo RN  Outcome: Progressing     Problem: Pain  Goal: Verbalizes/displays adequate comfort level or baseline comfort level  Outcome: Progressing

## 2022-10-10 NOTE — PRE SEDATION
Sedation Pre-Procedure Note    Patient Name: Susan Perry   YOB: 1965  Room/Bed: S7S-2117/5111-01  Medical Record Number: [id number]  Date: 10/10/2022   Time: 9:35 AM       Indication:  Preoperative risk assessment    Consent: I have discussed with the patient and/or the patient representative the indication, alternatives, and the possible risks and/or complications of the planned procedure and the anesthesia methods. The patient and/or patient representative appear to understand and agree to proceed. Vital Signs:   Vitals:    10/10/22 0735   BP:    Pulse:    Resp:    Temp:    SpO2: 96%       Past Medical History:   has a past medical history of Abdominal pain, Abnormal EKG, Acute pancreatitis, CHF (congestive heart failure) ([facility] Cigarette smoker, Cocaine abuse ([facility] Dehydration, Diabetes mellitus ([facility] History of [facility] History of MI (myocardial infarction), Hyperglycemia, Hyperlipidemia, Hypertension, Morbid obesity with BMI of 45.0-49.9, adult ([facility] Postural dizziness, Pre-diabetes, and PUD (peptic ulcer disease). Past Surgical History:   has a past surgical history that includes Cardiac catheterization and Upper gastrointestinal endoscopy.     Medications:   Scheduled Meds:    insulin glargine  35 Units SubCUTAneous Daily    insulin lispro  12 Units SubCUTAneous TID     amLODIPine  10 mg Oral Daily    hydrALAZINE  25 mg Oral 3 times per day    spironolactone  50 mg Oral Daily    losartan  100 mg Oral Daily    carvedilol  25 mg Oral BID WC    insulin lispro  0-16 Units SubCUTAneous TID     insulin lispro  0-4 Units SubCUTAneous Nightly    amiodarone  200 mg Oral Daily    atorvastatin  80 mg Oral Nightly    [Held by provider] aspirin  81 mg Oral Daily    [Held by provider] apixaban  5 mg Oral BID    fluticasone  2 spray Nasal Daily    furosemide  40 mg Oral Daily    pantoprazole  40 mg Oral QAM AC     Continuous Infusions:    dextrose       PRN Meds: acetaminophen, ondansetron **OR** ondansetron, labetalol, glucose, dextrose bolus **OR** dextrose bolus, glucagon (rDNA), dextrose, polyethylene glycol  Home Meds:   Prior to Admission medications    Medication Sig Start Date End Date Taking?  Authorizing Provider   amiodarone (CORDARONE) 200 MG tablet TAKE 1 TABLET BY MOUTH EVERY DAY 6/24/22   CONSUELO Zavala CNP   carvedilol (COREG) 25 MG tablet TAKE 1 TABLET BY MOUTH TWICE A DAY WITH MEALS 6/20/22   Enmanuel Amezcua MD   losartan (COZAAR) 25 MG tablet TAKE 1 TABLET BY MOUTH EVERY DAY 6/20/22   Enmanuel Amezcua MD   spironolactone (ALDACTONE) 25 MG tablet TAKE 1 TABLET BY MOUTH EVERY DAY 6/20/22   Enmanuel Amezcua MD   KLOR-CON M10 10 MEQ extended release tablet TAKE 1 TABLET BY MOUTH TWICE A DAY 6/20/22   Enmanuel Amezcua MD   furosemide (LASIX) 40 MG tablet TAKE 1 TABLET BY MOUTH EVERY DAY 6/20/22   Enmanuel Amezcua MD   atorvastatin (LIPITOR) 80 MG tablet TAKE 1 TABLET BY MOUTH NIGHTLY 6/20/22   Enmanuel Amezcua MD   FARXIGA 5 MG tablet TAKE 1 TABLET BY MOUTH EVERY DAY IN THE MORNING 6/2/22   Enmanuel Amezcua MD   ELIQUIS 5 MG TABS tablet TAKE 1 TABLET BY MOUTH TWICE A DAY 6/2/22   Enmanuel Amezcua MD   [employer] ASPIRIN ADULT LOW DOSE 81 MG chewable tablet TAKE 1 TABLET BY MOUTH EVERY DAY 5/18/22   Kevin Hernandez DO   Blood Pressure Monitor KIT 1 Device by Does not apply route daily 4/21/22   Kevin Hernandez DO   insulin glargine (LANTUS) 100 UNIT/ML injection vial Inject 10 Units into the skin daily (with breakfast) 3/22/22   CONSUELO Mazariegos NP   fluticasone (FLONASE) 50 MCG/ACT nasal spray 2 sprays by Nasal route daily 3/22/22   CONSUELO Mazariegos NP   omeprazole (PRILOSEC) 20 MG delayed release capsule Take 1 capsule by mouth Daily 10/21/21   Kevin Hernandez DO   polyethylene glycol (GLYCOLAX) 17 GM/SCOOP powder Take 17 g by mouth daily as needed for Constipation 8/30/21   Historical Provider, MD   Lancet Devices (LANCING DEVICE) MISC 1 lancet device  Pharmacy cannot do brand substitutions 7/31/21   Huy Crenshaw MD   Lancets MISC Check blood sugar four times. Pharmacy can do brand substitutions 7/31/21   Huy Crenshaw MD   Blood Glucose Monitoring Suppl (TRUE METRIX METER) w/Device KIT 1 kit by Does not apply route 4 times daily Dispense one kit  Pharmacy cannot do brand substitutions 7/31/21   Huy Crenshaw MD   Insulin Pen Needle (CAREFINE PEN NEEDLES) 32G X 4 MM MISC 1 each by Does not apply route 3 times daily Pharmacy can do brand substitutions 7/31/21   Huy Crenshaw MD     Coumadin Use Last 7 Days:  no  Antiplatelet drug therapy use last 7 days: yes - asa  Other anticoagulant use last 7 days: no  Additional Medication Information:  None      Pre-Sedation Documentation and Exam:   I have personally completed a history, physical exam & review of systems for this patient (see notes).     Mallampati Airway Assessment:  Mallampati Class III - (soft palate & base of uvula are visible)    Prior History of Anesthesia Complications:   none    ASA Classification:  Class 2 - A normal healthy patient with mild systemic disease    Sedation/ Anesthesia Plan:   intravenous sedation    Medications Planned:   midazolam (Versed) intravenously and fentanyl intravenously    Patient is an appropriate candidate for plan of sedation: yes    Electronically signed by Gael Goetz MD on 10/10/2022 at 9:35 AM

## 2022-10-10 NOTE — PLAN OF CARE
Problem: Discharge Planning  Goal: Discharge to home or other facility with appropriate resources  Outcome: Progressing     Problem: Safety - Adult  Goal: Free from fall injury  Outcome: Progressing     Problem: Cardiovascular - Adult  Goal: Maintains optimal cardiac output and hemodynamic stability  Outcome: Progressing     Problem: Neurosensory - Adult  Goal: Achieves maximal functionality and self care  Outcome: Progressing

## 2022-10-10 NOTE — PROGRESS NOTES
Occupational Therapy      Attempted to see pt for OT tx. Pt off floor for cath lab procedure. Will follow up as schedule and pt condition permit.     Electronically signed by Alison Ramos OT on 10/10/2022 at 9:27 AM

## 2022-10-10 NOTE — PROGRESS NOTES
[facility] [place]  Personalized Stroke Treatment Plan  My Stroke Type:   [x] Ischemic Stroke (Blockage of blood flow to the brain)     [] TIA - Transient Ischemic Attack (mini-stroke)    Personal risk factors you can control include:    [] Alcohol Abuse: check with your physician before any alcohol consumption. [x] Atrial fibrillation: may cause blood clots. [x] Drug Abuse: Seek help, talk with your doctor  [] Clotting Disorder  [x] Diabetes  [x] Family history of stroke or heart disease  [x] High Blood Pressure/Hypertension: work with your physician. [x] High cholesterol: monitor cholesterol levels with your physician. [x] Overweight/Obesity: work with your physician for your ideal body weight. [x] Physical Inactivity: get regular exercise as directed by your physician. [x] Personal history of previous TIA or stroke  [x] Poor Diet; decrease salt (sodium) in your diet, follow diet directed by physician. [x] Smoking: Cigarette/Cigar: stop smoking. Follow up with your physician is important after discharge. TAKE all medications as prescribed. Do not stop taking any medications   without talking to your physician. BE FAST is a simple way to remember the main symptoms of stroke. Recognizing these symptoms helps you know when to call for medical help. BE FAST stands for:                                                 B Balance problems                                                 E Eyes, vision lost        F  Face Drooping      A  Arm Weakness        S  Speech Difficulty      T  Time to Call 9-1-1  DO NOT DELAY THIS! Educated patient and/or family on personal risk factors for stroke/TIA. Included ways to reduce the risk for recurrent stroke. My [employer]s Stroke treatment and prevention, Managing your recovery  notebook  provided to patient. The notebook includes, but not limited to, sections addressing warning signs & symptoms of a stroke.  The need to call EMS ([phone]) immediately if

## 2022-10-10 NOTE — PROGRESS NOTES
Hospitalist Progress Note      PCP: Gabby Anthony DO    Date of Admission: 10/6/2022    Chief Complaint: imbalance, dizziness    Hospital Course: The patient is a 61 y.o. male with history of cocaine abuse, tobacco abuse, hypertension, lipidemia, morbid obesity, MI, type 2 diabetes, and diastolic heart failure who presents to [facility] with imbalance and dizziness. Patient states that starting on Saturday, he started to notice some imbalance that has been progressive throughout the last several days. Patient notes that he is sort of leaning more towards the right when he walks, and feels like he has to catch himself as he is walking. Patient notes that this is a new symptom for him. Patient denies visual disturbances, chest pain, fever, chills, shortness of breath, abdominal pain, nausea, vomiting, constipation, diarrhea, and dysuria. Patient notes that he has been out of his medications for a few weeks, and he has not taken any medication in the last few weeks. In the ED, labs were significant for sodium of 131, chloride of 97, glucose of 474, proBNP of 397. Chest x-ray was unremarkable. EKG showed normal sinus rhythm and possible LVH. CT head without contrast showed a subtle focus of asymmetric low attenuation in the inferior medial right cerebellar hemisphere. CTA head and neck with contrast showed a hemodynamically significant stenosis within the right cervical ICA with approximately 90% stenosis, as well as a left proximal cervical ICA resulting in approximately 45 to 50% stenosis. Subjective: Pt seen and examined. Has no complaints. Otherwise feels ok. Angiogram results noted.       Medications:  Reviewed    Infusion Medications    dextrose       Scheduled Medications    insulin glargine  35 Units SubCUTAneous Daily    insulin lispro  12 Units SubCUTAneous TID     amLODIPine  10 mg Oral Daily    hydrALAZINE  25 mg Oral 3 times per day    spironolactone  50 mg Oral Daily losartan  100 mg Oral Daily    carvedilol  25 mg Oral BID     insulin lispro  0-16 Units SubCUTAneous TID     insulin lispro  0-4 Units SubCUTAneous Nightly    amiodarone  200 mg Oral Daily    atorvastatin  80 mg Oral Nightly    [Held by provider] aspirin  81 mg Oral Daily    [Held by provider] apixaban  5 mg Oral BID    fluticasone  2 spray Nasal Daily    furosemide  40 mg Oral Daily    pantoprazole  40 mg Oral QAM AC     PRN Meds: acetaminophen, ondansetron **OR** ondansetron, labetalol, glucose, dextrose bolus **OR** dextrose bolus, glucagon (rDNA), dextrose, polyethylene glycol      Intake/Output Summary (Last 24 hours) at 10/10/2022 1619  Last data filed at 10/10/2022 1334  Gross per 24 hour   Intake 900 ml   Output 700 ml   Net 200 ml         Exam:    BP (!) 152/80   Pulse 63   Temp 97.3 °F (36.3 °C) (Axillary)   Resp 16   Ht 6' 2\" (1.88 m)   Wt (!) 303 lb 9.2 oz (137.7 kg)   SpO2 100%   BMI 38.98 kg/m²     General appearance: No apparent distress appears stated age and cooperative. HEENT Normal cephalic, atraumatic without obvious deformity. Pupils equal, round, and reactive to light. Extra ocular muscles intact. Conjunctivae/corneas clear. Neck: Supple, No jugular venous distention/bruits. Trachea midline without thyromegaly or adenopathy with full range of motion. Lungs: Clear to auscultation, bilaterally without Rales/Wheezes/Rhonchi with good respiratory effort. Heart: Regular rate and rhythm with Normal S1/S2 without murmurs, rubs or gallops, point of maximum impulse non-displaced  Abdomen: Soft, non-tender or non-distended without rigidity or guarding and positive bowel sounds all four quadrants. Extremities: No clubbing, cyanosis, or edema bilaterally. Full range of motion without deformity and normal gait intact. Skin: Skin color, texture, turgor normal.  No rashes or lesions.   Neurologic: Alert and oriented X 3, neurovascularly intact with sensory/motor intact upper extremities/lower extremities, bilaterally. Cranial nerves: II-XII intact, grossly non-focal.  Impaired finger-to-nose, slow heel-to-shin transfer. Mental status: Alert, oriented, thought content appropriate. Capillary Refill: Acceptable  < 3 seconds  Peripheral Pulses: +3 Easily felt, not easily obliterated with pressure      Labs:   Recent Labs     10/08/22  0824 10/09/22  0618 10/10/22  0456   WBC 5.7 5.2 6.3   HGB 13.9 13.9 13.7   HCT 40.6 40.2* 40.8    214 207       Recent Labs     10/08/22  0824 10/09/22  0618 10/10/22  0456   * 133* 136   K 4.2 4.0 4.2    99 101   CO2 22 23 24   BUN 14 16 19   CREATININE 0.9 1.0 1.1   CALCIUM 8.8 9.1 8.8       Recent Labs     10/08/22  0824 10/09/22  0618 10/10/22  0456   AST 11* 10* 13*   ALT 10 10 9*   BILITOT 0.6 0.6 0.6   ALKPHOS 63 65 70       No results for input(s): INR in the last 72 hours. No results for input(s): Lattie Tony in the last 72 hours. Urinalysis:      Lab Results   Component Value Date/Time    NITRU Negative 10/06/2022 03:00 PM    WBCUA 0-2 03/19/2022 05:10 PM    RBCUA 0-2 03/19/2022 05:10 PM    BLOODU Negative 10/06/2022 03:00 PM    SPECGRAV 1.056 10/06/2022 03:00 PM    GLUCOSEU 250 10/06/2022 03:00 PM       Radiology:  MRI brain without contrast   Final Result   1. Acute right posteroinferior cerebellar artery territory infarct involving   the medial aspect of the right cerebellar hemisphere as well as the right   lateral margin of the medulla. 2. No acute intracranial hemorrhage. 3. Mild chronic small vessel ischemic changes. The findings were sent to the Radiology Results [address] at 8:19   am on 10/7/2022 to be communicated to a licensed caregiver. CT HEAD WO CONTRAST   Final Result   1. Subtle focus of asymmetric low attenuation in the inferomedial right   cerebellar hemisphere. Finding may be artifactual.  Subtle area of recent   ischemic change cannot be excluded.   Follow-up MRI examination with diffusion   imaging may be helpful for more complete evaluation. 2. Otherwise, no evidence of acute intracranial abnormality. CTA HEAD NECK W CONTRAST   Final Result   1. Hemodynamically significant stenosis within the right cervical ICA with   approximately 90% stenosis per NASCET criteria related to noncalcified and   calcified plaque, most pronounced 1.5 cm distal to the bifurcation. 2. Fibrocalcific plaque within the left proximal cervical ICA resulting in   approximately 45-50% stenosis per NASCET criteria. 3. Patent cervical vertebral arteries. 4.  No apparent arterial high grade stenosis, occlusion or aneurysm within   the head. XR CHEST PORTABLE   Final Result   No evidence of acute cardiopulmonary disease.                  Assessment/Plan:    Active Hospital Problems    Diagnosis Date Noted    Symptomatic carotid artery stenosis, right [I65.21] 10/07/2022     Priority: Medium    Dizziness [R42] 10/07/2022     Priority: Medium    Acute CVA (cerebrovascular accident) [facility]) [I63.9] 10/06/2022     Priority: Medium       Acute CVA to the right cerebellar hemisphere  -confirmed on MRI Brain without contrast - acute right posteroinferior cerebellar artery territory infarct involving the medial aspect of the right cerebellar hemisphere as well as the right lateral margin of the medulla  -Neurology consulted, recs appreciated  -Continue high intensity statin  -Hold aspirin and  Eliquis to prevent hemorrhagic conversion  -Telemetry monitoring  -PT OT evaluation  -Bedside swallow evaluation  -Lipid panel  -Hgb A1c 12.9%  -Obtained echo with bubble study with no evidence of shunting  -Neurochecks  -restarted home anti-hypertensive meds  -Labetalol as needed     Right ICA stenosis  -Vascular surgery consulted, recs appreciated  -discussing with cardiology timing of CEA     Hypertensive urgency  -restart home anti-hypertensives, adjustments made and hydralazine added today  -Labetalol as needed for SBP > 220    Chronic combined systolic and diastolic CHF -  - was supposed to have an ischemic evaluation with cardiology as an outpatient but was never pursued  - continues to have EF 40-45% with hypokinesis and grade I DD  - cardiology consulted, underwent angiogram on 10/10 that showed nonocclusive disease and no intervention was needed     Type 2 diabetes  -Lantus - increase dose  -Medium dose SSI -> high dose SSi  -increase prandial Humalog  -Hypoglycemia protocol  -POCT glucose checks  -Carb controlled diet     Paroxysmal atrial fibrillation  -Continue home amiodarone and Eliquis  -Telemetry monitoring     Cocaine abuse  -Abstinence encouraged  -Contributing to blood pressure issues     Morbid obesity due to excess calories-BMI 40.57  -Nutritional counseling provided  -Weight loss encouraged  -Complicating medical management     Hyperlipidemia  -Continue high intensity statin     Chronic diastolic CHF  -Continue home medications  -Telemetry monitoring     Tobacco abuse  -Smoking cessation counseling provided  -Nicotine patch is offered      DVT Prophylaxis: Eliquis - on hold  Diet: ADULT DIET; Regular; 5 carb choices (75 gm/meal); No Added Salt (3-4 gm)  Code Status: Full Code    PT/OT Eval Status: ordered    Dispo - continue care, meds now clarified between vascular and neurology. Retail is closed for today, meds have been sent for discharge home tomorrow.     Olvin Whitlock MD

## 2022-10-10 NOTE — PROGRESS NOTES
[address] 80    Admit Date: 10/6/2022    PCP: Megan Raman DO                  : 1965  MRN: [id number]    Subjective: Interval History:   Patient seen and examined. Clinical notes reviewed. Telemetry reviewed. No new complaint today. No major events overnight. Denies having chest pain, shortness of breath, dyspnea on exertion, Orthopnea, PND at the time of this visit. Review of System:  Pertinent positive and negatives are in the HPI and interval above, the rest are negative. Data:   Scheduled Meds:   insulin glargine  35 Units SubCUTAneous Daily    insulin lispro  12 Units SubCUTAneous TID WC    amLODIPine  10 mg Oral Daily    hydrALAZINE  25 mg Oral 3 times per day    spironolactone  50 mg Oral Daily    losartan  100 mg Oral Daily    carvedilol  25 mg Oral BID WC    insulin lispro  0-16 Units SubCUTAneous TID WC    insulin lispro  0-4 Units SubCUTAneous Nightly    amiodarone  200 mg Oral Daily    atorvastatin  80 mg Oral Nightly    [Held by provider] aspirin  81 mg Oral Daily    [Held by provider] apixaban  5 mg Oral BID    fluticasone  2 spray Nasal Daily    furosemide  40 mg Oral Daily    pantoprazole  40 mg Oral QAM AC     PRN Meds:acetaminophen, ondansetron **OR** ondansetron, labetalol, glucose, dextrose bolus **OR** dextrose bolus, glucagon (rDNA), dextrose, polyethylene glycol  I/O last 3 completed shifts: In: 2160 [P.O.:2160]  Out: 2350 [Urine:2350]  No intake/output data recorded. Intake/Output Summary (Last 24 hours) at 10/10/2022 0945  Last data filed at 10/10/2022 0010  Gross per 24 hour   Intake 1200 ml   Output 550 ml   Net 650 ml             Objective:     Vitals: BP (!) 157/84   Pulse 69   Temp 98.4 °F (36.9 °C) (Oral)   Resp 20   Ht 6' 2\" (1.88 m)   Wt (!) 303 lb 9.2 oz (137.7 kg)   SpO2 96%   BMI 38.98 kg/m²     Physical Exam  Vitals and nursing note reviewed. Constitutional:       Appearance: Normal appearance.  He is not ill-appearing or diaphoretic. HENT:      Head: Normocephalic and atraumatic. Mouth/Throat:      Mouth: Mucous membranes are moist.   Eyes:      Pupils: Pupils are equal, round, and reactive to light. Cardiovascular:      Rate and Rhythm: Normal rate and regular rhythm. Heart sounds: No murmur heard. Pulmonary:      Effort: Pulmonary effort is normal. No respiratory distress. Breath sounds: No wheezing. Abdominal:      General: Abdomen is flat. There is no distension. Tenderness: There is no abdominal tenderness. Musculoskeletal:      Right lower leg: No edema. Left lower leg: No edema. Skin:     General: Skin is warm and dry. Neurological:      General: No focal deficit present. Mental Status: He is alert and oriented to person, place, and time. Psychiatric:         Mood and Affect: Mood normal.         Behavior: Behavior normal.       LABS:    CBC:   Recent Labs     10/08/22  0824 10/09/22  0618 10/10/22  0456   WBC 5.7 5.2 6.3   HGB 13.9 13.9 13.7   HCT 40.6 40.2* 40.8   MCV 92.3 92.2 93.6    214 207                                                                  BMP:    Recent Labs     10/08/22  0824 10/09/22  0618 10/10/22  0456   * 133* 136   K 4.2 4.0 4.2    99 101   CO2 22 23 24   BUN 14 16 19   CREATININE 0.9 1.0 1.1   GLUCOSE 316* 286* 326*         LFT's:   Recent Labs     10/08/22  0824 10/09/22  0618 10/10/22  0456   AST 11* 10* 13*   ALT 10 10 9*   BILITOT 0.6 0.6 0.6   ALKPHOS 63 65 70         Troponin:   No results for input(s): TROPONINI in the last 72 hours. Lipids:   No results for input(s): CHOL, HDL in the last 72 hours. Invalid input(s): LDLCALCU      INR:   No results for input(s): INR in the last 72 hours. -----------------------------------------------------------------  RAD:   MRI brain without contrast   Final Result   1.  Acute right posteroinferior cerebellar artery territory infarct involving   the medial aspect of the right cerebellar hemisphere as well as the right   lateral margin of the medulla. 2. No acute intracranial hemorrhage. 3. Mild chronic small vessel ischemic changes. The findings were sent to the Radiology Results [address] at 8:19   am on 10/7/2022 to be communicated to a licensed caregiver. CT HEAD WO CONTRAST   Final Result   1. Subtle focus of asymmetric low attenuation in the inferomedial right   cerebellar hemisphere. Finding may be artifactual.  Subtle area of recent   ischemic change cannot be excluded. Follow-up MRI examination with diffusion   imaging may be helpful for more complete evaluation. 2. Otherwise, no evidence of acute intracranial abnormality. CTA HEAD NECK W CONTRAST   Final Result   1. Hemodynamically significant stenosis within the right cervical ICA with   approximately 90% stenosis per NASCET criteria related to noncalcified and   calcified plaque, most pronounced 1.5 cm distal to the bifurcation. 2. Fibrocalcific plaque within the left proximal cervical ICA resulting in   approximately 45-50% stenosis per NASCET criteria. 3. Patent cervical vertebral arteries. 4.  No apparent arterial high grade stenosis, occlusion or aneurysm within   the head. XR CHEST PORTABLE   Final Result   No evidence of acute cardiopulmonary disease. EKG Interpretation: NSR/LVH      Echo:   10/2022  Conclusions      Summary   Suboptimal image quality. Definity contrast administered. Overall left ventricular systolic function appears mild to moderately   reduced. Ejection fraction is visually estimated to be 40-45% with diffuse   hypokinesis. Left ventricular cavity size is normal. There is moderately   increased left ventricular wall thickness. Diastolic filling parameters   suggest grade I diastolic dysfunction. Normal right ventricular size and function. No significant valve abnormalities noted.    A bubble study was performed and fails to show evidence of shunting.        9/25/2021  Conclusions      Summary   There is mildly increased left ventricular wall thickness. Overall left ventricular systolic function appears severely reduced. Ejection fraction is visually estimated to be 30-35%. No regional wall motion abnormalities are noted. Grade II diastolic dysfunction with elevated LV filling pressures. Normal right ventricular size and function. Echocardiogram 3/4/19   - Left ventricle: The cavity size was moderately dilated. Wall     thickness was normal. Systolic function was moderately to severely     reduced. The estimated ejection fraction was in the range of 30%     to 35%. Severe diffuse hypokinesis. The study is not technically     sufficient to allow evaluation of LV diastolic function.   - Mitral valve: Mild regurgitation.   - Left atrium: The atrium was mildly dilated. - Right ventricle: Systolic function was low normal. TAPSE:     1.8cm. Tricuspid annular systolic velocity: 02BM/Z.   - Right atrium: The atrium was mildly dilated. - Atrial septum: A shunt cannot be excluded. - Tricuspid valve: Mild-moderate regurgitation directed along the     right atrial wall. - Pulmonary arteries: Systolic pressure was moderately increased,     estimated to be 53mm Hg assuming that the right atrial pressure     was 5 mmHg. Stress: 7/25/19   There is a medium to large sized area of mild to moderately decreased perfusion in the basal inferior, basal inferolateral, mid inferior, mid inferolateral, and apical inferior segments at stress with extensive improvement at rest, consistent with reversible ischemia. Cath: 4/2/16    PROCEDURAL FINDINGS:   1. Left main coronary artery was free of any angiographically   significant disease. It gave of the left anterior descending   artery and the left circumflex artery. 2. The left anterior descending had a normal anatomic course. It   gave off 2 diagonals. 30-40% mid-LAD stenosis.      3. The left circumflex artery gives off 1 large obtuse marginals   and there is 30-40% ostial stenosis of OM1. Left dominant and   supplies posterior descending artery and posterolateral branches   distally. 4. The right coronary artery is a small, non-dominant vessel and   with no angiographically significant disease. HEMODYNAMICS:   LVEDP: 25   No gradient across the aortic valve       CONCLUSION:   Non-obstructive CAD      All above diagnostic testing and laboratory data was independently visualized and reviewed by me (not simply review of report)   Assessment/Plan:   1) preop risk assessment assessment  Coronary angiography performed without obstructive disease  Patient is okay to proceed with planned vascular carotid intervention, no further ischemic evaluation required     2)ischemic cardiomyopathy  Well compensated  Continue with beta-blockade ,ARB, and Aldactone     3) carotid artery disease  As per vascular surgery     4) atrial fibrillation  Status post ablation  Continue with beta-blockade and amiodarone  Restart DOAC once cleared by neurology    5) Hypertension - difficult to control  - Increase amlo to 10mg  - Continue 25mg Coreg  - Continue hydral 25mg  - Continue Losartan 100mg  - Continue aldactone 50mg  - Likely should be on a thiazide diuretic as next agent    Cardiology will sign off at this time, please do not hesitate to reengage us if any new issues arise or significant clinical changes. Thank you so much for allowing us to participate in this consult. Bhupinder Sadler MD  Interventional Cardiology  10/10/2022  9:45 AM    [address]   [address]  Ph: [phone]  Fax: [phone]      NOTE: This report was transcribed using voice recognition software. Every effort was made to ensure accuracy, however, inadvertent computerized transcription errors may be present.

## 2022-10-10 NOTE — PROGRESS NOTES
RN attempted to call [facility] in regards to medication clearance. Left voicemail at [phone] with unit call back number.     Electronically signed by Ashley Pelaez RN on 10/10/2022 at 3:30 PM

## 2022-10-10 NOTE — PROGRESS NOTES
[facility]) Vascular Surgery Progress Note      Chief Complaint:   Chief Complaint   Patient presents with    Dizziness     States dizzy since this past weekend states when he gets up he is dizzy, states he has not taken any of his home medication because he is out        : severe symptomatic right ICA stenosis    Michael Singh is a 61 y.o. male patient. Subjective  No acute issues  Cath WNL    1. Dizziness    2. Poorly controlled diabetes mellitus ([address]    3. Noncompliance with medication regimen    4. Polysubstance abuse [facility])      Past Medical History:   Diagnosis Date    Abdominal pain 7/29/2021    Abnormal EKG 7/28/2016    Acute pancreatitis 7/29/2021    CHF (congestive heart failure) ([facility] [address]     Cigarette smoker 8/25/2021    Cocaine abuse ([facility] [address] 3/28/2015    Last Assessment & Plan:  Formatting of this note might be different from the original. Counseled on cessation as increases risk of coronary vasospasm and further worsening of heart function. Dehydration 7/28/2016    Diabetes mellitus ([facility] [address]     History of cocaine abuse ([facility] [address] 7/28/2016    History of MI (myocardial infarction) 7/28/2016    Hyperglycemia 7/28/2016    Hyperlipidemia     Hypertension     Morbid obesity with BMI of 45.0-49.9, adult ([address] 7/28/2016    Postural dizziness 7/28/2016    Pre-diabetes 3/9/2017    Last Assessment & Plan:  Formatting of this note might be different from the original. Discussed lifestyle changes Refer to [facility] program    PUD (peptic ulcer disease) 3/1/2019     No past surgical history pertinent negatives on file.   Scheduled Meds:   insulin glargine  35 Units SubCUTAneous Daily    insulin lispro  12 Units SubCUTAneous TID WC    amLODIPine  10 mg Oral Daily    hydrALAZINE  25 mg Oral 3 times per day    spironolactone  50 mg Oral Daily    losartan  100 mg Oral Daily    carvedilol  25 mg Oral BID WC    insulin lispro  0-16 Units SubCUTAneous TID WC    insulin lispro  0-4 Units SubCUTAneous Nightly    amiodarone  200 mg Oral Daily    atorvastatin  80 mg Oral Nightly    [Held by provider] aspirin  81 mg Oral Daily    [Held by provider] apixaban  5 mg Oral BID    fluticasone  2 spray Nasal Daily    furosemide  40 mg Oral Daily    pantoprazole  40 mg Oral QAM AC     Continuous Infusions:   dextrose       PRN Meds:acetaminophen, ondansetron **OR** ondansetron, labetalol, glucose, dextrose bolus **OR** dextrose bolus, glucagon (rDNA), dextrose, polyethylene glycol    Principal Problem:    Acute CVA (cerebrovascular accident) ([facility] [address]  Active Problems:    Symptomatic carotid artery stenosis, right    Dizziness  Resolved Problems:    * No resolved hospital problems. *    Blood pressure (!) 158/82, pulse 64, temperature 97.3 °F (36.3 °C), temperature source Axillary, resp. rate 16, height 6' 2\" (1.88 m), weight (!) 303 lb 9.2 oz (137.7 kg), SpO2 97 %. Objective:  Vital signs (most recent): Blood pressure (!) 158/82, pulse 64, temperature 97.3 °F (36.3 °C), temperature source Axillary, resp. rate 16, height 6' 2\" (1.88 m), weight (!) 303 lb 9.2 oz (137.7 kg), SpO2 97 %. General appearance: In no acute distress. Lungs:  Normal effort. Heart: Normal rate. Extremities: There is normal range of motion. Neurological: The patient is alert and oriented to person, place and time. Normal strength. Assessment & Plan    Severe symptomatic right ICA stenosis with infarct on MRI    Given the lesion is high and behind the jaw line, and combined with poorly controlled diabetes (therefore higher risk for patch infection) would recommend right TCAR    Will need to discuss with Neurology as patient is going to be at certain intervals on aspirin/plavix and eliquis in addition to statin - will need guidance on timing of re-introduction of antiplatelet/anticoagulation. Ideally would just start aspirin and plavix and hold Eliquis until 1 week after TCAR.  Will need to be on antiplatelet therapy ideally 5 days before and 3 months after TCAR, could perform next Tuesday     Discussed with patient about taking this medicine as poor compliance risks stent thrombosis    Discussed with patient about cocaine use and to refrain from use in the near future  Expressed understanding and wishes to proceed  Once clarity with medication management is done then okay for discharge with intervention to be planned for next Tuesday.        Tori Garrett DO, FSVS, [facility] Vascular and Endovascular Surgery

## 2022-10-10 NOTE — PROGRESS NOTES
Received call back from Mindy Favre, NP with neuro. Ok to resume Aspirin and Plavix 10/13/22 for TCAR 10/18/22, then resume Eliquis on 10/28/22. Made primary MD aware.     Electronically signed by Shashi Shoemaker RN on 10/10/2022 at 4:57 PM

## 2022-10-11 VITALS
RESPIRATION RATE: 20 BRPM | DIASTOLIC BLOOD PRESSURE: 69 MMHG | WEIGHT: 305.34 LBS | OXYGEN SATURATION: 97 % | HEART RATE: 66 BPM | BODY MASS INDEX: 39.19 KG/M2 | SYSTOLIC BLOOD PRESSURE: 157 MMHG | TEMPERATURE: 98.6 F | HEIGHT: 74 IN

## 2022-10-11 LAB
A/G RATIO: 1.4 (ref 1.1–2.2)
ALBUMIN SERPL-MCNC: 3.8 G/DL (ref 3.4–5)
ALP BLD-CCNC: 59 U/L (ref 40–129)
ALT SERPL-CCNC: 10 U/L (ref 10–40)
ANION GAP SERPL CALCULATED.3IONS-SCNC: 12 MMOL/L (ref 3–16)
AST SERPL-CCNC: 11 U/L (ref 15–37)
BASOPHILS ABSOLUTE: 0.1 K/UL (ref 0–0.2)
BASOPHILS RELATIVE PERCENT: 1 %
BILIRUB SERPL-MCNC: 0.7 MG/DL (ref 0–1)
BUN BLDV-MCNC: 21 MG/DL (ref 7–20)
CALCIUM SERPL-MCNC: 9.2 MG/DL (ref 8.3–10.6)
CHLORIDE BLD-SCNC: 99 MMOL/L (ref 99–110)
CO2: 24 MMOL/L (ref 21–32)
CREAT SERPL-MCNC: 1 MG/DL (ref 0.9–1.3)
EOSINOPHILS ABSOLUTE: 0.2 K/UL (ref 0–0.6)
EOSINOPHILS RELATIVE PERCENT: 3.1 %
GFR AFRICAN AMERICAN: >60
GFR NON-AFRICAN AMERICAN: >60
GLUCOSE BLD-MCNC: 239 MG/DL (ref 70–99)
GLUCOSE BLD-MCNC: 273 MG/DL (ref 70–99)
GLUCOSE BLD-MCNC: 288 MG/DL (ref 70–99)
HCT VFR BLD CALC: 40.1 % (ref 40.5–52.5)
HEMOGLOBIN: 13.7 G/DL (ref 13.5–17.5)
LYMPHOCYTES ABSOLUTE: 1.9 K/UL (ref 1–5.1)
LYMPHOCYTES RELATIVE PERCENT: 30.5 %
MCH RBC QN AUTO: 31.8 PG (ref 26–34)
MCHC RBC AUTO-ENTMCNC: 34.1 G/DL (ref 31–36)
MCV RBC AUTO: 93.3 FL (ref 80–100)
MONOCYTES ABSOLUTE: 0.6 K/UL (ref 0–1.3)
MONOCYTES RELATIVE PERCENT: 10.5 %
NEUTROPHILS ABSOLUTE: 3.4 K/UL (ref 1.7–7.7)
NEUTROPHILS RELATIVE PERCENT: 54.9 %
PDW BLD-RTO: 12.5 % (ref 12.4–15.4)
PERFORMED ON: ABNORMAL
PERFORMED ON: ABNORMAL
PLATELET # BLD: 203 K/UL (ref 135–450)
PMV BLD AUTO: 8.3 FL (ref 5–10.5)
POTASSIUM REFLEX MAGNESIUM: 4.1 MMOL/L (ref 3.5–5.1)
RBC # BLD: 4.3 M/UL (ref 4.2–5.9)
SODIUM BLD-SCNC: 135 MMOL/L (ref 136–145)
TOTAL PROTEIN: 6.6 G/DL (ref 6.4–8.2)
WBC # BLD: 6.2 K/UL (ref 4–11)

## 2022-10-11 PROCEDURE — 6370000000 HC RX 637 (ALT 250 FOR IP): Performed by: FAMILY MEDICINE

## 2022-10-11 PROCEDURE — 97530 THERAPEUTIC ACTIVITIES: CPT

## 2022-10-11 PROCEDURE — 6370000000 HC RX 637 (ALT 250 FOR IP): Performed by: INTERNAL MEDICINE

## 2022-10-11 PROCEDURE — 6370000000 HC RX 637 (ALT 250 FOR IP): Performed by: STUDENT IN AN ORGANIZED HEALTH CARE EDUCATION/TRAINING PROGRAM

## 2022-10-11 PROCEDURE — 85025 COMPLETE CBC W/AUTO DIFF WBC: CPT

## 2022-10-11 PROCEDURE — 80053 COMPREHEN METABOLIC PANEL: CPT

## 2022-10-11 PROCEDURE — 97116 GAIT TRAINING THERAPY: CPT | Performed by: PHYSICAL THERAPIST

## 2022-10-11 PROCEDURE — 36415 COLL VENOUS BLD VENIPUNCTURE: CPT

## 2022-10-11 PROCEDURE — APPNB30 APP NON BILLABLE TIME 0-30 MINS: Performed by: NURSE PRACTITIONER

## 2022-10-11 RX ORDER — POTASSIUM CHLORIDE 750 MG/1
20 TABLET, EXTENDED RELEASE ORAL DAILY
Qty: 180 TABLET | Refills: 0 | Status: ON HOLD | OUTPATIENT
Start: 2022-10-11 | End: 2022-10-19 | Stop reason: SDUPTHER

## 2022-10-11 RX ORDER — CALCIUM CITRATE/VITAMIN D3 200MG-6.25
TABLET ORAL
Qty: 100 EACH | Refills: 3 | Status: SHIPPED | OUTPATIENT
Start: 2022-10-11

## 2022-10-11 RX ORDER — LANCETS 30 GAUGE
EACH MISCELLANEOUS
Qty: 300 EACH | Refills: 1 | Status: SHIPPED | OUTPATIENT
Start: 2022-10-11

## 2022-10-11 RX ORDER — ASPIRIN 81 MG/1
81 TABLET, CHEWABLE ORAL DAILY
Qty: 30 TABLET | Refills: 0 | Status: ON HOLD | OUTPATIENT
Start: 2022-10-13 | End: 2022-10-19 | Stop reason: SDUPTHER

## 2022-10-11 RX ADMIN — AMIODARONE HYDROCHLORIDE 200 MG: 200 TABLET ORAL at 08:24

## 2022-10-11 RX ADMIN — HYDRALAZINE HYDROCHLORIDE 25 MG: 25 TABLET, FILM COATED ORAL at 06:30

## 2022-10-11 RX ADMIN — AMLODIPINE BESYLATE 10 MG: 10 TABLET ORAL at 08:24

## 2022-10-11 RX ADMIN — FLUTICASONE PROPIONATE 2 SPRAY: 50 SPRAY, METERED NASAL at 08:27

## 2022-10-11 RX ADMIN — PANTOPRAZOLE SODIUM 40 MG: 40 TABLET, DELAYED RELEASE ORAL at 06:30

## 2022-10-11 RX ADMIN — LOSARTAN POTASSIUM 100 MG: 100 TABLET, FILM COATED ORAL at 08:24

## 2022-10-11 RX ADMIN — FUROSEMIDE 40 MG: 40 TABLET ORAL at 08:24

## 2022-10-11 RX ADMIN — INSULIN GLARGINE 40 UNITS: 100 INJECTION, SOLUTION SUBCUTANEOUS at 08:23

## 2022-10-11 RX ADMIN — EMPAGLIFLOZIN 10 MG: 10 TABLET, FILM COATED ORAL at 11:04

## 2022-10-11 RX ADMIN — SPIRONOLACTONE 50 MG: 25 TABLET ORAL at 08:24

## 2022-10-11 RX ADMIN — INSULIN LISPRO 8 UNITS: 100 INJECTION, SOLUTION INTRAVENOUS; SUBCUTANEOUS at 12:19

## 2022-10-11 RX ADMIN — INSULIN LISPRO 4 UNITS: 100 INJECTION, SOLUTION INTRAVENOUS; SUBCUTANEOUS at 08:23

## 2022-10-11 RX ADMIN — HYDRALAZINE HYDROCHLORIDE 25 MG: 25 TABLET, FILM COATED ORAL at 14:06

## 2022-10-11 RX ADMIN — CARVEDILOL 25 MG: 25 TABLET, FILM COATED ORAL at 08:24

## 2022-10-11 RX ADMIN — INSULIN LISPRO 15 UNITS: 100 INJECTION, SOLUTION INTRAVENOUS; SUBCUTANEOUS at 08:23

## 2022-10-11 RX ADMIN — INSULIN LISPRO 15 UNITS: 100 INJECTION, SOLUTION INTRAVENOUS; SUBCUTANEOUS at 12:19

## 2022-10-11 NOTE — CONSULTS
[address] [place] [address]  Diabetes Service    NAME: Andrea Puga RECORD NUMBER:  [id number]  AGE: 61 y.o. GENDER: male  : 1965  TODAY'S DATE:  10/6/2022      Viann Boast  was referred to the [address] [place] [address] for Diabetes Services. Shaneka Briones is agreeable to meet with me today. Introduced myself and described our services. Appointment scheduled for 10/26. He has a procedure next week and a PCP appt next week as well. I advised I would call in the next couple of days to check in on him to see how he is doing. [address] [place] [address] brochure with description of our services, contact information and patient appointment given. Asked to bring glucometer and blood glucose log to the appointment. Asked to bring all medications to the appointment for thorough review by the pharmacist.   Directions given. Shaneka Briones is new to using a glucometer. He received some education from our diabetes educator earlier on how to use the glucometer as well as other education. However, he had the new meter with him from the pharmacy and he asked me to walk him through it. We opened the meter and set it up and I reviewed proper use with him. He expressed feeling comfortable with doing this. Any questions or concerns from the patient were answered.        Electronically signed by Arcenio Lo PharmD on 10/11/2022 at 2:45 PM    [address] [place] [address]  Diabetes Service  Phone: [phone]  Fax [phone]

## 2022-10-11 NOTE — DISCHARGE SUMMARY
Hospital Medicine Discharge Summary    Patient: Cody Yap     Gender: male  : 1965   Age: 61 y.o. MRN: [id number]    Code Status: Full Code     Primary Care Provider: Sophronia Buerger, DO    Admit Date: 10/6/2022   Discharge Date:  10/11/2022    Admitting Physician: Shanita Hatch MD  Discharge Physician: Jose De Jesus Bhatia DO     Discharge Diagnoses: Active Hospital Problems    Diagnosis Date Noted    Symptomatic carotid artery stenosis, right [I65.21] 10/07/2022     Priority: Medium    Dizziness [R42] 10/07/2022     Priority: Medium    Acute CVA (cerebrovascular accident) ([address] [I63.9] 10/06/2022     Priority: Medium       Hospital Course:    61 y.o. male with history of cocaine abuse, tobacco abuse, hypertension, lipidemia, morbid obesity, MI, type 2 diabetes, and diastolic heart failure who presents to [facility] with imbalance and dizziness. Patient states that starting on Saturday, he started to notice some imbalance that has been progressive throughout the last several days. Patient notes that he is sort of leaning more towards the right when he walks, and feels like he has to catch himself as he is walking. Patient notes that this is a new symptom for him. Patient denies visual disturbances, chest pain, fever, chills, shortness of breath, abdominal pain, nausea, vomiting, constipation, diarrhea, and dysuria. Patient notes that he has been out of his medications for a few weeks, and he has not taken any medication in the last few weeks. In the ED, labs were significant for sodium of 131, chloride of 97, glucose of 474, proBNP of 397. Chest x-ray was unremarkable. EKG showed normal sinus rhythm and possible LVH. CT head without contrast showed a subtle focus of asymmetric low attenuation in the inferior medial right cerebellar hemisphere.   CTA head and neck with contrast showed a hemodynamically significant stenosis within the right cervical ICA with approximately 90% stenosis, as well as a left proximal cervical ICA resulting in approximately 45 to 50% stenosis. Work up completed, and improved with treatment as below. was discharged today in stable condition. Acute CVA to the right cerebellar hemisphere  -confirmed on MRI Brain without contrast - acute right posteroinferior cerebellar artery territory infarct involving the medial aspect of the right cerebellar hemisphere as well as the right lateral margin of the medulla  -Neurology consulted  -Continue high intensity statin  -Hold aspirin and  Eliquis to prevent hemorrhagic conversion, c/w neuro restart asa, plavix tomorrow 10/13, and eliquis 10/28 after procedure with vascular surgery  -Obtained echo with bubble study with no evidence of shunting     Right ICA stenosis  -Vascular surgery consulted, recs appreciated  -discussing with cardiology timing of CEA, planned for 10/28 with Dr Anders Esparza     Hypertensive urgency  -restart home anti-hypertensives, adjustments made and hydralazine added      Chronic combined systolic and diastolic CHF -  - was supposed to have an ischemic evaluation with cardiology as an outpatient but was never pursued  - continues to have EF 40-45% with hypokinesis and grade I DD  - cardiology consulted, underwent angiogram on 10/10 that showed nonocclusive disease and no intervention was needed     Type 2 diabetes  -Lantus - increased dose  -increased prandial Humalog     Paroxysmal atrial fibrillation  -Continue home amiodarone   -Telemetry monitoring     Cocaine abuse  -Abstinence encouraged  -Contributing to blood pressure issues    Disposition:  Home    Exam:     BP (!) 157/69   Pulse 66   Temp 98.6 °F (37 °C) (Oral)   Resp 20   Ht 6' 2\" (1.88 m)   Wt (!) 305 lb 5.4 oz (138.5 kg)   SpO2 97%   BMI 39.20 kg/m²     General appearance:  No apparent distress, appears stated age and cooperative. HEENT:  Normal cephalic, atraumatic without obvious deformity.  Pupils equal, round, and reactive to light. Extra ocular muscles intact. Conjunctivae/corneas clear. Neck: Supple, with full range of motion. No jugular venous distention. Trachea midline. Respiratory:  Normal respiratory effort. Clear to auscultation, bilaterally without Rales/Wheezes/Rhonchi. Cardiovascular:  Regular rate and rhythm with normal S1/S2 without murmurs, rubs or gallops. Abdomen: Soft, non-tender, non-distended with normal bowel sounds. Musculoskeletal:  No clubbing, cyanosis or edema bilaterally. Full range of motion without deformity. Skin: Skin color, texture, turgor normal.  No rashes or lesions. Neurologic:  Neurovascularly intact without any focal sensory/motor deficits. Cranial nerves: II-XII intact, grossly non-focal.  Psychiatric:  Alert and oriented, thought content appropriate, normal insight    Consults:     IP CONSULT TO PHARMACY  PHARMACY TO CHANGE BASE FLUIDS  IP CONSULT TO HOSPITALIST  IP CONSULT TO VASCULAR SURGERY  IP CONSULT TO NEUROLOGY  IP CONSULT TO CARDIOLOGY  IP CONSULT TO CARDIOLOGY  IP CONSULT TO DIABETES EDUCATOR    Labs:  For convenience and continuity at follow-up the following most recent labs are provided:    Lab Results   Component Value Date/Time    WBC 6.2 10/11/2022 05:03 AM    HGB 13.7 10/11/2022 05:03 AM    HCT 40.1 10/11/2022 05:03 AM    MCV 93.3 10/11/2022 05:03 AM     10/11/2022 05:03 AM     10/11/2022 05:03 AM    K 4.1 10/11/2022 05:03 AM    CL 99 10/11/2022 05:03 AM    CO2 24 10/11/2022 05:03 AM    BUN 21 10/11/2022 05:03 AM    CREATININE 1.0 10/11/2022 05:03 AM    CALCIUM 9.2 10/11/2022 05:03 AM    ALKPHOS 59 10/11/2022 05:03 AM    ALT 10 10/11/2022 05:03 AM    AST 11 10/11/2022 05:03 AM    BILITOT 0.7 10/11/2022 05:03 AM    LABALBU 3.8 10/11/2022 05:03 AM    LDLCALC 76 10/07/2022 04:41 AM    TRIG 86 10/07/2022 04:41 AM     Lab Results   Component Value Date    INR 0.97 10/06/2022    INR 1.05 09/24/2021       Radiology:  MRI brain without contrast   Final Result 1. Acute right posteroinferior cerebellar artery territory infarct involving   the medial aspect of the right cerebellar hemisphere as well as the right   lateral margin of the medulla. 2. No acute intracranial hemorrhage. 3. Mild chronic small vessel ischemic changes. The findings were sent to the Radiology Results [id number] at 8:19   am on 10/7/2022 to be communicated to a licensed caregiver. CT HEAD WO CONTRAST   Final Result   1. Subtle focus of asymmetric low attenuation in the inferomedial right   cerebellar hemisphere. Finding may be artifactual.  Subtle area of recent   ischemic change cannot be excluded. Follow-up MRI examination with diffusion   imaging may be helpful for more complete evaluation. 2. Otherwise, no evidence of acute intracranial abnormality. CTA HEAD NECK W CONTRAST   Final Result   1. Hemodynamically significant stenosis within the right cervical ICA with   approximately 90% stenosis per NASCET criteria related to noncalcified and   calcified plaque, most pronounced 1.5 cm distal to the bifurcation. 2. Fibrocalcific plaque within the left proximal cervical ICA resulting in   approximately 45-50% stenosis per NASCET criteria. 3. Patent cervical vertebral arteries. 4.  No apparent arterial high grade stenosis, occlusion or aneurysm within   the head. XR CHEST PORTABLE   Final Result   No evidence of acute cardiopulmonary disease. Discharge Medications:   Current Discharge Medication List        START taking these medications    Details   !!  Blood Glucose Monitoring Suppl (TRUE METRIX METER) w/Device KIT Check blood sugar 4 times daily, tidac  Qty: 1 kit, Refills: 0    Associated Diagnoses: Diabetes mellitus with coincident hypertension (HCC)      blood glucose test strips (TRUE METRIX BLOOD GLUCOSE TEST) strip Check blood sugar 4 times daily, tidac  Qty: 100 each, Refills: 3    Associated Diagnoses: Diabetes mellitus with coincident hypertension (HCC)      amLODIPine (NORVASC) 10 MG tablet Take 1 tablet by mouth daily  Qty: 30 tablet, Refills: 3      hydrALAZINE (APRESOLINE) 25 MG tablet Take 1 tablet by mouth every 8 hours  Qty: 90 tablet, Refills: 3      insulin lispro, 1 Unit Dial, (HUMALOG KWIKPEN) 100 UNIT/ML SOPN Inject 15 Units into the skin 3 times daily (before meals)  Qty: 5 Adjustable Dose Pre-filled Pen Syringe, Refills: 2      clopidogrel (PLAVIX) 75 MG tablet Take 1 tablet by mouth daily  Qty: 30 tablet, Refills: 3       !! - Potential duplicate medications found. Please discuss with provider. Current Discharge Medication List        CONTINUE these medications which have CHANGED    Details   Lancets MISC Check blood sugar 4 times daily, tidac  Qty: 300 each, Refills: 1    Associated Diagnoses: Diabetes mellitus with coincident hypertension (HCC)      apixaban (ELIQUIS) 5 MG TABS tablet Take 1 tablet by mouth 2 times daily  Qty: 60 tablet, Refills: 0    Associated Diagnoses: PAF (paroxysmal atrial fibrillation) (HCC)      aspirin ([employer] ASPIRIN ADULT LOW DOSE) 81 MG chewable tablet Take 1 tablet by mouth daily  Qty: 30 tablet, Refills: 0    Associated Diagnoses: CHF (congestive heart failure), NYHA class I, acute on chronic, combined ([facility] Diabetes mellitus with coincident hypertension ([facility] Chronic congestive heart failure, unspecified heart failure type (HCC)      potassium chloride (KLOR-CON M10) 10 MEQ extended release tablet Take 2 tablets by mouth daily  Qty: 180 tablet, Refills: 0    Associated Diagnoses: CHF (congestive heart failure), NYHA class I, acute on chronic, combined ([address]  Chronic congestive heart failure, unspecified heart failure type (HCC)      insulin glargine (LANTUS) 100 UNIT/ML injection vial Inject 40 Units into the skin daily  Qty: 10 mL, Refills: 3      spironolactone (ALDACTONE) 50 MG tablet Take 1 tablet by mouth daily  Qty: 30 tablet, Refills: 3           Current Discharge Medication List        CONTINUE these medications which have NOT CHANGED    Details   amiodarone (CORDARONE) 200 MG tablet TAKE 1 TABLET BY MOUTH EVERY DAY  Qty: 30 tablet, Refills: 0    Associated Diagnoses: CHF (congestive heart failure), NYHA class I, acute on chronic, combined ([facility] PAF (paroxysmal atrial fibrillation) ([facility] Long term current use of amiodarone      carvedilol (COREG) 25 MG tablet TAKE 1 TABLET BY MOUTH TWICE A DAY WITH MEALS  Qty: 90 tablet, Refills: 0    Associated Diagnoses: CHF (congestive heart failure), NYHA class I, acute on chronic, combined ([facility] Chronic congestive heart failure, unspecified heart failure type (HCC)      losartan (COZAAR) 25 MG tablet TAKE 1 TABLET BY MOUTH EVERY DAY  Qty: 90 tablet, Refills: 0    Associated Diagnoses: Diabetes mellitus with coincident hypertension (HCC)      furosemide (LASIX) 40 MG tablet TAKE 1 TABLET BY MOUTH EVERY DAY  Qty: 90 tablet, Refills: 0      atorvastatin (LIPITOR) 80 MG tablet TAKE 1 TABLET BY MOUTH NIGHTLY  Qty: 90 tablet, Refills: 0    Associated Diagnoses: CHF (congestive heart failure), NYHA class I, acute on chronic, combined ([facility] Diabetes mellitus with coincident hypertension (HCC)      FARXIGA 5 MG tablet TAKE 1 TABLET BY MOUTH EVERY DAY IN THE MORNING  Qty: 30 tablet, Refills: 2    Comments: DX Code Needed  . Associated Diagnoses: Chronic congestive heart failure, unspecified heart failure type (HCC)      Blood Pressure Monitor KIT 1 Device by Does not apply route daily  Qty: 1 kit, Refills: 0      fluticasone (FLONASE) 50 MCG/ACT nasal spray 2 sprays by Nasal route daily  Qty: 16 g, Refills: 0      omeprazole (PRILOSEC) 20 MG delayed release capsule Take 1 capsule by mouth Daily  Qty: 90 capsule, Refills: 1    Associated Diagnoses: Gastroesophageal reflux disease, unspecified whether esophagitis present      polyethylene glycol (GLYCOLAX) 17 GM/SCOOP powder Take 17 g by mouth daily as needed for Constipation      !!  Blood Glucose Monitoring Suppl (TRUE METRIX METER) w/Device KIT 1 kit by Does not apply route 4 times daily Dispense one kit  Pharmacy cannot do brand substitutions  Qty: 1 kit, Refills: 0      Insulin Pen Needle (CAREFINE PEN NEEDLES) 32G X 4 MM MISC 1 each by Does not apply route 3 times daily Pharmacy can do brand substitutions  Qty: 100 Package, Refills: 0       !! - Potential duplicate medications found. Please discuss with provider. Current Discharge Medication List        STOP taking these medications       famotidine (PEPCID) 20 MG tablet Comments:   Reason for Stopping:         Lancet Devices (LANCING DEVICE) MISC Comments:   Reason for Stopping: Follow-up appointments:  one week    Provider Follow-up:    Pcp: Dr Bee Home on 10/20/22   Dr Peyton Spence on 10/28/22    Condition at Discharge:  Stable    The patient was seen and examined on day of discharge and this discharge summary is in conjunction with any daily progress note from day of discharge. Time Spent on discharge is 45 minutes  in the examination, evaluation, counseling and review of medications and discharge plan. Signed:    Linda Melo DO   10/11/2022      Thank you Megan Raman DO for the opportunity to be involved in this patient's care. If you have any questions or concerns please feel free to contact me at [phone].

## 2022-10-11 NOTE — PROGRESS NOTES
[facility]  Diabetes Education   Progress Note       NAME:  Andrea Puga RECORD NUMBER:  [id number]  AGE: 61 y.o. GENDER: male  : 1965  TODAY'S DATE:  10/11/2022    Subjective   Reason for Diabetic Education Evaluation and Assessment: insulin     Levy reports that he has been on insulin before but did not have refills so he stopped. He did not test his blood sugar while he  was taking insulin. Visit Type: evaluation      Stephen Wu is a 61 y.o. male referred by:    [x] Physician  [] Nursing  [] Chart Review   [] Other:     PAST MEDICAL HISTORY        Diagnosis Date    Abdominal pain 2021    Abnormal EKG 2016    Acute pancreatitis 2021    CHF (congestive heart failure) ([facility]     Cigarette smoker 2021    Cocaine abuse ([facility] 3/28/2015    Last Assessment & Plan:  Formatting of this note might be different from the original. Counseled on cessation as increases risk of coronary vasospasm and further worsening of heart function. Dehydration 2016    Diabetes mellitus ([address]     History of cocaine abuse ([facility] 2016    History of MI (myocardial infarction) 2016    Hyperglycemia 2016    Hyperlipidemia     Hypertension     Morbid obesity with BMI of 45.0-49.9, adult ([facility] 2016    Postural dizziness 2016    Pre-diabetes 3/9/2017    Last Assessment & Plan:  Formatting of this note might be different from the original. Discussed lifestyle changes Refer to [facility] program    PUD (peptic ulcer disease) 3/1/2019       PAST SURGICAL HISTORY    Past Surgical History:   Procedure Laterality Date    CARDIAC CATHETERIZATION      UPPER GASTROINTESTINAL ENDOSCOPY         FAMILY HISTORY    No family history on file.     SOCIAL HISTORY    Social History     Tobacco Use    Smoking status: Every Day     Packs/day: 0.50     Years: 25.00     Pack years: 12.50     Types: Cigarettes    Smokeless tobacco: Never   Vaping Use    Vaping Use: Never used Substance Use Topics    Alcohol use: Not Currently    Drug use: Yes     Types: Marijuana Shellye Negritamanisha), Cocaine     Comment: last use 10/5/2022 marijuana; last use of cocaine 10/1/2022       ALLERGIES    No Known Allergies    MEDICATIONS     empagliflozin  10 mg Oral Daily    insulin lispro  15 Units SubCUTAneous TID WC    insulin glargine  40 Units SubCUTAneous Daily    amLODIPine  10 mg Oral Daily    hydrALAZINE  25 mg Oral 3 times per day    spironolactone  50 mg Oral Daily    losartan  100 mg Oral Daily    carvedilol  25 mg Oral BID WC    insulin lispro  0-16 Units SubCUTAneous TID WC    insulin lispro  0-4 Units SubCUTAneous Nightly    amiodarone  200 mg Oral Daily    atorvastatin  80 mg Oral Nightly    [Held by provider] aspirin  81 mg Oral Daily    [Held by provider] apixaban  5 mg Oral BID    fluticasone  2 spray Nasal Daily    furosemide  40 mg Oral Daily    pantoprazole  40 mg Oral QAM AC       Objective        Patient Active Problem List   Diagnosis Code    Type 2 diabetes mellitus with hyperlipidemia ([facility]) E11.69, E78.5    Diabetes mellitus with coincident hypertension ([facility]) E11.9, I10    Gastroesophageal reflux disease K21.9    Obesity, diabetes, and hypertension syndrome ([facility]) E11.69, E66.9, E11.59, I15.2    Abnormal stress test R94.39    Atherosclerosis of coronary artery I25.10    Chronic kidney disease (CKD) stage G2/A2, mildly decreased glomerular filtration rate (GFR) between 60-89 mL/min/1.73 square meter and albuminuria creatinine ratio between  mg/g N18.2    Gastric ulcer with perforation ([facility]) K25.5    History of MI (myocardial infarction) I25.2    Tobacco abuse Z72.0    Chronic systolic heart failure ([facility]) I50.22    Atrial fibrillation with RVR ([facility]) I48.91    Acute on chronic systolic heart failure ([facility]) I50.23    CHF (congestive heart failure), NYHA class I, acute on chronic, combined ([facility]) I50.43    PAF (paroxysmal atrial fibrillation) ([facility]) I48.0    Acute CVA (cerebrovascular accident) ([facility] I63.9    Symptomatic carotid artery stenosis, right I65.21    Dizziness R42        BP (!) 153/78   Pulse 72   Temp 98.5 °F (36.9 °C) (Oral)   Resp 21   Ht 6' 2\" (1.88 m)   Wt (!) 305 lb 5.4 oz (138.5 kg)   SpO2 96%   BMI 39.20 kg/m²     HgBA1c:    Lab Results   Component Value Date/Time    LABA1C 13.1 10/07/2022 04:42 AM       Recent Labs     10/10/22  1311 10/10/22  1725 10/10/22  1951 10/11/22  0801   POCGLU 298* 142* 192* 239*       BUN/Creatinine:    Lab Results   Component Value Date/Time    BUN 21 10/11/2022 05:03 AM    CREATININE 1.0 10/11/2022 05:03 AM       Assessment        Diabetes Management and Education    Does the patient have a Primary Care Physician? Yes, [facility], DO       Does the patient require new medication instruction? Yes - Levy reports successful insulin administration in the past.  He prefers insulin pens. Recommend adding 2 unit priming step. Reviewed basal and prandial insulin concepts. Person responsible for administration of Insulin/Medication:        [x] Self     [] Caregiver       [] Spouse       [] Other Family Member   []  Other    Insulin Instruction:  insulin pen  Injection Site:   [x] location    [x] rotation     Level of patient/caregiver understanding able to:      [] Verbalized Understanding   [x] Demonstrated Understanding       [] Teach Back       [] Needs Reinforcement     [x]  Other:  needs reinforcement of basla and prandial concepts. Does the patient/caregiver monitor Blood Glucoses? No: Does not know if he still has a glucometer. He has been paying close attention to nursing use of the glucometer. He did not know that the test strip insertion turned the meter on. Recommend BG targets 100 - 180. Recommend testing before meals and   Reviewed glycemic control targets, testing frequency and when to call PCP.      Level of patient/caregiver understanding able to:        [x] Verbalized Understanding   [] Demonstrated Understanding       [] Teach Back       [] Needs Reinforcement     []  Other:        Does the patient/caregiver follow a Meal Plan? No: \"I guess I need to stop eating my cake, candy and ice cream?\"    Recommend making water, unsweetened tea or coffee primary drinks. Likes salad. Identified common carbs and low carb foods. Reviewed importance of eating three meals per day and plate method for consistent carb intake. Level of patient/caregiver understanding able to:       [x] Verbalized Understanding   [] Demonstrated Understanding       [] Teach Back       [] Needs Reinforcement     []  Other:      Does the patient/caregiver understand S/S of Hypoglycemia? No:   Reviewed symptoms, prevention and treatment. Level of patient/caregiver understanding able to:       [] Verbalized Understanding   [] Demonstrated Understanding       [] Teach Back       [x] Needs Reinforcement     []  Other:                    Does the patient/caregiver understand S/S of Hyperglycemia? No: Reports increased thirst and urination. Reviewed symptoms, prevention and treatment. Level of patient/caregiver understanding able to:        [x] Verbalized Understanding   [] Demonstrated Understanding       [] Teach Back       [] Needs Reinforcement     []  Other:           Plan        Ongoing diabetes education and blood glucose monitoring. Social Service Consult Made:  Yes   Recommend meds to beds. [facility]. Recommend BG targets 100 - 180 to start at home.       The following educational and support materials were provided:  My contact information    The Diabetes Education Program:  Planning Healthy Meals   [employer] handout - Carbohydrate Counting for People with Diabetes  Blood Glucose Log Book                                           Discharge Plan:  Discharge needs: insulin pens and 4mm pen needles and glucometer/strips/lancets         Teaching Time Diabetes Education:  40 minutes     Electronically signed by Kayla Ruvalcaba on 10/11/2022 at 12:16 PM

## 2022-10-11 NOTE — CARE COORDINATION
CASE MANAGEMENT DISCHARGE SUMMARY:    DISCHARGE DATE: 10/11/2022    DISCHARGED TO: Home with sister     FOLLOW UP:   [address]: Follow up consult placed by Rosario Rodrigez RN for Diabetes education     PCP appointment: scheduled, patient confirmed he will be able to attend. Patient reports he will schedule transportation with [facility] on Monday prior to appointment.    10/20/2022  Appointment:   Instructions: @ 4:30 PM.,    TRANSPORTATION: family     PREFERRED PHARMACY: [address], [place], Social Work/Case Management   [phone]  Electronically signed by NAE Pina, DARYLW on 10/11/2022 at 2:23 PM

## 2022-10-11 NOTE — CARE COORDINATION
Per chart review, patient may be discharging home today. Called to schedule patient a PCP appointment for discharge. Hospital Follow up appointment scheduled for 10/20 @  4:30 PM with Dr. Juan Amezcua. Will meet with patient and verify that appointment is okay. Will need to schedule [facility] transport or confirm family transport with patient.      NAE Khan, CHLOE, Social Work/Case Management   [phone]  Electronically signed by NAE Khan, CHLOE on 10/11/2022 at 9:07 AM

## 2022-10-11 NOTE — PLAN OF CARE
Problem: Discharge Planning  Goal: Discharge to home or other facility with appropriate resources  10/10/2022 2057 by Ye Bergman RN  Outcome: Progressing  Flowsheets (Taken 10/10/2022 2010)  Discharge to home or other facility with appropriate resources: Identify barriers to discharge with patient and caregiver  10/10/2022 1059 by Edmund Bran RN  Outcome: Progressing     Problem: Safety - Adult  Goal: Free from fall injury  10/10/2022 2057 by Ye Bergman RN  Outcome: Progressing  10/10/2022 1059 by Edmund Bran RN  Outcome: Progressing     Problem: ABCDS Injury Assessment  Goal: Absence of physical injury  10/10/2022 2057 by Ye Bergman RN  Outcome: Progressing  10/10/2022 1059 by Edmund Bran RN  Outcome: Progressing     Problem: Cardiovascular - Adult  Goal: Maintains optimal cardiac output and hemodynamic stability  10/10/2022 2057 by Ye Bergman RN  Outcome: Progressing  Flowsheets (Taken 10/10/2022 2010)  Maintains optimal cardiac output and hemodynamic stability:   Monitor blood pressure and heart rate   Monitor urine output and notify Licensed Independent Practitioner for values outside of normal range   Assess for signs of decreased cardiac output  10/10/2022 1059 by Edmund Bran RN  Outcome: Progressing     Problem: Neurosensory - Adult  Goal: Achieves stable or improved neurological status  10/10/2022 2057 by Ye Bergman RN  Outcome: Progressing  [address] Mateusz Begum (Taken 10/10/2022 2010)  Achieves stable or improved neurological status: Assess for and report changes in neurological status  10/10/2022 1059 by Edmund Bran RN  Outcome: Progressing  Goal: Achieves maximal functionality and self care  10/10/2022 2057 by Ye Bergman RN  Outcome: Progressing  Flowsheets (Taken 10/10/2022 2010)  Achieves maximal functionality and self care: Monitor swallowing and airway patency with patient fatigue and changes in neurological status  10/10/2022 1059 by Edmund Bran RN  Outcome: Progressing     Problem: Pain  Goal: Verbalizes/displays adequate comfort level or baseline comfort level  10/10/2022 2057 by Aileen Hou RN  Outcome: Progressing  10/10/2022 1059 by Tonia Cannon RN  Outcome: Progressing

## 2022-10-11 NOTE — PROGRESS NOTES
CLINICAL PHARMACY NOTE: MEDS TO BEDS    Total # of Prescriptions Filled:  13   The following medications were delivered to the patient:    amLODIPine 10 MG tablet    apixaban 5 MG Tabs tablet    aspirin 81 MG chewable tablet    clopidogrel 75 MG tablet    hydrALAZINE 25 MG tablet    insulin glargine 100 UNIT/ML injection vial    insulin lispro (1 Unit Dial) 100 UNIT/ML Sopn    Lancets Misc    potassium chloride 10 MEQ extended release tablet    spironolactone 50 MG tablet    True Metrix Blood Glucose Test strip    True Metrix Meter w/Device Kit    Pen Needles    Additional Documentation:

## 2022-10-11 NOTE — PROGRESS NOTES
Occupational Therapy  [facility]  Occupational Therapy Daily Treatment Note    Name: Lore Rudolph  : 1965  MRN: [id number]  Date of Service: 10/11/2022    Discharge Recommendations:  Home with assist PRN  OT Equipment Recommendations  Equipment Needed: No     Lore Rudolph scored a  on the AM-[facility] ADL Inpatient form. At this time, no further OT is recommended upon discharge. Recommend patient returns to prior setting with prior services. Patient Diagnosis(es): The primary encounter diagnosis was Dizziness. Diagnoses of Poorly controlled diabetes mellitus ([address] Noncompliance with medication regimen, Polysubstance abuse ([address] CHF (congestive heart failure), NYHA class I, acute on chronic, combined ([address] Diabetes mellitus with coincident hypertension ([address] Chronic congestive heart failure, unspecified heart failure type ([address] and PAF (paroxysmal atrial fibrillation) ([address] were also pertinent to this visit. Past Medical History:  has a past medical history of Abdominal pain, Abnormal EKG, Acute pancreatitis, CHF (congestive heart failure) ([address] Cigarette smoker, Cocaine abuse ([address] Dehydration, Diabetes mellitus ([address] History of cocaine abuse ([address] History of MI (myocardial infarction), Hyperglycemia, Hyperlipidemia, Hypertension, Morbid obesity with BMI of 45.0-49.9, adult ([address] Postural dizziness, Pre-diabetes, and PUD (peptic ulcer disease). Past Surgical History:  has a past surgical history that includes Cardiac catheterization and Upper gastrointestinal endoscopy. Assessment   Performance deficits / Impairments: Decreased functional mobility ; Decreased safe awareness;Decreased balance;Decreased ADL status; Decreased high-level IADLs;Decreased endurance;Decreased strength  Assessment: 62 y/o male admitted 10/6/2022 with CVA. PTA pt lives at home with sister and was independent with ADLs and functional mobility.  Today, pt completed ADL transfers and mobility around unit independently. Pt with baseline limp but no LOB. Pt reports feeling back to baseline and no further concerns returning home. NO further OT is warranted. Prognosis: Good  REQUIRES OT FOLLOW-UP: No  Activity Tolerance  Activity Tolerance: Patient Tolerated treatment well        Plan   Occupational Therapy Plan  Times Per Week: DC acute OT  Current Treatment Recommendations: Strengthening, Balance training, Functional mobility training, Endurance training, Gait training, Self-Care / ADL, Patient/Caregiver education & training     Restrictions  Restrictions/Precautions  Restrictions/Precautions: Fall Risk    Subjective   General  Chart Reviewed: Yes  Patient assessed for rehabilitation services?: Yes  Additional Pertinent Hx: 62 y/o male admitted 10/6/2022 with c/o feeling imbalance and dizziness. CT head without contrast showed a subtle focus of asymmetric low attenuation in the inferior medial right cerebellar hemisphere. CTA head and neck with contrast showed a hemodynamically significant stenosis within the right cervical ICA with approximately 90% stenosis, as well as a left proximal cervical ICA resulting in approximately 45 to 50% stenosis. MRI confirmed cerebellar stroke. Family / Caregiver Present: No  Referring Practitioner: Dr. Roverto Martinez  Subjective  Subjective: Pt seen bedside and agreeable to therapy. Pt reports feels feeling back to baseline and ready to return home.   General Comment  Comments: Per RN ok for therapy     Social/Functional History  Social/Functional History  Lives With: Family (sister)  Type of Home: Apartment  Home Layout: One level  Home Access: Elevator  Bathroom Shower/Tub: Tub/Shower unit  Bathroom Toilet: Handicap height  Bathroom Equipment: Grab bars in shower  ADL Assistance: Independent  Ambulation Assistance: Independent  Transfer Assistance: Independent       Objective      Safety Devices  Type of Devices: Call light within reach;Nurse notified; Left in bed        AROM: Within functional limits  Strength: Generally decreased, functional  Coordination: Within functional limits    ADL  Additional Comments: Pt declined ADLs stating he already completed without issue/concerns     Activity Tolerance  Activity Tolerance: Patient tolerated treatment well    Bed mobility  Bed Mobility Comments: sitting on EOB upon arrival and at end of session    Transfers  Sit to stand: Independent  Stand to sit: Independent  Transfer Comments: Pt ambulated around room and entire unit without AD and independent. Pt with limp and occas lists to the side but reports baseline.     Vision  Vision: Within Functional Limits  Hearing  Hearing: Within functional limits    Cognition  Overall Cognitive Status: WFL  Orientation  Overall Orientation Status: Within Functional Limits                  Education Given To: Patient  Education Provided: Role of Therapy;Plan of Care;Transfer Training  Education Method: Demonstration;Verbal  Barriers to Learning: None  Education Outcome: Verbalized understanding;Demonstrated understanding    LUE AROM (degrees)  LUE AROM : WFL  Left Hand AROM (degrees)  Left Hand AROM: WFL  RUE AROM (degrees)  RUE AROM : WFL  Right Hand AROM (degrees)  Right Hand AROM: WFL     AM-PAC Score  AM-[facility] Inpatient Daily Activity Raw Score: 21 (10/11/22 1145)  AM-PAC Inpatient ADL T-Scale Score : 44.27 (10/11/22 1145)  ADL Inpatient CMS 0-100% Score: 32.79 (10/11/22 1145)  ADL Inpatient CMS G-Code Modifier : CJ (10/11/22 1145)    Goals  Short Term Goals  Time Frame for Short Term Goals: Prior to DC: goals ongoing  Short Term Goal 1: Pt will complete ADL transfers/mobility with supervision  Short Term Goal 2: Pt will complete toileting with supervision  Short Term Goal 3: Pt will complete LB Dressing with supervision  Short Term Goal 4: Pt will tolerate standing > 5 min for functional task with supervision  Patient Goals   Patient goals : to return home       Therapy Time   Individual Concurrent Group Co-treatment   Time In 1127         Time Out 1146         Minutes 19         Timed Code Treatment Minutes: 19 Minutes     This note to serve as OT d/c summary if pt is d/c-ed prior to next therapy session.     Kathryn Lopez, OTR/L

## 2022-10-11 NOTE — DISCHARGE INSTRUCTIONS
Start taking aspirin and plavix (clopidogrel) on 10/13/2022. Start taking eliquis (Mita Friar) on 10/28/2022 AFTER you see Dr Jossue Prabhakar for you procedure. Check your blood sugar before meals, and do not take the insulin lispro if your blood sugar is less than 120.

## 2022-10-11 NOTE — PROGRESS NOTES
Physical Therapy  Facility/Department: [facility] 2Y PROGRESSIVE CARE  Physical Therapy Treatment Note/Discharge Note    Name: Irwin Greenfield  : 1965  MRN: [id number]  Date of Service: 10/11/2022    Discharge Recommendations:  Home with assist PRN   PT Equipment Recommendations  Equipment Needed: No    Irwin Greenfield scored a 23/24 on the AM-PAC short mobility form. At this time, no further PT is recommended upon discharge. Recommend patient returns to prior setting with prior services. Patient Diagnosis(es): The primary encounter diagnosis was Dizziness. Diagnoses of Poorly controlled diabetes mellitus ([address] Noncompliance with medication regimen, Polysubstance abuse ([facility] [address] CHF (congestive heart failure), NYHA class I, acute on chronic, combined ([address] Diabetes mellitus with coincident hypertension ([address] Chronic congestive heart failure, unspecified heart failure type ([address] and PAF (paroxysmal atrial fibrillation) ([facility] [address] were also pertinent to this visit. Past Medical History:  has a past medical history of Abdominal pain, Abnormal EKG, Acute pancreatitis, CHF (congestive heart failure) ([address] Cigarette smoker, Cocaine abuse ([address] Dehydration, Diabetes mellitus ([address] History of cocaine abuse ([address] History of MI (myocardial infarction), Hyperglycemia, Hyperlipidemia, Hypertension, Morbid obesity with BMI of 45.0-49.9, adult ([address] Postural dizziness, Pre-diabetes, and PUD (peptic ulcer disease). Past Surgical History:  has a past surgical history that includes Cardiac catheterization and Upper gastrointestinal endoscopy.     Assessment   Assessment: pt is a 60 yo male who was adm to [facility] with acute CVA; pt is slightly below his baseline but feel he will be safe to return home at discharge; pt denies any needs and feels he is close to his baseline; no further therapy indicated  Therapy Prognosis: Good  Requires PT Follow-Up: Yes  Activity Tolerance  Activity Tolerance: Patient tolerated treatment well Plan   Physcial Therapy Plan  General Plan: 3-5 times per week  Current Treatment Recommendations: Functional mobility training, Balance training  Additional Comments: no further therapy indicated  Safety Devices  Type of Devices: Call light within reach, Nurse notified, Left in bed     Restrictions  Restrictions/Precautions  Restrictions/Precautions: Fall Risk     Subjective   General  Chart Reviewed: Yes  Patient assessed for rehabilitation services?: Yes  Additional Pertinent Hx: per H&P note: \"The patient is a 61 y.o. male with history of cocaine abuse, tobacco abuse, hypertension, lipidemia, morbid obesity, MI, type 2 diabetes, and diastolic heart failure who presents to [facility] with imbalance and dizziness. Patient states that starting on Saturday, he started to notice some imbalance that has been progressive throughout the last several days. Patient notes that he is sort of leaning more towards the right when he walks, and feels like he has to catch himself as he is walking. Patient notes that this is a new symptom for him. Patient denies visual disturbances, chest pain, fever, chills, shortness of breath, abdominal pain, nausea, vomiting, constipation, diarrhea, and dysuria. Patient notes that he has been out of his medications for a few weeks, and he has not taken any medication in the last few weeks. In the ED, labs were significant for sodium of 131, chloride of 97, glucose of 474, proBNP of 397. Chest x-ray was unremarkable. EKG showed normal sinus rhythm and possible LVH. CT head without contrast showed a subtle focus of asymmetric low attenuation in the inferior medial right cerebellar hemisphere. CTA head and neck with contrast showed a hemodynamically significant stenosis within the right cervical ICA with approximately 90% stenosis, as well as a left proximal cervical ICA resulting in approximately 45 to 50% stenosis. \" MRI (+) Acute right posteroinferior cerebellar artery territory infarct involving   the medial aspect of the right cerebellar hemisphere as well as the right  lateral margin of the medulla. Response To Previous Treatment: Patient with no complaints from previous session.   Family / Caregiver Present: No  Referring Practitioner: Dr Roberts  Referral Date : 10/06/22  Follows Commands: Within Functional Limits  Subjective  Subjective: pt very pleasant and agreeable to working with therapy         Social/Functional History  Social/Functional History  Lives With: Family (sister)  Type of Home: Apartment  Home Layout: One level  Home Access: Elevator  Bathroom Shower/Tub: Tub/Shower unit  Bathroom Toilet: Handicap height  Bathroom Equipment: Grab bars in shower  ADL Assistance: Independent  Ambulation Assistance: Independent  Transfer Assistance: Independent  Vision/Hearing  Vision  Vision: Within Functional Limits  Hearing  Hearing: Within functional limits    Cognition   Orientation  Overall Orientation Status: Within Functional Limits  Cognition  Overall Cognitive Status: WFL     Objective   Heart Rate: 72  Heart Rate Source: Monitor  BP: (!) 153/78  BP Location: Right upper arm  BP Method: Automatic  Patient Position: Sitting  MAP (Calculated): 103  Resp: 21  SpO2: 96 %  O2 Device: None (Room air)                             Bed mobility  Bed Mobility Comments: sitting on EOB upon arrival and at end of session  Transfers  Sit to Stand: Independent  Stand to Sit: Independent  Ambulation  Surface: Level tile  Device: No Device  Assistance: Independent  Quality of Gait: increased lateral sway due to body habitus and limp that he has had for \"years\"  Distance: 400'  Comments: pt declined stair training; feels he is back to baseline and denies any further needs     Balance  Sitting - Static: Good  Sitting - Dynamic: Good  Standing - Static: Good;-  Standing - Dynamic: -           OutComes Score                                                  AM-PAC Score  AM-PAC Inpatient Mobility Raw Score : 23 (10/11/22 1147)  AM-PAC Inpatient T-Scale Score : 56.93 (10/11/22 1147)  Mobility Inpatient CMS 0-100% Score: 11.2 (10/11/22 1147)  Mobility Inpatient CMS G-Code Modifier : CI (10/11/22 1147)          Tinneti Score       Goals  Short Term Goals  Time Frame for Short Term Goals: by discharge  Short Term Goal 1: transfers Ind - met  Short Term Goal 2: amb 100-200' Ind - met  Patient Goals   Patient Goals : to return to his sister's apt       Education  Patient Education  Education Given To: Patient  Education Provided Comments: discussed home situation and pt feels that he is almost at his baseline and does not feel he needs any further therapy  Education Method: Verbal  Education Outcome: Verbalized understanding      Therapy Time   Individual Concurrent Group Co-treatment   Time In 1125         Time Out 1146         Minutes 21                 LOLY VEE PT   Electronically signed by LOLY VEE PT on 10/11/2022 at 11:48 AM

## 2022-10-11 NOTE — PROGRESS NOTES
Speech Language Pathology    Patient met at bedside. Patient recalls writer from evaluation 10/7/2022. Patient currently declining skilled ST needs. No tx at this time. Discontinue skilled ST per patient request. Patient aware of ST availability should patient reconsider. Thank you. Marium Cherry [id number]  Speech-Language Pathologist  Portable phone: [phone]

## 2022-10-11 NOTE — PROGRESS NOTES
[facility]  Progress Note    10/11/2022 11:32 AM    Chief Complaint: Dizziness and Imbalance      Reason for Consultation: JAQUELINE Stenosis of 90%    Subjective: Pt seen resting in bed, reports plans for d/c later today, discussed plans for TCAR next week and resuming antiplatelet medications on Thursday prior to TCAR - he states understanding    Vital Signs:  Vitals:    10/10/22 2346 10/11/22 0354 10/11/22 0630 10/11/22 0800   BP: (!) 157/89 (!) 154/96 (!) 145/92 (!) 153/78   Pulse: 77 73  72   Resp: 16 16  21   Temp: 98.8 °F (37.1 °C) 97.9 °F (36.6 °C)  98.5 °F (36.9 °C)   TempSrc: Oral Oral  Oral   SpO2: 98% 96%  96%   Weight:  (!) 305 lb 5.4 oz (138.5 kg)     Height:           I/O:    Intake/Output Summary (Last 24 hours) at 10/11/2022 1132  Last data filed at 10/11/2022 0800  Gross per 24 hour   Intake 1440 ml   Output 2100 ml   Net -660 ml       Physical Exam:   General: no apparent distress, alert and oriented, afebrile  Chest/Lungs: non labored breathing no tachypnea, retractions or cyanosis  Extremities: normal strength, tone, and muscle mass, no deformities, no significant edema to BLE    Labs:   Lab Results   Component Value Date/Time     10/11/2022 05:03 AM    K 4.1 10/11/2022 05:03 AM    CL 99 10/11/2022 05:03 AM    CO2 24 10/11/2022 05:03 AM    BUN 21 10/11/2022 05:03 AM    CREATININE 1.0 10/11/2022 05:03 AM    GFRAA >60 10/11/2022 05:03 AM    LABGLOM >60 10/11/2022 05:03 AM    GLUCOSE 273 10/11/2022 05:03 AM    MG 2.40 09/24/2021 05:33 AM    CALCIUM 9.2 10/11/2022 05:03 AM     Lab Results   Component Value Date/Time    WBC 6.2 10/11/2022 05:03 AM    RBC 4.30 10/11/2022 05:03 AM    HGB 13.7 10/11/2022 05:03 AM    HCT 40.1 10/11/2022 05:03 AM    MCV 93.3 10/11/2022 05:03 AM    RDW 12.5 10/11/2022 05:03 AM     10/11/2022 05:03 AM     Lab Results   Component Value Date    INR 0.97 10/06/2022    PROTIME 12.8 10/06/2022        Scheduled Meds:    empagliflozin  10 mg Oral Daily    insulin lispro  15 Units SubCUTAneous TID     insulin glargine  40 Units SubCUTAneous Daily    amLODIPine  10 mg Oral Daily    hydrALAZINE  25 mg Oral 3 times per day    spironolactone  50 mg Oral Daily    losartan  100 mg Oral Daily    carvedilol  25 mg Oral BID     insulin lispro  0-16 Units SubCUTAneous TID     insulin lispro  0-4 Units SubCUTAneous Nightly    amiodarone  200 mg Oral Daily    atorvastatin  80 mg Oral Nightly    [Held by provider] aspirin  81 mg Oral Daily    [Held by provider] apixaban  5 mg Oral BID    fluticasone  2 spray Nasal Daily    furosemide  40 mg Oral Daily    pantoprazole  40 mg Oral QAM AC     Continuous Infusions:    dextrose         Imaging:   CTA Head Neck - 10/6/2022  Impression  1. Hemodynamically significant stenosis within the right cervical ICA with approximately 90% stenosis per NASCET criteria related to noncalcified and calcified plaque, most pronounced 1.5 cm distal to the bifurcation. 2. Fibrocalcific plaque within the left proximal cervical ICA resulting in approximately 45-50% stenosis per NASCET criteria. 3. Patent cervical vertebral arteries. 4.  No apparent arterial high grade stenosis, occlusion or aneurysm  within the head. MRI Brain - 10/7/2022  Impression  1. Acute right posteroinferior cerebellar artery territory infarct involving  the medial aspect of the right cerebellar hemisphere as well as the right  lateral margin of the medulla. 2. No acute intracranial hemorrhage. 3. Mild chronic small vessel ischemic changes.      Assessment:  -CTA Neck with 90% stenosis of JAQUELINE and 55-16% stenosis of LICA  -Complaints of Dizziness and Imbalance   -MRI Brain with acute right posteroinferior cerebellar infarct     Plan:   -Discussed DAPT with Neuro NPMichele to resume Aspirin 81 mg and Plavix 75 mg on 10/13/2022 and Eliquis on 10/28/2022  -TCAR next week on 10/18/2022 with Dr. Vadim Read    -Pt is to begin taking Aspirin 81 mg and Plavix 75 mg on 10/13/2022  -Pt is to continue taking Atorvastatin 80 mg at discharge  -Will resume Eliquis on 10/28/2022  -Pt encouraged to call with any questions regarding above, all other medications per Primary Team    All pertinent information and plan of care discussed with Dr. Mariela Wise. All questions and concerns were addressed with the patient. I have discussed the above stated plan with the patient and the nurse. The patient verbalized understanding and agreed with the plan.     Thank you for allowing to us to participate in the care of Balwinder Wilkes      Electronically signed by CONSUELO Yang CNP on 10/11/2022 at 11:32 AM

## 2022-10-12 ENCOUNTER — TELEPHONE (OUTPATIENT)
Dept: PHARMACY | Age: 57
End: 2022-10-12

## 2022-10-12 ENCOUNTER — TELEPHONE (OUTPATIENT)
Dept: INTERNAL MEDICINE CLINIC | Age: 57
End: 2022-10-12

## 2022-10-12 NOTE — TELEPHONE ENCOUNTER
[facility] Transitions Initial Follow Up Call    Outreach made within 2 business days of discharge: Yes    Patient: Nichole Whitten Patient : 1965   MRN: [id number]  Reason for Admission: There are no discharge diagnoses documented for the most recent discharge. Discharge Date: 10/11/22       Spoke with: Patient    Discharge department/facility: [address] Interactive Patient Contact:  Was patient able to fill all prescriptions: Yes  Was patient instructed to bring all medications to the follow-up visit: Yes  Is patient taking all medications as directed in the discharge summary?  Yes  Does patient understand their discharge instructions: Yes  Does patient have questions or concerns that need addressed prior to 7-14 day follow up office visit: no    Scheduled appointment with PCP within 7-14 days    Follow Up  Future Appointments   Date Time Provider Prashanth Monreal   10/18/2022  1:00 PM Margaret [address] [facility] [place]   10/18/2022  1:05 PM DO WOJCIECH Dickinson VASC/EN [facility]   10/20/2022  4:30 PM DO WOJCIECH Velarde [facility] [place] [facility]   10/26/2022  3:00 PM SCHEDULE, [facility] [address], [place]

## 2022-10-12 NOTE — TELEPHONE ENCOUNTER
Touched base with Levy to see if he was comfortable using his glucometer to check his BG. He is comfortable. BG = 200 this am.    F/u w PCP next week and me the week after. No questions or concerns. Has all of his medications and comfortable with his insulin.      nAnmarie Fields, PharmD  [address] [place] [address]  Diabetes Service  [phone]

## 2022-10-13 ENCOUNTER — TELEPHONE (OUTPATIENT)
Dept: VASCULAR SURGERY | Age: 57
End: 2022-10-13

## 2022-10-13 DIAGNOSIS — I50.43 CHF (CONGESTIVE HEART FAILURE), NYHA CLASS I, ACUTE ON CHRONIC, COMBINED (HCC): ICD-10-CM

## 2022-10-13 DIAGNOSIS — I10 DIABETES MELLITUS WITH COINCIDENT HYPERTENSION (HCC): ICD-10-CM

## 2022-10-13 DIAGNOSIS — E11.9 DIABETES MELLITUS WITH COINCIDENT HYPERTENSION (HCC): ICD-10-CM

## 2022-10-13 RX ORDER — ATORVASTATIN CALCIUM 80 MG/1
80 TABLET, FILM COATED ORAL NIGHTLY
Qty: 90 TABLET | Refills: 0 | Status: ON HOLD | OUTPATIENT
Start: 2022-10-13 | End: 2022-10-19 | Stop reason: SDUPTHER

## 2022-10-13 NOTE — TELEPHONE ENCOUNTER
Called pt to confirm he resumed taking ASA, Plavix and atorvastatin. Pt states he took all three medications this morning.

## 2022-10-13 NOTE — TELEPHONE ENCOUNTER
I called the pt back to confirm times for TCAR on 10-18. Reminded the importance of taking Plavix, ASA and atorvastatin. Pt now reports that he was not given any atorvastatin upon DC. Previous prescription was filled at [facility], but there are no refills remaining. Pended for approval to go to [facility] outpatient pharmacy.

## 2022-10-18 ENCOUNTER — ANESTHESIA (OUTPATIENT)
Dept: CARDIAC CATH/INVASIVE PROCEDURES | Age: 57
DRG: 030 | End: 2022-10-18
Payer: COMMERCIAL

## 2022-10-18 ENCOUNTER — HOSPITAL ENCOUNTER (INPATIENT)
Dept: CARDIAC CATH/INVASIVE PROCEDURES | Age: 57
LOS: 2 days | Discharge: HOME | DRG: 030 | End: 2022-10-20
Attending: STUDENT IN AN ORGANIZED HEALTH CARE EDUCATION/TRAINING PROGRAM | Admitting: STUDENT IN AN ORGANIZED HEALTH CARE EDUCATION/TRAINING PROGRAM
Payer: COMMERCIAL

## 2022-10-18 ENCOUNTER — ANESTHESIA EVENT (OUTPATIENT)
Dept: CARDIAC CATH/INVASIVE PROCEDURES | Age: 57
DRG: 030 | End: 2022-10-18
Payer: COMMERCIAL

## 2022-10-18 DIAGNOSIS — I50.23 ACUTE ON CHRONIC SYSTOLIC HEART FAILURE (HCC): ICD-10-CM

## 2022-10-18 DIAGNOSIS — E11.69 TYPE 2 DIABETES MELLITUS WITH HYPERLIPIDEMIA (HCC): Chronic | ICD-10-CM

## 2022-10-18 DIAGNOSIS — I48.0 PAF (PAROXYSMAL ATRIAL FIBRILLATION) (HCC): ICD-10-CM

## 2022-10-18 DIAGNOSIS — I50.43 CHF (CONGESTIVE HEART FAILURE), NYHA CLASS I, ACUTE ON CHRONIC, COMBINED (HCC): ICD-10-CM

## 2022-10-18 DIAGNOSIS — E66.9 OBESITY, DIABETES, AND HYPERTENSION SYNDROME (HCC): Chronic | ICD-10-CM

## 2022-10-18 DIAGNOSIS — I48.91 ATRIAL FIBRILLATION WITH RVR (HCC): ICD-10-CM

## 2022-10-18 DIAGNOSIS — I50.9 CHRONIC CONGESTIVE HEART FAILURE, UNSPECIFIED HEART FAILURE TYPE (HCC): ICD-10-CM

## 2022-10-18 DIAGNOSIS — E11.69 OBESITY, DIABETES, AND HYPERTENSION SYNDROME (HCC): Chronic | ICD-10-CM

## 2022-10-18 DIAGNOSIS — I63.239 CAROTID STENOSIS, SYMPTOMATIC, WITH INFARCTION (HCC): Primary | ICD-10-CM

## 2022-10-18 DIAGNOSIS — I63.231 STROKE DUE TO STENOSIS OF RIGHT CAROTID ARTERY (HCC): ICD-10-CM

## 2022-10-18 DIAGNOSIS — I15.2 OBESITY, DIABETES, AND HYPERTENSION SYNDROME (HCC): Chronic | ICD-10-CM

## 2022-10-18 DIAGNOSIS — E11.9 DIABETES MELLITUS WITH COINCIDENT HYPERTENSION (HCC): ICD-10-CM

## 2022-10-18 DIAGNOSIS — E78.5 TYPE 2 DIABETES MELLITUS WITH HYPERLIPIDEMIA (HCC): Chronic | ICD-10-CM

## 2022-10-18 DIAGNOSIS — E11.59 OBESITY, DIABETES, AND HYPERTENSION SYNDROME (HCC): Chronic | ICD-10-CM

## 2022-10-18 DIAGNOSIS — Z79.899 LONG TERM CURRENT USE OF AMIODARONE: ICD-10-CM

## 2022-10-18 DIAGNOSIS — I10 DIABETES MELLITUS WITH COINCIDENT HYPERTENSION (HCC): ICD-10-CM

## 2022-10-18 DIAGNOSIS — I50.22 CHRONIC SYSTOLIC HEART FAILURE (HCC): Chronic | ICD-10-CM

## 2022-10-18 LAB
ANION GAP SERPL CALCULATED.3IONS-SCNC: 13 MMOL/L (ref 3–16)
APTT: 26.5 SEC (ref 23–34.3)
BASE EXCESS ARTERIAL: 1 (ref -3–3)
BUN BLDV-MCNC: 14 MG/DL (ref 7–20)
CALCIUM SERPL-MCNC: 9.1 MG/DL (ref 8.3–10.6)
CHLORIDE BLD-SCNC: 95 MMOL/L (ref 99–110)
CO2: 23 MMOL/L (ref 21–32)
CREAT SERPL-MCNC: 1.1 MG/DL (ref 0.9–1.3)
GFR SERPL CREATININE-BSD FRML MDRD: >60 ML/MIN/{1.73_M2}
GLUCOSE BLD-MCNC: 232 MG/DL (ref 70–99)
GLUCOSE BLD-MCNC: 233 MG/DL (ref 70–99)
GLUCOSE BLD-MCNC: 424 MG/DL (ref 70–99)
HCO3 ARTERIAL: 26.1 MMOL/L (ref 21–29)
O2 SAT, ARTERIAL: 93 % (ref 93–100)
PCO2 ARTERIAL: 45.6 MM HG (ref 35–45)
PERFORMED ON: ABNORMAL
PH ARTERIAL: 7.37 (ref 7.35–7.45)
PO2 ARTERIAL: 70.4 MM HG (ref 75–108)
POC ACT LR: 276 SEC
POC SAMPLE TYPE: ABNORMAL
POTASSIUM SERPL-SCNC: 4.5 MMOL/L (ref 3.5–5.1)
SODIUM BLD-SCNC: 131 MMOL/L (ref 136–145)
TCO2 ARTERIAL: 28 MMOL/L

## 2022-10-18 PROCEDURE — C1884 EMBOLIZATION PROTECT SYST: HCPCS

## 2022-10-18 PROCEDURE — 2580000003 HC RX 258: Performed by: STUDENT IN AN ORGANIZED HEALTH CARE EDUCATION/TRAINING PROGRAM

## 2022-10-18 PROCEDURE — C1725 CATH, TRANSLUMIN NON-LASER: HCPCS

## 2022-10-18 PROCEDURE — C1894 INTRO/SHEATH, NON-LASER: HCPCS

## 2022-10-18 PROCEDURE — C1876 STENT, NON-COA/NON-COV W/DEL: HCPCS

## 2022-10-18 PROCEDURE — 2709999900 HC NON-CHARGEABLE SUPPLY

## 2022-10-18 PROCEDURE — 37215 TRANSCATH STENT CCA W/EPS: CPT

## 2022-10-18 PROCEDURE — 6370000000 HC RX 637 (ALT 250 FOR IP): Performed by: STUDENT IN AN ORGANIZED HEALTH CARE EDUCATION/TRAINING PROGRAM

## 2022-10-18 PROCEDURE — 3700000000 HC ANESTHESIA ATTENDED CARE

## 2022-10-18 PROCEDURE — 7100000000 HC PACU RECOVERY - FIRST 15 MIN

## 2022-10-18 PROCEDURE — 3700000001 HC ADD 15 MINUTES (ANESTHESIA)

## 2022-10-18 PROCEDURE — 2500000003 HC RX 250 WO HCPCS: Performed by: STUDENT IN AN ORGANIZED HEALTH CARE EDUCATION/TRAINING PROGRAM

## 2022-10-18 PROCEDURE — 6360000002 HC RX W HCPCS: Performed by: NURSE ANESTHETIST, CERTIFIED REGISTERED

## 2022-10-18 PROCEDURE — 2100000000 HC CCU R&B

## 2022-10-18 PROCEDURE — 85730 THROMBOPLASTIN TIME PARTIAL: CPT

## 2022-10-18 PROCEDURE — 2580000003 HC RX 258

## 2022-10-18 PROCEDURE — 36140 INTRO NDL ICATH UPR/LXTR ART: CPT

## 2022-10-18 PROCEDURE — 85347 COAGULATION TIME ACTIVATED: CPT

## 2022-10-18 PROCEDURE — B3161ZZ FLUOROSCOPY OF RIGHT INTERNAL CAROTID ARTERY USING LOW OSMOLAR CONTRAST: ICD-10-PCS | Performed by: STUDENT IN AN ORGANIZED HEALTH CARE EDUCATION/TRAINING PROGRAM

## 2022-10-18 PROCEDURE — 94760 N-INVAS EAR/PLS OXIMETRY 1: CPT

## 2022-10-18 PROCEDURE — 80048 BASIC METABOLIC PNL TOTAL CA: CPT

## 2022-10-18 PROCEDURE — 6360000004 HC RX CONTRAST MEDICATION: Performed by: STUDENT IN AN ORGANIZED HEALTH CARE EDUCATION/TRAINING PROGRAM

## 2022-10-18 PROCEDURE — C1769 GUIDE WIRE: HCPCS

## 2022-10-18 PROCEDURE — 6360000002 HC RX W HCPCS

## 2022-10-18 PROCEDURE — 82803 BLOOD GASES ANY COMBINATION: CPT

## 2022-10-18 PROCEDURE — 2500000003 HC RX 250 WO HCPCS

## 2022-10-18 PROCEDURE — 2500000003 HC RX 250 WO HCPCS: Performed by: NURSE ANESTHETIST, CERTIFIED REGISTERED

## 2022-10-18 PROCEDURE — 2580000003 HC RX 258: Performed by: NURSE ANESTHETIST, CERTIFIED REGISTERED

## 2022-10-18 PROCEDURE — A4216 STERILE WATER/SALINE, 10 ML: HCPCS

## 2022-10-18 PROCEDURE — 037K3DZ DILATION OF RIGHT INTERNAL CAROTID ARTERY WITH INTRALUMINAL DEVICE, PERCUTANEOUS APPROACH: ICD-10-PCS | Performed by: STUDENT IN AN ORGANIZED HEALTH CARE EDUCATION/TRAINING PROGRAM

## 2022-10-18 PROCEDURE — 6360000002 HC RX W HCPCS: Performed by: STUDENT IN AN ORGANIZED HEALTH CARE EDUCATION/TRAINING PROGRAM

## 2022-10-18 PROCEDURE — 37215 TRANSCATH STENT CCA W/EPS: CPT | Performed by: STUDENT IN AN ORGANIZED HEALTH CARE EDUCATION/TRAINING PROGRAM

## 2022-10-18 PROCEDURE — 7100000001 HC PACU RECOVERY - ADDTL 15 MIN

## 2022-10-18 RX ORDER — FENTANYL CITRATE 50 UG/ML
INJECTION, SOLUTION INTRAMUSCULAR; INTRAVENOUS PRN
Status: DISCONTINUED | OUTPATIENT
Start: 2022-10-18 | End: 2022-10-18 | Stop reason: SDUPTHER

## 2022-10-18 RX ORDER — SODIUM CHLORIDE 0.9 % (FLUSH) 0.9 %
5-40 SYRINGE (ML) INJECTION PRN
Status: DISCONTINUED | OUTPATIENT
Start: 2022-10-18 | End: 2022-10-20 | Stop reason: HOSPADM

## 2022-10-18 RX ORDER — ATORVASTATIN CALCIUM 80 MG/1
80 TABLET, FILM COATED ORAL NIGHTLY
Status: DISCONTINUED | OUTPATIENT
Start: 2022-10-18 | End: 2022-10-20 | Stop reason: HOSPADM

## 2022-10-18 RX ORDER — LIDOCAINE HYDROCHLORIDE 20 MG/ML
INJECTION, SOLUTION EPIDURAL; INFILTRATION; INTRACAUDAL; PERINEURAL PRN
Status: DISCONTINUED | OUTPATIENT
Start: 2022-10-18 | End: 2022-10-18 | Stop reason: SDUPTHER

## 2022-10-18 RX ORDER — SODIUM CHLORIDE 0.9 % (FLUSH) 0.9 %
5-40 SYRINGE (ML) INJECTION EVERY 12 HOURS SCHEDULED
Status: DISCONTINUED | OUTPATIENT
Start: 2022-10-18 | End: 2022-10-18

## 2022-10-18 RX ORDER — LOSARTAN POTASSIUM 25 MG/1
25 TABLET ORAL DAILY
Status: DISCONTINUED | OUTPATIENT
Start: 2022-10-19 | End: 2022-10-20 | Stop reason: HOSPADM

## 2022-10-18 RX ORDER — OXYCODONE HYDROCHLORIDE 5 MG/1
5 TABLET ORAL EVERY 4 HOURS PRN
Status: DISCONTINUED | OUTPATIENT
Start: 2022-10-18 | End: 2022-10-20 | Stop reason: HOSPADM

## 2022-10-18 RX ORDER — PROTAMINE SULFATE 10 MG/ML
INJECTION, SOLUTION INTRAVENOUS PRN
Status: DISCONTINUED | OUTPATIENT
Start: 2022-10-18 | End: 2022-10-18 | Stop reason: SDUPTHER

## 2022-10-18 RX ORDER — INSULIN LISPRO 100 [IU]/ML
0-16 INJECTION, SOLUTION INTRAVENOUS; SUBCUTANEOUS
Status: DISCONTINUED | OUTPATIENT
Start: 2022-10-18 | End: 2022-10-20 | Stop reason: HOSPADM

## 2022-10-18 RX ORDER — ONDANSETRON 4 MG/1
4 TABLET, ORALLY DISINTEGRATING ORAL EVERY 8 HOURS PRN
Status: DISCONTINUED | OUTPATIENT
Start: 2022-10-18 | End: 2022-10-20 | Stop reason: HOSPADM

## 2022-10-18 RX ORDER — SODIUM CHLORIDE 9 MG/ML
INJECTION, SOLUTION INTRAVENOUS CONTINUOUS
Status: ACTIVE | OUTPATIENT
Start: 2022-10-18 | End: 2022-10-18

## 2022-10-18 RX ORDER — GLYCOPYRROLATE 0.2 MG/ML
INJECTION INTRAMUSCULAR; INTRAVENOUS PRN
Status: DISCONTINUED | OUTPATIENT
Start: 2022-10-18 | End: 2022-10-18 | Stop reason: SDUPTHER

## 2022-10-18 RX ORDER — SODIUM CHLORIDE 0.9 % (FLUSH) 0.9 %
5-40 SYRINGE (ML) INJECTION EVERY 12 HOURS SCHEDULED
Status: DISCONTINUED | OUTPATIENT
Start: 2022-10-18 | End: 2022-10-20 | Stop reason: HOSPADM

## 2022-10-18 RX ORDER — ASPIRIN 81 MG/1
81 TABLET, CHEWABLE ORAL DAILY
Status: DISCONTINUED | OUTPATIENT
Start: 2022-10-19 | End: 2022-10-20 | Stop reason: HOSPADM

## 2022-10-18 RX ORDER — PROPOFOL 10 MG/ML
INJECTION, EMULSION INTRAVENOUS PRN
Status: DISCONTINUED | OUTPATIENT
Start: 2022-10-18 | End: 2022-10-18 | Stop reason: SDUPTHER

## 2022-10-18 RX ORDER — KETAMINE HCL IN NACL, ISO-OSM 100MG/10ML
SYRINGE (ML) INJECTION PRN
Status: DISCONTINUED | OUTPATIENT
Start: 2022-10-18 | End: 2022-10-18 | Stop reason: SDUPTHER

## 2022-10-18 RX ORDER — INSULIN LISPRO 100 [IU]/ML
0-4 INJECTION, SOLUTION INTRAVENOUS; SUBCUTANEOUS NIGHTLY
Status: DISCONTINUED | OUTPATIENT
Start: 2022-10-18 | End: 2022-10-20 | Stop reason: HOSPADM

## 2022-10-18 RX ORDER — ONDANSETRON 2 MG/ML
4 INJECTION INTRAMUSCULAR; INTRAVENOUS EVERY 6 HOURS PRN
Status: DISCONTINUED | OUTPATIENT
Start: 2022-10-18 | End: 2022-10-20 | Stop reason: HOSPADM

## 2022-10-18 RX ORDER — SODIUM CHLORIDE 9 MG/ML
INJECTION, SOLUTION INTRAVENOUS PRN
Status: DISCONTINUED | OUTPATIENT
Start: 2022-10-18 | End: 2022-10-20 | Stop reason: HOSPADM

## 2022-10-18 RX ORDER — CLOPIDOGREL BISULFATE 75 MG/1
75 TABLET ORAL DAILY
Status: DISCONTINUED | OUTPATIENT
Start: 2022-10-19 | End: 2022-10-20 | Stop reason: HOSPADM

## 2022-10-18 RX ORDER — ONDANSETRON 2 MG/ML
INJECTION INTRAMUSCULAR; INTRAVENOUS PRN
Status: DISCONTINUED | OUTPATIENT
Start: 2022-10-18 | End: 2022-10-18 | Stop reason: SDUPTHER

## 2022-10-18 RX ORDER — DEXAMETHASONE SODIUM PHOSPHATE 4 MG/ML
INJECTION, SOLUTION INTRA-ARTICULAR; INTRALESIONAL; INTRAMUSCULAR; INTRAVENOUS; SOFT TISSUE PRN
Status: DISCONTINUED | OUTPATIENT
Start: 2022-10-18 | End: 2022-10-18 | Stop reason: SDUPTHER

## 2022-10-18 RX ORDER — MIDAZOLAM HYDROCHLORIDE 1 MG/ML
INJECTION INTRAMUSCULAR; INTRAVENOUS PRN
Status: DISCONTINUED | OUTPATIENT
Start: 2022-10-18 | End: 2022-10-18 | Stop reason: SDUPTHER

## 2022-10-18 RX ORDER — AMIODARONE HYDROCHLORIDE 200 MG/1
200 TABLET ORAL DAILY
Status: DISCONTINUED | OUTPATIENT
Start: 2022-10-19 | End: 2022-10-20 | Stop reason: HOSPADM

## 2022-10-18 RX ORDER — HEPARIN SODIUM 1000 [USP'U]/ML
INJECTION, SOLUTION INTRAVENOUS; SUBCUTANEOUS PRN
Status: DISCONTINUED | OUTPATIENT
Start: 2022-10-18 | End: 2022-10-18 | Stop reason: SDUPTHER

## 2022-10-18 RX ORDER — FUROSEMIDE 40 MG/1
40 TABLET ORAL DAILY
Status: DISCONTINUED | OUTPATIENT
Start: 2022-10-19 | End: 2022-10-20 | Stop reason: HOSPADM

## 2022-10-18 RX ORDER — AMLODIPINE BESYLATE 10 MG/1
10 TABLET ORAL DAILY
Status: DISCONTINUED | OUTPATIENT
Start: 2022-10-19 | End: 2022-10-20 | Stop reason: HOSPADM

## 2022-10-18 RX ORDER — DEXTROSE MONOHYDRATE 100 MG/ML
INJECTION, SOLUTION INTRAVENOUS CONTINUOUS PRN
Status: DISCONTINUED | OUTPATIENT
Start: 2022-10-18 | End: 2022-10-20 | Stop reason: HOSPADM

## 2022-10-18 RX ORDER — ROCURONIUM BROMIDE 10 MG/ML
INJECTION, SOLUTION INTRAVENOUS PRN
Status: DISCONTINUED | OUTPATIENT
Start: 2022-10-18 | End: 2022-10-18 | Stop reason: SDUPTHER

## 2022-10-18 RX ORDER — PANTOPRAZOLE SODIUM 40 MG/1
40 TABLET, DELAYED RELEASE ORAL
Status: DISCONTINUED | OUTPATIENT
Start: 2022-10-19 | End: 2022-10-20 | Stop reason: HOSPADM

## 2022-10-18 RX ORDER — ENOXAPARIN SODIUM 100 MG/ML
40 INJECTION SUBCUTANEOUS DAILY
Status: DISCONTINUED | OUTPATIENT
Start: 2022-10-19 | End: 2022-10-20 | Stop reason: HOSPADM

## 2022-10-18 RX ORDER — SODIUM CHLORIDE 9 MG/ML
INJECTION, SOLUTION INTRAVENOUS PRN
Status: DISCONTINUED | OUTPATIENT
Start: 2022-10-18 | End: 2022-10-18

## 2022-10-18 RX ORDER — OXYCODONE HYDROCHLORIDE 10 MG/1
10 TABLET ORAL EVERY 4 HOURS PRN
Status: DISCONTINUED | OUTPATIENT
Start: 2022-10-18 | End: 2022-10-20 | Stop reason: HOSPADM

## 2022-10-18 RX ORDER — ACETAMINOPHEN 325 MG/1
650 TABLET ORAL EVERY 4 HOURS PRN
Status: DISCONTINUED | OUTPATIENT
Start: 2022-10-18 | End: 2022-10-20 | Stop reason: HOSPADM

## 2022-10-18 RX ORDER — SPIRONOLACTONE 25 MG/1
50 TABLET ORAL DAILY
Status: DISCONTINUED | OUTPATIENT
Start: 2022-10-19 | End: 2022-10-20 | Stop reason: HOSPADM

## 2022-10-18 RX ORDER — NITROGLYCERIN 20 MG/100ML
5-200 INJECTION INTRAVENOUS CONTINUOUS
Status: DISCONTINUED | OUTPATIENT
Start: 2022-10-18 | End: 2022-10-20 | Stop reason: HOSPADM

## 2022-10-18 RX ORDER — SODIUM CHLORIDE 0.9 % (FLUSH) 0.9 %
5-40 SYRINGE (ML) INJECTION PRN
Status: DISCONTINUED | OUTPATIENT
Start: 2022-10-18 | End: 2022-10-18

## 2022-10-18 RX ORDER — HYDRALAZINE HYDROCHLORIDE 25 MG/1
25 TABLET, FILM COATED ORAL EVERY 8 HOURS SCHEDULED
Status: DISCONTINUED | OUTPATIENT
Start: 2022-10-18 | End: 2022-10-20 | Stop reason: HOSPADM

## 2022-10-18 RX ORDER — SODIUM CHLORIDE 9 MG/ML
INJECTION, SOLUTION INTRAVENOUS CONTINUOUS PRN
Status: DISCONTINUED | OUTPATIENT
Start: 2022-10-18 | End: 2022-10-18 | Stop reason: SDUPTHER

## 2022-10-18 RX ORDER — CARVEDILOL 25 MG/1
25 TABLET ORAL 2 TIMES DAILY WITH MEALS
Status: DISCONTINUED | OUTPATIENT
Start: 2022-10-18 | End: 2022-10-20

## 2022-10-18 RX ADMIN — PROTAMINE SULFATE 20 MG: 10 INJECTION, SOLUTION INTRAVENOUS at 16:23

## 2022-10-18 RX ADMIN — Medication 30 MG: at 15:09

## 2022-10-18 RX ADMIN — PHENYLEPHRINE HYDROCHLORIDE 50 MCG/MIN: 10 INJECTION INTRAVENOUS at 15:14

## 2022-10-18 RX ADMIN — INSULIN LISPRO 4 UNITS: 100 INJECTION, SOLUTION INTRAVENOUS; SUBCUTANEOUS at 18:27

## 2022-10-18 RX ADMIN — ONDANSETRON 4 MG: 2 INJECTION INTRAMUSCULAR; INTRAVENOUS at 15:17

## 2022-10-18 RX ADMIN — SODIUM CHLORIDE: 9 INJECTION, SOLUTION INTRAVENOUS at 15:04

## 2022-10-18 RX ADMIN — SUGAMMADEX 400 MG: 100 INJECTION, SOLUTION INTRAVENOUS at 16:35

## 2022-10-18 RX ADMIN — HEPARIN SODIUM 10000 UNITS: 1000 INJECTION INTRAVENOUS; SUBCUTANEOUS at 15:32

## 2022-10-18 RX ADMIN — CARVEDILOL 25 MG: 25 TABLET, FILM COATED ORAL at 17:37

## 2022-10-18 RX ADMIN — NITROGLYCERIN 120 MCG/MIN: 20 INJECTION INTRAVENOUS at 23:45

## 2022-10-18 RX ADMIN — ATORVASTATIN CALCIUM 80 MG: 80 TABLET, FILM COATED ORAL at 20:12

## 2022-10-18 RX ADMIN — GLYCOPYRROLATE 0.2 MG: 0.2 INJECTION, SOLUTION INTRAMUSCULAR; INTRAVENOUS at 15:17

## 2022-10-18 RX ADMIN — SODIUM CHLORIDE: 9 INJECTION, SOLUTION INTRAVENOUS at 17:03

## 2022-10-18 RX ADMIN — FENTANYL CITRATE 100 MCG: 50 INJECTION INTRAMUSCULAR; INTRAVENOUS at 15:05

## 2022-10-18 RX ADMIN — IOPAMIDOL 70 ML: 755 INJECTION, SOLUTION INTRAVENOUS at 15:24

## 2022-10-18 RX ADMIN — OXYCODONE HYDROCHLORIDE 10 MG: 10 TABLET ORAL at 18:30

## 2022-10-18 RX ADMIN — Medication 200 MCG: at 16:42

## 2022-10-18 RX ADMIN — HYDRALAZINE HYDROCHLORIDE 25 MG: 25 TABLET, FILM COATED ORAL at 22:22

## 2022-10-18 RX ADMIN — NITROGLYCERIN 20 MCG/MIN: 20 INJECTION INTRAVENOUS at 17:02

## 2022-10-18 RX ADMIN — LIDOCAINE HYDROCHLORIDE 60 MG: 20 INJECTION, SOLUTION EPIDURAL; INFILTRATION; INTRACAUDAL; PERINEURAL at 15:10

## 2022-10-18 RX ADMIN — MIDAZOLAM 2 MG: 1 INJECTION INTRAMUSCULAR; INTRAVENOUS at 15:04

## 2022-10-18 RX ADMIN — ROCURONIUM BROMIDE 10 MG: 10 INJECTION INTRAVENOUS at 15:58

## 2022-10-18 RX ADMIN — Medication 3000 MG: at 15:04

## 2022-10-18 RX ADMIN — DEXAMETHASONE SODIUM PHOSPHATE 8 MG: 4 INJECTION, SOLUTION INTRAMUSCULAR; INTRAVENOUS at 15:17

## 2022-10-18 RX ADMIN — ROCURONIUM BROMIDE 30 MG: 10 INJECTION INTRAVENOUS at 15:10

## 2022-10-18 RX ADMIN — PROPOFOL 150 MG: 10 INJECTION, EMULSION INTRAVENOUS at 15:10

## 2022-10-18 RX ADMIN — Medication 200 MCG: at 16:49

## 2022-10-18 ASSESSMENT — PAIN SCALES - GENERAL: PAINLEVEL_OUTOF10: 5

## 2022-10-18 ASSESSMENT — LIFESTYLE VARIABLES: SMOKING_STATUS: 1

## 2022-10-18 NOTE — ANESTHESIA POSTPROCEDURE EVALUATION
Department of Anesthesiology  Postprocedure Note    Patient: Ariel Subramanian  MRN: [id number]  YOB: 1965  Date of evaluation: 10/18/2022      Procedure Summary     Date: 10/18/22 Room / Location: [facility] Cath Lab    Anesthesia Start: 1504 Anesthesia Stop: 1700    Procedure: PROCEDURE Diagnosis: Carotid stenosis, symptomatic, with infarction [facility])    Scheduled Providers:  Responsible Provider: Vadim Garza MD    Anesthesia Type: General ASA Status: 3          Anesthesia Type: General    Yaw Phase I:      Yaw Phase II:        Anesthesia Post Evaluation    Patient location during evaluation: PACU  Patient participation: complete - patient participated  Level of consciousness: awake and alert  Pain score: 0  Nausea & Vomiting: no nausea  Complications: no  Cardiovascular status: hemodynamically stable  Respiratory status: acceptable  Hydration status: stable

## 2022-10-18 NOTE — ANESTHESIA PRE PROCEDURE
Department of Anesthesiology  Preprocedure Note       Name:  Michi Turner   Age:  61 y.o.  :  1965                                          MRN:  [id number]         Date:  10/18/2022      Surgeon: * No surgeons listed *    Procedure: * No procedures listed *    Medications prior to admission:   Prior to Admission medications    Medication Sig Start Date End Date Taking?  Authorizing Provider   atorvastatin (LIPITOR) 80 MG tablet Take 1 tablet by mouth nightly 10/13/22   Inez Bingham, DO   Blood Glucose Monitoring Suppl (TRUE METRIX METER) w/Device KIT Check blood sugar 4 times daily, tidac 10/11/22   Chyna Wellington DO   blood glucose test strips (TRUE METRIX BLOOD GLUCOSE TEST) strip Check blood sugar 4 times daily, tidac 10/11/22   Chyna Wellington, DO   Lancets MISC Check blood sugar 4 times daily, tidac 10/11/22   Chyna Wellington DO   apixaban (ELIQUIS) 5 MG TABS tablet Take 1 tablet by mouth 2 times daily 10/28/22   Chyna Wellington DO   aspirin ([employer] ASPIRIN ADULT LOW DOSE) 81 MG chewable tablet Take 1 tablet by mouth daily 10/13/22   Chyna Wellington DO   potassium chloride (KLOR-CON M10) 10 MEQ extended release tablet Take 2 tablets by mouth daily 10/11/22   Chyna Wellington DO   insulin glargine (LANTUS) 100 UNIT/ML injection vial Inject 40 Units into the skin daily 10/11/22   Colletta Smiles, MD   spironolactone (ALDACTONE) 50 MG tablet Take 1 tablet by mouth daily 10/11/22   Colletta Smiles, MD   amLODIPine (NORVASC) 10 MG tablet Take 1 tablet by mouth daily 10/11/22   Colletta Smiles, MD   hydrALAZINE (APRESOLINE) 25 MG tablet Take 1 tablet by mouth every 8 hours 10/10/22   Colletta Smiles, MD   insulin lispro, 1 Unit Dial, ([facility]) 100 UNIT/ML SOPN Inject 15 Units into the skin 3 times daily (before meals) 10/10/22   Colletta Smiles, MD   clopidogrel (PLAVIX) 75 MG tablet Take 1 tablet by mouth daily 10/13/22   Colletta Smiles, MD   amiodarone (CORDARONE) 200 MG tablet TAKE 1 TABLET BY MOUTH EVERY DAY 22 Corey Goff, APRN - CNP   carvedilol (COREG) 25 MG tablet TAKE 1 TABLET BY MOUTH TWICE A DAY WITH MEALS 6/20/22   Pretty Szymanski MD   losartan (COZAAR) 25 MG tablet TAKE 1 TABLET BY MOUTH EVERY DAY 6/20/22   Pretty Szymanski MD   furosemide (LASIX) 40 MG tablet TAKE 1 TABLET BY MOUTH EVERY DAY 6/20/22   Pretty Szymanski MD   FARXIGA 5 MG tablet TAKE 1 TABLET BY MOUTH EVERY DAY IN THE MORNING 6/2/22   Pretty Szymanski MD   Blood Pressure Monitor KIT 1 Device by Does not apply route daily 4/21/22   Gera Reyna, DO   fluticasone Erman Britton) 50 MCG/ACT nasal spray 2 sprays by Nasal route daily 3/22/22   CONSUELO Gilmore NP   omeprazole (PRILOSEC) 20 MG delayed release capsule Take 1 capsule by mouth Daily 10/21/21   Gera Reyna, DO   polyethylene glycol (GLYCOLAX) 17 GM/SCOOP powder Take 17 g by mouth daily as needed for Constipation 8/30/21   Historical Provider, MD   Blood Glucose Monitoring Suppl (TRUE METRIX METER) w/Device KIT 1 kit by Does not apply route 4 times daily Dispense one kit  Pharmacy cannot do brand substitutions 7/31/21   Alice Cotton MD   Insulin Pen Needle (CAREFINE PEN NEEDLES) 32G X 4 MM MISC 1 each by Does not apply route 3 times daily Pharmacy can do brand substitutions 7/31/21   Alice Cotton MD       Current medications:    Current Facility-Administered Medications   Medication Dose Route Frequency Provider Last Rate Last Admin    sodium chloride flush 0.9 % injection 5-40 mL  5-40 mL IntraVENous 2 times per day Yobani Drain, DO        sodium chloride flush 0.9 % injection 5-40 mL  5-40 mL IntraVENous PRN Dominick Cid, DO        0.9 % sodium chloride infusion   IntraVENous PRN Yobani Drain, DO        ceFAZolin (ANCEF) 3000 mg in dextrose 5 % 100 mL IVPB  3,000 mg IntraVENous On Call to [address] [facility], DO         Current Outpatient Medications   Medication Sig Dispense Refill    atorvastatin (LIPITOR) 80 MG tablet Take 1 tablet by mouth nightly 90 tablet 0    Blood Glucose Monitoring Suppl (TRUE METRIX METER) w/Device KIT Check blood sugar 4 times daily, tidac 1 kit 0    blood glucose test strips (TRUE METRIX BLOOD GLUCOSE TEST) strip Check blood sugar 4 times daily, tidac 100 each 3    Lancets MISC Check blood sugar 4 times daily, tidac 300 each 1    [START ON 10/28/2022] apixaban (ELIQUIS) 5 MG TABS tablet Take 1 tablet by mouth 2 times daily 60 tablet 0    aspirin ([facility] ASPIRIN ADULT LOW DOSE) 81 MG chewable tablet Take 1 tablet by mouth daily 30 tablet 0    potassium chloride (KLOR-CON M10) 10 MEQ extended release tablet Take 2 tablets by mouth daily 180 tablet 0    insulin glargine (LANTUS) 100 UNIT/ML injection vial Inject 40 Units into the skin daily 10 mL 3    spironolactone (ALDACTONE) 50 MG tablet Take 1 tablet by mouth daily 30 tablet 3    amLODIPine (NORVASC) 10 MG tablet Take 1 tablet by mouth daily 30 tablet 3    hydrALAZINE (APRESOLINE) 25 MG tablet Take 1 tablet by mouth every 8 hours 90 tablet 3    insulin lispro, 1 Unit Dial, (HUMALOG KWIKPEN) 100 UNIT/ML SOPN Inject 15 Units into the skin 3 times daily (before meals) 5 Adjustable Dose Pre-filled Pen Syringe 2    clopidogrel (PLAVIX) 75 MG tablet Take 1 tablet by mouth daily 30 tablet 3    amiodarone (CORDARONE) 200 MG tablet TAKE 1 TABLET BY MOUTH EVERY DAY 30 tablet 0    carvedilol (COREG) 25 MG tablet TAKE 1 TABLET BY MOUTH TWICE A DAY WITH MEALS 90 tablet 0    losartan (COZAAR) 25 MG tablet TAKE 1 TABLET BY MOUTH EVERY DAY 90 tablet 0    furosemide (LASIX) 40 MG tablet TAKE 1 TABLET BY MOUTH EVERY DAY 90 tablet 0    FARXIGA 5 MG tablet TAKE 1 TABLET BY MOUTH EVERY DAY IN THE MORNING 30 tablet 2    Blood Pressure Monitor KIT 1 Device by Does not apply route daily 1 kit 0    fluticasone (FLONASE) 50 MCG/ACT nasal spray 2 sprays by Nasal route daily 16 g 0    omeprazole (PRILOSEC) 20 MG delayed release capsule Take 1 capsule by mouth Daily 90 capsule 1    polyethylene glycol (GLYCOLAX) 17 GM/SCOOP powder Take 17 g by mouth daily as needed for Constipation      Blood Glucose Monitoring Suppl (TRUE METRIX METER) w/Device KIT 1 kit by Does not apply route 4 times daily Dispense one kit  Pharmacy cannot do brand substitutions 1 kit 0    Insulin Pen Needle (CAREFINE PEN NEEDLES) 32G X 4 MM MISC 1 each by Does not apply route 3 times daily Pharmacy can do brand substitutions 100 Package 0       Allergies:  No Known Allergies    Problem List:    Patient Active Problem List   Diagnosis Code    Type 2 diabetes mellitus with hyperlipidemia ([facility]) E11.69, E78.5    Diabetes mellitus [facility] coincident hypertension [facility] E11.9, I10    Gastroesophageal reflux disease K21.9    Obesity, diabetes, and hypertension syndrome ([facility]) E11.69, E66.9, E11.59, I15.2    Abnormal stress test R94.39    Atherosclerosis of coronary artery I25.10    Chronic kidney disease (CKD) stage G2/A2, mildly decreased glomerular filtration rate (GFR) between 60-89 mL/min/1.73 square meter and albuminuria creatinine ratio between  mg/g N18.2    Gastric ulcer with perforation ([facility]) K25.5    History of MI (myocardial infarction) I25.2    Tobacco abuse Z72.0    Chronic systolic heart failure ([facility]) I50.22    Atrial fibrillation with RVR ([facility]) I48.91    Acute on chronic systolic heart failure ([facility]) I50.23    CHF (congestive heart failure), NYHA class I, acute on chronic, combined ([facility]) I50.43    PAF (paroxysmal atrial fibrillation) ([facility]) I48.0    Acute CVA (cerebrovascular accident) ([facility] I63.9    Symptomatic carotid artery stenosis, right I65.21    Dizziness R42       Past Medical History:        Diagnosis Date    Abdominal pain 7/29/2021    Abnormal EKG 7/28/2016    Acute pancreatitis 7/29/2021    CHF (congestive heart failure) ([facility]     Cigarette smoker 8/25/2021    Cocaine abuse ([facility] 3/28/2015    Last Assessment & Plan:  Formatting of this note might be different from the original. Counseled on cessation as increases risk of coronary vasospasm and further worsening of heart function.  Dehydration 7/28/2016    Diabetes mellitus ([facility]     History of cocaine abuse ([facility] 7/28/2016    History of MI (myocardial infarction) 7/28/2016    Hyperglycemia 7/28/2016    Hyperlipidemia     Hypertension     Morbid obesity with BMI of 45.0-49.9, adult ([facility] 7/28/2016    Postural dizziness 7/28/2016    Pre-diabetes 3/9/2017    Last Assessment & Plan:  Formatting of this note might be different from the original. Discussed lifestyle changes Refer to [facility] program    PUD (peptic ulcer disease) 3/1/2019       Past Surgical History:        Procedure Laterality Date    CARDIAC CATHETERIZATION      UPPER GASTROINTESTINAL ENDOSCOPY         Social History:    Social History     Tobacco Use    Smoking status: Every Day     Packs/day: 0.50     Years: 25.00     Pack years: 12.50     Types: Cigarettes    Smokeless tobacco: Never   Substance Use Topics    Alcohol use: Not Currently                                Ready to quit: Not Answered  Counseling given: Not Answered      Vital Signs (Current):   Vitals:    10/18/22 1225   BP: (!) 144/91   Pulse: 94   Resp: 12   Temp: 97.5 °F (36.4 °C)   TempSrc: Infrared   SpO2: 99%   Weight: (!) 305 lb (138.3 kg)   Height: 6' 2\" (1.88 m)                                              BP Readings from Last 3 Encounters:   10/18/22 (!) 144/91   10/11/22 (!) 157/69   07/23/22 (!) 150/77       NPO Status:                                                                                 BMI:   Wt Readings from Last 3 Encounters:   10/18/22 (!) 305 lb (138.3 kg)   10/11/22 (!) 305 lb 5.4 oz (138.5 kg)   07/23/22 300 lb (136.1 kg)     Body mass index is 39.16 kg/m².     CBC:   Lab Results   Component Value Date/Time    WBC 6.2 10/11/2022 05:03 AM    RBC 4.30 10/11/2022 05:03 AM    HGB 13.7 10/11/2022 05:03 AM    HCT 40.1 10/11/2022 05:03 AM    MCV 93.3 10/11/2022 05:03 AM    RDW 12.5 10/11/2022 05:03 AM     10/11/2022 05:03 AM       CMP:   Lab Results   Component Value Date/Time     10/18/2022 11:45 AM    K 4.5 10/18/2022 11:45 AM    K 4.1 10/11/2022 05:03 AM    CL 95 10/18/2022 11:45 AM    CO2 23 10/18/2022 11:45 AM    BUN 14 10/18/2022 11:45 AM    CREATININE 1.1 10/18/2022 11:45 AM    GFRAA >60 10/11/2022 05:03 AM    AGRATIO 1.4 10/11/2022 05:03 AM    LABGLOM >60 10/18/2022 11:45 AM    GLUCOSE 424 10/18/2022 11:45 AM    PROT 6.6 10/11/2022 05:03 AM    CALCIUM 9.1 10/18/2022 11:45 AM    BILITOT 0.7 10/11/2022 05:03 AM    ALKPHOS 59 10/11/2022 05:03 AM    AST 11 10/11/2022 05:03 AM    ALT 10 10/11/2022 05:03 AM       POC Tests: No results for input(s): POCGLU, POCNA, POCK, POCCL, POCBUN, POCHEMO, POCHCT in the last 72 hours.     Coags:   Lab Results   Component Value Date/Time    PROTIME 12.8 10/06/2022 12:36 PM    INR 0.97 10/06/2022 12:36 PM    APTT 26.5 10/18/2022 11:45 AM       HCG (If Applicable): No results found for: PREGTESTUR, PREGSERUM, HCG, HCGQUANT     ABGs: No results found for: PHART, PO2ART, BIF7ESL, OLN9JAY, BEART, A2URCONK     Type & Screen (If Applicable):  No results found for: LABABO, LABRH    Drug/Infectious Status (If Applicable):  No results found for: HIV, HEPCAB    COVID-19 Screening (If Applicable):   Lab Results   Component Value Date/Time    COVID19 Not Detected 07/23/2022 04:30 PM    COVID19 Not Detected 03/20/2022 12:00 AM           Anesthesia Evaluation  Patient summary reviewed no history of anesthetic complications:   Airway: Mallampati: III  TM distance: >3 FB   Neck ROM: full  Mouth opening: > = 3 FB   Dental:    (+) poor dentition      Pulmonary:   (+) sleep apnea: on CPAP,  current smoker                           Cardiovascular:    (+) hypertension:, dysrhythmias: atrial fibrillation, CHF:,         Rhythm: regular  Rate: normal                 ROS comment: ECHO 10/6/22: 40-45% EF, diffuse hypokinesis     Neuro/Psych:   (+) CVA (On MRI only, no symptoms):,             GI/Hepatic/Renal:   (+) GERD:,           Endo/Other:    (+) DiabetesType II DM, using insulin, blood dyscrasia: anticoagulation therapy:., .    (-) hypothyroidism, hyperthyroidism               Abdominal:   (+) obese,           Vascular:   + PVD, aortic or cerebral (Carotid stenosis), . Other Findings:           Anesthesia Plan      general     ASA 3       Induction: intravenous. arterial line  MIPS: Postoperative opioids intended and Prophylactic antiemetics administered. Anesthetic plan and risks discussed with patient. Plan discussed with CRNA.                     Aashish Warner MD   10/18/2022

## 2022-10-18 NOTE — ANESTHESIA PROCEDURE NOTES
Arterial Line:    An arterial line was placed using surface landmarks, in the pre-op for the following indication(s): continuous blood pressure monitoring and blood sampling needed. A 20 gauge (size), 1 and 3/8 inch (length), Arrow (type) catheter was placed, Seldinger technique used, into the left radial artery, secured by tape. Anesthesia type: Local  Local infiltration: Injection (lidocaine 2 % 1 ml)    Events:  patient tolerated procedure well with no complications, EBL 0mL and EBL < 5mL. 10/18/2022 2:40 PM10/18/2022 2:45 PM  Anesthesiologist: Sujata Girard MD  Preanesthetic Checklist  Completed: patient identified, IV checked, site marked, risks and benefits discussed, surgical/procedural consents, equipment checked, pre-op evaluation, timeout performed, anesthesia consent given, oxygen available and monitors applied/VS acknowledged

## 2022-10-18 NOTE — BRIEF OP NOTE
Brief Postoperative Note      Patient: Emily Yeboah  YOB: 1965  MRN: [id number]    Date of Procedure: 10/18/2022    Pre-Op Diagnosis: symptomatic carotid stenosis    Post-Op Diagnosis: Same       Procedure: right TCAR    Surgeon: Peyton Spence    Assistant:  Vivian Buerger Young    Anesthesia: General    Estimated Blood Loss (mL): less than 409     Complications: None        Drains: * No LDAs found *    Findings: neuro intact    Electronically signed by Rivka Rasmussen DO on 10/18/2022 at 4:52 PM

## 2022-10-18 NOTE — PROGRESS NOTES
Patient to room 1310. NC Oxygen at 4L weaned to 2L throughout recovery. Pt alert and oriented x4. Baseline NIH 3, NIH upon arrival 3. Patient denies pain, numbness or tingling. Blood pressure above goal of systolic 032-468. Nitroglycerin gtt started as ordered, see mar. Patient requested this RN call his sister and girlfriend. Attempted to call both, both phone calls unanswered. Right groin site clean, dry and intact. No s/s hematoma. Patient able to move all extremities freely. Patient instructed on bedrest times. Yaw 9. Bedside handoff performed with Eric Knight RN to assume care.     Electronically signed by Vamsi Thorpe RN on 10/18/2022 at 6:08 PM

## 2022-10-19 LAB
ANION GAP SERPL CALCULATED.3IONS-SCNC: 11 MMOL/L (ref 3–16)
BUN BLDV-MCNC: 20 MG/DL (ref 7–20)
CALCIUM SERPL-MCNC: 8.7 MG/DL (ref 8.3–10.6)
CHLORIDE BLD-SCNC: 94 MMOL/L (ref 99–110)
CO2: 23 MMOL/L (ref 21–32)
CREAT SERPL-MCNC: 1.2 MG/DL (ref 0.9–1.3)
GFR SERPL CREATININE-BSD FRML MDRD: >60 ML/MIN/{1.73_M2}
GLUCOSE BLD-MCNC: 195 MG/DL (ref 70–99)
GLUCOSE BLD-MCNC: 206 MG/DL (ref 70–99)
GLUCOSE BLD-MCNC: 289 MG/DL (ref 70–99)
GLUCOSE BLD-MCNC: 350 MG/DL (ref 70–99)
GLUCOSE BLD-MCNC: 377 MG/DL (ref 70–99)
HCT VFR BLD CALC: 37.7 % (ref 40.5–52.5)
HEMOGLOBIN: 12.7 G/DL (ref 13.5–17.5)
MCH RBC QN AUTO: 31.6 PG (ref 26–34)
MCHC RBC AUTO-ENTMCNC: 33.7 G/DL (ref 31–36)
MCV RBC AUTO: 93.6 FL (ref 80–100)
PDW BLD-RTO: 12.8 % (ref 12.4–15.4)
PERFORMED ON: ABNORMAL
PLATELET # BLD: 238 K/UL (ref 135–450)
PMV BLD AUTO: 8.6 FL (ref 5–10.5)
POTASSIUM SERPL-SCNC: 5 MMOL/L (ref 3.5–5.1)
RBC # BLD: 4.03 M/UL (ref 4.2–5.9)
SODIUM BLD-SCNC: 128 MMOL/L (ref 136–145)
WBC # BLD: 9.1 K/UL (ref 4–11)

## 2022-10-19 PROCEDURE — 85027 COMPLETE CBC AUTOMATED: CPT

## 2022-10-19 PROCEDURE — 80048 BASIC METABOLIC PNL TOTAL CA: CPT

## 2022-10-19 PROCEDURE — 6360000002 HC RX W HCPCS: Performed by: STUDENT IN AN ORGANIZED HEALTH CARE EDUCATION/TRAINING PROGRAM

## 2022-10-19 PROCEDURE — 2060000000 HC ICU INTERMEDIATE R&B

## 2022-10-19 PROCEDURE — 94760 N-INVAS EAR/PLS OXIMETRY 1: CPT

## 2022-10-19 PROCEDURE — 2500000003 HC RX 250 WO HCPCS: Performed by: STUDENT IN AN ORGANIZED HEALTH CARE EDUCATION/TRAINING PROGRAM

## 2022-10-19 PROCEDURE — 36415 COLL VENOUS BLD VENIPUNCTURE: CPT

## 2022-10-19 PROCEDURE — APPNB30 APP NON BILLABLE TIME 0-30 MINS: Performed by: NURSE PRACTITIONER

## 2022-10-19 PROCEDURE — 6370000000 HC RX 637 (ALT 250 FOR IP): Performed by: STUDENT IN AN ORGANIZED HEALTH CARE EDUCATION/TRAINING PROGRAM

## 2022-10-19 RX ORDER — PANTOPRAZOLE SODIUM 40 MG/1
40 TABLET, DELAYED RELEASE ORAL
Qty: 30 TABLET | Refills: 2 | Status: SHIPPED | OUTPATIENT
Start: 2022-10-20

## 2022-10-19 RX ORDER — CARVEDILOL 25 MG/1
TABLET ORAL
Qty: 60 TABLET | Refills: 2 | Status: SHIPPED | OUTPATIENT
Start: 2022-10-19 | End: 2022-10-20 | Stop reason: HOSPADM

## 2022-10-19 RX ORDER — POTASSIUM CHLORIDE 750 MG/1
20 TABLET, EXTENDED RELEASE ORAL DAILY
Qty: 60 TABLET | Refills: 2 | Status: SHIPPED | OUTPATIENT
Start: 2022-10-19

## 2022-10-19 RX ORDER — FLUTICASONE PROPIONATE 50 MCG
2 SPRAY, SUSPENSION (ML) NASAL DAILY
Qty: 16 G | Refills: 0 | Status: SHIPPED | OUTPATIENT
Start: 2022-10-19

## 2022-10-19 RX ORDER — ASPIRIN 81 MG/1
81 TABLET, CHEWABLE ORAL DAILY
Qty: 30 TABLET | Refills: 2 | Status: SHIPPED | OUTPATIENT
Start: 2022-10-19

## 2022-10-19 RX ORDER — AMIODARONE HYDROCHLORIDE 200 MG/1
TABLET ORAL
Qty: 30 TABLET | Refills: 2 | Status: SHIPPED | OUTPATIENT
Start: 2022-10-19

## 2022-10-19 RX ORDER — ATORVASTATIN CALCIUM 80 MG/1
80 TABLET, FILM COATED ORAL NIGHTLY
Qty: 30 TABLET | Refills: 2 | Status: SHIPPED | OUTPATIENT
Start: 2022-10-19

## 2022-10-19 RX ORDER — HYDRALAZINE HYDROCHLORIDE 25 MG/1
25 TABLET, FILM COATED ORAL EVERY 8 HOURS SCHEDULED
Qty: 90 TABLET | Refills: 2 | Status: SHIPPED | OUTPATIENT
Start: 2022-10-19

## 2022-10-19 RX ORDER — AMLODIPINE BESYLATE 10 MG/1
10 TABLET ORAL DAILY
Qty: 30 TABLET | Refills: 2 | Status: SHIPPED | OUTPATIENT
Start: 2022-10-19

## 2022-10-19 RX ORDER — CLOPIDOGREL BISULFATE 75 MG/1
75 TABLET ORAL DAILY
Qty: 30 TABLET | Refills: 2 | Status: SHIPPED | OUTPATIENT
Start: 2022-10-19

## 2022-10-19 RX ORDER — FUROSEMIDE 40 MG/1
TABLET ORAL
Qty: 30 TABLET | Refills: 2 | Status: SHIPPED | OUTPATIENT
Start: 2022-10-19

## 2022-10-19 RX ORDER — INSULIN LISPRO 100 [IU]/ML
15 INJECTION, SOLUTION INTRAVENOUS; SUBCUTANEOUS
Qty: 5 ADJUSTABLE DOSE PRE-FILLED PEN SYRINGE | Refills: 2 | Status: SHIPPED | OUTPATIENT
Start: 2022-10-19

## 2022-10-19 RX ORDER — SPIRONOLACTONE 50 MG/1
50 TABLET, FILM COATED ORAL DAILY
Qty: 30 TABLET | Refills: 2 | Status: SHIPPED | OUTPATIENT
Start: 2022-10-19

## 2022-10-19 RX ORDER — INSULIN GLARGINE 100 [IU]/ML
40 INJECTION, SOLUTION SUBCUTANEOUS DAILY
Qty: 10 ML | Refills: 3 | Status: SHIPPED | OUTPATIENT
Start: 2022-10-19

## 2022-10-19 RX ORDER — LOSARTAN POTASSIUM 25 MG/1
TABLET ORAL
Qty: 30 TABLET | Refills: 2 | Status: SHIPPED | OUTPATIENT
Start: 2022-10-19

## 2022-10-19 RX ORDER — OXYCODONE HYDROCHLORIDE 5 MG/1
5 TABLET ORAL EVERY 6 HOURS PRN
Qty: 20 TABLET | Refills: 0 | Status: SHIPPED | OUTPATIENT
Start: 2022-10-19 | End: 2022-10-24

## 2022-10-19 RX ADMIN — HYDRALAZINE HYDROCHLORIDE 25 MG: 25 TABLET, FILM COATED ORAL at 15:36

## 2022-10-19 RX ADMIN — FUROSEMIDE 40 MG: 40 TABLET ORAL at 07:51

## 2022-10-19 RX ADMIN — OXYCODONE HYDROCHLORIDE 10 MG: 10 TABLET ORAL at 02:00

## 2022-10-19 RX ADMIN — ASPIRIN 81 MG 81 MG: 81 TABLET ORAL at 07:51

## 2022-10-19 RX ADMIN — HYDRALAZINE HYDROCHLORIDE 25 MG: 25 TABLET, FILM COATED ORAL at 21:01

## 2022-10-19 RX ADMIN — INSULIN LISPRO 8 UNITS: 100 INJECTION, SOLUTION INTRAVENOUS; SUBCUTANEOUS at 07:52

## 2022-10-19 RX ADMIN — HYDRALAZINE HYDROCHLORIDE 25 MG: 25 TABLET, FILM COATED ORAL at 06:00

## 2022-10-19 RX ADMIN — LOSARTAN POTASSIUM 25 MG: 25 TABLET, FILM COATED ORAL at 07:51

## 2022-10-19 RX ADMIN — NITROGLYCERIN 120 MCG/MIN: 20 INJECTION INTRAVENOUS at 06:05

## 2022-10-19 RX ADMIN — CARVEDILOL 25 MG: 25 TABLET, FILM COATED ORAL at 07:51

## 2022-10-19 RX ADMIN — AMIODARONE HYDROCHLORIDE 200 MG: 200 TABLET ORAL at 07:52

## 2022-10-19 RX ADMIN — INSULIN LISPRO 16 UNITS: 100 INJECTION, SOLUTION INTRAVENOUS; SUBCUTANEOUS at 12:42

## 2022-10-19 RX ADMIN — CLOPIDOGREL BISULFATE 75 MG: 75 TABLET ORAL at 07:51

## 2022-10-19 RX ADMIN — AMLODIPINE BESYLATE 10 MG: 10 TABLET ORAL at 07:52

## 2022-10-19 RX ADMIN — PANTOPRAZOLE SODIUM 40 MG: 40 TABLET, DELAYED RELEASE ORAL at 06:00

## 2022-10-19 RX ADMIN — SPIRONOLACTONE 50 MG: 25 TABLET ORAL at 07:52

## 2022-10-19 RX ADMIN — CARVEDILOL 25 MG: 25 TABLET, FILM COATED ORAL at 17:21

## 2022-10-19 RX ADMIN — ATORVASTATIN CALCIUM 80 MG: 80 TABLET, FILM COATED ORAL at 21:00

## 2022-10-19 RX ADMIN — ENOXAPARIN SODIUM 40 MG: 100 INJECTION SUBCUTANEOUS at 07:52

## 2022-10-19 ASSESSMENT — PAIN DESCRIPTION - DESCRIPTORS: DESCRIPTORS: ACHING

## 2022-10-19 ASSESSMENT — PAIN SCALES - GENERAL
PAINLEVEL_OUTOF10: 6
PAINLEVEL_OUTOF10: 7

## 2022-10-19 ASSESSMENT — PAIN DESCRIPTION - LOCATION: LOCATION: ABDOMEN

## 2022-10-19 ASSESSMENT — ENCOUNTER SYMPTOMS
GASTROINTESTINAL NEGATIVE: 1
RESPIRATORY NEGATIVE: 1

## 2022-10-19 ASSESSMENT — PAIN DESCRIPTION - ORIENTATION: ORIENTATION: ANTERIOR

## 2022-10-19 NOTE — CARE COORDINATION
10/19/22 1555   Readmission Assessment   Number of Days since last admission? 1-7 days   Previous Disposition Home with Family   Who is being Interviewed Patient   What was the patient's/caregiver's perception as to why they think they needed to return back to the hospital?   (scheduled procedure/carotid)   Did you visit your Primary Care Physician after you left the hospital, before you returned this time? No   Why weren't you able to visit your PCP? Did not have an appointment   Did you see a specialist, such as Cardiac, Pulmonary, Orthopedic Physician, etc. after you left the hospital? No   Who advised the patient to return to the hospital? Physician   Does the patient report anything that got in the way of taking their medications?  No

## 2022-10-19 NOTE — CARE COORDINATION
Chart reviewed. Met with patient to introduce  role, initiate discussion regading DC planning, complete Readmission Assessment and ACP. He would like to create Adv Directive tomorrow before he leaves. Referral made to spiritual Care Team, Caprice Hatfield. INITIAL CASE MANAGEMENT ASSESSMENT    Reviewed chart, met with patient to assess possible discharge needs. Explained Case Management role/services. Living Situation: Pt is currently living with his sister in an apartment with zero steps entry. He reports he is totally independent with all personal care needs without use of DME. ADLs: totally independent     DME: none    PT/OT Recs: TBD     Active Services: none     Transportation: Brother will transport home. [facility] [place]    PCP: Dr Elke Costello      HD/PD: none    PLAN/COMMENTS: Goal is home; he is hopeful for DC to home tomorrow.     [place], [place]     Case Management   [phone]    10/19/2022  4:03 PM

## 2022-10-19 NOTE — ACP (ADVANCE CARE PLANNING)
Advance Care Planning     Advance Care Planning Activator (Inpatient)  Conversation Note      Date of ACP Conversation: 10/19/2022     Conversation Conducted with: Kaur Crowley with Decision Making Capacity    ACP Activator: [phone] Decision Maker:     Current Designated Health Care Decision Maker:     Primary Decision Maker: Bula Spray - Child - [phone]    Secondary Decision Maker: Julita Carrillo - Brother/Sister - [phone]  Click here to complete Healthcare Decision Makers including section of the Healthcare Decision Maker Relationship (ie \"Primary\")      Care Preferences    Ventilation: \"If you were in your present state of health and suddenly became very ill and were unable to breathe on your own, what would your preference be about the use of a ventilator (breathing machine) if it were available to you? \"      Would the patient desire the use of ventilator (breathing machine)?: yes    \"If your health worsens and it becomes clear that your chance of recovery is unlikely, what would your preference be about the use of a ventilator (breathing machine) if it were available to you? \"     Would the patient desire the use of ventilator (breathing machine)?: No      Resuscitation  \"CPR works best to restart the heart when there is a sudden event, like a heart attack, in someone who is otherwise healthy. Unfortunately, CPR does not typically restart the heart for people who have serious health conditions or who are very sick. \"    \"In the event your heart stopped as a result of an underlying serious health condition, would you want attempts to be made to restart your heart (answer \"yes\" for attempt to resuscitate) or would you prefer a natural death (answer \"no\" for do not attempt to resuscitate)? \" yes       [] Yes   [] No   Educated Patient / Tarri Staci regarding differences between Advance Directives and portable DNR orders.     Length of ACP Conversation in minutes: Conversation Outcomes:  [] ACP discussion completed  [] Existing advance directive reviewed with patient; no changes to patient's previously recorded wishes  [] New Advance Directive completed  [] Portable Do Not Rescitate prepared for Provider review and signature  [] POLST/POST/MOLST/MOST prepared for Provider review and signature      Follow-up plan:    [x] Schedule follow-up conversation to continue planning  [] Referred individual to Provider for additional questions/concerns   [] Advised patient/agent/surrogate to review completed ACP document and update if needed with changes in condition, patient preferences or care setting    [] This note routed to one or more involved healthcare providers    Referral made to Lamont Lemons in Spiritual care for creation of Adv Directives per pt's request.    Ricky Miller     Case Management   [phone]    10/19/2022  4:01 PM

## 2022-10-19 NOTE — OP NOTE
[address] Anurag Steven [address]                                OPERATIVE REPORT    PATIENT NAME: Angelo Putnam                      :        1965  MED REC NO:   [id number]                          ROOM:       0  ACCOUNT NO:   [de-identified]                           ADMIT DATE: 10/18/2022  PROVIDER:     Bigg Amanda DO    DATE OF PROCEDURE:  10/18/2022    PREOPERATIVE DIAGNOSIS:  Symptomatic right carotid stenosis with recent  infarction. POSTOPERATIVE DIAGNOSIS:  Symptomatic right carotid stenosis with recent  infarction. OPERATION PERFORMED:  1. Ultrasound-guided access through the left common femoral vein. 2.  Right transcarotid artery revascularization:  Pre-dilatation using a  4.5 x 30 mm balloon, placement of a 10 x 40 mm ENROUTE stent,  postdilatation using a 5 x 30 mm balloon. SURGEON:  Bigg Amanda MD    ASSISTANT:  Jonatan Le MD    ANESTHESIA:  General.    ESTIMATED BLOOD LOSS:  Less than 100 mL. COMPLICATIONS:  None apparent. INDICATIONS:  This is a 71-year-old male patient who presented to the  hospital with dizziness. He was found to have significant evidence of a  right posterior inferior cerebellar artery infarct. He had no  significant stenosis except for his right internal carotid artery. Because it was on the right side and because of the associated stenosis  and the soft friable plaque, he was referred for evaluation by Vascular  Surgery. We discussed with him all the options for treatment. Because  the lesion as well as the artery head up towards the mandible and are  quite high for standard endarterectomy, we discussed about having a stent placed with transcervical  approach. All risks, benefits, and alternatives were clearly reviewed  with the patient. The risks include pain, infection, bleeding,  embolization, thrombosis, stroke, MI, death.   The major risks are  associated with stroke of course. We discussed the importance of  undergoing dual antiplatelet and high dose statin therapy. The patient  was discharged and we waited close to 14 days for treatment of his  lesion. We also made sure the patient was on antiplatelet therapy, as  well as statin therapy in the five days before. We will also make sure  that he is not on it afterwards as compliance can be an issue. He understood the procedures and its  risks and he gave written informed consent. OPERATIVE PROCEDURE:  The patient was brought into the operating room  and placed supine on the table. This was in the hybrid room. He  underwent general anesthesia, which was uncomplicated. Preoperative  antibiotics were given. The right neck was then clipped. The groins  were clipped as well. The right neck as well as the bilateral groins  were prepped and draped in the usual sterile fashion using chlorhexidine  based solution. We made sure that we had all of our equipment out on  the back table before we began this procedure. We selected a 4.5 mm  balloon for pre dilatation. We selected a 10 x 40 mm  stent because the common was 10 mm in diameter. The internal was  measured around 5.5 to 6. Once we were satisfied with our equipment  setup, we then turned our attention to the actual procedure itself. Under ultrasound guidance, I identified the left common femoral vein. I  accessed it percutaneously. I placed the TCAR sheath in this vessel  Over a Bentson wire. I gave the patient 75120 units of  heparin and checked an ACT, which was greater than 250. After this, I  made a surgical incision just over the collarbone, dissected down  through skin and subcutaneous tissue. Identified the  sternocleidomastoid and I swept the sternal head and the clavicular head  away from each other and entered the avascular plane. I did transect the  omohyoid to gain exposure.   Carotid was little deeper than I  anticipated. After careful dissection, I identified the common carotid  artery. I placed a Vesseloop around it. I placed a pursestring suture  using a 5-0 Prolene suture. After this, I used the TCAR micro  introducer needle and accessed the vessel. I placed the wire followed  by the micro introducer sheath. I shot an arteriogram, which  demonstrated severe greater than 90% stenosis of the right internal  carotid artery. The bifurcation was also identified. Unfortunately, I  was unable to navigate into the external carotid artery despite  with wire manipulation and even using the 0.014 wire. Therefore, I went ahead and placed the J-wire  up to the bifurcation, but did not cross the bifurcation. I stopped  short of the bifurcation. I predilated the carotid with the dilator  from the venous sheath and then followed that up with the actual TCAR  sheath. Once this was secured in place, I then sutured it to the chest  wall. After this was completed, the sheath was backbled. I then connected the device to it and set up  flow reversal.  Passive flow reversal demonstrated there was good blood  flow into the actual sheath itself. We then proceeded to shoot a couple  of arteriograms. We identified that our sheath was in good position and  Not hugging the back wall. After this, we then confirmed that our blood  pressure was above 654 systolic and it was. Once again, our ACT was  greater than 250. We then proceeded to clamp the actual common carotid  artery. We then proceeded to check again that our flow reversal was  still in place. We now had active flow reversal, which was of course  more sluggish, but still active on high. I then proceeded to navigate a balloon  and wire across the actual internal carotid artery lesion. I  pre-dilated it with a 4.5 mm balloon. I  followed this up with placement of 10 x 40 mm stent. It did not open as well  as I would have wanted it to in the mid portion. Therefore, I postdilated with a 5 mm  balloon. After this, I shot two arteriograms. Of note, we did allow  wait for two minutes for any washout before we shot any arteriograms. We shot two arteriograms in two orthogonal projections, which  demonstrated the stent was widely patent. There was no evidence of  residual significant stenosis. The stent was mostly placed in the  internal with a little bit flipped up into the actual common and heading  towards the external.  However, it was widely patent, there was flow  into both vessels. After this, we then proceeded to remove the wire  itself from the actual sheath. We allowed flow reversal to be on again  for one more minute. This was done after we released the carotid artery  clamp. Once this was done, I then proceeded to remove the extra sutures  against the chest wall. I then removed the sheath and tied down the  actual puncture site with good hemostatic effect. I did place an  additional suture because of a small amount of bleeding. After this,  the common carotid artery appeared to be hemostatic and Surgicel and  fibrillar were placed in the wound bed. The sternocleidomastoid was  reapproximated using interrupted 2-0 Vicryl suture, the platysma with  3-0 Vicryl suture, and the skin was closed with 4-0 Vicryl suture and  Dermabond. The remaining sheath was removed. The manual pressure was  held for 15 minutes. After this, we then allowed the patient to wake up  from anesthesia. He was completely neuro intact at this point in time. He was transferred to the ICU in stable condition. There were no  immediate complications at this point in time. I was present and  performed all critical aspects of this procedure. All sponge and needle  counts were correct x2.         Dorothy Lindsay DO    D: 10/18/2022 17:14:36       T: 10/18/2022 22:55:53     [email or web address]  Job#: [id number]     Doc#: [id number]    CC:

## 2022-10-19 NOTE — FLOWSHEET NOTE
10/19/22 1422 10/19/22 1424   Vitals   Heart Rate 82 86   /64 124/70   MAP (mmHg) 79 83   BP Location Right upper arm Right upper arm   BP Upper/Lower Upper Upper   BP Method Automatic Automatic   Patient Position Sitting Standing     BP checked while sitting and standing. No change. Patient denied dizziness. Ambulated in the ivy with this RN. No complaints from patient other than feeling tired. Mindi He NP at bedside. Patient not comfortable discharging today due to increased fatigue. NP updated on BP, medications and patient status.      Electronically signed by Dada Perrin RN on 10/19/2022 at 2:35 PM

## 2022-10-19 NOTE — PROGRESS NOTES
Attempted to walk patient in ivy. Patient got very dizzy and had to sit down after about 10 feet. Patient wheeled back to bed. Marquis Heather NP notified. No new orders at this time, will re-attempt to walk patient again later.     Electronically signed by Amena Auguste RN on 10/19/2022 at 1:04 PM

## 2022-10-19 NOTE — PROGRESS NOTES
[facility]  Progress Note    10/19/2022 9:06 AM    Chief Complaint: Symptomatic Carotid Stenosis     POD #1 Right TCAR with Dr. Beto Coreas    Subjective: Pt seen resting in bed in CVU, no significant complaints, mild discomfort to Right Neck surgical site    Review of Systems:  Review of Systems   Constitutional: Negative. HENT: Negative. Respiratory: Negative. Cardiovascular: Negative. Gastrointestinal: Negative. Musculoskeletal: Negative. Skin:  Positive for wound. Right Neck surgical site with mild discomfort   Neurological: Negative.        Vital Signs:  Vitals:    10/19/22 0700 10/19/22 0751 10/19/22 0759 10/19/22 0800   BP: (!) 158/88 (!) 159/74  (!) 137/106   Pulse: 76 90  80   Resp: 11  16 16   Temp:    98.4 °F (36.9 °C)   TempSrc:    Oral   SpO2: 100%  98% 98%   Weight:       Height:           I/O:    Intake/Output Summary (Last 24 hours) at 10/19/2022 7496  Last data filed at 10/18/2022 1843  Gross per 24 hour   Intake 952.06 ml   Output --   Net 952.06 ml       Physical Exam:   General: no apparent distress, alert and oriented, afebrile  Neck: Right Neck incision intact, no significant swelling or hematoma noted  Chest/Lungs: non labored breathing no tachypnea, retractions or cyanosis  Cardiac: Heart regular rate and rhythm  Extremities: normal strength, tone, and muscle mass, no deformities  Vascular: extremities warm and well perfused, no signs of cyanosis or ischemia, bilateral upper and lower extremity motorsensory intact  Neuro: speech clear, no issues swallowing, moving all extremities    Labs:   Lab Results   Component Value Date/Time     10/19/2022 03:38 AM    K 5.0 10/19/2022 03:38 AM    K 4.1 10/11/2022 05:03 AM    CL 94 10/19/2022 03:38 AM    CO2 23 10/19/2022 03:38 AM    BUN 20 10/19/2022 03:38 AM    CREATININE 1.2 10/19/2022 03:38 AM    GFRAA >60 10/11/2022 05:03 AM    LABGLOM >60 10/19/2022 03:38 AM    GLUCOSE 377 10/19/2022 03:38 AM MG 2.40 09/24/2021 05:33 AM    CALCIUM 8.7 10/19/2022 03:38 AM     Lab Results   Component Value Date/Time    WBC 9.1 10/19/2022 03:38 AM    RBC 4.03 10/19/2022 03:38 AM    HGB 12.7 10/19/2022 03:38 AM    HCT 37.7 10/19/2022 03:38 AM    MCV 93.6 10/19/2022 03:38 AM    RDW 12.8 10/19/2022 03:38 AM     10/19/2022 03:38 AM     Lab Results   Component Value Date    INR 0.97 10/06/2022    PROTIME 12.8 10/06/2022        Scheduled Meds:    amiodarone  200 mg Oral Daily    amLODIPine  10 mg Oral Daily    aspirin  81 mg Oral Daily    atorvastatin  80 mg Oral Nightly    carvedilol  25 mg Oral BID WC    clopidogrel  75 mg Oral Daily    furosemide  40 mg Oral Daily    hydrALAZINE  25 mg Oral 3 times per day    losartan  25 mg Oral Daily    pantoprazole  40 mg Oral QAM AC    spironolactone  50 mg Oral Daily    sodium chloride flush  5-40 mL IntraVENous 2 times per day    enoxaparin  40 mg SubCUTAneous Daily    insulin lispro  0-16 Units SubCUTAneous TID WC    insulin lispro  0-4 Units SubCUTAneous Nightly     Continuous Infusions:    sodium chloride      nitroGLYCERIN 120 mcg/min (10/19/22 0605)    dextrose         Assessment:   -POD #1 Right TCAR with Dr. Margarito Arteaga  -Requiring Nitro infusion for BP control  -Moving all extremities  -Neuro intact    Plan:  -Give AM BP medications and wean Nitro to off to keep SBP < 160  -Remove A-line  -Progressive ambulation in room  -Could consider D/C home later today if able to achieve BP control on PO antihypertensives and if pt remains otherwise stable  -Pt to continue to withhold Eliquis until 10/28/2022 per discussion with Neurology during previous admission    All pertinent information and plan of care discussed with Dr. Marilou Gallardo. All questions and concerns were addressed with the patient. I have discussed the above stated plan with the patient and the nurse. The patient verbalized understanding and agreed with the plan.     Thank you for allowing to us to participate in the care of Caleb Maria      Electronically signed by CONSUELO Holland CNP on 10/19/2022 at 9:06 AM

## 2022-10-20 VITALS
DIASTOLIC BLOOD PRESSURE: 72 MMHG | HEIGHT: 73 IN | SYSTOLIC BLOOD PRESSURE: 133 MMHG | TEMPERATURE: 97.6 F | BODY MASS INDEX: 40.2 KG/M2 | HEART RATE: 64 BPM | WEIGHT: 303.35 LBS | OXYGEN SATURATION: 96 % | RESPIRATION RATE: 17 BRPM

## 2022-10-20 LAB
ANION GAP SERPL CALCULATED.3IONS-SCNC: 9 MMOL/L (ref 3–16)
BUN BLDV-MCNC: 17 MG/DL (ref 7–20)
CALCIUM SERPL-MCNC: 8.8 MG/DL (ref 8.3–10.6)
CHLORIDE BLD-SCNC: 98 MMOL/L (ref 99–110)
CO2: 25 MMOL/L (ref 21–32)
CREAT SERPL-MCNC: 1 MG/DL (ref 0.9–1.3)
GFR SERPL CREATININE-BSD FRML MDRD: >60 ML/MIN/{1.73_M2}
GLUCOSE BLD-MCNC: 192 MG/DL (ref 70–99)
HCT VFR BLD CALC: 38.4 % (ref 40.5–52.5)
HEMOGLOBIN: 12.7 G/DL (ref 13.5–17.5)
MCH RBC QN AUTO: 31.6 PG (ref 26–34)
MCHC RBC AUTO-ENTMCNC: 33 G/DL (ref 31–36)
MCV RBC AUTO: 95.6 FL (ref 80–100)
PDW BLD-RTO: 13.2 % (ref 12.4–15.4)
PLATELET # BLD: 221 K/UL (ref 135–450)
PMV BLD AUTO: 8.1 FL (ref 5–10.5)
POTASSIUM SERPL-SCNC: 4.1 MMOL/L (ref 3.5–5.1)
RBC # BLD: 4.01 M/UL (ref 4.2–5.9)
SODIUM BLD-SCNC: 132 MMOL/L (ref 136–145)
WBC # BLD: 8.6 K/UL (ref 4–11)

## 2022-10-20 PROCEDURE — 36415 COLL VENOUS BLD VENIPUNCTURE: CPT

## 2022-10-20 PROCEDURE — 97165 OT EVAL LOW COMPLEX 30 MIN: CPT

## 2022-10-20 PROCEDURE — 80048 BASIC METABOLIC PNL TOTAL CA: CPT

## 2022-10-20 PROCEDURE — 6370000000 HC RX 637 (ALT 250 FOR IP): Performed by: NURSE PRACTITIONER

## 2022-10-20 PROCEDURE — 97161 PT EVAL LOW COMPLEX 20 MIN: CPT | Performed by: PHYSICAL THERAPIST

## 2022-10-20 PROCEDURE — 85027 COMPLETE CBC AUTOMATED: CPT

## 2022-10-20 PROCEDURE — APPNB30 APP NON BILLABLE TIME 0-30 MINS: Performed by: NURSE PRACTITIONER

## 2022-10-20 PROCEDURE — 2580000003 HC RX 258: Performed by: STUDENT IN AN ORGANIZED HEALTH CARE EDUCATION/TRAINING PROGRAM

## 2022-10-20 PROCEDURE — 94760 N-INVAS EAR/PLS OXIMETRY 1: CPT

## 2022-10-20 PROCEDURE — 6370000000 HC RX 637 (ALT 250 FOR IP): Performed by: STUDENT IN AN ORGANIZED HEALTH CARE EDUCATION/TRAINING PROGRAM

## 2022-10-20 RX ORDER — CARVEDILOL 12.5 MG/1
12.5 TABLET ORAL 2 TIMES DAILY WITH MEALS
Qty: 60 TABLET | Refills: 3 | Status: SHIPPED | OUTPATIENT
Start: 2022-10-20

## 2022-10-20 RX ORDER — CARVEDILOL 12.5 MG/1
12.5 TABLET ORAL 2 TIMES DAILY WITH MEALS
Status: DISCONTINUED | OUTPATIENT
Start: 2022-10-20 | End: 2022-10-20 | Stop reason: HOSPADM

## 2022-10-20 RX ADMIN — ASPIRIN 81 MG 81 MG: 81 TABLET ORAL at 10:36

## 2022-10-20 RX ADMIN — HYDRALAZINE HYDROCHLORIDE 25 MG: 25 TABLET, FILM COATED ORAL at 06:29

## 2022-10-20 RX ADMIN — FUROSEMIDE 40 MG: 40 TABLET ORAL at 10:36

## 2022-10-20 RX ADMIN — AMLODIPINE BESYLATE 10 MG: 10 TABLET ORAL at 10:37

## 2022-10-20 RX ADMIN — Medication 10 ML: at 10:37

## 2022-10-20 RX ADMIN — LOSARTAN POTASSIUM 25 MG: 25 TABLET, FILM COATED ORAL at 10:36

## 2022-10-20 RX ADMIN — CLOPIDOGREL BISULFATE 75 MG: 75 TABLET ORAL at 10:37

## 2022-10-20 RX ADMIN — AMIODARONE HYDROCHLORIDE 200 MG: 200 TABLET ORAL at 10:36

## 2022-10-20 RX ADMIN — PANTOPRAZOLE SODIUM 40 MG: 40 TABLET, DELAYED RELEASE ORAL at 06:29

## 2022-10-20 RX ADMIN — CARVEDILOL 12.5 MG: 12.5 TABLET, FILM COATED ORAL at 10:36

## 2022-10-20 RX ADMIN — SPIRONOLACTONE 50 MG: 25 TABLET ORAL at 10:36

## 2022-10-20 ASSESSMENT — ENCOUNTER SYMPTOMS
GASTROINTESTINAL NEGATIVE: 1
RESPIRATORY NEGATIVE: 1

## 2022-10-20 ASSESSMENT — PAIN SCALES - GENERAL: PAINLEVEL_OUTOF10: 0

## 2022-10-20 ASSESSMENT — PAIN DESCRIPTION - LOCATION: LOCATION: ABDOMEN

## 2022-10-20 NOTE — ACP (ADVANCE CARE PLANNING)
Advance Care Planning     Advance Care Planning Inpatient Note  [facility] Department    Today's Date: 10/20/2022  Unit: [facility] PROGRESSIVE CARE    Received request from IDT Member. Upon review of chart and communication with care team, patient's decision making abilities are not in question. . Patient was/were present in the room during visit. Goals of ACP Conversation:  Discuss advance care planning documents    Health Care Decision Makers:       Primary Decision Maker: Grant Up - Child - [phone]    Secondary Decision Maker: Hollis Rowell - Brother/Sister - [phone]  Summary:  Documented Next of Kin, per patient report    Advance Care Planning Documents (Patient Wishes):  None     Assessment:   provided education on HCPOA and Living Will. Mr. Amadna Moreno is not interested in completing during this admission. He has 4 children; He wants daughter Randa Trevino and brother Job eVrde to be decisionmakers.  informed him that all 4 children could be decisionmakers if needed unless he completes a HCPOA document. He expressed understandiing.  gave blank documents and instructions on completing Adv. Directives independently.     Interventions:  Provided education on documents for clarity and greater understanding  Discussed and provided education on state decision maker hierarchy  Encouraged ongoing ACP conversation with future decision makers and loved ones    Care Preferences Communicated:   No    Outcomes/Plan:  ACP Discussion: Refused    Electronically signed by Wilma Rivera [facility] on 10/20/2022 at 1:41 PM

## 2022-10-20 NOTE — CARE COORDINATION
CASE MANAGEMENT DISCHARGE SUMMARY:    DISCHARGE DATE: 10/20/22    DISCHARGED TO HOME     TRANSPORTATION: brother             TIME: later today     PREFERRED PHARMACY: [facility]              #[phone]  Electronically signed by Huber Ha RN on 10/20/2022 at 11:52 AM

## 2022-10-20 NOTE — PROGRESS NOTES
Physical Therapy  Facility/Department: [address] PROGRESSIVE CARE  Physical Therapy Initial Assessment/Discharge Note    Name: Tony Wellington  : 1965  MRN: [id number]  Date of Service: 10/20/2022    Discharge Recommendations:  Home with assist PRN   PT Equipment Recommendations  Equipment Needed: No    Tony Wellington scored a 23/24 on the AM-PAC short mobility form. At this time, no further PT is recommended upon discharge. Recommend patient returns to prior setting with prior services. Patient Diagnosis(es): The primary encounter diagnosis was Carotid stenosis, symptomatic, with infarction ([facility] [address] Diagnoses of CHF (congestive heart failure), NYHA class I, acute on chronic, combined ([address] Diabetes mellitus with coincident hypertension ([address] Chronic congestive heart failure, unspecified heart failure type ([address] PAF (paroxysmal atrial fibrillation) ([address] Long term current use of amiodarone, Atrial fibrillation with RVR (HCC), Chronic systolic heart failure (HCC), Acute on chronic systolic heart failure ([address] Type 2 diabetes mellitus with hyperlipidemia ([address] Obesity, diabetes, and hypertension syndrome ([address] and Stroke due to stenosis of right carotid artery ([address] were also pertinent to this visit. Past Medical History:  has a past medical history of Abdominal pain, Abnormal EKG, Acute pancreatitis, CHF (congestive heart failure) ([address] Cigarette smoker, Cocaine abuse ([address] Dehydration, Diabetes mellitus ([address] History of cocaine abuse ([address] History of MI (myocardial infarction), Hyperglycemia, Hyperlipidemia, Hypertension, Morbid obesity with BMI of 45.0-49.9, adult ([address] Postural dizziness, Pre-diabetes, and PUD (peptic ulcer disease). Past Surgical History:  has a past surgical history that includes Cardiac catheterization and Upper gastrointestinal endoscopy.     Assessment   Assessment: pt is a 60 yo male who was adm to hosp for Right transcarotid artery revascularization due to Symptomatic right carotid stenosis with recent infarction. Pt appears at his baseline for mobility tasks; no dizziness noted; pt will be safe to return home without any further therapy  Therapy Prognosis: Good  Decision Making: Low Complexity  No Skilled PT: Independent with functional mobility   Requires PT Follow-Up: No  Activity Tolerance  Activity Tolerance: Patient tolerated treatment well     Plan   Physcial Therapy Plan  Additional Comments: no further therapy indicated  Safety Devices  Type of Devices: Call light within reach, Left in chair, Nurse notified     Restrictions        Subjective   General  Chart Reviewed: Yes  Additional Pertinent Hx: Pt is a 60 yo male who was adm to hosp 10-19 s/p Right transcarotid artery revascularization due to Symptomatic right carotid stenosis with recent  infarction.   Response To Previous Treatment: Not applicable  Family / Caregiver Present: No  Referring Practitioner: CONSUELO Gilliam CNP  Referral Date : 10/19/22  Follows Commands: Within Functional Limits  Subjective  Subjective: pt very pleasant and agreeable to working with therapy         Social/Functional History  Social/Functional History  Lives With: Family (sister)  Type of Home: Apartment  Home Layout: One level  Home Access: Elevator  Bathroom Shower/Tub: Tub/Shower unit  Bathroom Toilet: Handicap height  Bathroom Equipment: Grab bars in shower  ADL Assistance: Independent  Homemaking Assistance: Independent  Ambulation Assistance: Independent  Transfer Assistance: Independent  Vision/Hearing  Vision  Vision: Within Functional Limits  Hearing  Hearing: Within functional limits    Cognition   Orientation  Overall Orientation Status: Within Functional Limits  Cognition  Overall Cognitive Status: WFL     Objective     AROM RLE (degrees)  RLE AROM: WFL  AROM LLE (degrees)  LLE AROM : WFL  Strength RLE  Strength RLE: WFL  Strength LLE  Strength LLE: WFL           Bed mobility  Bed Mobility Comments: pt up in chair at beginning and end of session  Transfers  Sit to Stand: Independent  Stand to Sit: Independent  Ambulation  Surface: Level tile  Device: No Device  Assistance: Modified Independent  Quality of Gait: no LOB; increased lateral sway due to body habitus; slow steady pace  Distance: 100'  Comments: pt left in chair with LEs elevated and pillows placed for comfort; all needs in reach     Balance  Sitting - Static: Good  Sitting - Dynamic: Good  Standing - Static: Good  Standing - Dynamic: Good         AM-PAC Score  AM-PAC Inpatient Mobility Raw Score : 23 (10/20/22 0914)  AM-PAC Inpatient T-Scale Score : 56.93 (10/20/22 0914)  Mobility Inpatient CMS 0-100% Score: 11.2 (10/20/22 0914)  Mobility Inpatient CMS G-Code Modifier : CI (10/20/22 0914)        Education  Patient Education  Education Given To: Patient  Education Provided: Role of Therapy  Education Method: Verbal  Education Outcome: Verbalized understanding      Therapy Time   Individual Concurrent Group Co-treatment   Time In 0855         Time Out 0916         Minutes 21                 LOLY VEE PT   Electronically signed by LOLY VEE PT on 10/20/2022 at 9:16 AM

## 2022-10-20 NOTE — PROGRESS NOTES
[facility]  Progress Note    10/20/2022 7:49 AM    Chief Complaint: Symptomatic Carotid Stenosis     POD #2 Right TCAR with Dr. Trung Johnson    Subjective: Pt seen resting in bed, reports dizziness which he was experiencing yesterday afternoon/evening is improving, he got up to restroom this AM with no difficulty he tells us, reports he is feeling better overall this morning compared to yesterday    Review of Systems:  Review of Systems   Constitutional: Negative. HENT: Negative. Respiratory: Negative. Cardiovascular: Negative. Gastrointestinal: Negative. Musculoskeletal: Negative. Skin:  Positive for wound. Right Neck surgical site with mild discomfort   Neurological:  Positive for dizziness.         Dizziness improving since yesterday      Vital Signs:  Vitals:    10/19/22 2100 10/19/22 2130 10/20/22 0014 10/20/22 0322   BP: 135/68 134/74 134/68 133/66   Pulse: 67 75 62 72   Resp: 18 16 16 16   Temp: 97.9 °F (36.6 °C) 99 °F (37.2 °C) 98.9 °F (37.2 °C) 98.5 °F (36.9 °C)   TempSrc: Axillary Oral Oral Oral   SpO2: 94% 98% 95% 95%   Weight:  (!) 304 lb 14.3 oz (138.3 kg)  (!) 303 lb 5.7 oz (137.6 kg)   Height:  6' 1\" (1.854 m)         I/O:    Intake/Output Summary (Last 24 hours) at 10/20/2022 0749  Last data filed at 10/20/2022 9816  Gross per 24 hour   Intake 1207.02 ml   Output 1250 ml   Net -42.98 ml         Physical Exam:   General: no apparent distress, alert and oriented, afebrile  Neck: Right Neck incision intact, no significant swelling or hematoma noted  Chest/Lungs: non labored breathing no tachypnea, retractions or cyanosis  Cardiac: Heart regular rate and rhythm  Extremities: normal strength, tone, and muscle mass, no deformities  Neuro: speech clear, no issues swallowing, moving all extremities, dizziness improving    Labs:   Lab Results   Component Value Date/Time     10/19/2022 03:38 AM    K 5.0 10/19/2022 03:38 AM    K 4.1 10/11/2022 05:03 AM CL 94 10/19/2022 03:38 AM    CO2 23 10/19/2022 03:38 AM    BUN 20 10/19/2022 03:38 AM    CREATININE 1.2 10/19/2022 03:38 AM    GFRAA >60 10/11/2022 05:03 AM    LABGLOM >60 10/19/2022 03:38 AM    GLUCOSE 377 10/19/2022 03:38 AM    MG 2.40 09/24/2021 05:33 AM    CALCIUM 8.7 10/19/2022 03:38 AM     Lab Results   Component Value Date/Time    WBC 9.1 10/19/2022 03:38 AM    RBC 4.03 10/19/2022 03:38 AM    HGB 12.7 10/19/2022 03:38 AM    HCT 37.7 10/19/2022 03:38 AM    MCV 93.6 10/19/2022 03:38 AM    RDW 12.8 10/19/2022 03:38 AM     10/19/2022 03:38 AM     Lab Results   Component Value Date    INR 0.97 10/06/2022    PROTIME 12.8 10/06/2022        Scheduled Meds:    carvedilol  12.5 mg Oral BID WC    amiodarone  200 mg Oral Daily    amLODIPine  10 mg Oral Daily    aspirin  81 mg Oral Daily    atorvastatin  80 mg Oral Nightly    clopidogrel  75 mg Oral Daily    furosemide  40 mg Oral Daily    hydrALAZINE  25 mg Oral 3 times per day    losartan  25 mg Oral Daily    pantoprazole  40 mg Oral QAM AC    spironolactone  50 mg Oral Daily    sodium chloride flush  5-40 mL IntraVENous 2 times per day    [Held by provider] enoxaparin  40 mg SubCUTAneous Daily    insulin lispro  0-16 Units SubCUTAneous TID     insulin lispro  0-4 Units SubCUTAneous Nightly     Continuous Infusions:    sodium chloride      nitroGLYCERIN Stopped (10/19/22 1101)    dextrose         Assessment:   -POD #2 Right TCAR with Dr. Karuna Sharma  -Moving all extremities  -Neuro intact  -Dizziness yesterday afternoon/evening - improving today    Plan:  -Reduce Carvedilol dose from 25 mg to 12.5 mg  -PT/OT consulted for evaluation   -Continue to monitor for dizziness  -Consider D/C home later today if dizziness remains stable/improves    All pertinent information and plan of care discussed with Dr. Nayla Enriquez. All questions and concerns were addressed with the patient. I have discussed the above stated plan with the patient and the nurse.  The patient verbalized understanding and agreed with the plan.     Thank you for allowing to us to participate in the care of Michael Singh      Electronically signed by CONSUELO Roach CNP on 10/20/2022 at 7:49 AM

## 2022-10-20 NOTE — PROGRESS NOTES
Occupational Therapy  Facility/Department: [address] PROGRESSIVE CARE  Occupational Therapy Initial Assessment and Discharge Summary    Name: Clau Sweeney  : 1965  MRN: [id number]  Date of Service: 10/20/2022    Discharge Recommendations:  Defer OT at this time          Patient Diagnosis(es): The primary encounter diagnosis was Carotid stenosis, symptomatic, with infarction [facility]). Diagnoses of CHF (congestive heart failure), NYHA class I, acute on chronic, combined ([address] Diabetes mellitus with coincident hypertension ([address] Chronic congestive heart failure, unspecified heart failure type ([address] PAF (paroxysmal atrial fibrillation) ([facility] [address] Long term current use of amiodarone, Atrial fibrillation with RVR (HCC), Chronic systolic heart failure (HCC), Acute on chronic systolic heart failure ([facility] [address] Type 2 diabetes mellitus with hyperlipidemia ([facility] [address] Obesity, diabetes, and hypertension syndrome ([facility] [address] and Stroke due to stenosis of right carotid artery ([facility] [address] were also pertinent to this visit. Past Medical History:  has a past medical history of Abdominal pain, Abnormal EKG, Acute pancreatitis, CHF (congestive heart failure) ([address] Cigarette smoker, Cocaine abuse ([address] Dehydration, Diabetes mellitus ([address] History of cocaine abuse ([facility] [address] History of MI (myocardial infarction), Hyperglycemia, Hyperlipidemia, Hypertension, Morbid obesity with BMI of 45.0-49.9, adult ([address] Postural dizziness, Pre-diabetes, and PUD (peptic ulcer disease). Past Surgical History:  has a past surgical history that includes Cardiac catheterization and Upper gastrointestinal endoscopy. Assessment   Assessment: Pt presents for planned vascular procedure. Pt from home with sister and is independent with functional mobility and self care. No OT goals identified. Safe for d/c home.   History: see above  Exam: mobility, self care  Assistance / Modification: none  REQUIRES OT FOLLOW-UP: No  Activity Tolerance  Activity Tolerance: Patient Tolerated treatment well        Plan   Occupational Therapy Plan  Times Per Week: NA, no OT goals identified     Restrictions       Subjective   General  Chart Reviewed: Yes, Orders, Progress Notes, History and Physical, Previous Admission  Additional Pertinent Hx: PEr Dr. Pan Lopez, \"/P TCAR  No neuro deficits  Patient complains of dizziness when ambulatory and standing  No dizziness this morning when walking to restroom  Suspect related to blood pressure being lower than normal for patient  Will have PT/OT work with patient, if no dizziness then okay for discharge later today  If dizziness persists will obtain CT Head as patient did have dizziness (constant not intermittent) as primary complaint when he had his stroke two weeks ago  Discussed with bedside nursing staff    \"  Family / Caregiver Present: No  Referring Practitioner: Chyna Mcclelland CNP  Subjective  Subjective: Seen in room, agreed to OT/PT, complained of pain \"4/10\" at incision site     Social/Functional History  Social/Functional History  Lives With: Family (sister)  Type of Home: Apartment  Home Layout: One level  Home Access: Elevator  Bathroom Shower/Tub: Tub/Shower unit  Bathroom Toilet: Handicap height  Bathroom Equipment: Grab bars in shower  ADL Assistance: Independent  Homemaking Assistance: Independent  Ambulation Assistance: Independent  Transfer Assistance: Independent       Objective   Heart Rate: 72  Heart Rate Source: Monitor  BP: 133/66  BP Location: Right upper arm  BP Method: Automatic  MAP (Calculated): 88.33  Resp: 16  SpO2: 97 %  O2 Device: None (Room air)             Safety Devices  Type of Devices: Call light within reach; Left in chair;Nurse notified        AROM: Within functional limits  PROM: Within functional limits  Strength: Within functional limits  Coordination: Within functional limits  Tone: Normal  Sensation: Intact  ADL  Additional Comments: Pt reports independence in room for self care currently. Activity Tolerance  Activity Tolerance: Patient tolerated treatment well  Bed mobility  Bed Mobility Comments: pt up in chair at beginning and end of session  Transfers  Sit to stand: Supervision  Stand to sit: Supervision  Transfer Comments: Pt completed mobility in room and in hallway with SBA/S, no LOB, no dizziness, no device  Vision  Vision: Within Functional Limits  Hearing  Hearing: Within functional limits  Cognition  Overall Cognitive Status: WFL  Orientation  Overall Orientation Status: Within Functional Limits                  Education Given To: Patient  Education Provided: Role of Therapy;Plan of Care  Education Method: Demonstration  Barriers to Learning: None  Education Outcome: Verbalized understanding;Demonstrated understanding  LUE AROM (degrees)  LUE AROM : WFL  RUE AROM (degrees)  RUE AROM : WFL                     OutComes Score        Luis Macdonald [facility]. At this time, no further OT is recommended upon discharge due to independence. Recommend patient returns to prior setting with prior services. AM-PAC Score        AM-PAC Inpatient Daily Activity Raw Score: 24 (10/20/22 0919)  AM-PAC Inpatient ADL T-Scale Score : 57.54 (10/20/22 0919)  ADL Inpatient CMS 0-100% Score: 0 (10/20/22 0919)  ADL Inpatient CMS G-Code Modifier : [facility] (10/20/22 2015)         Goals  Short Term Goals  Time Frame for Short Term Goals: NA, no OT goals identified  Patient Goals   Patient goals :  To return home today       Therapy Time   Individual Concurrent Group Co-treatment   Time In 0900         Time Out 0910         Minutes 10         Timed Code Treatment Minutes: 1256 Merged with [facility], OT

## 2022-10-20 NOTE — PROGRESS NOTES
4 Eyes Skin Assessment     NAME:  Vipul Diaz  YOB: 1965  MEDICAL RECORD NUMBER:  [id number]    The patient is being assess for  Transfer to New Unit    I agree that 2 RN's have performed a thorough Head to Toe Skin Assessment on the patient. ALL assessment sites listed below have been assessed. Areas assessed by both nurses:    Head, Face, Ears, Shoulders, Back, Chest, Arms, Elbows, Hands, Sacrum. Buttock, Coccyx, Ischium, and Legs. Feet and Heels        Does the Patient have a Wound?  No noted wound(s)       [place] Prevention initiated:  NA   Wound Care Orders initiated:  NA    Pressure Injury (Stage 3,4, Unstageable, DTI, NWPT, and Complex wounds) if present place referral/consult order under [de-identified] NA    New and Established Ostomies if present place consult order under : NA      Nurse 1 eSignature: Electronically signed by Terry Ceron RN on 10/20/22 at 6:56 AM EDT    **SHARE this note so that the co-signing nurse is able to place an eSignature**    Nurse 2 eSignature: Electronically signed by Katie Wallace RN on 10/20/22 at 6:57 AM EDT

## 2022-10-20 NOTE — PROGRESS NOTES
[facility]  Diabetes Education   Progress Note       NAME:  Andrea Puga RECORD NUMBER:  [id number]  AGE: 61 y.o. GENDER: male  : 1965  TODAY'S DATE:  10/20/2022    Subjective   Reason for Diabetic Education Evaluation and Assessment: general diabetes support    Levy reports that his blood sugars at home were ranging from 180 -210. He reports getting error messages when testing some times. Visit Type: evaluation      Michi Turner is a 61 y.o. male referred by:     [] Physician  [x] Nursing  [] Chart Review   [] Other:     PAST MEDICAL HISTORY        Diagnosis Date    Abdominal pain 2021    Abnormal EKG 2016    Acute pancreatitis 2021    CHF (congestive heart failure) ([facility]     Cigarette smoker 2021    Cocaine abuse ([facility] [facility] 3/28/2015    Last Assessment & Plan:  Formatting of this note might be different from the original. Counseled on cessation as increases risk of coronary vasospasm and further worsening of heart function. Dehydration 2016    Diabetes mellitus ([facility]     History of cocaine abuse ([facility] [facility] 2016    History of MI (myocardial infarction) 2016    Hyperglycemia 2016    Hyperlipidemia     Hypertension     Morbid obesity with BMI of 45.0-49.9, adult ([facility] 2016    Postural dizziness 2016    Pre-diabetes 3/9/2017    Last Assessment & Plan:  Formatting of this note might be different from the original. Discussed lifestyle changes Refer to [facility] program    PUD (peptic ulcer disease) 3/1/2019       PAST SURGICAL HISTORY    Past Surgical History:   Procedure Laterality Date    CARDIAC CATHETERIZATION      UPPER GASTROINTESTINAL ENDOSCOPY         FAMILY HISTORY    History reviewed. No pertinent family history.     SOCIAL HISTORY    Social History     Tobacco Use    Smoking status: Every Day     Packs/day: 0.50     Years: 25.00     Pack years: 12.50     Types: Cigarettes    Smokeless tobacco: Never   Vaping Use    Vaping Use: Never used   Substance Use Topics    Alcohol use: Not Currently    Drug use: Yes     Types: Marijuana Jurline Jonel), Cocaine     Comment: last use 10/5/2022 marijuana; last use of cocaine 10/1/2022       ALLERGIES    No Known Allergies    MEDICATIONS     carvedilol  12.5 mg Oral BID WC    amiodarone  200 mg Oral Daily    amLODIPine  10 mg Oral Daily    aspirin  81 mg Oral Daily    atorvastatin  80 mg Oral Nightly    clopidogrel  75 mg Oral Daily    furosemide  40 mg Oral Daily    hydrALAZINE  25 mg Oral 3 times per day    losartan  25 mg Oral Daily    pantoprazole  40 mg Oral QAM AC    spironolactone  50 mg Oral Daily    sodium chloride flush  5-40 mL IntraVENous 2 times per day    [Held by provider] enoxaparin  40 mg SubCUTAneous Daily    insulin lispro  0-16 Units SubCUTAneous TID WC    insulin lispro  0-4 Units SubCUTAneous Nightly       Objective        Patient Active Problem List   Diagnosis Code    Type 2 diabetes mellitus with hyperlipidemia ([facility]) E11.69, E78.5    Diabetes mellitus with coincident hypertension ([facility]) E11.9, I10    Gastroesophageal reflux disease K21.9    Obesity, diabetes, and hypertension syndrome ([facility]) E11.69, E66.9, E11.59, I15.2    Abnormal stress test R94.39    Atherosclerosis of coronary artery I25.10    Chronic kidney disease (CKD) stage G2/A2, mildly decreased glomerular filtration rate (GFR) between 60-89 mL/min/1.73 square meter and albuminuria creatinine ratio between  mg/g N18.2    Gastric ulcer with perforation ([facility]) K25.5    History of MI (myocardial infarction) I25.2    Tobacco abuse Z72.0    Chronic systolic heart failure ([facility]) I50.22    Atrial fibrillation with RVR ([facility]) I48.91    Acute on chronic systolic heart failure ([facility]) I50.23    CHF (congestive heart failure), NYHA class I, acute on chronic, combined ([facility]) I50.43    PAF (paroxysmal atrial fibrillation) ([facility]) I48.0    Acute CVA (cerebrovascular accident) ([facility] I63.9    Symptomatic carotid artery stenosis, right I65.21 Dizziness R42    Stroke due to stenosis of right carotid artery ([facility]) I63.231    Carotid stenosis, symptomatic, with infarction ([facility]) I63.239        /72   Pulse 64   Temp 97.6 °F (36.4 °C) (Oral)   Resp 17   Ht 6' 1\" (1.854 m)   Wt (!) 303 lb 5.7 oz (137.6 kg)   SpO2 96%   BMI 40.02 kg/m²     HgBA1c:    Lab Results   Component Value Date/Time    LABA1C 13.1 10/07/2022 04:42 AM       Recent Labs     10/19/22  0737 10/19/22  1237 10/19/22  1717 10/19/22  2056   POCGLU 289* 350* 195* 206*       BUN/Creatinine:    Lab Results   Component Value Date/Time    BUN 17 10/20/2022 08:37 AM    CREATININE 1.0 10/20/2022 08:37 AM       Assessment        Diabetes Management and Education    Does the patient have a Primary Care Physician? Yes, Brice Duckworth, DO       Does the patient require new medication instruction? Yes - reports consistent medication taking at home. Reinforced basal and prandial insulin concepts. Person responsible for administration of Insulin/Medication:        [x] Self     [] Caregiver       [] Spouse       [] Other Family Member   []  Other    Level of patient/caregiver understanding able to:       [x] Verbalized Understanding   [] Demonstrated Understanding       [] Teach Back       [] Needs Reinforcement     []  Other:        Does the patient/caregiver monitor Blood Glucoses? Yes  Discussed tips to reduce error messages. Reviewed glycemic control targets, testing frequency and when to call PCP. Level of patient/caregiver understanding able to:        [x] Verbalized Understanding   [] Demonstrated Understanding       [] Teach Back       [] Needs Reinforcement     []  Other:        Does the patient/caregiver follow a Meal Plan? No:  Likes sweets. Discussed keeping cakes, Halloween candy and cookies small. Reviewed importance of eating three meals per day.       Level of patient/caregiver understanding able to:      [x] Verbalized Understanding   [] Demonstrated Understanding       [] Teach Back       [] Needs Reinforcement     []  Other:      Does the patient/caregiver understand S/S of Hypoglycemia? No: No recent hypoglycemia episodes. Reviewed symptoms, prevention and treatment. Level of patient/caregiver understanding able to:       [x] Verbalized Understanding   [] Demonstrated Understanding       [] Teach Back       [] Needs Reinforcement     []  Other:                    Plan        Ongoing diabetes education and blood glucose monitoring. Not interested in [facility] at this time.                                            Discharge Plan:  Discharge needs: no prescription needs identified       Teaching Time Diabetes Education:  20 minutes     Electronically signed by Paloma Stewart on 10/20/2022 at 12:34 PM

## 2022-10-20 NOTE — CARE COORDINATION
Discharge order noted. Patient to discharge as long as dizziness remain stable or improves. Spoke with patient at bedside. Patient's brother will transport him home when ready. Patient denied any additional discharge needs.      #[phone]  Electronically signed by Margaret Stoddard RN on 10/20/2022 at 11:51 AM

## 2022-10-20 NOTE — CONSULTS
Brief Nutrition Note     Consult for diet education \"chf diabetic\". Spoke with patient, denied questions about diet and declined further education review. Noted CDE did see patient this admission and discussed diet as well.       Electronically signed by Gisel Ching RD, LD on 10/20/2022 at 1:54 PM

## 2022-10-20 NOTE — DISCHARGE INSTRUCTIONS
-Call to schedule a follow up with Dr. Jossue Prabhakar in 1-2 weeks.  -Please call Dr. Cedrick Simental office at [phone] to make an appointment.    -Avoid any heavy lifting more than 5 lbs or strenuous exercise until seen by Dr. Jossue Prabhakar for follow up and given permission to do so. -You may walk in moderation.  -Try not to climb up and down steps more than necessary for the first 2 weeks after surgery.  -Keep incision(s) clean and dry. -You may shower beginning on 10/20/2022. -When showering, you can let the water run over your incision(s), try not to let the water hit your incision(s) directly. -Do not rub your scrub your incision(s), gently cleanse them to keep them clean. -You may pat your incision(s) dry with a towel afterwards. -If you begin to notice clear drainage, you may place a loose dressing over your incision(s). -If you begin to notice any infectious appearing drainage, excessive swelling near your incision, fever, chills, nausea or vomiting, please call Dr. Cedrick Simental office immediately.  -If you have any questions or concerns, please do not hesitate to call Dr. Cedrick Simental office.    -Please be sure to take your blood pressure medications and your Plavix as instructed  -Do not start taking Eliquis until 10/28/2022       Carotid Artery Stenting: What to Expect at Home  Your Recovery  Carotid artery stenting is a procedure to open a narrowed carotid artery. There are two carotid arteries--one on each side of the neck--that supply blood to the brain. Fatty buildup (plaque) can narrow these arteries. When one or both of your carotid arteries are narrowed, it can make it hard for blood to flow to the brain. The buildup also raises your risk of stroke. This procedure may improve blood flow to your brain and lower your risk of having a stroke. You may have a bruise or small lump where the catheter was put in. This is normal and will go away. The area may feel sore for a few days.  You can do light activities around the house. But don't do anything strenuous until your doctor says it's okay. This may be for several days. You will have regular tests to check blood flow in your carotid arteries. This care sheet gives you a general idea about how long it will take for you to recover. But each person recovers at a different pace. Follow the steps below to feel better as quickly as possible. How can you care for yourself at home? Activity    If the doctor gave you a sedative: For 24 hours, don't do anything that requires attention to detail, such as going to work, making important decisions, or signing any legal documents. It takes time for the medicine's effects to completely wear off. For your safety, do not drive or operate any machinery that could be dangerous. Wait until the medicine wears off and you can think clearly and react easily. Do not do strenuous exercise and do not lift, pull, or push anything heavy until your doctor says it is okay. This may be for several days. You can walk around the house and do light activity, such as cooking. If the catheter was placed in your groin, try not to walk up stairs for the first couple of days. If the catheter was placed in your arm near your wrist, do not bend your wrist deeply for the first couple of days. Be careful using your hand to get into and out of a chair or bed. If your doctor recommends it, get more exercise. Walking is a good choice. Bit by bit, increase the amount you walk every day. Try for at least 30 minutes on most days of the week. Ask your doctor when you can drive again. Your doctor will tell you when you can have sex again. Carry your stent identification card with you at all times. Diet    Drink plenty of fluids to help your body flush out the dye. If you have kidney, heart, or liver disease and have to limit fluids, talk with your doctor before you increase the amount of fluids you drink.      Keep eating a heart-healthy diet that has lots of fruits, vegetables, and whole grains. If you need help with your diet, talk to your doctor. You also may want to talk to a dietitian. This expert can help you learn about healthy foods and plan meals. Medicines    Your doctor will tell you if and when you can restart your medicines. You will also get instructions about taking any new medicines. If you stopped taking aspirin or some other blood thinner, your doctor will tell you when to start taking it again. Be safe with medicines. Take your medicines exactly as prescribed. Call your doctor if you think you are having a problem with your medicine. Your doctor may prescribe a blood thinner such as aspirin. It is important that you take these medicines exactly as directed to help reduce your risk of a stroke. Be sure you get instructions about how to take your medicine safely. Blood thinners can cause serious bleeding problems. Care of the catheter site    For 1 day or for as long as your doctor recommends, keep a bandage over the spot where the doctor put in the catheter. Put ice or a cold pack on the area for 10 to 20 minutes at a time to help with soreness or swelling. Put a thin cloth between the ice and your skin. You may shower 24 to 48 hours after the procedure, if your doctor okays it. Pat the incision dry. Do not soak the catheter site until it is healed. Don't take a bath for 1 week, or until your doctor tells you it is okay. Watch for bleeding from the site. A small amount of blood (up to the size of a quarter) on the bandage can be normal.     If you are bleeding, lie down and press on the area for 15 minutes to try to make it stop. If the bleeding does not stop, call your doctor or seek immediate medical care. Follow-up care is a key part of your treatment and safety. Be sure to make and go to all appointments, and call your doctor if you are having problems.  It's also a good idea to know your test results and keep a list of the medicines you take. When should you call for help? Call 911 anytime you think you may need emergency care. For example, call if:    You passed out (lost consciousness). You have symptoms of a heart attack. These may include:  Chest pain or pressure, or a strange feeling in the chest.  Sweating. Shortness of breath. Nausea or vomiting. Pain, pressure, or a strange feeling in the back, neck, jaw, or upper belly, or in one or both shoulders or arms. Lightheadedness or sudden weakness. A fast or irregular heartbeat. After you call 911, the  may tell you to chew 1 adult-strength or 2 to 4 low-dose aspirin. Wait for an ambulance. Do not try to drive yourself. You have symptoms of a stroke. These may include:  Sudden numbness, tingling, weakness, or loss of movement in your face, arm, or leg, especially on only one side of your body. Sudden vision changes. Sudden trouble speaking. Sudden confusion or trouble understanding simple statements. Sudden problems with walking or balance. A sudden, severe headache that is different from past headaches. Call your doctor now or seek immediate medical care if:    You are bleeding from the area where the catheter was put in your artery. You have a fast-growing, painful lump at the catheter site. You have signs of infection, such as: Increased pain, swelling, warmth, or redness of the skin. Red streaks leading from the area. Pus draining from the area. A fever. Your leg, arm, or hand is painful, looks blue, or feels cold, numb, or tingly. Watch closely for changes in your health, and be sure to contact your doctor if you have any problems. Where can you learn more? Go to https://[email or web address].Admatic. org and sign in to your [employer] account. Enter A497 in the [facility] box to learn more about \"Carotid Artery Stenting: What to Expect at Home. \"     If you do not have an account, please click on the \"Sign Up Now\" link. Current as of: January 10, 2022               Content Version: 13.4  © 2006-2022 [employer]. Care instructions adapted under license by [facility] ([facility]). If you have questions about a medical condition or this instruction, always ask your healthcare professional. [address] any warranty or liability for your use of this information.

## 2022-10-20 NOTE — PROGRESS NOTES
CLINICAL PHARMACY NOTE: MEDS TO BEDS    Total # of Prescriptions Filled:  23   The following medications were delivered to the patient:  Current Discharge Medication List        START taking these medications    Details   oxyCODONE (ROXICODONE) 5 MG immediate release tablet Take 1 tablet by mouth every 6 hours as needed for Pain for up to 5 days. Qty: 20 tablet, Refills: 0    Comments: Reduce doses taken as pain becomes manageable  Associated Diagnoses: CHF (congestive heart failure), NYHA class I, acute on chronic, combined ([facility] Carotid stenosis, symptomatic, with infarction ([facility] Atrial fibrillation with RVR ([facility] Chronic systolic heart failure ([facility] Acute on chronic systolic heart failure ([facility] Type 2 diabetes mellitus with hyperlipidemia ([facility] Obesity, diabetes, and hypertension syndrome ([facility]  Stroke due to stenosis of right carotid artery (HCC)      pantoprazole (PROTONIX) 40 MG tablet Take 1 tablet by mouth every morning (before breakfast)  Qty: 30 tablet, Refills: 2         Carvedilol 12.5mg  Amidarone 200mg  Lantus  Eliquis 5mg   Fluticasone 50mg  Atorvatatin 80mg  Hydralazine 25mg  Techlite pen needles  Farxiga 5mg  Insulin Lispro  Aspirin 81mg  Spirinolatcone 50mg  Clopidogrel 75mg   Losartan 25mg   Amlodipine 10mg    Additional Documentation:  Given 19 medications total

## 2022-10-20 NOTE — PROGRESS NOTES
Patient transferred to Room 5116 from CVU. Telemetry applied and confirmed with CMU. Oriented patient to new room and call light. Plan of care reviewed with patient. Call light within reach. Will continue to monitor. Patient had large amount of emesis into trash can upon arrival - states he has been getting dizzy when moving. Assessment initiated.

## 2022-10-21 NOTE — DISCHARGE SUMMARY
DISCHARGE SUMMARY      Patient ID:  Cyndee Brady  [id number] 61 y.o. 1965    Admission Date: 10/18/2022  2:54 PM  Discharge Date: 10/20/2022    Principle Diagnosis: Carotid stenosis, symptomatic, with infarction [facility])  Secondary Diagnosis: Principal Problem:    Carotid stenosis, symptomatic, with infarction [facility])  Active Problems:    Stroke due to stenosis of right carotid artery ([facility]  Resolved Problems:    * No resolved hospital problems.  *    Patient Active Problem List   Diagnosis    Type 2 diabetes mellitus with hyperlipidemia ([facility])    Diabetes mellitus with coincident hypertension ([facility])    Gastroesophageal reflux disease    Obesity, diabetes, and hypertension syndrome ([facility])    Abnormal stress test    Atherosclerosis of coronary artery    Chronic kidney disease (CKD) stage G2/A2, mildly decreased glomerular filtration rate (GFR) between 60-89 mL/min/1.73 square meter and albuminuria creatinine ratio between  mg/g    Gastric ulcer with perforation ([facility])    History of MI (myocardial infarction)    Tobacco abuse    Chronic systolic heart failure ([facility])    Atrial fibrillation with RVR ([facility])    Acute on chronic systolic heart failure ([facility])    CHF (congestive heart failure), NYHA class I, acute on chronic, combined ([facility])    PAF (paroxysmal atrial fibrillation) ([facility])    Acute CVA (cerebrovascular accident) ([facility]    Symptomatic carotid artery stenosis, right    Dizziness    Stroke due to stenosis of right carotid artery ([facility])    Carotid stenosis, symptomatic, with infarction ([facility]        Consults: IP CONSULT TO SPIRITUAL SERVICES  IP CONSULT TO DIABETES EDUCATOR  IP CONSULT TO DIETITIAN    Procedure: Right TCAR with Dr. Guillermina Montes on 10/18/2022    History: The patient is a 61 y.o. male with past medical and surgical history of:  Past Medical History:   Diagnosis Date    Abdominal pain 7/29/2021    Abnormal EKG 7/28/2016    Acute pancreatitis 7/29/2021    CHF (congestive heart failure) ([facility])     Cigarette smoker 8/25/2021    Cocaine abuse ([facility] 3/28/2015    Last Assessment & Plan:  Formatting of this note might be different from the original. Elmon Peat on cessation as increases risk of coronary vasospasm and further worsening of heart function. Dehydration 7/28/2016    Diabetes mellitus ([facility]     History of [facility] ([facility] 7/28/2016    History of MI (myocardial infarction) 7/28/2016    Hyperglycemia 7/28/2016    Hyperlipidemia     Hypertension     Morbid obesity with BMI of 45.0-49.9, adult ([facility] 7/28/2016    Postural dizziness 7/28/2016    Pre-diabetes 3/9/2017    Last Assessment & Plan:  Formatting of this note might be different from the original. Discussed lifestyle changes Refer to [facility] program    PUD (peptic ulcer disease) 3/1/2019      and   Past Surgical History:   Procedure Laterality Date    [facility] Course: The patient underwent a Right TCAR with Dr. New Dodson on 10/18/2022. His post-operative course progressed without any immediate or obvious post-operative complications. His pain was controlled with combination of oral and IV medications. He required a Nitroglycerin infusion for BP control and upon resuming his oral antihypertensives, his BP normalized into an acceptable range. In the afternoon of POD #1, while walking, the patient experienced dizziness which forces him to have to sit down in a chair and return to bed. He stayed an additional night and on the morning of POD #2, he reported his dizziness was improved. PT/OT was consulted and he ambulated with them without any difficulties. The patient was written for 90 days of prescriptions for his home medications with the outpatient pharmacy as he has had a history of not being able to obtain the appropriate medications. The importance of strict medication compliance was discussed with the pt and he expressed understanding.  He was able to ambulate without difficulty and tolerated a regular diet. The patient was discharged on 10/20/2022. Disposition: home in stable condition    Patient Instructions:     Medication List        START taking these medications      carvedilol 12.5 MG tablet  Commonly known as: COREG  Take 1 tablet by mouth 2 times daily (with meals)     oxyCODONE 5 MG immediate release tablet  Commonly known as: ROXICODONE  Take 1 tablet by mouth every 6 hours as needed for Pain for up to 5 days. pantoprazole 40 MG tablet  Commonly known as: PROTONIX  Take 1 tablet by mouth every morning (before breakfast)  Replaces: omeprazole 20 MG delayed release capsule            CHANGE how you take these medications      apixaban 5 MG Tabs tablet  Commonly known as: Eliquis  Take 1 tablet by mouth 2 times daily  Start taking on: October 28, 2022  What changed: These instructions start on October 28, 2022. If you are unsure what to do until then, ask your doctor or other care provider.             CONTINUE taking these medications      amiodarone 200 MG tablet  Commonly known as: CORDARONE  TAKE 1 TABLET BY MOUTH EVERY DAY     amLODIPine 10 MG tablet  Commonly known as: NORVASC  Take 1 tablet by mouth daily     aspirin 81 MG chewable tablet  Commonly known as: [facility] Aspirin Adult Low Dose  Take 1 tablet by mouth daily     atorvastatin 80 MG tablet  Commonly known as: LIPITOR  Take 1 tablet by mouth nightly     Blood Pressure Monitor Kit  1 Device by Does not apply route daily     CareFine Pen Needles 32G X 4 MM Misc  Generic drug: Insulin Pen Needle  1 each by Does not apply route 3 times daily Pharmacy can do brand substitutions     clopidogrel 75 MG tablet  Commonly known as: Plavix  Take 1 tablet by mouth daily     dapagliflozin 5 MG tablet  Commonly known as: Farxiga  TAKE 1 TABLET BY MOUTH EVERY DAY IN THE MORNING     fluticasone 50 MCG/ACT nasal spray  Commonly known as: FLONASE  2 sprays by Nasal route daily     furosemide 40 MG tablet  Commonly known as: LASIX  TAKE 1 TABLET BY MOUTH EVERY DAY     hydrALAZINE 25 MG tablet  Commonly known as: APRESOLINE  Take 1 tablet by mouth every 8 hours     insulin glargine 100 UNIT/ML injection vial  Commonly known as: LANTUS  Inject 40 Units into the skin daily     insulin lispro (1 Unit Dial) 100 UNIT/ML Sopn  Commonly known as: HumaLOG KwikPen  Inject 15 Units into the skin 3 times daily (before meals)     Lancets Misc  Check blood sugar 4 times daily, tidac     losartan 25 MG tablet  Commonly known as: COZAAR  TAKE 1 TABLET BY MOUTH EVERY DAY     polyethylene glycol 17 GM/SCOOP powder  Commonly known as: GLYCOLAX     potassium chloride 10 MEQ extended release tablet  Commonly known as: Klor-Con M10  Take 2 tablets by mouth daily     spironolactone 50 MG tablet  Commonly known as: ALDACTONE  Take 1 tablet by mouth daily     True Metrix Blood Glucose Test strip  Generic drug: blood glucose test strips  Check blood sugar 4 times daily, tidac     * True Metrix Meter w/Device Kit  1 kit by Does not apply route 4 times daily Dispense one kit  Pharmacy cannot do brand substitutions     * True Metrix Meter w/Device Kit  Check blood sugar 4 times daily, tidac           * This list has 2 medication(s) that are the same as other medications prescribed for you. Read the directions carefully, and ask your doctor or other care provider to review them with you.                 STOP taking these medications      omeprazole 20 MG delayed release capsule  Commonly known as: PRILOSEC  Replaced by: pantoprazole 40 MG tablet               Where to Get Your Medications        These medications were sent to [address] [place] [address] & [facility]  [address] [facility], [facility]      Phone: [phone]   amiodarone 200 MG tablet  amLODIPine 10 MG tablet  apixaban 5 MG Tabs tablet  aspirin 81 MG chewable tablet  atorvastatin 80 MG tablet  carvedilol 12.5 MG tablet  clopidogrel 75 MG tablet  dapagliflozin 5 MG tablet  fluticasone 50 MCG/ACT nasal spray  furosemide 40 MG tablet  hydrALAZINE 25 MG tablet  insulin glargine 100 UNIT/ML injection vial  insulin lispro (1 Unit Dial) 100 UNIT/ML Sopn  losartan 25 MG tablet  oxyCODONE 5 MG immediate release tablet  pantoprazole 40 MG tablet  potassium chloride 10 MEQ extended release tablet  spironolactone 50 MG tablet         Activity: No lifting, driving, or strenuous exercise until released by Dr. Darya Roque to do so. Diet: Resume diet prior to admission  Wound Care: Keep wound clean and dry. Use soap and water to clean around your wound, keep open to air. Vascular Surgery Discharge Medications  Antiplatelet - ASA 81 mg  Statin - Atorvastatin 80 mg  Anticoagulation - Eliquis 5 mg BID - Pt to begin taking on 10/28/2022 as recommended by Neurology    Follow up with Dr. Darya Roque in 1-2 weeks.  Please call Dr. Madelyn Morrow office at [phone] to make an appointment    Signed:  Electronically signed by CONSUELO Eng CNP on 10/21/2022 at 7:23 AM

## 2022-10-24 ENCOUNTER — TELEPHONE (OUTPATIENT)
Dept: INTERNAL MEDICINE CLINIC | Age: 57
End: 2022-10-24

## 2022-10-24 NOTE — TELEPHONE ENCOUNTER
[facility] Transitions Initial Follow Up Call    Outreach made within 2 business days of discharge: Yes    Patient: Hailey Lacy Patient : 1965   MRN: [id number]  Reason for Admission: There are no discharge diagnoses documented for the most recent discharge.   Discharge Date: 10/20/22       Spoke with: no answer left message    Discharge department/facility: [facility]        Scheduled appointment with PCP within 7-14 days    Follow Up  Future Appointments   Date Time Provider Prashanth Monreal   10/26/2022  3:00 PM Kahlil Blandon [facility]

## 2022-11-04 ENCOUNTER — TELEPHONE (OUTPATIENT)
Dept: PHARMACY | Age: 57
End: 2022-11-04

## 2022-11-04 NOTE — TELEPHONE ENCOUNTER
Referral for Diabetes. Missed 10/26/22 visit. Left message to please call to reschedule.      Juany Barney, PharmD  [address] [place] [address]  Diabetes Service  [phone]

## 2022-11-05 DIAGNOSIS — I10 DIABETES MELLITUS WITH COINCIDENT HYPERTENSION (HCC): ICD-10-CM

## 2022-11-05 DIAGNOSIS — E11.9 DIABETES MELLITUS WITH COINCIDENT HYPERTENSION (HCC): ICD-10-CM

## 2022-11-05 DIAGNOSIS — I50.43 CHF (CONGESTIVE HEART FAILURE), NYHA CLASS I, ACUTE ON CHRONIC, COMBINED (HCC): ICD-10-CM

## 2022-11-07 RX ORDER — LOSARTAN POTASSIUM 25 MG/1
TABLET ORAL
Qty: 90 TABLET | Refills: 2 | Status: SHIPPED | OUTPATIENT
Start: 2022-11-07

## 2022-11-07 RX ORDER — ATORVASTATIN CALCIUM 80 MG/1
80 TABLET, FILM COATED ORAL NIGHTLY
Qty: 90 TABLET | Refills: 2 | Status: SHIPPED | OUTPATIENT
Start: 2022-11-07

## 2022-11-07 NOTE — TELEPHONE ENCOUNTER
Requested Prescriptions     Pending Prescriptions Disp Refills    atorvastatin (LIPITOR) 80 MG tablet [Pharmacy Med Name: ATORVASTATIN 80 MG TABLET] 90 tablet 2     Sig: TAKE 1 TABLET BY MOUTH NIGHTLY    losartan (COZAAR) 25 MG tablet [Pharmacy Med Name: LOSARTAN POTASSIUM 25 MG TAB] 90 tablet 2     Sig: TAKE 1 TABLET BY MOUTH EVERY DAY   Patient requesting a medication refill.   [facility]  Next office visit: Visit date not found  Last regular office visit: 4/21/2022

## 2022-11-22 ENCOUNTER — TELEPHONE (OUTPATIENT)
Dept: PHARMACY | Age: 57
End: 2022-11-22

## 2023-04-21 ENCOUNTER — TELEPHONE (OUTPATIENT)
Dept: PRIMARY CARE CLINIC | Age: 58
End: 2023-04-21

## 2023-06-27 DIAGNOSIS — E11.9 DIABETES MELLITUS WITH COINCIDENT HYPERTENSION (HCC): ICD-10-CM

## 2023-06-27 DIAGNOSIS — I10 DIABETES MELLITUS WITH COINCIDENT HYPERTENSION (HCC): ICD-10-CM

## 2023-06-27 DIAGNOSIS — I50.9 CHRONIC CONGESTIVE HEART FAILURE, UNSPECIFIED HEART FAILURE TYPE (HCC): ICD-10-CM

## 2023-06-27 DIAGNOSIS — I50.43 CHF (CONGESTIVE HEART FAILURE), NYHA CLASS I, ACUTE ON CHRONIC, COMBINED (HCC): ICD-10-CM

## 2023-06-27 DIAGNOSIS — I48.0 PAF (PAROXYSMAL ATRIAL FIBRILLATION) (HCC): ICD-10-CM

## 2023-06-27 DIAGNOSIS — Z79.899 LONG TERM CURRENT USE OF AMIODARONE: ICD-10-CM

## 2023-06-29 RX ORDER — PANTOPRAZOLE SODIUM 40 MG/1
TABLET, DELAYED RELEASE ORAL
Qty: 30 TABLET | Refills: 2 | OUTPATIENT
Start: 2023-06-29

## 2023-06-29 RX ORDER — SPIRONOLACTONE 50 MG/1
TABLET, FILM COATED ORAL
Qty: 30 TABLET | Refills: 2 | OUTPATIENT
Start: 2023-06-29

## 2023-06-29 RX ORDER — ATORVASTATIN CALCIUM 80 MG/1
TABLET, FILM COATED ORAL
Qty: 30 TABLET | Refills: 2 | OUTPATIENT
Start: 2023-06-29

## 2023-06-29 RX ORDER — POTASSIUM CHLORIDE 750 MG/1
TABLET, EXTENDED RELEASE ORAL
Qty: 60 TABLET | Refills: 2 | OUTPATIENT
Start: 2023-06-29

## 2023-06-29 RX ORDER — HYDRALAZINE HYDROCHLORIDE 25 MG/1
TABLET, FILM COATED ORAL
Qty: 90 TABLET | Refills: 2 | OUTPATIENT
Start: 2023-06-29

## 2023-06-29 RX ORDER — FUROSEMIDE 40 MG/1
TABLET ORAL
Qty: 30 TABLET | Refills: 2 | OUTPATIENT
Start: 2023-06-29

## 2023-06-29 RX ORDER — AMIODARONE HYDROCHLORIDE 200 MG/1
TABLET ORAL
Qty: 30 TABLET | Refills: 2 | OUTPATIENT
Start: 2023-06-29

## 2023-06-29 RX ORDER — CARVEDILOL 12.5 MG/1
TABLET ORAL
Qty: 60 TABLET | Refills: 2 | OUTPATIENT
Start: 2023-06-29

## 2023-06-29 RX ORDER — CLOPIDOGREL BISULFATE 75 MG/1
TABLET ORAL
Qty: 30 TABLET | Refills: 2 | OUTPATIENT
Start: 2023-06-29

## 2023-06-29 RX ORDER — LOSARTAN POTASSIUM 25 MG/1
TABLET ORAL
Qty: 30 TABLET | Refills: 2 | OUTPATIENT
Start: 2023-06-29

## 2023-06-29 RX ORDER — AMLODIPINE BESYLATE 10 MG/1
TABLET ORAL
Qty: 30 TABLET | Refills: 2 | OUTPATIENT
Start: 2023-06-29

## 2023-08-03 ENCOUNTER — HOSPITAL ENCOUNTER (INPATIENT)
Age: 58
LOS: 3 days | Discharge: HOME | DRG: 194 | End: 2023-08-07
Attending: STUDENT IN AN ORGANIZED HEALTH CARE EDUCATION/TRAINING PROGRAM | Admitting: STUDENT IN AN ORGANIZED HEALTH CARE EDUCATION/TRAINING PROGRAM
Payer: COMMERCIAL

## 2023-08-03 ENCOUNTER — APPOINTMENT (OUTPATIENT)
Dept: GENERAL RADIOLOGY | Age: 58
DRG: 194 | End: 2023-08-03
Payer: COMMERCIAL

## 2023-08-03 DIAGNOSIS — I50.9 ACUTE ON CHRONIC CONGESTIVE HEART FAILURE, UNSPECIFIED HEART FAILURE TYPE (HCC): Primary | ICD-10-CM

## 2023-08-03 DIAGNOSIS — R77.8 ELEVATED TROPONIN: ICD-10-CM

## 2023-08-03 DIAGNOSIS — Z91.148 NONCOMPLIANCE WITH MEDICATIONS: ICD-10-CM

## 2023-08-03 DIAGNOSIS — N17.9 AKI (ACUTE KIDNEY INJURY) (HCC): ICD-10-CM

## 2023-08-03 LAB
ALBUMIN SERPL-MCNC: 3.5 G/DL (ref 3.4–5)
ALBUMIN/GLOB SERPL: 1.1 {RATIO} (ref 1.1–2.2)
ALP SERPL-CCNC: 93 U/L (ref 40–129)
ALT SERPL-CCNC: 18 U/L (ref 10–40)
ANION GAP SERPL CALCULATED.3IONS-SCNC: 11 MMOL/L (ref 3–16)
AST SERPL-CCNC: 22 U/L (ref 15–37)
BACTERIA URNS QL MICRO: NORMAL /HPF
BASOPHILS # BLD: 0 K/UL (ref 0–0.2)
BASOPHILS NFR BLD: 0.2 %
BILIRUB SERPL-MCNC: 0.5 MG/DL (ref 0–1)
BILIRUB UR QL STRIP.AUTO: NEGATIVE
BUN SERPL-MCNC: 17 MG/DL (ref 7–20)
CALCIUM SERPL-MCNC: 8.3 MG/DL (ref 8.3–10.6)
CHLORIDE SERPL-SCNC: 101 MMOL/L (ref 99–110)
CLARITY UR: CLEAR
CO2 SERPL-SCNC: 26 MMOL/L (ref 21–32)
COLOR UR: YELLOW
CREAT SERPL-MCNC: 1.6 MG/DL (ref 0.9–1.3)
DEPRECATED RDW RBC AUTO: 14.6 % (ref 12.4–15.4)
EKG ATRIAL RATE: 91 BPM
EKG DIAGNOSIS: NORMAL
EKG P AXIS: 30 DEGREES
EKG P-R INTERVAL: 146 MS
EKG Q-T INTERVAL: 382 MS
EKG QRS DURATION: 100 MS
EKG QTC CALCULATION (BAZETT): 469 MS
EKG R AXIS: -26 DEGREES
EKG T AXIS: 189 DEGREES
EKG VENTRICULAR RATE: 91 BPM
EOSINOPHIL # BLD: 0.2 K/UL (ref 0–0.6)
EOSINOPHIL NFR BLD: 2.5 %
EPI CELLS #/AREA URNS AUTO: 0 /HPF (ref 0–5)
EST. AVERAGE GLUCOSE BLD GHB EST-MCNC: 142.7 MG/DL
GFR SERPLBLD CREATININE-BSD FMLA CKD-EPI: 49 ML/MIN/{1.73_M2}
GLUCOSE BLD-MCNC: 150 MG/DL (ref 70–99)
GLUCOSE BLD-MCNC: 152 MG/DL (ref 70–99)
GLUCOSE BLD-MCNC: 72 MG/DL (ref 70–99)
GLUCOSE BLD-MCNC: 76 MG/DL (ref 70–99)
GLUCOSE SERPL-MCNC: 187 MG/DL (ref 70–99)
GLUCOSE UR STRIP.AUTO-MCNC: NEGATIVE MG/DL
HBA1C MFR BLD: 6.6 %
HCT VFR BLD AUTO: 38.4 % (ref 40.5–52.5)
HGB BLD-MCNC: 12.4 G/DL (ref 13.5–17.5)
HGB UR QL STRIP.AUTO: NEGATIVE
HYALINE CASTS #/AREA URNS AUTO: 0 /LPF (ref 0–8)
KETONES UR STRIP.AUTO-MCNC: NEGATIVE MG/DL
LEUKOCYTE ESTERASE UR QL STRIP.AUTO: NEGATIVE
LV EF: 18 %
LVEF MODALITY: NORMAL
LYMPHOCYTES # BLD: 1 K/UL (ref 1–5.1)
LYMPHOCYTES NFR BLD: 14.1 %
MCH RBC QN AUTO: 29.2 PG (ref 26–34)
MCHC RBC AUTO-ENTMCNC: 32.3 G/DL (ref 31–36)
MCV RBC AUTO: 90.5 FL (ref 80–100)
MONOCYTES # BLD: 0.7 K/UL (ref 0–1.3)
MONOCYTES NFR BLD: 10.6 %
NEUTROPHILS # BLD: 4.9 K/UL (ref 1.7–7.7)
NEUTROPHILS NFR BLD: 72.6 %
NITRITE UR QL STRIP.AUTO: NEGATIVE
NT-PROBNP SERPL-MCNC: 5662 PG/ML (ref 0–124)
PERFORMED ON: ABNORMAL
PERFORMED ON: ABNORMAL
PERFORMED ON: NORMAL
PERFORMED ON: NORMAL
PH UR STRIP.AUTO: 7 [PH] (ref 5–8)
PLATELET # BLD AUTO: 229 K/UL (ref 135–450)
PMV BLD AUTO: 9.4 FL (ref 5–10.5)
POTASSIUM SERPL-SCNC: 3.8 MMOL/L (ref 3.5–5.1)
PROT SERPL-MCNC: 6.6 G/DL (ref 6.4–8.2)
PROT UR STRIP.AUTO-MCNC: NEGATIVE MG/DL
RBC # BLD AUTO: 4.25 M/UL (ref 4.2–5.9)
RBC CLUMPS #/AREA URNS AUTO: 0 /HPF (ref 0–4)
SODIUM SERPL-SCNC: 138 MMOL/L (ref 136–145)
SP GR UR STRIP.AUTO: 1 (ref 1–1.03)
TROPONIN, HIGH SENSITIVITY: 208 NG/L (ref 0–22)
TROPONIN, HIGH SENSITIVITY: 44 NG/L (ref 0–22)
TROPONIN, HIGH SENSITIVITY: 49 NG/L (ref 0–22)
TROPONIN, HIGH SENSITIVITY: 53 NG/L (ref 0–22)
TROPONIN, HIGH SENSITIVITY: 96 NG/L (ref 0–22)
UA DIPSTICK W REFLEX MICRO PNL UR: NORMAL
URN SPEC COLLECT METH UR: NORMAL
UROBILINOGEN UR STRIP-ACNC: 1 E.U./DL
WBC # BLD AUTO: 6.8 K/UL (ref 4–11)
WBC #/AREA URNS AUTO: 0 /HPF (ref 0–5)

## 2023-08-03 PROCEDURE — 96374 THER/PROPH/DIAG INJ IV PUSH: CPT

## 2023-08-03 PROCEDURE — 6360000002 HC RX W HCPCS: Performed by: STUDENT IN AN ORGANIZED HEALTH CARE EDUCATION/TRAINING PROGRAM

## 2023-08-03 PROCEDURE — 84484 ASSAY OF TROPONIN QUANT: CPT

## 2023-08-03 PROCEDURE — 93970 EXTREMITY STUDY: CPT

## 2023-08-03 PROCEDURE — 6360000002 HC RX W HCPCS: Performed by: PHYSICIAN ASSISTANT

## 2023-08-03 PROCEDURE — C8929 TTE W OR WO FOL WCON,DOPPLER: HCPCS

## 2023-08-03 PROCEDURE — 93005 ELECTROCARDIOGRAM TRACING: CPT | Performed by: NURSE PRACTITIONER

## 2023-08-03 PROCEDURE — G0378 HOSPITAL OBSERVATION PER HR: HCPCS

## 2023-08-03 PROCEDURE — 80307 DRUG TEST PRSMV CHEM ANLYZR: CPT

## 2023-08-03 PROCEDURE — 36415 COLL VENOUS BLD VENIPUNCTURE: CPT

## 2023-08-03 PROCEDURE — 6370000000 HC RX 637 (ALT 250 FOR IP): Performed by: STUDENT IN AN ORGANIZED HEALTH CARE EDUCATION/TRAINING PROGRAM

## 2023-08-03 PROCEDURE — 83880 ASSAY OF NATRIURETIC PEPTIDE: CPT

## 2023-08-03 PROCEDURE — 83036 HEMOGLOBIN GLYCOSYLATED A1C: CPT

## 2023-08-03 PROCEDURE — 71045 X-RAY EXAM CHEST 1 VIEW: CPT

## 2023-08-03 PROCEDURE — 94760 N-INVAS EAR/PLS OXIMETRY 1: CPT

## 2023-08-03 PROCEDURE — 93010 ELECTROCARDIOGRAM REPORT: CPT | Performed by: INTERNAL MEDICINE

## 2023-08-03 PROCEDURE — 80053 COMPREHEN METABOLIC PANEL: CPT

## 2023-08-03 PROCEDURE — 85025 COMPLETE CBC W/AUTO DIFF WBC: CPT

## 2023-08-03 PROCEDURE — 96376 TX/PRO/DX INJ SAME DRUG ADON: CPT

## 2023-08-03 PROCEDURE — 81001 URINALYSIS AUTO W/SCOPE: CPT

## 2023-08-03 PROCEDURE — 6360000004 HC RX CONTRAST MEDICATION: Performed by: NURSE PRACTITIONER

## 2023-08-03 PROCEDURE — 99285 EMERGENCY DEPT VISIT HI MDM: CPT

## 2023-08-03 RX ORDER — ASPIRIN 81 MG/1
81 TABLET, CHEWABLE ORAL DAILY
Status: DISCONTINUED | OUTPATIENT
Start: 2023-08-03 | End: 2023-08-07 | Stop reason: HOSPADM

## 2023-08-03 RX ORDER — POTASSIUM CHLORIDE 20 MEQ/1
20 TABLET, EXTENDED RELEASE ORAL DAILY
Status: DISCONTINUED | OUTPATIENT
Start: 2023-08-03 | End: 2023-08-07 | Stop reason: HOSPADM

## 2023-08-03 RX ORDER — ONDANSETRON 4 MG/1
4 TABLET, ORALLY DISINTEGRATING ORAL EVERY 8 HOURS PRN
Status: DISCONTINUED | OUTPATIENT
Start: 2023-08-03 | End: 2023-08-07 | Stop reason: HOSPADM

## 2023-08-03 RX ORDER — DEXTROSE MONOHYDRATE 100 MG/ML
INJECTION, SOLUTION INTRAVENOUS CONTINUOUS PRN
Status: DISCONTINUED | OUTPATIENT
Start: 2023-08-03 | End: 2023-08-07 | Stop reason: HOSPADM

## 2023-08-03 RX ORDER — CLOPIDOGREL BISULFATE 75 MG/1
75 TABLET ORAL DAILY
Status: DISCONTINUED | OUTPATIENT
Start: 2023-08-03 | End: 2023-08-05

## 2023-08-03 RX ORDER — MAGNESIUM SULFATE IN WATER 40 MG/ML
2000 INJECTION, SOLUTION INTRAVENOUS PRN
Status: DISCONTINUED | OUTPATIENT
Start: 2023-08-03 | End: 2023-08-07 | Stop reason: HOSPADM

## 2023-08-03 RX ORDER — POLYETHYLENE GLYCOL 3350 17 G/17G
17 POWDER, FOR SOLUTION ORAL DAILY PRN
Status: DISCONTINUED | OUTPATIENT
Start: 2023-08-03 | End: 2023-08-07 | Stop reason: HOSPADM

## 2023-08-03 RX ORDER — ONDANSETRON 2 MG/ML
4 INJECTION INTRAMUSCULAR; INTRAVENOUS EVERY 6 HOURS PRN
Status: DISCONTINUED | OUTPATIENT
Start: 2023-08-03 | End: 2023-08-07 | Stop reason: HOSPADM

## 2023-08-03 RX ORDER — FUROSEMIDE 10 MG/ML
40 INJECTION INTRAMUSCULAR; INTRAVENOUS 2 TIMES DAILY
Status: DISCONTINUED | OUTPATIENT
Start: 2023-08-03 | End: 2023-08-07 | Stop reason: HOSPADM

## 2023-08-03 RX ORDER — INSULIN LISPRO 100 [IU]/ML
15 INJECTION, SOLUTION INTRAVENOUS; SUBCUTANEOUS
Status: DISCONTINUED | OUTPATIENT
Start: 2023-08-03 | End: 2023-08-04

## 2023-08-03 RX ORDER — FUROSEMIDE 10 MG/ML
40 INJECTION INTRAMUSCULAR; INTRAVENOUS ONCE
Status: COMPLETED | OUTPATIENT
Start: 2023-08-03 | End: 2023-08-03

## 2023-08-03 RX ORDER — INSULIN LISPRO 100 [IU]/ML
0-4 INJECTION, SOLUTION INTRAVENOUS; SUBCUTANEOUS NIGHTLY
Status: DISCONTINUED | OUTPATIENT
Start: 2023-08-03 | End: 2023-08-07 | Stop reason: HOSPADM

## 2023-08-03 RX ORDER — ACETAMINOPHEN 325 MG/1
650 TABLET ORAL EVERY 6 HOURS PRN
Status: DISCONTINUED | OUTPATIENT
Start: 2023-08-03 | End: 2023-08-07 | Stop reason: HOSPADM

## 2023-08-03 RX ORDER — SODIUM CHLORIDE 0.9 % (FLUSH) 0.9 %
5-40 SYRINGE (ML) INJECTION PRN
Status: DISCONTINUED | OUTPATIENT
Start: 2023-08-03 | End: 2023-08-07 | Stop reason: HOSPADM

## 2023-08-03 RX ORDER — POTASSIUM CHLORIDE 20 MEQ/1
40 TABLET, EXTENDED RELEASE ORAL PRN
Status: DISCONTINUED | OUTPATIENT
Start: 2023-08-03 | End: 2023-08-07 | Stop reason: HOSPADM

## 2023-08-03 RX ORDER — SODIUM CHLORIDE 0.9 % (FLUSH) 0.9 %
5-40 SYRINGE (ML) INJECTION EVERY 12 HOURS SCHEDULED
Status: DISCONTINUED | OUTPATIENT
Start: 2023-08-03 | End: 2023-08-07 | Stop reason: HOSPADM

## 2023-08-03 RX ORDER — SODIUM CHLORIDE 9 MG/ML
INJECTION, SOLUTION INTRAVENOUS PRN
Status: DISCONTINUED | OUTPATIENT
Start: 2023-08-03 | End: 2023-08-07 | Stop reason: HOSPADM

## 2023-08-03 RX ORDER — AMIODARONE HYDROCHLORIDE 200 MG/1
200 TABLET ORAL DAILY
Status: DISCONTINUED | OUTPATIENT
Start: 2023-08-03 | End: 2023-08-07 | Stop reason: HOSPADM

## 2023-08-03 RX ORDER — POTASSIUM CHLORIDE 7.45 MG/ML
10 INJECTION INTRAVENOUS PRN
Status: DISCONTINUED | OUTPATIENT
Start: 2023-08-03 | End: 2023-08-07 | Stop reason: HOSPADM

## 2023-08-03 RX ORDER — INSULIN LISPRO 100 [IU]/ML
0-8 INJECTION, SOLUTION INTRAVENOUS; SUBCUTANEOUS
Status: DISCONTINUED | OUTPATIENT
Start: 2023-08-03 | End: 2023-08-07 | Stop reason: HOSPADM

## 2023-08-03 RX ORDER — ATORVASTATIN CALCIUM 80 MG/1
80 TABLET, FILM COATED ORAL NIGHTLY
Status: DISCONTINUED | OUTPATIENT
Start: 2023-08-03 | End: 2023-08-07 | Stop reason: HOSPADM

## 2023-08-03 RX ORDER — ACETAMINOPHEN 650 MG/1
650 SUPPOSITORY RECTAL EVERY 6 HOURS PRN
Status: DISCONTINUED | OUTPATIENT
Start: 2023-08-03 | End: 2023-08-07 | Stop reason: HOSPADM

## 2023-08-03 RX ORDER — INSULIN GLARGINE 100 [IU]/ML
40 INJECTION, SOLUTION SUBCUTANEOUS DAILY
Status: DISCONTINUED | OUTPATIENT
Start: 2023-08-03 | End: 2023-08-04

## 2023-08-03 RX ADMIN — AMIODARONE HYDROCHLORIDE 200 MG: 200 TABLET ORAL at 09:00

## 2023-08-03 RX ADMIN — PERFLUTREN 1.5 ML: 6.52 INJECTION, SUSPENSION INTRAVENOUS at 15:08

## 2023-08-03 RX ADMIN — APIXABAN 5 MG: 5 TABLET, FILM COATED ORAL at 09:00

## 2023-08-03 RX ADMIN — INSULIN LISPRO 15 UNITS: 100 INJECTION, SOLUTION INTRAVENOUS; SUBCUTANEOUS at 12:54

## 2023-08-03 RX ADMIN — ASPIRIN 81 MG: 81 TABLET, CHEWABLE ORAL at 09:00

## 2023-08-03 RX ADMIN — INSULIN GLARGINE 40 UNITS: 100 INJECTION, SOLUTION SUBCUTANEOUS at 08:05

## 2023-08-03 RX ADMIN — FUROSEMIDE 40 MG: 10 INJECTION, SOLUTION INTRAMUSCULAR; INTRAVENOUS at 01:19

## 2023-08-03 RX ADMIN — APIXABAN 5 MG: 5 TABLET, FILM COATED ORAL at 21:15

## 2023-08-03 RX ADMIN — FUROSEMIDE 40 MG: 10 INJECTION, SOLUTION INTRAMUSCULAR; INTRAVENOUS at 17:50

## 2023-08-03 RX ADMIN — CLOPIDOGREL BISULFATE 75 MG: 75 TABLET ORAL at 09:00

## 2023-08-03 RX ADMIN — POTASSIUM CHLORIDE 20 MEQ: 1500 TABLET, EXTENDED RELEASE ORAL at 09:00

## 2023-08-03 RX ADMIN — ATORVASTATIN CALCIUM 80 MG: 80 TABLET, FILM COATED ORAL at 21:15

## 2023-08-03 NOTE — PROGRESS NOTES
Pt awake in bed, alert and oriented x 4. Morning medications administered, pt tolerated well. Pt denies chest pain and SOB at this time. No new orders at this time. Call light placed within reach.  Electronically signed by Sravani Farmer RN on 8/3/2023 at 3:49 PM

## 2023-08-03 NOTE — ED TRIAGE NOTES
Patient presents to ED via [facility] for c/o SOB and increased edema to BLE. Patient states he has CHF, AFIB, DM but has been out of all of his medications except his insulin for months. Patient states they were on auto refill but states they haven't been refilled but neither has he called to determine why. Patient A&O x4, VSS. Patient noted to have approximately 50lbs increase since last being seen.

## 2023-08-03 NOTE — DISCHARGE INSTR - PHARMACY
Stefani Causey:    You have home medications stored in [address] [place] [address] please have your nurse call [phone] and one our Certified Pharmacy Technicians will be happy to bring them to you before discharge.

## 2023-08-03 NOTE — PROGRESS NOTES
Pt blood sugar is 72 message sent to provider to hold humalog. Ok per Odean Nice to hold Humalog. Pt given dessert and juice to drink. Pt shows no signs of distress. Call light placed within reach.  Electronically signed by Julian Pleitez RN on 8/3/2023 at 5:26 PM

## 2023-08-03 NOTE — CONSULTS
Comprehensive Nutrition Assessment    Type and Reason for Visit:  Patient Education (Heart failure)    Nutrition Recommendations/Plan:   Continue 5 carb/MARY diet. Continue 1500mL fluid restriction. Pt. Education. Malnutrition Assessment:  Malnutrition Status: At risk for malnutrition (Comment) (08/03/23 1503)    Context:  Acute Illness     Findings of the 6 clinical characteristics of malnutrition:  Energy Intake:  No significant decrease in energy intake  Weight Loss:  No significant weight loss     Body Fat Loss:  No significant body fat loss     Muscle Mass Loss:  No significant muscle mass loss    Fluid Accumulation:  Mild Extremities   Strength:  Not Performed    Nutrition Assessment:    Consult for Pt. education regarding heart failure. Pt. admitted for decompensated heart failure. PMH includes T2DM, GERD, CKD, tobacco use, CHF, and a fib. Currently receiving a 5carb/MARY diet with 1500mL FR. Unable to assess meal intakes at this time. Nutrition Education    Educated on Heart failure  Learners: Patient  Readiness: Non-acceptance  Method: Explanation and Handout  Response: No Evidence of Learning  Contact name and number provided. Lolita Dexter RD  Contact Number: [phone]      Nutrition Related Findings:    Labs reviewed Creat. 1.6. Meds reviewed. Last BM 8/3. Skin intact. Wound Type: None       Current Nutrition Intake & Therapies:    Average Meal Intake: Unable to assess  Average Supplements Intake: None Ordered  ADULT DIET; Regular; 5 carb choices (75 gm/meal); No Added Salt (3-4 gm); 1500 ml    Anthropometric Measures:  Height: 6' 1\" (185.4 cm)  Ideal Body Weight (IBW): 184 lbs (84 kg)       Current Body Weight: 344 lb 9.3 oz (156.3 kg), 187.3 % IBW.  Weight Source: Standing Scale  Current BMI (kg/m2): 45.5        Weight Adjustment For: No Adjustment                 BMI Categories: Obese Class 3 (BMI 40.0 or greater)    Estimated Daily Nutrient Needs:  Energy Requirements Based On: Kcal/kg  Weight Used for Energy Requirements: Ideal  Energy (kcal/day): 2100-2268kcal (25-27kcal/kg IBW)  Weight Used for Protein Requirements: Ideal  Protein (g/day): 67-84g (0.8-1g/kg CBW)  Method Used for Fluid Requirements: Other (Comment)  Fluid (ml/day): 1500mL    Nutrition Diagnosis:   Altered nutrition-related lab values related to cardiac dysfunction as evidenced by lab values, other (comment) (decompensated heart failure)  Overweight/Obese related to excessive energy intake as evidenced by BMI    Nutrition Interventions:   Food and/or Nutrient Delivery: Continue Current Diet  Nutrition Education/Counseling: Education completed  Coordination of Nutrition Care: Continue to monitor while inpatient       Goals:  Previous Goal Met: No Progress toward Goal(s)  Goals: PO intake 75% or greater, by next RD assessment       Nutrition Monitoring and Evaluation:   Behavioral-Environmental Outcomes: Knowledge or Skill  Food/Nutrient Intake Outcomes: Food and Nutrient Intake  Physical Signs/Symptoms Outcomes: Biochemical Data, Nutrition Focused Physical Findings, Weight    Discharge Planning:    No discharge needs at this time     Jaret Styles RD  Contact: [phone]

## 2023-08-03 NOTE — PROGRESS NOTES
Pt transported off unit via stretcher to vascular.  Electronically signed by Elizabeth Bailey RN on 8/3/2023 at 1:45 PM

## 2023-08-03 NOTE — ED NOTES
Report given to [facility] 3W RN, all questions answered.      Estefania Perez, MAURISIO  08/03/23 [id number]

## 2023-08-03 NOTE — PROGRESS NOTES
Patient admitted for SOB. Currently is resting in bed. Alert and oriented X 4. Denies pain at this time. IV capped and flushed. Assessment complete. Patient ambulates independently. VSS stable at this time. All patient needs are met at this time. Fall precautions are in place. Call light is in reach.

## 2023-08-03 NOTE — CONSULTS
[facility]  HEART FAILURE PROGRAM      NAME:  Aubrie Lama [facility] RECORD NUMBER:  [id number]  AGE: 60 y.o. GENDER: male  : 1965  TODAY'S DATE:  8/3/2023    Subjective:     VISIT TYPE: evaluation     ADMITTING PHYSICIAN:  Lennox Olivera MD    PAST MEDICAL HISTORY        Diagnosis Date    Abdominal pain 2021    Abnormal EKG 2016    Acute pancreatitis 2021    CHF (congestive heart failure) ([address])     Cigarette smoker 2021    Cocaine abuse ([address]) 3/28/2015    Last Assessment & Plan:  Formatting of this note might be different from the original. Counseled on cessation as increases risk of coronary vasospasm and further worsening of heart function.     Dehydration 2016    Diabetes mellitus ([address])     History of cocaine abuse ([address]) 2016    History of MI (myocardial infarction) 2016    Hyperglycemia 2016    Hyperlipidemia     Hypertension     Morbid obesity with BMI of 45.0-49.9, adult ([address]) 2016    Postural dizziness 2016    Pre-diabetes 3/9/2017    Last Assessment & Plan:  Formatting of this note might be different from the original. Discussed lifestyle changes Refer to [facility] program    PUD (peptic ulcer disease) 3/1/2019       SOCIAL HISTORY    Social History     Tobacco Use    Smoking status: Every Day     Packs/day: 0.50     Years: 25.00     Pack years: 12.50     Types: Cigarettes    Smokeless tobacco: Never   Vaping Use    Vaping Use: Never used   Substance Use Topics    Alcohol use: Not Currently    Drug use: Yes     Types: Marijuana Aries Dougherty     Comment: daily       ALLERGIES    No Known Allergies    MEDICATIONS  Scheduled Meds:   amiodarone  200 mg Oral Daily    apixaban  5 mg Oral BID    aspirin  81 mg Oral Daily    atorvastatin  80 mg Oral Nightly    clopidogrel  75 mg Oral Daily    insulin glargine  40 Units SubCUTAneous Daily    insulin lispro  15 Units SubCUTAneous TID WC    potassium chloride  20 mEq Oral Daily    sodium chloride flush

## 2023-08-03 NOTE — PROGRESS NOTES
V2.0  [facility] Hospitalist Progress Note      Name:  Rich Aguilera /Age/Sex: 1965  (60 y.o. male)   MRN & CSN:  [id number] & [id number] Encounter Date/Time: 8/3/2023 11:54 AM EDT    Location:  [id number] PCP: Raiza Munoz, [address] [place] [address] Day: 1    Assessment and Plan:   Rich Aguilera is a 60 y.o. male with pmh of  who presents with Decompensated heart failure ([facility])    Rich Aguilera is a 60 y.o. male with history of T2DM, HTN, HLD, nonobstructive CAD, CKD, chronic combined systolic and diastolic heart failure, atrial fibrillation, cocaine use, and prior CVA presenting for shortness of breath and leg swelling, admitted for decompensated heat failure in the setting of medication nonadherance. # Acute on chronic combined systolic and diastolic CHF, EF 47-61% on TTE , grade 1 diastolic dysfx  [facility]  with nonobstructive disease of LAD with 30% stenosis. Etiology NICM likely secondary to cocaine use. Patient presented with worsening shortness of breath, increased lower extremity swelling, noncompliance with cardiac medications including diuretics due to ran out of prescriptions. BNP 5,662 higher than prior. Chest x-ray unremarkable. - Limited TTE ordered, IV lasix 40 mg BID. Goal net negative 2-3L/day  - GDMT: losartan and spironolactone held for KENDRA, monitor for resuming  - Daily weights strict I/O, cardiac     # Acute kidney injury, GFR 49-  Cr 1.6 on admissoin from baseline of ~1.0, possibly cardiorenal component. Monitor with diuresis. Holding ARB as noted. Avoid nephrotoxins, IV contrast dyes, NSAIDs, keep MAP greater than 65, renal dose medication GFR, RFP in a.m. we will also check PVR     # Elevated troponin in setting of renal insufficiency and CHF/history of nonobstructive CAD with 30% disease of RCA on [facility] 10\2022 -no acute ST changes, T wave inversion noted. Patient denies chest pain   - Continue to trend troponin. Limited TTE ordered.  Patient already on [facility], now PresentT wave inversion less evident in Inferior leadsVoltage criteria for left ventricular hypertrophy is not currently seenConfirmed by Chai Manriquez ([id number]) on 8/3/2023 8:27:59 AM     CBC with Auto Differential    Collection Time: 08/03/23  1:13 AM   Result Value Ref Range    WBC 6.8 4.0 - 11.0 K/uL    RBC 4.25 4.20 - 5.90 M/uL    Hemoglobin 12.4 (L) 13.5 - 17.5 g/dL    Hematocrit 38.4 (L) 40.5 - 52.5 %    MCV 90.5 80.0 - 100.0 fL    MCH 29.2 26.0 - 34.0 pg    MCHC 32.3 31.0 - 36.0 g/dL    RDW 14.6 12.4 - 15.4 %    Platelets 253 907 - 319 K/uL    MPV 9.4 5.0 - 10.5 fL    Neutrophils % 72.6 %    Lymphocytes % 14.1 %    Monocytes % 10.6 %    Eosinophils % 2.5 %    Basophils % 0.2 %    Neutrophils Absolute 4.9 1.7 - 7.7 K/uL    Lymphocytes Absolute 1.0 1.0 - 5.1 K/uL    Monocytes Absolute 0.7 0.0 - 1.3 K/uL    Eosinophils Absolute 0.2 0.0 - 0.6 K/uL    Basophils Absolute 0.0 0.0 - 0.2 K/uL   CMP w/ Reflex to MG    Collection Time: 08/03/23  1:13 AM   Result Value Ref Range    Sodium 138 136 - 145 mmol/L    Potassium reflex Magnesium 3.8 3.5 - 5.1 mmol/L    Chloride 101 99 - 110 mmol/L    CO2 26 21 - 32 mmol/L    Anion Gap 11 3 - 16    Glucose 187 (H) 70 - 99 mg/dL    BUN 17 7 - 20 mg/dL    Creatinine 1.6 (H) 0.9 - 1.3 mg/dL    Est, Glom Filt Rate 49 (A) >60    Calcium 8.3 8.3 - 10.6 mg/dL    Total Protein 6.6 6.4 - 8.2 g/dL    Albumin 3.5 3.4 - 5.0 g/dL    Albumin/Globulin Ratio 1.1 1.1 - 2.2    Total Bilirubin 0.5 0.0 - 1.0 mg/dL    Alkaline Phosphatase 93 40 - 129 U/L    ALT 18 10 - 40 U/L    AST 22 15 - 37 U/L   Troponin    Collection Time: 08/03/23  1:13 AM   Result Value Ref Range    Troponin, High Sensitivity 49 (H) 0 - 22 ng/L   BNP    Collection Time: 08/03/23  1:13 AM   Result Value Ref Range    Pro-BNP 5,662 (H) 0 - 124 pg/mL   Troponin    Collection Time: 08/03/23  2:08 AM   Result Value Ref Range    Troponin, High Sensitivity 53 (H) 0 - 22 ng/L   Hemoglobin A1c    Collection Time: 08/03/23  6:53 AM

## 2023-08-03 NOTE — PROGRESS NOTES
Medication Reconciliation    List of medications patient is currently taking is complete. Source of information: 1. Conversation with patient at bedside                                      2. EPIC records      Allergies  Patient has no known allergies. Notes regarding home medications:   1. Patient has been out of all medications except Humalog for a few months.          Maxine Chacon, [address] [place] [address], PharmD, 8/3/2023 1:50 PM

## 2023-08-03 NOTE — PLAN OF CARE
Problem: Safety - Adult  Goal: Free from fall injury  8/3/2023 0820 by Nery Polanco  Outcome: Progressing  8/3/2023 0807 by Teri Hoyt RN  Outcome: Progressing  Flowsheets (Taken 8/3/2023 0807)  Free From Fall Injury: Instruct family/caregiver on patient safety

## 2023-08-03 NOTE — DISCHARGE INSTRUCTIONS
Follow up with PCP in 1 week   Follow up with cardiology as directed  Follow up with nephrology as directed     Extra Heart Failure sites:     https://SwitchForce. Effektif/publication/?v=749353  --- this is American Heart Association Interactive Healthier Living with Heart Failure guidebook. Please click hyperlink or copy / paste link into search bar. Use your mouse to scroll through the pages. Lots of information about weight monitoring, diet tips, activity, meds, etc    HF [place] francy -- this is a free smart phone francy available for iPhone and Android download. Use your phone to track sodium / fluid intake, zone tool symptom tracking, weights, medications, etc. Click on this hyperlink  HF [place] Francy   for QR code for easy download--.look for this in your francy store                                          DASH (Dietary Approach to Stop Hypertension) diet --  SeekAlumni.no -- this diet is a flexible eating plan that promotes heart healthy eating style. Click on hyperlink or copy / paste link into search bar. Lots of low sodium recipes and tips.     CigarRepair.ca  -- more free recipes

## 2023-08-03 NOTE — H&P
History and Physical    History of Present Illness:   Adan Lima is a 60 y.o. male with history of T2DM, HTN, HLD, nonobstructive CAD, CKD, chronic combined systolic and diastolic heart failure, atrial fibrillation, cocaine use, and prior CVA presenting for shortness of breath and leg swelling. Patient says his symptoms started when he ran out of his home medications several months ago and have worsened over the last few weeks. He endorses dyspnea with exertion and  bilateral lower extremity swelling. He is not able to walk from his bedroom to the bathroom without feeling short of breath. This improves with rest. He denies chest pain, lightheadedness/dizziness, nausea/vomiting, abdominal pain, cough. Last cocaine use 4-5 days ago. ED course: hemodynamically stable. Labs notable for cr 1.6 from baseline of 1.1 and BNP of 5000. He was given dose of IV lasix 40 mg.     Review of Systems: pertinent positives and negatives discussed in HPI    Medications Prior to Admission:  Prior to Admission medications    Medication Sig Start Date End Date Taking?  Authorizing Provider   atorvastatin (LIPITOR) 80 MG tablet TAKE 1 TABLET BY MOUTH NIGHTLY 11/7/22   David Hurley DO   losartan (COZAAR) 25 MG tablet TAKE 1 TABLET BY MOUTH EVERY DAY 11/7/22   David Hurley,    carvedilol (COREG) 12.5 MG tablet Take 1 tablet by mouth 2 times daily (with meals) 10/20/22   CONSUELO Caballero CNP   aspirin ([facility] ASPIRIN ADULT LOW DOSE) 81 MG chewable tablet Take 1 tablet by mouth daily 10/19/22   CONSUELO Caballero CNP   amiodarone (CORDARONE) 200 MG tablet TAKE 1 TABLET BY MOUTH EVERY DAY 10/19/22   CONSUELO Caballero CNP   apixaban (ELIQUIS) 5 MG TABS tablet Take 1 tablet by mouth 2 times daily 10/28/22   CONSUELO Caballero CNP   dapagliflozin (FARXIGA) 5 MG tablet TAKE 1 TABLET BY MOUTH EVERY DAY IN THE MORNING 10/19/22   CONSUELO Wilson CNP   insulin glargine (LANTUS) 100 UNIT/ML amiodarone   -Continue eliquis  -Telemetry    Hx CVA R PCA 10/2022  - On aspirin and plavix however also on eliquis for his a fib - would confirm with neurology triple therapy vs  plavix and eliquis alone      Hypertension  - Systolic BP 259J off of home antihypertensives. Holding losartan, coreg, and amlodipine for now, restart as able    Cocaine use: abstinence encouraged    HLD: continue atorvastatin    62 Minutes were spent solely for medical decision making for this patient including documentation, ordering and interpreting labs imaging and studies, independently reviewing the chart as well as discussing plan of care with the patient/ancillary staff and coordinating their care.       Pedrito Brown MD

## 2023-08-03 NOTE — ED PROVIDER NOTES
[address] [facility] [address] [address]        Pt Name: Jean Paul Mane  MRN: [id number]  [address] [facility] 1965  Date of evaluation: 8/3/2023  Provider: Jenn Morton PA-C  PCP: Anup Wallace DO  Note Started: 12:50 AM EDT 8/3/23      MARGARET. I have evaluated this patient. CHIEF COMPLAINT       Chief Complaint   Patient presents with    Shortness of Breath    Leg Swelling       HISTORY OF PRESENT ILLNESS: 1 or more Elements     History From: patient    Jean Paul Mane is a 60 y.o. male with past medical history of hypertension, diabetes, CAD, CHF who presents complaining of lower extremity edema and shortness of breath. Patient reports he has been out of all of his medications except his insulin pens for least 2 to 3 months. States he had not filled when he was admitted and had some refills but has not seen his primary care doctor recently. Patient called ambulance today because he has increasing bilateral lower extremity edema. No shortness of breath. Denies fever, cough, chest pain, dizziness, syncope. Nursing Notes were all reviewed and agreed with or any disagreements were addressed in the HPI. REVIEW OF SYSTEMS :      Review of Systems   All other systems reviewed and are negative. Positives and Pertinent negatives as per HPI. PAST MEDICAL HISTORY    has a past medical history of Abdominal pain (7/29/2021), Abnormal EKG (7/28/2016), Acute pancreatitis (7/29/2021), CHF (congestive heart failure) ([address] [facility]), Cigarette smoker (8/25/2021), Cocaine abuse ([address] ) (3/28/2015), Dehydration (7/28/2016), Diabetes mellitus ([address]), History of cocaine abuse ([address]) (7/28/2016), History of MI (myocardial infarction) (7/28/2016), Hyperglycemia (7/28/2016), Hyperlipidemia, Hypertension, Morbid obesity with BMI of 45.0-49.9, adult ([address]) (7/28/2016), Postural dizziness (7/28/2016), Pre-diabetes (3/9/2017), and PUD (peptic ulcer disease) (3/1/2019).      SURGICAL Effort: Pulmonary effort is normal. No respiratory distress. Breath sounds: Rales present. Abdominal:      General: Abdomen is flat. Bowel sounds are normal. There is no distension. Palpations: Abdomen is soft. Tenderness: There is no abdominal tenderness. There is no guarding or rebound. Musculoskeletal:         General: Normal range of motion. Cervical back: Normal range of motion and neck supple. Right lower leg: Edema (2+ BLE pitting) present. Left lower leg: Edema present. Skin:     General: Skin is warm and dry. Capillary Refill: Capillary refill takes less than 2 seconds. Neurological:      Mental Status: He is alert and oriented to person, place, and time. Motor: No weakness. Gait: Gait normal.   Psychiatric:         Mood and Affect: Mood normal.         Behavior: Behavior normal.           DIAGNOSTIC RESULTS   LABS:    Labs Reviewed   CBC WITH AUTO DIFFERENTIAL - Abnormal; Notable for the following components:       Result Value    Hemoglobin 12.4 (*)     Hematocrit 38.4 (*)     All other components within normal limits   COMPREHENSIVE METABOLIC PANEL W/ REFLEX TO MG FOR LOW K - Abnormal; Notable for the following components:    Glucose 187 (*)     Creatinine 1.6 (*)     Est, Glom Filt Rate 49 (*)     All other components within normal limits   TROPONIN - Abnormal; Notable for the following components:    Troponin, High Sensitivity 49 (*)     All other components within normal limits   BRAIN NATRIURETIC PEPTIDE - Abnormal; Notable for the following components:    Pro-BNP 5,662 (*)     All other components within normal limits   TROPONIN       When ordered only abnormal lab results are displayed. All other labs were within normal range or not returned as of this dictation. EKG: When ordered, EKG's are interpreted by the Emergency Department Physician in the absence of a cardiologist.  Please see their note for interpretation of EKG.     RADIOLOGY:

## 2023-08-03 NOTE — PLAN OF CARE
Problem: Discharge Planning  Goal: Discharge to home or other facility with appropriate resources  Outcome: Progressing  Flowsheets (Taken 8/3/2023 0807)  Discharge to home or other facility with appropriate resources:   Identify barriers to discharge with patient and caregiver   Arrange for needed discharge resources and transportation as appropriate   Identify discharge learning needs (meds, wound care, etc)   Refer to discharge planning if patient needs post-hospital services based on physician order or complex needs related to functional status, cognitive ability or social support system     Problem: Safety - Adult  Goal: Free from fall injury  Outcome: Progressing  Flowsheets (Taken 8/3/2023 0807)  Free From Fall Injury: Instruct family/caregiver on patient safety     Problem: Chronic Conditions and Co-morbidities  Goal: Patient's chronic conditions and co-morbidity symptoms are monitored and maintained or improved  Outcome: Progressing  Flowsheets (Taken 8/3/2023 0807)  Care Plan - Patient's Chronic Conditions and Co-Morbidity Symptoms are Monitored and Maintained or Improved: Monitor and assess patient's chronic conditions and comorbid symptoms for stability, deterioration, or improvement

## 2023-08-03 NOTE — PROGRESS NOTES
Pt returned back to unit from vascular via stretcher.  Electronically signed by Kadeem Fernandes RN on 8/3/2023 at 3:47 PM

## 2023-08-04 ENCOUNTER — APPOINTMENT (OUTPATIENT)
Dept: ULTRASOUND IMAGING | Age: 58
DRG: 194 | End: 2023-08-04
Payer: COMMERCIAL

## 2023-08-04 LAB
AMPHETAMINES UR QL SCN>1000 NG/ML: ABNORMAL
ANION GAP SERPL CALCULATED.3IONS-SCNC: 8 MMOL/L (ref 3–16)
BARBITURATES UR QL SCN>200 NG/ML: ABNORMAL
BASOPHILS # BLD: 0 K/UL (ref 0–0.2)
BASOPHILS NFR BLD: 1 %
BENZODIAZ UR QL SCN>200 NG/ML: ABNORMAL
BUN SERPL-MCNC: 22 MG/DL (ref 7–20)
CALCIUM SERPL-MCNC: 8.3 MG/DL (ref 8.3–10.6)
CANNABINOIDS UR QL SCN>50 NG/ML: ABNORMAL
CHLORIDE SERPL-SCNC: 108 MMOL/L (ref 99–110)
CO2 SERPL-SCNC: 26 MMOL/L (ref 21–32)
COCAINE UR QL SCN: POSITIVE
CREAT SERPL-MCNC: 1.4 MG/DL (ref 0.9–1.3)
DEPRECATED RDW RBC AUTO: 14.7 % (ref 12.4–15.4)
DRUG SCREEN COMMENT UR-IMP: ABNORMAL
EOSINOPHIL # BLD: 0.2 K/UL (ref 0–0.6)
EOSINOPHIL NFR BLD: 4.6 %
EST. AVERAGE GLUCOSE BLD GHB EST-MCNC: 142.7 MG/DL
FENTANYL SCREEN, URINE: ABNORMAL
GFR SERPLBLD CREATININE-BSD FMLA CKD-EPI: 58 ML/MIN/{1.73_M2}
GLUCOSE BLD-MCNC: 70 MG/DL (ref 70–99)
GLUCOSE BLD-MCNC: 80 MG/DL (ref 70–99)
GLUCOSE BLD-MCNC: 82 MG/DL (ref 70–99)
GLUCOSE BLD-MCNC: 98 MG/DL (ref 70–99)
GLUCOSE SERPL-MCNC: 92 MG/DL (ref 70–99)
HBA1C MFR BLD: 6.6 %
HCT VFR BLD AUTO: 36.2 % (ref 40.5–52.5)
HGB BLD-MCNC: 11.8 G/DL (ref 13.5–17.5)
LYMPHOCYTES # BLD: 1 K/UL (ref 1–5.1)
LYMPHOCYTES NFR BLD: 21.3 %
MAGNESIUM SERPL-MCNC: 2.2 MG/DL (ref 1.8–2.4)
MCH RBC QN AUTO: 29.3 PG (ref 26–34)
MCHC RBC AUTO-ENTMCNC: 32.7 G/DL (ref 31–36)
MCV RBC AUTO: 89.5 FL (ref 80–100)
METHADONE UR QL SCN>300 NG/ML: ABNORMAL
MONOCYTES # BLD: 0.8 K/UL (ref 0–1.3)
MONOCYTES NFR BLD: 15.6 %
NEUTROPHILS # BLD: 2.8 K/UL (ref 1.7–7.7)
NEUTROPHILS NFR BLD: 57.5 %
NT-PROBNP SERPL-MCNC: 4044 PG/ML (ref 0–124)
OPIATES UR QL SCN>300 NG/ML: ABNORMAL
OXYCODONE UR QL SCN: ABNORMAL
PCP UR QL SCN>25 NG/ML: ABNORMAL
PERFORMED ON: NORMAL
PH UR STRIP: 7 [PH]
PLATELET # BLD AUTO: 232 K/UL (ref 135–450)
PMV BLD AUTO: 9.9 FL (ref 5–10.5)
POTASSIUM SERPL-SCNC: 3.6 MMOL/L (ref 3.5–5.1)
RBC # BLD AUTO: 4.04 M/UL (ref 4.2–5.9)
SODIUM SERPL-SCNC: 142 MMOL/L (ref 136–145)
WBC # BLD AUTO: 4.8 K/UL (ref 4–11)

## 2023-08-04 PROCEDURE — G0378 HOSPITAL OBSERVATION PER HR: HCPCS

## 2023-08-04 PROCEDURE — 76937 US GUIDE VASCULAR ACCESS: CPT

## 2023-08-04 PROCEDURE — 36415 COLL VENOUS BLD VENIPUNCTURE: CPT

## 2023-08-04 PROCEDURE — 83735 ASSAY OF MAGNESIUM: CPT

## 2023-08-04 PROCEDURE — 6370000000 HC RX 637 (ALT 250 FOR IP): Performed by: STUDENT IN AN ORGANIZED HEALTH CARE EDUCATION/TRAINING PROGRAM

## 2023-08-04 PROCEDURE — 02HV33Z INSERTION OF INFUSION DEVICE INTO SUPERIOR VENA CAVA, PERCUTANEOUS APPROACH: ICD-10-PCS | Performed by: NURSE PRACTITIONER

## 2023-08-04 PROCEDURE — 36569 INSJ PICC 5 YR+ W/O IMAGING: CPT

## 2023-08-04 PROCEDURE — 99254 IP/OBS CNSLTJ NEW/EST MOD 60: CPT | Performed by: STUDENT IN AN ORGANIZED HEALTH CARE EDUCATION/TRAINING PROGRAM

## 2023-08-04 PROCEDURE — 85025 COMPLETE CBC W/AUTO DIFF WBC: CPT

## 2023-08-04 PROCEDURE — 83880 ASSAY OF NATRIURETIC PEPTIDE: CPT

## 2023-08-04 PROCEDURE — 76770 US EXAM ABDO BACK WALL COMP: CPT

## 2023-08-04 PROCEDURE — 1200000000 HC SEMI PRIVATE

## 2023-08-04 PROCEDURE — 80048 BASIC METABOLIC PNL TOTAL CA: CPT

## 2023-08-04 PROCEDURE — C1751 CATH, INF, PER/CENT/MIDLINE: HCPCS

## 2023-08-04 PROCEDURE — 6360000002 HC RX W HCPCS: Performed by: STUDENT IN AN ORGANIZED HEALTH CARE EDUCATION/TRAINING PROGRAM

## 2023-08-04 PROCEDURE — 96376 TX/PRO/DX INJ SAME DRUG ADON: CPT

## 2023-08-04 PROCEDURE — 83036 HEMOGLOBIN GLYCOSYLATED A1C: CPT

## 2023-08-04 RX ORDER — SODIUM CHLORIDE 9 MG/ML
25 INJECTION, SOLUTION INTRAVENOUS PRN
Status: DISCONTINUED | OUTPATIENT
Start: 2023-08-04 | End: 2023-08-07 | Stop reason: SDUPTHER

## 2023-08-04 RX ORDER — INSULIN LISPRO 100 [IU]/ML
10 INJECTION, SOLUTION INTRAVENOUS; SUBCUTANEOUS
Status: DISCONTINUED | OUTPATIENT
Start: 2023-08-04 | End: 2023-08-05

## 2023-08-04 RX ORDER — SODIUM CHLORIDE 0.9 % (FLUSH) 0.9 %
5-40 SYRINGE (ML) INJECTION EVERY 12 HOURS SCHEDULED
Status: DISCONTINUED | OUTPATIENT
Start: 2023-08-04 | End: 2023-08-07 | Stop reason: SDUPTHER

## 2023-08-04 RX ORDER — INSULIN GLARGINE 100 [IU]/ML
30 INJECTION, SOLUTION SUBCUTANEOUS DAILY
Status: DISCONTINUED | OUTPATIENT
Start: 2023-08-05 | End: 2023-08-05

## 2023-08-04 RX ORDER — SODIUM CHLORIDE 0.9 % (FLUSH) 0.9 %
5-40 SYRINGE (ML) INJECTION PRN
Status: DISCONTINUED | OUTPATIENT
Start: 2023-08-04 | End: 2023-08-07 | Stop reason: SDUPTHER

## 2023-08-04 RX ORDER — LIDOCAINE HYDROCHLORIDE 10 MG/ML
5 INJECTION, SOLUTION EPIDURAL; INFILTRATION; INTRACAUDAL; PERINEURAL ONCE
Status: DISCONTINUED | OUTPATIENT
Start: 2023-08-04 | End: 2023-08-07 | Stop reason: HOSPADM

## 2023-08-04 RX ADMIN — ATORVASTATIN CALCIUM 80 MG: 80 TABLET, FILM COATED ORAL at 20:16

## 2023-08-04 RX ADMIN — ASPIRIN 81 MG: 81 TABLET, CHEWABLE ORAL at 07:52

## 2023-08-04 RX ADMIN — APIXABAN 5 MG: 5 TABLET, FILM COATED ORAL at 20:16

## 2023-08-04 RX ADMIN — CLOPIDOGREL BISULFATE 75 MG: 75 TABLET ORAL at 07:52

## 2023-08-04 RX ADMIN — FUROSEMIDE 40 MG: 10 INJECTION, SOLUTION INTRAMUSCULAR; INTRAVENOUS at 18:39

## 2023-08-04 RX ADMIN — APIXABAN 5 MG: 5 TABLET, FILM COATED ORAL at 07:52

## 2023-08-04 RX ADMIN — POTASSIUM CHLORIDE 20 MEQ: 1500 TABLET, EXTENDED RELEASE ORAL at 07:51

## 2023-08-04 RX ADMIN — INSULIN GLARGINE 40 UNITS: 100 INJECTION, SOLUTION SUBCUTANEOUS at 07:52

## 2023-08-04 RX ADMIN — AMIODARONE HYDROCHLORIDE 200 MG: 200 TABLET ORAL at 07:52

## 2023-08-04 NOTE — CARE COORDINATION
Case Management Assessment  Initial Evaluation    Date/Time of Evaluation: 8/4/2023 4:13 PM  Assessment Completed by: CHLOE Solares    If patient is discharged prior to next notation, then this note serves as note for discharge by case management. Patient Name: Rich Aguilera                   YOB: 1965  Diagnosis: Elevated troponin [R77.8]  KENDRA (acute kidney injury) ([address]) [N17.9]  Noncompliance with medications [Z91.148]  Acute on chronic congestive heart failure, unspecified heart failure type ([address]) [I50.9]  Decompensated heart failure ([address]) [I50.9]                   Date / Time: 8/3/2023 12:40 AM    Patient Admission Status: Observation   Readmission Risk (Low < 19, Mod (19-27), High > 27): Readmission Risk Score: 19.5    Current PCP: Raiza Munoz, DO  PCP verified by CM? Yes    Chart Reviewed: Yes      History Provided by: Patient, Medical Record  Patient Orientation: Alert and Oriented    Patient Cognition: Alert    Hospitalization in the last 30 days (Readmission):  No    If yes, Readmission Assessment in CM Navigator will be completed. Advance Directives:      Code Status: Full Code   Patient's Primary Decision Maker is: Legal Next of Kin    Primary Decision Maker: Donald Win - Child - [phone]    Secondary Decision Maker: Lilly Neff - Brother/Sister - [phone]    Discharge Planning:    Patient lives with: Alone Type of Home: Apartment  Primary Care Giver: Self  Patient Support Systems include: Family Members   Current Financial resources: Medicaid  Current community resources: None  Current services prior to admission: None            Current DME:              Type of Home Care services:  None    ADLS  Prior functional level: Independent in ADLs/IADLs  Current functional level:  Independent in ADLs/IADLs    PT AM-PAC:   /24  OT AM-PAC:   /24    Family can provide assistance at DC: No  Would you like Case Management to discuss the discharge plan with any other family

## 2023-08-04 NOTE — PROGRESS NOTES
Dr. Татьяна Gallegos aware pt lost IV access. Asked for transition to oral lasix.  Asked to make dayshift aware and pt is ok without access for now. Will continue to monitor.     Electronically signed by Landon Lechuga RN on 8/4/2023 at 7:43 AM

## 2023-08-04 NOTE — PLAN OF CARE
Problem: Discharge Planning  Goal: Discharge to home or other facility with appropriate resources  Outcome: Progressing  Flowsheets (Taken 8/4/2023 0425)  Discharge to home or other facility with appropriate resources:   Identify barriers to discharge with patient and caregiver   Arrange for needed discharge resources and transportation as appropriate     Problem: Safety - Adult  Goal: Free from fall injury  Outcome: Progressing  Flowsheets (Taken 8/4/2023 0425)  Free From Fall Injury: Instruct family/caregiver on patient safety     Problem: Chronic Conditions and Co-morbidities  Goal: Patient's chronic conditions and co-morbidity symptoms are monitored and maintained or improved  Outcome: Progressing  Flowsheets (Taken 8/4/2023 0425)  Care Plan - Patient's Chronic Conditions and Co-Morbidity Symptoms are Monitored and Maintained or Improved:   Monitor and assess patient's chronic conditions and comorbid symptoms for stability, deterioration, or improvement   Collaborate with multidisciplinary team to address chronic and comorbid conditions and prevent exacerbation or deterioration     Problem: Respiratory - Adult  Goal: Achieves optimal ventilation and oxygenation  Outcome: Progressing  Flowsheets (Taken 8/4/2023 0425)  Achieves optimal ventilation and oxygenation:   Assess for changes in respiratory status   Assess for changes in mentation and behavior   Position to facilitate oxygenation and minimize respiratory effort     Problem: Cardiovascular - Adult  Goal: Maintains optimal cardiac output and hemodynamic stability  Outcome: Progressing  Flowsheets (Taken 8/4/2023 0425)  Maintains optimal cardiac output and hemodynamic stability:   Monitor blood pressure and heart rate   Monitor urine output and notify Licensed Independent Practitioner for values outside of normal range   Assess for signs of decreased cardiac output  Goal: Absence of cardiac dysrhythmias or at baseline  Outcome: Progressing     Problem:

## 2023-08-04 NOTE — PLAN OF CARE
Problem: Discharge Planning  Goal: Discharge to home or other facility with appropriate resources  8/4/2023 0722 by Daryl Cleary RN  Outcome: Progressing  Flowsheets (Taken 8/4/2023 0722)  Discharge to home or other facility with appropriate resources:   Identify barriers to discharge with patient and caregiver   Arrange for needed discharge resources and transportation as appropriate  8/4/2023 0425 by Giuseppe Santos RN  Outcome: Progressing  Flowsheets (Taken 8/4/2023 0425)  Discharge to home or other facility with appropriate resources:   Identify barriers to discharge with patient and caregiver   Arrange for needed discharge resources and transportation as appropriate     Problem: Safety - Adult  Goal: Free from fall injury  8/4/2023 0722 by Daryl Cleary RN  Outcome: Progressing  Flowsheets (Taken 8/4/2023 0722)  Free From Fall Injury: Instruct family/caregiver on patient safety  8/4/2023 0425 by Giuseppe Santos RN  Outcome: Progressing  Flowsheets (Taken 8/4/2023 0425)  Free From Fall Injury: Instruct family/caregiver on patient safety     Problem: Chronic Conditions and Co-morbidities  Goal: Patient's chronic conditions and co-morbidity symptoms are monitored and maintained or improved  8/4/2023 0722 by Daryl Cleary RN  Outcome: Progressing  Flowsheets (Taken 8/4/2023 0722)  Care Plan - Patient's Chronic Conditions and Co-Morbidity Symptoms are Monitored and Maintained or Improved: Monitor and assess patient's chronic conditions and comorbid symptoms for stability, deterioration, or improvement  8/4/2023 0425 by Giuseppe Santos RN  Outcome: Progressing  Flowsheets (Taken 8/4/2023 0425)  Care Plan - Patient's Chronic Conditions and Co-Morbidity Symptoms are Monitored and Maintained or Improved:   Monitor and assess patient's chronic conditions and comorbid symptoms for stability, deterioration, or improvement   Collaborate with multidisciplinary team to address chronic and comorbid conditions and

## 2023-08-04 NOTE — PROGRESS NOTES
Pt returned back unit via stretcher.  Electronically signed by Geovani Rice RN on 8/4/2023 at 7:52 PM

## 2023-08-04 NOTE — PROGRESS NOTES
Pt transported off unit via stretcher for ultrasound.  Electronically signed by Cletis Essex, RN on 8/4/2023 at 7:52 PM

## 2023-08-04 NOTE — PROGRESS NOTES
Pt is alert and oriented x4, resting quietly in bed. Nightly medications and intake tolerated well. No complaints of nausea, vomiting, or pain. No other needs made known at this time. Fall precautions in place. Call light within reach. Will continue to monitor.     Electronically signed by Subhash Rivera RN on 8/4/2023 at 4:25 AM

## 2023-08-04 NOTE — PROGRESS NOTES
Conjunctivae/corneas clear. Neck: Supple, with full range of motion. No jugular venous distention. Trachea midline. Respiratory:  Normal respiratory effort. Clear to auscultation, bilaterally without Rales/Wheezes/Rhonchi. Cardiovascular: Regular rate and rhythm with normal S1/S2 without murmurs, rubs or gallops. Abdomen: Soft, non-tender, non-distended with normal bowel sounds. Musculoskeletal: No clubbing, cyanosis. + Anasarca. Bilateral lower extremity weeping. Full range of motion without deformity. Skin: Skin color, texture, turgor normal.  No rashes or lesions. Neurologic:  Neurovascularly intact without any focal sensory/motor deficits. Cranial nerves: II-XII intact, grossly non-focal.  Psychiatric: Alert and oriented, thought content appropriate, normal insight  Capillary Refill: Brisk,< 3 seconds   Peripheral Pulses: +2 palpable, equal bilaterally       Labs:   Recent Labs     08/03/23 0113 08/04/23 0415   WBC 6.8 4.8   HGB 12.4* 11.8*   HCT 38.4* 36.2*    232     Recent Labs     08/03/23 0113 08/04/23  0415    142   K 3.8 3.6    108   CO2 26 26   BUN 17 22*   CREATININE 1.6* 1.4*   CALCIUM 8.3 8.3     Recent Labs     08/03/23 0113   AST 22   ALT 18   BILITOT 0.5   ALKPHOS 93     No results for input(s): INR in the last 72 hours. No results for input(s): Carolyn Cobia in the last 72 hours. Urinalysis:      Lab Results   Component Value Date/Time    NITRU Negative 08/03/2023 09:20 PM    WBCUA 0 08/03/2023 09:20 PM    BACTERIA None Seen 08/03/2023 09:20 PM    RBCUA 0 08/03/2023 09:20 PM    BLOODU Negative 08/03/2023 09:20 PM    SPECGRAV 1.005 08/03/2023 09:20 PM    GLUCOSEU Negative 08/03/2023 09:20 PM       Radiology:  VL Extremity Venous Bilateral   Final Result      XR CHEST PORTABLE   Final Result   Cardiomegaly with no acute pulmonary finding. DVT Prophylaxis: Eliquis  Diet: ADULT DIET; Regular; 5 carb choices (75 gm/meal);  No Added Salt (3-4 gm); 1500 ml  Code Status: Full Code    PT/OT Eval Status: No acute needs    Dispo -Home once medically stable, likely 2 to 3 days for diuresing    Petra Mccullough, APRN - CNP      NOTE:  This report was transcribed using voice recognition software. Every effort was made to ensure accuracy; however, inadvertent computerized transcription errors may be present.

## 2023-08-04 NOTE — PROGRESS NOTES
Message sent to provider about the patient switching to oral lasix due to pt lost IV access again. Pt is a hard stick. Awaiting response.  Electronically signed by Shantal Long RN on 8/4/2023 at 8:43 AM

## 2023-08-04 NOTE — PROGRESS NOTES
Upon arrival to place midline assessed chart for issues related to midline placement, check for consent, and did time out with MAURISIO Vu. Pt. Tolerated midline placement well, no difficulty accessing basilic vein, threaded well, with free flowing blood return.  Reported off to [employer]

## 2023-08-04 NOTE — PROGRESS NOTES
Pt awake sitting up in chair alert and oriented x 4. Morning medications administered, pt tolerated well. Pt blood sugar is 72 humalog held pt given apple juice. Pt shows no signs of distress. Provider notified loss of IV access, provider placed order for midline placement. Pt states he has no other needs at this time. Call light placed within reach.  Electronically signed by Brittney Drummond RN on 8/4/2023 at 12:11 PM

## 2023-08-04 NOTE — PROGRESS NOTES
Message sent to provider about pt low blood sugar for possible lantus adjustments, humalog held due to low readings pt given apple juice with blood sugar reading of 70. Pt shows no signs of distress at this time. Awaiting response from provider.  Electronically signed by Sravani Farmer RN on 8/4/2023 at 12:00 PM

## 2023-08-05 LAB
ANION GAP SERPL CALCULATED.3IONS-SCNC: 11 MMOL/L (ref 3–16)
BUN SERPL-MCNC: 24 MG/DL (ref 7–20)
CALCIUM SERPL-MCNC: 8.3 MG/DL (ref 8.3–10.6)
CHLORIDE SERPL-SCNC: 101 MMOL/L (ref 99–110)
CO2 SERPL-SCNC: 28 MMOL/L (ref 21–32)
CREAT SERPL-MCNC: 1.2 MG/DL (ref 0.9–1.3)
CREAT UR-MCNC: 202.6 MG/DL (ref 39–259)
GFR SERPLBLD CREATININE-BSD FMLA CKD-EPI: >60 ML/MIN/{1.73_M2}
GLUCOSE BLD-MCNC: 143 MG/DL (ref 70–99)
GLUCOSE BLD-MCNC: 162 MG/DL (ref 70–99)
GLUCOSE BLD-MCNC: 57 MG/DL (ref 70–99)
GLUCOSE BLD-MCNC: 58 MG/DL (ref 70–99)
GLUCOSE SERPL-MCNC: 117 MG/DL (ref 70–99)
MAGNESIUM SERPL-MCNC: 2.1 MG/DL (ref 1.8–2.4)
PERFORMED ON: ABNORMAL
POTASSIUM SERPL-SCNC: 3.5 MMOL/L (ref 3.5–5.1)
SODIUM SERPL-SCNC: 140 MMOL/L (ref 136–145)
SODIUM UR-SCNC: 74 MMOL/L
UUN UR-MCNC: 1054.6 MG/DL (ref 800–1666)

## 2023-08-05 PROCEDURE — 6370000000 HC RX 637 (ALT 250 FOR IP): Performed by: NURSE PRACTITIONER

## 2023-08-05 PROCEDURE — 99233 SBSQ HOSP IP/OBS HIGH 50: CPT | Performed by: NURSE PRACTITIONER

## 2023-08-05 PROCEDURE — 83735 ASSAY OF MAGNESIUM: CPT

## 2023-08-05 PROCEDURE — 80048 BASIC METABOLIC PNL TOTAL CA: CPT

## 2023-08-05 PROCEDURE — 2580000003 HC RX 258: Performed by: STUDENT IN AN ORGANIZED HEALTH CARE EDUCATION/TRAINING PROGRAM

## 2023-08-05 PROCEDURE — 36415 COLL VENOUS BLD VENIPUNCTURE: CPT

## 2023-08-05 PROCEDURE — 94760 N-INVAS EAR/PLS OXIMETRY 1: CPT

## 2023-08-05 PROCEDURE — 6360000002 HC RX W HCPCS: Performed by: STUDENT IN AN ORGANIZED HEALTH CARE EDUCATION/TRAINING PROGRAM

## 2023-08-05 PROCEDURE — 84540 ASSAY OF URINE/UREA-N: CPT

## 2023-08-05 PROCEDURE — 6370000000 HC RX 637 (ALT 250 FOR IP): Performed by: STUDENT IN AN ORGANIZED HEALTH CARE EDUCATION/TRAINING PROGRAM

## 2023-08-05 PROCEDURE — 84300 ASSAY OF URINE SODIUM: CPT

## 2023-08-05 PROCEDURE — 2580000003 HC RX 258: Performed by: NURSE PRACTITIONER

## 2023-08-05 PROCEDURE — 1200000000 HC SEMI PRIVATE

## 2023-08-05 PROCEDURE — 82570 ASSAY OF URINE CREATININE: CPT

## 2023-08-05 RX ORDER — INSULIN GLARGINE 100 [IU]/ML
20 INJECTION, SOLUTION SUBCUTANEOUS DAILY
Status: DISCONTINUED | OUTPATIENT
Start: 2023-08-05 | End: 2023-08-07 | Stop reason: HOSPADM

## 2023-08-05 RX ORDER — CLOPIDOGREL BISULFATE 75 MG/1
75 TABLET ORAL DAILY
Status: DISCONTINUED | OUTPATIENT
Start: 2023-08-05 | End: 2023-08-07

## 2023-08-05 RX ORDER — INSULIN LISPRO 100 [IU]/ML
7 INJECTION, SOLUTION INTRAVENOUS; SUBCUTANEOUS
Status: DISCONTINUED | OUTPATIENT
Start: 2023-08-05 | End: 2023-08-07 | Stop reason: HOSPADM

## 2023-08-05 RX ADMIN — SODIUM CHLORIDE, PRESERVATIVE FREE 10 ML: 5 INJECTION INTRAVENOUS at 09:22

## 2023-08-05 RX ADMIN — SODIUM CHLORIDE, PRESERVATIVE FREE 10 ML: 5 INJECTION INTRAVENOUS at 21:50

## 2023-08-05 RX ADMIN — INSULIN LISPRO 7 UNITS: 100 INJECTION, SOLUTION INTRAVENOUS; SUBCUTANEOUS at 11:55

## 2023-08-05 RX ADMIN — ATORVASTATIN CALCIUM 80 MG: 80 TABLET, FILM COATED ORAL at 21:49

## 2023-08-05 RX ADMIN — INSULIN GLARGINE 20 UNITS: 100 INJECTION, SOLUTION SUBCUTANEOUS at 11:55

## 2023-08-05 RX ADMIN — CLOPIDOGREL BISULFATE 75 MG: 75 TABLET ORAL at 09:20

## 2023-08-05 RX ADMIN — FUROSEMIDE 40 MG: 10 INJECTION, SOLUTION INTRAMUSCULAR; INTRAVENOUS at 18:51

## 2023-08-05 RX ADMIN — ASPIRIN 81 MG: 81 TABLET, CHEWABLE ORAL at 09:21

## 2023-08-05 RX ADMIN — FUROSEMIDE 40 MG: 10 INJECTION, SOLUTION INTRAMUSCULAR; INTRAVENOUS at 09:20

## 2023-08-05 RX ADMIN — AMIODARONE HYDROCHLORIDE 200 MG: 200 TABLET ORAL at 09:20

## 2023-08-05 RX ADMIN — POTASSIUM CHLORIDE 20 MEQ: 1500 TABLET, EXTENDED RELEASE ORAL at 09:20

## 2023-08-05 RX ADMIN — APIXABAN 5 MG: 5 TABLET, FILM COATED ORAL at 09:20

## 2023-08-05 RX ADMIN — APIXABAN 5 MG: 5 TABLET, FILM COATED ORAL at 21:49

## 2023-08-05 NOTE — PROGRESS NOTES
Pt awake alert and oriented x 4. Pt denies pain at this time. Morning medications administered, pt tolerated well. No new orders at this time. Call light placed within reach.  Electronically signed by Laurie Covarrubias RN on 8/5/2023 at 4:07 PM

## 2023-08-05 NOTE — PROGRESS NOTES
Hospitalist Progress Note      PCP: Miriam Urban DO    Date of Admission: 8/3/2023    Chief Complaint: Shortness of breath and leg swelling    Hospital Course: Shanelle Chance is a 60 y.o. male with history of T2DM, HTN, HLD, nonobstructive CAD, CKD, chronic combined systolic and diastolic heart failure, atrial fibrillation, cocaine use, and prior CVA presenting for shortness of breath and leg swelling. Patient says his symptoms started when he ran out of his home medications several months ago and have worsened over the last few weeks. He endorses dyspnea with exertion and  bilateral lower extremity swelling. He is not able to walk from his bedroom to the bathroom without feeling short of breath. This improves with rest. He denies chest pain, lightheadedness/dizziness, nausea/vomiting, abdominal pain, cough. Last cocaine use 4-5 days ago. Subjective: Pt laying in bed, has been refusing tele per nurse. Discussed with patient, he now agrees to wear it. Nurse informed. Pt states breathing and swelling are bed. Denies cp palpitations dizziness. Reviewed POC, denies needs. Assessment/Plan:    Acute on chronic combined systolic and diastolic CHF  -[facility] 08/8210 with nonobstructive disease. Etiology NICM likely secondary to cocaine use. - TTE 10/2022 LVEF 43%  -8/4/2023 echo EF 15 to 20%  -Consulted cardiology, continue IV lasix  -? Need for heart cath to evaluate new lower EF?  - IV lasix 40 mg BID. Goal net negative 2-3L/day  - GDMT: losartan and spironolactone held for KENDRA  -accurate I&O, daily weights, low salt diet  -Daily bmp     KENDRA  -Cr 1.6 on admissoin from baseline of ~1.0, possibly congestion 2/2 ADHF. Monitor with diuresis. -Creatinine 1.4 today  -Consult nephrology  -renal US non acute      Elevated troponin  No ischemic changes on EKG.  Suspect 2/2 heart failure exacerbation.   - Trend troponin  - Telemetry     Type 2 diabetes  - glucose low  -decrease lantus to 20 units, decrease RBCUA 0 08/03/2023 09:20 PM    BLOODU Negative 08/03/2023 09:20 PM    SPECGRAV 1.005 08/03/2023 09:20 PM    GLUCOSEU Negative 08/03/2023 09:20 PM       Radiology:  US RENAL COMPLETE   Final Result   Unremarkable ultrasound of the kidneys and urinary bladder. VL Extremity Venous Bilateral   Final Result      XR CHEST PORTABLE   Final Result   Cardiomegaly with no acute pulmonary finding. DVT Prophylaxis: Eliquis  Diet: ADULT DIET; Regular; 5 carb choices (75 gm/meal); No Added Salt (3-4 gm); 1500 ml  Code Status: Full Code    PT/OT Eval Status: No acute needs    Dispo -Home once medically stable, likely 2 to 3 days for shanique Anglin, APRN - CNP      NOTE:  This report was transcribed using voice recognition software. Every effort was made to ensure accuracy; however, inadvertent computerized transcription errors may be present.

## 2023-08-05 NOTE — PLAN OF CARE
Problem: Discharge Planning  Goal: Discharge to home or other facility with appropriate resources  Outcome: Progressing  Flowsheets (Taken 8/5/2023 0902)  Discharge to home or other facility with appropriate resources:   Identify barriers to discharge with patient and caregiver   Arrange for needed discharge resources and transportation as appropriate   Identify discharge learning needs (meds, wound care, etc)   Refer to discharge planning if patient needs post-hospital services based on physician order or complex needs related to functional status, cognitive ability or social support system     Problem: Safety - Adult  Goal: Free from fall injury  Outcome: Progressing  Flowsheets (Taken 8/5/2023 0902)  Free From Fall Injury: Instruct family/caregiver on patient safety     Problem: Chronic Conditions and Co-morbidities  Goal: Patient's chronic conditions and co-morbidity symptoms are monitored and maintained or improved  Outcome: Progressing  Flowsheets (Taken 8/5/2023 0902)  Care Plan - Patient's Chronic Conditions and Co-Morbidity Symptoms are Monitored and Maintained or Improved: Monitor and assess patient's chronic conditions and comorbid symptoms for stability, deterioration, or improvement     Problem: Respiratory - Adult  Goal: Achieves optimal ventilation and oxygenation  Outcome: Progressing  Flowsheets (Taken 8/5/2023 0902)  Achieves optimal ventilation and oxygenation:   Assess for changes in respiratory status   Assess for changes in mentation and behavior     Problem: Cardiovascular - Adult  Goal: Maintains optimal cardiac output and hemodynamic stability  Outcome: Progressing  Flowsheets (Taken 8/5/2023 0902)  Maintains optimal cardiac output and hemodynamic stability:   Monitor blood pressure and heart rate   Monitor urine output and notify Licensed Independent Practitioner for values outside of normal range  Goal: Absence of cardiac dysrhythmias or at baseline  Outcome: Progressing  Flowsheets

## 2023-08-06 LAB
ANION GAP SERPL CALCULATED.3IONS-SCNC: 10 MMOL/L (ref 3–16)
BUN SERPL-MCNC: 24 MG/DL (ref 7–20)
CALCIUM SERPL-MCNC: 8.5 MG/DL (ref 8.3–10.6)
CHLORIDE SERPL-SCNC: 102 MMOL/L (ref 99–110)
CO2 SERPL-SCNC: 26 MMOL/L (ref 21–32)
CREAT SERPL-MCNC: 1.4 MG/DL (ref 0.9–1.3)
GFR SERPLBLD CREATININE-BSD FMLA CKD-EPI: 58 ML/MIN/{1.73_M2}
GLUCOSE BLD-MCNC: 111 MG/DL (ref 70–99)
GLUCOSE BLD-MCNC: 136 MG/DL (ref 70–99)
GLUCOSE BLD-MCNC: 171 MG/DL (ref 70–99)
GLUCOSE BLD-MCNC: 98 MG/DL (ref 70–99)
GLUCOSE SERPL-MCNC: 117 MG/DL (ref 70–99)
MAGNESIUM SERPL-MCNC: 2.1 MG/DL (ref 1.8–2.4)
PERFORMED ON: ABNORMAL
PERFORMED ON: NORMAL
POTASSIUM SERPL-SCNC: 4.1 MMOL/L (ref 3.5–5.1)
SODIUM SERPL-SCNC: 138 MMOL/L (ref 136–145)

## 2023-08-06 PROCEDURE — 6370000000 HC RX 637 (ALT 250 FOR IP): Performed by: NURSE PRACTITIONER

## 2023-08-06 PROCEDURE — 36415 COLL VENOUS BLD VENIPUNCTURE: CPT

## 2023-08-06 PROCEDURE — 6370000000 HC RX 637 (ALT 250 FOR IP): Performed by: STUDENT IN AN ORGANIZED HEALTH CARE EDUCATION/TRAINING PROGRAM

## 2023-08-06 PROCEDURE — 1200000000 HC SEMI PRIVATE

## 2023-08-06 PROCEDURE — 83735 ASSAY OF MAGNESIUM: CPT

## 2023-08-06 PROCEDURE — 6360000002 HC RX W HCPCS: Performed by: STUDENT IN AN ORGANIZED HEALTH CARE EDUCATION/TRAINING PROGRAM

## 2023-08-06 PROCEDURE — 99232 SBSQ HOSP IP/OBS MODERATE 35: CPT | Performed by: NURSE PRACTITIONER

## 2023-08-06 PROCEDURE — 2580000003 HC RX 258: Performed by: NURSE PRACTITIONER

## 2023-08-06 PROCEDURE — 80048 BASIC METABOLIC PNL TOTAL CA: CPT

## 2023-08-06 PROCEDURE — 2580000003 HC RX 258: Performed by: STUDENT IN AN ORGANIZED HEALTH CARE EDUCATION/TRAINING PROGRAM

## 2023-08-06 PROCEDURE — 94760 N-INVAS EAR/PLS OXIMETRY 1: CPT

## 2023-08-06 RX ORDER — PANTOPRAZOLE SODIUM 40 MG/1
40 TABLET, DELAYED RELEASE ORAL
Status: DISCONTINUED | OUTPATIENT
Start: 2023-08-07 | End: 2023-08-07 | Stop reason: HOSPADM

## 2023-08-06 RX ADMIN — SODIUM CHLORIDE, PRESERVATIVE FREE 10 ML: 5 INJECTION INTRAVENOUS at 08:33

## 2023-08-06 RX ADMIN — POTASSIUM CHLORIDE 20 MEQ: 1500 TABLET, EXTENDED RELEASE ORAL at 08:32

## 2023-08-06 RX ADMIN — ASPIRIN 81 MG: 81 TABLET, CHEWABLE ORAL at 08:32

## 2023-08-06 RX ADMIN — FUROSEMIDE 40 MG: 10 INJECTION, SOLUTION INTRAMUSCULAR; INTRAVENOUS at 08:32

## 2023-08-06 RX ADMIN — INSULIN GLARGINE 20 UNITS: 100 INJECTION, SOLUTION SUBCUTANEOUS at 08:32

## 2023-08-06 RX ADMIN — SODIUM CHLORIDE, PRESERVATIVE FREE 10 ML: 5 INJECTION INTRAVENOUS at 21:16

## 2023-08-06 RX ADMIN — INSULIN LISPRO 7 UNITS: 100 INJECTION, SOLUTION INTRAVENOUS; SUBCUTANEOUS at 11:54

## 2023-08-06 RX ADMIN — SODIUM CHLORIDE, PRESERVATIVE FREE 10 ML: 5 INJECTION INTRAVENOUS at 08:32

## 2023-08-06 RX ADMIN — APIXABAN 5 MG: 5 TABLET, FILM COATED ORAL at 08:32

## 2023-08-06 RX ADMIN — APIXABAN 5 MG: 5 TABLET, FILM COATED ORAL at 21:14

## 2023-08-06 RX ADMIN — INSULIN LISPRO 7 UNITS: 100 INJECTION, SOLUTION INTRAVENOUS; SUBCUTANEOUS at 08:32

## 2023-08-06 RX ADMIN — SODIUM CHLORIDE, PRESERVATIVE FREE 10 ML: 5 INJECTION INTRAVENOUS at 21:15

## 2023-08-06 RX ADMIN — AMIODARONE HYDROCHLORIDE 200 MG: 200 TABLET ORAL at 08:32

## 2023-08-06 RX ADMIN — INSULIN LISPRO 7 UNITS: 100 INJECTION, SOLUTION INTRAVENOUS; SUBCUTANEOUS at 17:11

## 2023-08-06 RX ADMIN — ATORVASTATIN CALCIUM 80 MG: 80 TABLET, FILM COATED ORAL at 21:15

## 2023-08-06 RX ADMIN — CLOPIDOGREL BISULFATE 75 MG: 75 TABLET ORAL at 08:32

## 2023-08-06 RX ADMIN — FUROSEMIDE 40 MG: 10 INJECTION, SOLUTION INTRAMUSCULAR; INTRAVENOUS at 17:11

## 2023-08-06 NOTE — PROGRESS NOTES
Patient is A&Ox4. Patient is resting in chair, awake and quiet. Room air. Legs elevated while in chair. Call light, telephone and bedside table are within reach. Patient is UAL. VSS taken. AM meds given. Shift assessment completed. Will continue to monitor patient per unit protocols.  Electronically signed by Giovani Leach RN on 8/6/2023 at 10:01 AM

## 2023-08-06 NOTE — PROGRESS NOTES
Hospitalist Progress Note      PCP: Maribel Jacobs DO    Date of Admission: 8/3/2023    Chief Complaint: Shortness of breath and leg swelling    Hospital Course: Lexx Suarez is a 60 y.o. male with history of T2DM, HTN, HLD, nonobstructive CAD, CKD, chronic combined systolic and diastolic heart failure, atrial fibrillation, cocaine use, and prior CVA presenting for shortness of breath and leg swelling. Patient says his symptoms started when he ran out of his home medications several months ago and have worsened over the last few weeks. He endorses dyspnea with exertion and  bilateral lower extremity swelling. He is not able to walk from his bedroom to the bathroom without feeling short of breath. This improves with rest. He denies chest pain, lightheadedness/dizziness, nausea/vomiting, abdominal pain, cough. Last cocaine use 4-5 days ago. Subjective: Pt sitting on side of bed, states he feels much better. Has been walking in the halls. Reqeuested a new glucometer and testing supplies at [employer], asked for [address] [place] [address] to be primary pharmacy. Hopeful to go home tomorrow. States he is staying away from cocaine. Discussed the importance of cocaine cessation. Pt states breathing and swelling are bed. Denies cp palpitations dizziness. Reviewed POC, denies needs. Assessment/Plan:    Acute on chronic combined systolic and diastolic CHF  -[facility] 42/2772 with nonobstructive disease. Etiology NICM likely secondary to cocaine use. - TTE 10/2022 LVEF 43%  -8/4/2023 echo EF 15 to 20%  -Consulted cardiology, continue IV lasix  - IV lasix 40 mg BID. Goal net negative 2-3L/day  - GDMT: losartan and spironolactone held for KENDRA  -accurate I&O, daily weights, low salt diet  -Daily bmp  - -18 lbs since admission     KENDRA  -Cr 1.6 on admissoin from baseline of ~1.0, possibly congestion 2/2 ADHF. Monitor with diuresis.    -Creatinine 1.5 today  -Consulted nephrology  -renal US non acute      Elevated troponin  No

## 2023-08-06 NOTE — PLAN OF CARE
Problem: Discharge Planning  Goal: Discharge to home or other facility with appropriate resources  8/6/2023 0722 by Ellen Portillo RN  Outcome: Progressing   Assessed patients knowledge of discharge. Will continue to work with patient on discharge planning and answer patient questions. Will consult case management and  as necessary. Problem: Safety - Adult  Goal: Free from fall injury  8/6/2023 0722 by Ellen Portillo RN  Outcome: Progressing   Will remain free from falls. Fall precautions are in place. Call light, telephone and bedside table are within reach.    Problem: Chronic Conditions and Co-morbidities  Goal: Patient's chronic conditions and co-morbidity symptoms are monitored and maintained or improved  8/6/2023 0722 by Ellen Portillo RN  Outcome: Progressing     Problem: Nutrition Deficit:  Goal: Optimize nutritional status  8/6/2023 0722 by Ellen Portillo RN  Outcome: Progressing

## 2023-08-06 NOTE — PROGRESS NOTES
Patient is resting in chair, awake and quiet. Room air. Legs elevated while in chair. Call light, telephone and bedside table are within reach. Patient is UAL. Patient denies needs at this time. Will continue to monitor patient per unit protocols.  Electronically signed by Gayle Restrepo RN on 8/6/2023 at 4:25 PM

## 2023-08-06 NOTE — PROGRESS NOTES
Hudson score assessed. Patient able to ambulate and turn self. Repositioned patient Q2H and assessed skin. Educated patient on importance of repositioning to prevent skin issues.

## 2023-08-06 NOTE — PLAN OF CARE
Impression: Age-related nuclear cataract, bilateral: H25.13. Bilateral. Plan: Discussed condition. Continue to monitor without treatment at this time. Problem: Discharge Planning  Goal: Discharge to home or other facility with appropriate resources  Outcome: Progressing  Flowsheets (Taken 8/5/2023 0902 by Sherry Suarez RN)  Discharge to home or other facility with appropriate resources:   Identify barriers to discharge with patient and caregiver   Arrange for needed discharge resources and transportation as appropriate   Identify discharge learning needs (meds, wound care, etc)   Refer to discharge planning if patient needs post-hospital services based on physician order or complex needs related to functional status, cognitive ability or social support system     Problem: Safety - Adult  Goal: Free from fall injury  Outcome: Progressing  Flowsheets (Taken 8/5/2023 0902 by Sherry Suarez RN)  Free From Fall Injury: Instruct family/caregiver on patient safety     Problem: Chronic Conditions and Co-morbidities  Goal: Patient's chronic conditions and co-morbidity symptoms are monitored and maintained or improved  Outcome: Progressing  Flowsheets (Taken 8/5/2023 0902 by Sherry Suarez RN)  Care Plan - Patient's Chronic Conditions and Co-Morbidity Symptoms are Monitored and Maintained or Improved: Monitor and assess patient's chronic conditions and comorbid symptoms for stability, deterioration, or improvement     Problem: Respiratory - Adult  Goal: Achieves optimal ventilation and oxygenation  Outcome: Progressing  Flowsheets (Taken 8/5/2023 0902 by Evette Limon RN)  Achieves optimal ventilation and oxygenation:   Assess for changes in respiratory status   Assess for changes in mentation and behavior     Problem: Cardiovascular - Adult  Goal: Maintains optimal cardiac output and hemodynamic stability  Outcome: Progressing  Flowsheets (Taken 8/5/2023 0902 by Sherry Suarez RN)  Maintains optimal cardiac output and hemodynamic stability:   Monitor blood pressure and heart rate   Monitor urine output and notify Licensed Independent Practitioner for values outside of normal range  Goal: Absence of cardiac dysrhythmias or at baseline  Outcome: Progressing  Flowsheets (Taken 8/5/2023 0902 by Wilfredo Underwood RN)  Absence of cardiac dysrhythmias or at baseline: Monitor cardiac rate and rhythm     Problem: Metabolic/Fluid and Electrolytes - Adult  Goal: Electrolytes maintained within normal limits  Outcome: Progressing  Flowsheets (Taken 8/5/2023 0902 by Wilfredo Underwood RN)  Electrolytes maintained within normal limits: Monitor labs and assess patient for signs and symptoms of electrolyte imbalances  Goal: Hemodynamic stability and optimal renal function maintained  Outcome: Progressing  Flowsheets (Taken 8/5/2023 0902 by Loreto Limon RN)  Hemodynamic stability and optimal renal function maintained: Monitor labs and assess for signs and symptoms of volume excess or deficit     Problem: Nutrition Deficit:  Goal: Optimize nutritional status  Outcome: Progressing  Flowsheets (Taken 8/5/2023 0902 by Loreto Limon, RN)  Nutrient intake appropriate for improving, restoring, or maintaining nutritional needs:   Assess nutritional status and recommend course of action   Monitor oral intake, labs, and treatment plans

## 2023-08-07 VITALS
OXYGEN SATURATION: 98 % | DIASTOLIC BLOOD PRESSURE: 93 MMHG | WEIGHT: 315 LBS | HEIGHT: 73 IN | RESPIRATION RATE: 22 BRPM | BODY MASS INDEX: 41.75 KG/M2 | TEMPERATURE: 98.2 F | SYSTOLIC BLOOD PRESSURE: 147 MMHG | HEART RATE: 70 BPM

## 2023-08-07 PROBLEM — Z95.828 PRESENCE OF INTERNAL CAROTID STENT: Status: ACTIVE | Noted: 2023-08-07

## 2023-08-07 LAB
ANION GAP SERPL CALCULATED.3IONS-SCNC: 11 MMOL/L (ref 3–16)
BUN SERPL-MCNC: 23 MG/DL (ref 7–20)
CALCIUM SERPL-MCNC: 8 MG/DL (ref 8.3–10.6)
CHLORIDE SERPL-SCNC: 103 MMOL/L (ref 99–110)
CO2 SERPL-SCNC: 26 MMOL/L (ref 21–32)
CREAT SERPL-MCNC: 1.3 MG/DL (ref 0.9–1.3)
GFR SERPLBLD CREATININE-BSD FMLA CKD-EPI: >60 ML/MIN/{1.73_M2}
GLUCOSE BLD-MCNC: 125 MG/DL (ref 70–99)
GLUCOSE BLD-MCNC: 138 MG/DL (ref 70–99)
GLUCOSE BLD-MCNC: 156 MG/DL (ref 70–99)
GLUCOSE SERPL-MCNC: 119 MG/DL (ref 70–99)
MAGNESIUM SERPL-MCNC: 2 MG/DL (ref 1.8–2.4)
NT-PROBNP SERPL-MCNC: 2174 PG/ML (ref 0–124)
PERFORMED ON: ABNORMAL
POTASSIUM SERPL-SCNC: 3.7 MMOL/L (ref 3.5–5.1)
SODIUM SERPL-SCNC: 140 MMOL/L (ref 136–145)

## 2023-08-07 PROCEDURE — 83735 ASSAY OF MAGNESIUM: CPT

## 2023-08-07 PROCEDURE — 6370000000 HC RX 637 (ALT 250 FOR IP): Performed by: STUDENT IN AN ORGANIZED HEALTH CARE EDUCATION/TRAINING PROGRAM

## 2023-08-07 PROCEDURE — 93880 EXTRACRANIAL BILAT STUDY: CPT

## 2023-08-07 PROCEDURE — 94760 N-INVAS EAR/PLS OXIMETRY 1: CPT

## 2023-08-07 PROCEDURE — 83880 ASSAY OF NATRIURETIC PEPTIDE: CPT

## 2023-08-07 PROCEDURE — APPNB30 APP NON BILLABLE TIME 0-30 MINS: Performed by: NURSE PRACTITIONER

## 2023-08-07 PROCEDURE — 80048 BASIC METABOLIC PNL TOTAL CA: CPT

## 2023-08-07 PROCEDURE — 6360000002 HC RX W HCPCS: Performed by: STUDENT IN AN ORGANIZED HEALTH CARE EDUCATION/TRAINING PROGRAM

## 2023-08-07 PROCEDURE — 99221 1ST HOSP IP/OBS SF/LOW 40: CPT | Performed by: STUDENT IN AN ORGANIZED HEALTH CARE EDUCATION/TRAINING PROGRAM

## 2023-08-07 PROCEDURE — 2580000003 HC RX 258: Performed by: STUDENT IN AN ORGANIZED HEALTH CARE EDUCATION/TRAINING PROGRAM

## 2023-08-07 PROCEDURE — 2580000003 HC RX 258: Performed by: NURSE PRACTITIONER

## 2023-08-07 PROCEDURE — 36415 COLL VENOUS BLD VENIPUNCTURE: CPT

## 2023-08-07 PROCEDURE — 6370000000 HC RX 637 (ALT 250 FOR IP): Performed by: NURSE PRACTITIONER

## 2023-08-07 RX ORDER — INSULIN LISPRO 100 [IU]/ML
7 INJECTION, SOLUTION INTRAVENOUS; SUBCUTANEOUS
Qty: 3 EACH | Refills: 0 | Status: SHIPPED | OUTPATIENT
Start: 2023-08-07 | End: 2023-08-07 | Stop reason: SDUPTHER

## 2023-08-07 RX ORDER — GLUCOSAMINE HCL/CHONDROITIN SU 500-400 MG
CAPSULE ORAL
Qty: 150 STRIP | Refills: 0 | Status: SHIPPED | OUTPATIENT
Start: 2023-08-07

## 2023-08-07 RX ORDER — GLUCOSAMINE HCL/CHONDROITIN SU 500-400 MG
CAPSULE ORAL
Qty: 100 STRIP | Refills: 0 | Status: SHIPPED | OUTPATIENT
Start: 2023-08-07

## 2023-08-07 RX ORDER — FLUTICASONE PROPIONATE 50 MCG
2 SPRAY, SUSPENSION (ML) NASAL DAILY
Qty: 16 G | Refills: 0 | Status: SHIPPED | OUTPATIENT
Start: 2023-08-07

## 2023-08-07 RX ORDER — TORSEMIDE 20 MG/1
20 TABLET ORAL 2 TIMES DAILY
Status: DISCONTINUED | OUTPATIENT
Start: 2023-08-08 | End: 2023-08-07 | Stop reason: HOSPADM

## 2023-08-07 RX ORDER — LANCETS 30 GAUGE
1 EACH MISCELLANEOUS 4 TIMES DAILY
Qty: 200 EACH | Refills: 5 | Status: SHIPPED | OUTPATIENT
Start: 2023-08-07

## 2023-08-07 RX ORDER — POTASSIUM CHLORIDE 20 MEQ/1
20 TABLET, EXTENDED RELEASE ORAL DAILY
Qty: 60 TABLET | Refills: 3 | Status: SHIPPED | OUTPATIENT
Start: 2023-08-08

## 2023-08-07 RX ORDER — INSULIN LISPRO 100 [IU]/ML
7 INJECTION, SOLUTION INTRAVENOUS; SUBCUTANEOUS
Qty: 3 EACH | Refills: 0 | Status: SHIPPED | OUTPATIENT
Start: 2023-08-07

## 2023-08-07 RX ORDER — TORSEMIDE 20 MG/1
20 TABLET ORAL 2 TIMES DAILY
Status: DISCONTINUED | OUTPATIENT
Start: 2023-08-08 | End: 2023-08-07

## 2023-08-07 RX ORDER — TORSEMIDE 20 MG/1
20 TABLET ORAL 2 TIMES DAILY
Qty: 30 TABLET | Refills: 3 | Status: SHIPPED | OUTPATIENT
Start: 2023-08-08

## 2023-08-07 RX ORDER — INSULIN GLARGINE 100 [IU]/ML
20 INJECTION, SOLUTION SUBCUTANEOUS DAILY
Qty: 10 ML | Refills: 3 | Status: SHIPPED | OUTPATIENT
Start: 2023-08-08

## 2023-08-07 RX ORDER — BLOOD-GLUCOSE METER
1 KIT MISCELLANEOUS DAILY
Qty: 1 KIT | Refills: 0 | Status: SHIPPED | OUTPATIENT
Start: 2023-08-07

## 2023-08-07 RX ORDER — PANTOPRAZOLE SODIUM 40 MG/1
40 TABLET, DELAYED RELEASE ORAL
Qty: 30 TABLET | Refills: 2 | Status: SHIPPED | OUTPATIENT
Start: 2023-08-07

## 2023-08-07 RX ADMIN — POTASSIUM CHLORIDE 20 MEQ: 1500 TABLET, EXTENDED RELEASE ORAL at 11:11

## 2023-08-07 RX ADMIN — AMIODARONE HYDROCHLORIDE 200 MG: 200 TABLET ORAL at 11:11

## 2023-08-07 RX ADMIN — ASPIRIN 81 MG: 81 TABLET, CHEWABLE ORAL at 11:11

## 2023-08-07 RX ADMIN — SODIUM CHLORIDE, PRESERVATIVE FREE 10 ML: 5 INJECTION INTRAVENOUS at 11:12

## 2023-08-07 RX ADMIN — PANTOPRAZOLE SODIUM 40 MG: 40 TABLET, DELAYED RELEASE ORAL at 05:17

## 2023-08-07 RX ADMIN — INSULIN GLARGINE 20 UNITS: 100 INJECTION, SOLUTION SUBCUTANEOUS at 11:11

## 2023-08-07 RX ADMIN — APIXABAN 5 MG: 5 TABLET, FILM COATED ORAL at 11:11

## 2023-08-07 RX ADMIN — FUROSEMIDE 40 MG: 10 INJECTION, SOLUTION INTRAMUSCULAR; INTRAVENOUS at 11:11

## 2023-08-07 RX ADMIN — INSULIN LISPRO 7 UNITS: 100 INJECTION, SOLUTION INTRAVENOUS; SUBCUTANEOUS at 11:11

## 2023-08-07 ASSESSMENT — ENCOUNTER SYMPTOMS
SHORTNESS OF BREATH: 1
BLOOD IN STOOL: 0

## 2023-08-07 ASSESSMENT — PAIN SCALES - GENERAL: PAINLEVEL_OUTOF10: 0

## 2023-08-07 NOTE — PROGRESS NOTES
Discharge medications are ready in inpatient pharmacy for possible evening discharge.     08/07/23 3:39 PM

## 2023-08-07 NOTE — DISCHARGE SUMMARY
Hospital Medicine Discharge Summary    Patient ID: Stefani Causey      Patient's PCP: Danielle John DO    Admit Date: 8/3/2023     Discharge Date: 8/7/2023      Admitting Physician: Jade Liang MD     Discharge Physician: CONSUELO Eid - CNP     Discharge Diagnoses  Acute on chronic combined systolic and diastolic CHF  KENDRA  Average troponin  Type 2 diabetes  Paroxysmal A-fib  History CVA right TCAR  Hypertension  Cocaine use  Hyperlipidemia      Hospital Course: Stefani Causey is a 60 y.o. male with history of T2DM, HTN, HLD, nonobstructive CAD, CKD, chronic combined systolic and diastolic heart failure, atrial fibrillation, cocaine use, and prior CVA presenting for shortness of breath and leg swelling. Patient says his symptoms started when he ran out of his home medications several months ago and have worsened over the last few weeks. He endorses dyspnea with exertion and  bilateral lower extremity swelling. He is not able to walk from his bedroom to the bathroom without feeling short of breath. This improves with rest. He denies chest pain, lightheadedness/dizziness, nausea/vomiting, abdominal pain, cough. Last cocaine use 4-5 days ago. Acute on chronic combined systolic and diastolic CHF  -[facility] 67/2267 with nonobstructive disease. Etiology NICM likely secondary to cocaine use. - TTE 10/2022 LVEF 43%  -8/4/2023 echo EF 15 to 20%  -Consulted cardiology, treated with IV lasix. Transition to oral torsemide at discharge  - GDMT: losartan and spironolactone held for KENDRA  - -18 lbs since admission     KENDRA  -Cr 1.6 on admissoin from baseline of ~1.0, possibly congestion 2/2 ADHF. Monitor with diuresis. -Creatinine 1.5 today  -Consulted nephrology, holding Aldactone for now  -renal US non acute      Elevated troponin  No ischemic changes on EKG. Suspect 2/2 heart failure exacerbation.       Type 2 diabetes  -Prescription for new glucometer testing supplies given  - lantus to 20 units,

## 2023-08-07 NOTE — PLAN OF CARE
Problem: Discharge Planning  Goal: Discharge to home or other facility with appropriate resources  Outcome: Completed     Problem: Safety - Adult  Goal: Free from fall injury  Outcome: Completed  Flowsheets (Taken 8/7/2023 3128)  Free From Fall Injury: Instruct family/caregiver on patient safety     Problem: Chronic Conditions and Co-morbidities  Goal: Patient's chronic conditions and co-morbidity symptoms are monitored and maintained or improved  Outcome: Completed     Problem: Respiratory - Adult  Goal: Achieves optimal ventilation and oxygenation  Outcome: Completed     Problem: Cardiovascular - Adult  Goal: Maintains optimal cardiac output and hemodynamic stability  Outcome: Completed  Goal: Absence of cardiac dysrhythmias or at baseline  Outcome: Completed     Problem: Metabolic/Fluid and Electrolytes - Adult  Goal: Electrolytes maintained within normal limits  Outcome: Completed  Goal: Hemodynamic stability and optimal renal function maintained  Outcome: Completed     Problem: Nutrition Deficit:  Goal: Optimize nutritional status  Outcome: Completed     Problem: Pain  Goal: Verbalizes/displays adequate comfort level or baseline comfort level  Outcome: Completed

## 2023-08-07 NOTE — PROGRESS NOTES
Data- discharge order received, pt verbalized agreement to discharge, disposition to previous residence, no needs for HHC/DME. Action- discharge instructions prepared and given to pt, pt verbalized understanding. Medication information packet given r/t NEW and/or CHANGED prescriptions emphasizing name/purpose/side effects, pt verbalized understanding. Discharge instruction summary: Diet- regular, Activity- as tolerated, Primary Care Physician as followsRaiza Munoz -[phone] f/u appointment august 11th, prescription medications filled and delivered to pt. CHF Education reviewed. Pt/ Family has had a total of 60 minutes CHF education this admission encounter. Response- Pt belongings gathered, midline removed. Disposition is home (no HHC/DME needs), [facility], taken to lobby via w/c w/ belongings, no complications.

## 2023-08-07 NOTE — PROGRESS NOTES
Physician Progress Note      PATIENT:               Rich Aguilera  CSN #:                  [id number]  :                       1965  ADMIT DATE:       8/3/2023 12:40 AM  DISCH DATE:  RESPONDING  PROVIDER #:        Hamida Rodas          QUERY TEXT:    Dear Lisa Cali, APRN-CNP,  Patient admitted with Acute on chronic systolic and diastolic CHF. Documentation reflects CKD in H&P and PNs on 8/3/23 and 23. If possible,   please document in the progress notes and discharge summary if CKD was: The medical record reflects the following:  Risk Factors: HTN Morbid obesity noncompliance Chronic combined CHF DM 2  Clinical Indicators: Per H&P \"KENDRA on CKD  Cr 1.6 on admission from baseline of     1.0, \" per Nephrology consult \"KENDRA - baseline 10/20/22 Cr 1.0\"  Treatment: Nephrology consult and lab monitoring  Thank you,  Gregg Cosby RN, [email or web address]  Options provided:  -- CKD ruled out after study  -- CKD confirmed after study  -- CKD treated and resolved  -- Other - I will add my own diagnosis  -- Disagree - Not applicable / Not valid  -- Disagree - Clinically unable to determine / Unknown  -- Refer to Clinical Documentation Reviewer    PROVIDER RESPONSE TEXT:    CKD ruled out after study, has KENDRA POA.     Query created by: Chelsy Anthony on 2023 2:08 PM      Electronically signed by:  Hamida Rodas 2023 8:03 AM

## 2023-08-07 NOTE — NURSE NAVIGATOR
DC order noted. HF dc instructions are on AVS. Pt has cardiology f/u appt on 8/11 at 11:00 with Shayy Cummings NP. Bedside RN made aware of appt.

## 2023-08-07 NOTE — CONSULTS
[facility]  Consultation Note    8/7/2023 8:22 AM    Chief Complaint: Shortness of Breath and Leg Swelling on Admission     Reason for Consultation: Evaluate need for Antiplatelet Therapy    History of Present Illness:  Patient is a 60 y.o. male who presented to the ED on 8/3/2023 with complaints of Shortness of Breath and Leg Swelling. He has a significant PMH of CHF, Cocaine Abuse, DM II, MI, HLD, PUD, and Right TCAR with Dr. Bob Garcia on 10/18/2022. The patient reports a couple of months ago he ran out of all of his medications. He tells me the dizziness which was the primary symptom of his CVA last year has improved significantly and is no longer an issue. He denies any issues with his Right Neck post-operatively from his TCAR procedure. Unfortunately, the patient was lost to follow up post operatively. We have been consulted to evaluate the ongoing need for Antiplatelet Therapy S/P TCAR. At present, the patient is seen sitting up in the chair, he is alert and oriented and appears to be in stable condition. Review of Systems  Review of Systems   Constitutional: Negative. Respiratory:  Positive for shortness of breath (Improved since admission). Cardiovascular:  Positive for leg swelling (Improving since admission). Gastrointestinal:  Negative for blood in stool. Neurological:  Negative for dizziness and weakness. Hematological:  Does not bruise/bleed easily. Psychiatric/Behavioral: Negative. Past Medical History:   Diagnosis Date    Abdominal pain 7/29/2021    Abnormal EKG 7/28/2016    Acute pancreatitis 7/29/2021    CHF (congestive heart failure) ([address])     Cigarette smoker 8/25/2021    Cocaine abuse ([address]) 3/28/2015    Last Assessment & Plan:  Formatting of this note might be different from the original. Counseled on cessation as increases risk of coronary vasospasm and further worsening of heart function.     Dehydration 7/28/2016    Diabetes mellitus ([address])

## 2023-08-07 NOTE — PLAN OF CARE
Problem: Discharge Planning  Goal: Discharge to home or other facility with appropriate resources  Outcome: Progressing  Flowsheets (Taken 8/5/2023 0902 by Sherry Suarez RN)  Discharge to home or other facility with appropriate resources:   Identify barriers to discharge with patient and caregiver   Arrange for needed discharge resources and transportation as appropriate   Identify discharge learning needs (meds, wound care, etc)   Refer to discharge planning if patient needs post-hospital services based on physician order or complex needs related to functional status, cognitive ability or social support system     Problem: Safety - Adult  Goal: Free from fall injury  Outcome: Progressing  Flowsheets (Taken 8/5/2023 0902 by Sherry Suarez RN)  Free From Fall Injury: Instruct family/caregiver on patient safety     Problem: Chronic Conditions and Co-morbidities  Goal: Patient's chronic conditions and co-morbidity symptoms are monitored and maintained or improved  Outcome: Progressing  Flowsheets (Taken 8/5/2023 0902 by Sherry Suarez RN)  Care Plan - Patient's Chronic Conditions and Co-Morbidity Symptoms are Monitored and Maintained or Improved: Monitor and assess patient's chronic conditions and comorbid symptoms for stability, deterioration, or improvement     Problem: Respiratory - Adult  Goal: Achieves optimal ventilation and oxygenation  Outcome: Progressing  Flowsheets (Taken 8/5/2023 0902 by MICHAEL Limon RN)  Achieves optimal ventilation and oxygenation:   Assess for changes in respiratory status   Assess for changes in mentation and behavior     Problem: Cardiovascular - Adult  Goal: Maintains optimal cardiac output and hemodynamic stability  Outcome: Progressing  Flowsheets (Taken 8/5/2023 0902 by Sherry Suarez RN)  Maintains optimal cardiac output and hemodynamic stability:   Monitor blood pressure and heart rate   Monitor urine output and notify Licensed Independent Practitioner for values

## 2023-08-09 NOTE — PROGRESS NOTES
Printed prescription for Humalog found on printer at nurses station. Patient discharged 8/7/23. Call placed to patient ([phone]) to see if this prescription had been provided to him already or if he needed it. No answer obtained. Voicemail left w/ call back number for this RN (charge). Waiting for return call at this time.

## 2023-08-10 PROBLEM — I42.8 NON-ISCHEMIC CARDIOMYOPATHY (HCC): Status: ACTIVE | Noted: 2023-08-10

## 2023-08-10 PROBLEM — I10 PRIMARY HYPERTENSION: Status: ACTIVE | Noted: 2023-08-10

## 2023-08-10 PROBLEM — F19.10 SUBSTANCE ABUSE (HCC): Status: ACTIVE | Noted: 2023-08-10

## 2023-08-16 ENCOUNTER — TELEPHONE (OUTPATIENT)
Dept: PRIMARY CARE CLINIC | Age: 58
End: 2023-08-16

## 2023-08-21 ENCOUNTER — TELEPHONE (OUTPATIENT)
Dept: PRIMARY CARE CLINIC | Age: 58
End: 2023-08-21

## 2023-08-21 ENCOUNTER — HOSPITAL ENCOUNTER (INPATIENT)
Age: 58
LOS: 12 days | Discharge: NURSING, REHAB OR LONG-TERM CARE | DRG: 181 | End: 2023-09-03
Attending: STUDENT IN AN ORGANIZED HEALTH CARE EDUCATION/TRAINING PROGRAM | Admitting: INTERNAL MEDICINE
Payer: COMMERCIAL

## 2023-08-21 DIAGNOSIS — I50.9 ACUTE DECOMPENSATED HEART FAILURE (HCC): ICD-10-CM

## 2023-08-21 DIAGNOSIS — S80.821A BLISTER OF RIGHT LOWER EXTREMITY, INITIAL ENCOUNTER: ICD-10-CM

## 2023-08-21 DIAGNOSIS — L03.115 CELLULITIS OF RIGHT LOWER EXTREMITY: ICD-10-CM

## 2023-08-21 DIAGNOSIS — M79.604 RIGHT LEG PAIN: Primary | ICD-10-CM

## 2023-08-21 DIAGNOSIS — R60.0 BILATERAL LOWER EXTREMITY EDEMA: ICD-10-CM

## 2023-08-21 DIAGNOSIS — I50.9 ACUTE ON CHRONIC CONGESTIVE HEART FAILURE, UNSPECIFIED HEART FAILURE TYPE (HCC): ICD-10-CM

## 2023-08-21 PROCEDURE — 96374 THER/PROPH/DIAG INJ IV PUSH: CPT

## 2023-08-21 PROCEDURE — 99285 EMERGENCY DEPT VISIT HI MDM: CPT

## 2023-08-21 ASSESSMENT — PAIN SCALES - GENERAL: PAINLEVEL_OUTOF10: 5

## 2023-08-21 ASSESSMENT — PAIN - FUNCTIONAL ASSESSMENT: PAIN_FUNCTIONAL_ASSESSMENT: 0-10

## 2023-08-21 ASSESSMENT — LIFESTYLE VARIABLES
HOW OFTEN DO YOU HAVE A DRINK CONTAINING ALCOHOL: NEVER
HOW MANY STANDARD DRINKS CONTAINING ALCOHOL DO YOU HAVE ON A TYPICAL DAY: PATIENT DOES NOT DRINK

## 2023-08-21 NOTE — TELEPHONE ENCOUNTER
Pt called in through the nurse triage line for left leg pain and swelling & to schedule a hospital follow up. Dr. Ahmet Gilliam had no openings for a HFU until 9/7. Spoke with her about double booking patient. Per Dr. Ahmet Gilliam, advised PT to go back to the hospital to have his leg and foot evaluated. Pt expressed understanding and said he would go this evening.

## 2023-08-22 ENCOUNTER — APPOINTMENT (OUTPATIENT)
Dept: GENERAL RADIOLOGY | Age: 58
DRG: 181 | End: 2023-08-22
Payer: COMMERCIAL

## 2023-08-22 PROBLEM — I50.9 ACUTE DECOMPENSATED HEART FAILURE (HCC): Status: ACTIVE | Noted: 2023-08-22

## 2023-08-22 LAB
ALBUMIN SERPL-MCNC: 3 G/DL (ref 3.4–5)
ALBUMIN/GLOB SERPL: 0.9 {RATIO} (ref 1.1–2.2)
ALP SERPL-CCNC: 106 U/L (ref 40–129)
ALT SERPL-CCNC: 26 U/L (ref 10–40)
ANION GAP SERPL CALCULATED.3IONS-SCNC: 12 MMOL/L (ref 3–16)
AST SERPL-CCNC: 38 U/L (ref 15–37)
BASOPHILS # BLD: 0.1 K/UL (ref 0–0.2)
BASOPHILS NFR BLD: 1.8 %
BILIRUB SERPL-MCNC: 0.6 MG/DL (ref 0–1)
BUN SERPL-MCNC: 17 MG/DL (ref 7–20)
CALCIUM SERPL-MCNC: 8.3 MG/DL (ref 8.3–10.6)
CHLORIDE SERPL-SCNC: 106 MMOL/L (ref 99–110)
CK SERPL-CCNC: 613 U/L (ref 39–308)
CO2 SERPL-SCNC: 20 MMOL/L (ref 21–32)
CREAT SERPL-MCNC: 1.3 MG/DL (ref 0.9–1.3)
CRP SERPL-MCNC: 14.8 MG/L (ref 0–5.1)
DEPRECATED RDW RBC AUTO: 15.5 % (ref 12.4–15.4)
EOSINOPHIL # BLD: 0.2 K/UL (ref 0–0.6)
EOSINOPHIL NFR BLD: 4.1 %
GFR SERPLBLD CREATININE-BSD FMLA CKD-EPI: >60 ML/MIN/{1.73_M2}
GLUCOSE BLD-MCNC: 101 MG/DL (ref 70–99)
GLUCOSE BLD-MCNC: 80 MG/DL (ref 70–99)
GLUCOSE BLD-MCNC: 90 MG/DL (ref 70–99)
GLUCOSE BLD-MCNC: 94 MG/DL (ref 70–99)
GLUCOSE BLD-MCNC: 94 MG/DL (ref 70–99)
GLUCOSE SERPL-MCNC: 127 MG/DL (ref 70–99)
HCT VFR BLD AUTO: 36.4 % (ref 40.5–52.5)
HGB BLD-MCNC: 11.9 G/DL (ref 13.5–17.5)
LACTATE BLDV-SCNC: 1.6 MMOL/L (ref 0.4–1.9)
LYMPHOCYTES # BLD: 1.2 K/UL (ref 1–5.1)
LYMPHOCYTES NFR BLD: 24.8 %
MCH RBC QN AUTO: 29.6 PG (ref 26–34)
MCHC RBC AUTO-ENTMCNC: 32.6 G/DL (ref 31–36)
MCV RBC AUTO: 90.6 FL (ref 80–100)
MONOCYTES # BLD: 0.7 K/UL (ref 0–1.3)
MONOCYTES NFR BLD: 13.3 %
NEUTROPHILS # BLD: 2.8 K/UL (ref 1.7–7.7)
NEUTROPHILS NFR BLD: 56 %
NT-PROBNP SERPL-MCNC: 3279 PG/ML (ref 0–124)
PERFORMED ON: ABNORMAL
PERFORMED ON: NORMAL
PLATELET # BLD AUTO: 226 K/UL (ref 135–450)
PLATELET BLD QL SMEAR: ADEQUATE
PMV BLD AUTO: 9.1 FL (ref 5–10.5)
POTASSIUM SERPL-SCNC: 5 MMOL/L (ref 3.5–5.1)
PROT SERPL-MCNC: 6.5 G/DL (ref 6.4–8.2)
RBC # BLD AUTO: 4.02 M/UL (ref 4.2–5.9)
SLIDE REVIEW: ABNORMAL
SODIUM SERPL-SCNC: 138 MMOL/L (ref 136–145)
TROPONIN, HIGH SENSITIVITY: 48 NG/L (ref 0–22)
TROPONIN, HIGH SENSITIVITY: 50 NG/L (ref 0–22)
TSH SERPL DL<=0.005 MIU/L-ACNC: 4.1 UIU/ML (ref 0.27–4.2)
WBC # BLD AUTO: 5 K/UL (ref 4–11)

## 2023-08-22 PROCEDURE — 86140 C-REACTIVE PROTEIN: CPT

## 2023-08-22 PROCEDURE — 85025 COMPLETE CBC W/AUTO DIFF WBC: CPT

## 2023-08-22 PROCEDURE — 83605 ASSAY OF LACTIC ACID: CPT

## 2023-08-22 PROCEDURE — 99254 IP/OBS CNSLTJ NEW/EST MOD 60: CPT | Performed by: NURSE PRACTITIONER

## 2023-08-22 PROCEDURE — 84443 ASSAY THYROID STIM HORMONE: CPT

## 2023-08-22 PROCEDURE — 97162 PT EVAL MOD COMPLEX 30 MIN: CPT

## 2023-08-22 PROCEDURE — 6370000000 HC RX 637 (ALT 250 FOR IP): Performed by: INTERNAL MEDICINE

## 2023-08-22 PROCEDURE — 84484 ASSAY OF TROPONIN QUANT: CPT

## 2023-08-22 PROCEDURE — 83880 ASSAY OF NATRIURETIC PEPTIDE: CPT

## 2023-08-22 PROCEDURE — 80053 COMPREHEN METABOLIC PANEL: CPT

## 2023-08-22 PROCEDURE — 36415 COLL VENOUS BLD VENIPUNCTURE: CPT

## 2023-08-22 PROCEDURE — 6370000000 HC RX 637 (ALT 250 FOR IP): Performed by: STUDENT IN AN ORGANIZED HEALTH CARE EDUCATION/TRAINING PROGRAM

## 2023-08-22 PROCEDURE — 6360000002 HC RX W HCPCS: Performed by: INTERNAL MEDICINE

## 2023-08-22 PROCEDURE — 1200000000 HC SEMI PRIVATE

## 2023-08-22 PROCEDURE — 6360000002 HC RX W HCPCS: Performed by: NURSE PRACTITIONER

## 2023-08-22 PROCEDURE — 94760 N-INVAS EAR/PLS OXIMETRY 1: CPT

## 2023-08-22 PROCEDURE — 73630 X-RAY EXAM OF FOOT: CPT

## 2023-08-22 PROCEDURE — 6360000002 HC RX W HCPCS: Performed by: STUDENT IN AN ORGANIZED HEALTH CARE EDUCATION/TRAINING PROGRAM

## 2023-08-22 PROCEDURE — 82550 ASSAY OF CK (CPK): CPT

## 2023-08-22 PROCEDURE — 97530 THERAPEUTIC ACTIVITIES: CPT

## 2023-08-22 PROCEDURE — 2580000003 HC RX 258: Performed by: NURSE PRACTITIONER

## 2023-08-22 PROCEDURE — 2580000003 HC RX 258: Performed by: INTERNAL MEDICINE

## 2023-08-22 RX ORDER — ASPIRIN 81 MG/1
81 TABLET, CHEWABLE ORAL DAILY
Status: DISCONTINUED | OUTPATIENT
Start: 2023-08-22 | End: 2023-09-03 | Stop reason: HOSPADM

## 2023-08-22 RX ORDER — HYDRALAZINE HYDROCHLORIDE 25 MG/1
25 TABLET, FILM COATED ORAL EVERY 8 HOURS SCHEDULED
Status: DISCONTINUED | OUTPATIENT
Start: 2023-08-22 | End: 2023-09-03 | Stop reason: HOSPADM

## 2023-08-22 RX ORDER — ATORVASTATIN CALCIUM 80 MG/1
80 TABLET, FILM COATED ORAL NIGHTLY
Status: DISCONTINUED | OUTPATIENT
Start: 2023-08-22 | End: 2023-09-03 | Stop reason: HOSPADM

## 2023-08-22 RX ORDER — FUROSEMIDE 10 MG/ML
40 INJECTION INTRAMUSCULAR; INTRAVENOUS 2 TIMES DAILY
Status: DISCONTINUED | OUTPATIENT
Start: 2023-08-22 | End: 2023-08-22

## 2023-08-22 RX ORDER — DEXTROSE MONOHYDRATE 100 MG/ML
INJECTION, SOLUTION INTRAVENOUS CONTINUOUS PRN
Status: DISCONTINUED | OUTPATIENT
Start: 2023-08-22 | End: 2023-09-03 | Stop reason: HOSPADM

## 2023-08-22 RX ORDER — LOSARTAN POTASSIUM 25 MG/1
25 TABLET ORAL DAILY
Status: DISCONTINUED | OUTPATIENT
Start: 2023-08-22 | End: 2023-09-03 | Stop reason: HOSPADM

## 2023-08-22 RX ORDER — ACETAMINOPHEN 325 MG/1
650 TABLET ORAL EVERY 6 HOURS PRN
Status: DISCONTINUED | OUTPATIENT
Start: 2023-08-22 | End: 2023-08-28

## 2023-08-22 RX ORDER — ACETAMINOPHEN 650 MG/1
650 SUPPOSITORY RECTAL EVERY 6 HOURS PRN
Status: DISCONTINUED | OUTPATIENT
Start: 2023-08-22 | End: 2023-08-28

## 2023-08-22 RX ORDER — SODIUM CHLORIDE 0.9 % (FLUSH) 0.9 %
5-40 SYRINGE (ML) INJECTION EVERY 12 HOURS SCHEDULED
Status: DISCONTINUED | OUTPATIENT
Start: 2023-08-22 | End: 2023-09-03 | Stop reason: HOSPADM

## 2023-08-22 RX ORDER — ONDANSETRON 4 MG/1
4 TABLET, ORALLY DISINTEGRATING ORAL EVERY 8 HOURS PRN
Status: DISCONTINUED | OUTPATIENT
Start: 2023-08-22 | End: 2023-09-03 | Stop reason: HOSPADM

## 2023-08-22 RX ORDER — SODIUM CHLORIDE 0.9 % (FLUSH) 0.9 %
5-40 SYRINGE (ML) INJECTION PRN
Status: DISCONTINUED | OUTPATIENT
Start: 2023-08-22 | End: 2023-09-03 | Stop reason: HOSPADM

## 2023-08-22 RX ORDER — INSULIN GLARGINE 100 [IU]/ML
20 INJECTION, SOLUTION SUBCUTANEOUS NIGHTLY
Status: DISCONTINUED | OUTPATIENT
Start: 2023-08-22 | End: 2023-09-03 | Stop reason: HOSPADM

## 2023-08-22 RX ORDER — INSULIN LISPRO 100 [IU]/ML
7 INJECTION, SOLUTION INTRAVENOUS; SUBCUTANEOUS
Status: DISCONTINUED | OUTPATIENT
Start: 2023-08-22 | End: 2023-09-03 | Stop reason: HOSPADM

## 2023-08-22 RX ORDER — POTASSIUM CHLORIDE 20 MEQ/1
40 TABLET, EXTENDED RELEASE ORAL PRN
Status: DISCONTINUED | OUTPATIENT
Start: 2023-08-22 | End: 2023-09-03 | Stop reason: HOSPADM

## 2023-08-22 RX ORDER — PANTOPRAZOLE SODIUM 40 MG/1
40 TABLET, DELAYED RELEASE ORAL
Status: DISCONTINUED | OUTPATIENT
Start: 2023-08-22 | End: 2023-09-03 | Stop reason: HOSPADM

## 2023-08-22 RX ORDER — MAGNESIUM SULFATE IN WATER 40 MG/ML
2000 INJECTION, SOLUTION INTRAVENOUS PRN
Status: DISCONTINUED | OUTPATIENT
Start: 2023-08-22 | End: 2023-09-03 | Stop reason: HOSPADM

## 2023-08-22 RX ORDER — FLUTICASONE PROPIONATE 50 MCG
2 SPRAY, SUSPENSION (ML) NASAL DAILY
Status: DISCONTINUED | OUTPATIENT
Start: 2023-08-22 | End: 2023-09-03 | Stop reason: HOSPADM

## 2023-08-22 RX ORDER — FUROSEMIDE 10 MG/ML
40 INJECTION INTRAMUSCULAR; INTRAVENOUS ONCE
Status: COMPLETED | OUTPATIENT
Start: 2023-08-22 | End: 2023-08-22

## 2023-08-22 RX ORDER — POTASSIUM CHLORIDE 7.45 MG/ML
10 INJECTION INTRAVENOUS PRN
Status: DISCONTINUED | OUTPATIENT
Start: 2023-08-22 | End: 2023-09-03 | Stop reason: HOSPADM

## 2023-08-22 RX ORDER — OXYCODONE HYDROCHLORIDE AND ACETAMINOPHEN 5; 325 MG/1; MG/1
1 TABLET ORAL ONCE
Status: COMPLETED | OUTPATIENT
Start: 2023-08-22 | End: 2023-08-22

## 2023-08-22 RX ORDER — POLYETHYLENE GLYCOL 3350 17 G/17G
17 POWDER, FOR SOLUTION ORAL DAILY PRN
Status: DISCONTINUED | OUTPATIENT
Start: 2023-08-22 | End: 2023-09-03 | Stop reason: HOSPADM

## 2023-08-22 RX ORDER — ONDANSETRON 2 MG/ML
4 INJECTION INTRAMUSCULAR; INTRAVENOUS EVERY 6 HOURS PRN
Status: DISCONTINUED | OUTPATIENT
Start: 2023-08-22 | End: 2023-09-03 | Stop reason: HOSPADM

## 2023-08-22 RX ORDER — AMIODARONE HYDROCHLORIDE 200 MG/1
200 TABLET ORAL DAILY
Status: DISCONTINUED | OUTPATIENT
Start: 2023-08-22 | End: 2023-09-03 | Stop reason: HOSPADM

## 2023-08-22 RX ORDER — SODIUM CHLORIDE 9 MG/ML
INJECTION, SOLUTION INTRAVENOUS PRN
Status: DISCONTINUED | OUTPATIENT
Start: 2023-08-22 | End: 2023-09-03 | Stop reason: HOSPADM

## 2023-08-22 RX ADMIN — ACETAMINOPHEN 650 MG: 325 TABLET ORAL at 09:07

## 2023-08-22 RX ADMIN — ATORVASTATIN CALCIUM 80 MG: 80 TABLET, FILM COATED ORAL at 20:40

## 2023-08-22 RX ADMIN — INSULIN LISPRO 7 UNITS: 100 INJECTION, SOLUTION INTRAVENOUS; SUBCUTANEOUS at 14:24

## 2023-08-22 RX ADMIN — SODIUM CHLORIDE, PRESERVATIVE FREE 10 ML: 5 INJECTION INTRAVENOUS at 09:10

## 2023-08-22 RX ADMIN — OXYCODONE AND ACETAMINOPHEN 1 TABLET: 5; 325 TABLET ORAL at 04:29

## 2023-08-22 RX ADMIN — FUROSEMIDE 40 MG: 10 INJECTION, SOLUTION INTRAMUSCULAR; INTRAVENOUS at 04:29

## 2023-08-22 RX ADMIN — FUROSEMIDE 40 MG: 10 INJECTION, SOLUTION INTRAMUSCULAR; INTRAVENOUS at 09:09

## 2023-08-22 RX ADMIN — HYDRALAZINE HYDROCHLORIDE 25 MG: 25 TABLET, FILM COATED ORAL at 20:40

## 2023-08-22 RX ADMIN — FUROSEMIDE 10 MG/HR: 10 INJECTION, SOLUTION INTRAMUSCULAR; INTRAVENOUS at 14:28

## 2023-08-22 RX ADMIN — APIXABAN 5 MG: 5 TABLET, FILM COATED ORAL at 09:08

## 2023-08-22 RX ADMIN — INSULIN LISPRO 7 UNITS: 100 INJECTION, SOLUTION INTRAVENOUS; SUBCUTANEOUS at 18:30

## 2023-08-22 RX ADMIN — HYDRALAZINE HYDROCHLORIDE 25 MG: 25 TABLET, FILM COATED ORAL at 14:24

## 2023-08-22 RX ADMIN — INSULIN GLARGINE 20 UNITS: 100 INJECTION, SOLUTION SUBCUTANEOUS at 20:40

## 2023-08-22 RX ADMIN — HYDROMORPHONE HYDROCHLORIDE 0.5 MG: 1 INJECTION, SOLUTION INTRAMUSCULAR; INTRAVENOUS; SUBCUTANEOUS at 01:24

## 2023-08-22 RX ADMIN — AMIODARONE HYDROCHLORIDE 200 MG: 200 TABLET ORAL at 09:08

## 2023-08-22 RX ADMIN — FUROSEMIDE 10 MG/HR: 10 INJECTION, SOLUTION INTRAMUSCULAR; INTRAVENOUS at 21:20

## 2023-08-22 RX ADMIN — SODIUM CHLORIDE, PRESERVATIVE FREE 10 ML: 5 INJECTION INTRAVENOUS at 20:42

## 2023-08-22 RX ADMIN — FLUTICASONE PROPIONATE 2 SPRAY: 50 SPRAY, METERED NASAL at 09:10

## 2023-08-22 RX ADMIN — ASPIRIN 81 MG: 81 TABLET, CHEWABLE ORAL at 09:09

## 2023-08-22 RX ADMIN — PANTOPRAZOLE SODIUM 40 MG: 40 TABLET, DELAYED RELEASE ORAL at 09:08

## 2023-08-22 RX ADMIN — LOSARTAN POTASSIUM 25 MG: 25 TABLET, FILM COATED ORAL at 09:09

## 2023-08-22 RX ADMIN — APIXABAN 5 MG: 5 TABLET, FILM COATED ORAL at 20:40

## 2023-08-22 RX ADMIN — HYDRALAZINE HYDROCHLORIDE 25 MG: 25 TABLET, FILM COATED ORAL at 09:08

## 2023-08-22 ASSESSMENT — PAIN SCALES - GENERAL
PAINLEVEL_OUTOF10: 5
PAINLEVEL_OUTOF10: 0
PAINLEVEL_OUTOF10: 0
PAINLEVEL_OUTOF10: 5
PAINLEVEL_OUTOF10: 5
PAINLEVEL_OUTOF10: 3

## 2023-08-22 ASSESSMENT — PAIN DESCRIPTION - LOCATION
LOCATION: LEG
LOCATION: LEG
LOCATION: FOOT

## 2023-08-22 ASSESSMENT — PAIN - FUNCTIONAL ASSESSMENT: PAIN_FUNCTIONAL_ASSESSMENT: PREVENTS OR INTERFERES SOME ACTIVE ACTIVITIES AND ADLS

## 2023-08-22 ASSESSMENT — PAIN DESCRIPTION - ORIENTATION
ORIENTATION: RIGHT

## 2023-08-22 ASSESSMENT — PAIN DESCRIPTION - DESCRIPTORS: DESCRIPTORS: BURNING

## 2023-08-22 NOTE — ED PROVIDER NOTES
[address] [facility] [address] [address]        Pt Name: Kelly Mojica  MRN: [id number]  [address] [facility] 1965  Date of evaluation: 8/21/2023  Provider: Wilma Lanza MD  PCP: Ab Mendoza DO  Note Started: 12:36 AM EDT 8/22/23    CHIEF COMPLAINT       Chief Complaint   Patient presents with    Leg Pain     C/o right leg pain and swelling that started tonight. Hx CHF. HISTORY OF PRESENT ILLNESS: 1 or more Elements     History from : Patient and EMS    Limitations to history : None    Patricia Ordñoez is a 60 y.o. male who presents with R leg pain x1-2 days and swelling for the past several days, including LLE swelling as well. Recent change in diuretics, swelling not improved with this change. Blistering associated with this. Denies fevers, redness, chills. Is diabetic. H/o CHF. Symptoms not otherwise alleviated or exacerbated by other factors. Nursing Notes were all reviewed and agreed with or any disagreements were addressed in the HPI. REVIEW OF SYSTEMS :      Positives and Pertinent negatives as per HPI. ROS otherwise unremarkable. SURGICAL HISTORY     Past Surgical History:   Procedure Laterality Date    CARDIAC CATHETERIZATION      UPPER GASTROINTESTINAL ENDOSCOPY         CURRENTMEDICATIONS       Previous Medications    AMIODARONE (CORDARONE) 200 MG TABLET    TAKE 1 TABLET BY MOUTH EVERY DAY    APIXABAN (ELIQUIS) 5 MG TABS TABLET    Take 1 tablet by mouth 2 times daily    ASPIRIN ([employer] ASPIRIN ADULT LOW DOSE) 81 MG CHEWABLE TABLET    Take 1 tablet by mouth daily    ATORVASTATIN (LIPITOR) 80 MG TABLET    TAKE 1 TABLET BY MOUTH NIGHTLY    BLOOD GLUCOSE MONITOR STRIPS    Test 4 times a day & as needed for symptoms of irregular blood glucose. Dispense sufficient amount for indicated testing frequency plus additional to accommodate PRN testing needs.     BLOOD GLUCOSE MONITOR STRIPS    Test 4 times a day & as needed for symptoms of irregular

## 2023-08-22 NOTE — H&P
Hospital Medicine  History and Physical    Patient:  Lexx Suarez  MRN: [id number]    CHIEF COMPLAINT:    Patient presents with    Leg Pain       C/o right leg pain and swelling that started tonight. Hx CHF. PCP: Maribel Jacobs DO    HISTORY OF PRESENT ILLNESS:   The patient is a 60 y.o. male who presents with R leg pain x1-2 days and swelling for the past several days, including LLE swelling as well. Recent change in diuretics, swelling not improved with this change. Blistering associated with this. Denies fevers, redness, chills. Is diabetic. H/o CHF. Symptoms not otherwise alleviated or exacerbated by other factors. PAST MEDICAL HISTORY:      Diagnosis Date    Abdominal pain 7/29/2021    Abnormal EKG 7/28/2016    Acute pancreatitis 7/29/2021    CHF (congestive heart failure) ([address])     Cigarette smoker 8/25/2021    Cocaine abuse ([address]) 3/28/2015    Last Assessment & Plan:  Formatting of this note might be different from the original. Counseled on cessation as increases risk of coronary vasospasm and further worsening of heart function. Dehydration 7/28/2016    Diabetes mellitus ([address])     History of cocaine abuse ([address]) 7/28/2016    History of MI (myocardial infarction) 7/28/2016    Hyperglycemia 7/28/2016    Hyperlipidemia     Hypertension     Morbid obesity with BMI of 45.0-49.9, adult ([address]) 7/28/2016    Postural dizziness 7/28/2016    Pre-diabetes 3/9/2017    Last Assessment & Plan:  Formatting of this note might be different from the original. Discussed lifestyle changes Refer to [facility] program    PUD (peptic ulcer disease) 3/1/2019       PAST SURGICAL HISTORY:      Procedure Laterality Date    CARDIAC CATHETERIZATION      UPPER GASTROINTESTINAL ENDOSCOPY         FAMILY  HISTORY:  No family history on file. SOCIAL HISTORY:  TOBACCO:   reports that he has been smoking cigarettes. He has a 12.50 pack-year smoking history.  He has never used smokeless tobacco.  ETOH:   reports that he does not

## 2023-08-22 NOTE — CARE COORDINATION
08/22/23 1435   Service Assessment   Patient Orientation Alert and Oriented;Person;Place   Cognition Alert   History Provided By Patient   Primary Caregiver Self   Accompanied By/Relationship NA   Support Systems Family Members   Patient's Healthcare Decision Maker is: Legal Next of [facility]   PCP Verified by CM Yes   Last Visit to PCP Within last year   Prior Functional Level Assistance with the following:;Mobility; Independent in ADLs/IADLs   Current Functional Level Mobility; Independent in ADLs/IADLs;Assistance with the following:   Can patient return to prior living arrangement Yes   Ability to make needs known: Good   Family able to assist with home care needs: Yes   Would you like for me to discuss the discharge plan with any other family members/significant others, and if so, who? No   Financial Resources None   [employer] None   CM/SW Referral Other (see comment)  (NA)   Discharge Planning   Type of Residence Apartment   Living Arrangements Family Members   Current Services Prior To Admission None   Potential Assistance Needed N/A   DME Ordered? No   Potential Assistance Purchasing Medications No   Type of Home Care Services None   Patient expects to be discharged to: Apartment   Follow Up Appointment: Best Day/Time  Monday AM   One/Two Story Residence One story   History of falls? 0   Services At/After Discharge   Transition of Care Consult (CM Consult) N/A   Services At/After Discharge None   [employer] Information Provided? No   Mode of Transport at Discharge Other (see comment)  ([facility])   Confirm Follow Up Transport Self   Condition of Participation: Discharge Planning   The Plan for Transition of Care is related to the following treatment goals: To get better and get home. The Patient and/or Patient Representative was provided with a Choice of Provider? Patient   The Patient and/Or Patient Representative agree with the Discharge Plan?  Yes   Freedom of Choice list was provided

## 2023-08-22 NOTE — DISCHARGE INSTRUCTIONS
Extra Heart Failure sites:     https://C[employer]. [employer]/publication/?z=404968  --- this is American Heart Association Interactive Healthier Living with Heart Failure guidebook. Please click hyperlink or copy / paste link into search bar. Use your mouse to scroll through the pages. Lots of information about weight monitoring, diet tips, activity, meds, etc    HF [place] francy -- this is a free smart phone francy available for iPhone and Android download. Use your phone to track sodium / fluid intake, zone tool symptom tracking, weights, medications, etc. Click on this hyperlink  HF [place] Francy   for QR code for easy download--.look for this in your francy store                                          DASH (Dietary Approach to Stop Hypertension) diet --  Se[employer]Alumni.no -- this diet is a flexible eating plan that promotes heart healthy eating style. Click on hyperlink or copy / paste link into search bar. Lots of low sodium recipes and tips.     C[employer]arRep[employer].ca  -- more free recipes

## 2023-08-22 NOTE — CARE COORDINATION
Wound care consulted for boils on rt foot. Reviewed images obtained in ED. Pt with blisters, swelling and redness. Concern for possible infection, however may be from fluid overload. Would recommend vascular consult.  Notified MD.   Electronically signed by Jass Mg RN [id number] [facility] on 8/22/2023 at 12:21 PM

## 2023-08-22 NOTE — ED NOTES
Updated pts sister Alvarado Meghna on plans for admission.       Franchesca Norwood RN  08/22/23 6961

## 2023-08-23 ENCOUNTER — APPOINTMENT (OUTPATIENT)
Dept: CT IMAGING | Age: 58
DRG: 181 | End: 2023-08-23
Payer: COMMERCIAL

## 2023-08-23 PROBLEM — M79.604 RIGHT LEG PAIN: Status: ACTIVE | Noted: 2023-08-23

## 2023-08-23 LAB
ANION GAP SERPL CALCULATED.3IONS-SCNC: 10 MMOL/L (ref 3–16)
APTT BLD: 31.2 SEC (ref 22.7–35.9)
APTT BLD: 31.7 SEC (ref 22.7–35.9)
BUN SERPL-MCNC: 18 MG/DL (ref 7–20)
CALCIUM SERPL-MCNC: 8.3 MG/DL (ref 8.3–10.6)
CHLORIDE SERPL-SCNC: 99 MMOL/L (ref 99–110)
CHOLEST SERPL-MCNC: 118 MG/DL (ref 0–199)
CO2 SERPL-SCNC: 30 MMOL/L (ref 21–32)
CREAT SERPL-MCNC: 1.3 MG/DL (ref 0.9–1.3)
GFR SERPLBLD CREATININE-BSD FMLA CKD-EPI: >60 ML/MIN/{1.73_M2}
GLUCOSE BLD-MCNC: 102 MG/DL (ref 70–99)
GLUCOSE BLD-MCNC: 108 MG/DL (ref 70–99)
GLUCOSE BLD-MCNC: 148 MG/DL (ref 70–99)
GLUCOSE BLD-MCNC: 83 MG/DL (ref 70–99)
GLUCOSE BLD-MCNC: 95 MG/DL (ref 70–99)
GLUCOSE SERPL-MCNC: 101 MG/DL (ref 70–99)
HDLC SERPL-MCNC: 41 MG/DL (ref 40–60)
INR PPP: 1.26 (ref 0.84–1.16)
LDLC SERPL CALC-MCNC: 67 MG/DL
MAGNESIUM SERPL-MCNC: 1.7 MG/DL (ref 1.8–2.4)
PERFORMED ON: ABNORMAL
PERFORMED ON: NORMAL
PERFORMED ON: NORMAL
POTASSIUM SERPL-SCNC: 3.5 MMOL/L (ref 3.5–5.1)
PROTHROMBIN TIME: 15.8 SEC (ref 11.5–14.8)
SODIUM SERPL-SCNC: 139 MMOL/L (ref 136–145)
TRIGL SERPL-MCNC: 50 MG/DL (ref 0–150)
VLDLC SERPL CALC-MCNC: 10 MG/DL

## 2023-08-23 PROCEDURE — 75635 CT ANGIO ABDOMINAL ARTERIES: CPT

## 2023-08-23 PROCEDURE — 97116 GAIT TRAINING THERAPY: CPT | Performed by: PHYSICAL THERAPIST

## 2023-08-23 PROCEDURE — 80048 BASIC METABOLIC PNL TOTAL CA: CPT

## 2023-08-23 PROCEDURE — 97530 THERAPEUTIC ACTIVITIES: CPT | Performed by: PHYSICAL THERAPIST

## 2023-08-23 PROCEDURE — 36415 COLL VENOUS BLD VENIPUNCTURE: CPT

## 2023-08-23 PROCEDURE — 94760 N-INVAS EAR/PLS OXIMETRY 1: CPT

## 2023-08-23 PROCEDURE — 80061 LIPID PANEL: CPT

## 2023-08-23 PROCEDURE — 2580000003 HC RX 258: Performed by: NURSE PRACTITIONER

## 2023-08-23 PROCEDURE — 6360000004 HC RX CONTRAST MEDICATION: Performed by: SURGERY

## 2023-08-23 PROCEDURE — 99232 SBSQ HOSP IP/OBS MODERATE 35: CPT | Performed by: NURSE PRACTITIONER

## 2023-08-23 PROCEDURE — 6370000000 HC RX 637 (ALT 250 FOR IP): Performed by: FAMILY MEDICINE

## 2023-08-23 PROCEDURE — 99253 IP/OBS CNSLTJ NEW/EST LOW 45: CPT | Performed by: SURGERY

## 2023-08-23 PROCEDURE — 1200000000 HC SEMI PRIVATE

## 2023-08-23 PROCEDURE — 85610 PROTHROMBIN TIME: CPT

## 2023-08-23 PROCEDURE — 6360000002 HC RX W HCPCS: Performed by: NURSE PRACTITIONER

## 2023-08-23 PROCEDURE — 93926 LOWER EXTREMITY STUDY: CPT

## 2023-08-23 PROCEDURE — 6370000000 HC RX 637 (ALT 250 FOR IP): Performed by: INTERNAL MEDICINE

## 2023-08-23 PROCEDURE — 83735 ASSAY OF MAGNESIUM: CPT

## 2023-08-23 PROCEDURE — 85730 THROMBOPLASTIN TIME PARTIAL: CPT

## 2023-08-23 PROCEDURE — 97530 THERAPEUTIC ACTIVITIES: CPT

## 2023-08-23 PROCEDURE — 97165 OT EVAL LOW COMPLEX 30 MIN: CPT

## 2023-08-23 RX ORDER — HEPARIN SODIUM 1000 [USP'U]/ML
10000 INJECTION, SOLUTION INTRAVENOUS; SUBCUTANEOUS ONCE
Status: COMPLETED | OUTPATIENT
Start: 2023-08-24 | End: 2023-08-24

## 2023-08-23 RX ORDER — HEPARIN SODIUM 1000 [USP'U]/ML
60 INJECTION, SOLUTION INTRAVENOUS; SUBCUTANEOUS ONCE
Status: DISCONTINUED | OUTPATIENT
Start: 2023-08-23 | End: 2023-08-23

## 2023-08-23 RX ORDER — OXYCODONE HYDROCHLORIDE AND ACETAMINOPHEN 5; 325 MG/1; MG/1
1 TABLET ORAL EVERY 4 HOURS PRN
Status: DISCONTINUED | OUTPATIENT
Start: 2023-08-23 | End: 2023-08-28

## 2023-08-23 RX ORDER — HEPARIN SODIUM 10000 [USP'U]/100ML
0-4000 INJECTION, SOLUTION INTRAVENOUS CONTINUOUS
Status: DISCONTINUED | OUTPATIENT
Start: 2023-08-23 | End: 2023-08-25

## 2023-08-23 RX ORDER — HEPARIN SODIUM 1000 [USP'U]/ML
60 INJECTION, SOLUTION INTRAVENOUS; SUBCUTANEOUS PRN
Status: DISCONTINUED | OUTPATIENT
Start: 2023-08-23 | End: 2023-08-23

## 2023-08-23 RX ORDER — HEPARIN SODIUM 10000 [USP'U]/100ML
5-30 INJECTION, SOLUTION INTRAVENOUS CONTINUOUS
Status: DISCONTINUED | OUTPATIENT
Start: 2023-08-23 | End: 2023-08-23

## 2023-08-23 RX ORDER — HEPARIN SODIUM 1000 [USP'U]/ML
30 INJECTION, SOLUTION INTRAVENOUS; SUBCUTANEOUS PRN
Status: DISCONTINUED | OUTPATIENT
Start: 2023-08-23 | End: 2023-08-23

## 2023-08-23 RX ORDER — HEPARIN SODIUM 10000 [USP'U]/100ML
0-4000 INJECTION, SOLUTION INTRAVENOUS CONTINUOUS
Status: DISCONTINUED | OUTPATIENT
Start: 2023-08-23 | End: 2023-08-23

## 2023-08-23 RX ADMIN — HYDRALAZINE HYDROCHLORIDE 25 MG: 25 TABLET, FILM COATED ORAL at 13:12

## 2023-08-23 RX ADMIN — APIXABAN 5 MG: 5 TABLET, FILM COATED ORAL at 08:20

## 2023-08-23 RX ADMIN — ATORVASTATIN CALCIUM 80 MG: 80 TABLET, FILM COATED ORAL at 20:07

## 2023-08-23 RX ADMIN — IOPAMIDOL 75 ML: 755 INJECTION, SOLUTION INTRAVENOUS at 14:22

## 2023-08-23 RX ADMIN — OXYCODONE AND ACETAMINOPHEN 1 TABLET: 5; 325 TABLET ORAL at 09:10

## 2023-08-23 RX ADMIN — HYDRALAZINE HYDROCHLORIDE 25 MG: 25 TABLET, FILM COATED ORAL at 20:07

## 2023-08-23 RX ADMIN — LOSARTAN POTASSIUM 25 MG: 25 TABLET, FILM COATED ORAL at 08:20

## 2023-08-23 RX ADMIN — INSULIN LISPRO 7 UNITS: 100 INJECTION, SOLUTION INTRAVENOUS; SUBCUTANEOUS at 18:37

## 2023-08-23 RX ADMIN — HYDRALAZINE HYDROCHLORIDE 25 MG: 25 TABLET, FILM COATED ORAL at 05:54

## 2023-08-23 RX ADMIN — ACETAMINOPHEN 650 MG: 325 TABLET ORAL at 03:10

## 2023-08-23 RX ADMIN — FUROSEMIDE 10 MG/HR: 10 INJECTION, SOLUTION INTRAMUSCULAR; INTRAVENOUS at 09:52

## 2023-08-23 RX ADMIN — HEPARIN SODIUM 2100 UNITS/HR: 10000 INJECTION, SOLUTION INTRAVENOUS at 18:26

## 2023-08-23 RX ADMIN — PANTOPRAZOLE SODIUM 40 MG: 40 TABLET, DELAYED RELEASE ORAL at 05:54

## 2023-08-23 RX ADMIN — AMIODARONE HYDROCHLORIDE 200 MG: 200 TABLET ORAL at 08:20

## 2023-08-23 RX ADMIN — INSULIN LISPRO 7 UNITS: 100 INJECTION, SOLUTION INTRAVENOUS; SUBCUTANEOUS at 08:56

## 2023-08-23 RX ADMIN — OXYCODONE AND ACETAMINOPHEN 1 TABLET: 5; 325 TABLET ORAL at 13:12

## 2023-08-23 RX ADMIN — ASPIRIN 81 MG: 81 TABLET, CHEWABLE ORAL at 08:20

## 2023-08-23 RX ADMIN — FUROSEMIDE 10 MG/HR: 10 INJECTION, SOLUTION INTRAMUSCULAR; INTRAVENOUS at 19:27

## 2023-08-23 RX ADMIN — INSULIN GLARGINE 20 UNITS: 100 INJECTION, SOLUTION SUBCUTANEOUS at 20:07

## 2023-08-23 ASSESSMENT — PAIN SCALES - GENERAL
PAINLEVEL_OUTOF10: 4
PAINLEVEL_OUTOF10: 6
PAINLEVEL_OUTOF10: 3

## 2023-08-23 ASSESSMENT — ENCOUNTER SYMPTOMS
RESPIRATORY NEGATIVE: 1
GASTROINTESTINAL NEGATIVE: 1

## 2023-08-23 ASSESSMENT — PAIN DESCRIPTION - DESCRIPTORS: DESCRIPTORS: ACHING

## 2023-08-23 ASSESSMENT — PAIN DESCRIPTION - LOCATION: LOCATION: FOOT

## 2023-08-23 ASSESSMENT — PAIN DESCRIPTION - ORIENTATION: ORIENTATION: RIGHT

## 2023-08-24 ENCOUNTER — APPOINTMENT (OUTPATIENT)
Dept: CARDIAC CATH/INVASIVE PROCEDURES | Age: 58
DRG: 181 | End: 2023-08-24
Payer: COMMERCIAL

## 2023-08-24 LAB
ANION GAP SERPL CALCULATED.3IONS-SCNC: 8 MMOL/L (ref 3–16)
APTT BLD: 152.4 SEC (ref 22.7–35.9)
APTT BLD: >180 SEC (ref 22.7–35.9)
BUN SERPL-MCNC: 17 MG/DL (ref 7–20)
CALCIUM SERPL-MCNC: 8.8 MG/DL (ref 8.3–10.6)
CHLORIDE SERPL-SCNC: 96 MMOL/L (ref 99–110)
CO2 SERPL-SCNC: 34 MMOL/L (ref 21–32)
CREAT SERPL-MCNC: 1.4 MG/DL (ref 0.9–1.3)
DEPRECATED RDW RBC AUTO: 15.4 % (ref 12.4–15.4)
GFR SERPLBLD CREATININE-BSD FMLA CKD-EPI: 58 ML/MIN/{1.73_M2}
GLUCOSE BLD-MCNC: 132 MG/DL (ref 70–99)
GLUCOSE BLD-MCNC: 74 MG/DL (ref 70–99)
GLUCOSE BLD-MCNC: 80 MG/DL (ref 70–99)
GLUCOSE SERPL-MCNC: 93 MG/DL (ref 70–99)
HCT VFR BLD AUTO: 37.1 % (ref 40.5–52.5)
HGB BLD-MCNC: 12 G/DL (ref 13.5–17.5)
MAGNESIUM SERPL-MCNC: 1.8 MG/DL (ref 1.8–2.4)
MCH RBC QN AUTO: 28.3 PG (ref 26–34)
MCHC RBC AUTO-ENTMCNC: 32.3 G/DL (ref 31–36)
MCV RBC AUTO: 87.5 FL (ref 80–100)
PERFORMED ON: ABNORMAL
PERFORMED ON: NORMAL
PERFORMED ON: NORMAL
PLATELET # BLD AUTO: 234 K/UL (ref 135–450)
PMV BLD AUTO: 9.9 FL (ref 5–10.5)
POTASSIUM SERPL-SCNC: 3.2 MMOL/L (ref 3.5–5.1)
RBC # BLD AUTO: 4.24 M/UL (ref 4.2–5.9)
REASON FOR REJECTION: NORMAL
REJECTED TEST: NORMAL
SODIUM SERPL-SCNC: 138 MMOL/L (ref 136–145)
WBC # BLD AUTO: 5.3 K/UL (ref 4–11)

## 2023-08-24 PROCEDURE — 6370000000 HC RX 637 (ALT 250 FOR IP): Performed by: STUDENT IN AN ORGANIZED HEALTH CARE EDUCATION/TRAINING PROGRAM

## 2023-08-24 PROCEDURE — 75710 ARTERY X-RAYS ARM/LEG: CPT

## 2023-08-24 PROCEDURE — 2060000000 HC ICU INTERMEDIATE R&B

## 2023-08-24 PROCEDURE — 37185 PRIM ART M-THRMBC SBSQ VSL: CPT

## 2023-08-24 PROCEDURE — 6360000002 HC RX W HCPCS: Performed by: NURSE PRACTITIONER

## 2023-08-24 PROCEDURE — C1894 INTRO/SHEATH, NON-LASER: HCPCS

## 2023-08-24 PROCEDURE — C1760 CLOSURE DEV, VASC: HCPCS

## 2023-08-24 PROCEDURE — 6370000000 HC RX 637 (ALT 250 FOR IP): Performed by: FAMILY MEDICINE

## 2023-08-24 PROCEDURE — 80048 BASIC METABOLIC PNL TOTAL CA: CPT

## 2023-08-24 PROCEDURE — 36415 COLL VENOUS BLD VENIPUNCTURE: CPT

## 2023-08-24 PROCEDURE — 6360000002 HC RX W HCPCS: Performed by: FAMILY MEDICINE

## 2023-08-24 PROCEDURE — 2580000003 HC RX 258

## 2023-08-24 PROCEDURE — 2709999900 HC NON-CHARGEABLE SUPPLY

## 2023-08-24 PROCEDURE — 85730 THROMBOPLASTIN TIME PARTIAL: CPT

## 2023-08-24 PROCEDURE — 6360000002 HC RX W HCPCS: Performed by: STUDENT IN AN ORGANIZED HEALTH CARE EDUCATION/TRAINING PROGRAM

## 2023-08-24 PROCEDURE — 37184 PRIM ART M-THRMBC 1ST VSL: CPT | Performed by: STUDENT IN AN ORGANIZED HEALTH CARE EDUCATION/TRAINING PROGRAM

## 2023-08-24 PROCEDURE — 37228 PR REVSC OPN/PRQ TIB/PERO W/ANGIOPLASTY UNI: CPT | Performed by: STUDENT IN AN ORGANIZED HEALTH CARE EDUCATION/TRAINING PROGRAM

## 2023-08-24 PROCEDURE — 37226 PR REVSC OPN/PRQ FEM/POP W/STNT/ANGIOP SM VSL: CPT | Performed by: STUDENT IN AN ORGANIZED HEALTH CARE EDUCATION/TRAINING PROGRAM

## 2023-08-24 PROCEDURE — 83735 ASSAY OF MAGNESIUM: CPT

## 2023-08-24 PROCEDURE — 37228 HC TIB PER TERRITORY PLASTY: CPT

## 2023-08-24 PROCEDURE — 36247 INS CATH ABD/L-EXT ART 3RD: CPT | Performed by: STUDENT IN AN ORGANIZED HEALTH CARE EDUCATION/TRAINING PROGRAM

## 2023-08-24 PROCEDURE — 6370000000 HC RX 637 (ALT 250 FOR IP): Performed by: INTERNAL MEDICINE

## 2023-08-24 PROCEDURE — 99232 SBSQ HOSP IP/OBS MODERATE 35: CPT | Performed by: NURSE PRACTITIONER

## 2023-08-24 PROCEDURE — C1874 STENT, COATED/COV W/DEL SYS: HCPCS

## 2023-08-24 PROCEDURE — 85027 COMPLETE CBC AUTOMATED: CPT

## 2023-08-24 PROCEDURE — 94760 N-INVAS EAR/PLS OXIMETRY 1: CPT

## 2023-08-24 PROCEDURE — 6360000002 HC RX W HCPCS

## 2023-08-24 PROCEDURE — 99152 MOD SED SAME PHYS/QHP 5/>YRS: CPT | Performed by: STUDENT IN AN ORGANIZED HEALTH CARE EDUCATION/TRAINING PROGRAM

## 2023-08-24 PROCEDURE — C1769 GUIDE WIRE: HCPCS

## 2023-08-24 PROCEDURE — C1725 CATH, TRANSLUMIN NON-LASER: HCPCS

## 2023-08-24 PROCEDURE — 37184 PRIM ART M-THRMBC 1ST VSL: CPT

## 2023-08-24 PROCEDURE — 37226 HC FEM POP TERR PLASTY AND STENT: CPT

## 2023-08-24 PROCEDURE — 2500000003 HC RX 250 WO HCPCS

## 2023-08-24 PROCEDURE — 99153 MOD SED SAME PHYS/QHP EA: CPT

## 2023-08-24 PROCEDURE — C2623 CATH, TRANSLUMIN, DRUG-COAT: HCPCS

## 2023-08-24 PROCEDURE — C1887 CATHETER, GUIDING: HCPCS

## 2023-08-24 PROCEDURE — 99152 MOD SED SAME PHYS/QHP 5/>YRS: CPT

## 2023-08-24 PROCEDURE — 6360000004 HC RX CONTRAST MEDICATION: Performed by: STUDENT IN AN ORGANIZED HEALTH CARE EDUCATION/TRAINING PROGRAM

## 2023-08-24 PROCEDURE — 9990000010 HC NO CHARGE VISIT: Performed by: PHYSICAL THERAPIST

## 2023-08-24 PROCEDURE — C1757 CATH, THROMBECTOMY/EMBOLECT: HCPCS

## 2023-08-24 RX ORDER — SODIUM CHLORIDE 0.9 % (FLUSH) 0.9 %
5-40 SYRINGE (ML) INJECTION PRN
Status: DISCONTINUED | OUTPATIENT
Start: 2023-08-24 | End: 2023-09-03 | Stop reason: HOSPADM

## 2023-08-24 RX ORDER — LANOLIN ALCOHOL/MO/W.PET/CERES
3 CREAM (GRAM) TOPICAL NIGHTLY PRN
Status: DISCONTINUED | OUTPATIENT
Start: 2023-08-24 | End: 2023-09-03 | Stop reason: HOSPADM

## 2023-08-24 RX ORDER — SODIUM CHLORIDE 0.9 % (FLUSH) 0.9 %
5-40 SYRINGE (ML) INJECTION EVERY 12 HOURS SCHEDULED
Status: DISCONTINUED | OUTPATIENT
Start: 2023-08-24 | End: 2023-09-03 | Stop reason: HOSPADM

## 2023-08-24 RX ORDER — MORPHINE SULFATE 4 MG/ML
4 INJECTION, SOLUTION INTRAMUSCULAR; INTRAVENOUS EVERY 4 HOURS PRN
Status: DISCONTINUED | OUTPATIENT
Start: 2023-08-24 | End: 2023-09-03 | Stop reason: HOSPADM

## 2023-08-24 RX ADMIN — HEPARIN SODIUM 2700 UNITS/HR: 10000 INJECTION, SOLUTION INTRAVENOUS at 06:55

## 2023-08-24 RX ADMIN — INSULIN GLARGINE 20 UNITS: 100 INJECTION, SOLUTION SUBCUTANEOUS at 20:13

## 2023-08-24 RX ADMIN — OXYCODONE AND ACETAMINOPHEN 1 TABLET: 5; 325 TABLET ORAL at 17:25

## 2023-08-24 RX ADMIN — HEPARIN SODIUM 10000 UNITS: 1000 INJECTION INTRAVENOUS; SUBCUTANEOUS at 00:17

## 2023-08-24 RX ADMIN — POTASSIUM CHLORIDE 40 MEQ: 1500 TABLET, EXTENDED RELEASE ORAL at 18:21

## 2023-08-24 RX ADMIN — PANTOPRAZOLE SODIUM 40 MG: 40 TABLET, DELAYED RELEASE ORAL at 05:21

## 2023-08-24 RX ADMIN — OXYCODONE AND ACETAMINOPHEN 1 TABLET: 5; 325 TABLET ORAL at 21:56

## 2023-08-24 RX ADMIN — AMIODARONE HYDROCHLORIDE 200 MG: 200 TABLET ORAL at 09:32

## 2023-08-24 RX ADMIN — HYDRALAZINE HYDROCHLORIDE 25 MG: 25 TABLET, FILM COATED ORAL at 05:21

## 2023-08-24 RX ADMIN — MORPHINE SULFATE 4 MG: 4 INJECTION, SOLUTION INTRAMUSCULAR; INTRAVENOUS at 18:31

## 2023-08-24 RX ADMIN — HEPARIN SODIUM 2250 UNITS/HR: 10000 INJECTION, SOLUTION INTRAVENOUS at 15:06

## 2023-08-24 RX ADMIN — LOSARTAN POTASSIUM 25 MG: 25 TABLET, FILM COATED ORAL at 09:32

## 2023-08-24 RX ADMIN — MORPHINE SULFATE 4 MG: 4 INJECTION, SOLUTION INTRAMUSCULAR; INTRAVENOUS at 23:07

## 2023-08-24 RX ADMIN — IOPAMIDOL 160 ML: 612 INJECTION, SOLUTION INTRAVENOUS at 12:37

## 2023-08-24 RX ADMIN — ATORVASTATIN CALCIUM 80 MG: 80 TABLET, FILM COATED ORAL at 20:12

## 2023-08-24 RX ADMIN — OXYCODONE AND ACETAMINOPHEN 1 TABLET: 5; 325 TABLET ORAL at 06:58

## 2023-08-24 RX ADMIN — HEPARIN SODIUM 2700 UNITS/HR: 10000 INJECTION, SOLUTION INTRAVENOUS at 00:18

## 2023-08-24 RX ADMIN — HYDRALAZINE HYDROCHLORIDE 25 MG: 25 TABLET, FILM COATED ORAL at 14:59

## 2023-08-24 RX ADMIN — FLUTICASONE PROPIONATE 2 SPRAY: 50 SPRAY, METERED NASAL at 09:33

## 2023-08-24 RX ADMIN — ASPIRIN 81 MG: 81 TABLET, CHEWABLE ORAL at 09:32

## 2023-08-24 ASSESSMENT — PAIN DESCRIPTION - DESCRIPTORS
DESCRIPTORS: ACHING;SHARP
DESCRIPTORS: SHARP

## 2023-08-24 ASSESSMENT — PAIN DESCRIPTION - ORIENTATION
ORIENTATION: RIGHT

## 2023-08-24 ASSESSMENT — PAIN DESCRIPTION - ONSET: ONSET: GRADUAL

## 2023-08-24 ASSESSMENT — PAIN DESCRIPTION - LOCATION
LOCATION: LEG
LOCATION: LEG
LOCATION: FOOT;LEG
LOCATION: LEG

## 2023-08-24 ASSESSMENT — PAIN SCALES - GENERAL
PAINLEVEL_OUTOF10: 6
PAINLEVEL_OUTOF10: 10

## 2023-08-24 ASSESSMENT — PAIN DESCRIPTION - FREQUENCY: FREQUENCY: INTERMITTENT

## 2023-08-24 ASSESSMENT — PAIN DESCRIPTION - PAIN TYPE: TYPE: ACUTE PAIN

## 2023-08-24 ASSESSMENT — PAIN - FUNCTIONAL ASSESSMENT: PAIN_FUNCTIONAL_ASSESSMENT: PREVENTS OR INTERFERES SOME ACTIVE ACTIVITIES AND ADLS

## 2023-08-24 NOTE — ADT AUTH CERT
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 He is significantly fluid overloaded. Unclear if he was compliant with meds/ fluid and Na restriction. Apparently did not get torsemide upon discharge? Started lasix gtt 8/22  Hx KENDRA, will monitor renal fx  Cont hyralazine, losartan  Restart farxiga after IV lasix  Add AA only if he proves compliance (needs frequent K monitoring)      10/2022- adm wt 354>331  DC wt 8/12 331lbs- Adm wt 361lbs. proBNP 3279 (2174 on 8/7)     2.) PAF: NSR  Onset 9/2021, s/p FRANTZ/DCCV  ChadsVasc 6- cont eliquis     MEDICATIONS:  Accu checks ac/hs with ssi coverage     ORDERS:  Bmp, mg daily  NPO           8/22  Initial by Angela Paz RN       Review Status Review Entered   In Primary 8/23/2023 1253       Created By   Angela Paz RN      Criteria Review   DATE: 8/22     TYPE OF BED: progressive        CHIEF COMPLAINT/ PRESENTATION:  Leg pain/swelling     HPI:  The patient is a 60 y.o. male who presents with R leg pain x1-2 days and swelling for the past several days, including LLE swelling as well. Recent change in diuretics, swelling not improved with this change. Blistering associated with this. Denies fevers, redness, chills. Is diabetic. H/o CHF. Symptoms not otherwise alleviated or exacerbated by other factors.      VITALS:  T: 37.2 p: 95 rr: 18 bp:

## 2023-08-24 NOTE — H&P
H&P Update     I have reviewed the history and physical and examined the patient and find no relevant changes. I have reviewed with the patient and/or family the risks, benefits, and alternatives to the procedure. I HAVE PRESENTED REASONABLE ALTERNATIVES TO THE PATIENT'S PROPOSED CARE, TREATMENT, AND SERVICES. THE DISCUSSION I HAVE DONE ENCOMPASSED RISKS, BENEFITS, AND SIDE EFFECTS RELATED TO THE ALTERNATIVES AND THE RISKS RELATED TO NOT RECEIVING THE PROPOSED CARE, TREATMENT, SERVICES. Re-iterated with patient that there is a substantial risk of distal embolization, limb loss with these procedures. Also understands the risks of lytic therapy and its attendant complications. All questions answered. Pre-sedation Assessment    Patient:  Neelam Reid   :   1965  Intended Procedure: right lower extremity angiogram, possible intervention    Vitals:    23 0903   BP:    Pulse: 79   Resp: 18   Temp:    SpO2: 94%       Nursing notes reviewed and agreed. Medications reviewed  Allergies: No Known Allergies      Pre-Procedure Assessment/Plan:  ASA 3 - Patient with moderate systemic disease with functional limitations    Mallampati Airway Assessment:  Mallampati Class III - (soft palate & base of uvula are visible)    Level of Sedation Plan: Moderate sedation    Post Procedure plan: Return to same level of care    Morena Mosqueda, , FACS, FSVS, [address] Vascular and Endovascular Surgery

## 2023-08-25 ENCOUNTER — APPOINTMENT (OUTPATIENT)
Dept: CT IMAGING | Age: 58
DRG: 181 | End: 2023-08-25
Payer: COMMERCIAL

## 2023-08-25 ENCOUNTER — ANESTHESIA (OUTPATIENT)
Dept: OPERATING ROOM | Age: 58
End: 2023-08-25
Payer: COMMERCIAL

## 2023-08-25 ENCOUNTER — ANESTHESIA EVENT (OUTPATIENT)
Dept: OPERATING ROOM | Age: 58
End: 2023-08-25
Payer: COMMERCIAL

## 2023-08-25 LAB
ANION GAP SERPL CALCULATED.3IONS-SCNC: 13 MMOL/L (ref 3–16)
APTT BLD: 99.8 SEC (ref 22.7–35.9)
BUN SERPL-MCNC: 15 MG/DL (ref 7–20)
CALCIUM SERPL-MCNC: 8.6 MG/DL (ref 8.3–10.6)
CHLORIDE SERPL-SCNC: 93 MMOL/L (ref 99–110)
CO2 SERPL-SCNC: 28 MMOL/L (ref 21–32)
CREAT SERPL-MCNC: 1.2 MG/DL (ref 0.9–1.3)
DEPRECATED RDW RBC AUTO: 15.7 % (ref 12.4–15.4)
DEPRECATED RDW RBC AUTO: 16 % (ref 12.4–15.4)
GFR SERPLBLD CREATININE-BSD FMLA CKD-EPI: >60 ML/MIN/{1.73_M2}
GLUCOSE BLD-MCNC: 179 MG/DL (ref 70–99)
GLUCOSE BLD-MCNC: 191 MG/DL (ref 70–99)
GLUCOSE BLD-MCNC: 195 MG/DL (ref 70–99)
GLUCOSE BLD-MCNC: 197 MG/DL (ref 70–99)
GLUCOSE BLD-MCNC: 218 MG/DL (ref 70–99)
GLUCOSE SERPL-MCNC: 173 MG/DL (ref 70–99)
HCT VFR BLD AUTO: 25.2 % (ref 40.5–52.5)
HCT VFR BLD AUTO: 30.1 % (ref 40.5–52.5)
HGB BLD-MCNC: 8.2 G/DL (ref 13.5–17.5)
HGB BLD-MCNC: 9.6 G/DL (ref 13.5–17.5)
MAGNESIUM SERPL-MCNC: 1.9 MG/DL (ref 1.8–2.4)
MCH RBC QN AUTO: 27.7 PG (ref 26–34)
MCH RBC QN AUTO: 28.8 PG (ref 26–34)
MCHC RBC AUTO-ENTMCNC: 32 G/DL (ref 31–36)
MCHC RBC AUTO-ENTMCNC: 32.4 G/DL (ref 31–36)
MCV RBC AUTO: 86.7 FL (ref 80–100)
MCV RBC AUTO: 88.8 FL (ref 80–100)
NT-PROBNP SERPL-MCNC: 2124 PG/ML (ref 0–124)
PERFORMED ON: ABNORMAL
PLATELET # BLD AUTO: 192 K/UL (ref 135–450)
PLATELET # BLD AUTO: 212 K/UL (ref 135–450)
PMV BLD AUTO: 10.1 FL (ref 5–10.5)
PMV BLD AUTO: 9.6 FL (ref 5–10.5)
POTASSIUM SERPL-SCNC: 3.9 MMOL/L (ref 3.5–5.1)
RBC # BLD AUTO: 2.84 M/UL (ref 4.2–5.9)
RBC # BLD AUTO: 3.47 M/UL (ref 4.2–5.9)
SODIUM SERPL-SCNC: 134 MMOL/L (ref 136–145)
WBC # BLD AUTO: 12.8 K/UL (ref 4–11)
WBC # BLD AUTO: 8.5 K/UL (ref 4–11)

## 2023-08-25 PROCEDURE — 6370000000 HC RX 637 (ALT 250 FOR IP): Performed by: SURGERY

## 2023-08-25 PROCEDURE — 2500000003 HC RX 250 WO HCPCS

## 2023-08-25 PROCEDURE — 2580000003 HC RX 258: Performed by: STUDENT IN AN ORGANIZED HEALTH CARE EDUCATION/TRAINING PROGRAM

## 2023-08-25 PROCEDURE — 6360000002 HC RX W HCPCS: Performed by: STUDENT IN AN ORGANIZED HEALTH CARE EDUCATION/TRAINING PROGRAM

## 2023-08-25 PROCEDURE — 83880 ASSAY OF NATRIURETIC PEPTIDE: CPT

## 2023-08-25 PROCEDURE — 3600000005 HC SURGERY LEVEL 5 BASE: Performed by: STUDENT IN AN ORGANIZED HEALTH CARE EDUCATION/TRAINING PROGRAM

## 2023-08-25 PROCEDURE — 6360000002 HC RX W HCPCS

## 2023-08-25 PROCEDURE — 3600000015 HC SURGERY LEVEL 5 ADDTL 15MIN: Performed by: STUDENT IN AN ORGANIZED HEALTH CARE EDUCATION/TRAINING PROGRAM

## 2023-08-25 PROCEDURE — 2720000010 HC SURG SUPPLY STERILE: Performed by: STUDENT IN AN ORGANIZED HEALTH CARE EDUCATION/TRAINING PROGRAM

## 2023-08-25 PROCEDURE — X27K385 DILATION OF PROXIMAL RIGHT POPLITEAL ARTERY WITH SUSTAINED RELEASE DRUG-ELUTING INTRALUMINAL DEVICE, PERCUTANEOUS APPROACH, NEW TECHNOLOGY GROUP 5: ICD-10-PCS | Performed by: STUDENT IN AN ORGANIZED HEALTH CARE EDUCATION/TRAINING PROGRAM

## 2023-08-25 PROCEDURE — 6370000000 HC RX 637 (ALT 250 FOR IP): Performed by: STUDENT IN AN ORGANIZED HEALTH CARE EDUCATION/TRAINING PROGRAM

## 2023-08-25 PROCEDURE — 80048 BASIC METABOLIC PNL TOTAL CA: CPT

## 2023-08-25 PROCEDURE — 83735 ASSAY OF MAGNESIUM: CPT

## 2023-08-25 PROCEDURE — 94760 N-INVAS EAR/PLS OXIMETRY 1: CPT

## 2023-08-25 PROCEDURE — 2580000003 HC RX 258

## 2023-08-25 PROCEDURE — 6370000000 HC RX 637 (ALT 250 FOR IP): Performed by: NURSE PRACTITIONER

## 2023-08-25 PROCEDURE — A4217 STERILE WATER/SALINE, 500 ML: HCPCS | Performed by: STUDENT IN AN ORGANIZED HEALTH CARE EDUCATION/TRAINING PROGRAM

## 2023-08-25 PROCEDURE — 85730 THROMBOPLASTIN TIME PARTIAL: CPT

## 2023-08-25 PROCEDURE — B41F1ZZ FLUOROSCOPY OF RIGHT LOWER EXTREMITY ARTERIES USING LOW OSMOLAR CONTRAST: ICD-10-PCS | Performed by: STUDENT IN AN ORGANIZED HEALTH CARE EDUCATION/TRAINING PROGRAM

## 2023-08-25 PROCEDURE — 9990000010 HC NO CHARGE VISIT

## 2023-08-25 PROCEDURE — 6360000004 HC RX CONTRAST MEDICATION: Performed by: SURGERY

## 2023-08-25 PROCEDURE — 36415 COLL VENOUS BLD VENIPUNCTURE: CPT

## 2023-08-25 PROCEDURE — 7100000001 HC PACU RECOVERY - ADDTL 15 MIN: Performed by: STUDENT IN AN ORGANIZED HEALTH CARE EDUCATION/TRAINING PROGRAM

## 2023-08-25 PROCEDURE — 85027 COMPLETE CBC AUTOMATED: CPT

## 2023-08-25 PROCEDURE — 04CM3ZZ EXTIRPATION OF MATTER FROM RIGHT POPLITEAL ARTERY, PERCUTANEOUS APPROACH: ICD-10-PCS | Performed by: STUDENT IN AN ORGANIZED HEALTH CARE EDUCATION/TRAINING PROGRAM

## 2023-08-25 PROCEDURE — 047T3ZZ DILATION OF RIGHT PERONEAL ARTERY, PERCUTANEOUS APPROACH: ICD-10-PCS | Performed by: STUDENT IN AN ORGANIZED HEALTH CARE EDUCATION/TRAINING PROGRAM

## 2023-08-25 PROCEDURE — 3700000001 HC ADD 15 MINUTES (ANESTHESIA): Performed by: STUDENT IN AN ORGANIZED HEALTH CARE EDUCATION/TRAINING PROGRAM

## 2023-08-25 PROCEDURE — 73701 CT LOWER EXTREMITY W/DYE: CPT

## 2023-08-25 PROCEDURE — 0J9L0ZZ DRAINAGE OF RIGHT UPPER LEG SUBCUTANEOUS TISSUE AND FASCIA, OPEN APPROACH: ICD-10-PCS | Performed by: STUDENT IN AN ORGANIZED HEALTH CARE EDUCATION/TRAINING PROGRAM

## 2023-08-25 PROCEDURE — 7100000000 HC PACU RECOVERY - FIRST 15 MIN: Performed by: STUDENT IN AN ORGANIZED HEALTH CARE EDUCATION/TRAINING PROGRAM

## 2023-08-25 PROCEDURE — APPNB30 APP NON BILLABLE TIME 0-30 MINS: Performed by: NURSE PRACTITIONER

## 2023-08-25 PROCEDURE — 0JCL0ZZ EXTIRPATION OF MATTER FROM RIGHT UPPER LEG SUBCUTANEOUS TISSUE AND FASCIA, OPEN APPROACH: ICD-10-PCS | Performed by: STUDENT IN AN ORGANIZED HEALTH CARE EDUCATION/TRAINING PROGRAM

## 2023-08-25 PROCEDURE — 3700000000 HC ANESTHESIA ATTENDED CARE: Performed by: STUDENT IN AN ORGANIZED HEALTH CARE EDUCATION/TRAINING PROGRAM

## 2023-08-25 PROCEDURE — 2060000000 HC ICU INTERMEDIATE R&B

## 2023-08-25 PROCEDURE — 2709999900 HC NON-CHARGEABLE SUPPLY: Performed by: STUDENT IN AN ORGANIZED HEALTH CARE EDUCATION/TRAINING PROGRAM

## 2023-08-25 RX ORDER — CEFAZOLIN SODIUM 1 G/3ML
INJECTION, POWDER, FOR SOLUTION INTRAMUSCULAR; INTRAVENOUS PRN
Status: DISCONTINUED | OUTPATIENT
Start: 2023-08-25 | End: 2023-08-25 | Stop reason: SDUPTHER

## 2023-08-25 RX ORDER — OXYCODONE HYDROCHLORIDE 10 MG/1
10 TABLET ORAL PRN
Status: DISCONTINUED | OUTPATIENT
Start: 2023-08-25 | End: 2023-08-25 | Stop reason: HOSPADM

## 2023-08-25 RX ORDER — OXYCODONE HYDROCHLORIDE 5 MG/1
5 TABLET ORAL PRN
Status: DISCONTINUED | OUTPATIENT
Start: 2023-08-25 | End: 2023-08-25 | Stop reason: HOSPADM

## 2023-08-25 RX ORDER — PHENYLEPHRINE HCL IN 0.9% NACL 1 MG/10 ML
SYRINGE (ML) INTRAVENOUS PRN
Status: DISCONTINUED | OUTPATIENT
Start: 2023-08-25 | End: 2023-08-25 | Stop reason: SDUPTHER

## 2023-08-25 RX ORDER — DEXAMETHASONE SODIUM PHOSPHATE 4 MG/ML
INJECTION, SOLUTION INTRA-ARTICULAR; INTRALESIONAL; INTRAMUSCULAR; INTRAVENOUS; SOFT TISSUE PRN
Status: DISCONTINUED | OUTPATIENT
Start: 2023-08-25 | End: 2023-08-25 | Stop reason: SDUPTHER

## 2023-08-25 RX ORDER — PROPOFOL 10 MG/ML
INJECTION, EMULSION INTRAVENOUS PRN
Status: DISCONTINUED | OUTPATIENT
Start: 2023-08-25 | End: 2023-08-25 | Stop reason: SDUPTHER

## 2023-08-25 RX ORDER — ONDANSETRON 2 MG/ML
4 INJECTION INTRAMUSCULAR; INTRAVENOUS
Status: DISCONTINUED | OUTPATIENT
Start: 2023-08-25 | End: 2023-08-25 | Stop reason: HOSPADM

## 2023-08-25 RX ORDER — HEPARIN SODIUM 1000 [USP'U]/ML
30 INJECTION, SOLUTION INTRAVENOUS; SUBCUTANEOUS PRN
Status: DISCONTINUED | OUTPATIENT
Start: 2023-08-25 | End: 2023-08-25

## 2023-08-25 RX ORDER — SODIUM CHLORIDE 9 MG/ML
INJECTION, SOLUTION INTRAVENOUS PRN
Status: DISCONTINUED | OUTPATIENT
Start: 2023-08-25 | End: 2023-08-25 | Stop reason: HOSPADM

## 2023-08-25 RX ORDER — HEPARIN SODIUM 1000 [USP'U]/ML
60 INJECTION, SOLUTION INTRAVENOUS; SUBCUTANEOUS PRN
Status: DISCONTINUED | OUTPATIENT
Start: 2023-08-25 | End: 2023-08-25

## 2023-08-25 RX ORDER — FENTANYL CITRATE 50 UG/ML
INJECTION, SOLUTION INTRAMUSCULAR; INTRAVENOUS PRN
Status: DISCONTINUED | OUTPATIENT
Start: 2023-08-25 | End: 2023-08-25 | Stop reason: SDUPTHER

## 2023-08-25 RX ORDER — MAGNESIUM HYDROXIDE 1200 MG/15ML
LIQUID ORAL CONTINUOUS PRN
Status: COMPLETED | OUTPATIENT
Start: 2023-08-25 | End: 2023-08-25

## 2023-08-25 RX ORDER — HEPARIN SODIUM 10000 [USP'U]/100ML
0-4000 INJECTION, SOLUTION INTRAVENOUS CONTINUOUS
Status: DISCONTINUED | OUTPATIENT
Start: 2023-08-25 | End: 2023-08-28

## 2023-08-25 RX ORDER — TORSEMIDE 20 MG/1
40 TABLET ORAL DAILY
Status: DISCONTINUED | OUTPATIENT
Start: 2023-08-25 | End: 2023-09-03 | Stop reason: HOSPADM

## 2023-08-25 RX ORDER — SODIUM CHLORIDE 0.9 % (FLUSH) 0.9 %
5-40 SYRINGE (ML) INJECTION PRN
Status: DISCONTINUED | OUTPATIENT
Start: 2023-08-25 | End: 2023-08-25 | Stop reason: HOSPADM

## 2023-08-25 RX ORDER — LIDOCAINE HYDROCHLORIDE 20 MG/ML
INJECTION, SOLUTION EPIDURAL; INFILTRATION; INTRACAUDAL; PERINEURAL PRN
Status: DISCONTINUED | OUTPATIENT
Start: 2023-08-25 | End: 2023-08-25 | Stop reason: SDUPTHER

## 2023-08-25 RX ORDER — EPHEDRINE SULFATE/0.9% NACL/PF 50 MG/5 ML
SYRINGE (ML) INTRAVENOUS PRN
Status: DISCONTINUED | OUTPATIENT
Start: 2023-08-25 | End: 2023-08-25 | Stop reason: SDUPTHER

## 2023-08-25 RX ORDER — SODIUM CHLORIDE 0.9 % (FLUSH) 0.9 %
5-40 SYRINGE (ML) INJECTION EVERY 12 HOURS SCHEDULED
Status: DISCONTINUED | OUTPATIENT
Start: 2023-08-25 | End: 2023-08-25 | Stop reason: HOSPADM

## 2023-08-25 RX ORDER — ONDANSETRON 2 MG/ML
INJECTION INTRAMUSCULAR; INTRAVENOUS PRN
Status: DISCONTINUED | OUTPATIENT
Start: 2023-08-25 | End: 2023-08-25 | Stop reason: SDUPTHER

## 2023-08-25 RX ORDER — SODIUM CHLORIDE 9 MG/ML
INJECTION, SOLUTION INTRAVENOUS CONTINUOUS PRN
Status: DISCONTINUED | OUTPATIENT
Start: 2023-08-25 | End: 2023-08-25 | Stop reason: SDUPTHER

## 2023-08-25 RX ADMIN — SODIUM CHLORIDE, PRESERVATIVE FREE 10 ML: 5 INJECTION INTRAVENOUS at 20:21

## 2023-08-25 RX ADMIN — INSULIN GLARGINE 20 UNITS: 100 INJECTION, SOLUTION SUBCUTANEOUS at 23:02

## 2023-08-25 RX ADMIN — CEFAZOLIN 3 G: 1 INJECTION, POWDER, FOR SOLUTION INTRAMUSCULAR; INTRAVENOUS at 14:42

## 2023-08-25 RX ADMIN — HEPARIN SODIUM 1900 UNITS/HR: 10000 INJECTION, SOLUTION INTRAVENOUS at 20:26

## 2023-08-25 RX ADMIN — PROPOFOL 40 MG: 10 INJECTION, EMULSION INTRAVENOUS at 14:31

## 2023-08-25 RX ADMIN — ONDANSETRON 4 MG: 2 INJECTION INTRAMUSCULAR; INTRAVENOUS at 14:37

## 2023-08-25 RX ADMIN — Medication 10 MG: at 14:56

## 2023-08-25 RX ADMIN — Medication 50 MCG: at 14:47

## 2023-08-25 RX ADMIN — Medication 10 MG: at 15:03

## 2023-08-25 RX ADMIN — PANTOPRAZOLE SODIUM 40 MG: 40 TABLET, DELAYED RELEASE ORAL at 05:11

## 2023-08-25 RX ADMIN — Medication 50 MCG: at 14:52

## 2023-08-25 RX ADMIN — IOPAMIDOL 75 ML: 755 INJECTION, SOLUTION INTRAVENOUS at 09:59

## 2023-08-25 RX ADMIN — SODIUM CHLORIDE: 9 INJECTION, SOLUTION INTRAVENOUS at 14:24

## 2023-08-25 RX ADMIN — SODIUM CHLORIDE, PRESERVATIVE FREE 10 ML: 5 INJECTION INTRAVENOUS at 10:20

## 2023-08-25 RX ADMIN — ATORVASTATIN CALCIUM 80 MG: 80 TABLET, FILM COATED ORAL at 23:02

## 2023-08-25 RX ADMIN — ASPIRIN 81 MG: 81 TABLET, CHEWABLE ORAL at 10:18

## 2023-08-25 RX ADMIN — LIDOCAINE HYDROCHLORIDE 100 MG: 20 INJECTION, SOLUTION EPIDURAL; INFILTRATION; INTRACAUDAL; PERINEURAL at 14:30

## 2023-08-25 RX ADMIN — FENTANYL CITRATE 25 MCG: 50 INJECTION INTRAMUSCULAR; INTRAVENOUS at 14:48

## 2023-08-25 RX ADMIN — Medication 10 MG: at 14:54

## 2023-08-25 RX ADMIN — Medication 10 MG: at 15:00

## 2023-08-25 RX ADMIN — AMIODARONE HYDROCHLORIDE 200 MG: 200 TABLET ORAL at 10:18

## 2023-08-25 RX ADMIN — Medication 10 MG: at 14:58

## 2023-08-25 RX ADMIN — LOSARTAN POTASSIUM 25 MG: 25 TABLET, FILM COATED ORAL at 10:18

## 2023-08-25 RX ADMIN — PROPOFOL 40 MG: 10 INJECTION, EMULSION INTRAVENOUS at 14:33

## 2023-08-25 RX ADMIN — DEXAMETHASONE SODIUM PHOSPHATE 10 MG: 4 INJECTION, SOLUTION INTRAMUSCULAR; INTRAVENOUS at 14:37

## 2023-08-25 RX ADMIN — INSULIN LISPRO 7 UNITS: 100 INJECTION, SOLUTION INTRAVENOUS; SUBCUTANEOUS at 18:57

## 2023-08-25 RX ADMIN — SODIUM CHLORIDE, PRESERVATIVE FREE 10 ML: 5 INJECTION INTRAVENOUS at 10:19

## 2023-08-25 RX ADMIN — FENTANYL CITRATE 50 MCG: 50 INJECTION INTRAMUSCULAR; INTRAVENOUS at 14:27

## 2023-08-25 RX ADMIN — OXYCODONE AND ACETAMINOPHEN 1 TABLET: 5; 325 TABLET ORAL at 04:17

## 2023-08-25 RX ADMIN — HYDRALAZINE HYDROCHLORIDE 25 MG: 25 TABLET, FILM COATED ORAL at 04:17

## 2023-08-25 RX ADMIN — TORSEMIDE 40 MG: 20 TABLET ORAL at 10:18

## 2023-08-25 ASSESSMENT — PAIN DESCRIPTION - DESCRIPTORS: DESCRIPTORS: SHARP

## 2023-08-25 ASSESSMENT — PAIN DESCRIPTION - LOCATION
LOCATION: OTHER (COMMENT)
LOCATION: LEG

## 2023-08-25 ASSESSMENT — LIFESTYLE VARIABLES: SMOKING_STATUS: 0

## 2023-08-25 ASSESSMENT — PAIN - FUNCTIONAL ASSESSMENT: PAIN_FUNCTIONAL_ASSESSMENT: 0-10

## 2023-08-25 ASSESSMENT — ENCOUNTER SYMPTOMS: SHORTNESS OF BREATH: 0

## 2023-08-25 ASSESSMENT — PAIN SCALES - GENERAL
PAINLEVEL_OUTOF10: 6
PAINLEVEL_OUTOF10: 4
PAINLEVEL_OUTOF10: 0

## 2023-08-25 ASSESSMENT — PAIN DESCRIPTION - ORIENTATION
ORIENTATION: RIGHT
ORIENTATION: RIGHT

## 2023-08-25 NOTE — OP NOTE
[address], [address] [place] [address] Way                                OPERATIVE REPORT    PATIENT NAME: Lexx Suarez                      :        1965  MED REC NO:   [id number]                          ROOM:       5107  ACCOUNT NO:   [de-identified]                           ADMIT DATE: 2023  PROVIDER:     Fer Torrez DO    DATE OF PROCEDURE:  2023    PREOPERATIVE DIAGNOSIS:  Critical limb ischemia of the right lower  extremity. POSTOPERATIVE DIAGNOSIS:  Critical limb ischemia of the right lower  extremity. OPERATION PERFORMED:  1. Ultrasound-guided access to the left common femoral artery. 2.  Right lower extremity angiogram.  3.  Selective catheterization of the peroneal artery and posterior  tibial artery; third order selection. 4.  Balloon angioplasty of the peroneal artery, tibioperoneal trunk, and  popliteal artery using a 3 mm balloon. 5.  Balloon angioplasty of the popliteal artery using a 4 mm followed by  a 5 mm balloon. 6.  Suction thrombectomy using the Crucell Lightning 7 mechanical  suction device of the popliteal artery. 7.  Balloon angioplasty of the popliteal artery using a 5 x 150 mm Impact drug coated balloon  7. Balloon angioplasty and stenting of the P2 segment of the popliteal  artery and P3 segment of the popliteal artery using a 6 x 120 mm Rima  stent, followed by post dilatation using a 6-mm balloon. 8.  Completion angiogram.    SURGEON:  Dominick Cid DO    ESTIMATED BLOOD LOSS:  Less than 100 mL. COMPLICATIONS:  None readily apparent. ANESTHESIA:  Local sedation. ASA CLASSIFICATION:  III. MALLAMPATI SCORE:  III. TOTAL SEDATION TIME:  124 minutes. TOTAL SEDATION USED:  4 mg of Versed and 200 mcg of fentanyl. INDICATIONS:  This is a 53-year-old male patient, who has severely  reduced ejection fraction.   He is a noncompliant individual who uses  cocaine on a

## 2023-08-25 NOTE — BRIEF OP NOTE
Brief Postoperative Note      Patient: Lo Solis  YOB: 1965  MRN: [id number]    Date of Procedure: 8/25/2023    Pre-Op Diagnosis Codes:     * Hematoma [T14. 8XXA]    Post-Op Diagnosis: Post-Op Diagnosis Codes:     * Hematoma [T14. 8XXA]       Procedure(s):  RIGHT THIGH INCISION AND DRAINAGE WITH DRAIN PLACEMENT    Surgeon(s):  Giovanni Hoyt DO    Assistant:  Surgical Assistant: Jenny Persaud    Anesthesia: General    Estimated Blood Loss (mL): 500 cc evacuated    Complications: None    Specimens:   * No specimens in log *    Implants:  * No implants in log *      Drains:   Closed/Suction Drain Right; Anterior Thigh Bulb (Active)       Findings: dopp PT remains      Electronically signed by Giovanni Hoyt DO on 8/25/2023 at 3:17 PM

## 2023-08-25 NOTE — ANESTHESIA PRE PROCEDURE
AM    AGRATIO 0.9 08/22/2023 01:12 AM    LABGLOM >60 08/25/2023 04:23 AM    GLUCOSE 173 08/25/2023 04:23 AM    PROT 6.5 08/22/2023 01:12 AM    CALCIUM 8.6 08/25/2023 04:23 AM    BILITOT 0.6 08/22/2023 01:12 AM    ALKPHOS 106 08/22/2023 01:12 AM    AST 38 08/22/2023 01:12 AM    ALT 26 08/22/2023 01:12 AM       POC Tests:   Recent Labs     08/25/23  1258   POCGLU 179*       Coags:   Lab Results   Component Value Date/Time    PROTIME 15.8 08/23/2023 03:38 PM    INR 1.26 08/23/2023 03:38 PM    APTT 99.8 08/25/2023 04:23 AM       HCG (If Applicable): No results found for: PREGTESTUR, PREGSERUM, HCG, HCGQUANT     ABGs:   Lab Results   Component Value Date/Time    PHART 7.366 10/18/2022 10:12 PM    PO2ART 70.4 10/18/2022 10:12 PM    NZO3VJR 45.6 10/18/2022 10:12 PM    QRD9UMF 26.1 10/18/2022 10:12 PM    BEART 1 10/18/2022 10:12 PM    C7TANQQZ 93 10/18/2022 10:12 PM        Type & Screen (If Applicable):  No results found for: LABABO, LABRH    Drug/Infectious Status (If Applicable):  No results found for: HIV, HEPCAB    COVID-19 Screening (If Applicable):   Lab Results   Component Value Date/Time    COVID19 Not Detected 07/23/2022 04:30 PM    COVID19 Not Detected 03/20/2022 12:00 AM           Anesthesia Evaluation  Patient summary reviewed no history of anesthetic complications:   Airway: Mallampati: II  TM distance: >3 FB   Neck ROM: full  Mouth opening: > = 3 FB   Dental: normal exam         Pulmonary:Negative Pulmonary ROS and normal exam  breath sounds clear to auscultation      (-) shortness of breath and not a current smoker                           Cardiovascular:  Exercise tolerance: poor (<4 METS),   (+) hypertension:, CAD:, CHF:,         Rhythm: regular  Rate: normal                 ROS comment: Recent ECHO 15-20  Two years ago - 35%  Pt.  Non-compliant  H/O Cocaine use     Neuro/Psych:   Negative Neuro/Psych ROS  (+) CVA:,             GI/Hepatic/Renal:   (+) GERD:, PUD,           Endo/Other: Negative Endo/Other

## 2023-08-26 LAB
ANION GAP SERPL CALCULATED.3IONS-SCNC: 13 MMOL/L (ref 3–16)
ANTI-XA UNFRAC HEPARIN: 0.71 IU/ML (ref 0.3–0.7)
ANTI-XA UNFRAC HEPARIN: 0.78 IU/ML (ref 0.3–0.7)
APTT BLD: 87.5 SEC (ref 22.7–35.9)
BUN SERPL-MCNC: 20 MG/DL (ref 7–20)
CALCIUM SERPL-MCNC: 8.2 MG/DL (ref 8.3–10.6)
CHLORIDE SERPL-SCNC: 97 MMOL/L (ref 99–110)
CO2 SERPL-SCNC: 28 MMOL/L (ref 21–32)
CREAT SERPL-MCNC: 1.6 MG/DL (ref 0.9–1.3)
DEPRECATED RDW RBC AUTO: 15.6 % (ref 12.4–15.4)
GFR SERPLBLD CREATININE-BSD FMLA CKD-EPI: 49 ML/MIN/{1.73_M2}
GLUCOSE BLD-MCNC: 133 MG/DL (ref 70–99)
GLUCOSE BLD-MCNC: 143 MG/DL (ref 70–99)
GLUCOSE BLD-MCNC: 152 MG/DL (ref 70–99)
GLUCOSE BLD-MCNC: 164 MG/DL (ref 70–99)
GLUCOSE SERPL-MCNC: 163 MG/DL (ref 70–99)
HCT VFR BLD AUTO: 23.6 % (ref 40.5–52.5)
HGB BLD-MCNC: 7.7 G/DL (ref 13.5–17.5)
MAGNESIUM SERPL-MCNC: 1.8 MG/DL (ref 1.8–2.4)
MCH RBC QN AUTO: 28.1 PG (ref 26–34)
MCHC RBC AUTO-ENTMCNC: 32.6 G/DL (ref 31–36)
MCV RBC AUTO: 86.3 FL (ref 80–100)
PERFORMED ON: ABNORMAL
PLATELET # BLD AUTO: 238 K/UL (ref 135–450)
PMV BLD AUTO: 9.3 FL (ref 5–10.5)
POTASSIUM SERPL-SCNC: 3.9 MMOL/L (ref 3.5–5.1)
RBC # BLD AUTO: 2.74 M/UL (ref 4.2–5.9)
SODIUM SERPL-SCNC: 138 MMOL/L (ref 136–145)
WBC # BLD AUTO: 16.4 K/UL (ref 4–11)

## 2023-08-26 PROCEDURE — 85730 THROMBOPLASTIN TIME PARTIAL: CPT

## 2023-08-26 PROCEDURE — 6370000000 HC RX 637 (ALT 250 FOR IP): Performed by: STUDENT IN AN ORGANIZED HEALTH CARE EDUCATION/TRAINING PROGRAM

## 2023-08-26 PROCEDURE — 2580000003 HC RX 258: Performed by: STUDENT IN AN ORGANIZED HEALTH CARE EDUCATION/TRAINING PROGRAM

## 2023-08-26 PROCEDURE — 99232 SBSQ HOSP IP/OBS MODERATE 35: CPT | Performed by: NURSE PRACTITIONER

## 2023-08-26 PROCEDURE — 2060000000 HC ICU INTERMEDIATE R&B

## 2023-08-26 PROCEDURE — 80048 BASIC METABOLIC PNL TOTAL CA: CPT

## 2023-08-26 PROCEDURE — 83735 ASSAY OF MAGNESIUM: CPT

## 2023-08-26 PROCEDURE — 36415 COLL VENOUS BLD VENIPUNCTURE: CPT

## 2023-08-26 PROCEDURE — 85520 HEPARIN ASSAY: CPT

## 2023-08-26 PROCEDURE — 85027 COMPLETE CBC AUTOMATED: CPT

## 2023-08-26 PROCEDURE — 6360000002 HC RX W HCPCS: Performed by: STUDENT IN AN ORGANIZED HEALTH CARE EDUCATION/TRAINING PROGRAM

## 2023-08-26 RX ADMIN — SODIUM CHLORIDE, PRESERVATIVE FREE 10 ML: 5 INJECTION INTRAVENOUS at 09:30

## 2023-08-26 RX ADMIN — AMIODARONE HYDROCHLORIDE 200 MG: 200 TABLET ORAL at 09:27

## 2023-08-26 RX ADMIN — INSULIN GLARGINE 20 UNITS: 100 INJECTION, SOLUTION SUBCUTANEOUS at 21:11

## 2023-08-26 RX ADMIN — INSULIN LISPRO 7 UNITS: 100 INJECTION, SOLUTION INTRAVENOUS; SUBCUTANEOUS at 09:27

## 2023-08-26 RX ADMIN — INSULIN LISPRO 7 UNITS: 100 INJECTION, SOLUTION INTRAVENOUS; SUBCUTANEOUS at 13:30

## 2023-08-26 RX ADMIN — SODIUM CHLORIDE, PRESERVATIVE FREE 10 ML: 5 INJECTION INTRAVENOUS at 21:12

## 2023-08-26 RX ADMIN — ASPIRIN 81 MG: 81 TABLET, CHEWABLE ORAL at 09:00

## 2023-08-26 RX ADMIN — HEPARIN SODIUM 1660 UNITS/HR: 10000 INJECTION, SOLUTION INTRAVENOUS at 23:41

## 2023-08-26 RX ADMIN — HYDRALAZINE HYDROCHLORIDE 25 MG: 25 TABLET, FILM COATED ORAL at 13:30

## 2023-08-26 RX ADMIN — PANTOPRAZOLE SODIUM 40 MG: 40 TABLET, DELAYED RELEASE ORAL at 06:23

## 2023-08-26 RX ADMIN — TORSEMIDE 40 MG: 20 TABLET ORAL at 09:00

## 2023-08-26 RX ADMIN — ATORVASTATIN CALCIUM 80 MG: 80 TABLET, FILM COATED ORAL at 21:11

## 2023-08-26 RX ADMIN — HEPARIN SODIUM 1900 UNITS/HR: 10000 INJECTION, SOLUTION INTRAVENOUS at 09:30

## 2023-08-26 RX ADMIN — OXYCODONE AND ACETAMINOPHEN 1 TABLET: 5; 325 TABLET ORAL at 17:23

## 2023-08-26 RX ADMIN — INSULIN LISPRO 7 UNITS: 100 INJECTION, SOLUTION INTRAVENOUS; SUBCUTANEOUS at 17:23

## 2023-08-26 ASSESSMENT — PAIN - FUNCTIONAL ASSESSMENT: PAIN_FUNCTIONAL_ASSESSMENT: PREVENTS OR INTERFERES SOME ACTIVE ACTIVITIES AND ADLS

## 2023-08-26 ASSESSMENT — PAIN DESCRIPTION - DESCRIPTORS
DESCRIPTORS: DISCOMFORT;ACHING;GNAWING
DESCRIPTORS: SHARP

## 2023-08-26 ASSESSMENT — PAIN SCALES - GENERAL
PAINLEVEL_OUTOF10: 5
PAINLEVEL_OUTOF10: 1
PAINLEVEL_OUTOF10: 3

## 2023-08-26 ASSESSMENT — PAIN DESCRIPTION - ORIENTATION
ORIENTATION: RIGHT
ORIENTATION: RIGHT

## 2023-08-26 ASSESSMENT — PAIN DESCRIPTION - LOCATION
LOCATION: LEG
LOCATION: LEG

## 2023-08-26 NOTE — OP NOTE
[address], [address]                                OPERATIVE REPORT    PATIENT NAME: Julia Chavez                      :        1965  MED REC NO:   [id number]                          ROOM:       5107  ACCOUNT NO:   [de-identified]                           ADMIT DATE: 2023  PROVIDER:     Marty Kaba DO    DATE OF PROCEDURE:  2023    PREOPERATIVE DIAGNOSIS:  Hematoma of the thigh. POSTOPERATIVE DIAGNOSIS:  Hematoma of the thigh. OPERATION PERFORMED:  Incision and drainage and evacuation of hematoma  of the thigh and posterior knee. SURGEON:  Marty Kaba DO    ANESTHESIA:  General.    ESTIMATED BLOOD LOSS:  500 mL of the total hematoma evacuated. INDICATIONS:  This is a 62-year-old male patient who underwent a complex  endovascular reconstruction yesterday for treatment of what appeared to  be acute on chronic ischemia. Therapy was directed at reopening of  severely diseased popliteal and tibial vessels. The procedure appeared  to be mostly successful. He did have what appeared to be a small  intramuscular perforated vessel that was very small and tiny and did  appear to respond to angioplasty and was actually nearly arrested at the  end of the procedure and heparin was held for two hours in anticipation  of this helping with the leg wrapped and compressed. Unfortunately, on  presentation this morning, it appears that the patient has an indurated  And tense thigh and posterior calf area. Therefore, CT scan was done,  which demonstrated that he had indeed hematoma that was likely forming  in the thigh and also tracking down to the calf. The patient, however,  did not relay his symptoms to the nursing staff other than pain, which  he has been all along in his right leg with the acute on chronic limb  ischemia. Therefore, it was not readily identified.   The patient might  be developing

## 2023-08-27 LAB
ABO + RH BLD: NORMAL
ANION GAP SERPL CALCULATED.3IONS-SCNC: 6 MMOL/L (ref 3–16)
ANTI-XA UNFRAC HEPARIN: 0.39 IU/ML (ref 0.3–0.7)
ANTI-XA UNFRAC HEPARIN: 0.58 IU/ML (ref 0.3–0.7)
ANTI-XA UNFRAC HEPARIN: 0.91 IU/ML (ref 0.3–0.7)
ANTI-XA UNFRAC HEPARIN: 0.94 IU/ML (ref 0.3–0.7)
BLD GP AB SCN SERPL QL: NORMAL
BLOOD BANK DISPENSE STATUS: NORMAL
BLOOD BANK PRODUCT CODE: NORMAL
BPU ID: NORMAL
BUN SERPL-MCNC: 24 MG/DL (ref 7–20)
CALCIUM SERPL-MCNC: 7.8 MG/DL (ref 8.3–10.6)
CHLORIDE SERPL-SCNC: 97 MMOL/L (ref 99–110)
CO2 SERPL-SCNC: 33 MMOL/L (ref 21–32)
CREAT SERPL-MCNC: 1.6 MG/DL (ref 0.9–1.3)
DEPRECATED RDW RBC AUTO: 15.7 % (ref 12.4–15.4)
DESCRIPTION BLOOD BANK: NORMAL
GFR SERPLBLD CREATININE-BSD FMLA CKD-EPI: 49 ML/MIN/{1.73_M2}
GLUCOSE BLD-MCNC: 125 MG/DL (ref 70–99)
GLUCOSE BLD-MCNC: 162 MG/DL (ref 70–99)
GLUCOSE BLD-MCNC: 196 MG/DL (ref 70–99)
GLUCOSE BLD-MCNC: 220 MG/DL (ref 70–99)
GLUCOSE SERPL-MCNC: 98 MG/DL (ref 70–99)
HCT VFR BLD AUTO: 20.8 % (ref 40.5–52.5)
HCT VFR BLD AUTO: 25.2 % (ref 40.5–52.5)
HGB BLD-MCNC: 6.9 G/DL (ref 13.5–17.5)
HGB BLD-MCNC: 8.2 G/DL (ref 13.5–17.5)
MAGNESIUM SERPL-MCNC: 2 MG/DL (ref 1.8–2.4)
MCH RBC QN AUTO: 28.5 PG (ref 26–34)
MCHC RBC AUTO-ENTMCNC: 33 G/DL (ref 31–36)
MCV RBC AUTO: 86.3 FL (ref 80–100)
PERFORMED ON: ABNORMAL
PLATELET # BLD AUTO: 218 K/UL (ref 135–450)
PMV BLD AUTO: 9 FL (ref 5–10.5)
POTASSIUM SERPL-SCNC: 3.6 MMOL/L (ref 3.5–5.1)
RBC # BLD AUTO: 2.41 M/UL (ref 4.2–5.9)
SODIUM SERPL-SCNC: 136 MMOL/L (ref 136–145)
WBC # BLD AUTO: 12.1 K/UL (ref 4–11)

## 2023-08-27 PROCEDURE — 2580000003 HC RX 258: Performed by: STUDENT IN AN ORGANIZED HEALTH CARE EDUCATION/TRAINING PROGRAM

## 2023-08-27 PROCEDURE — 9990000010 HC NO CHARGE VISIT

## 2023-08-27 PROCEDURE — 36415 COLL VENOUS BLD VENIPUNCTURE: CPT

## 2023-08-27 PROCEDURE — 36430 TRANSFUSION BLD/BLD COMPNT: CPT

## 2023-08-27 PROCEDURE — 85018 HEMOGLOBIN: CPT

## 2023-08-27 PROCEDURE — P9016 RBC LEUKOCYTES REDUCED: HCPCS

## 2023-08-27 PROCEDURE — 6370000000 HC RX 637 (ALT 250 FOR IP): Performed by: STUDENT IN AN ORGANIZED HEALTH CARE EDUCATION/TRAINING PROGRAM

## 2023-08-27 PROCEDURE — 85014 HEMATOCRIT: CPT

## 2023-08-27 PROCEDURE — 86901 BLOOD TYPING SEROLOGIC RH(D): CPT

## 2023-08-27 PROCEDURE — 80048 BASIC METABOLIC PNL TOTAL CA: CPT

## 2023-08-27 PROCEDURE — 85520 HEPARIN ASSAY: CPT

## 2023-08-27 PROCEDURE — 94760 N-INVAS EAR/PLS OXIMETRY 1: CPT

## 2023-08-27 PROCEDURE — 30233N1 TRANSFUSION OF NONAUTOLOGOUS RED BLOOD CELLS INTO PERIPHERAL VEIN, PERCUTANEOUS APPROACH: ICD-10-PCS | Performed by: INTERNAL MEDICINE

## 2023-08-27 PROCEDURE — 85027 COMPLETE CBC AUTOMATED: CPT

## 2023-08-27 PROCEDURE — 2060000000 HC ICU INTERMEDIATE R&B

## 2023-08-27 PROCEDURE — 86900 BLOOD TYPING SEROLOGIC ABO: CPT

## 2023-08-27 PROCEDURE — 83735 ASSAY OF MAGNESIUM: CPT

## 2023-08-27 PROCEDURE — 86850 RBC ANTIBODY SCREEN: CPT

## 2023-08-27 PROCEDURE — 99232 SBSQ HOSP IP/OBS MODERATE 35: CPT | Performed by: NURSE PRACTITIONER

## 2023-08-27 PROCEDURE — 86923 COMPATIBILITY TEST ELECTRIC: CPT

## 2023-08-27 RX ORDER — SODIUM CHLORIDE 9 MG/ML
INJECTION, SOLUTION INTRAVENOUS PRN
Status: DISCONTINUED | OUTPATIENT
Start: 2023-08-27 | End: 2023-09-03 | Stop reason: HOSPADM

## 2023-08-27 RX ADMIN — OXYCODONE AND ACETAMINOPHEN 1 TABLET: 5; 325 TABLET ORAL at 21:27

## 2023-08-27 RX ADMIN — ASPIRIN 81 MG: 81 TABLET, CHEWABLE ORAL at 08:16

## 2023-08-27 RX ADMIN — INSULIN LISPRO 7 UNITS: 100 INJECTION, SOLUTION INTRAVENOUS; SUBCUTANEOUS at 16:23

## 2023-08-27 RX ADMIN — TORSEMIDE 40 MG: 20 TABLET ORAL at 08:16

## 2023-08-27 RX ADMIN — ATORVASTATIN CALCIUM 80 MG: 80 TABLET, FILM COATED ORAL at 21:27

## 2023-08-27 RX ADMIN — LOSARTAN POTASSIUM 25 MG: 25 TABLET, FILM COATED ORAL at 08:16

## 2023-08-27 RX ADMIN — PANTOPRAZOLE SODIUM 40 MG: 40 TABLET, DELAYED RELEASE ORAL at 06:25

## 2023-08-27 RX ADMIN — INSULIN GLARGINE 20 UNITS: 100 INJECTION, SOLUTION SUBCUTANEOUS at 21:26

## 2023-08-27 RX ADMIN — Medication 3 MG: at 21:27

## 2023-08-27 RX ADMIN — SODIUM CHLORIDE, PRESERVATIVE FREE 10 ML: 5 INJECTION INTRAVENOUS at 21:27

## 2023-08-27 RX ADMIN — SODIUM CHLORIDE, PRESERVATIVE FREE 10 ML: 5 INJECTION INTRAVENOUS at 21:28

## 2023-08-27 RX ADMIN — HYDRALAZINE HYDROCHLORIDE 25 MG: 25 TABLET, FILM COATED ORAL at 21:27

## 2023-08-27 RX ADMIN — AMIODARONE HYDROCHLORIDE 200 MG: 200 TABLET ORAL at 08:16

## 2023-08-27 ASSESSMENT — PAIN SCALES - GENERAL
PAINLEVEL_OUTOF10: 0
PAINLEVEL_OUTOF10: 6

## 2023-08-27 ASSESSMENT — PAIN DESCRIPTION - LOCATION: LOCATION: LEG

## 2023-08-27 NOTE — ACP (ADVANCE CARE PLANNING)
Advance Care Planning     Advance Care Planning Inpatient Note  [facility] Department    Today's Date: 8/27/2023  Unit: [facility] 5W PROGRESSIVE CARE    Received request from IDT Member. Upon review of chart and communication with care team, patient's decision making abilities are not in question. . Patient was/were present in the room during visit. Goals of ACP Conversation:  Discuss advance care planning documents    Health Care Decision Makers:       Primary Decision Maker: Sujata Riddle - Child - [phone]    Secondary Decision Maker: Unique Song - Brother/Sister - [phone]  Summary:  Documented Next of Kin, per patient report    Advance Care Planning Documents (Patient Wishes):  None     Assessment:  Patient expressed concerns over recovery from surgery, rehabilitation. He is anxious to get home to his children and grandchildren as soon as possible. Patient is not interested in any information about advance directives. He identified his oldest child, Dayami Canada, as his choice for healthcare decision maker followed by his brother Sofi Valente and sister Selin Busby.     Interventions:  Patient DECLINED ACP conversation    Care Preferences Communicated:   No    Outcomes/Plan:  ACP Discussion: Refused    Electronically signed by [facility] on 8/27/2023 at 12:28 PM

## 2023-08-28 LAB
ANTI-XA UNFRAC HEPARIN: 0.31 IU/ML (ref 0.3–0.7)
BLOOD BANK DISPENSE STATUS: NORMAL
BLOOD BANK PRODUCT CODE: NORMAL
BPU ID: NORMAL
DEPRECATED RDW RBC AUTO: 15.5 % (ref 12.4–15.4)
DEPRECATED RDW RBC AUTO: 15.6 % (ref 12.4–15.4)
DESCRIPTION BLOOD BANK: NORMAL
GLUCOSE BLD-MCNC: 155 MG/DL (ref 70–99)
GLUCOSE BLD-MCNC: 191 MG/DL (ref 70–99)
GLUCOSE BLD-MCNC: 194 MG/DL (ref 70–99)
GLUCOSE BLD-MCNC: 215 MG/DL (ref 70–99)
HCT VFR BLD AUTO: 21.1 % (ref 40.5–52.5)
HCT VFR BLD AUTO: 22.3 % (ref 40.5–52.5)
HGB BLD-MCNC: 6.8 G/DL (ref 13.5–17.5)
HGB BLD-MCNC: 7.1 G/DL (ref 13.5–17.5)
MCH RBC QN AUTO: 28.2 PG (ref 26–34)
MCH RBC QN AUTO: 28.3 PG (ref 26–34)
MCHC RBC AUTO-ENTMCNC: 31.9 G/DL (ref 31–36)
MCHC RBC AUTO-ENTMCNC: 32.4 G/DL (ref 31–36)
MCV RBC AUTO: 87.2 FL (ref 80–100)
MCV RBC AUTO: 88.9 FL (ref 80–100)
NT-PROBNP SERPL-MCNC: 1492 PG/ML (ref 0–124)
PERFORMED ON: ABNORMAL
PLATELET # BLD AUTO: 240 K/UL (ref 135–450)
PLATELET # BLD AUTO: 253 K/UL (ref 135–450)
PMV BLD AUTO: 10.3 FL (ref 5–10.5)
PMV BLD AUTO: 9.4 FL (ref 5–10.5)
RBC # BLD AUTO: 2.42 M/UL (ref 4.2–5.9)
RBC # BLD AUTO: 2.51 M/UL (ref 4.2–5.9)
WBC # BLD AUTO: 12.3 K/UL (ref 4–11)
WBC # BLD AUTO: 15 K/UL (ref 4–11)

## 2023-08-28 PROCEDURE — 6370000000 HC RX 637 (ALT 250 FOR IP): Performed by: NURSE PRACTITIONER

## 2023-08-28 PROCEDURE — 2060000000 HC ICU INTERMEDIATE R&B

## 2023-08-28 PROCEDURE — APPNB30 APP NON BILLABLE TIME 0-30 MINS: Performed by: NURSE PRACTITIONER

## 2023-08-28 PROCEDURE — 6370000000 HC RX 637 (ALT 250 FOR IP): Performed by: STUDENT IN AN ORGANIZED HEALTH CARE EDUCATION/TRAINING PROGRAM

## 2023-08-28 PROCEDURE — 85027 COMPLETE CBC AUTOMATED: CPT

## 2023-08-28 PROCEDURE — 97530 THERAPEUTIC ACTIVITIES: CPT

## 2023-08-28 PROCEDURE — 2580000003 HC RX 258: Performed by: STUDENT IN AN ORGANIZED HEALTH CARE EDUCATION/TRAINING PROGRAM

## 2023-08-28 PROCEDURE — 6360000002 HC RX W HCPCS: Performed by: STUDENT IN AN ORGANIZED HEALTH CARE EDUCATION/TRAINING PROGRAM

## 2023-08-28 PROCEDURE — 99232 SBSQ HOSP IP/OBS MODERATE 35: CPT | Performed by: NURSE PRACTITIONER

## 2023-08-28 PROCEDURE — 36430 TRANSFUSION BLD/BLD COMPNT: CPT

## 2023-08-28 PROCEDURE — 97530 THERAPEUTIC ACTIVITIES: CPT | Performed by: PHYSICAL THERAPIST

## 2023-08-28 PROCEDURE — 83880 ASSAY OF NATRIURETIC PEPTIDE: CPT

## 2023-08-28 PROCEDURE — 94760 N-INVAS EAR/PLS OXIMETRY 1: CPT

## 2023-08-28 PROCEDURE — 85520 HEPARIN ASSAY: CPT

## 2023-08-28 PROCEDURE — 36415 COLL VENOUS BLD VENIPUNCTURE: CPT

## 2023-08-28 RX ORDER — OXYCODONE HYDROCHLORIDE 10 MG/1
10 TABLET ORAL EVERY 4 HOURS PRN
Status: DISCONTINUED | OUTPATIENT
Start: 2023-08-28 | End: 2023-09-03 | Stop reason: HOSPADM

## 2023-08-28 RX ORDER — SODIUM CHLORIDE 9 MG/ML
INJECTION, SOLUTION INTRAVENOUS PRN
Status: DISCONTINUED | OUTPATIENT
Start: 2023-08-28 | End: 2023-09-03 | Stop reason: HOSPADM

## 2023-08-28 RX ORDER — OXYCODONE HYDROCHLORIDE 5 MG/1
5 TABLET ORAL EVERY 4 HOURS PRN
Status: DISCONTINUED | OUTPATIENT
Start: 2023-08-28 | End: 2023-09-03 | Stop reason: HOSPADM

## 2023-08-28 RX ORDER — ACETAMINOPHEN 325 MG/1
650 TABLET ORAL EVERY 6 HOURS SCHEDULED
Status: DISCONTINUED | OUTPATIENT
Start: 2023-08-28 | End: 2023-08-29

## 2023-08-28 RX ADMIN — MORPHINE SULFATE 4 MG: 4 INJECTION, SOLUTION INTRAMUSCULAR; INTRAVENOUS at 08:00

## 2023-08-28 RX ADMIN — ACETAMINOPHEN 650 MG: 325 TABLET ORAL at 12:34

## 2023-08-28 RX ADMIN — AMIODARONE HYDROCHLORIDE 200 MG: 200 TABLET ORAL at 10:18

## 2023-08-28 RX ADMIN — INSULIN GLARGINE 20 UNITS: 100 INJECTION, SOLUTION SUBCUTANEOUS at 22:59

## 2023-08-28 RX ADMIN — ASPIRIN 81 MG: 81 TABLET, CHEWABLE ORAL at 10:18

## 2023-08-28 RX ADMIN — HYDRALAZINE HYDROCHLORIDE 25 MG: 25 TABLET, FILM COATED ORAL at 04:54

## 2023-08-28 RX ADMIN — OXYCODONE HYDROCHLORIDE 10 MG: 10 TABLET ORAL at 12:33

## 2023-08-28 RX ADMIN — ATORVASTATIN CALCIUM 80 MG: 80 TABLET, FILM COATED ORAL at 23:01

## 2023-08-28 RX ADMIN — OXYCODONE HYDROCHLORIDE 10 MG: 10 TABLET ORAL at 23:02

## 2023-08-28 RX ADMIN — OXYCODONE AND ACETAMINOPHEN 1 TABLET: 5; 325 TABLET ORAL at 04:54

## 2023-08-28 RX ADMIN — SODIUM CHLORIDE, PRESERVATIVE FREE 10 ML: 5 INJECTION INTRAVENOUS at 23:05

## 2023-08-28 RX ADMIN — MORPHINE SULFATE 4 MG: 4 INJECTION, SOLUTION INTRAMUSCULAR; INTRAVENOUS at 02:00

## 2023-08-28 RX ADMIN — LOSARTAN POTASSIUM 25 MG: 25 TABLET, FILM COATED ORAL at 10:18

## 2023-08-28 RX ADMIN — SODIUM CHLORIDE, PRESERVATIVE FREE 10 ML: 5 INJECTION INTRAVENOUS at 08:00

## 2023-08-28 RX ADMIN — PANTOPRAZOLE SODIUM 40 MG: 40 TABLET, DELAYED RELEASE ORAL at 06:11

## 2023-08-28 RX ADMIN — HYDRALAZINE HYDROCHLORIDE 25 MG: 25 TABLET, FILM COATED ORAL at 15:51

## 2023-08-28 RX ADMIN — ACETAMINOPHEN 650 MG: 325 TABLET ORAL at 18:35

## 2023-08-28 RX ADMIN — OXYCODONE HYDROCHLORIDE 5 MG: 5 TABLET ORAL at 15:48

## 2023-08-28 RX ADMIN — TORSEMIDE 40 MG: 20 TABLET ORAL at 10:18

## 2023-08-28 ASSESSMENT — PAIN DESCRIPTION - ORIENTATION
ORIENTATION: RIGHT

## 2023-08-28 ASSESSMENT — PAIN DESCRIPTION - PAIN TYPE
TYPE: SURGICAL PAIN
TYPE: ACUTE PAIN
TYPE: SURGICAL PAIN

## 2023-08-28 ASSESSMENT — PAIN DESCRIPTION - LOCATION
LOCATION: LEG

## 2023-08-28 ASSESSMENT — PAIN SCALES - GENERAL
PAINLEVEL_OUTOF10: 4
PAINLEVEL_OUTOF10: 4
PAINLEVEL_OUTOF10: 9
PAINLEVEL_OUTOF10: 3
PAINLEVEL_OUTOF10: 3
PAINLEVEL_OUTOF10: 4
PAINLEVEL_OUTOF10: 8
PAINLEVEL_OUTOF10: 5
PAINLEVEL_OUTOF10: 7
PAINLEVEL_OUTOF10: 5
PAINLEVEL_OUTOF10: 4
PAINLEVEL_OUTOF10: 0
PAINLEVEL_OUTOF10: 5
PAINLEVEL_OUTOF10: 7
PAINLEVEL_OUTOF10: 7

## 2023-08-28 ASSESSMENT — PAIN DESCRIPTION - ONSET
ONSET: ON-GOING
ONSET: ON-GOING

## 2023-08-28 ASSESSMENT — PAIN DESCRIPTION - FREQUENCY
FREQUENCY: CONTINUOUS
FREQUENCY: CONTINUOUS

## 2023-08-28 ASSESSMENT — PAIN DESCRIPTION - DESCRIPTORS
DESCRIPTORS: SHARP;DISCOMFORT
DESCRIPTORS: ACHING;DISCOMFORT
DESCRIPTORS: ACHING
DESCRIPTORS: DISCOMFORT;ACHING
DESCRIPTORS: DISCOMFORT
DESCRIPTORS: DISCOMFORT;ACHING

## 2023-08-28 NOTE — CARE COORDINATION
SW completed chart review, pt is pending clearances from cardio, vascular. Pt needs PT/OT rec's. SW will follow.      Harpal Cramer LMSW, [address] [facility] Social Work Case Management   Phone: [phone]  Fax: [phone] The patient is a 11y Male complaining of facial droop.

## 2023-08-29 LAB
DEPRECATED RDW RBC AUTO: 15.1 % (ref 12.4–15.4)
GLUCOSE BLD-MCNC: 136 MG/DL (ref 70–99)
GLUCOSE BLD-MCNC: 136 MG/DL (ref 70–99)
GLUCOSE BLD-MCNC: 157 MG/DL (ref 70–99)
GLUCOSE BLD-MCNC: 172 MG/DL (ref 70–99)
GLUCOSE BLD-MCNC: 218 MG/DL (ref 70–99)
HCT VFR BLD AUTO: 22 % (ref 40.5–52.5)
HGB BLD-MCNC: 7.2 G/DL (ref 13.5–17.5)
MCH RBC QN AUTO: 28.4 PG (ref 26–34)
MCHC RBC AUTO-ENTMCNC: 32.6 G/DL (ref 31–36)
MCV RBC AUTO: 87 FL (ref 80–100)
PERFORMED ON: ABNORMAL
PLATELET # BLD AUTO: 271 K/UL (ref 135–450)
PMV BLD AUTO: 8.6 FL (ref 5–10.5)
RBC # BLD AUTO: 2.53 M/UL (ref 4.2–5.9)
WBC # BLD AUTO: 16 K/UL (ref 4–11)

## 2023-08-29 PROCEDURE — 94760 N-INVAS EAR/PLS OXIMETRY 1: CPT

## 2023-08-29 PROCEDURE — APPNB30 APP NON BILLABLE TIME 0-30 MINS: Performed by: NURSE PRACTITIONER

## 2023-08-29 PROCEDURE — 97530 THERAPEUTIC ACTIVITIES: CPT | Performed by: PHYSICAL THERAPIST

## 2023-08-29 PROCEDURE — 6370000000 HC RX 637 (ALT 250 FOR IP): Performed by: NURSE PRACTITIONER

## 2023-08-29 PROCEDURE — 97530 THERAPEUTIC ACTIVITIES: CPT

## 2023-08-29 PROCEDURE — 2580000003 HC RX 258: Performed by: STUDENT IN AN ORGANIZED HEALTH CARE EDUCATION/TRAINING PROGRAM

## 2023-08-29 PROCEDURE — 85027 COMPLETE CBC AUTOMATED: CPT

## 2023-08-29 PROCEDURE — 6370000000 HC RX 637 (ALT 250 FOR IP): Performed by: STUDENT IN AN ORGANIZED HEALTH CARE EDUCATION/TRAINING PROGRAM

## 2023-08-29 PROCEDURE — 2060000000 HC ICU INTERMEDIATE R&B

## 2023-08-29 PROCEDURE — 36415 COLL VENOUS BLD VENIPUNCTURE: CPT

## 2023-08-29 RX ORDER — ACETAMINOPHEN 325 MG/1
650 TABLET ORAL EVERY 6 HOURS PRN
Status: DISCONTINUED | OUTPATIENT
Start: 2023-08-29 | End: 2023-09-03 | Stop reason: HOSPADM

## 2023-08-29 RX ADMIN — INSULIN LISPRO 7 UNITS: 100 INJECTION, SOLUTION INTRAVENOUS; SUBCUTANEOUS at 19:18

## 2023-08-29 RX ADMIN — PANTOPRAZOLE SODIUM 40 MG: 40 TABLET, DELAYED RELEASE ORAL at 06:44

## 2023-08-29 RX ADMIN — OXYCODONE HYDROCHLORIDE 5 MG: 5 TABLET ORAL at 12:29

## 2023-08-29 RX ADMIN — OXYCODONE HYDROCHLORIDE 10 MG: 10 TABLET ORAL at 05:55

## 2023-08-29 RX ADMIN — AMIODARONE HYDROCHLORIDE 200 MG: 200 TABLET ORAL at 08:42

## 2023-08-29 RX ADMIN — HYDRALAZINE HYDROCHLORIDE 25 MG: 25 TABLET, FILM COATED ORAL at 21:54

## 2023-08-29 RX ADMIN — SODIUM CHLORIDE, PRESERVATIVE FREE 10 ML: 5 INJECTION INTRAVENOUS at 21:56

## 2023-08-29 RX ADMIN — ACETAMINOPHEN 650 MG: 325 TABLET ORAL at 06:44

## 2023-08-29 RX ADMIN — INSULIN LISPRO 7 UNITS: 100 INJECTION, SOLUTION INTRAVENOUS; SUBCUTANEOUS at 12:31

## 2023-08-29 RX ADMIN — ATORVASTATIN CALCIUM 80 MG: 80 TABLET, FILM COATED ORAL at 21:49

## 2023-08-29 RX ADMIN — OXYCODONE HYDROCHLORIDE 5 MG: 5 TABLET ORAL at 21:52

## 2023-08-29 RX ADMIN — ASPIRIN 81 MG: 81 TABLET, CHEWABLE ORAL at 08:42

## 2023-08-29 RX ADMIN — SODIUM CHLORIDE, PRESERVATIVE FREE 10 ML: 5 INJECTION INTRAVENOUS at 08:40

## 2023-08-29 RX ADMIN — LOSARTAN POTASSIUM 25 MG: 25 TABLET, FILM COATED ORAL at 08:42

## 2023-08-29 RX ADMIN — INSULIN GLARGINE 20 UNITS: 100 INJECTION, SOLUTION SUBCUTANEOUS at 21:49

## 2023-08-29 RX ADMIN — TORSEMIDE 40 MG: 20 TABLET ORAL at 08:43

## 2023-08-29 RX ADMIN — HYDRALAZINE HYDROCHLORIDE 25 MG: 25 TABLET, FILM COATED ORAL at 16:37

## 2023-08-29 RX ADMIN — SODIUM CHLORIDE, PRESERVATIVE FREE 10 ML: 5 INJECTION INTRAVENOUS at 08:44

## 2023-08-29 ASSESSMENT — PAIN DESCRIPTION - DESCRIPTORS
DESCRIPTORS: ACHING;DISCOMFORT
DESCRIPTORS: ACHING;DISCOMFORT
DESCRIPTORS: ACHING
DESCRIPTORS: ACHING;DISCOMFORT
DESCRIPTORS: ACHING
DESCRIPTORS: ACHING

## 2023-08-29 ASSESSMENT — PAIN DESCRIPTION - ONSET
ONSET: ON-GOING

## 2023-08-29 ASSESSMENT — PAIN SCALES - GENERAL
PAINLEVEL_OUTOF10: 5
PAINLEVEL_OUTOF10: 4
PAINLEVEL_OUTOF10: 3
PAINLEVEL_OUTOF10: 5
PAINLEVEL_OUTOF10: 0
PAINLEVEL_OUTOF10: 3
PAINLEVEL_OUTOF10: 8
PAINLEVEL_OUTOF10: 0
PAINLEVEL_OUTOF10: 4
PAINLEVEL_OUTOF10: 5
PAINLEVEL_OUTOF10: 0
PAINLEVEL_OUTOF10: 3
PAINLEVEL_OUTOF10: 0
PAINLEVEL_OUTOF10: 3

## 2023-08-29 ASSESSMENT — PAIN DESCRIPTION - LOCATION
LOCATION: LEG

## 2023-08-29 ASSESSMENT — PAIN DESCRIPTION - ORIENTATION
ORIENTATION: RIGHT
ORIENTATION: LEFT
ORIENTATION: RIGHT

## 2023-08-29 ASSESSMENT — PAIN DESCRIPTION - FREQUENCY
FREQUENCY: CONTINUOUS

## 2023-08-29 ASSESSMENT — PAIN DESCRIPTION - PAIN TYPE
TYPE: SURGICAL PAIN

## 2023-08-29 NOTE — CARE COORDINATION
SW met with patient, and left SNF choices for pt to choose from. SW will follow up in AM so referrals can be placed. The Plan for Transition of Care is related to the following treatment goals: To get home and get better    The Patient and/or patient representative Levy- patient was provided with a choice of provider and agrees   with the discharge plan. [x] Yes [] No    Freedom of choice list was provided with basic dialogue that supports the patient's individualized plan of care/goals, treatment preferences and shares the quality data associated with the providers.  [x] Yes [] No    Electronically signed by NAE Jones on 8/29/2023 at 4:27 PM    [facility], [address] [place] [address], [address] [place] [address] Social Work Case Management   Phone: [phone]  Fax: [phone]

## 2023-08-29 NOTE — DISCHARGE INSTR - COC
thickness:70690}  Daily Fluid Restriction: {CHP DME Yes amt example:592802504}  Last Modified Barium Swallow with Video (Video Swallowing Test): {Done Not Done TVHY:935276344}    Treatments at the Time of Hospital Discharge:   Respiratory Treatments: ***  Oxygen Therapy:  {Therapy; copd oxygen:31943}  Ventilator:    { CC Vent FUZT:036450518}    Rehab Therapies: {THERAPEUTIC INTERVENTION:6457427896}  Weight Bearing Status/Restrictions: { CC Weight Bearin}  Other Medical Equipment (for information only, NOT a DME order):  {EQUIPMENT:480994408}  Other Treatments: ***    Heart Failure Instructions for Daily Management  Patient was treated for acute on chronic combined systolic and diastolic heart failure. he  will require the following:    Please weigh daily on the same scale and approximately the same time of day. Report weight gain of 3 pounds/day or 5 pounds/week to : arcadio RODRIGUEZ, Gian Paul, [address] [facility], and [facility] [phone]. Please use hospital discharge weight as baseline reference. Please monitor for signs and symptoms of and report to MD:  Worsening Heart Failure: sudden weight gain, shortness of breath, lower extremity or general edema/swelling, abdominal bloating/swelling, inability to lie flat, intolerance to usual activity, or cough (especially at night). Report these finding even if no increase in weight. Dehydration:  having difficulty or a decrease in urination, dizziness, worsening fatigue, or new onset/worsening of generalized weakness. Please continue a LOW SODIUM diet and LIMIT fluid intake to 48 - 64 ounces ( 1.5 - 2 liters) per day. Call Gian Paul [phone], arcadio RODRIGUEZ, and [facility] [phone] and/or [address] @ [phone] with any questions or concerns. Please continue heart failure education to patient and family/support system.   See After Visit Summary for hospital follow up

## 2023-08-30 LAB
ANISOCYTOSIS BLD QL SMEAR: ABNORMAL
BASOPHILS # BLD: 0 K/UL (ref 0–0.2)
BASOPHILS NFR BLD: 0 %
DEPRECATED RDW RBC AUTO: 14.9 % (ref 12.4–15.4)
EOSINOPHIL # BLD: 0 K/UL (ref 0–0.6)
EOSINOPHIL NFR BLD: 0 %
GLUCOSE BLD-MCNC: 138 MG/DL (ref 70–99)
GLUCOSE BLD-MCNC: 151 MG/DL (ref 70–99)
GLUCOSE BLD-MCNC: 190 MG/DL (ref 70–99)
GLUCOSE BLD-MCNC: 240 MG/DL (ref 70–99)
HCT VFR BLD AUTO: 21.5 % (ref 40.5–52.5)
HGB BLD-MCNC: 7 G/DL (ref 13.5–17.5)
HYPOCHROMIA BLD QL SMEAR: ABNORMAL
LYMPHOCYTES # BLD: 1.4 K/UL (ref 1–5.1)
LYMPHOCYTES NFR BLD: 8 %
MCH RBC QN AUTO: 28.3 PG (ref 26–34)
MCHC RBC AUTO-ENTMCNC: 32.6 G/DL (ref 31–36)
MCV RBC AUTO: 86.8 FL (ref 80–100)
MONOCYTES # BLD: 2 K/UL (ref 0–1.3)
MONOCYTES NFR BLD: 11 %
NEUTROPHILS # BLD: 14.5 K/UL (ref 1.7–7.7)
NEUTROPHILS NFR BLD: 79 %
NEUTS BAND NFR BLD MANUAL: 2 % (ref 0–7)
PATH INTERP BLD-IMP: YES
PERFORMED ON: ABNORMAL
PLATELET # BLD AUTO: 324 K/UL (ref 135–450)
PLATELET BLD QL SMEAR: ADEQUATE
PMV BLD AUTO: 8.2 FL (ref 5–10.5)
POIKILOCYTOSIS BLD QL SMEAR: ABNORMAL
POLYCHROMASIA BLD QL SMEAR: ABNORMAL
RBC # BLD AUTO: 2.47 M/UL (ref 4.2–5.9)
SLIDE REVIEW: ABNORMAL
TARGETS BLD QL SMEAR: ABNORMAL
WBC # BLD AUTO: 17.9 K/UL (ref 4–11)

## 2023-08-30 PROCEDURE — 2580000003 HC RX 258: Performed by: STUDENT IN AN ORGANIZED HEALTH CARE EDUCATION/TRAINING PROGRAM

## 2023-08-30 PROCEDURE — 6360000002 HC RX W HCPCS: Performed by: STUDENT IN AN ORGANIZED HEALTH CARE EDUCATION/TRAINING PROGRAM

## 2023-08-30 PROCEDURE — 94760 N-INVAS EAR/PLS OXIMETRY 1: CPT

## 2023-08-30 PROCEDURE — 36415 COLL VENOUS BLD VENIPUNCTURE: CPT

## 2023-08-30 PROCEDURE — 2060000000 HC ICU INTERMEDIATE R&B

## 2023-08-30 PROCEDURE — 6370000000 HC RX 637 (ALT 250 FOR IP): Performed by: STUDENT IN AN ORGANIZED HEALTH CARE EDUCATION/TRAINING PROGRAM

## 2023-08-30 PROCEDURE — 85025 COMPLETE CBC W/AUTO DIFF WBC: CPT

## 2023-08-30 PROCEDURE — 6370000000 HC RX 637 (ALT 250 FOR IP): Performed by: NURSE PRACTITIONER

## 2023-08-30 PROCEDURE — APPNB30 APP NON BILLABLE TIME 0-30 MINS: Performed by: NURSE PRACTITIONER

## 2023-08-30 PROCEDURE — 97530 THERAPEUTIC ACTIVITIES: CPT | Performed by: PHYSICAL THERAPIST

## 2023-08-30 PROCEDURE — 97530 THERAPEUTIC ACTIVITIES: CPT

## 2023-08-30 RX ADMIN — INSULIN LISPRO 7 UNITS: 100 INJECTION, SOLUTION INTRAVENOUS; SUBCUTANEOUS at 13:25

## 2023-08-30 RX ADMIN — PANTOPRAZOLE SODIUM 40 MG: 40 TABLET, DELAYED RELEASE ORAL at 06:52

## 2023-08-30 RX ADMIN — ATORVASTATIN CALCIUM 80 MG: 80 TABLET, FILM COATED ORAL at 20:33

## 2023-08-30 RX ADMIN — INSULIN GLARGINE 20 UNITS: 100 INJECTION, SOLUTION SUBCUTANEOUS at 20:52

## 2023-08-30 RX ADMIN — AMIODARONE HYDROCHLORIDE 200 MG: 200 TABLET ORAL at 09:21

## 2023-08-30 RX ADMIN — SODIUM CHLORIDE, PRESERVATIVE FREE 10 ML: 5 INJECTION INTRAVENOUS at 11:23

## 2023-08-30 RX ADMIN — ASPIRIN 81 MG: 81 TABLET, CHEWABLE ORAL at 09:21

## 2023-08-30 RX ADMIN — CEFEPIME 2000 MG: 2 INJECTION, POWDER, FOR SOLUTION INTRAVENOUS at 20:46

## 2023-08-30 RX ADMIN — SODIUM CHLORIDE: 9 INJECTION, SOLUTION INTRAVENOUS at 21:50

## 2023-08-30 RX ADMIN — LOSARTAN POTASSIUM 25 MG: 25 TABLET, FILM COATED ORAL at 09:21

## 2023-08-30 RX ADMIN — SODIUM CHLORIDE, PRESERVATIVE FREE 10 ML: 5 INJECTION INTRAVENOUS at 20:32

## 2023-08-30 RX ADMIN — VANCOMYCIN HYDROCHLORIDE 1500 MG: 1.5 INJECTION, POWDER, LYOPHILIZED, FOR SOLUTION INTRAVENOUS at 21:51

## 2023-08-30 RX ADMIN — HYDRALAZINE HYDROCHLORIDE 25 MG: 25 TABLET, FILM COATED ORAL at 13:25

## 2023-08-30 RX ADMIN — INSULIN LISPRO 7 UNITS: 100 INJECTION, SOLUTION INTRAVENOUS; SUBCUTANEOUS at 18:10

## 2023-08-30 RX ADMIN — INSULIN LISPRO 7 UNITS: 100 INJECTION, SOLUTION INTRAVENOUS; SUBCUTANEOUS at 09:21

## 2023-08-30 RX ADMIN — TORSEMIDE 40 MG: 20 TABLET ORAL at 09:21

## 2023-08-30 RX ADMIN — OXYCODONE HYDROCHLORIDE 5 MG: 5 TABLET ORAL at 06:59

## 2023-08-30 RX ADMIN — HYDRALAZINE HYDROCHLORIDE 25 MG: 25 TABLET, FILM COATED ORAL at 06:52

## 2023-08-30 RX ADMIN — OXYCODONE HYDROCHLORIDE 5 MG: 5 TABLET ORAL at 02:41

## 2023-08-30 RX ADMIN — SODIUM CHLORIDE: 9 INJECTION, SOLUTION INTRAVENOUS at 20:45

## 2023-08-30 ASSESSMENT — PAIN DESCRIPTION - ORIENTATION
ORIENTATION: RIGHT
ORIENTATION: RIGHT

## 2023-08-30 ASSESSMENT — PAIN - FUNCTIONAL ASSESSMENT
PAIN_FUNCTIONAL_ASSESSMENT: PREVENTS OR INTERFERES SOME ACTIVE ACTIVITIES AND ADLS
PAIN_FUNCTIONAL_ASSESSMENT: PREVENTS OR INTERFERES SOME ACTIVE ACTIVITIES AND ADLS

## 2023-08-30 ASSESSMENT — PAIN DESCRIPTION - DESCRIPTORS
DESCRIPTORS: ACHING;DISCOMFORT
DESCRIPTORS: ACHING;DISCOMFORT

## 2023-08-30 ASSESSMENT — PAIN SCALES - GENERAL
PAINLEVEL_OUTOF10: 4
PAINLEVEL_OUTOF10: 5
PAINLEVEL_OUTOF10: 5

## 2023-08-30 ASSESSMENT — PAIN DESCRIPTION - LOCATION
LOCATION: LEG
LOCATION: LEG

## 2023-08-30 NOTE — CARE COORDINATION
SW met with pt this am, and discussed SNFs at bedside. Pt gave zip code locations, and was okay with SW placing blank referrals. SW placed referrals to: [facility]  [address] [facility]    The Plan for Transition of Care is related to the following treatment goals: To get better and get out    The Patient and/or patient representative Levy- patient was provided with a choice of provider and agrees   with the discharge plan. [x] Yes [] No    Freedom of choice list was provided with basic dialogue that supports the patient's individualized plan of care/goals, treatment preferences and shares the quality data associated with the providers.  [x] Yes [] No    Electronically signed by NAE Navarrete on 8/30/2023 at 9:51 AM

## 2023-08-31 LAB
ANION GAP SERPL CALCULATED.3IONS-SCNC: 9 MMOL/L (ref 3–16)
BUN SERPL-MCNC: 15 MG/DL (ref 7–20)
CALCIUM SERPL-MCNC: 8.1 MG/DL (ref 8.3–10.6)
CHLORIDE SERPL-SCNC: 95 MMOL/L (ref 99–110)
CO2 SERPL-SCNC: 29 MMOL/L (ref 21–32)
CREAT SERPL-MCNC: 1 MG/DL (ref 0.9–1.3)
GFR SERPLBLD CREATININE-BSD FMLA CKD-EPI: >60 ML/MIN/{1.73_M2}
GLUCOSE BLD-MCNC: 118 MG/DL (ref 70–99)
GLUCOSE BLD-MCNC: 160 MG/DL (ref 70–99)
GLUCOSE BLD-MCNC: 198 MG/DL (ref 70–99)
GLUCOSE BLD-MCNC: 203 MG/DL (ref 70–99)
GLUCOSE SERPL-MCNC: 117 MG/DL (ref 70–99)
NT-PROBNP SERPL-MCNC: 2476 PG/ML (ref 0–124)
PATH INTERP BLD-IMP: NORMAL
PERFORMED ON: ABNORMAL
POTASSIUM SERPL-SCNC: 3.4 MMOL/L (ref 3.5–5.1)
SODIUM SERPL-SCNC: 133 MMOL/L (ref 136–145)

## 2023-08-31 PROCEDURE — 94760 N-INVAS EAR/PLS OXIMETRY 1: CPT

## 2023-08-31 PROCEDURE — 6370000000 HC RX 637 (ALT 250 FOR IP): Performed by: STUDENT IN AN ORGANIZED HEALTH CARE EDUCATION/TRAINING PROGRAM

## 2023-08-31 PROCEDURE — 83880 ASSAY OF NATRIURETIC PEPTIDE: CPT

## 2023-08-31 PROCEDURE — 97530 THERAPEUTIC ACTIVITIES: CPT

## 2023-08-31 PROCEDURE — 97530 THERAPEUTIC ACTIVITIES: CPT | Performed by: PHYSICAL THERAPIST

## 2023-08-31 PROCEDURE — 36415 COLL VENOUS BLD VENIPUNCTURE: CPT

## 2023-08-31 PROCEDURE — 2060000000 HC ICU INTERMEDIATE R&B

## 2023-08-31 PROCEDURE — 99223 1ST HOSP IP/OBS HIGH 75: CPT | Performed by: INTERNAL MEDICINE

## 2023-08-31 PROCEDURE — 6360000002 HC RX W HCPCS: Performed by: STUDENT IN AN ORGANIZED HEALTH CARE EDUCATION/TRAINING PROGRAM

## 2023-08-31 PROCEDURE — 2580000003 HC RX 258: Performed by: STUDENT IN AN ORGANIZED HEALTH CARE EDUCATION/TRAINING PROGRAM

## 2023-08-31 PROCEDURE — 80048 BASIC METABOLIC PNL TOTAL CA: CPT

## 2023-08-31 PROCEDURE — 6370000000 HC RX 637 (ALT 250 FOR IP): Performed by: NURSE PRACTITIONER

## 2023-08-31 PROCEDURE — 6360000002 HC RX W HCPCS: Performed by: FAMILY MEDICINE

## 2023-08-31 PROCEDURE — 2580000003 HC RX 258: Performed by: FAMILY MEDICINE

## 2023-08-31 PROCEDURE — APPNB30 APP NON BILLABLE TIME 0-30 MINS: Performed by: NURSE PRACTITIONER

## 2023-08-31 RX ADMIN — OXYCODONE HYDROCHLORIDE 5 MG: 5 TABLET ORAL at 20:30

## 2023-08-31 RX ADMIN — TORSEMIDE 40 MG: 20 TABLET ORAL at 08:28

## 2023-08-31 RX ADMIN — VANCOMYCIN HYDROCHLORIDE 750 MG: 750 INJECTION, POWDER, LYOPHILIZED, FOR SOLUTION INTRAVENOUS at 10:56

## 2023-08-31 RX ADMIN — CEFEPIME 2000 MG: 2 INJECTION, POWDER, FOR SOLUTION INTRAVENOUS at 20:38

## 2023-08-31 RX ADMIN — OXYCODONE HYDROCHLORIDE 5 MG: 5 TABLET ORAL at 04:50

## 2023-08-31 RX ADMIN — LOSARTAN POTASSIUM 25 MG: 25 TABLET, FILM COATED ORAL at 08:28

## 2023-08-31 RX ADMIN — HYDRALAZINE HYDROCHLORIDE 25 MG: 25 TABLET, FILM COATED ORAL at 14:44

## 2023-08-31 RX ADMIN — PANTOPRAZOLE SODIUM 40 MG: 40 TABLET, DELAYED RELEASE ORAL at 06:27

## 2023-08-31 RX ADMIN — CEFEPIME 2000 MG: 2 INJECTION, POWDER, FOR SOLUTION INTRAVENOUS at 04:46

## 2023-08-31 RX ADMIN — INSULIN GLARGINE 20 UNITS: 100 INJECTION, SOLUTION SUBCUTANEOUS at 20:31

## 2023-08-31 RX ADMIN — POTASSIUM CHLORIDE 40 MEQ: 1500 TABLET, EXTENDED RELEASE ORAL at 06:48

## 2023-08-31 RX ADMIN — INSULIN LISPRO 7 UNITS: 100 INJECTION, SOLUTION INTRAVENOUS; SUBCUTANEOUS at 12:24

## 2023-08-31 RX ADMIN — ASPIRIN 81 MG: 81 TABLET, CHEWABLE ORAL at 08:28

## 2023-08-31 RX ADMIN — CEFEPIME 2000 MG: 2 INJECTION, POWDER, FOR SOLUTION INTRAVENOUS at 12:24

## 2023-08-31 RX ADMIN — SODIUM CHLORIDE, PRESERVATIVE FREE 10 ML: 5 INJECTION INTRAVENOUS at 08:28

## 2023-08-31 RX ADMIN — ATORVASTATIN CALCIUM 80 MG: 80 TABLET, FILM COATED ORAL at 20:31

## 2023-08-31 RX ADMIN — INSULIN LISPRO 7 UNITS: 100 INJECTION, SOLUTION INTRAVENOUS; SUBCUTANEOUS at 08:28

## 2023-08-31 RX ADMIN — INSULIN LISPRO 7 UNITS: 100 INJECTION, SOLUTION INTRAVENOUS; SUBCUTANEOUS at 17:41

## 2023-08-31 RX ADMIN — SODIUM CHLORIDE, PRESERVATIVE FREE 10 ML: 5 INJECTION INTRAVENOUS at 20:38

## 2023-08-31 RX ADMIN — VANCOMYCIN HYDROCHLORIDE 750 MG: 750 INJECTION, POWDER, LYOPHILIZED, FOR SOLUTION INTRAVENOUS at 17:42

## 2023-08-31 RX ADMIN — AMIODARONE HYDROCHLORIDE 200 MG: 200 TABLET ORAL at 08:27

## 2023-08-31 ASSESSMENT — PAIN DESCRIPTION - ORIENTATION: ORIENTATION: RIGHT

## 2023-08-31 ASSESSMENT — PAIN DESCRIPTION - LOCATION: LOCATION: LEG

## 2023-08-31 ASSESSMENT — PAIN SCALES - GENERAL: PAINLEVEL_OUTOF10: 4

## 2023-08-31 NOTE — CARE COORDINATION
SW met with patient this am, and he decided to go with [facility]. Precert was started. SW will wait to hear back on precert. Chart review completed, nursing rounds completed, and pt has had fever a couple days, looking like 1-2 days dc. SW will follow.      Adia Brink LMSW, [address] [facility] Social Work Case Management   Phone: [phone]  Fax: [phone]

## 2023-09-01 PROBLEM — I73.9 PVD (PERIPHERAL VASCULAR DISEASE) (HCC): Status: ACTIVE | Noted: 2023-09-01

## 2023-09-01 PROBLEM — I50.9 ACUTE ON CHRONIC CONGESTIVE HEART FAILURE (HCC): Status: ACTIVE | Noted: 2023-09-01

## 2023-09-01 PROBLEM — L03.115 CELLULITIS OF RIGHT LOWER EXTREMITY: Status: ACTIVE | Noted: 2023-09-01

## 2023-09-01 PROBLEM — R60.0 BILATERAL LOWER EXTREMITY EDEMA: Status: ACTIVE | Noted: 2023-09-01

## 2023-09-01 PROBLEM — S80.821A BLISTER OF RIGHT LEG: Status: ACTIVE | Noted: 2023-09-01

## 2023-09-01 LAB
ANION GAP SERPL CALCULATED.3IONS-SCNC: 11 MMOL/L (ref 3–16)
BASOPHILS # BLD: 0.1 K/UL (ref 0–0.2)
BASOPHILS NFR BLD: 0.7 %
BUN SERPL-MCNC: 15 MG/DL (ref 7–20)
CALCIUM SERPL-MCNC: 8.1 MG/DL (ref 8.3–10.6)
CHLORIDE SERPL-SCNC: 97 MMOL/L (ref 99–110)
CK SERPL-CCNC: 1484 U/L (ref 39–308)
CO2 SERPL-SCNC: 24 MMOL/L (ref 21–32)
CREAT SERPL-MCNC: 0.9 MG/DL (ref 0.9–1.3)
CREAT SERPL-MCNC: 1 MG/DL (ref 0.9–1.3)
DEPRECATED RDW RBC AUTO: 15.2 % (ref 12.4–15.4)
EOSINOPHIL # BLD: 0.4 K/UL (ref 0–0.6)
EOSINOPHIL NFR BLD: 2.4 %
EST. AVERAGE GLUCOSE BLD GHB EST-MCNC: 99.7 MG/DL
GFR SERPLBLD CREATININE-BSD FMLA CKD-EPI: >60 ML/MIN/{1.73_M2}
GFR SERPLBLD CREATININE-BSD FMLA CKD-EPI: >60 ML/MIN/{1.73_M2}
GLUCOSE BLD-MCNC: 200 MG/DL (ref 70–99)
GLUCOSE BLD-MCNC: 203 MG/DL (ref 70–99)
GLUCOSE BLD-MCNC: 208 MG/DL (ref 70–99)
GLUCOSE BLD-MCNC: 286 MG/DL (ref 70–99)
GLUCOSE SERPL-MCNC: 189 MG/DL (ref 70–99)
HBA1C MFR BLD: 5.1 %
HCT VFR BLD AUTO: 22 % (ref 40.5–52.5)
HGB BLD-MCNC: 7.2 G/DL (ref 13.5–17.5)
LYMPHOCYTES # BLD: 1.3 K/UL (ref 1–5.1)
LYMPHOCYTES NFR BLD: 8.5 %
MCH RBC QN AUTO: 28.5 PG (ref 26–34)
MCHC RBC AUTO-ENTMCNC: 32.8 G/DL (ref 31–36)
MCV RBC AUTO: 86.7 FL (ref 80–100)
MONOCYTES # BLD: 2.4 K/UL (ref 0–1.3)
MONOCYTES NFR BLD: 15.6 %
NEUTROPHILS # BLD: 11 K/UL (ref 1.7–7.7)
NEUTROPHILS NFR BLD: 72.8 %
PATH INTERP BLD-IMP: NO
PERFORMED ON: ABNORMAL
PLATELET # BLD AUTO: 435 K/UL (ref 135–450)
PMV BLD AUTO: 8.2 FL (ref 5–10.5)
POTASSIUM SERPL-SCNC: 3.6 MMOL/L (ref 3.5–5.1)
PROCALCITONIN SERPL IA-MCNC: 0.24 NG/ML (ref 0–0.15)
RBC # BLD AUTO: 2.53 M/UL (ref 4.2–5.9)
SODIUM SERPL-SCNC: 132 MMOL/L (ref 136–145)
VANCOMYCIN SERPL-MCNC: 9.7 UG/ML
WBC # BLD AUTO: 15.1 K/UL (ref 4–11)

## 2023-09-01 PROCEDURE — 84145 PROCALCITONIN (PCT): CPT

## 2023-09-01 PROCEDURE — 97116 GAIT TRAINING THERAPY: CPT | Performed by: PHYSICAL THERAPIST

## 2023-09-01 PROCEDURE — 97530 THERAPEUTIC ACTIVITIES: CPT

## 2023-09-01 PROCEDURE — 80048 BASIC METABOLIC PNL TOTAL CA: CPT

## 2023-09-01 PROCEDURE — 82550 ASSAY OF CK (CPK): CPT

## 2023-09-01 PROCEDURE — 6360000002 HC RX W HCPCS: Performed by: FAMILY MEDICINE

## 2023-09-01 PROCEDURE — 6370000000 HC RX 637 (ALT 250 FOR IP): Performed by: STUDENT IN AN ORGANIZED HEALTH CARE EDUCATION/TRAINING PROGRAM

## 2023-09-01 PROCEDURE — 2060000000 HC ICU INTERMEDIATE R&B

## 2023-09-01 PROCEDURE — 85025 COMPLETE CBC W/AUTO DIFF WBC: CPT

## 2023-09-01 PROCEDURE — 2580000003 HC RX 258: Performed by: FAMILY MEDICINE

## 2023-09-01 PROCEDURE — 2580000003 HC RX 258: Performed by: STUDENT IN AN ORGANIZED HEALTH CARE EDUCATION/TRAINING PROGRAM

## 2023-09-01 PROCEDURE — 36415 COLL VENOUS BLD VENIPUNCTURE: CPT

## 2023-09-01 PROCEDURE — 80202 ASSAY OF VANCOMYCIN: CPT

## 2023-09-01 PROCEDURE — 6360000002 HC RX W HCPCS: Performed by: STUDENT IN AN ORGANIZED HEALTH CARE EDUCATION/TRAINING PROGRAM

## 2023-09-01 PROCEDURE — 97530 THERAPEUTIC ACTIVITIES: CPT | Performed by: PHYSICAL THERAPIST

## 2023-09-01 PROCEDURE — 94760 N-INVAS EAR/PLS OXIMETRY 1: CPT

## 2023-09-01 PROCEDURE — 6370000000 HC RX 637 (ALT 250 FOR IP): Performed by: NURSE PRACTITIONER

## 2023-09-01 PROCEDURE — 99232 SBSQ HOSP IP/OBS MODERATE 35: CPT | Performed by: INTERNAL MEDICINE

## 2023-09-01 PROCEDURE — 82565 ASSAY OF CREATININE: CPT

## 2023-09-01 PROCEDURE — 83036 HEMOGLOBIN GLYCOSYLATED A1C: CPT

## 2023-09-01 PROCEDURE — APPNB30 APP NON BILLABLE TIME 0-30 MINS: Performed by: NURSE PRACTITIONER

## 2023-09-01 RX ADMIN — VANCOMYCIN HYDROCHLORIDE 750 MG: 750 INJECTION, POWDER, LYOPHILIZED, FOR SOLUTION INTRAVENOUS at 11:01

## 2023-09-01 RX ADMIN — INSULIN LISPRO 7 UNITS: 100 INJECTION, SOLUTION INTRAVENOUS; SUBCUTANEOUS at 08:32

## 2023-09-01 RX ADMIN — VANCOMYCIN HYDROCHLORIDE 750 MG: 750 INJECTION, POWDER, LYOPHILIZED, FOR SOLUTION INTRAVENOUS at 02:43

## 2023-09-01 RX ADMIN — HYDRALAZINE HYDROCHLORIDE 25 MG: 25 TABLET, FILM COATED ORAL at 21:18

## 2023-09-01 RX ADMIN — OXYCODONE HYDROCHLORIDE 5 MG: 5 TABLET ORAL at 06:14

## 2023-09-01 RX ADMIN — CEFEPIME 2000 MG: 2 INJECTION, POWDER, FOR SOLUTION INTRAVENOUS at 21:22

## 2023-09-01 RX ADMIN — PANTOPRAZOLE SODIUM 40 MG: 40 TABLET, DELAYED RELEASE ORAL at 06:14

## 2023-09-01 RX ADMIN — LOSARTAN POTASSIUM 25 MG: 25 TABLET, FILM COATED ORAL at 08:32

## 2023-09-01 RX ADMIN — TORSEMIDE 40 MG: 20 TABLET ORAL at 08:32

## 2023-09-01 RX ADMIN — INSULIN GLARGINE 20 UNITS: 100 INJECTION, SOLUTION SUBCUTANEOUS at 21:18

## 2023-09-01 RX ADMIN — ATORVASTATIN CALCIUM 80 MG: 80 TABLET, FILM COATED ORAL at 21:18

## 2023-09-01 RX ADMIN — HYDRALAZINE HYDROCHLORIDE 25 MG: 25 TABLET, FILM COATED ORAL at 06:14

## 2023-09-01 RX ADMIN — CEFEPIME 2000 MG: 2 INJECTION, POWDER, FOR SOLUTION INTRAVENOUS at 12:45

## 2023-09-01 RX ADMIN — OXYCODONE HYDROCHLORIDE 5 MG: 5 TABLET ORAL at 14:53

## 2023-09-01 RX ADMIN — CEFEPIME 2000 MG: 2 INJECTION, POWDER, FOR SOLUTION INTRAVENOUS at 04:06

## 2023-09-01 RX ADMIN — OXYCODONE HYDROCHLORIDE 5 MG: 5 TABLET ORAL at 21:18

## 2023-09-01 RX ADMIN — ASPIRIN 81 MG: 81 TABLET, CHEWABLE ORAL at 08:32

## 2023-09-01 RX ADMIN — INSULIN LISPRO 7 UNITS: 100 INJECTION, SOLUTION INTRAVENOUS; SUBCUTANEOUS at 12:45

## 2023-09-01 RX ADMIN — HYDRALAZINE HYDROCHLORIDE 25 MG: 25 TABLET, FILM COATED ORAL at 14:53

## 2023-09-01 RX ADMIN — AMIODARONE HYDROCHLORIDE 200 MG: 200 TABLET ORAL at 08:32

## 2023-09-01 RX ADMIN — INSULIN LISPRO 7 UNITS: 100 INJECTION, SOLUTION INTRAVENOUS; SUBCUTANEOUS at 17:38

## 2023-09-01 RX ADMIN — VANCOMYCIN HYDROCHLORIDE 750 MG: 750 INJECTION, POWDER, LYOPHILIZED, FOR SOLUTION INTRAVENOUS at 17:38

## 2023-09-01 ASSESSMENT — PAIN - FUNCTIONAL ASSESSMENT: PAIN_FUNCTIONAL_ASSESSMENT: PREVENTS OR INTERFERES SOME ACTIVE ACTIVITIES AND ADLS

## 2023-09-01 ASSESSMENT — PAIN DESCRIPTION - ORIENTATION
ORIENTATION: RIGHT

## 2023-09-01 ASSESSMENT — PAIN SCALES - GENERAL
PAINLEVEL_OUTOF10: 5

## 2023-09-01 ASSESSMENT — PAIN DESCRIPTION - LOCATION
LOCATION: LEG

## 2023-09-01 ASSESSMENT — PAIN DESCRIPTION - DESCRIPTORS
DESCRIPTORS: SHOOTING
DESCRIPTORS: SHOOTING

## 2023-09-01 NOTE — CARE COORDINATION
SW completed chart review, nursing rounds completed. Pt has had fever the last couple days, and will likely be ready in 1-2days. SW confirmed with Sheri Salazar. That precert is good for pt to come when ready. Pt has caresource. CLYDE. Will follow.      Dimas Xiao LMSW, [address] [facility] Social Work Case Management   Phone: [phone]  Fax: [phone]

## 2023-09-02 LAB
ABO + RH BLD: NORMAL
ANION GAP SERPL CALCULATED.3IONS-SCNC: 9 MMOL/L (ref 3–16)
BASOPHILS # BLD: 0.1 K/UL (ref 0–0.2)
BASOPHILS NFR BLD: 0.7 %
BLD GP AB SCN SERPL QL: NORMAL
BLOOD BANK DISPENSE STATUS: NORMAL
BLOOD BANK PRODUCT CODE: NORMAL
BPU ID: NORMAL
BUN SERPL-MCNC: 14 MG/DL (ref 7–20)
CALCIUM SERPL-MCNC: 8 MG/DL (ref 8.3–10.6)
CHLORIDE SERPL-SCNC: 97 MMOL/L (ref 99–110)
CK SERPL-CCNC: 1038 U/L (ref 39–308)
CO2 SERPL-SCNC: 29 MMOL/L (ref 21–32)
CREAT SERPL-MCNC: 0.9 MG/DL (ref 0.9–1.3)
DEPRECATED RDW RBC AUTO: 15.2 % (ref 12.4–15.4)
DESCRIPTION BLOOD BANK: NORMAL
EOSINOPHIL # BLD: 0.3 K/UL (ref 0–0.6)
EOSINOPHIL NFR BLD: 1.7 %
FERRITIN SERPL IA-MCNC: 213.3 NG/ML (ref 30–400)
FOLATE SERPL-MCNC: 6.09 NG/ML (ref 4.78–24.2)
GFR SERPLBLD CREATININE-BSD FMLA CKD-EPI: >60 ML/MIN/{1.73_M2}
GLUCOSE BLD-MCNC: 193 MG/DL (ref 70–99)
GLUCOSE BLD-MCNC: 199 MG/DL (ref 70–99)
GLUCOSE BLD-MCNC: 232 MG/DL (ref 70–99)
GLUCOSE BLD-MCNC: 234 MG/DL (ref 70–99)
GLUCOSE SERPL-MCNC: 154 MG/DL (ref 70–99)
HCT VFR BLD AUTO: 23 % (ref 40.5–52.5)
HCT VFR BLD AUTO: 23.3 % (ref 40.5–52.5)
HGB BLD-MCNC: 7.5 G/DL (ref 13.5–17.5)
IMMATURE RETIC FRACT: 0.5 (ref 0.21–0.37)
IRON SATN MFR SERPL: 18 % (ref 20–50)
IRON SERPL-MCNC: 40 UG/DL (ref 59–158)
LYMPHOCYTES # BLD: 1.5 K/UL (ref 1–5.1)
LYMPHOCYTES NFR BLD: 9.7 %
MCH RBC QN AUTO: 28.1 PG (ref 26–34)
MCHC RBC AUTO-ENTMCNC: 32.4 G/DL (ref 31–36)
MCV RBC AUTO: 86.5 FL (ref 80–100)
MONOCYTES # BLD: 2.2 K/UL (ref 0–1.3)
MONOCYTES NFR BLD: 14 %
NEUTROPHILS # BLD: 11.8 K/UL (ref 1.7–7.7)
NEUTROPHILS NFR BLD: 73.9 %
PATH INTERP BLD-IMP: NO
PERFORMED ON: ABNORMAL
PLATELET # BLD AUTO: 529 K/UL (ref 135–450)
PMV BLD AUTO: 7.4 FL (ref 5–10.5)
POTASSIUM SERPL-SCNC: 3.6 MMOL/L (ref 3.5–5.1)
RBC # BLD AUTO: 2.65 M/UL (ref 4.2–5.9)
RETICS # AUTO: 0.16 M/UL
RETICS/RBC NFR AUTO: 5.84 % (ref 0.5–2.18)
SODIUM SERPL-SCNC: 135 MMOL/L (ref 136–145)
TIBC SERPL-MCNC: 217 UG/DL (ref 260–445)
VIT B12 SERPL-MCNC: 443 PG/ML (ref 211–911)
WBC # BLD AUTO: 16 K/UL (ref 4–11)

## 2023-09-02 PROCEDURE — 6370000000 HC RX 637 (ALT 250 FOR IP): Performed by: STUDENT IN AN ORGANIZED HEALTH CARE EDUCATION/TRAINING PROGRAM

## 2023-09-02 PROCEDURE — 82728 ASSAY OF FERRITIN: CPT

## 2023-09-02 PROCEDURE — P9016 RBC LEUKOCYTES REDUCED: HCPCS

## 2023-09-02 PROCEDURE — 6360000002 HC RX W HCPCS: Performed by: FAMILY MEDICINE

## 2023-09-02 PROCEDURE — 86900 BLOOD TYPING SEROLOGIC ABO: CPT

## 2023-09-02 PROCEDURE — 82550 ASSAY OF CK (CPK): CPT

## 2023-09-02 PROCEDURE — 86901 BLOOD TYPING SEROLOGIC RH(D): CPT

## 2023-09-02 PROCEDURE — 6370000000 HC RX 637 (ALT 250 FOR IP): Performed by: INTERNAL MEDICINE

## 2023-09-02 PROCEDURE — 82607 VITAMIN B-12: CPT

## 2023-09-02 PROCEDURE — 6360000002 HC RX W HCPCS: Performed by: STUDENT IN AN ORGANIZED HEALTH CARE EDUCATION/TRAINING PROGRAM

## 2023-09-02 PROCEDURE — 85045 AUTOMATED RETICULOCYTE COUNT: CPT

## 2023-09-02 PROCEDURE — 83540 ASSAY OF IRON: CPT

## 2023-09-02 PROCEDURE — 83550 IRON BINDING TEST: CPT

## 2023-09-02 PROCEDURE — 6370000000 HC RX 637 (ALT 250 FOR IP): Performed by: NURSE PRACTITIONER

## 2023-09-02 PROCEDURE — 86923 COMPATIBILITY TEST ELECTRIC: CPT

## 2023-09-02 PROCEDURE — 86850 RBC ANTIBODY SCREEN: CPT

## 2023-09-02 PROCEDURE — 36415 COLL VENOUS BLD VENIPUNCTURE: CPT

## 2023-09-02 PROCEDURE — 2580000003 HC RX 258: Performed by: STUDENT IN AN ORGANIZED HEALTH CARE EDUCATION/TRAINING PROGRAM

## 2023-09-02 PROCEDURE — 2580000003 HC RX 258: Performed by: FAMILY MEDICINE

## 2023-09-02 PROCEDURE — 85025 COMPLETE CBC W/AUTO DIFF WBC: CPT

## 2023-09-02 PROCEDURE — 2060000000 HC ICU INTERMEDIATE R&B

## 2023-09-02 PROCEDURE — 80048 BASIC METABOLIC PNL TOTAL CA: CPT

## 2023-09-02 PROCEDURE — 82746 ASSAY OF FOLIC ACID SERUM: CPT

## 2023-09-02 PROCEDURE — 36430 TRANSFUSION BLD/BLD COMPNT: CPT

## 2023-09-02 RX ORDER — FERROUS SULFATE TAB EC 324 MG (65 MG FE EQUIVALENT) 324 (65 FE) MG
324 TABLET DELAYED RESPONSE ORAL
Status: DISCONTINUED | OUTPATIENT
Start: 2023-09-02 | End: 2023-09-03 | Stop reason: HOSPADM

## 2023-09-02 RX ORDER — SODIUM CHLORIDE 9 MG/ML
INJECTION, SOLUTION INTRAVENOUS PRN
Status: DISCONTINUED | OUTPATIENT
Start: 2023-09-02 | End: 2023-09-03 | Stop reason: HOSPADM

## 2023-09-02 RX ORDER — OXYCODONE HYDROCHLORIDE 10 MG/1
10 TABLET ORAL EVERY 6 HOURS PRN
Qty: 20 TABLET | Refills: 0 | Status: SHIPPED | OUTPATIENT
Start: 2023-09-02 | End: 2023-09-09

## 2023-09-02 RX ORDER — FUROSEMIDE 10 MG/ML
20 INJECTION INTRAMUSCULAR; INTRAVENOUS SEE ADMIN INSTRUCTIONS
Status: DISCONTINUED | OUTPATIENT
Start: 2023-09-02 | End: 2023-09-03 | Stop reason: HOSPADM

## 2023-09-02 RX ORDER — AMOXICILLIN AND CLAVULANATE POTASSIUM 875; 125 MG/1; MG/1
1 TABLET, FILM COATED ORAL EVERY 12 HOURS SCHEDULED
Qty: 19 TABLET | Refills: 0 | Status: SHIPPED | OUTPATIENT
Start: 2023-09-02 | End: 2023-09-12

## 2023-09-02 RX ORDER — FERROUS SULFATE TAB EC 324 MG (65 MG FE EQUIVALENT) 324 (65 FE) MG
324 TABLET DELAYED RESPONSE ORAL
Qty: 30 TABLET | Refills: 0 | Status: SHIPPED | OUTPATIENT
Start: 2023-09-02

## 2023-09-02 RX ORDER — POTASSIUM CHLORIDE 20 MEQ/1
40 TABLET, EXTENDED RELEASE ORAL ONCE
Status: COMPLETED | OUTPATIENT
Start: 2023-09-02 | End: 2023-09-02

## 2023-09-02 RX ORDER — AMOXICILLIN AND CLAVULANATE POTASSIUM 875; 125 MG/1; MG/1
1 TABLET, FILM COATED ORAL EVERY 12 HOURS SCHEDULED
Status: DISCONTINUED | OUTPATIENT
Start: 2023-09-02 | End: 2023-09-03 | Stop reason: HOSPADM

## 2023-09-02 RX ADMIN — AMOXICILLIN AND CLAVULANATE POTASSIUM 1 TABLET: 875; 125 TABLET, FILM COATED ORAL at 10:15

## 2023-09-02 RX ADMIN — OXYCODONE HYDROCHLORIDE 5 MG: 5 TABLET ORAL at 20:35

## 2023-09-02 RX ADMIN — SODIUM CHLORIDE, PRESERVATIVE FREE 10 ML: 5 INJECTION INTRAVENOUS at 13:12

## 2023-09-02 RX ADMIN — ATORVASTATIN CALCIUM 80 MG: 80 TABLET, FILM COATED ORAL at 20:35

## 2023-09-02 RX ADMIN — SODIUM CHLORIDE, PRESERVATIVE FREE 10 ML: 5 INJECTION INTRAVENOUS at 13:11

## 2023-09-02 RX ADMIN — AMOXICILLIN AND CLAVULANATE POTASSIUM 1 TABLET: 875; 125 TABLET, FILM COATED ORAL at 20:35

## 2023-09-02 RX ADMIN — APIXABAN 5 MG: 5 TABLET, FILM COATED ORAL at 15:21

## 2023-09-02 RX ADMIN — TORSEMIDE 40 MG: 20 TABLET ORAL at 10:07

## 2023-09-02 RX ADMIN — HYDRALAZINE HYDROCHLORIDE 25 MG: 25 TABLET, FILM COATED ORAL at 05:09

## 2023-09-02 RX ADMIN — SODIUM CHLORIDE, PRESERVATIVE FREE 10 ML: 5 INJECTION INTRAVENOUS at 20:36

## 2023-09-02 RX ADMIN — INSULIN LISPRO 7 UNITS: 100 INJECTION, SOLUTION INTRAVENOUS; SUBCUTANEOUS at 18:15

## 2023-09-02 RX ADMIN — APIXABAN 5 MG: 5 TABLET, FILM COATED ORAL at 20:35

## 2023-09-02 RX ADMIN — INSULIN LISPRO 7 UNITS: 100 INJECTION, SOLUTION INTRAVENOUS; SUBCUTANEOUS at 10:09

## 2023-09-02 RX ADMIN — INSULIN GLARGINE 20 UNITS: 100 INJECTION, SOLUTION SUBCUTANEOUS at 20:37

## 2023-09-02 RX ADMIN — POTASSIUM CHLORIDE 40 MEQ: 1500 TABLET, EXTENDED RELEASE ORAL at 15:21

## 2023-09-02 RX ADMIN — AMIODARONE HYDROCHLORIDE 200 MG: 200 TABLET ORAL at 10:07

## 2023-09-02 RX ADMIN — ASPIRIN 81 MG: 81 TABLET, CHEWABLE ORAL at 10:07

## 2023-09-02 RX ADMIN — LOSARTAN POTASSIUM 25 MG: 25 TABLET, FILM COATED ORAL at 10:08

## 2023-09-02 RX ADMIN — Medication 3 MG: at 20:37

## 2023-09-02 RX ADMIN — OXYCODONE HYDROCHLORIDE 5 MG: 5 TABLET ORAL at 05:07

## 2023-09-02 RX ADMIN — CEFEPIME 2000 MG: 2 INJECTION, POWDER, FOR SOLUTION INTRAVENOUS at 04:07

## 2023-09-02 RX ADMIN — HYDRALAZINE HYDROCHLORIDE 25 MG: 25 TABLET, FILM COATED ORAL at 13:10

## 2023-09-02 RX ADMIN — PANTOPRAZOLE SODIUM 40 MG: 40 TABLET, DELAYED RELEASE ORAL at 10:15

## 2023-09-02 RX ADMIN — VANCOMYCIN HYDROCHLORIDE 750 MG: 750 INJECTION, POWDER, LYOPHILIZED, FOR SOLUTION INTRAVENOUS at 02:03

## 2023-09-02 RX ADMIN — INSULIN LISPRO 7 UNITS: 100 INJECTION, SOLUTION INTRAVENOUS; SUBCUTANEOUS at 13:13

## 2023-09-02 RX ADMIN — OXYCODONE HYDROCHLORIDE 5 MG: 5 TABLET ORAL at 16:15

## 2023-09-02 RX ADMIN — FERROUS SULFATE TAB EC 324 MG (65 MG FE EQUIVALENT) 324 MG: 324 (65 FE) TABLET DELAYED RESPONSE at 13:10

## 2023-09-02 ASSESSMENT — PAIN DESCRIPTION - FREQUENCY
FREQUENCY: CONTINUOUS
FREQUENCY: CONTINUOUS

## 2023-09-02 ASSESSMENT — PAIN DESCRIPTION - DESCRIPTORS
DESCRIPTORS: ACHING
DESCRIPTORS: ACHING
DESCRIPTORS: SHOOTING

## 2023-09-02 ASSESSMENT — PAIN DESCRIPTION - PAIN TYPE
TYPE: SURGICAL PAIN
TYPE: SURGICAL PAIN

## 2023-09-02 ASSESSMENT — PAIN DESCRIPTION - LOCATION
LOCATION: LEG

## 2023-09-02 ASSESSMENT — PAIN DESCRIPTION - ORIENTATION
ORIENTATION: RIGHT

## 2023-09-02 ASSESSMENT — PAIN SCALES - WONG BAKER
WONGBAKER_NUMERICALRESPONSE: 0

## 2023-09-02 ASSESSMENT — PAIN SCALES - GENERAL
PAINLEVEL_OUTOF10: 0
PAINLEVEL_OUTOF10: 4
PAINLEVEL_OUTOF10: 5
PAINLEVEL_OUTOF10: 3
PAINLEVEL_OUTOF10: 5
PAINLEVEL_OUTOF10: 5

## 2023-09-02 ASSESSMENT — PAIN - FUNCTIONAL ASSESSMENT
PAIN_FUNCTIONAL_ASSESSMENT: PREVENTS OR INTERFERES SOME ACTIVE ACTIVITIES AND ADLS

## 2023-09-02 ASSESSMENT — PAIN DESCRIPTION - ONSET
ONSET: ON-GOING
ONSET: ON-GOING

## 2023-09-02 NOTE — CONSENT
Informed Consent for Blood Component Transfusion Note    I have discussed with the patient the rationale for blood component transfusion; its benefits in treating or preventing fatigue, organ damage, or death; and its risk which includes mild transfusion reactions, rare risk of blood borne infection, or more serious but rare reactions. I have discussed the alternatives to transfusion, including the risk and consequences of not receiving transfusion. The patient had an opportunity to ask questions and had agreed to proceed with transfusion of blood components.     Electronically signed by Roel Rosario MD on 9/2/23 at 2:03 PM EDT

## 2023-09-03 VITALS
SYSTOLIC BLOOD PRESSURE: 114 MMHG | RESPIRATION RATE: 15 BRPM | HEIGHT: 73 IN | OXYGEN SATURATION: 100 % | BODY MASS INDEX: 39.44 KG/M2 | TEMPERATURE: 98.4 F | HEART RATE: 91 BPM | DIASTOLIC BLOOD PRESSURE: 72 MMHG | WEIGHT: 297.62 LBS

## 2023-09-03 LAB
ANION GAP SERPL CALCULATED.3IONS-SCNC: 9 MMOL/L (ref 3–16)
BASOPHILS # BLD: 0.1 K/UL (ref 0–0.2)
BASOPHILS NFR BLD: 0.7 %
BUN SERPL-MCNC: 13 MG/DL (ref 7–20)
CALCIUM SERPL-MCNC: 8 MG/DL (ref 8.3–10.6)
CHLORIDE SERPL-SCNC: 97 MMOL/L (ref 99–110)
CO2 SERPL-SCNC: 27 MMOL/L (ref 21–32)
CREAT SERPL-MCNC: 0.9 MG/DL (ref 0.9–1.3)
DEPRECATED RDW RBC AUTO: 15 % (ref 12.4–15.4)
EOSINOPHIL # BLD: 0.4 K/UL (ref 0–0.6)
EOSINOPHIL NFR BLD: 3.1 %
GFR SERPLBLD CREATININE-BSD FMLA CKD-EPI: >60 ML/MIN/{1.73_M2}
GLUCOSE BLD-MCNC: 173 MG/DL (ref 70–99)
GLUCOSE BLD-MCNC: 211 MG/DL (ref 70–99)
GLUCOSE SERPL-MCNC: 159 MG/DL (ref 70–99)
HCT VFR BLD AUTO: 23.6 % (ref 40.5–52.5)
HGB BLD-MCNC: 8 G/DL (ref 13.5–17.5)
LYMPHOCYTES # BLD: 1.3 K/UL (ref 1–5.1)
LYMPHOCYTES NFR BLD: 9.6 %
MCH RBC QN AUTO: 29 PG (ref 26–34)
MCHC RBC AUTO-ENTMCNC: 33.8 G/DL (ref 31–36)
MCV RBC AUTO: 85.7 FL (ref 80–100)
MONOCYTES # BLD: 1.7 K/UL (ref 0–1.3)
MONOCYTES NFR BLD: 12.4 %
NEUTROPHILS # BLD: 10.1 K/UL (ref 1.7–7.7)
NEUTROPHILS NFR BLD: 74.2 %
PATH INTERP BLD-IMP: NO
PERFORMED ON: ABNORMAL
PERFORMED ON: ABNORMAL
PLATELET # BLD AUTO: 505 K/UL (ref 135–450)
PMV BLD AUTO: 7.2 FL (ref 5–10.5)
POTASSIUM SERPL-SCNC: 3.7 MMOL/L (ref 3.5–5.1)
RBC # BLD AUTO: 2.76 M/UL (ref 4.2–5.9)
SODIUM SERPL-SCNC: 133 MMOL/L (ref 136–145)
WBC # BLD AUTO: 13.6 K/UL (ref 4–11)

## 2023-09-03 PROCEDURE — 94760 N-INVAS EAR/PLS OXIMETRY 1: CPT

## 2023-09-03 PROCEDURE — 6370000000 HC RX 637 (ALT 250 FOR IP): Performed by: STUDENT IN AN ORGANIZED HEALTH CARE EDUCATION/TRAINING PROGRAM

## 2023-09-03 PROCEDURE — 6370000000 HC RX 637 (ALT 250 FOR IP): Performed by: NURSE PRACTITIONER

## 2023-09-03 PROCEDURE — 36415 COLL VENOUS BLD VENIPUNCTURE: CPT

## 2023-09-03 PROCEDURE — 6370000000 HC RX 637 (ALT 250 FOR IP): Performed by: INTERNAL MEDICINE

## 2023-09-03 PROCEDURE — 80048 BASIC METABOLIC PNL TOTAL CA: CPT

## 2023-09-03 PROCEDURE — 2580000003 HC RX 258: Performed by: STUDENT IN AN ORGANIZED HEALTH CARE EDUCATION/TRAINING PROGRAM

## 2023-09-03 PROCEDURE — 85025 COMPLETE CBC W/AUTO DIFF WBC: CPT

## 2023-09-03 RX ADMIN — SODIUM CHLORIDE, PRESERVATIVE FREE 10 ML: 5 INJECTION INTRAVENOUS at 09:03

## 2023-09-03 RX ADMIN — SODIUM CHLORIDE, PRESERVATIVE FREE 10 ML: 5 INJECTION INTRAVENOUS at 09:04

## 2023-09-03 RX ADMIN — INSULIN LISPRO 7 UNITS: 100 INJECTION, SOLUTION INTRAVENOUS; SUBCUTANEOUS at 11:48

## 2023-09-03 RX ADMIN — OXYCODONE HYDROCHLORIDE 10 MG: 10 TABLET ORAL at 15:34

## 2023-09-03 RX ADMIN — INSULIN LISPRO 7 UNITS: 100 INJECTION, SOLUTION INTRAVENOUS; SUBCUTANEOUS at 09:04

## 2023-09-03 RX ADMIN — TORSEMIDE 40 MG: 20 TABLET ORAL at 09:02

## 2023-09-03 RX ADMIN — APIXABAN 5 MG: 5 TABLET, FILM COATED ORAL at 09:02

## 2023-09-03 RX ADMIN — LOSARTAN POTASSIUM 25 MG: 25 TABLET, FILM COATED ORAL at 09:02

## 2023-09-03 RX ADMIN — ASPIRIN 81 MG: 81 TABLET, CHEWABLE ORAL at 09:02

## 2023-09-03 RX ADMIN — AMIODARONE HYDROCHLORIDE 200 MG: 200 TABLET ORAL at 09:02

## 2023-09-03 RX ADMIN — OXYCODONE HYDROCHLORIDE 10 MG: 10 TABLET ORAL at 07:17

## 2023-09-03 RX ADMIN — AMOXICILLIN AND CLAVULANATE POTASSIUM 1 TABLET: 875; 125 TABLET, FILM COATED ORAL at 09:02

## 2023-09-03 RX ADMIN — HYDRALAZINE HYDROCHLORIDE 25 MG: 25 TABLET, FILM COATED ORAL at 07:17

## 2023-09-03 RX ADMIN — FERROUS SULFATE TAB EC 324 MG (65 MG FE EQUIVALENT) 324 MG: 324 (65 FE) TABLET DELAYED RESPONSE at 09:02

## 2023-09-03 RX ADMIN — PANTOPRAZOLE SODIUM 40 MG: 40 TABLET, DELAYED RELEASE ORAL at 07:17

## 2023-09-03 RX ADMIN — HYDRALAZINE HYDROCHLORIDE 25 MG: 25 TABLET, FILM COATED ORAL at 15:35

## 2023-09-03 ASSESSMENT — PAIN DESCRIPTION - LOCATION: LOCATION: LEG

## 2023-09-03 ASSESSMENT — PAIN DESCRIPTION - DESCRIPTORS: DESCRIPTORS: ACHING

## 2023-09-03 ASSESSMENT — PAIN SCALES - GENERAL: PAINLEVEL_OUTOF10: 7

## 2023-09-03 ASSESSMENT — PAIN DESCRIPTION - ORIENTATION: ORIENTATION: RIGHT

## 2023-09-03 ASSESSMENT — PAIN - FUNCTIONAL ASSESSMENT: PAIN_FUNCTIONAL_ASSESSMENT: PREVENTS OR INTERFERES SOME ACTIVE ACTIVITIES AND ADLS

## 2023-09-03 NOTE — PLAN OF CARE
Problem: Discharge Planning  Goal: Discharge to home or other facility with appropriate resources  8/23/2023 2117 by Mercedes Sagastume RN  Outcome: Progressing     Problem: Pain  Goal: Verbalizes/displays adequate comfort level or baseline comfort level  8/23/2023 2117 by Mercedes Sagastume RN  Outcome: Progressing     Problem: Safety - Adult  Goal: Free from fall injury  8/23/2023 2117 by Mercedes Sagastmue RN  Outcome: Progressing     Problem: Respiratory - Adult  Goal: Achieves optimal ventilation and oxygenation  8/23/2023 2117 by Mercedes Sagastume RN  Outcome: Progressing
Problem: Discharge Planning  Goal: Discharge to home or other facility with appropriate resources  8/24/2023 2040 by Dionicio Espinosa RN  Outcome: Progressing     Problem: Pain  Goal: Verbalizes/displays adequate comfort level or baseline comfort level  8/24/2023 2040 by Dionicio Espinosa RN  Outcome: Progressing     Problem: Safety - Adult  Goal: Free from fall injury  8/24/2023 2040 by Dionicio Espinosa RN  Outcome: Progressing     Problem: Respiratory - Adult  Goal: Achieves optimal ventilation and oxygenation  8/24/2023 2040 by Dionicio Espinosa RN  Outcome: Progressing
Problem: Discharge Planning  Goal: Discharge to home or other facility with appropriate resources  8/26/2023 1121 by Zac Mena RN  Outcome: Progressing     Problem: Pain  Goal: Verbalizes/displays adequate comfort level or baseline comfort level  8/26/2023 1121 by Zac Mena RN  Outcome: Progressing     Problem: Safety - Adult  Goal: Free from fall injury  8/26/2023 1121 by Zac Mena RN  Outcome: Progressing     Problem: Respiratory - Adult  Goal: Achieves optimal ventilation and oxygenation  8/26/2023 1121 by Zac Mena RN  Outcome: Progressing     Problem: Cardiovascular - Adult  Goal: Maintains optimal cardiac output and hemodynamic stability  8/26/2023 1121 by Zac Mena RN  Outcome: Progressing     Problem: Metabolic/Fluid and Electrolytes - Adult  Goal: Electrolytes maintained within normal limits  8/26/2023 1121 by Zac Mena RN  Outcome: Progressing     Problem: Metabolic/Fluid and Electrolytes - Adult  Goal: Hemodynamic stability and optimal renal function maintained  8/26/2023 1121 by Zac Mena RN  Outcome: Progressing     Problem: Chronic Conditions and Co-morbidities  Goal: Patient's chronic conditions and co-morbidity symptoms are monitored and maintained or improved  8/26/2023 1121 by Zac Mena RN  Outcome: Progressing     Problem: Neurosensory - Adult  Goal: Achieves stable or improved neurological status  8/26/2023 1121 by Zac Mena RN  Outcome: Progressing     Problem: Neurosensory - Adult  Goal: Achieves maximal functionality and self care  8/26/2023 1121 by Zac Mena RN  Outcome: Progressing     Problem: Skin/Tissue Integrity - Adult  Goal: Skin integrity remains intact  8/26/2023 1121 by Zac Mena RN  Outcome: Progressing     Problem: Musculoskeletal - Adult  Goal: Return mobility to safest level of function  8/26/2023 1121 by Zac Mena RN  Outcome: Progressing     Problem: Musculoskeletal - Adult  Goal: Return ADL status to a safe level of
Problem: Discharge Planning  Goal: Discharge to home or other facility with appropriate resources  8/26/2023 2203 by Romeo Stewart RN  Outcome: Progressing     Problem: Pain  Goal: Verbalizes/displays adequate comfort level or baseline comfort level  8/26/2023 2203 by Romeo Stewart RN  Outcome: Progressing     Problem: Safety - Adult  Goal: Free from fall injury  8/26/2023 2203 by Romeo Stewart RN  Outcome: Progressing     Problem: Respiratory - Adult  Goal: Achieves optimal ventilation and oxygenation  8/26/2023 2203 by Romeo Stewart RN  Outcome: Progressing     Problem: Cardiovascular - Adult  Goal: Maintains optimal cardiac output and hemodynamic stability  8/26/2023 2203 by Romoe Stewart RN  Outcome: Progressing     Problem: Cardiovascular - Adult  Goal: Absence of cardiac dysrhythmias or at baseline  Outcome: Progressing     Problem: Metabolic/Fluid and Electrolytes - Adult  Goal: Electrolytes maintained within normal limits  8/26/2023 2203 by Romeo Stewart RN  Outcome: Progressing     Problem: Metabolic/Fluid and Electrolytes - Adult  Goal: Hemodynamic stability and optimal renal function maintained  8/26/2023 2203 by Romeo Stewart RN  Outcome: Progressing     Problem: Metabolic/Fluid and Electrolytes - Adult  Goal: Glucose maintained within prescribed range  Outcome: Progressing     Problem: Neurosensory - Adult  Goal: Achieves stable or improved neurological status  8/26/2023 2203 by Romeo Stewart RN  Outcome: Progressing     Problem: Neurosensory - Adult  Goal: Achieves maximal functionality and self care  8/26/2023 2203 by Romeo Stewart RN  Outcome: Progressing     Problem: Skin/Tissue Integrity - Adult  Goal: Skin integrity remains intact  8/26/2023 2203 by Romeo Stewart RN  Outcome: Progressing     Problem: Musculoskeletal - Adult  Goal: Return mobility to safest level of function  8/26/2023 2203 by
Problem: Discharge Planning  Goal: Discharge to home or other facility with appropriate resources  8/27/2023 1101 by Becca Barlow RN  Outcome: Progressing  Flowsheets (Taken 8/23/2023 0819 by Eduardo Bourgeois RN)  Discharge to home or other facility with appropriate resources:   Identify barriers to discharge with patient and caregiver   Arrange for needed discharge resources and transportation as appropriate   Refer to discharge planning if patient needs post-hospital services based on physician order or complex needs related to functional status, cognitive ability or social support system     Problem: Pain  Goal: Verbalizes/displays adequate comfort level or baseline comfort level  8/27/2023 1101 by Becca Barlow RN  Outcome: Progressing     Problem: Safety - Adult  Goal: Free from fall injury  8/27/2023 1101 by Becca Barlow RN  Outcome: Progressing     Problem: Respiratory - Adult  Goal: Achieves optimal ventilation and oxygenation  8/26/2023 2203 by Maritza Navarro, RN  Outcome: Progressing
Problem: Discharge Planning  Goal: Discharge to home or other facility with appropriate resources  8/29/2023 0209 by Everardo Canales RN  Outcome: Progressing  8/28/2023 1708 by Awilda Hernandez RN  Outcome: Progressing     Problem: Pain  Goal: Verbalizes/displays adequate comfort level or baseline comfort level  8/29/2023 0209 by Everardo Canales RN  Outcome: Progressing  8/28/2023 1708 by Awilda Hernandez RN  Outcome: Progressing     Problem: Safety - Adult  Goal: Free from fall injury  8/29/2023 0209 by Everardo Canales RN  Outcome: Progressing  8/28/2023 1708 by Awilda Hernandez RN  Outcome: Progressing     Problem: Respiratory - Adult  Goal: Achieves optimal ventilation and oxygenation  8/29/2023 0209 by Everardo Canales RN  Outcome: Progressing  8/28/2023 1708 by Awilda Hernandez RN  Outcome: Progressing     Problem: Cardiovascular - Adult  Goal: Maintains optimal cardiac output and hemodynamic stability  8/29/2023 0209 by Everardo Canales RN  Outcome: Progressing  8/28/2023 1708 by Awilda Hernandez RN  Outcome: Progressing  Goal: Absence of cardiac dysrhythmias or at baseline  8/29/2023 0209 by Everardo Canales RN  Outcome: Progressing  8/28/2023 1708 by Awilda Hernandez RN  Outcome: Progressing     Problem: Metabolic/Fluid and Electrolytes - Adult  Goal: Electrolytes maintained within normal limits  8/29/2023 0209 by Everardo Canales RN  Outcome: Progressing  8/28/2023 1708 by Awilda Hernandez RN  Outcome: Progressing  Goal: Hemodynamic stability and optimal renal function maintained  8/29/2023 0209 by Everardo Canales RN  Outcome: Progressing  8/28/2023 1708 by Awilda Hernandez RN  Outcome: Progressing  Goal: Glucose maintained within prescribed range  8/29/2023 0209 by Everardo Canales RN  Outcome: Progressing  8/28/2023 1708 by Awilda Hernandez RN  Outcome: Progressing     Problem: Chronic Conditions and Co-morbidities  Goal: Patient's chronic conditions and co-morbidity symptoms are monitored and maintained or
Problem: Discharge Planning  Goal: Discharge to home or other facility with appropriate resources  8/30/2023 1356 by Alanna Colunga RN  Outcome: Progressing     Problem: Pain  Goal: Verbalizes/displays adequate comfort level or baseline comfort level  8/30/2023 1356 by Alanna Colunga RN  Outcome: Progressing     Problem: Safety - Adult  Goal: Free from fall injury  8/30/2023 1356 by Alanna Colunga RN  Outcome: Progressing     Problem: Respiratory - Adult  Goal: Achieves optimal ventilation and oxygenation  8/30/2023 1356 by Alanna Colunga RN  Outcome: Progressing     Problem: Cardiovascular - Adult  Goal: Maintains optimal cardiac output and hemodynamic stability  8/30/2023 1356 by Alanna Colunga RN  Outcome: Progressing     Problem: Cardiovascular - Adult  Goal: Absence of cardiac dysrhythmias or at baseline  8/30/2023 1356 by Alanna Colunga RN  Outcome: Progressing     Problem: Metabolic/Fluid and Electrolytes - Adult  Goal: Electrolytes maintained within normal limits  8/30/2023 1356 by Alanna Colunga RN  Outcome: Progressing     Problem: Metabolic/Fluid and Electrolytes - Adult  Goal: Hemodynamic stability and optimal renal function maintained  8/30/2023 1356 by Alanna Colunga RN  Outcome: Progressing     Problem: Metabolic/Fluid and Electrolytes - Adult  Goal: Glucose maintained within prescribed range  8/30/2023 1356 by Alanna Colunga RN  Outcome: Progressing     Problem: Neurosensory - Adult  Goal: Achieves stable or improved neurological status  8/30/2023 1356 by Alanna Colunga RN  Outcome: Progressing     Problem: Neurosensory - Adult  Goal: Achieves maximal functionality and self care  8/30/2023 1356 by Alanna Colunga RN  Outcome: Progressing     Problem: Skin/Tissue Integrity - Adult  Goal: Skin integrity remains intact  8/30/2023 1356 by Alanna Colunga RN  Outcome: Progressing     Problem: Musculoskeletal - Adult  Goal: Return mobility to safest level of function  8/30/2023 1356 by Alanna Colunga RN  Outcome:
Problem: Discharge Planning  Goal: Discharge to home or other facility with appropriate resources  8/30/2023 2159 by Ariel Boland RN  Outcome: Progressing     Problem: Pain  Goal: Verbalizes/displays adequate comfort level or baseline comfort level  8/30/2023 2159 by Ariel Boland RN  Outcome: Progressing     Problem: Safety - Adult  Goal: Free from fall injury  8/30/2023 2159 by Ariel Boland RN  Outcome: Progressing     Problem: Respiratory - Adult  Goal: Achieves optimal ventilation and oxygenation  8/30/2023 2159 by Ariel Boland RN  Outcome: Progressing     Problem: Cardiovascular - Adult  Goal: Maintains optimal cardiac output and hemodynamic stability  8/30/2023 2159 by Ariel Boland RN  Outcome: Progressing     Problem: Cardiovascular - Adult  Goal: Absence of cardiac dysrhythmias or at baseline  8/30/2023 2159 by Ariel Boland RN  Outcome: Progressing     Problem: Metabolic/Fluid and Electrolytes - Adult  Goal: Electrolytes maintained within normal limits  8/30/2023 2159 by Ariel Boland RN  Outcome: Progressing     Problem: Metabolic/Fluid and Electrolytes - Adult  Goal: Hemodynamic stability and optimal renal function maintained  8/30/2023 2159 by Ariel Boland RN  Outcome: Progressing     Problem: Metabolic/Fluid and Electrolytes - Adult  Goal: Glucose maintained within prescribed range  8/30/2023 2159 by Ariel Boland RN  Outcome: Progressing     Problem: Neurosensory - Adult  Goal: Achieves stable or improved neurological status  8/30/2023 2159 by Ariel Boland RN  Outcome: Progressing     Problem: Neurosensory - Adult  Goal: Achieves maximal functionality and self care  8/30/2023 2159 by Ariel Boland RN  Outcome: Progressing     Problem: Skin/Tissue Integrity - Adult  Goal: Skin integrity remains intact  8/30/2023 2159 by Ariel Boland RN  Outcome: Progressing  Flowsheets (Taken 8/30/2023 2159)  Skin Integrity Remains Intact: Monitor for areas of
Problem: Discharge Planning  Goal: Discharge to home or other facility with appropriate resources  8/30/2023 2304 by Arlene August RN  Outcome: Progressing     Problem: Pain  Goal: Verbalizes/displays adequate comfort level or baseline comfort level  8/30/2023 2304 by Arlene August RN  Outcome: Progressing     Problem: Safety - Adult  Goal: Free from fall injury  8/30/2023 2304 by Arlene August RN  Outcome: Progressing     Problem: Respiratory - Adult  Goal: Achieves optimal ventilation and oxygenation  8/30/2023 2304 by Arlene August RN  Outcome: Progressing     Problem: Cardiovascular - Adult  Goal: Maintains optimal cardiac output and hemodynamic stability  8/30/2023 2304 by Arlene August RN  Outcome: Progressing     Problem: Cardiovascular - Adult  Goal: Absence of cardiac dysrhythmias or at baseline  8/30/2023 2304 by Arlene August RN  Outcome: Progressing     Problem: Metabolic/Fluid and Electrolytes - Adult  Goal: Electrolytes maintained within normal limits  8/30/2023 2304 by Arlene August RN  Outcome: Progressing     Problem: Metabolic/Fluid and Electrolytes - Adult  Goal: Hemodynamic stability and optimal renal function maintained  8/30/2023 2304 by Arlene August RN  Outcome: Progressing     Problem: Metabolic/Fluid and Electrolytes - Adult  Goal: Glucose maintained within prescribed range  8/30/2023 2304 by Arlene August RN  Outcome: Progressing     Problem: Chronic Conditions and Co-morbidities  Goal: Patient's chronic conditions and co-morbidity symptoms are monitored and maintained or improved  8/30/2023 2304 by Arlene August RN  Outcome: Progressing     Problem: Neurosensory - Adult  Goal: Achieves stable or improved neurological status  8/30/2023 2304 by Arlene August RN  Outcome: Progressing     Problem: Neurosensory - Adult  Goal: Achieves maximal functionality and self care  8/30/2023 2304 by Arlene August RN  Outcome: Progressing     Problem: Skin/Tissue Integrity - Adult  Goal: Skin integrity remains
Problem: Discharge Planning  Goal: Discharge to home or other facility with appropriate resources  8/31/2023 1037 by Rafa Hedrick RN  Outcome: Progressing     Problem: Pain  Goal: Verbalizes/displays adequate comfort level or baseline comfort level  8/31/2023 1037 by Rafa Hedrick RN  Outcome: Progressing     Problem: Safety - Adult  Goal: Free from fall injury  8/31/2023 1037 by Rafa Hedrick RN  Outcome: Progressing     Problem: Respiratory - Adult  Goal: Achieves optimal ventilation and oxygenation  8/31/2023 1037 by Rafa Hedrick RN  Outcome: Progressing     Problem: Cardiovascular - Adult  Goal: Maintains optimal cardiac output and hemodynamic stability  8/31/2023 1037 by Rafa Hedrick RN  Outcome: Progressing     Problem: Cardiovascular - Adult  Goal: Absence of cardiac dysrhythmias or at baseline  8/31/2023 1037 by Rafa Hedrick RN  Outcome: Progressing     Problem: Metabolic/Fluid and Electrolytes - Adult  Goal: Electrolytes maintained within normal limits  8/31/2023 1037 by Rafa Hedrick RN  Outcome: Progressing     Problem: Metabolic/Fluid and Electrolytes - Adult  Goal: Hemodynamic stability and optimal renal function maintained  8/31/2023 1037 by Rafa Hedrick RN  Outcome: Progressing     Problem: Metabolic/Fluid and Electrolytes - Adult  Goal: Glucose maintained within prescribed range  8/31/2023 1037 by Rafa Hedrick RN  Outcome: Progressing     Problem: Neurosensory - Adult  Goal: Achieves stable or improved neurological status  8/31/2023 1037 by Rafa Hedrick RN  Outcome: Progressing     Problem: Neurosensory - Adult  Goal: Achieves maximal functionality and self care  8/31/2023 1037 by Rafa Hedrick RN  Outcome: Progressing
Problem: Discharge Planning  Goal: Discharge to home or other facility with appropriate resources  9/1/2023 1143 by Arthur Flores RN  Outcome: Progressing     Problem: Pain  Goal: Verbalizes/displays adequate comfort level or baseline comfort level  9/1/2023 1143 by Arthur Flores RN  Outcome: Progressing     Problem: Safety - Adult  Goal: Free from fall injury  9/1/2023 1143 by Arthur Flores RN  Outcome: Progressing     Problem: Cardiovascular - Adult  Goal: Maintains optimal cardiac output and hemodynamic stability  9/1/2023 1143 by Arthur Flores RN  Outcome: Progressing     Problem: Cardiovascular - Adult  Goal: Absence of cardiac dysrhythmias or at baseline  9/1/2023 1143 by Arthur Flores RN  Outcome: Progressing     Problem: Metabolic/Fluid and Electrolytes - Adult  Goal: Electrolytes maintained within normal limits  9/1/2023 1143 by Arthur Flores RN  Outcome: Progressing     Problem: Metabolic/Fluid and Electrolytes - Adult  Goal: Hemodynamic stability and optimal renal function maintained  9/1/2023 1143 by Arthur Flores RN  Outcome: Progressing     Problem: Metabolic/Fluid and Electrolytes - Adult  Goal: Glucose maintained within prescribed range  9/1/2023 1143 by Arthur Flores RN  Outcome: Progressing     Problem: Neurosensory - Adult  Goal: Achieves stable or improved neurological status  9/1/2023 1143 by Arthur Flores RN  Outcome: Progressing     Problem: Neurosensory - Adult  Goal: Achieves maximal functionality and self care  9/1/2023 1143 by Arthur Flores RN  Outcome: Progressing     Problem: Skin/Tissue Integrity - Adult  Goal: Skin integrity remains intact  9/1/2023 1143 by Arthur Flores RN  Outcome: Progressing     Problem: Musculoskeletal - Adult  Goal: Return mobility to safest level of function  9/1/2023 1143 by Arthur Flores RN  Outcome: Progressing     Problem: Musculoskeletal - Adult  Goal: Return ADL status to a safe level of function  9/1/2023 1143 by Arthur Flores RN  Outcome: Progressing
Problem: Discharge Planning  Goal: Discharge to home or other facility with appropriate resources  9/2/2023 0034 by Anjel Holt RN  Outcome: Progressing  9/1/2023 1143 by Cecy Riddle RN  Outcome: Progressing     Problem: Pain  Goal: Verbalizes/displays adequate comfort level or baseline comfort level  9/2/2023 0034 by Anjel Holt RN  Outcome: Progressing  9/1/2023 1143 by Cecy Riddle RN  Outcome: Progressing     Problem: Safety - Adult  Goal: Free from fall injury  9/2/2023 0034 by Anjel Hlot RN  Outcome: Progressing  9/1/2023 1143 by Cecy Riddle RN  Outcome: Progressing     Problem: Respiratory - Adult  Goal: Achieves optimal ventilation and oxygenation  9/2/2023 0034 by Anjel Holt RN  Outcome: Progressing  9/1/2023 1143 by Cecy Riddle RN  Outcome: Progressing     Problem: Cardiovascular - Adult  Goal: Maintains optimal cardiac output and hemodynamic stability  9/2/2023 0034 by Anjel Holt RN  Outcome: Progressing  Flowsheets (Taken 9/1/2023 2129)  Maintains optimal cardiac output and hemodynamic stability: Monitor blood pressure and heart rate  9/1/2023 1143 by Ccey Riddle RN  Outcome: Progressing  Goal: Absence of cardiac dysrhythmias or at baseline  9/2/2023 0034 by Anjel Holt RN  Outcome: Progressing  [address] (Taken 9/1/2023 2129)  Absence of cardiac dysrhythmias or at baseline: Monitor cardiac rate and rhythm  9/1/2023 1143 by Cecy Riddle RN  Outcome: Progressing     Problem: Metabolic/Fluid and Electrolytes - Adult  Goal: Electrolytes maintained within normal limits  9/2/2023 0034 by Anjel Holt RN  Outcome: Progressing  Flowsheets (Taken 9/1/2023 2129)  Electrolytes maintained within normal limits: Monitor labs and assess patient for signs and symptoms of electrolyte imbalances  9/1/2023 1143 by Cecy Riddle RN  Outcome: Progressing  Goal: Hemodynamic stability and optimal renal function maintained  9/2/2023 0034 by Anjel Holt RN  Outcome:
Problem: Discharge Planning  Goal: Discharge to home or other facility with appropriate resources  9/2/2023 1321 by Elian Adam RN  Outcome: Progressing     Problem: Pain  Goal: Verbalizes/displays adequate comfort level or baseline comfort level  9/2/2023 1321 by Elian Adam RN  Outcome: Progressing     Problem: Safety - Adult  Goal: Free from fall injury  9/2/2023 1321 by Elian Adam RN  Outcome: [place] (Taken 9/2/2023 0035 by Katherin Mcnair RN)  Free From Fall Injury: Instruct family/caregiver on patient safety     Problem: Respiratory - Adult  Goal: Achieves optimal ventilation and oxygenation  9/2/2023 1321 by Elian Adam RN  Outcome: Progressing     Problem: Cardiovascular - Adult  Goal: Maintains optimal cardiac output and hemodynamic stability  9/2/2023 1321 by Elian Adam RN  Outcome: Progressing     Problem: Cardiovascular - Adult  Goal: Absence of cardiac dysrhythmias or at baseline  9/2/2023 1321 by Elian Adam RN  Outcome: Progressing     Problem: Metabolic/Fluid and Electrolytes - Adult  Goal: Electrolytes maintained within normal limits  9/2/2023 1321 by Elian Adam RN  Outcome: Progressing     Problem: Metabolic/Fluid and Electrolytes - Adult  Goal: Hemodynamic stability and optimal renal function maintained  9/2/2023 1321 by Elian Adam RN  Outcome: Progressing     Problem: Metabolic/Fluid and Electrolytes - Adult  Goal: Glucose maintained within prescribed range  9/2/2023 1321 by Elian Adam RN  Outcome: Progressing     Problem: Chronic Conditions and Co-morbidities  Goal: Patient's chronic conditions and co-morbidity symptoms are monitored and maintained or improved  9/2/2023 1321 by Elian Adam RN  Outcome: Progressing     Problem: Neurosensory - Adult  Goal: Achieves stable or improved neurological status  9/2/2023 1321 by Elian Adam RN  Outcome: Progressing     Problem: Neurosensory - Adult  Goal: Achieves maximal functionality and self
Problem: Discharge Planning  Goal: Discharge to home or other facility with appropriate resources  Outcome: Progressing     Problem: Pain  Goal: Verbalizes/displays adequate comfort level or baseline comfort level  Outcome: Progressing
Problem: Discharge Planning  Goal: Discharge to home or other facility with appropriate resources  Outcome: Progressing     Problem: Pain  Goal: Verbalizes/displays adequate comfort level or baseline comfort level  Outcome: Progressing     Problem: Safety - Adult  Goal: Free from fall injury  Outcome: Progressing     Problem: Respiratory - Adult  Goal: Achieves optimal ventilation and oxygenation  Outcome: Progressing     Problem: Cardiovascular - Adult  Goal: Maintains optimal cardiac output and hemodynamic stability  Outcome: Progressing     Problem: Cardiovascular - Adult  Goal: Absence of cardiac dysrhythmias or at baseline  Outcome: Progressing     Problem: Metabolic/Fluid and Electrolytes - Adult  Goal: Electrolytes maintained within normal limits  Outcome: Progressing     Problem: Metabolic/Fluid and Electrolytes - Adult  Goal: Hemodynamic stability and optimal renal function maintained  Outcome: Progressing     Problem: Metabolic/Fluid and Electrolytes - Adult  Goal: Glucose maintained within prescribed range  Outcome: Progressing     Problem: Chronic Conditions and Co-morbidities  Goal: Patient's chronic conditions and co-morbidity symptoms are monitored and maintained or improved  Outcome: Progressing     Problem: Neurosensory - Adult  Goal: Achieves stable or improved neurological status  Outcome: Progressing     Problem: Neurosensory - Adult  Goal: Achieves maximal functionality and self care  Outcome: Progressing     Problem: Skin/Tissue Integrity - Adult  Goal: Skin integrity remains intact  Outcome: Progressing     Problem: Musculoskeletal - Adult  Goal: Return mobility to safest level of function  Outcome: Progressing     Problem: Musculoskeletal - Adult  Goal: Return ADL status to a safe level of function  Outcome: Progressing     Problem: Skin/Tissue Integrity  Goal: Absence of new skin breakdown  Description: 1. Monitor for areas of redness and/or skin breakdown  2.   Assess vascular access
Problem: Discharge Planning  Goal: Discharge to home or other facility with appropriate resources  Outcome: Progressing     Problem: Pain  Goal: Verbalizes/displays adequate comfort level or baseline comfort level  Outcome: Progressing     Problem: Safety - Adult  Goal: Free from fall injury  Outcome: Progressing     Problem: Respiratory - Adult  Goal: Achieves optimal ventilation and oxygenation  Outcome: Progressing     Problem: Cardiovascular - Adult  Goal: Maintains optimal cardiac output and hemodynamic stability  Outcome: Progressing     Problem: Cardiovascular - Adult  Goal: Absence of cardiac dysrhythmias or at baseline  Outcome: Progressing     Problem: Metabolic/Fluid and Electrolytes - Adult  Goal: Electrolytes maintained within normal limits  Outcome: Progressing     Problem: Metabolic/Fluid and Electrolytes - Adult  Goal: Hemodynamic stability and optimal renal function maintained  Outcome: Progressing     Problem: Metabolic/Fluid and Electrolytes - Adult  Goal: Glucose maintained within prescribed range  Outcome: Progressing     Problem: Neurosensory - Adult  Goal: Achieves stable or improved neurological status  Outcome: Progressing     Problem: Neurosensory - Adult  Goal: Achieves maximal functionality and self care  Outcome: Progressing     Problem: Skin/Tissue Integrity - Adult  Goal: Skin integrity remains intact  Outcome: Progressing
Problem: Discharge Planning  Goal: Discharge to home or other facility with appropriate resources  Outcome: Progressing     Problem: Pain  Goal: Verbalizes/displays adequate comfort level or baseline comfort level  Outcome: Progressing     Problem: Safety - Adult  Goal: Free from fall injury  Outcome: Progressing     Problem: Respiratory - Adult  Goal: Achieves optimal ventilation and oxygenation  Outcome: Progressing     Problem: Cardiovascular - Adult  Goal: Maintains optimal cardiac output and hemodynamic stability  Outcome: Progressing     Problem: Cardiovascular - Adult  Goal: Absence of cardiac dysrhythmias or at baseline  Outcome: Progressing     Problem: Metabolic/Fluid and Electrolytes - Adult  Goal: Electrolytes maintained within normal limits  Outcome: Progressing     Problem: Metabolic/Fluid and Electrolytes - Adult  Goal: Hemodynamic stability and optimal renal function maintained  Outcome: Progressing     Problem: Metabolic/Fluid and Electrolytes - Adult  Goal: Glucose maintained within prescribed range  Outcome: Progressing     Problem: Neurosensory - Adult  Goal: Achieves stable or improved neurological status  Outcome: Progressing     Problem: Neurosensory - Adult  Goal: Achieves maximal functionality and self care  Outcome: Progressing     Problem: Skin/Tissue Integrity - Adult  Goal: Skin integrity remains intact  Outcome: Progressing     Problem: Musculoskeletal - Adult  Goal: Return mobility to safest level of function  Outcome: Progressing     Problem: Musculoskeletal - Adult  Goal: Return ADL status to a safe level of function  Outcome: Progressing     Problem: Chronic Conditions and Co-morbidities  Goal: Patient's chronic conditions and co-morbidity symptoms are monitored and maintained or improved  Outcome: Progressing     Problem: Skin/Tissue Integrity  Goal: Absence of new skin breakdown  Description: 1. Monitor for areas of redness and/or skin breakdown  2.   Assess vascular access
Problem: Discharge Planning  Goal: Discharge to home or other facility with appropriate resources  Outcome: Progressing     Problem: Pain  Goal: Verbalizes/displays adequate comfort level or baseline comfort level  Outcome: Progressing     Problem: Safety - Adult  Goal: Free from fall injury  Outcome: Progressing     Problem: Respiratory - Adult  Goal: Achieves optimal ventilation and oxygenation  Outcome: Progressing     Problem: Cardiovascular - Adult  Goal: Maintains optimal cardiac output and hemodynamic stability  Outcome: Progressing  Goal: Absence of cardiac dysrhythmias or at baseline  Outcome: Progressing     Problem: Metabolic/Fluid and Electrolytes - Adult  Goal: Electrolytes maintained within normal limits  Outcome: Progressing  Goal: Hemodynamic stability and optimal renal function maintained  Outcome: Progressing  Goal: Glucose maintained within prescribed range  Outcome: Progressing     Problem: Neurosensory - Adult  Goal: Achieves stable or improved neurological status  Outcome: Progressing  Goal: Achieves maximal functionality and self care  Outcome: Progressing     Problem: Skin/Tissue Integrity - Adult  Goal: Skin integrity remains intact  Outcome: Progressing     Problem: Musculoskeletal - Adult  Goal: Return mobility to safest level of function  Outcome: Progressing  Goal: Return ADL status to a safe level of function  Outcome: Progressing     Problem: Chronic Conditions and Co-morbidities  Goal: Patient's chronic conditions and co-morbidity symptoms are monitored and maintained or improved  Outcome: Progressing
Problem: Discharge Planning  Goal: Discharge to home or other facility with appropriate resources  Outcome: Progressing     Problem: Pain  Goal: Verbalizes/displays adequate comfort level or baseline comfort level  Outcome: Progressing     Problem: Safety - Adult  Goal: Free from fall injury  Outcome: Progressing     Problem: Respiratory - Adult  Goal: Achieves optimal ventilation and oxygenation  Outcome: Progressing     Problem: Cardiovascular - Adult  Goal: Maintains optimal cardiac output and hemodynamic stability  Outcome: Progressing  Goal: Absence of cardiac dysrhythmias or at baseline  Outcome: Progressing     Problem: Metabolic/Fluid and Electrolytes - Adult  Goal: Electrolytes maintained within normal limits  Outcome: Progressing  Goal: Hemodynamic stability and optimal renal function maintained  Outcome: Progressing  Goal: Glucose maintained within prescribed range  Outcome: Progressing     Problem: Neurosensory - Adult  Goal: Achieves stable or improved neurological status  Outcome: Progressing  Goal: Achieves maximal functionality and self care  Outcome: Progressing     Problem: Skin/Tissue Integrity - Adult  Goal: Skin integrity remains intact  Outcome: Progressing     Problem: Musculoskeletal - Adult  Goal: Return mobility to safest level of function  Outcome: Progressing  Goal: Return ADL status to a safe level of function  Outcome: Progressing     Problem: Chronic Conditions and Co-morbidities  Goal: Patient's chronic conditions and co-morbidity symptoms are monitored and maintained or improved  Outcome: Progressing
Problem: Discharge Planning  Goal: Discharge to home or other facility with appropriate resources  Outcome: Progressing  Care coordination involved to assess needs and help determine and explore appropriate resources. Problem: Pain  Goal: Verbalizes/displays adequate comfort level or baseline comfort level  Outcome: Progressing  Pain being managed with PRN analgesics per MD orders. Patient able to express presence and absence of pain and rate pain appropriately using numerical scale. Problem: Safety - Adult  Goal: Free from fall injury  Outcome: Progressing  Fall risk assessment completed every shift. All precautions in place. Patient has call light with in reach at all times. Room free from clutter. Patient aware to call for assistance when getting up. Problem: Chronic Conditions and Co-morbidities  Goal: Patient's chronic conditions and co-morbidity symptoms are monitored and maintained or improved  Outcome: Progressing  Patient's heart rate and rhythm, vital signs, and labs are being monitored for changes. Problem: Skin/Tissue Integrity - Adult  Goal: Skin integrity remains intact  Outcome: Progressing  Skin assessment completed every shift. Patient assessed for incontinence, appropriate barrier cream applied prn. Patient encouraged to turn/rotate every 2 hours. Assistance provided if patient unable to do so themselves. Problem: Skin/Tissue Integrity  Goal: Absence of new skin breakdown  Description: 1. Monitor for areas of redness and/or skin breakdown  2. Assess vascular access sites hourly  3. Every 4-6 hours minimum:  Change oxygen saturation probe site  4. Every 4-6 hours:  If on nasal continuous positive airway pressure, respiratory therapy assess nares and determine need for appliance change or resting period. Outcome: Progressing  Skin assessment completed every shift. Patient assessed for incontinence, appropriate barrier cream applied prn.   Patient encouraged to
Problem: Pain  Goal: Verbalizes/displays adequate comfort level or baseline comfort level  8/23/2023 0035 by Elmore Lanes, RN  Outcome: Progressing     Problem: Safety - Adult  Goal: Free from fall injury  8/23/2023 0035 by Elmore Lanes, RN  Outcome: Progressing     Problem: Respiratory - Adult  Goal: Achieves optimal ventilation and oxygenation  8/23/2023 0035 by Elmore Lanes, RN  Outcome: Progressing     Problem: Cardiovascular - Adult  Goal: Maintains optimal cardiac output and hemodynamic stability  8/23/2023 0035 by Elmore Lanes, RN  Outcome: Progressing  Flowsheets (Taken 8/23/2023 0035)  Maintains optimal cardiac output and hemodynamic stability:   Monitor blood pressure and heart rate   Monitor urine output and notify Licensed Independent Practitioner for values outside of normal range     Problem: Metabolic/Fluid and Electrolytes - Adult  Goal: Electrolytes maintained within normal limits  8/23/2023 0035 by Elmore Lanes, RN  Flowsheets (Taken 8/23/2023 8281)  Electrolytes maintained within normal limits: Monitor labs and assess patient for signs and symptoms of electrolyte imbalances  Note: Educated patient/family on CHF signs/ symptoms, causes, treatments, limited fluid intake, daily weights, low sodium diet, and follow-up. Pt to call attending physician if weight gain of 3 pounds in one day or 5 pounds in one week.
Problem: Pain  Goal: Verbalizes/displays adequate comfort level or baseline comfort level  8/27/2023 2156 by Real Vasquez RN  Outcome: Progressing
Assess the need for suctioning and aspirate as needed   Assess and instruct to report shortness of breath or any respiratory difficulty   Respiratory therapy support as indicated  8/23/2023 0035 by Duke Johnson RN  Outcome: Progressing     Problem: Cardiovascular - Adult  Goal: Maintains optimal cardiac output and hemodynamic stability  8/23/2023 1128 by Joel Corley RN  Outcome: Progressing  Flowsheets (Taken 8/23/2023 5061)  Maintains optimal cardiac output and hemodynamic stability:   Monitor blood pressure and heart rate   Monitor urine output and notify Licensed Independent Practitioner for values outside of normal range   Assess for signs of decreased cardiac output  8/23/2023 0035 by Duke Johnson RN  Outcome: Progressing  Flowsheets (Taken 8/23/2023 0035)  Maintains optimal cardiac output and hemodynamic stability:   Monitor blood pressure and heart rate   Monitor urine output and notify Licensed Independent Practitioner for values outside of normal range  Goal: Absence of cardiac dysrhythmias or at baseline  Outcome: Progressing  Flowsheets (Taken 8/23/2023 0819)  Absence of cardiac dysrhythmias or at baseline:   Monitor cardiac rate and rhythm   Assess for signs of decreased cardiac output   Administer antiarrhythmia medication and electrolyte replacement as ordered     Problem: Metabolic/Fluid and Electrolytes - Adult  Goal: Electrolytes maintained within normal limits  8/23/2023 1128 by Joel Corley RN  Outcome: Progressing  Flowsheets (Taken 8/23/2023 5904)  Electrolytes maintained within normal limits:   Monitor labs and assess patient for signs and symptoms of electrolyte imbalances   Administer electrolyte replacement as ordered   Monitor response to electrolyte replacements, including repeat lab results as appropriate   Fluid restriction as ordered   Instruct patient on fluid and nutrition restrictions as appropriate  8/23/2023 0035 by Duke Johnson RN  Flowsheets (Taken 8/23/2023
and co-morbidity symptoms are monitored and maintained or improved  Outcome: Progressing  Flowsheets (Taken 8/22/2023 0906)  Care Plan - Patient's Chronic Conditions and Co-Morbidity Symptoms are Monitored and Maintained or Improved:   Monitor and assess patient's chronic conditions and comorbid symptoms for stability, deterioration, or improvement   Collaborate with multidisciplinary team to address chronic and comorbid conditions and prevent exacerbation or deterioration     Problem: Neurosensory - Adult  Goal: Achieves stable or improved neurological status  Outcome: Progressing  Flowsheets (Taken 8/22/2023 1855)  Achieves stable or improved neurological status: Assess for and report changes in neurological status  Goal: Achieves maximal functionality and self care  Outcome: Progressing  Flowsheets (Taken 8/22/2023 1855)  Achieves maximal functionality and self care:   Monitor swallowing and airway patency with patient fatigue and changes in neurological status   Encourage and assist patient to increase activity and self care with guidance from physical therapy/occupational therapy     Problem: Skin/Tissue Integrity - Adult  Goal: Skin integrity remains intact  Outcome: Progressing  Flowsheets (Taken 8/22/2023 1855)  Skin Integrity Remains Intact: Monitor for areas of redness and/or skin breakdown     Problem: Musculoskeletal - Adult  Goal: Return mobility to safest level of function  Outcome: Progressing  Flowsheets (Taken 8/22/2023 1855)  Return Mobility to Safest Level of Function:   Assess patient stability and activity tolerance for standing, transferring and ambulating with or without assistive devices   Assist with transfers and ambulation using safe patient handling equipment as needed   Obtain physical therapy/occupational therapy consults as needed  Goal: Return ADL status to a safe level of function  Outcome: Progressing  Flowsheets (Taken 8/22/2023 1855)  Return ADL Status to a Safe Level of Function:

## 2023-09-03 NOTE — CARE COORDINATION
CASE MANAGEMENT DISCHARGE SUMMARY:    DISCHARGE DATE: 9/3/23    DISCHARGED TO:   Name:  [facility]  Address:  [address]   Report Number:  [phone] or call [facility] admissions rep at [phone] if no answer  Fax:  [phone]     TRANSPORTATION: [address] [place] [address] Transport             TIME: 3:30pm    INSURANCE PRECERT OBTAINED: yes    HENS/PASRR COMPLETED: yes    COMMENTS: Patient agrees to discharge today to [facility] . Patient advised to speak to supervisor and/or SW at facility if he isn't happy with his care at this facility and they can assist with getting him to another SNF. Todd at facility made aware of dc plan & timing, AVS faxed. MAURISIO Theone Cease aware of plan.     Electronically signed by Luke Morrison RN Case Management on 9/3/2023 at 12:11 PM

## 2023-09-03 NOTE — DISCHARGE SUMMARY
Hospital Discharge Summary    Patient's PCP: Gian Paul DO  Admit Date: 8/21/2023   Discharge Date: 9/3/2023    Admitting Physician: Dr. Hans Wong MD  Discharge Physician: Dr. Silke Pfeiffer MD     Consults:   IP CONSULT TO HEART FAILURE NURSE/COORDINATOR  IP CONSULT TO DIETITIAN  IP CONSULT TO CARDIOLOGY  IP CONSULT TO PALLIATIVE CARE  IP CONSULT TO VASCULAR SURGERY  IP CONSULT TO SPIRITUAL SERVICES  IP CONSULT TO INFECTIOUS DISEASES    Brief HPI: Patient admitted with acute heart failure    Brief hospital course: 50-year-old male with history of well-controlled diabetes type 2 (most recent hemoglobin A1c of 5.1), chronic combined systolic and diastolic heart failure (most recent echocardiogram with ejection fraction of 15 to 34%, grade 3 diastolic dysfunction), ongoing tobacco use disorder, marijuana use disorder, cocaine abuse (recent urine drug screen was positive for cocaine on 8/3/2023), hypertension associated with diabetes, hyperlipidemia associated with diabetes, peptic ulcer disease, coronary artery disease, peripheral arterial disease, paroxysmal atrial fibrillation, who presented to the emergency room on 8/22/2023 with complaints of right leg pain that had been present for several days. He also reported left lower extremity swelling. There was no associated fever, redness, chills. He was diagnosed with critical limb ischemia. Assessment/plan:  Critical limb ischemia of right lower extremity. Patient is status post right lower extremity angiogram with balloon angioplasty of peroneal artery, tibioperoneal trunk and popliteal artery, suction thrombectomy of popliteal and stenting of the popliteal artery, by Dr. Kandice Ochoa on 8/24/2023. Postoperative complication with right thigh hematoma. CT-right thigh/Tepe/Price with contrast on 8/25/2023 demonstrated large 24 cm long by 11 cm wide by 7 cm deep fluid collection in the posterior thigh consistent with hematoma.   Is status

## 2023-09-11 ENCOUNTER — OFFICE VISIT (OUTPATIENT)
Dept: VASCULAR SURGERY | Age: 58
End: 2023-09-11

## 2023-09-11 DIAGNOSIS — Z09 POSTOP CHECK: Primary | ICD-10-CM

## 2023-09-11 PROCEDURE — 99024 POSTOP FOLLOW-UP VISIT: CPT | Performed by: STUDENT IN AN ORGANIZED HEALTH CARE EDUCATION/TRAINING PROGRAM

## 2023-09-11 NOTE — PROGRESS NOTES
Zina Fox (:  1965) is a 60 y.o. male,Established patient, here for evaluation of the following chief complaint(s):  Post-Op Check         ASSESSMENT/PLAN:  1. Postop check    Thigh incision has healed, soft, no distension. Okay to resume eliquis. Still has what appears to be a neuropraxia confined to the ankle and foot. However he prevents his foot from having a foot drop therefore he does have neuromuscular control. Suspect he still will have time to recover as there appears to be an effort component as he is able to sense palpation through the mid forefoot. Will need ongoing therapy and encouragement. As far as his PAD is concerned, he has severely reduced EF that compromises any intervention and with single vessel runoff he is high risk for amputation in the future particularly if he continues to abuse drugs. Patient understanding as well of my departure from the practice and will have a different practitioner resume his care. All questions answered. Subjective   SUBJECTIVE/OBJECTIVE:  This is a 60year old male patient s/p RLE revascularization with subsequent development of right thigh hematoma. This was drained however he still endorses numbness in the top and bottom of his foot with limited motion. States rehab is challenging but overall thankful for the assistance. Denies significant pain in his leg. Objective   Physical Exam  Constitutional:       Appearance: He is obese. Cardiovascular:      Rate and Rhythm: Normal rate and regular rhythm. Comments: Dopp PT right  Musculoskeletal:      Comments: Reduced plantarflexion and dorsiflexion of ankle, however no apparent foot drop as patient able to keep his foot in the anatomic position   Skin:     General: Skin is warm and dry. Comments: Thigh incision healed, staples removed   Neurological:      Mental Status: He is alert.             Breanne Arthur, DO, FACS, FSVS, [address] [facility]

## 2023-09-25 ENCOUNTER — TELEPHONE (OUTPATIENT)
Dept: PHARMACY | Age: 58
End: 2023-09-25

## 2023-09-25 NOTE — TELEPHONE ENCOUNTER
[facility] Note:    Attempted to reach patient on both 9/21/23 and today 9/25/23 to reschedule missed appointment on 9/21/23. Left voicemail both times to please return our call at [phone].       Paul Campos, PharmD, BCPS  9/25/2023  1:33 PM

## 2023-10-17 ENCOUNTER — HOSPITAL ENCOUNTER (OUTPATIENT)
Dept: WOUND CARE | Age: 58
Discharge: HOME | End: 2023-10-17

## 2023-10-19 NOTE — PLAN OF CARE
Problem: Chronic Conditions and Co-morbidities  Goal: Patient's chronic conditions and co-morbidity symptoms are monitored and maintained or improved  Outcome: Progressing  Flowsheets (Taken 10/19/2022 2130)  Care Plan - Patient's Chronic Conditions and Co-Morbidity Symptoms are Monitored and Maintained or Improved: Monitor and assess patient's chronic conditions and comorbid symptoms for stability, deterioration, or improvement     Problem: Discharge Planning  Goal: Discharge to home or other facility with appropriate resources  Outcome: Progressing  Flowsheets (Taken 10/19/2022 2130)  Discharge to home or other facility with appropriate resources:   Identify barriers to discharge with patient and caregiver   Arrange for needed discharge resources and transportation as appropriate     Problem: Pain  Goal: Verbalizes/displays adequate comfort level or baseline comfort level  Outcome: Progressing  Flowsheets (Taken 10/20/2022 0245)  Verbalizes/displays adequate comfort level or baseline comfort level:   Encourage patient to monitor pain and request assistance   Assess pain using appropriate pain scale     Problem: Safety - Adult  Goal: Free from fall injury  Outcome: Progressing  Flowsheets (Taken 10/20/2022 0245)  Free From Fall Injury: Instruct family/caregiver on patient safety patient

## 2023-11-09 ENCOUNTER — TELEPHONE (OUTPATIENT)
Dept: PRIMARY CARE CLINIC | Age: 58
End: 2023-11-09

## 2023-11-09 NOTE — TELEPHONE ENCOUNTER
Correction to previous note   [address] [facility]  Patient stated that his pharmacy has changed to [address] [facility] in [place]; therefore, please send prescription for blood pressure machine and scale to be called into [address] [place] [address]

## 2023-11-09 NOTE — TELEPHONE ENCOUNTER
Noelle Black with [facility] called pt is asking for prescription for blood pressure machine and scale to be called into pharmacy     [facility][id number] - [place], [address]  Mitch Bowman [phone] Joanna Hanson [phone]    Please advise pt

## 2023-11-09 NOTE — TELEPHONE ENCOUNTER
[facility]/ home care visit patient has two open wounds 2nd right & third toe, patient BP elevated 150/108 reading done this afternoon pm

## 2023-11-20 RX ORDER — BLOOD PRESSURE TEST KIT
1 KIT MISCELLANEOUS DAILY
Qty: 1 KIT | Refills: 0 | Status: SHIPPED | OUTPATIENT
Start: 2023-11-20

## 2024-01-22 DIAGNOSIS — Z79.899 LONG TERM CURRENT USE OF AMIODARONE: ICD-10-CM

## 2024-01-22 DIAGNOSIS — I48.0 PAF (PAROXYSMAL ATRIAL FIBRILLATION) (HCC): ICD-10-CM

## 2024-01-22 DIAGNOSIS — I50.43 CHF (CONGESTIVE HEART FAILURE), NYHA CLASS I, ACUTE ON CHRONIC, COMBINED (HCC): ICD-10-CM

## 2024-01-22 DIAGNOSIS — I50.9 CHRONIC CONGESTIVE HEART FAILURE, UNSPECIFIED HEART FAILURE TYPE (HCC): ICD-10-CM

## 2024-01-22 DIAGNOSIS — E11.9 DIABETES MELLITUS WITH COINCIDENT HYPERTENSION (HCC): ICD-10-CM

## 2024-01-22 DIAGNOSIS — I10 DIABETES MELLITUS WITH COINCIDENT HYPERTENSION (HCC): ICD-10-CM

## 2024-01-22 RX ORDER — PANTOPRAZOLE SODIUM 40 MG/1
40 TABLET, DELAYED RELEASE ORAL
Qty: 30 TABLET | Refills: 2 | Status: SHIPPED | OUTPATIENT
Start: 2024-01-22

## 2024-01-22 RX ORDER — LOSARTAN POTASSIUM 25 MG/1
25 TABLET ORAL DAILY
Qty: 90 TABLET | Refills: 2 | Status: SHIPPED | OUTPATIENT
Start: 2024-01-22

## 2024-01-22 RX ORDER — HYDRALAZINE HYDROCHLORIDE 25 MG/1
25 TABLET, FILM COATED ORAL EVERY 8 HOURS SCHEDULED
Qty: 90 TABLET | Refills: 2 | Status: SHIPPED | OUTPATIENT
Start: 2024-01-22

## 2024-01-22 RX ORDER — AMIODARONE HYDROCHLORIDE 200 MG/1
TABLET ORAL
Qty: 30 TABLET | Refills: 2 | Status: SHIPPED | OUTPATIENT
Start: 2024-01-22

## 2024-01-22 NOTE — TELEPHONE ENCOUNTER
Received message from [facility] that PT was needing medication refills & an appointment with Dr. Lilly.     Called PT to adv that Dr. Lilly is out on maternity leave & appointments will be made for refills when she is back.     Currently, PT needs refills for several medications.     Eliquis 5 mg  Farxiga 5 mg  Torsemide 20 mg  Atorvastatin 80 mg  Hydralazine 25 mg  Pantoprazole 40 mg  Amiodarone  mg  Losartan 25 mg    Please send to the [facility] in [place].     Best call back number: [phone]

## 2024-01-22 NOTE — TELEPHONE ENCOUNTER
Medication:   Requested Prescriptions     Pending Prescriptions Disp Refills    apixaban (ELIQUIS) 5 MG TABS tablet 60 tablet 0     Sig: Take 1 tablet by mouth 2 times daily    dapagliflozin (FARXIGA) 5 MG tablet 30 tablet 2     Sig: TAKE 1 TABLET BY MOUTH EVERY DAY IN THE MORNING    Torsemide 40 MG TABS 30 tablet 3     Sig: Take 40 mg by mouth daily    hydrALAZINE (APRESOLINE) 25 MG tablet 90 tablet 2     Sig: Take 1 tablet by mouth every 8 hours    pantoprazole (PROTONIX) 40 MG tablet 30 tablet 2     Sig: Take 1 tablet by mouth every morning (before breakfast)    amiodarone (CORDARONE) 200 MG tablet 30 tablet 2     Sig: TAKE 1 TABLET BY MOUTH EVERY DAY    losartan (COZAAR) 25 MG tablet 90 tablet 2     Sig: Take 1 tablet by mouth daily        Last Filled:      Patient Phone Number: [phone] (home)     Last appt: Visit date not found   Next appt: Visit date not found    Last OARRS:        No data to display

## 2024-02-02 ENCOUNTER — TELEPHONE (OUTPATIENT)
Dept: PRIMARY CARE CLINIC | Age: 59
End: 2024-02-02

## 2024-02-02 DIAGNOSIS — I10 DIABETES MELLITUS WITH COINCIDENT HYPERTENSION (HCC): ICD-10-CM

## 2024-02-02 DIAGNOSIS — E11.9 DIABETES MELLITUS WITH COINCIDENT HYPERTENSION (HCC): ICD-10-CM

## 2024-02-02 NOTE — TELEPHONE ENCOUNTER
Rosana ([phone]) from [facility] is requesting new prescription be sent over to preferred Pharmacy    [facility] [id number] - [place], [place] - [address] - P [phone] - F [phone]  [facility] [address]     Blood pressure cuff  glucose monitoring system

## 2024-02-06 RX ORDER — BLOOD-GLUCOSE METER
1 KIT MISCELLANEOUS DAILY
Qty: 1 KIT | Refills: 0 | Status: SHIPPED | OUTPATIENT
Start: 2024-02-06

## 2024-02-06 RX ORDER — LANCETS 30 GAUGE
1 EACH MISCELLANEOUS 4 TIMES DAILY
Qty: 200 EACH | Refills: 5 | Status: SHIPPED | OUTPATIENT
Start: 2024-02-06

## 2024-02-06 RX ORDER — BLOOD PRESSURE TEST KIT
1 KIT MISCELLANEOUS DAILY
Qty: 1 KIT | Refills: 0 | Status: SHIPPED | OUTPATIENT
Start: 2024-02-06

## 2024-02-06 RX ORDER — GLUCOSAMINE HCL/CHONDROITIN SU 500-400 MG
CAPSULE ORAL
Qty: 100 STRIP | Refills: 0 | Status: SHIPPED | OUTPATIENT
Start: 2024-02-06

## 2024-02-06 NOTE — TELEPHONE ENCOUNTER
Medication:   Requested Prescriptions     Pending Prescriptions Disp Refills    Blood Pressure KIT 1 kit 0     Si each by Does not apply route daily    blood glucose monitor strips 100 strip 0     Sig: Test 4 times a day & as needed for symptoms of irregular blood glucose. Dispense sufficient amount for indicated testing frequency plus additional to accommodate PRN testing needs.    glucose monitoring kit 1 kit 0     Si kit by Does not apply route daily    Lancets MISC 200 each 5     Si each by Does not apply route in the morning, at noon, in the evening, and at bedtime Check blood sugar 4 times a day and as needed      Rx's pending please sign orders

## 2024-04-23 ENCOUNTER — APPOINTMENT (OUTPATIENT)
Dept: CT IMAGING | Age: 59
DRG: 280 | End: 2024-04-23
Payer: COMMERCIAL

## 2024-04-23 ENCOUNTER — APPOINTMENT (OUTPATIENT)
Dept: GENERAL RADIOLOGY | Age: 59
DRG: 280 | End: 2024-04-23
Payer: COMMERCIAL

## 2024-04-23 ENCOUNTER — HOSPITAL ENCOUNTER (INPATIENT)
Age: 59
LOS: 2 days | Discharge: HOME | DRG: 280 | End: 2024-04-27
Attending: EMERGENCY MEDICINE | Admitting: INTERNAL MEDICINE
Payer: COMMERCIAL

## 2024-04-23 DIAGNOSIS — R79.89 ELEVATED TROPONIN: Primary | ICD-10-CM

## 2024-04-23 DIAGNOSIS — R18.8 ASCITES OF LIVER: ICD-10-CM

## 2024-04-23 DIAGNOSIS — R10.84 GENERALIZED ABDOMINAL PAIN: ICD-10-CM

## 2024-04-23 DIAGNOSIS — Z71.89 GOALS OF CARE, COUNSELING/DISCUSSION: ICD-10-CM

## 2024-04-23 PROBLEM — K70.31 ASCITES DUE TO ALCOHOLIC CIRRHOSIS (HCC): Status: ACTIVE | Noted: 2024-04-23

## 2024-04-23 LAB
ALBUMIN SERPL-MCNC: 3.8 G/DL (ref 3.4–5)
ALBUMIN/GLOB SERPL: 0.8 {RATIO} (ref 1.1–2.2)
ALP SERPL-CCNC: 185 U/L (ref 40–129)
ALT SERPL-CCNC: 20 U/L (ref 10–40)
ANION GAP SERPL CALCULATED.3IONS-SCNC: 12 MMOL/L (ref 3–16)
AST SERPL-CCNC: 24 U/L (ref 15–37)
BASOPHILS # BLD: 0 K/UL (ref 0–0.2)
BASOPHILS NFR BLD: 1.1 %
BILIRUB SERPL-MCNC: 1.1 MG/DL (ref 0–1)
BUN SERPL-MCNC: 16 MG/DL (ref 7–20)
CALCIUM SERPL-MCNC: 8.6 MG/DL (ref 8.3–10.6)
CHLORIDE SERPL-SCNC: 104 MMOL/L (ref 99–110)
CO2 SERPL-SCNC: 23 MMOL/L (ref 21–32)
CREAT SERPL-MCNC: 0.8 MG/DL (ref 0.9–1.3)
D DIMER: 3.14 UG/ML FEU (ref 0–0.6)
DEPRECATED RDW RBC AUTO: 16.6 % (ref 12.4–15.4)
EKG ATRIAL RATE: 58 BPM
EKG DIAGNOSIS: NORMAL
EKG P AXIS: 0 DEGREES
EKG P-R INTERVAL: 140 MS
EKG Q-T INTERVAL: 522 MS
EKG QRS DURATION: 112 MS
EKG QTC CALCULATION (BAZETT): 512 MS
EKG R AXIS: -30 DEGREES
EKG T AXIS: 180 DEGREES
EKG VENTRICULAR RATE: 58 BPM
EOSINOPHIL # BLD: 0 K/UL (ref 0–0.6)
EOSINOPHIL NFR BLD: 0.5 %
GFR SERPLBLD CREATININE-BSD FMLA CKD-EPI: >90 ML/MIN/{1.73_M2}
GLUCOSE BLD-MCNC: 123 MG/DL (ref 70–99)
GLUCOSE BLD-MCNC: 333 MG/DL (ref 70–99)
GLUCOSE SERPL-MCNC: 135 MG/DL (ref 70–99)
HCT VFR BLD AUTO: 39.8 % (ref 40.5–52.5)
HGB BLD-MCNC: 12.5 G/DL (ref 13.5–17.5)
LIPASE SERPL-CCNC: 33 U/L (ref 13–60)
LYMPHOCYTES # BLD: 0.5 K/UL (ref 1–5.1)
LYMPHOCYTES NFR BLD: 11.3 %
MAGNESIUM SERPL-MCNC: 2.3 MG/DL (ref 1.8–2.4)
MCH RBC QN AUTO: 29 PG (ref 26–34)
MCHC RBC AUTO-ENTMCNC: 31.3 G/DL (ref 31–36)
MCV RBC AUTO: 92.6 FL (ref 80–100)
MONOCYTES # BLD: 0.4 K/UL (ref 0–1.3)
MONOCYTES NFR BLD: 9.9 %
NEUTROPHILS # BLD: 3.5 K/UL (ref 1.7–7.7)
NEUTROPHILS NFR BLD: 77.2 %
NT-PROBNP SERPL-MCNC: 3410 PG/ML (ref 0–124)
PERFORMED ON: ABNORMAL
PERFORMED ON: ABNORMAL
PLATELET # BLD AUTO: 197 K/UL (ref 135–450)
PLATELET BLD QL SMEAR: ADEQUATE
PMV BLD AUTO: 10 FL (ref 5–10.5)
POTASSIUM SERPL-SCNC: 3.5 MMOL/L (ref 3.5–5.1)
PROT SERPL-MCNC: 8.3 G/DL (ref 6.4–8.2)
RBC # BLD AUTO: 4.29 M/UL (ref 4.2–5.9)
SLIDE REVIEW: ABNORMAL
SODIUM SERPL-SCNC: 139 MMOL/L (ref 136–145)
TROPONIN, HIGH SENSITIVITY: 74 NG/L (ref 0–22)
TROPONIN, HIGH SENSITIVITY: 78 NG/L (ref 0–22)
WBC # BLD AUTO: 4.5 K/UL (ref 4–11)

## 2024-04-23 PROCEDURE — 6370000000 HC RX 637 (ALT 250 FOR IP)

## 2024-04-23 PROCEDURE — G0378 HOSPITAL OBSERVATION PER HR: HCPCS

## 2024-04-23 PROCEDURE — 83735 ASSAY OF MAGNESIUM: CPT

## 2024-04-23 PROCEDURE — 6370000000 HC RX 637 (ALT 250 FOR IP): Performed by: EMERGENCY MEDICINE

## 2024-04-23 PROCEDURE — 6360000002 HC RX W HCPCS: Performed by: EMERGENCY MEDICINE

## 2024-04-23 PROCEDURE — 96374 THER/PROPH/DIAG INJ IV PUSH: CPT

## 2024-04-23 PROCEDURE — 99253 IP/OBS CNSLTJ NEW/EST LOW 45: CPT | Performed by: SURGERY

## 2024-04-23 PROCEDURE — 85379 FIBRIN DEGRADATION QUANT: CPT

## 2024-04-23 PROCEDURE — 71046 X-RAY EXAM CHEST 2 VIEWS: CPT

## 2024-04-23 PROCEDURE — 84484 ASSAY OF TROPONIN QUANT: CPT

## 2024-04-23 PROCEDURE — 6370000000 HC RX 637 (ALT 250 FOR IP): Performed by: INTERNAL MEDICINE

## 2024-04-23 PROCEDURE — 83880 ASSAY OF NATRIURETIC PEPTIDE: CPT

## 2024-04-23 PROCEDURE — 85025 COMPLETE CBC W/AUTO DIFF WBC: CPT

## 2024-04-23 PROCEDURE — 2580000003 HC RX 258: Performed by: INTERNAL MEDICINE

## 2024-04-23 PROCEDURE — 93005 ELECTROCARDIOGRAM TRACING: CPT | Performed by: EMERGENCY MEDICINE

## 2024-04-23 PROCEDURE — 71260 CT THORAX DX C+: CPT

## 2024-04-23 PROCEDURE — 93010 ELECTROCARDIOGRAM REPORT: CPT | Performed by: INTERNAL MEDICINE

## 2024-04-23 PROCEDURE — 74177 CT ABD & PELVIS W/CONTRAST: CPT

## 2024-04-23 PROCEDURE — 80053 COMPREHEN METABOLIC PANEL: CPT

## 2024-04-23 PROCEDURE — 83690 ASSAY OF LIPASE: CPT

## 2024-04-23 PROCEDURE — 99285 EMERGENCY DEPT VISIT HI MDM: CPT

## 2024-04-23 PROCEDURE — 6360000004 HC RX CONTRAST MEDICATION

## 2024-04-23 RX ORDER — ASPIRIN 325 MG
325 TABLET ORAL ONCE
Status: COMPLETED | OUTPATIENT
Start: 2024-04-23 | End: 2024-04-23

## 2024-04-23 RX ORDER — ONDANSETRON 4 MG/1
4 TABLET, ORALLY DISINTEGRATING ORAL EVERY 8 HOURS PRN
Status: DISCONTINUED | OUTPATIENT
Start: 2024-04-23 | End: 2024-04-27 | Stop reason: HOSPADM

## 2024-04-23 RX ORDER — AMIODARONE HYDROCHLORIDE 200 MG/1
200 TABLET ORAL DAILY
Status: DISCONTINUED | OUTPATIENT
Start: 2024-04-24 | End: 2024-04-27 | Stop reason: HOSPADM

## 2024-04-23 RX ORDER — LOSARTAN POTASSIUM 25 MG/1
25 TABLET ORAL DAILY
Status: DISCONTINUED | OUTPATIENT
Start: 2024-04-24 | End: 2024-04-27 | Stop reason: HOSPADM

## 2024-04-23 RX ORDER — POLYETHYLENE GLYCOL 3350 17 G/17G
17 POWDER, FOR SOLUTION ORAL DAILY PRN
Status: DISCONTINUED | OUTPATIENT
Start: 2024-04-23 | End: 2024-04-27 | Stop reason: HOSPADM

## 2024-04-23 RX ORDER — DICYCLOMINE HYDROCHLORIDE 10 MG/1
10 CAPSULE ORAL
Status: DISCONTINUED | OUTPATIENT
Start: 2024-04-23 | End: 2024-04-23

## 2024-04-23 RX ORDER — POTASSIUM CHLORIDE 20 MEQ/1
40 TABLET, EXTENDED RELEASE ORAL PRN
Status: DISCONTINUED | OUTPATIENT
Start: 2024-04-23 | End: 2024-04-27 | Stop reason: HOSPADM

## 2024-04-23 RX ORDER — PANTOPRAZOLE SODIUM 40 MG/1
40 TABLET, DELAYED RELEASE ORAL
Status: DISCONTINUED | OUTPATIENT
Start: 2024-04-24 | End: 2024-04-27 | Stop reason: HOSPADM

## 2024-04-23 RX ORDER — SODIUM CHLORIDE 9 MG/ML
INJECTION, SOLUTION INTRAVENOUS PRN
Status: DISCONTINUED | OUTPATIENT
Start: 2024-04-23 | End: 2024-04-27 | Stop reason: HOSPADM

## 2024-04-23 RX ORDER — MAGNESIUM SULFATE IN WATER 40 MG/ML
2000 INJECTION, SOLUTION INTRAVENOUS PRN
Status: DISCONTINUED | OUTPATIENT
Start: 2024-04-23 | End: 2024-04-27 | Stop reason: HOSPADM

## 2024-04-23 RX ORDER — ACETAMINOPHEN 325 MG/1
650 TABLET ORAL EVERY 6 HOURS PRN
Status: DISCONTINUED | OUTPATIENT
Start: 2024-04-23 | End: 2024-04-27 | Stop reason: HOSPADM

## 2024-04-23 RX ORDER — DICYCLOMINE HYDROCHLORIDE 10 MG/1
10 CAPSULE ORAL ONCE
Status: COMPLETED | OUTPATIENT
Start: 2024-04-23 | End: 2024-04-23

## 2024-04-23 RX ORDER — SODIUM CHLORIDE 0.9 % (FLUSH) 0.9 %
5-40 SYRINGE (ML) INJECTION PRN
Status: DISCONTINUED | OUTPATIENT
Start: 2024-04-23 | End: 2024-04-27 | Stop reason: HOSPADM

## 2024-04-23 RX ORDER — POTASSIUM CHLORIDE 20 MEQ/1
20 TABLET, EXTENDED RELEASE ORAL DAILY
Status: DISCONTINUED | OUTPATIENT
Start: 2024-04-24 | End: 2024-04-27 | Stop reason: HOSPADM

## 2024-04-23 RX ORDER — POTASSIUM CHLORIDE 7.45 MG/ML
10 INJECTION INTRAVENOUS PRN
Status: DISCONTINUED | OUTPATIENT
Start: 2024-04-23 | End: 2024-04-27 | Stop reason: HOSPADM

## 2024-04-23 RX ORDER — SODIUM CHLORIDE 0.9 % (FLUSH) 0.9 %
5-40 SYRINGE (ML) INJECTION EVERY 12 HOURS SCHEDULED
Status: DISCONTINUED | OUTPATIENT
Start: 2024-04-23 | End: 2024-04-27 | Stop reason: HOSPADM

## 2024-04-23 RX ORDER — SPIRONOLACTONE 25 MG/1
50 TABLET ORAL ONCE
Status: COMPLETED | OUTPATIENT
Start: 2024-04-23 | End: 2024-04-23

## 2024-04-23 RX ORDER — HYDRALAZINE HYDROCHLORIDE 25 MG/1
25 TABLET, FILM COATED ORAL EVERY 8 HOURS SCHEDULED
Status: DISCONTINUED | OUTPATIENT
Start: 2024-04-23 | End: 2024-04-27 | Stop reason: HOSPADM

## 2024-04-23 RX ORDER — LIDOCAINE 4 G/G
1 PATCH TOPICAL DAILY
Status: DISCONTINUED | OUTPATIENT
Start: 2024-04-23 | End: 2024-04-27 | Stop reason: HOSPADM

## 2024-04-23 RX ORDER — ONDANSETRON 2 MG/ML
4 INJECTION INTRAMUSCULAR; INTRAVENOUS EVERY 6 HOURS PRN
Status: DISCONTINUED | OUTPATIENT
Start: 2024-04-23 | End: 2024-04-27 | Stop reason: HOSPADM

## 2024-04-23 RX ORDER — TORSEMIDE 20 MG/1
40 TABLET ORAL DAILY
Status: DISCONTINUED | OUTPATIENT
Start: 2024-04-24 | End: 2024-04-27 | Stop reason: HOSPADM

## 2024-04-23 RX ORDER — ASPIRIN 81 MG/1
81 TABLET, CHEWABLE ORAL DAILY
Status: DISCONTINUED | OUTPATIENT
Start: 2024-04-24 | End: 2024-04-24

## 2024-04-23 RX ORDER — FUROSEMIDE 10 MG/ML
40 INJECTION INTRAMUSCULAR; INTRAVENOUS ONCE
Status: COMPLETED | OUTPATIENT
Start: 2024-04-23 | End: 2024-04-23

## 2024-04-23 RX ORDER — ACETAMINOPHEN 650 MG/1
650 SUPPOSITORY RECTAL EVERY 6 HOURS PRN
Status: DISCONTINUED | OUTPATIENT
Start: 2024-04-23 | End: 2024-04-27 | Stop reason: HOSPADM

## 2024-04-23 RX ADMIN — DICYCLOMINE HYDROCHLORIDE 10 MG: 10 CAPSULE ORAL at 12:55

## 2024-04-23 RX ADMIN — ACETAMINOPHEN 325MG 650 MG: 325 TABLET ORAL at 20:51

## 2024-04-23 RX ADMIN — IOPAMIDOL 75 ML: 755 INJECTION, SOLUTION INTRAVENOUS at 14:21

## 2024-04-23 RX ADMIN — HYDRALAZINE HYDROCHLORIDE 25 MG: 25 TABLET ORAL at 20:50

## 2024-04-23 RX ADMIN — APIXABAN 5 MG: 5 TABLET, FILM COATED ORAL at 20:50

## 2024-04-23 RX ADMIN — ASPIRIN 325 MG: 325 TABLET ORAL at 16:33

## 2024-04-23 RX ADMIN — SPIRONOLACTONE 50 MG: 25 TABLET ORAL at 16:33

## 2024-04-23 RX ADMIN — IOPAMIDOL 75 ML: 755 INJECTION, SOLUTION INTRAVENOUS at 13:22

## 2024-04-23 RX ADMIN — SODIUM CHLORIDE, PRESERVATIVE FREE 10 ML: 5 INJECTION INTRAVENOUS at 20:52

## 2024-04-23 RX ADMIN — FUROSEMIDE 40 MG: 10 INJECTION, SOLUTION INTRAMUSCULAR; INTRAVENOUS at 16:32

## 2024-04-23 ASSESSMENT — ENCOUNTER SYMPTOMS
RESPIRATORY NEGATIVE: 1
ABDOMINAL PAIN: 1
NAUSEA: 1
VOMITING: 1

## 2024-04-23 ASSESSMENT — LIFESTYLE VARIABLES
HOW MANY STANDARD DRINKS CONTAINING ALCOHOL DO YOU HAVE ON A TYPICAL DAY: 1 OR 2
HOW OFTEN DO YOU HAVE A DRINK CONTAINING ALCOHOL: MONTHLY OR LESS

## 2024-04-23 ASSESSMENT — PAIN DESCRIPTION - DESCRIPTORS
DESCRIPTORS: ACHING
DESCRIPTORS: CRAMPING
DESCRIPTORS: ACHING

## 2024-04-23 ASSESSMENT — PAIN SCALES - GENERAL
PAINLEVEL_OUTOF10: 5
PAINLEVEL_OUTOF10: 5
PAINLEVEL_OUTOF10: 8

## 2024-04-23 ASSESSMENT — PAIN - FUNCTIONAL ASSESSMENT: PAIN_FUNCTIONAL_ASSESSMENT: 0-10

## 2024-04-23 ASSESSMENT — PAIN DESCRIPTION - ORIENTATION
ORIENTATION: RIGHT
ORIENTATION: MID

## 2024-04-23 ASSESSMENT — PAIN DESCRIPTION - LOCATION
LOCATION: ABDOMEN
LOCATION: LEG
LOCATION: ABDOMEN

## 2024-04-23 NOTE — ED NOTES
Brachial SBP: 153 mmHg  R ankle SBP: 57 mmHg  MAISHA: 0.37     Lavell Prajapati MD  04/23/24 1449

## 2024-04-23 NOTE — ED PROVIDER NOTES
[facility] 4W MED SURG  EMERGENCY DEPARTMENT ENCOUNTER        Pt Name: Levy Vargas  MRN: [id number]  Birthdate 1965  Date of evaluation: 4/23/2024  Provider: Kathryn Mixon PA-C  PCP: Minnie Lilly DO  Note Started: 3:37 PM EDT 4/23/24       I have seen and evaluated this patient with my supervising physician Alo Monte.      CHIEF COMPLAINT       Chief Complaint   Patient presents with    Abdominal Pain     Pt to ED from home c/o SOB and abdominal pain x2 days. Pt states \"I've had diarrhea x2 days ago\" Pt rates 5/10. \"I haven't had my water pill in a couple days\"       HISTORY OF PRESENT ILLNESS: 1 or more Elements     History From: Patient            Chief Complaint: Shortness of breath and lower extremity pain    Levy Vargas is a 58 y.o. male who presents to the ED via home with a concern of shortness of breath and abdominal pain that started 2 days ago as well as worsening of right lower extremity pain that has been progressively getting worse in the past 3 months. He has history of severe PAD.  Endorses 2 episodes of diarrhea, no melena, hematochezia, and vomiting last night, no blood.  Patient takes torsemide, but has been over 1 week since last dose, he mentioned avoiding taking it because wakes him up to use the restroom.  Denies cough, fever, chest pain, urinary symptoms.    Nursing Notes were all reviewed and agreed with or any disagreements were addressed in the HPI.    REVIEW OF SYSTEMS :      Review of Systems    Positives and Pertinent negatives as per HPI.     SURGICAL HISTORY     Past Surgical History:   Procedure Laterality Date    CARDIAC CATHETERIZATION      LEG SURGERY Right 8/25/2023    RIGHT THIGH INCISION AND DRAINAGE WITH DRAIN PLACEMENT performed by Dominick Cid DO at [facility] OR    UPPER GASTROINTESTINAL ENDOSCOPY         CURRENTMEDICATIONS       Current Discharge Medication List        CONTINUE these medications which have NOT CHANGED    Details   apixaban (ELIQUIS) 5  pain Additional Contrast?->None Decision Support Exception - unselect if not a suspected or confirmed emergency medical condition->Emergency Medical Condition (MA) FINDINGS: Lower Chest: There is no consolidation or effusion. Organs: The liver, pancreas, spleen, kidneys and adrenals are stable in appearance with note made of diffuse fatty infiltration of the liver. GI/Bowel: There is no bowel dilatation, wall thickening or obstruction.  The appendix is not visualized. Pelvis: The bladder and prostate are unremarkable. Peritoneum/Retroperitoneum: There is a small to moderate amount of ascites scattered throughout the abdomen and pelvis, increased from prior.  There is no free air or adenopathy. Bones/Soft Tissues: There is no acute fracture or aggressive osseous lesion. There is moderate diffuse soft tissue anasarca     Fatty infiltration of the liver with increasing ascites.     XR CHEST (2 VW)    Result Date: 4/23/2024  EXAMINATION: TWO XRAY VIEWS OF THE CHEST 4/23/2024 1:02 pm COMPARISON: 08/03/2023 HISTORY: ORDERING SYSTEM PROVIDED HISTORY: SOB TECHNOLOGIST PROVIDED HISTORY: Reason for exam:->SOB Reason for Exam: Abdominal Pain FINDINGS: The lungs are without acute focal process.  There is no effusion or pneumothorax. The cardiomediastinal silhouette is without acute process. The osseous structures are without acute process.     No acute process.        No results found.    PROCEDURES   Unless otherwise noted below, none     Procedures    CRITICAL CARE TIME (.cctime)       PAST MEDICAL HISTORY      has a past medical history of Abdominal pain (07/29/2021), Abnormal EKG (07/28/2016), Acute pancreatitis (07/29/2021), CHF (congestive heart failure) ([facility]), Cigarette smoker (08/25/2021), Cocaine abuse (HCC) (03/28/2015), Dehydration (07/28/2016), Diabetes mellitus (HCC), History of cocaine abuse (HCC) (07/28/2016), History of MI (myocardial infarction) (07/28/2016), Hyperglycemia (07/28/2016), Hyperlipidemia,  (ZOFRAN) injection 4 mg (has no administration in time range)   polyethylene glycol (GLYCOLAX) packet 17 g (has no administration in time range)   acetaminophen (TYLENOL) tablet 650 mg (has no administration in time range)     Or   acetaminophen (TYLENOL) suppository 650 mg (has no administration in time range)   dicyclomine (BENTYL) capsule 10 mg (10 mg Oral Given 4/23/24 1255)   iopamidol (ISOVUE-370) 76 % injection 75 mL (75 mLs IntraVENous Given 4/23/24 1322)   iopamidol (ISOVUE-370) 76 % injection 75 mL (75 mLs IntraVENous Given 4/23/24 1421)   furosemide (LASIX) injection 40 mg (40 mg IntraVENous Given 4/23/24 1632)   spironolactone (ALDACTONE) tablet 50 mg (50 mg Oral Given 4/23/24 1633)   aspirin tablet 325 mg (325 mg Oral Given 4/23/24 1633)             Is this patient to be included in the SEP-1 Core Measure due to severe sepsis or septic shock?   No   Exclusion criteria - the patient is NOT to be included for SEP-1 Core Measure due to:  Infection is not suspected        Chronic Conditions affecting care:    has a past medical history of Abdominal pain (07/29/2021), Abnormal EKG (07/28/2016), Acute pancreatitis (07/29/2021), CHF (congestive heart failure) ([facility]), Cigarette smoker (08/25/2021), Cocaine abuse ([facility]) (03/28/2015), Dehydration (07/28/2016), Diabetes mellitus ([facility]), History of cocaine abuse ([facility]) (07/28/2016), History of MI (myocardial infarction) (07/28/2016), Hyperglycemia (07/28/2016), Hyperlipidemia, Hypertension, Morbid obesity with BMI of 45.0-49.9, adult (HCC) (07/28/2016), Postural dizziness (07/28/2016), and PUD (peptic ulcer disease) (03/01/2019).    CONSULTS: (Who and What was discussed)  IP CONSULT TO VASCULAR SURGERY  IP CONSULT TO VASCULAR SURGERY  IP CONSULT TO HOSPITALIST  IP CONSULT TO CARDIOLOGY      Records Reviewed (External and Source)     CC/HPI Summary, DDx, ED Course, and Reassessment:   Patient is a 58-year-old male presented to the ED with a concern of generalized

## 2024-04-23 NOTE — ED NOTES
Pt arrived from [facility] in gown. Placed on cardiac monitor and bundled. Call light left within reach.

## 2024-04-23 NOTE — ED NOTES
Pt to ED from home c/o SOB and abdominal pain x2 days. Pt states \"I've had diarrhea x2 days ago\" Pt rates 5/10. \"I haven't had my water pill in a couple days\"

## 2024-04-23 NOTE — PROGRESS NOTES
Medication Reconciliation    List of medications patient is currently taking is complete.     Source of information: 1. Conversation with patient at bedside                                      2. EPIC records      Notes regarding home medications:   1. Patient received morning medications prior to arrival to the ER today.  2. Patient's list reflects what he is suppose to be taking - he endorses some non-compliance at times.  3. Patient has not used any insulin for months - he indicates his blood sugar has been fine.  Blood glucose 135 today.    Lanre Jaimes [facility], PharmD, BCPS  4/23/2024 5:30 PM

## 2024-04-23 NOTE — PROGRESS NOTES
Patient admitted from  ED to room 4110 due abdominal pain and elevated blood pressures    Patient alert and oriented x 4    Vitals as per chart    Answers questions appropriately.   Denies any pain at this time    Patient ambulated to bathroom  Gait unsteady, uses a cane     Lungs clear bilaterally  S 1 S 2 audible,   Bowels sounds active.  Patient continent of bowel and bladder    Wound to left 2nd toe open,   Open wound to right anterior ankle, 2 scabbed  wound to right shin below the knee,   Old healed wound to dorsal right foot,  Chronic numbness to right lower extremity   Swelling noted to BLE  Thick, discolored nails on both feet noted       Orders reviewed with patient.  Patient instructed on how to use call light      Needs met   Patient left in bed  Call light  and belongings placed within reach    Report given to oncoming nurse

## 2024-04-23 NOTE — CONSULTS
[facility]  Consultation Note    4/23/2024 2:29 PM    Chief Complaint: Abdominal Pain     Reason for Consultation: Diminished Pulses    History of Present Illness:  Patient is a 58 y.o. male who presented to the ED on 4/23/2024 with complaints of Abdominal Pain. He has a significant PMH of CHF, Cocaine abuse, DM II, HLD, HTN, Right Popliteal and Tibial Angioplasty with Dr. Cid and Right Thigh I&D with Dr. Cid. The patient tells me since his procedure and surgery in August 2023, the function of his RLE has not significantly changed. He continues to be unable to dorsiflex/plantarflex his Right Ankle. He tells me he walks to the store 1-2 times per week with a cane. He initially had to stop about 8 times and now stops about 4 times each way due to fatigue in his legs. He tells me he has pain to his Right Leg which is chronic and has been ongoing since August 2023. His Right Leg pain occurs with rest and activity and improves with pain medications. We have been consulted to evaluate the patients RLE d/t diminished pulses. At present, the patient is seen resting in bed, he is alert and oriented and appears to be in stable condition.    Review of Systems  Review of Systems   Constitutional: Negative.    HENT: Negative.     Respiratory: Negative.     Cardiovascular: Negative.    Gastrointestinal:  Positive for abdominal pain, nausea and vomiting.   Musculoskeletal:  Positive for gait problem (RLE weakness).   Skin:  Positive for wound (Left foot).   Neurological:  Positive for numbness (Chronic numbness to feet bilaterally).   Psychiatric/Behavioral: Negative.          Past Medical History:   Diagnosis Date    Abdominal pain 07/29/2021    Abnormal EKG 07/28/2016    Acute pancreatitis 07/29/2021    CHF (congestive heart failure) ([facility])     Cigarette smoker 08/25/2021    Cocaine abuse ([facility]) 03/28/2015    Last Assessment & Plan:  Formatting of this note might be different from the      Imaging:   CT Abdomen/Pelvis - 4/23/2024  IMPRESSION:  Fatty infiltration of the liver with increasing ascites.    Assessment:  -Presented to ED with complaints of Abdominal Pain  -S/P Right Popliteal Suction Thrombectomy and Stenting with Dr. Cid in August 2023  -S/P Evacuation of Right Thigh Hematoma with Dr. Cid in August 2023  -Unable to dorsiflex/plantarflex Right Ankle - chronic and no foot drop  -Doppler signals to RLE remain similar to prior assessments  -Pt reports no acute changes to RLE and reports all of his symptoms are chronic and have been ongoing    Plan:   -No evidence of acute ischemia to RLE identified as pt's pain and inability to dorsiflex/plantarflex are chronic and have been ongoing since his procedures in August 2023  -Admission and further workup for abdominal pain as necessary per ED and Hospitalist  -Should pt be admitted, can consider any further testing, if pt discharges from the ED, can F/U with Vascular within 2 weeks    All pertinent information and plan of care discussed with Dr. Ziyad العلي.    All questions and concerns were addressed with the patient. I have discussed the above stated plan with the patient and the nurse. The patient verbalized understanding and agreed with the plan.    Thank you for allowing to us to participate in the care of Levy Vargas      Electronically signed by CONSUELO Garsia CNP on 4/23/2024 at 2:29 PM    VASCULAR STAFF  Seen and discussed with SHASHI Mena CNP.  Greater than 50% of evaluation including history, exam, review of old records/images, medical decision making and documentation performed personally by this MD.  Agree with above history.  Pertinent facts and findings as follow:  Immobile R foot/ankle/toes with post-ischemia neuropathy - minimal improvement since 2023 interventions.  No R foot wounds.  Intact L foot motor and sensation  Hammer toe L 2nd toe with tip ulceration - swollen distal phalanx  Pulses:   R bruit  L

## 2024-04-23 NOTE — H&P
V2.0  History and Physical      Name:  Levy Vargas /Age/Sex: 1965  (58 y.o. male)   MRN & CSN:  [id number] & [id number] Encounter Date/Time: 2024 4:59 PM EDT   Location:   PCP: Minnie Lilly DO       Hospital Day: 1    Assessment and Plan:   Levy Vargas is a 58 y.o. male with a Significant PMH as below who presented to ED with c/o abdominal distention      Plan:  History of A-fib.  Ischemic cardiomyopathy.  Ascites.  Increasing  Peripheral vascular disease  History of CVA.  High-grade internal artery carotid stenosis.    Status post left-sided Aldactone in the ER.  Monitor response, monitor labs.  Resume home medications for torsemide.    Patient does have increased ascites, fatty infiltration of the liver however LFTs within normal limits, but slightly elevated total bili suspect cardiac in origin.  Elevated troponin, cardiology consulted.    History of atrial fibrillation, continue with Eliquis, continue amiodarone, monitor hemoglobin.    Resume home medications as appropriate.    Peripheral vascular disease.  Continue statin/anticoagulation.    Hypertension.  Stable.  Resume home losartan hydralazine.    See orders.  Resume home medications as appropriate.    Disposition:   ECF/home/home health care in 2 to 3 days depending on clinical course    Diet No diet orders on file   DVT Prophylaxis Eliquis   Code Status Prior         EKG/imaging reviewed, patient seen and examined, discussed with ER as well.        History from:     patient    History of Present Illness:     Chief Complaint: Belly distention  Levy Vargas is a 58 y.o. male with significant past medical history of CHF, ischemic cardiomyopathy, cocaine abuse, hyperlipidemia, hypertension, peripheral artery disease with right popliteal and tibial angioplasty in the past, presented to the hospital with abdominal distention, admits of having bilateral lower extremity pain which is chronic however no new findings, does have  daily 1/22/24   Farooq Perez MD   dapagliflozin (FARXIGA) 5 MG tablet TAKE 1 TABLET BY MOUTH EVERY DAY IN THE MORNING 1/22/24   Farooq Perez MD   Torsemide 40 MG TABS Take 40 mg by mouth daily 1/22/24   Farooq Perez MD   hydrALAZINE (APRESOLINE) 25 MG tablet Take 1 tablet by mouth every 8 hours 1/22/24   Faroqo Perez MD   pantoprazole (PROTONIX) 40 MG tablet Take 1 tablet by mouth every morning (before breakfast) 1/22/24   Farooq Perez MD   amiodarone (CORDARONE) 200 MG tablet TAKE 1 TABLET BY MOUTH EVERY DAY 1/22/24   Farooq Perez MD   losartan (COZAAR) 25 MG tablet Take 1 tablet by mouth daily 1/22/24   Farooq Perez MD   Misc. Devices (DIGITAL GLASS SCALE) MISC 1 each by Does not apply route daily 11/20/23   Minnie Lilly,    ferrous sulfate 324 (65 Fe) MG EC tablet Take 1 tablet by mouth daily (with breakfast) 9/2/23   Neal Leiva MD   blood glucose monitor strips Test 4 times a day & as needed for symptoms of irregular blood glucose. Dispense sufficient amount for indicated testing frequency plus additional to accommodate PRN testing needs. 8/7/23   Leslie Galeano, CONSUELO - CNP   insulin glargine (LANTUS) 100 UNIT/ML injection vial Inject 20 Units into the skin daily 8/8/23   Leslie Galeano, APRN - CNP   fluticasone (FLONASE) 50 MCG/ACT nasal spray 2 sprays by Nasal route daily 8/7/23   Leslie Galeano, APRN - CNP   potassium chloride (KLOR-CON M) 20 MEQ extended release tablet Take 1 tablet by mouth daily 8/8/23   Leslie Galeano, APRN - CNP   Insulin Syringe-Needle U-100 30G X 5/16\" 1 ML MISC 5 each by Does not apply route daily 8/7/23   Leslie Galeano, APRN - CNP   insulin lispro (HUMALOG) 100 UNIT/ML SOLN injection vial Inject 7 Units into the skin 3 times daily (with meals) **Medium Dose Corrective Algorithm**  Glucose: Dose:    No Insulin  200-249 2 Units  250-299 4 Units  300-349 6 Units  Over 349 8 Units and notify

## 2024-04-24 LAB
ALBUMIN SERPL-MCNC: 3.5 G/DL (ref 3.4–5)
ALBUMIN/GLOB SERPL: 0.9 {RATIO} (ref 1.1–2.2)
ALP SERPL-CCNC: 155 U/L (ref 40–129)
ALT SERPL-CCNC: 14 U/L (ref 10–40)
ANION GAP SERPL CALCULATED.3IONS-SCNC: 11 MMOL/L (ref 3–16)
AST SERPL-CCNC: 15 U/L (ref 15–37)
BASOPHILS # BLD: 0 K/UL (ref 0–0.2)
BASOPHILS NFR BLD: 0.8 %
BILIRUB SERPL-MCNC: 0.8 MG/DL (ref 0–1)
BUN SERPL-MCNC: 16 MG/DL (ref 7–20)
CALCIUM SERPL-MCNC: 8.1 MG/DL (ref 8.3–10.6)
CHLORIDE SERPL-SCNC: 106 MMOL/L (ref 99–110)
CO2 SERPL-SCNC: 23 MMOL/L (ref 21–32)
CREAT SERPL-MCNC: 1 MG/DL (ref 0.9–1.3)
DEPRECATED RDW RBC AUTO: 16.5 % (ref 12.4–15.4)
EOSINOPHIL # BLD: 0.1 K/UL (ref 0–0.6)
EOSINOPHIL NFR BLD: 2 %
GFR SERPLBLD CREATININE-BSD FMLA CKD-EPI: 86 ML/MIN/{1.73_M2}
GLUCOSE BLD-MCNC: 114 MG/DL (ref 70–99)
GLUCOSE BLD-MCNC: 160 MG/DL (ref 70–99)
GLUCOSE BLD-MCNC: 178 MG/DL (ref 70–99)
GLUCOSE BLD-MCNC: 186 MG/DL (ref 70–99)
GLUCOSE SERPL-MCNC: 113 MG/DL (ref 70–99)
HCT VFR BLD AUTO: 35.4 % (ref 40.5–52.5)
HGB BLD-MCNC: 11.4 G/DL (ref 13.5–17.5)
INR PPP: 1.38 (ref 0.85–1.15)
LYMPHOCYTES # BLD: 0.7 K/UL (ref 1–5.1)
LYMPHOCYTES NFR BLD: 15.8 %
MAGNESIUM SERPL-MCNC: 2 MG/DL (ref 1.8–2.4)
MCH RBC QN AUTO: 29 PG (ref 26–34)
MCHC RBC AUTO-ENTMCNC: 32.1 G/DL (ref 31–36)
MCV RBC AUTO: 90.3 FL (ref 80–100)
MONOCYTES # BLD: 0.5 K/UL (ref 0–1.3)
MONOCYTES NFR BLD: 12.3 %
NEUTROPHILS # BLD: 2.9 K/UL (ref 1.7–7.7)
NEUTROPHILS NFR BLD: 69.1 %
PERFORMED ON: ABNORMAL
PLATELET # BLD AUTO: 176 K/UL (ref 135–450)
PMV BLD AUTO: 9.8 FL (ref 5–10.5)
POTASSIUM SERPL-SCNC: 3.2 MMOL/L (ref 3.5–5.1)
PROT SERPL-MCNC: 7.2 G/DL (ref 6.4–8.2)
PROTHROMBIN TIME: 17.1 SEC (ref 11.9–14.9)
RBC # BLD AUTO: 3.92 M/UL (ref 4.2–5.9)
SODIUM SERPL-SCNC: 140 MMOL/L (ref 136–145)
WBC # BLD AUTO: 4.2 K/UL (ref 4–11)

## 2024-04-24 PROCEDURE — 83735 ASSAY OF MAGNESIUM: CPT

## 2024-04-24 PROCEDURE — 0W9G3ZZ DRAINAGE OF PERITONEAL CAVITY, PERCUTANEOUS APPROACH: ICD-10-PCS | Performed by: INTERNAL MEDICINE

## 2024-04-24 PROCEDURE — 6370000000 HC RX 637 (ALT 250 FOR IP): Performed by: INTERNAL MEDICINE

## 2024-04-24 PROCEDURE — 2580000003 HC RX 258: Performed by: INTERNAL MEDICINE

## 2024-04-24 PROCEDURE — 80053 COMPREHEN METABOLIC PANEL: CPT

## 2024-04-24 PROCEDURE — 85025 COMPLETE CBC W/AUTO DIFF WBC: CPT

## 2024-04-24 PROCEDURE — 6370000000 HC RX 637 (ALT 250 FOR IP): Performed by: NURSE PRACTITIONER

## 2024-04-24 PROCEDURE — 94760 N-INVAS EAR/PLS OXIMETRY 1: CPT

## 2024-04-24 PROCEDURE — 36415 COLL VENOUS BLD VENIPUNCTURE: CPT

## 2024-04-24 PROCEDURE — G0378 HOSPITAL OBSERVATION PER HR: HCPCS

## 2024-04-24 PROCEDURE — 99221 1ST HOSP IP/OBS SF/LOW 40: CPT | Performed by: SURGERY

## 2024-04-24 PROCEDURE — 89051 BODY FLUID CELL COUNT: CPT

## 2024-04-24 PROCEDURE — 85610 PROTHROMBIN TIME: CPT

## 2024-04-24 RX ADMIN — DICLOFENAC SODIUM 4 G: 10 GEL TOPICAL at 00:12

## 2024-04-24 RX ADMIN — TORSEMIDE 40 MG: 20 TABLET ORAL at 08:50

## 2024-04-24 RX ADMIN — ACETAMINOPHEN 325MG 650 MG: 325 TABLET ORAL at 14:36

## 2024-04-24 RX ADMIN — HYDRALAZINE HYDROCHLORIDE 25 MG: 25 TABLET ORAL at 05:23

## 2024-04-24 RX ADMIN — APIXABAN 5 MG: 5 TABLET, FILM COATED ORAL at 21:20

## 2024-04-24 RX ADMIN — ASPIRIN 81 MG: 81 TABLET, CHEWABLE ORAL at 08:50

## 2024-04-24 RX ADMIN — POTASSIUM CHLORIDE 20 MEQ: 1500 TABLET, EXTENDED RELEASE ORAL at 08:50

## 2024-04-24 RX ADMIN — PANTOPRAZOLE SODIUM 40 MG: 40 TABLET, DELAYED RELEASE ORAL at 05:23

## 2024-04-24 RX ADMIN — HYDRALAZINE HYDROCHLORIDE 25 MG: 25 TABLET ORAL at 14:36

## 2024-04-24 RX ADMIN — LOSARTAN POTASSIUM 25 MG: 25 TABLET, FILM COATED ORAL at 08:50

## 2024-04-24 RX ADMIN — POTASSIUM BICARBONATE 40 MEQ: 391 TABLET, EFFERVESCENT ORAL at 08:50

## 2024-04-24 RX ADMIN — SODIUM CHLORIDE, PRESERVATIVE FREE 10 ML: 5 INJECTION INTRAVENOUS at 21:21

## 2024-04-24 RX ADMIN — APIXABAN 5 MG: 5 TABLET, FILM COATED ORAL at 08:50

## 2024-04-24 RX ADMIN — AMIODARONE HYDROCHLORIDE 200 MG: 200 TABLET ORAL at 08:50

## 2024-04-24 RX ADMIN — SODIUM CHLORIDE, PRESERVATIVE FREE 10 ML: 5 INJECTION INTRAVENOUS at 08:56

## 2024-04-24 RX ADMIN — HYDRALAZINE HYDROCHLORIDE 25 MG: 25 TABLET ORAL at 21:20

## 2024-04-24 ASSESSMENT — PAIN DESCRIPTION - LOCATION: LOCATION: LEG

## 2024-04-24 ASSESSMENT — PAIN SCALES - GENERAL: PAINLEVEL_OUTOF10: 3

## 2024-04-24 NOTE — CARE COORDINATION
Case Management Assessment  Initial Evaluation    Date/Time of Evaluation: 4/24/2024 3:54 PM  Assessment Completed by: CHLOE Moreno    If patient is discharged prior to next notation, then this note serves as note for discharge by case management.    Patient Name: Levy Vargas                   YOB: 1965  Diagnosis: Generalized abdominal pain [R10.84]  Elevated troponin [R79.89]  Goals of care, counseling/discussion [Z71.89]  Ascites of liver [R18.8]  Ascites due to alcoholic cirrhosis (HCC) [K70.31]                   Date / Time: 4/23/2024 11:50 AM    Patient Admission Status: Observation   Readmission Risk (Low < 19, Mod (19-27), High > 27): Readmission Risk Score: 25.8    Current PCP: Minnie Lilly, DO  PCP verified by CM? (P) Yes    Chart Reviewed: Yes      History Provided by: (P) Patient  Patient Orientation: (P) Alert and Oriented    Patient Cognition:  Alert and Oriented     Hospitalization in the last 30 days (Readmission):  Yes    If yes, Readmission Assessment in  Navigator will be completed.    Advance Directives:      Code Status: Full Code   Patient's Primary Decision Maker is: (P) Legal Next of Kin (Silvestreaudrey Steve)    Primary Decision Maker: Ian Wilson - Child - [phone]    Secondary Decision Maker: Curly Vargas - Brother/Sister - [phone]    Discharge Planning:    Patient lives with: (P) Spouse/Significant Other Type of Home: (P) House  Primary Care Giver: (P) Self  Patient Support Systems include: (P) Spouse/Significant Other, Children   Current Financial resources: (P) Other (Comment) (community action)  Current community resources: (P) Transportation  Current services prior to admission: (P) C-pap            Current DME:              Type of Home Care services:  (P) None    ADLS  Prior functional level: (P) Independent in ADLs/IADLs  Current functional level: (P) Independent in ADLs/IADLs    PT AM-PAC:   /24  OT AM-PAC:   /24    Family can provide  To Admission C-pap   Potential Assistance Needed N/A   DME Ordered? Cane   Potential Assistance Purchasing Medications No   Type of Home Care Services None   Patient expects to be discharged to: House   One/Two Story Residence Two story  (8 steps to enter)   Lift Chair Available No   History of falls? 0   Services At/After Discharge   Services At/After Discharge None;Home Health;Skilled Nursing Facility (SNF)    Resource Information Provided? No   Mode of Transport at Discharge Self  (possible [employer])

## 2024-04-24 NOTE — CARE COORDINATION
Wound referral noted. Pt has podiatry consult pending; vascular following. Please re-consult if needed. Joseph Mustafa, MSN, RN, CWOCN

## 2024-04-24 NOTE — PROGRESS NOTES
Stat INR remains uncollected. Called lab to inquire about timing and was told \"it's not on our board\". Lab requested this nurse re-enter the order.

## 2024-04-24 NOTE — PROGRESS NOTES
Hospitalist Progress Note      PCP: Minnie Lilly DO    Date of Admission: 4/23/2024    Subjective: cont to feel abd distended, SOB better    Medications:  Reviewed    Infusion Medications    sodium chloride       Scheduled Medications    amiodarone  200 mg Oral Daily    apixaban  5 mg Oral BID    hydrALAZINE  25 mg Oral 3 times per day    losartan  25 mg Oral Daily    pantoprazole  40 mg Oral QAM AC    potassium chloride  20 mEq Oral Daily    torsemide  40 mg Oral Daily    sodium chloride flush  5-40 mL IntraVENous 2 times per day    lidocaine  1 patch TransDERmal Daily     PRN Meds: sodium chloride flush, sodium chloride, potassium chloride **OR** potassium alternative oral replacement **OR** potassium chloride, magnesium sulfate, ondansetron **OR** ondansetron, polyethylene glycol, acetaminophen **OR** acetaminophen, diclofenac sodium    No intake or output data in the 24 hours ending 04/24/24 1533    Physical Exam Performed:    BP (!) 142/93   Pulse 59   Temp 98.4 °F (36.9 °C)   Resp 20   Ht 1.854 m (6' 1\")   Wt 135.5 kg (298 lb 11.2 oz)   SpO2 99%   BMI 39.41 kg/m²     General: Awake, oriented  HEENT: Pupils round, reacting to light  Heart: S1 S2 heard, no murmurs  Lung: Clear b/l  Abd: Soft, distended, not tender, BS +  Extr: No cyanosis or clubbing  Neuro: No focal deficits.    Labs:   Recent Labs     04/23/24  1208 04/24/24  0447   WBC 4.5 4.2   HGB 12.5* 11.4*   HCT 39.8* 35.4*    176     Recent Labs     04/23/24  1208 04/24/24  0447    140   K 3.5 3.2*    106   CO2 23 23   BUN 16 16   CREATININE 0.8* 1.0   CALCIUM 8.6 8.1*     Recent Labs     04/23/24  1208 04/24/24  0447   AST 24 15   ALT 20 14   BILITOT 1.1* 0.8   ALKPHOS 185* 155*     No results for input(s): \"INR\" in the last 72 hours.  Recent Labs     04/23/24  1208 04/23/24  1532   TROPHS 74* 78*       Urinalysis:      Lab Results   Component Value Date/Time    NITRU Negative 08/03/2023 09:20 PM    WBCUA 0  08/03/2023 09:20 PM    BACTERIA None Seen 08/03/2023 09:20 PM    RBCUA 0 08/03/2023 09:20 PM    BLOODU Negative 08/03/2023 09:20 PM    SPECGRAV 1.005 08/03/2023 09:20 PM    GLUCOSEU Negative 08/03/2023 09:20 PM       Radiology:  CT CHEST PULMONARY EMBOLISM W CONTRAST   Final Result   No evidence of pulmonary embolism or acute pulmonary abnormality.      Enlarged caliber of the main pulmonary artery measuring 4 cm may be found in   the setting of pulmonary artery hypertension.         CT ABDOMEN PELVIS W IV CONTRAST Additional Contrast? None   Final Result   Fatty infiltration of the liver with increasing ascites.         XR CHEST (2 VW)   Final Result   No acute process.         VL Extremity Venous Right    (Results Pending)   US GUIDED PARACENTESIS    (Results Pending)       IP CONSULT TO VASCULAR SURGERY  IP CONSULT TO HOSPITALIST  IP CONSULT TO CARDIOLOGY  IP CONSULT TO PODIATRY    Assessment/Plan:    Active Hospital Problems    Diagnosis     Ascites due to alcoholic cirrhosis (HCC) [K70.31]      History of A-fib.  Ischemic cardiomyopathy.  Ascites.  Increasing  Peripheral vascular disease  History of CVA.  High-grade internal artery carotid stenosis.     Ordered IR for paracentesis, check ascitis fluid cx and tests  Elevated troponin, cardiology consulted.  Last echo with EF 15 - 20%, pt states he doesn't f/u with cardio     History of atrial fibrillation, continue with Eliquis, continue amiodarone, monitor hemoglobin. Cardio consulted     Resume home medications as appropriate.     Peripheral vascular disease.  Continue statin/anticoagulation.     Hypertension.  Stable.  Resume home losartan hydralazine.     See orders.  Resume home medications as appropriate        Diet: ADULT DIET; Regular; No Added Salt (3-4 gm); 2000 ml  Code Status: Full Code  PT/OT Eval Status: ordered    Dispo - cont care      Karen García MD

## 2024-04-24 NOTE — PROGRESS NOTES
HEART FAILURE CARE PLAN:    Comorbidities Reviewed: Yes   Patient has a past medical history of Abdominal pain, Abnormal EKG, Acute pancreatitis, CHF (congestive heart failure) (HCC), Cigarette smoker, Cocaine abuse (HCC), Dehydration, Diabetes mellitus (HCC), History of cocaine abuse (HCC), History of MI (myocardial infarction), Hyperglycemia, Hyperlipidemia, Hypertension, Morbid obesity with BMI of 45.0-49.9, adult (HCC), Postural dizziness, and PUD (peptic ulcer disease).     Weights Reviewed: Yes   Admission weight: 135 kg (297 lb 9.9 oz)   Wt Readings from Last 3 Encounters:   04/24/24 135.5 kg (298 lb 11.2 oz)   09/03/23 135 kg (297 lb 9.9 oz)   08/07/23 (!) 150.5 kg (331 lb 12.7 oz)     Intake & Output Reviewed: Yes   No intake or output data in the 24 hours ending 04/24/24 0940    ECHOCARDIOGRAM Reviewed: Yes   Patient's Ejection Fraction (EF) is less than or equal to 40%. Discuss HFrEF Guideline Directed Medical Therapy (GDMT) with Cardiologist or Hospitalist:          Medications Reviewed: Yes   SCHEDULED HOSPITAL MEDICATIONS:   amiodarone  200 mg Oral Daily    apixaban  5 mg Oral BID    aspirin  81 mg Oral Daily    hydrALAZINE  25 mg Oral 3 times per day    losartan  25 mg Oral Daily    pantoprazole  40 mg Oral QAM AC    potassium chloride  20 mEq Oral Daily    torsemide  40 mg Oral Daily    sodium chloride flush  5-40 mL IntraVENous 2 times per day    lidocaine  1 patch TransDERmal Daily     HOME MEDICATIONS:  Prior to Admission medications    Medication Sig Start Date End Date Taking? Authorizing Provider   apixaban (ELIQUIS) 5 MG TABS tablet Take 1 tablet by mouth 2 times daily 1/22/24   Farooq Perez MD   dapagliflozin (FARXIGA) 5 MG tablet TAKE 1 TABLET BY MOUTH EVERY DAY IN THE MORNING  Patient taking differently: Take 1 tablet by mouth every morning TAKE 1 TABLET BY MOUTH EVERY DAY IN THE MORNING 1/22/24   Farooq Perez MD   Torsemide 40 MG TABS Take 40 mg by mouth daily 1/22/24   Chris,

## 2024-04-24 NOTE — FLOWSHEET NOTE
04/24/24 0900   Vital Signs   Temp 97.5 °F (36.4 °C)   Pulse 57   Respirations 20   BP (!) 154/92   MAP (Calculated) 113   MAP (mmHg) 113   Oxygen Therapy   SpO2 100 %       Lab Results   Component Value Date/Time    WBC 4.2 04/24/2024 04:47 AM    HGB 11.4 (L) 04/24/2024 04:47 AM    HCT 35.4 (L) 04/24/2024 04:47 AM     04/24/2024 04:47 AM     04/24/2024 04:47 AM    K 3.2 (L) 04/24/2024 04:47 AM    BUN 16 04/24/2024 04:47 AM    CREATININE 1.0 04/24/2024 04:47 AM    MG 2.00 04/24/2024 04:47 AM        AM Assessment completed.  Patient in bed.  Awake, Alert and oriented. Respirations easy unlabored.    Pain/Discomfort is being managed with PRN analgesics per MD orders (See MAR). Patient is able to express and rate pain using numerical scale. Potassium replaced per PRN protocol.   Plan of care, education and safety measures reviewed and mutually agreed upon with the patient.   Calls appropriately. Needed items including call light in reach and exit alarm in place.

## 2024-04-24 NOTE — PROGRESS NOTES
VASCULAR    Seen for chronic R foot numbness and L 2nd toe ulcer.  No changes last 24 hrs.    VSS Afeb  Exam stable without change    A/P: Post-ischemic neuropathy R foot   L 2nd toe ulcer - will get foot Xrya and podiatry input for management.   Will perform bedside ABIs as baseline to supplement lab results.   No plans for vascular intervention.   May benefit from addition of Gabapentin at discretion of medical service.   Pain management per admitting service.    Aakash العلي

## 2024-04-24 NOTE — PROGRESS NOTES
VASCULAR SURGERY    L  R   150/156 = 0.96 DP -   160/156 = 1.02 PT 78/156 = 0.5            -L PT MAISHA = 1.02  -R PT MAISHA = 0.5    Electronically signed by CONSUELO Garsia - CNP on 4/24/2024 at 10:08 AM

## 2024-04-24 NOTE — PLAN OF CARE
Problem: Discharge Planning  Goal: Discharge to home or other facility with appropriate resources  4/24/2024 0931 by Gianna Khan, RN  Outcome: Progressing       Problem: Skin/Tissue Integrity  Goal: Absence of new skin breakdown  Description: 1.  Monitor for areas of redness and/or skin breakdown  2.  Assess vascular access sites hourly  3.  Every 4-6 hours minimum:  Change oxygen saturation probe site  4.  Every 4-6 hours:  If on nasal continuous positive airway pressure, respiratory therapy assess nares and determine need for appliance change or resting period.  4/24/2024 0931 by Gianna Khan, RN  Outcome: Progressing     Problem: Chronic Conditions and Co-morbidities  Goal: Patient's chronic conditions and co-morbidity symptoms are monitored and maintained or improved  Outcome: Progressing   Problem: Safety - Adult  Goal: Free from fall injury  4/24/2024 0931 by Gianna Khan, RN  Outcome: Progressing

## 2024-04-25 ENCOUNTER — APPOINTMENT (OUTPATIENT)
Dept: ULTRASOUND IMAGING | Age: 59
DRG: 280 | End: 2024-04-25
Payer: COMMERCIAL

## 2024-04-25 PROBLEM — R10.9 ABDOMINAL PAIN: Status: ACTIVE | Noted: 2024-04-25

## 2024-04-25 LAB
ANION GAP SERPL CALCULATED.3IONS-SCNC: 13 MMOL/L (ref 3–16)
APPEARANCE FLUID: CLEAR
BASOPHILS # BLD: 0 K/UL (ref 0–0.2)
BASOPHILS NFR BLD: 1 %
BASOPHILS NFR FLD MANUAL: 1 %
BDY FLUID QUALITY: NORMAL
BUN SERPL-MCNC: 19 MG/DL (ref 7–20)
CALCIUM SERPL-MCNC: 8.6 MG/DL (ref 8.3–10.6)
CELL COUNT FLUID TYPE: NORMAL
CHLORIDE SERPL-SCNC: 101 MMOL/L (ref 99–110)
CO2 SERPL-SCNC: 24 MMOL/L (ref 21–32)
COLOR FLUID: YELLOW
CREAT SERPL-MCNC: 1.1 MG/DL (ref 0.9–1.3)
DEPRECATED RDW RBC AUTO: 16.6 % (ref 12.4–15.4)
EOSINOPHIL # BLD: 0.1 K/UL (ref 0–0.6)
EOSINOPHIL NFR BLD: 1.9 %
GFR SERPLBLD CREATININE-BSD FMLA CKD-EPI: 77 ML/MIN/{1.73_M2}
GLUCOSE BLD-MCNC: 122 MG/DL (ref 70–99)
GLUCOSE BLD-MCNC: 132 MG/DL (ref 70–99)
GLUCOSE BLD-MCNC: 147 MG/DL (ref 70–99)
GLUCOSE BLD-MCNC: 186 MG/DL (ref 70–99)
GLUCOSE FLD-MCNC: 146 MG/DL
GLUCOSE SERPL-MCNC: 181 MG/DL (ref 70–99)
HCT VFR BLD AUTO: 38.8 % (ref 40.5–52.5)
HGB BLD-MCNC: 12.3 G/DL (ref 13.5–17.5)
LDH FLD L TO P-CCNC: 77 U/L
LYMPHOCYTES # BLD: 0.7 K/UL (ref 1–5.1)
LYMPHOCYTES NFR BLD: 18.4 %
LYMPHOCYTES NFR FLD: 25 %
MACROPHAGES # FLD: 37 %
MCH RBC QN AUTO: 28.8 PG (ref 26–34)
MCHC RBC AUTO-ENTMCNC: 31.8 G/DL (ref 31–36)
MCV RBC AUTO: 90.4 FL (ref 80–100)
MESOTHL CELL NFR FLD: 1 %
MONOCYTES # BLD: 0.4 K/UL (ref 0–1.3)
MONOCYTES NFR BLD: 9.3 %
MONOCYTES NFR FLD: 22 %
NEUTROPHIL, FLUID: 14 %
NEUTROPHILS # BLD: 2.8 K/UL (ref 1.7–7.7)
NEUTROPHILS NFR BLD: 69.4 %
NUC CELL # FLD: 188 /CUMM
PATH REV: YES
PERFORMED ON: ABNORMAL
PLATELET # BLD AUTO: 193 K/UL (ref 135–450)
PMV BLD AUTO: 9.8 FL (ref 5–10.5)
POTASSIUM SERPL-SCNC: 3.2 MMOL/L (ref 3.5–5.1)
PROT FLD-MCNC: 3.3 G/DL
RBC # BLD AUTO: 4.29 M/UL (ref 4.2–5.9)
RBC FLUID: 2000 /CUMM
SODIUM SERPL-SCNC: 138 MMOL/L (ref 136–145)
SPECIMEN SOURCE FLD: NORMAL
SPECIMEN VOL FLD: 900 ML
TOTAL CELLS COUNTED FLD: 100
WBC # BLD AUTO: 4 K/UL (ref 4–11)

## 2024-04-25 PROCEDURE — 6370000000 HC RX 637 (ALT 250 FOR IP): Performed by: INTERNAL MEDICINE

## 2024-04-25 PROCEDURE — 6370000000 HC RX 637 (ALT 250 FOR IP): Performed by: NURSE PRACTITIONER

## 2024-04-25 PROCEDURE — 97161 PT EVAL LOW COMPLEX 20 MIN: CPT

## 2024-04-25 PROCEDURE — 97165 OT EVAL LOW COMPLEX 30 MIN: CPT

## 2024-04-25 PROCEDURE — 36415 COLL VENOUS BLD VENIPUNCTURE: CPT

## 2024-04-25 PROCEDURE — 82945 GLUCOSE OTHER FLUID: CPT

## 2024-04-25 PROCEDURE — APPNB30 APP NON BILLABLE TIME 0-30 MINS: Performed by: NURSE PRACTITIONER

## 2024-04-25 PROCEDURE — 2709999900 US GUIDED PARACENTESIS

## 2024-04-25 PROCEDURE — G0378 HOSPITAL OBSERVATION PER HR: HCPCS

## 2024-04-25 PROCEDURE — 83615 LACTATE (LD) (LDH) ENZYME: CPT

## 2024-04-25 PROCEDURE — 97116 GAIT TRAINING THERAPY: CPT

## 2024-04-25 PROCEDURE — 2580000003 HC RX 258: Performed by: INTERNAL MEDICINE

## 2024-04-25 PROCEDURE — 87070 CULTURE OTHR SPECIMN AEROBIC: CPT

## 2024-04-25 PROCEDURE — 84157 ASSAY OF PROTEIN OTHER: CPT

## 2024-04-25 PROCEDURE — 99223 1ST HOSP IP/OBS HIGH 75: CPT | Performed by: INTERNAL MEDICINE

## 2024-04-25 PROCEDURE — 1200000000 HC SEMI PRIVATE

## 2024-04-25 PROCEDURE — 87205 SMEAR GRAM STAIN: CPT

## 2024-04-25 PROCEDURE — 80048 BASIC METABOLIC PNL TOTAL CA: CPT

## 2024-04-25 PROCEDURE — 97530 THERAPEUTIC ACTIVITIES: CPT

## 2024-04-25 PROCEDURE — 85025 COMPLETE CBC W/AUTO DIFF WBC: CPT

## 2024-04-25 RX ORDER — TRAMADOL HYDROCHLORIDE 50 MG/1
50 TABLET ORAL EVERY 6 HOURS PRN
Status: DISCONTINUED | OUTPATIENT
Start: 2024-04-25 | End: 2024-04-27 | Stop reason: HOSPADM

## 2024-04-25 RX ORDER — METHOCARBAMOL 750 MG/1
750 TABLET, FILM COATED ORAL 3 TIMES DAILY
Status: DISCONTINUED | OUTPATIENT
Start: 2024-04-25 | End: 2024-04-27 | Stop reason: HOSPADM

## 2024-04-25 RX ADMIN — SODIUM CHLORIDE, PRESERVATIVE FREE 10 ML: 5 INJECTION INTRAVENOUS at 20:57

## 2024-04-25 RX ADMIN — APIXABAN 5 MG: 5 TABLET, FILM COATED ORAL at 20:56

## 2024-04-25 RX ADMIN — METHOCARBAMOL TABLETS 750 MG: 750 TABLET, COATED ORAL at 14:53

## 2024-04-25 RX ADMIN — LOSARTAN POTASSIUM 25 MG: 25 TABLET, FILM COATED ORAL at 08:35

## 2024-04-25 RX ADMIN — HYDRALAZINE HYDROCHLORIDE 25 MG: 25 TABLET ORAL at 05:17

## 2024-04-25 RX ADMIN — METHOCARBAMOL TABLETS 750 MG: 750 TABLET, COATED ORAL at 20:56

## 2024-04-25 RX ADMIN — TORSEMIDE 40 MG: 20 TABLET ORAL at 08:35

## 2024-04-25 RX ADMIN — HYDRALAZINE HYDROCHLORIDE 25 MG: 25 TABLET ORAL at 14:53

## 2024-04-25 RX ADMIN — SODIUM CHLORIDE, PRESERVATIVE FREE 10 ML: 5 INJECTION INTRAVENOUS at 08:36

## 2024-04-25 RX ADMIN — POTASSIUM CHLORIDE 20 MEQ: 1500 TABLET, EXTENDED RELEASE ORAL at 08:35

## 2024-04-25 RX ADMIN — TRAMADOL HYDROCHLORIDE 50 MG: 50 TABLET ORAL at 14:53

## 2024-04-25 RX ADMIN — APIXABAN 5 MG: 5 TABLET, FILM COATED ORAL at 08:35

## 2024-04-25 RX ADMIN — PANTOPRAZOLE SODIUM 40 MG: 40 TABLET, DELAYED RELEASE ORAL at 05:17

## 2024-04-25 RX ADMIN — AMIODARONE HYDROCHLORIDE 200 MG: 200 TABLET ORAL at 08:35

## 2024-04-25 RX ADMIN — HYDRALAZINE HYDROCHLORIDE 25 MG: 25 TABLET ORAL at 22:27

## 2024-04-25 ASSESSMENT — PAIN DESCRIPTION - ORIENTATION
ORIENTATION: RIGHT;LEFT
ORIENTATION: RIGHT

## 2024-04-25 ASSESSMENT — PAIN DESCRIPTION - DESCRIPTORS
DESCRIPTORS: THROBBING
DESCRIPTORS: ACHING

## 2024-04-25 ASSESSMENT — PAIN SCALES - GENERAL
PAINLEVEL_OUTOF10: 4
PAINLEVEL_OUTOF10: 9

## 2024-04-25 ASSESSMENT — PAIN - FUNCTIONAL ASSESSMENT: PAIN_FUNCTIONAL_ASSESSMENT: ACTIVITIES ARE NOT PREVENTED

## 2024-04-25 ASSESSMENT — PAIN DESCRIPTION - LOCATION
LOCATION: GENERALIZED
LOCATION: FOOT

## 2024-04-25 NOTE — PROGRESS NOTES
Hospitalist Progress Note      PCP: Minnie Lilly DO    Date of Admission: 4/23/2024    Subjective: had paracentesis with 1.7L fluid removed, c/o pain in rt LE and requesting oxycodone    Medications:  Reviewed    Infusion Medications    sodium chloride       Scheduled Medications    methocarbamol  750 mg Oral TID    amiodarone  200 mg Oral Daily    apixaban  5 mg Oral BID    hydrALAZINE  25 mg Oral 3 times per day    losartan  25 mg Oral Daily    pantoprazole  40 mg Oral QAM AC    potassium chloride  20 mEq Oral Daily    torsemide  40 mg Oral Daily    sodium chloride flush  5-40 mL IntraVENous 2 times per day    lidocaine  1 patch TransDERmal Daily     PRN Meds: traMADol, sodium chloride flush, sodium chloride, potassium chloride **OR** potassium alternative oral replacement **OR** potassium chloride, magnesium sulfate, ondansetron **OR** ondansetron, polyethylene glycol, acetaminophen **OR** acetaminophen, diclofenac sodium      Intake/Output Summary (Last 24 hours) at 4/25/2024 1719  Last data filed at 4/25/2024 1531  Gross per 24 hour   Intake --   Output 6050 ml   Net -6050 ml       Physical Exam Performed:    BP (!) 164/80   Pulse 59   Temp 98.7 °F (37.1 °C) (Oral)   Resp 16   Ht 1.854 m (6' 1\")   Wt 135.5 kg (298 lb 11.6 oz)   SpO2 100%   BMI 39.41 kg/m²       General: Awake, oriented  HEENT: Pupils round, reacting to light  Heart: S1 S2 heard, no murmurs  Lung: Clear b/l  Abd: Soft, distended, not tender, BS +  Extr: No cyanosis or clubbing  Neuro: No focal deficits.    Labs:   Recent Labs     04/23/24  1208 04/24/24  0447 04/25/24  1034   WBC 4.5 4.2 4.0   HGB 12.5* 11.4* 12.3*   HCT 39.8* 35.4* 38.8*    176 193     Recent Labs     04/23/24  1208 04/24/24  0447 04/25/24  1034    140 138   K 3.5 3.2* 3.2*    106 101   CO2 23 23 24   BUN 16 16 19   CREATININE 0.8* 1.0 1.1   CALCIUM 8.6 8.1* 8.6     Recent Labs     04/23/24  1208 04/24/24  0447   AST 24 15   ALT 20 14

## 2024-04-25 NOTE — PROGRESS NOTES
Flex Sig scheduled at [facility] [id number] with Dr. [facility]  Progress Note    4/25/2024 9:44 AM    Chief Complaint: Abdominal Pain     Reason for Consult: Diminished Pulses    Subjective: Pt seen sitting on edge of bed, reports RLE pain is unchanged, abdominal pain slightly improved since admission    Vital Signs:  Vitals:    04/24/24 1130 04/24/24 2100 04/25/24 0500 04/25/24 0830   BP: (!) 142/93 (!) 153/55  (!) 164/80   Pulse: 59 64  59   Resp: 20 16  16   Temp: 98.4 °F (36.9 °C) 98.1 °F (36.7 °C)  98.7 °F (37.1 °C)   TempSrc:  Oral  Oral   SpO2: 99% 100%  100%   Weight:   135.5 kg (298 lb 11.6 oz)    Height:           I/O:    Intake/Output Summary (Last 24 hours) at 4/25/2024 0944  Last data filed at 4/25/2024 0943  Gross per 24 hour   Intake --   Output 3700 ml   Net -3700 ml       Physical Exam:   General: no apparent distress, alert and oriented, afebrile  Chest/Lungs: non labored breathing no tachypnea, retractions or cyanosis  Abdomen: soft, distended, and nontender  Extremities:   -RLE - foot relatively warm to touch, unable to dorsiflex/plantarflex ankle (chronic), scabbed wound to anterior ankle and upper shin areas  -LLE - foot relatively warm to touch, motor/sensation intact, wound to plantar aspect of 2nd toe    Pulses:  -R DP: unable to appreciate doppler signal  -L DP: doppler signal present  -R PT: doppler signal present  -L PT: biphasic doppler signal present    Wounds:   RLE - Photo taken 4/23/2024       Left 2nd Toe - Photo taken 4/23/2024        Labs:   Lab Results   Component Value Date/Time     04/24/2024 04:47 AM    K 3.2 04/24/2024 04:47 AM     04/24/2024 04:47 AM    CO2 23 04/24/2024 04:47 AM    BUN 16 04/24/2024 04:47 AM    CREATININE 1.0 04/24/2024 04:47 AM    GFRAA >60 10/11/2022 05:03 AM    LABGLOM 86 04/24/2024 04:47 AM    GLUCOSE 113 04/24/2024 04:47 AM    MG 2.00 04/24/2024 04:47 AM    CALCIUM 8.1 04/24/2024 04:47 AM     Lab Results   Component Value Date/Time    WBC 4.2 04/24/2024 04:47 AM     RBC 3.92 04/24/2024 04:47 AM    HGB 11.4 04/24/2024 04:47 AM    HCT 35.4 04/24/2024 04:47 AM    MCV 90.3 04/24/2024 04:47 AM    RDW 16.5 04/24/2024 04:47 AM     04/24/2024 04:47 AM     Lab Results   Component Value Date    INR 1.38 (H) 04/24/2024    PROTIME 17.1 (H) 04/24/2024        Scheduled Meds:    amiodarone  200 mg Oral Daily    apixaban  5 mg Oral BID    hydrALAZINE  25 mg Oral 3 times per day    losartan  25 mg Oral Daily    pantoprazole  40 mg Oral QAM AC    potassium chloride  20 mEq Oral Daily    torsemide  40 mg Oral Daily    sodium chloride flush  5-40 mL IntraVENous 2 times per day    lidocaine  1 patch TransDERmal Daily     Continuous Infusions:    sodium chloride         Assessment:   -Right Foot Neuropathy  -Left 2nd Toe Ulcer  -MAISHA's completed yesterday  -Vascular exam remains unchanged    Plan:  -Abdominal Pain workup per Primary  -No further Vascular Intervention planned  -Podiatry Consult pending for Left 2nd Toe Wound  -Further per Primary  -F/U with Dr. العلي in office PRN    All pertinent information and plan of care discussed with Dr. Ziyad العلي.    All questions and concerns were addressed with the patient. I have discussed the above stated plan with the patient and the nurse. The patient verbalized understanding and agreed with the plan.    Thank you for allowing to us to participate in the care of Levy Vargas      Electronically signed by CONSUELO Garsia CNP on 4/25/2024 at 9:44 AM     VASCULAR STAFF  As above.  No plans for intervention or further diagnostics at this time.  Will see as needed. Please call for any further questions or problems.    Aakash العلي

## 2024-04-25 NOTE — PROGRESS NOTES
Occupational Therapy  Facility/Department: [address] MED SURG  Occupational Therapy Initial Assessment and Discharge    Name: Levy Vargas  : 1965  MRN: [id number]  Date of Service: 2024    Discharge Recommendations:  Home with assist PRN  OT Equipment Recommendations  Other: shower chair recommended, however, pt denied need.    Levy Vargas scored a  on the AM-PAC ADL Inpatient form.  At this time, no further OT is recommended upon discharge due to pt functioning at/near baseline.  Recommend patient returns to prior setting with prior services.          Patient Diagnosis(es): The primary encounter diagnosis was Elevated troponin. Diagnoses of Ascites of liver, Generalized abdominal pain, and Goals of care, counseling/discussion were also pertinent to this visit.  Past Medical History:  has a past medical history of Abdominal pain, Abnormal EKG, Acute pancreatitis, CHF (congestive heart failure) (HCC), Cigarette smoker, Cocaine abuse (HCC), Dehydration, Diabetes mellitus (HCC), History of cocaine abuse (HCC), History of MI (myocardial infarction), Hyperglycemia, Hyperlipidemia, Hypertension, Morbid obesity with BMI of 45.0-49.9, adult (HCC), Postural dizziness, and PUD (peptic ulcer disease).  Past Surgical History:  has a past surgical history that includes Cardiac catheterization; Upper gastrointestinal endoscopy; and Leg Surgery (Right, 2023).           Assessment   Assessment: Levy Vargas is a 58 y.o. male with significant past medical history of CHF, ischemic cardiomyopathy, cocaine abuse, hyperlipidemia, hypertension, peripheral artery disease with right popliteal and tibial angioplasty in the past, presented to the hospital with c/o abd pain and distention, chronic R LE pain. Pt admitted with ascites. Vascular consulted for diminished pulses. Podiatry consulted for L 2nd toe ulcer. Pt s/p US guided paracentesis . At baseline, pt lives with girlfriend, independent ADLs and fxl  HR)  Home Access: Stairs to enter with rails  Entrance Stairs - Number of Steps: 5 CHARBEL to enter house  Bathroom Shower/Tub: Tub/Shower unit  Bathroom Toilet: Standard  Home Equipment: Walker, standard, Cane  Has the patient had two or more falls in the past year or any fall with injury in the past year?: Yes (last fell ~3 months ago taking out the trash)  ADL Assistance: Independent  Homemaking Assistance: Needs assistance (girlfriend does laundry, grocery shopping, and cooking,  pt and gf share cleaning. Pt manages own meds)  Ambulation Assistance: Independent (no AD in home, cane community)  Transfer Assistance: Independent  Active : No  Patient's  Info[facility], brother or girlfriend drive pt store  Occupation: On disability       Objective     Observation/Palpation  Posture:  (Mild forward head, rounded shoulders, and increased thoracic kyphosis in sitting and standing)  Observation: Pt on RA on arrival.    Safety Devices  Type of Devices: Call light within reach;Left in bed (Pt left seated EOB, up ad sarina in room)  Restraints  Restraints Initially in Place: No    AROM: Generally decreased, functional (R hand flexion contracture at digit IV, L hand flexion contrature digits IV/V)  PROM: Within functional limits  Strength: Generally decreased, functional  Coordination: Generally decreased, functional  Sensation: Impaired (decreased sensation B LE's)    ADL  LE Bathing Skilled Clinical Factors: pt reports showered independently earlier this date, used shower chair when washing feet. Discussed getting shower chair for home for safety with bathing, but pt denied need.  LE Dressing: Modified independent   LE Dressing Skilled Clinical Factors: to don/doff socks  Functional Mobility: Modified independent ;Supervision  Functional Mobility Skilled Clinical Factors: around room and ~75 ft in hallway with no AD and supervision/mod I. Pt antalgic d/t pain R LE, but no LOB,  declined using cane.  Additional Comments: pt UAL in room completing ADLs independent/mod I.    Activity Tolerance  Activity Tolerance Comments: Pt tolerated the PT/OT initial evaluation well with no significant limitations.    Bed mobility  Bed Mobility Comments: Not completed as pt is seated on EOB on arrival and at the end of the session.    Transfers  Sit to stand: Independent;Supervision  Stand to sit: Independent;Supervision    Vision  Vision: Within Functional Limits  Hearing  Hearing: Within functional limits    Cognition  Overall Cognitive Status: WFL  Safety Judgement: Decreased awareness of need for safety;Decreased awareness of need for assistance  Insights: Decreased awareness of deficits  Orientation  Overall Orientation Status: Within Functional Limits  Orientation Level: Oriented X4    Static Sitting Balance : independent  Static Standing Balance : independent  Dynamic Standing Balance : supervision    Education Given To: Patient  Education Provided: Role of Therapy;ADL Adaptive Strategies;Transfer Training;Equipment  Education Method: Verbal  Barriers to Learning: Other (Comment) (insight/safety judgement)  Education Outcome: Verbalized understanding;Demonstrated understanding                                   AM-PAC - ADL  AM-PAC Daily Activity - Inpatient   How much help is needed for putting on and taking off regular lower body clothing?: None  How much help is needed for bathing (which includes washing, rinsing, drying)?: None  How much help is needed for toileting (which includes using toilet, bedpan, or urinal)?: None  How much help is needed for putting on and taking off regular upper body clothing?: None  How much help is needed for taking care of personal grooming?: None  How much help for eating meals?: None  AM-[facility] Inpatient Daily Activity Raw Score: 24  AM-PAC Inpatient ADL T-Scale Score : 57.54  ADL Inpatient CMS 0-100% Score: 0  ADL Inpatient CMS G-Code Modifier :

## 2024-04-25 NOTE — PLAN OF CARE
Problem: Discharge Planning  Goal: Discharge to home or other facility with appropriate resources  4/24/2024 2258 by Froylan Graham RN  Outcome: Progressing  Flowsheets (Taken 4/24/2024 2132)  Discharge to home or other facility with appropriate resources:   Identify barriers to discharge with patient and caregiver   Arrange for needed discharge resources and transportation as appropriate   Identify discharge learning needs (meds, wound care, etc)  4/24/2024 0931 by Gianna Khan RN  Outcome: Progressing     Problem: Safety - Adult  Goal: Free from fall injury  4/24/2024 2258 by Froylan Graham RN  Outcome: Progressing  4/24/2024 0931 by Gianna Khan RN  Outcome: Progressing     Problem: Skin/Tissue Integrity  Goal: Absence of new skin breakdown  Description: 1.  Monitor for areas of redness and/or skin breakdown  2.  Assess vascular access sites hourly  3.  Every 4-6 hours minimum:  Change oxygen saturation probe site  4.  Every 4-6 hours:  If on nasal continuous positive airway pressure, respiratory therapy assess nares and determine need for appliance change or resting period.  4/24/2024 2258 by Froylan Graham RN  Outcome: Progressing  4/24/2024 0931 by Gianna Khan RN  Outcome: Progressing     Problem: Chronic Conditions and Co-morbidities  Goal: Patient's chronic conditions and co-morbidity symptoms are monitored and maintained or improved  4/24/2024 2258 by Froylan Graham RN  Outcome: Progressing  Flowsheets (Taken 4/24/2024 2132)  Care Plan - Patient's Chronic Conditions and Co-Morbidity Symptoms are Monitored and Maintained or Improved:   Monitor and assess patient's chronic conditions and comorbid symptoms for stability, deterioration, or improvement   Collaborate with multidisciplinary team to address chronic and comorbid conditions and prevent exacerbation or deterioration  4/24/2024 0931 by Gianna Khan RN  Outcome: Progressing     Problem: Cardiovascular - Adult  Goal:  Maintains optimal cardiac output and hemodynamic stability  Outcome: Progressing  Flowsheets (Taken 4/24/2024 2132)  Maintains optimal cardiac output and hemodynamic stability: Monitor blood pressure and heart rate  Goal: Absence of cardiac dysrhythmias or at baseline  Outcome: Progressing  Flowsheets (Taken 4/24/2024 2132)  Absence of cardiac dysrhythmias or at baseline:   Monitor cardiac rate and rhythm   Assess for signs of decreased cardiac output

## 2024-04-25 NOTE — CARE COORDINATION
Discharge Planning:  SW met with pt to discuss d/c planning needs.  Pt stated he is not interested in any HC services at d/c and stated he will contact his brother to pick him up at d/c.  Pt stated if his brother is unable to pick him up at d/c, he would like a LYFT through [facility].    PLAN: Home at d/c.  [facility]  Pt has declined HC services.  NAE Camacho [facility]  [phone]  Electronically signed by NAE Bella on 4/25/2024 at 11:55 AM

## 2024-04-25 NOTE — PROGRESS NOTES
Physical Therapy  Facility/Department: [address] MED SURG  Physical Therapy Initial Assessment    Name: Levy Vargas  : 1965  MRN: [id number]  Date of Service: 2024    Discharge Recommendations: Levy Vargas scored a 20/24 on the AM-PAC short mobility form. Current research shows that an AM-PAC score of 18 or greater is typically associated with a discharge to the patient's home setting. Based on the patient's AM-PAC score and their current functional mobility deficits, it is recommended that the patient have 2-3 sessions per week of Physical Therapy at d/c to increase the patient's independence.  At this time, this patient demonstrates the endurance and safety to discharge home with HHPT and assistance PRN and a follow up treatment frequency of 2-3x/wk.  Please see assessment section for further patient specific details.    If patient discharges prior to next session this note will serve as a discharge summary.  Please see below for the latest assessment towards goals.     HOME HEALTH CARE: LEVEL 1 STANDARD    - Initial home health evaluation to occur within 24-48 hours, in patient home   - Therapy to evaluate with goal of regaining prior level of functioning   - Therapy to evaluate if patient has Home Health Aide needs for personal care    Home with assist PRN, Home with Home health PT   PT Equipment Recommendations  Equipment Needed: No      Patient Diagnosis(es): The primary encounter diagnosis was Elevated troponin. Diagnoses of Ascites of liver, Generalized abdominal pain, and Goals of care, counseling/discussion were also pertinent to this visit.  Past Medical History:  has a past medical history of Abdominal pain, Abnormal EKG, Acute pancreatitis, CHF (congestive heart failure) (HCC), Cigarette smoker, Cocaine abuse (HCC), Dehydration, Diabetes mellitus (HCC), History of cocaine abuse (HCC), History of MI (myocardial infarction), Hyperglycemia, Hyperlipidemia, Hypertension, Morbid obesity with  Tolerated (High fall risk)     Subjective   General  Chart Reviewed: Yes  Patient assessed for rehabilitation services?: Yes  Additional Pertinent Hx: Levy Vargas is a 58 y.o. year old male presenting to the emergency department for abdominal pain. Pt to ED from home c/o SOB and abdominal pain x2 days. Pt states \"I've had diarrhea x2 days ago\" Pt rates 5/10. \"I haven't had my water pill in a couple days\".  Response To Previous Treatment: Not applicable  Family / Caregiver Present: No  Diagnosis: Ascites due to alcoholic cirrhosis  Follows Commands: Within Functional Limits  General Comment  Comments: Pt seated EOB on arrival, pleasant and agreeable to participate in PT/OT initial evaluation.  Subjective  Subjective: Pt reports 6/10 pain in the R LE, pain medication in place prior to arrival.     Social/Functional History  Social/Functional History  Lives With: Significant other (girlfriend)  Type of Home: House  Home Layout: Two level, Bed/Bath upstairs (15 [address] 2nd floor with L ascending HR)  Home Access: Stairs to enter with rails  Entrance Stairs - Number of Steps: 5 CHAR[address] to enter house  Bathroom Shower/Tub: Tub/Shower unit  Bathroom Toilet: Standard  Home Equipment: Walker, standard, Cane  Has the patient had two or more falls in the past year or any fall with injury in the past year?: Yes (last fell ~3 months ago taking out the trash)  ADL Assistance: Independent  Homemaking Assistance: Needs assistance (girlfriend does laundry, grocery shopping, and cooking,  pt and gf share cleaning. Pt manages own meds)  Ambulation Assistance: Independent (no AD in home, cane community)  Transfer Assistance: Independent  Active : No  Patient's  Info: medical transportation through [facility], brother or girlfriend drive pt store  Occupation: On disability    Vision/Hearing  Vision  Vision: Within Functional Limits  Hearing  Hearing: Within functional limits      Cognition   Orientation  Overall Orientation  Modifier : CJ    Goals  Short Term Goals  Time Frame for Short Term Goals: Pt will transfer supine to/from sit independently  Short Term Goal 1: Pt will transfer sit to/from stand with mod I  Short Term Goal 2: Pt will ambulate at least 150' with use of SPC at mod I  Short Term Goal 3: Pt will asc/dec 15 steps with L ascending HR at mod I  Patient Goals   Patient Goals : To return home     Education  Patient Education  Education Given To: Patient  Education Provided: Role of Therapy;Plan of Care  Education Provided Comments: General safety  Education Method: Verbal  Barriers to Learning: None  Education Outcome: Verbalized understanding;Continued education needed    Therapy Time   Individual Concurrent Group Co-treatment   Time In       1010   Time Out       1034   Minutes       24   Timed Code Treatment Minutes: 9 Minutes       Jia Dumont, PT  Jia Dumont PT, DPT [id number]

## 2024-04-25 NOTE — FLOWSHEET NOTE
Pleasant and cooperative. Denies  pain or discomfort. Abd cont distended. No n/v. Ate 2 sandwiches. No s/s acute distress.

## 2024-04-26 ENCOUNTER — APPOINTMENT (OUTPATIENT)
Dept: GENERAL RADIOLOGY | Age: 59
DRG: 280 | End: 2024-04-26
Payer: COMMERCIAL

## 2024-04-26 PROBLEM — R79.89 ELEVATED TROPONIN: Status: ACTIVE | Noted: 2024-04-26

## 2024-04-26 LAB
ANION GAP SERPL CALCULATED.3IONS-SCNC: 9 MMOL/L (ref 3–16)
BASOPHILS # BLD: 0.1 K/UL (ref 0–0.2)
BASOPHILS NFR BLD: 1.4 %
BUN SERPL-MCNC: 20 MG/DL (ref 7–20)
CALCIUM SERPL-MCNC: 8 MG/DL (ref 8.3–10.6)
CHLORIDE SERPL-SCNC: 104 MMOL/L (ref 99–110)
CO2 SERPL-SCNC: 25 MMOL/L (ref 21–32)
CREAT SERPL-MCNC: 1.2 MG/DL (ref 0.9–1.3)
DEPRECATED RDW RBC AUTO: 16.4 % (ref 12.4–15.4)
EOSINOPHIL # BLD: 0.1 K/UL (ref 0–0.6)
EOSINOPHIL NFR BLD: 2.9 %
GFR SERPLBLD CREATININE-BSD FMLA CKD-EPI: 69 ML/MIN/{1.73_M2}
GLUCOSE BLD-MCNC: 102 MG/DL (ref 70–99)
GLUCOSE BLD-MCNC: 151 MG/DL (ref 70–99)
GLUCOSE BLD-MCNC: 208 MG/DL (ref 70–99)
GLUCOSE BLD-MCNC: 99 MG/DL (ref 70–99)
GLUCOSE SERPL-MCNC: 109 MG/DL (ref 70–99)
HCT VFR BLD AUTO: 33.6 % (ref 40.5–52.5)
HGB BLD-MCNC: 10.9 G/DL (ref 13.5–17.5)
LYMPHOCYTES # BLD: 0.9 K/UL (ref 1–5.1)
LYMPHOCYTES NFR BLD: 23.6 %
MCH RBC QN AUTO: 29 PG (ref 26–34)
MCHC RBC AUTO-ENTMCNC: 32.3 G/DL (ref 31–36)
MCV RBC AUTO: 89.7 FL (ref 80–100)
MONOCYTES # BLD: 0.5 K/UL (ref 0–1.3)
MONOCYTES NFR BLD: 13 %
NEUTROPHILS # BLD: 2.2 K/UL (ref 1.7–7.7)
NEUTROPHILS NFR BLD: 59.1 %
PATH CONSULT FLUID: NORMAL
PERFORMED ON: ABNORMAL
PERFORMED ON: NORMAL
PLATELET # BLD AUTO: 173 K/UL (ref 135–450)
PMV BLD AUTO: 9.9 FL (ref 5–10.5)
POTASSIUM SERPL-SCNC: 3.7 MMOL/L (ref 3.5–5.1)
RBC # BLD AUTO: 3.74 M/UL (ref 4.2–5.9)
SODIUM SERPL-SCNC: 138 MMOL/L (ref 136–145)
WBC # BLD AUTO: 3.7 K/UL (ref 4–11)

## 2024-04-26 PROCEDURE — G0378 HOSPITAL OBSERVATION PER HR: HCPCS

## 2024-04-26 PROCEDURE — 6370000000 HC RX 637 (ALT 250 FOR IP): Performed by: INTERNAL MEDICINE

## 2024-04-26 PROCEDURE — 80048 BASIC METABOLIC PNL TOTAL CA: CPT

## 2024-04-26 PROCEDURE — 6370000000 HC RX 637 (ALT 250 FOR IP): Performed by: NURSE PRACTITIONER

## 2024-04-26 PROCEDURE — 36415 COLL VENOUS BLD VENIPUNCTURE: CPT

## 2024-04-26 PROCEDURE — 1200000000 HC SEMI PRIVATE

## 2024-04-26 PROCEDURE — 2580000003 HC RX 258: Performed by: INTERNAL MEDICINE

## 2024-04-26 PROCEDURE — 94760 N-INVAS EAR/PLS OXIMETRY 1: CPT

## 2024-04-26 PROCEDURE — 85025 COMPLETE CBC W/AUTO DIFF WBC: CPT

## 2024-04-26 PROCEDURE — 73630 X-RAY EXAM OF FOOT: CPT

## 2024-04-26 RX ORDER — CARVEDILOL 3.12 MG/1
3.12 TABLET ORAL 2 TIMES DAILY WITH MEALS
Status: DISCONTINUED | OUTPATIENT
Start: 2024-04-26 | End: 2024-04-27 | Stop reason: HOSPADM

## 2024-04-26 RX ADMIN — CARVEDILOL 3.12 MG: 3.12 TABLET, FILM COATED ORAL at 16:35

## 2024-04-26 RX ADMIN — APIXABAN 5 MG: 5 TABLET, FILM COATED ORAL at 20:44

## 2024-04-26 RX ADMIN — TORSEMIDE 40 MG: 20 TABLET ORAL at 08:32

## 2024-04-26 RX ADMIN — METHOCARBAMOL TABLETS 750 MG: 750 TABLET, COATED ORAL at 08:32

## 2024-04-26 RX ADMIN — METHOCARBAMOL TABLETS 750 MG: 750 TABLET, COATED ORAL at 15:06

## 2024-04-26 RX ADMIN — SODIUM CHLORIDE, PRESERVATIVE FREE 10 ML: 5 INJECTION INTRAVENOUS at 20:44

## 2024-04-26 RX ADMIN — HYDRALAZINE HYDROCHLORIDE 25 MG: 25 TABLET ORAL at 20:44

## 2024-04-26 RX ADMIN — METHOCARBAMOL TABLETS 750 MG: 750 TABLET, COATED ORAL at 20:43

## 2024-04-26 RX ADMIN — AMIODARONE HYDROCHLORIDE 200 MG: 200 TABLET ORAL at 08:32

## 2024-04-26 RX ADMIN — APIXABAN 5 MG: 5 TABLET, FILM COATED ORAL at 08:32

## 2024-04-26 RX ADMIN — PANTOPRAZOLE SODIUM 40 MG: 40 TABLET, DELAYED RELEASE ORAL at 05:55

## 2024-04-26 RX ADMIN — LOSARTAN POTASSIUM 25 MG: 25 TABLET, FILM COATED ORAL at 08:32

## 2024-04-26 RX ADMIN — HYDRALAZINE HYDROCHLORIDE 25 MG: 25 TABLET ORAL at 15:06

## 2024-04-26 RX ADMIN — CARVEDILOL 3.12 MG: 3.12 TABLET, FILM COATED ORAL at 08:32

## 2024-04-26 RX ADMIN — HYDRALAZINE HYDROCHLORIDE 25 MG: 25 TABLET ORAL at 05:55

## 2024-04-26 RX ADMIN — POTASSIUM CHLORIDE 20 MEQ: 1500 TABLET, EXTENDED RELEASE ORAL at 08:32

## 2024-04-26 ASSESSMENT — PAIN DESCRIPTION - DESCRIPTORS: DESCRIPTORS: ACHING

## 2024-04-26 ASSESSMENT — PAIN DESCRIPTION - ORIENTATION: ORIENTATION: RIGHT

## 2024-04-26 ASSESSMENT — PAIN SCALES - GENERAL
PAINLEVEL_OUTOF10: 0
PAINLEVEL_OUTOF10: 3

## 2024-04-26 ASSESSMENT — PAIN DESCRIPTION - LOCATION: LOCATION: FOOT

## 2024-04-26 NOTE — PLAN OF CARE
Problem: Discharge Planning  Goal: Discharge to home or other facility with appropriate resources  4/25/2024 2321 by Aleyda Sampson RN  Outcome: Progressing  Flowsheets (Taken 4/25/2024 2055)  Discharge to home or other facility with appropriate resources: Identify barriers to discharge with patient and caregiver

## 2024-04-26 NOTE — CONSULTS
Department of Podiatry Consult Note  Resident      Reason for Consult:  L 2nd toe ulcer  Requesting Physician:  Dr. Mila Whitley MD    CHIEF COMPLAINT:  Left second toe ulceration    HISTORY OF PRESENT ILLNESS:    The patient is a 58 y.o. male with significant past medical history as listed below who is consulted to podiatry for a left second toe ulceration. Patient states that he has had the wound on his toe for some time and does not dress it when at home. He also notes a wound to the right foot that has been none healing after a \"procedure\" was done. Patient admits to being diabetic and having minimal feeling in his feet. Patient denies any N/V/F/SOB/CP. Patient denies any other pedal complaints today.    Past Medical History:        Diagnosis Date    Abdominal pain 07/29/2021    Abnormal EKG 07/28/2016    Acute pancreatitis 07/29/2021    CHF (congestive heart failure) ([facility])     Cigarette smoker 08/25/2021    Cocaine abuse ([facility]) 03/28/2015    Last Assessment & Plan:  Formatting of this note might be different from the original. Counseled on cessation as increases risk of coronary vasospasm and further worsening of heart function.    Dehydration 07/28/2016    Diabetes mellitus (HCC)     History of cocaine abuse ([facility]) 07/28/2016    History of MI (myocardial infarction) 07/28/2016    Hyperglycemia 07/28/2016    Hyperlipidemia     Hypertension     Morbid obesity with BMI of 45.0-49.9, adult ([facility]) 07/28/2016    Postural dizziness 07/28/2016    PUD (peptic ulcer disease) 03/01/2019       Past Surgical History:        Procedure Laterality Date    CARDIAC CATHETERIZATION      LEG SURGERY Right 8/25/2023    RIGHT THIGH INCISION AND DRAINAGE WITH DRAIN PLACEMENT performed by Dominick Cid DO at [email or web address]] OR    UPPER GASTROINTESTINAL ENDOSCOPY         Allergies:   Patient has no known allergies.    Medications:   Home Meds  No current facility-administered medications on file prior to encounter.     Current Outpatient  surgical intervention at this point     DISPO: Full thickness ulceration to b/l LE. All labs reviewed. XR left foot ordered, venous duplex pending. No antibiotics necessary from podiatric standpoint. No surgical intervention planned at this time. Podiatry will continue to follow patient while in house with local wound care.     The patient was seen and evaluated at bedside with Dr. Brent Stewart DPM.    Angelica Mazariegos DPM   Podiatric Resident PGY1  [employer]  Pager number [phone]  4/26/2024, 6:17 PM

## 2024-04-26 NOTE — PROGRESS NOTES
Hospitalist Progress Note      PCP: Minnie Lilly DO    Date of Admission: 4/23/2024    Subjective: had paracentesis with 1.7L fluid removed, c/o pain in rt LE and requesting oxycodone    Medications:  Reviewed    Infusion Medications    sodium chloride       Scheduled Medications    carvedilol  3.125 mg Oral BID WC    methocarbamol  750 mg Oral TID    amiodarone  200 mg Oral Daily    apixaban  5 mg Oral BID    hydrALAZINE  25 mg Oral 3 times per day    losartan  25 mg Oral Daily    pantoprazole  40 mg Oral QAM AC    potassium chloride  20 mEq Oral Daily    torsemide  40 mg Oral Daily    sodium chloride flush  5-40 mL IntraVENous 2 times per day    lidocaine  1 patch TransDERmal Daily     PRN Meds: traMADol, sodium chloride flush, sodium chloride, potassium chloride **OR** potassium alternative oral replacement **OR** potassium chloride, magnesium sulfate, ondansetron **OR** ondansetron, polyethylene glycol, acetaminophen **OR** acetaminophen, diclofenac sodium      Intake/Output Summary (Last 24 hours) at 4/26/2024 1520  Last data filed at 4/26/2024 0953  Gross per 24 hour   Intake 570 ml   Output 1275 ml   Net -705 ml         Physical Exam Performed:    BP (!) 146/86   Pulse 64   Temp 98.5 °F (36.9 °C) (Oral)   Resp 16   Ht 1.854 m (6' 1\")   Wt 135.5 kg (298 lb 11.6 oz)   SpO2 98%   BMI 39.41 kg/m²       General: Awake, oriented  HEENT: Pupils round, reacting to light  Heart: S1 S2 heard, no murmurs  Lung: Clear b/l  Abd: Soft, distended, not tender, BS +  Extr: No cyanosis or clubbing  Neuro: No focal deficits.    Labs:   Recent Labs     04/24/24  0447 04/25/24  1034 04/26/24  0450   WBC 4.2 4.0 3.7*   HGB 11.4* 12.3* 10.9*   HCT 35.4* 38.8* 33.6*    193 173       Recent Labs     04/24/24  0447 04/25/24  1034 04/26/24  0450    138 138   K 3.2* 3.2* 3.7    101 104   CO2 23 24 25   BUN 16 19 20   CREATININE 1.0 1.1 1.2   CALCIUM 8.1* 8.6 8.0*       Recent Labs     04/24/24  0449  fibrillation, continue with Eliquis, continue amiodarone, monitor hemoglobin. Cardio consulted    RLE pain - appears chronic pain, pt requesting oxycodone. States he hasn't followed up with his PCP in about a year, discussed that will not be prescribing narcotics. Start tramadol and robaxin. Would avoid narcotics         Resume home medications as appropriate.     Peripheral vascular disease.  Continue statin/anticoagulation  Vascular surgery consulted, no further recs  Podiatry consulted for lt 2nd toe ulcer     Hypertension.  Stable.  Resume home losartan hydralazine.       Resume home medications as appropriate  Diet: ADULT DIET; Regular; No Added Salt (3-4 gm); 2000 ml  Code Status: Full Code  PT/OT Eval Status: ordered    Dispo - cont care, waiting for podiatry      Mila Whitley MD

## 2024-04-26 NOTE — FLOWSHEET NOTE
Pt slept well overnight. No c/o pain or distress. UAL with steady gait. Pleasant and cooperative. Dressing to L foot 2nd toe intact. Uses call light as needed.

## 2024-04-26 NOTE — CONSULTS
urination. No tremor.  Hematologic/Lymphatic: No abnormal bruising or bleeding, blood clots or swollen lymph nodes.  Allergic/Immunologic: No nasal congestion or hives.    Physical Exam:   /83   Pulse 57   Temp 98.7 °F (37.1 °C) (Oral)   Resp 18   Ht 1.854 m (6' 1\")   Wt 135.5 kg (298 lb 11.6 oz)   SpO2 98%   BMI 39.41 kg/m²   Wt Readings from Last 3 Encounters:   04/26/24 135.5 kg (298 lb 11.6 oz)   09/03/23 135 kg (297 lb 9.9 oz)   08/07/23 (!) 150.5 kg (331 lb 12.7 oz)     Constitutional: He is oriented to person, place, and time. He appears well-developed and well-nourished. In no acute distress.   Head: Normocephalic and atraumatic. Pupils equal and round.  Neck: Neck supple. No JVP or carotid bruit appreciated. No mass and no thyromegaly present. No lymphadenopathy present.  Cardiovascular: Normal rate. Normal heart sounds. Exam reveals no gallop and no friction rub. No murmur heard.  Pulmonary/Chest: Effort normal and breath sounds normal. No respiratory distress. He has no wheezes, rhonchi or rales.   Abdominal: Soft, non-tender. Bowel sounds are normal. He exhibits no organomegaly, mass or bruit.   Extremities: 2+ bilaterally edema. No cyanosis or clubbing. Pulses are 2+ radial/carotid bilaterally.  Neurological: No gross cranial nerve deficit. Coordination normal.   Skin: Skin is warm and dry. There is no rash or diaphoresis.   Psychiatric: He has a normal mood and affect. His speech is normal and behavior is normal.       Lab Review:   FLP:    Lab Results   Component Value Date/Time    TRIG 50 08/23/2023 07:20 AM    HDL 41 08/23/2023 07:20 AM    LDLCALC 67 08/23/2023 07:20 AM     BUN/Creatinine:    Lab Results   Component Value Date/Time    BUN 20 04/26/2024 04:50 AM    CREATININE 1.2 04/26/2024 04:50 AM     PT/INR, TNI, HGB A1C:   Lab Results   Component Value Date/Time    TROPONINI <0.01 10/06/2022 12:35 PM    LABA1C 5.1 09/01/2023 04:35 AM      No results found for: \"CBCAUTODIF\"    EKG  Interpretation: NSR, pRWP   Diagnostic and imaging results reviewed.   Stress: 7/25/19 UC  There is a medium to large sized area of mild to moderately decreased perfusion in the basal inferior, basal inferolateral, mid inferior, mid inferolateral, and apical inferior segments at stress with extensive improvement at rest, consistent with reversible ischemia.      ECHO 9/25/2021   Summary   There is mildly increased left ventricular wall thickness.   Overall left ventricular systolic function appears severely reduced.   Ejection fraction is visually estimated to be 30-35%.   No regional wall motion abnormalities are noted.   Grade II diastolic dysfunction with elevated LV filling pressures.   Normal right ventricular size and function.     10/7/2022 ECHO   Summary   Suboptimal image quality. Definity contrast administered.   Overall left ventricular systolic function appears mild to moderately   reduced. Ejection fraction is visually estimated to be 40-45% with diffuse   hypokinesis. Left ventricular cavity size is normal. There is moderately   increased left ventricular wall thickness. Diastolic filling parameters   suggest grade I diastolic dysfunction.   Normal right ventricular size and function.   No significant valve abnormalities noted.   A bubble study was performed and fails to show evidence of shunting.     Cath: 10/10/22  Findings:     Hemodynamics  LVEDP 3mmHg   Left Main  Normal bifurcation, no angiographically significant disease   LAD  Mild proximal non obstructive 30% stenosis, remaining vessel with luminal irregularities   Circ  Dominant, large vessel with luminal irregularities   RCA  Non-dominant, small vessel without angiographically significant disease   Closure Device  Radial hemostasis band   Complications  None          Conclusion:   - Mild non obstructive disease of the LAD  - Low LVEDP: post hydration  cc/hr x 8 hours  - Radial hemostasis band with 13cc air  - No further ischemic  follow-up , but if so we will then transition to aldactone, Entresto, and sglt2i inhibitor as an outpatient  -Continue with ARB/hydralazine at current doses  -P.o. torsemide    2) paroxysmal atrial fibrillation  -Continue with Eliquis and amiodarone    Requires no further inpatient cardiac testing and can be discharged home    Plan for outpatient follow-up with Dr. Tavarez in 7 to 10 days      Overall, the problems requiring hospitalization are high in severity     Thank you very much for allowing me to participate in the care of your patient. Please do not hesitate to contact me if you have any questions.      Mandeep Chaves MD [facility]  General, Interventional Cardiology, and Peripheral Vascular Disease   [facility]   Ph: [phone]  Fax: [phone]

## 2024-04-26 NOTE — PLAN OF CARE
Problem: Discharge Planning  Goal: Discharge to home or other facility with appropriate resources  4/25/2024 2321 by Aleyda Sampson RN  Outcome: Progressing  4/25/2024 1305 by Babs Almaraz RN  Outcome: Progressing   Continuing to work with patient and health care team on discharge plan. Discharge instructions and medication management will be reviewed prior to discharge.    Problem: Safety - Adult  Goal: Free from fall injury  4/25/2024 2321 by Aleyda Sampson RN  Outcome: Progressing  4/25/2024 1305 by Babs Almaraz RN  Outcome: Progressing   Pt free from falls this shift. Fall precautions in place at all times. Call light always within reach. Pt able and agreeable to contact for safety appropriately.    Problem: Skin/Tissue Integrity  Goal: Absence of new skin breakdown  Description: 1.  Monitor for areas of redness and/or skin breakdown  2.  Assess vascular access sites hourly  3.  Every 4-6 hours minimum:  Change oxygen saturation probe site  4.  Every 4-6 hours:  If on nasal continuous positive airway pressure, respiratory therapy assess nares and determine need for appliance change or resting period.  4/25/2024 2321 by Aleyda Sampson RN  Outcome: Progressing  4/25/2024 1305 by Babs Almaraz RN  Outcome: Progressing   Skin assessment performed each shift per protocol.  Patient turned and repositioned every two hours and prn with pillow support. Patient checked for incontence every two hours.     Problem: Chronic Conditions and Co-morbidities  Goal: Patient's chronic conditions and co-morbidity symptoms are monitored and maintained or improved  4/25/2024 2321 by Aleyda Sampson RN  Outcome: Progressing  4/25/2024 1305 by Babs Almaraz RN  Outcome: Progressing     Problem: Cardiovascular - Adult  Goal: Maintains optimal cardiac output and hemodynamic stability  4/25/2024 2321 by Aleyda Sampson RN  Outcome: Progressing  4/25/2024 1305 by Babs Almaraz RN  Outcome: Progressing  Goal:

## 2024-04-27 VITALS
RESPIRATION RATE: 16 BRPM | TEMPERATURE: 97.4 F | DIASTOLIC BLOOD PRESSURE: 76 MMHG | HEART RATE: 57 BPM | HEIGHT: 73 IN | BODY MASS INDEX: 39.25 KG/M2 | WEIGHT: 296.2 LBS | SYSTOLIC BLOOD PRESSURE: 119 MMHG | OXYGEN SATURATION: 98 %

## 2024-04-27 LAB
ANION GAP SERPL CALCULATED.3IONS-SCNC: 9 MMOL/L (ref 3–16)
BASOPHILS # BLD: 0 K/UL (ref 0–0.2)
BASOPHILS NFR BLD: 0.8 %
BUN SERPL-MCNC: 25 MG/DL (ref 7–20)
CALCIUM SERPL-MCNC: 8 MG/DL (ref 8.3–10.6)
CHLORIDE SERPL-SCNC: 105 MMOL/L (ref 99–110)
CO2 SERPL-SCNC: 28 MMOL/L (ref 21–32)
CREAT SERPL-MCNC: 1.3 MG/DL (ref 0.9–1.3)
DEPRECATED RDW RBC AUTO: 16.4 % (ref 12.4–15.4)
EOSINOPHIL # BLD: 0.1 K/UL (ref 0–0.6)
EOSINOPHIL NFR BLD: 3 %
GFR SERPLBLD CREATININE-BSD FMLA CKD-EPI: 63 ML/MIN/{1.73_M2}
GLUCOSE SERPL-MCNC: 145 MG/DL (ref 70–99)
HCT VFR BLD AUTO: 32.6 % (ref 40.5–52.5)
HGB BLD-MCNC: 10.7 G/DL (ref 13.5–17.5)
LYMPHOCYTES # BLD: 0.7 K/UL (ref 1–5.1)
LYMPHOCYTES NFR BLD: 21.9 %
MCH RBC QN AUTO: 29.2 PG (ref 26–34)
MCHC RBC AUTO-ENTMCNC: 32.7 G/DL (ref 31–36)
MCV RBC AUTO: 89.1 FL (ref 80–100)
MONOCYTES # BLD: 0.4 K/UL (ref 0–1.3)
MONOCYTES NFR BLD: 12.2 %
NEUTROPHILS # BLD: 1.9 K/UL (ref 1.7–7.7)
NEUTROPHILS NFR BLD: 62.1 %
PLATELET # BLD AUTO: 144 K/UL (ref 135–450)
PMV BLD AUTO: 9.7 FL (ref 5–10.5)
POTASSIUM SERPL-SCNC: 3.7 MMOL/L (ref 3.5–5.1)
RBC # BLD AUTO: 3.66 M/UL (ref 4.2–5.9)
SODIUM SERPL-SCNC: 142 MMOL/L (ref 136–145)
WBC # BLD AUTO: 3 K/UL (ref 4–11)

## 2024-04-27 PROCEDURE — 85025 COMPLETE CBC W/AUTO DIFF WBC: CPT

## 2024-04-27 PROCEDURE — 6370000000 HC RX 637 (ALT 250 FOR IP): Performed by: INTERNAL MEDICINE

## 2024-04-27 PROCEDURE — 80048 BASIC METABOLIC PNL TOTAL CA: CPT

## 2024-04-27 PROCEDURE — 94760 N-INVAS EAR/PLS OXIMETRY 1: CPT

## 2024-04-27 PROCEDURE — G0378 HOSPITAL OBSERVATION PER HR: HCPCS

## 2024-04-27 PROCEDURE — 36415 COLL VENOUS BLD VENIPUNCTURE: CPT

## 2024-04-27 PROCEDURE — 2580000003 HC RX 258: Performed by: INTERNAL MEDICINE

## 2024-04-27 PROCEDURE — 6370000000 HC RX 637 (ALT 250 FOR IP): Performed by: NURSE PRACTITIONER

## 2024-04-27 RX ORDER — CARVEDILOL 3.12 MG/1
3.12 TABLET ORAL 2 TIMES DAILY WITH MEALS
Qty: 60 TABLET | Refills: 3 | Status: SHIPPED | OUTPATIENT
Start: 2024-04-27 | End: 2024-04-27

## 2024-04-27 RX ORDER — METHOCARBAMOL 750 MG/1
750 TABLET, FILM COATED ORAL 3 TIMES DAILY
Qty: 30 TABLET | Refills: 0 | Status: SHIPPED | OUTPATIENT
Start: 2024-04-27 | End: 2024-05-07

## 2024-04-27 RX ORDER — METHOCARBAMOL 750 MG/1
750 TABLET, FILM COATED ORAL 3 TIMES DAILY
Qty: 30 TABLET | Refills: 0 | Status: SHIPPED | OUTPATIENT
Start: 2024-04-27 | End: 2024-04-27

## 2024-04-27 RX ORDER — CARVEDILOL 3.12 MG/1
3.12 TABLET ORAL 2 TIMES DAILY WITH MEALS
Qty: 60 TABLET | Refills: 3 | Status: SHIPPED | OUTPATIENT
Start: 2024-04-27

## 2024-04-27 RX ADMIN — POTASSIUM CHLORIDE 20 MEQ: 1500 TABLET, EXTENDED RELEASE ORAL at 08:07

## 2024-04-27 RX ADMIN — PANTOPRAZOLE SODIUM 40 MG: 40 TABLET, DELAYED RELEASE ORAL at 05:36

## 2024-04-27 RX ADMIN — LOSARTAN POTASSIUM 25 MG: 25 TABLET, FILM COATED ORAL at 08:06

## 2024-04-27 RX ADMIN — CARVEDILOL 3.12 MG: 3.12 TABLET, FILM COATED ORAL at 08:07

## 2024-04-27 RX ADMIN — APIXABAN 5 MG: 5 TABLET, FILM COATED ORAL at 08:06

## 2024-04-27 RX ADMIN — SODIUM CHLORIDE, PRESERVATIVE FREE 10 ML: 5 INJECTION INTRAVENOUS at 08:07

## 2024-04-27 RX ADMIN — AMIODARONE HYDROCHLORIDE 200 MG: 200 TABLET ORAL at 08:07

## 2024-04-27 RX ADMIN — METHOCARBAMOL TABLETS 750 MG: 750 TABLET, COATED ORAL at 08:06

## 2024-04-27 RX ADMIN — TORSEMIDE 40 MG: 20 TABLET ORAL at 08:06

## 2024-04-27 RX ADMIN — TRAMADOL HYDROCHLORIDE 50 MG: 50 TABLET ORAL at 08:06

## 2024-04-27 RX ADMIN — HYDRALAZINE HYDROCHLORIDE 25 MG: 25 TABLET ORAL at 05:36

## 2024-04-27 ASSESSMENT — PAIN DESCRIPTION - DESCRIPTORS: DESCRIPTORS: ACHING

## 2024-04-27 ASSESSMENT — PAIN SCALES - GENERAL
PAINLEVEL_OUTOF10: 3
PAINLEVEL_OUTOF10: 7

## 2024-04-27 ASSESSMENT — PAIN DESCRIPTION - ORIENTATION: ORIENTATION: RIGHT;LEFT

## 2024-04-27 ASSESSMENT — PAIN DESCRIPTION - FREQUENCY: FREQUENCY: INTERMITTENT

## 2024-04-27 ASSESSMENT — PAIN DESCRIPTION - LOCATION: LOCATION: LEG

## 2024-04-27 NOTE — PROGRESS NOTES
Peripheral IV removed without complication. Dry dressing applied. Patient got dressed and gathered belongings. Reviewed discharged instructions and highlighted followup numbers. All questions answered. Wheeled patient downstairs to be picked up by . Discharged home at this time.

## 2024-04-27 NOTE — PLAN OF CARE
Problem: Discharge Planning  Goal: Discharge to home or other facility with appropriate resources  4/27/2024 1151 by Tayler Fernandez RN  Outcome: Adequate for Discharge  4/27/2024 1000 by Tayler Fernandez RN  Outcome: Progressing  Flowsheets (Taken 4/27/2024 0800)  Discharge to home or other facility with appropriate resources: Identify barriers to discharge with patient and caregiver  4/26/2024 2355 by Charu Almendarez RN  Outcome: Progressing     Problem: Safety - Adult  Goal: Free from fall injury  4/27/2024 1151 by Tayler Fernandez RN  Outcome: Adequate for Discharge  4/27/2024 1000 by Tayler Fernandez RN  Outcome: Progressing  4/26/2024 2355 by Charu Almendarez RN  Outcome: Progressing     Problem: Skin/Tissue Integrity  Goal: Absence of new skin breakdown  Description: 1.  Monitor for areas of redness and/or skin breakdown  2.  Assess vascular access sites hourly  3.  Every 4-6 hours minimum:  Change oxygen saturation probe site  4.  Every 4-6 hours:  If on nasal continuous positive airway pressure, respiratory therapy assess nares and determine need for appliance change or resting period.  4/27/2024 1151 by Tayler Fernandez RN  Outcome: Adequate for Discharge  4/27/2024 1000 by Tayler Fernandez RN  Outcome: Progressing  4/26/2024 2355 by Charu Almendarez RN  Outcome: Progressing     Problem: Chronic Conditions and Co-morbidities  Goal: Patient's chronic conditions and co-morbidity symptoms are monitored and maintained or improved  4/27/2024 1151 by Tayler Fernandez RN  Outcome: Adequate for Discharge  4/27/2024 1000 by Tayler Fernandez RN  Outcome: Progressing  Flowsheets (Taken 4/27/2024 0800)  Care Plan - Patient's Chronic Conditions and Co-Morbidity Symptoms are Monitored and Maintained or Improved: Monitor and assess patient's chronic conditions and comorbid symptoms for stability, deterioration, or improvement  4/26/2024 2355 by Charu Almendarez RN  Outcome: Progressing     Problem: Cardiovascular - Adult  Goal:

## 2024-04-27 NOTE — PLAN OF CARE
Problem: Discharge Planning  Goal: Discharge to home or other facility with appropriate resources  4/27/2024 1000 by Tayler Fernandez RN  Outcome: Progressing  Flowsheets (Taken 4/27/2024 0800)  Discharge to home or other facility with appropriate resources: Identify barriers to discharge with patient and caregiver  4/26/2024 2355 by Charu Almendarez RN  Outcome: Progressing     Problem: Safety - Adult  Goal: Free from fall injury  4/27/2024 1000 by Tayler Fernandez RN  Outcome: Progressing  4/26/2024 2355 by Charu Almendarez RN  Outcome: Progressing     Problem: Skin/Tissue Integrity  Goal: Absence of new skin breakdown  Description: 1.  Monitor for areas of redness and/or skin breakdown  2.  Assess vascular access sites hourly  3.  Every 4-6 hours minimum:  Change oxygen saturation probe site  4.  Every 4-6 hours:  If on nasal continuous positive airway pressure, respiratory therapy assess nares and determine need for appliance change or resting period.  4/27/2024 1000 by Tayler Fernandez RN  Outcome: Progressing  4/26/2024 2355 by Charu Almendarez RN  Outcome: Progressing     Problem: Chronic Conditions and Co-morbidities  Goal: Patient's chronic conditions and co-morbidity symptoms are monitored and maintained or improved  4/27/2024 1000 by Tayler Fernandez RN  Outcome: Progressing  Flowsheets (Taken 4/27/2024 0800)  Care Plan - Patient's Chronic Conditions and Co-Morbidity Symptoms are Monitored and Maintained or Improved: Monitor and assess patient's chronic conditions and comorbid symptoms for stability, deterioration, or improvement  4/26/2024 2355 by Charu Almendarez RN  Outcome: Progressing     Problem: Cardiovascular - Adult  Goal: Maintains optimal cardiac output and hemodynamic stability  4/27/2024 1000 by Tayler Fernandez RN  Outcome: Progressing  Flowsheets (Taken 4/27/2024 0800)  Maintains optimal cardiac output and hemodynamic stability: Monitor blood pressure and heart rate  4/26/2024 2355 by Charu Almendarez  Return ADL status to a safe level of function  Outcome: Progressing  Flowsheets (Taken 4/27/2024 0800)  Return ADL Status to a Safe Level of Function: Administer medication as ordered     Problem: ABCDS Injury Assessment  Goal: Absence of physical injury  4/27/2024 1000 by Tayler Fernandez, RN  Outcome: Progressing  4/26/2024 2355 by Charu Almendarez, RN  Outcome: Progressing

## 2024-04-27 NOTE — PROGRESS NOTES
Department of Podiatry Progress Note        Reason for Consult:  L 2nd toe ulcer  Requesting Physician:  Dr. Mila Whitley MD    CHIEF COMPLAINT:  Left second toe ulceration    HISTORY OF PRESENT ILLNESS:    The patient is a 58 y.o. male with significant past medical history as listed below who is consulted to podiatry for a left second toe ulceration. Patient states that he has had the wound on his toe for some time and does not dress it when at home. He also notes a wound to the right foot that has been none healing after a \"procedure\" was done. Patient admits to being diabetic and having minimal feeling in his feet. Patient denies any N/V/F/SOB/CP. Patient denies any other pedal complaints today.    Patient resting comfortably, anticipating discharge    Past Medical History:        Diagnosis Date    Abdominal pain 07/29/2021    Abnormal EKG 07/28/2016    Acute pancreatitis 07/29/2021    CHF (congestive heart failure) ([facility])     Cigarette smoker 08/25/2021    Cocaine abuse ([facility]) 03/28/2015    Last Assessment & Plan:  Formatting of this note might be different from the original. Counseled on cessation as increases risk of coronary vasospasm and further worsening of heart function.    Dehydration 07/28/2016    Diabetes mellitus (HCC)     History of cocaine abuse ([facility]) 07/28/2016    History of MI (myocardial infarction) 07/28/2016    Hyperglycemia 07/28/2016    Hyperlipidemia     Hypertension     Morbid obesity with BMI of 45.0-49.9, adult (HCC) 07/28/2016    Postural dizziness 07/28/2016    PUD (peptic ulcer disease) 03/01/2019       Past Surgical History:        Procedure Laterality Date    CARDIAC CATHETERIZATION      LEG SURGERY Right 8/25/2023    RIGHT THIGH INCISION AND DRAINAGE WITH DRAIN PLACEMENT performed by Dominick Cid DO at [facility] OR    UPPER GASTROINTESTINAL ENDOSCOPY         Allergies:   Patient has no known allergies.    Medications:   Home Meds  No current facility-administered medications on file  dressed with saline gauze, dry gauze, mepliex pad  -Weightbearing as tolerated to b/l LE  -Lengthy discussion with patient regarding the importance of extremity elevation and edema control, patient voiced understanding   -No podiatric surgical intervention at this point     DISPO: Full thickness ulceration to b/l LE. All labs reviewed. XR left foot no osseous infection. venous duplex pending. No antibiotics necessary from podiatric standpoint. No surgical intervention planned at this time. Patient amenable to OUT-patient care upon Medical stability.     Brent Stewart DPM  [facility]  [phone]

## 2024-04-27 NOTE — CARE COORDINATION
Call received from bedside RN stating that patient is requesting a lyft to [address] [place] [address]. [place] [address]. Per bedside RN patient is safe for a lyft. Lyft scheduled through [employer].    Electronically signed by CHLOE Moreno on 4/27/2024 at 12:54 PM  [phone]

## 2024-04-27 NOTE — PLAN OF CARE
Problem: Discharge Planning  Goal: Discharge to home or other facility with appropriate resources  Outcome: Progressing     Problem: Safety - Adult  Goal: Free from fall injury  Outcome: Progressing     Problem: Skin/Tissue Integrity  Goal: Absence of new skin breakdown  Description: 1.  Monitor for areas of redness and/or skin breakdown  2.  Assess vascular access sites hourly  3.  Every 4-6 hours minimum:  Change oxygen saturation probe site  4.  Every 4-6 hours:  If on nasal continuous positive airway pressure, respiratory therapy assess nares and determine need for appliance change or resting period.  Outcome: Progressing     Problem: Chronic Conditions and Co-morbidities  Goal: Patient's chronic conditions and co-morbidity symptoms are monitored and maintained or improved  Outcome: Progressing     Problem: Cardiovascular - Adult  Goal: Maintains optimal cardiac output and hemodynamic stability  Outcome: Progressing  Goal: Absence of cardiac dysrhythmias or at baseline  Outcome: Progressing     Problem: Skin/Tissue Integrity - Adult  Goal: Skin integrity remains intact  Outcome: Progressing  Goal: Oral mucous membranes remain intact  Outcome: Progressing     Problem: Musculoskeletal - Adult  Goal: Return mobility to safest level of function  Outcome: Progressing     Problem: Pain  Goal: Verbalizes/displays adequate comfort level or baseline comfort level  Outcome: Progressing  Flowsheets (Taken 4/26/2024 1505 by Ml Barth, MAURISIO)  Verbalizes/displays adequate comfort level or baseline comfort level: Encourage patient to monitor pain and request assistance     Problem: ABCDS Injury Assessment  Goal: Absence of physical injury  Outcome: Progressing

## 2024-04-28 LAB
BACTERIA FLD AEROBE CULT: NORMAL
GRAM STN SPEC: NORMAL

## 2024-05-05 NOTE — DISCHARGE SUMMARY
Discharge Summary    Name:  Levy Vargas /Age/Sex: 1965  (58 y.o. male)   MRN & CSN:  [id number] & [id number] Admission Date/Time: 2024 11:50 AM   Attending:  No att. providers found Discharging Physician: Mila Whitley MD     Discharge Diagnosis:  Ischemic cardiomyopathy        Discharge Exam  Physical Exam  Vitals:    24 0536 24 0803 24 0818 24 0836   BP: 120/68 119/76     Pulse:  57     Resp:  16  16   Temp:  97.4 °F (36.3 °C)     TempSrc:  Axillary     SpO2:  98% 98%    Weight:       Height:          General: Awake, oriented  HEENT: Pupils round, reacting to light  Heart: S1 S2 heard, no murmurs  Lung: Clear b/l  Abd: Soft, distended, not tender, BS +  Extr: No cyanosis or clubbing  Neuro: No focal deficits.    Hospital Course:   Levy Vargas is a 58 y.o.  male  who presents with Ascites due to alcoholic cirrhosis (HCC)      History of A-fib.  Ischemic cardiomyopathy.  Ascites.  Increasing  Peripheral vascular disease  History of CVA.  High-grade internal artery carotid stenosis.     S/p paracentesis with removal of 1.7L serous fluid, check ascitis fluid cx and tests  Elevated troponin, cardiology consulted.  Last echo in  with EF 15 - 20%, pt states he doesn't f/u with cardio  Appreciate cardiology recommendation.  Started on Coreg 3.125 twice daily.  Plan to initiate Aldactone, Entresto and SGLT2 inhibitor as outpatient.  Continue torsemide.     History of atrial fibrillation, continue with Eliquis, continue amiodarone, monitor hemoglobin. Cardio consulted     RLE pain - appears chronic pain, pt requesting oxycodone. States he hasn't followed up with his PCP in about a year, discussed that will not be prescribing narcotics. Start tramadol and robaxin. Would avoid narcotics           Resume home medications as appropriate.     Peripheral vascular disease.  Continue statin/anticoagulation  Vascular surgery consulted, no further recs  Podiatry consulted for lt 2nd

## 2024-05-26 PROBLEM — R79.89 ELEVATED TROPONIN: Status: RESOLVED | Noted: 2024-04-26 | Resolved: 2024-05-26

## 2024-09-17 ENCOUNTER — TELEPHONE (OUTPATIENT)
Dept: PRIMARY CARE CLINIC | Age: 59
End: 2024-09-17

## 2024-10-18 ENCOUNTER — APPOINTMENT (OUTPATIENT)
Dept: CT IMAGING | Age: 59
DRG: 192 | End: 2024-10-18
Payer: COMMERCIAL

## 2024-10-18 ENCOUNTER — HOSPITAL ENCOUNTER (EMERGENCY)
Dept: VASCULAR LAB | Age: 59
Discharge: HOME | DRG: 192 | End: 2024-10-20
Payer: COMMERCIAL

## 2024-10-18 ENCOUNTER — HOSPITAL ENCOUNTER (INPATIENT)
Age: 59
LOS: 11 days | Discharge: HOME HEALTH | DRG: 192 | End: 2024-10-29
Attending: STUDENT IN AN ORGANIZED HEALTH CARE EDUCATION/TRAINING PROGRAM | Admitting: INTERNAL MEDICINE
Payer: COMMERCIAL

## 2024-10-18 ENCOUNTER — APPOINTMENT (OUTPATIENT)
Dept: GENERAL RADIOLOGY | Age: 59
DRG: 192 | End: 2024-10-18
Payer: COMMERCIAL

## 2024-10-18 DIAGNOSIS — K70.31 ASCITES DUE TO ALCOHOLIC CIRRHOSIS (HCC): ICD-10-CM

## 2024-10-18 DIAGNOSIS — I48.0 PAF (PAROXYSMAL ATRIAL FIBRILLATION) (HCC): ICD-10-CM

## 2024-10-18 DIAGNOSIS — I73.9 PAD (PERIPHERAL ARTERY DISEASE) (HCC): ICD-10-CM

## 2024-10-18 DIAGNOSIS — I50.9 CHF (CONGESTIVE HEART FAILURE) (HCC): ICD-10-CM

## 2024-10-18 DIAGNOSIS — I48.91 ATRIAL FIBRILLATION WITH RVR (HCC): Primary | ICD-10-CM

## 2024-10-18 DIAGNOSIS — I50.9 ACUTE ON CHRONIC CONGESTIVE HEART FAILURE, UNSPECIFIED HEART FAILURE TYPE (HCC): ICD-10-CM

## 2024-10-18 DIAGNOSIS — E11.9 DIABETES MELLITUS WITH COINCIDENT HYPERTENSION (HCC): ICD-10-CM

## 2024-10-18 DIAGNOSIS — I10 DIABETES MELLITUS WITH COINCIDENT HYPERTENSION (HCC): ICD-10-CM

## 2024-10-18 DIAGNOSIS — I50.43 CHF (CONGESTIVE HEART FAILURE), NYHA CLASS I, ACUTE ON CHRONIC, COMBINED (HCC): ICD-10-CM

## 2024-10-18 DIAGNOSIS — I50.9 CHRONIC CONGESTIVE HEART FAILURE, UNSPECIFIED HEART FAILURE TYPE (HCC): ICD-10-CM

## 2024-10-18 DIAGNOSIS — I50.23 ACUTE ON CHRONIC SYSTOLIC CONGESTIVE HEART FAILURE (HCC): ICD-10-CM

## 2024-10-18 LAB
ALBUMIN SERPL-MCNC: 3.5 G/DL (ref 3.4–5)
ALP SERPL-CCNC: 122 U/L (ref 40–129)
ALT SERPL-CCNC: 35 U/L (ref 10–40)
ANION GAP SERPL CALCULATED.3IONS-SCNC: 13 MMOL/L (ref 3–16)
AST SERPL-CCNC: 62 U/L (ref 15–37)
BASE EXCESS BLDV CALC-SCNC: 6.6 MMOL/L
BASOPHILS # BLD: 0 K/UL (ref 0–0.2)
BASOPHILS NFR BLD: 1.2 %
BILIRUB DIRECT SERPL-MCNC: 1.5 MG/DL (ref 0–0.3)
BILIRUB INDIRECT SERPL-MCNC: 0.8 MG/DL (ref 0–1)
BILIRUB SERPL-MCNC: 2.3 MG/DL (ref 0–1)
BILIRUB UR QL STRIP.AUTO: ABNORMAL
BUN SERPL-MCNC: 37 MG/DL (ref 7–20)
CALCIUM SERPL-MCNC: 8.8 MG/DL (ref 8.3–10.6)
CHLORIDE SERPL-SCNC: 98 MMOL/L (ref 99–110)
CLARITY UR: CLEAR
CO2 BLDV-SCNC: 33 MMOL/L
CO2 SERPL-SCNC: 28 MMOL/L (ref 21–32)
COHGB MFR BLDV: 1.8 %
COLOR UR: ABNORMAL
CREAT SERPL-MCNC: 1.3 MG/DL (ref 0.8–1.3)
DEPRECATED RDW RBC AUTO: 15.8 % (ref 12.4–15.4)
ECHO BSA: 2.72 M2
EKG DIAGNOSIS: NORMAL
EKG Q-T INTERVAL: 284 MS
EKG QRS DURATION: 104 MS
EKG QTC CALCULATION (BAZETT): 470 MS
EKG R AXIS: -38 DEGREES
EKG T AXIS: 168 DEGREES
EKG VENTRICULAR RATE: 165 BPM
EOSINOPHIL # BLD: 0 K/UL (ref 0–0.6)
EOSINOPHIL NFR BLD: 1.2 %
FERRITIN SERPL IA-MCNC: 63.4 NG/ML (ref 30–400)
FINE GRAN CASTS #/AREA URNS HPF: NORMAL /LPF (ref 0–2)
GFR SERPLBLD CREATININE-BSD FMLA CKD-EPI: 63 ML/MIN/{1.73_M2}
GLUCOSE BLD-MCNC: 169 MG/DL (ref 70–99)
GLUCOSE SERPL-MCNC: 143 MG/DL (ref 70–99)
GLUCOSE UR STRIP.AUTO-MCNC: NEGATIVE MG/DL
HCO3 BLDV-SCNC: 32 MMOL/L (ref 23–29)
HCT VFR BLD AUTO: 38.4 % (ref 40.5–52.5)
HGB BLD-MCNC: 12.6 G/DL (ref 13.5–17.5)
HGB UR QL STRIP.AUTO: NEGATIVE
HYALINE CASTS #/AREA URNS LPF: NORMAL /LPF (ref 0–2)
IRON SATN MFR SERPL: 14 % (ref 20–50)
IRON SERPL-MCNC: 40 UG/DL (ref 59–158)
KETONES UR STRIP.AUTO-MCNC: ABNORMAL MG/DL
LACTATE BLDV-SCNC: 1.9 MMOL/L (ref 0.4–2)
LACTATE BLDV-SCNC: 2.5 MMOL/L (ref 0.4–2)
LEUKOCYTE ESTERASE UR QL STRIP.AUTO: ABNORMAL
LYMPHOCYTES # BLD: 0.7 K/UL (ref 1–5.1)
LYMPHOCYTES NFR BLD: 20.1 %
MAGNESIUM SERPL-MCNC: 1.93 MG/DL (ref 1.8–2.4)
MCH RBC QN AUTO: 30 PG (ref 26–34)
MCHC RBC AUTO-ENTMCNC: 32.7 G/DL (ref 31–36)
MCV RBC AUTO: 91.6 FL (ref 80–100)
METHGB MFR BLDV: 0.5 %
MONOCYTES # BLD: 0.4 K/UL (ref 0–1.3)
MONOCYTES NFR BLD: 10.3 %
NEUTROPHILS # BLD: 2.3 K/UL (ref 1.7–7.7)
NEUTROPHILS NFR BLD: 67.2 %
NITRITE UR QL STRIP.AUTO: NEGATIVE
NT-PROBNP SERPL-MCNC: 5343 PG/ML (ref 0–124)
O2 THERAPY: ABNORMAL
PCO2 BLDV: 47.4 MMHG (ref 40–50)
PERFORMED ON: ABNORMAL
PH BLDV: 7.44 [PH] (ref 7.35–7.45)
PH UR STRIP.AUTO: 5 [PH] (ref 5–8)
PLATELET # BLD AUTO: 186 K/UL (ref 135–450)
PMV BLD AUTO: 9.2 FL (ref 5–10.5)
PO2 BLDV: <30 MMHG
POTASSIUM SERPL-SCNC: 3.5 MMOL/L (ref 3.5–5.1)
PROT SERPL-MCNC: 7 G/DL (ref 6.4–8.2)
PROT UR STRIP.AUTO-MCNC: 100 MG/DL
RBC # BLD AUTO: 4.19 M/UL (ref 4.2–5.9)
RBC #/AREA URNS HPF: NORMAL /HPF (ref 0–4)
SAO2 % BLDV: 44 %
SODIUM SERPL-SCNC: 139 MMOL/L (ref 136–145)
SP GR UR STRIP.AUTO: 1.04 (ref 1–1.03)
T4 FREE SERPL-MCNC: 3.6 NG/DL (ref 0.9–1.8)
TIBC SERPL-MCNC: 287 UG/DL (ref 260–445)
TROPONIN, HIGH SENSITIVITY: 117 NG/L (ref 0–22)
TROPONIN, HIGH SENSITIVITY: 62 NG/L (ref 0–22)
TSH SERPL DL<=0.005 MIU/L-ACNC: 0.06 UIU/ML (ref 0.27–4.2)
UA COMPLETE W REFLEX CULTURE PNL UR: ABNORMAL
UA DIPSTICK W REFLEX MICRO PNL UR: YES
URN SPEC COLLECT METH UR: ABNORMAL
UROBILINOGEN UR STRIP-ACNC: 2 E.U./DL
VAS RIGHT ATA DIST PSV: 14 CM/S
VAS RIGHT ATA MID PSV: 0 CM/S
VAS RIGHT CFA DIST PSV: 44.2 CM/S
VAS RIGHT CFA PROX PSV: 36.5 CM/S
VAS RIGHT PERONEAL MID PSV: 0 CM/S
VAS RIGHT PFA PROX PSV: 60 CM/S
VAS RIGHT POP A DIST PSV: 0 CM/S
VAS RIGHT POP A PROX PSV: 17 CM/S
VAS RIGHT POP A PROX VEL RATIO: 0.64
VAS RIGHT PTA MID PSV: 0 CM/S
VAS RIGHT SFA DIST PSV: 26.4 CM/S
VAS RIGHT SFA DIST VEL RATIO: 0.61
VAS RIGHT SFA MID PSV: 43.1 CM/S
VAS RIGHT SFA MID VEL RATIO: 1.1
VAS RIGHT SFA PROX PSV: 40.9 CM/S
VAS RIGHT SFA PROX VEL RATIO: 0.9
WBC # BLD AUTO: 3.4 K/UL (ref 4–11)
WBC #/AREA URNS HPF: NORMAL /HPF (ref 0–5)

## 2024-10-18 PROCEDURE — 2580000003 HC RX 258: Performed by: INTERNAL MEDICINE

## 2024-10-18 PROCEDURE — 74177 CT ABD & PELVIS W/CONTRAST: CPT

## 2024-10-18 PROCEDURE — 6370000000 HC RX 637 (ALT 250 FOR IP): Performed by: INTERNAL MEDICINE

## 2024-10-18 PROCEDURE — 99285 EMERGENCY DEPT VISIT HI MDM: CPT

## 2024-10-18 PROCEDURE — 71045 X-RAY EXAM CHEST 1 VIEW: CPT

## 2024-10-18 PROCEDURE — 84443 ASSAY THYROID STIM HORMONE: CPT

## 2024-10-18 PROCEDURE — 80048 BASIC METABOLIC PNL TOTAL CA: CPT

## 2024-10-18 PROCEDURE — 83550 IRON BINDING TEST: CPT

## 2024-10-18 PROCEDURE — 83735 ASSAY OF MAGNESIUM: CPT

## 2024-10-18 PROCEDURE — 6360000002 HC RX W HCPCS: Performed by: STUDENT IN AN ORGANIZED HEALTH CARE EDUCATION/TRAINING PROGRAM

## 2024-10-18 PROCEDURE — 96375 TX/PRO/DX INJ NEW DRUG ADDON: CPT

## 2024-10-18 PROCEDURE — 96374 THER/PROPH/DIAG INJ IV PUSH: CPT

## 2024-10-18 PROCEDURE — 84439 ASSAY OF FREE THYROXINE: CPT

## 2024-10-18 PROCEDURE — 93010 ELECTROCARDIOGRAM REPORT: CPT | Performed by: INTERNAL MEDICINE

## 2024-10-18 PROCEDURE — 83540 ASSAY OF IRON: CPT

## 2024-10-18 PROCEDURE — 2500000003 HC RX 250 WO HCPCS: Performed by: INTERNAL MEDICINE

## 2024-10-18 PROCEDURE — 93005 ELECTROCARDIOGRAM TRACING: CPT | Performed by: STUDENT IN AN ORGANIZED HEALTH CARE EDUCATION/TRAINING PROGRAM

## 2024-10-18 PROCEDURE — 93925 LOWER EXTREMITY STUDY: CPT

## 2024-10-18 PROCEDURE — 84484 ASSAY OF TROPONIN QUANT: CPT

## 2024-10-18 PROCEDURE — 6370000000 HC RX 637 (ALT 250 FOR IP): Performed by: NURSE PRACTITIONER

## 2024-10-18 PROCEDURE — 6360000004 HC RX CONTRAST MEDICATION: Performed by: STUDENT IN AN ORGANIZED HEALTH CARE EDUCATION/TRAINING PROGRAM

## 2024-10-18 PROCEDURE — 82803 BLOOD GASES ANY COMBINATION: CPT

## 2024-10-18 PROCEDURE — 85025 COMPLETE CBC W/AUTO DIFF WBC: CPT

## 2024-10-18 PROCEDURE — 82728 ASSAY OF FERRITIN: CPT

## 2024-10-18 PROCEDURE — 6360000002 HC RX W HCPCS: Performed by: PHYSICIAN ASSISTANT

## 2024-10-18 PROCEDURE — 83605 ASSAY OF LACTIC ACID: CPT

## 2024-10-18 PROCEDURE — 83880 ASSAY OF NATRIURETIC PEPTIDE: CPT

## 2024-10-18 PROCEDURE — 93925 LOWER EXTREMITY STUDY: CPT | Performed by: SURGERY

## 2024-10-18 PROCEDURE — 81001 URINALYSIS AUTO W/SCOPE: CPT

## 2024-10-18 PROCEDURE — 2500000003 HC RX 250 WO HCPCS: Performed by: STUDENT IN AN ORGANIZED HEALTH CARE EDUCATION/TRAINING PROGRAM

## 2024-10-18 PROCEDURE — 6360000002 HC RX W HCPCS: Performed by: INTERNAL MEDICINE

## 2024-10-18 PROCEDURE — 80076 HEPATIC FUNCTION PANEL: CPT

## 2024-10-18 PROCEDURE — 1200000000 HC SEMI PRIVATE

## 2024-10-18 RX ORDER — ASPIRIN 81 MG/1
81 TABLET, CHEWABLE ORAL DAILY
Status: DISCONTINUED | OUTPATIENT
Start: 2024-10-18 | End: 2024-10-29 | Stop reason: HOSPADM

## 2024-10-18 RX ORDER — ISOSORBIDE MONONITRATE 30 MG/1
15 TABLET, EXTENDED RELEASE ORAL DAILY
Status: DISCONTINUED | OUTPATIENT
Start: 2024-10-18 | End: 2024-10-29 | Stop reason: HOSPADM

## 2024-10-18 RX ORDER — LANOLIN ALCOHOL/MO/W.PET/CERES
400 CREAM (GRAM) TOPICAL 2 TIMES DAILY WITH MEALS
Status: DISCONTINUED | OUTPATIENT
Start: 2024-10-18 | End: 2024-10-29 | Stop reason: HOSPADM

## 2024-10-18 RX ORDER — FUROSEMIDE 10 MG/ML
40 INJECTION INTRAMUSCULAR; INTRAVENOUS ONCE
Status: DISCONTINUED | OUTPATIENT
Start: 2024-10-18 | End: 2024-10-18

## 2024-10-18 RX ORDER — NITROGLYCERIN 0.4 MG/1
0.4 TABLET SUBLINGUAL EVERY 5 MIN PRN
Status: ON HOLD | COMMUNITY
End: 2024-10-29

## 2024-10-18 RX ORDER — GLUCAGON 1 MG/ML
1 KIT INJECTION PRN
Status: DISCONTINUED | OUTPATIENT
Start: 2024-10-18 | End: 2024-10-29 | Stop reason: HOSPADM

## 2024-10-18 RX ORDER — MORPHINE SULFATE 4 MG/ML
4 INJECTION, SOLUTION INTRAMUSCULAR; INTRAVENOUS ONCE
Status: COMPLETED | OUTPATIENT
Start: 2024-10-18 | End: 2024-10-18

## 2024-10-18 RX ORDER — GABAPENTIN 300 MG/1
300 CAPSULE ORAL 3 TIMES DAILY
COMMUNITY

## 2024-10-18 RX ORDER — CALCIUM CARBONATE 300MG(750)
400 TABLET,CHEWABLE ORAL 2 TIMES DAILY
Status: ON HOLD | COMMUNITY
End: 2024-10-29

## 2024-10-18 RX ORDER — CARVEDILOL 25 MG/1
25 TABLET ORAL 2 TIMES DAILY WITH MEALS
Status: DISCONTINUED | OUTPATIENT
Start: 2024-10-18 | End: 2024-10-24

## 2024-10-18 RX ORDER — NITROGLYCERIN 0.4 MG/1
0.4 TABLET SUBLINGUAL EVERY 5 MIN PRN
Status: DISCONTINUED | OUTPATIENT
Start: 2024-10-18 | End: 2024-10-29 | Stop reason: HOSPADM

## 2024-10-18 RX ORDER — INSULIN LISPRO 100 [IU]/ML
0-4 INJECTION, SOLUTION INTRAVENOUS; SUBCUTANEOUS
Status: DISCONTINUED | OUTPATIENT
Start: 2024-10-18 | End: 2024-10-29 | Stop reason: HOSPADM

## 2024-10-18 RX ORDER — DAPAGLIFLOZIN 10 MG/1
10 TABLET, FILM COATED ORAL EVERY MORNING
Status: ON HOLD | COMMUNITY
End: 2024-10-29 | Stop reason: HOSPADM

## 2024-10-18 RX ORDER — PANTOPRAZOLE SODIUM 40 MG/1
40 TABLET, DELAYED RELEASE ORAL DAILY PRN
Status: ON HOLD | COMMUNITY
End: 2024-10-29

## 2024-10-18 RX ORDER — SODIUM CHLORIDE 9 MG/ML
INJECTION, SOLUTION INTRAVENOUS PRN
Status: DISCONTINUED | OUTPATIENT
Start: 2024-10-18 | End: 2024-10-19 | Stop reason: SDUPTHER

## 2024-10-18 RX ORDER — FUROSEMIDE 40 MG/1
40 TABLET ORAL 2 TIMES DAILY
Status: ON HOLD | COMMUNITY
End: 2024-10-29 | Stop reason: HOSPADM

## 2024-10-18 RX ORDER — SACUBITRIL AND VALSARTAN 49; 51 MG/1; MG/1
1 TABLET, FILM COATED ORAL 2 TIMES DAILY
Status: ON HOLD | COMMUNITY
End: 2024-10-29

## 2024-10-18 RX ORDER — SPIRONOLACTONE 25 MG/1
25 TABLET ORAL DAILY
Status: DISCONTINUED | OUTPATIENT
Start: 2024-10-18 | End: 2024-10-25

## 2024-10-18 RX ORDER — POTASSIUM CHLORIDE 1500 MG/1
40 TABLET, EXTENDED RELEASE ORAL PRN
Status: DISCONTINUED | OUTPATIENT
Start: 2024-10-18 | End: 2024-10-29 | Stop reason: HOSPADM

## 2024-10-18 RX ORDER — POLYETHYLENE GLYCOL 3350 17 G/17G
17 POWDER, FOR SOLUTION ORAL DAILY PRN
Status: DISCONTINUED | OUTPATIENT
Start: 2024-10-18 | End: 2024-10-29 | Stop reason: HOSPADM

## 2024-10-18 RX ORDER — DILTIAZEM HYDROCHLORIDE 5 MG/ML
10 INJECTION INTRAVENOUS ONCE
Status: DISCONTINUED | OUTPATIENT
Start: 2024-10-18 | End: 2024-10-18

## 2024-10-18 RX ORDER — PANTOPRAZOLE SODIUM 40 MG/1
40 TABLET, DELAYED RELEASE ORAL DAILY PRN
Status: DISCONTINUED | OUTPATIENT
Start: 2024-10-18 | End: 2024-10-23

## 2024-10-18 RX ORDER — MAGNESIUM SULFATE IN WATER 40 MG/ML
2000 INJECTION, SOLUTION INTRAVENOUS PRN
Status: DISCONTINUED | OUTPATIENT
Start: 2024-10-18 | End: 2024-10-29 | Stop reason: HOSPADM

## 2024-10-18 RX ORDER — SPIRONOLACTONE 25 MG/1
25 TABLET ORAL DAILY
Status: ON HOLD | COMMUNITY
End: 2024-10-29 | Stop reason: HOSPADM

## 2024-10-18 RX ORDER — DEXTROSE MONOHYDRATE 100 MG/ML
INJECTION, SOLUTION INTRAVENOUS CONTINUOUS PRN
Status: DISCONTINUED | OUTPATIENT
Start: 2024-10-18 | End: 2024-10-29 | Stop reason: HOSPADM

## 2024-10-18 RX ORDER — ONDANSETRON 4 MG/1
4 TABLET, ORALLY DISINTEGRATING ORAL EVERY 8 HOURS PRN
Status: DISCONTINUED | OUTPATIENT
Start: 2024-10-18 | End: 2024-10-29 | Stop reason: HOSPADM

## 2024-10-18 RX ORDER — CARVEDILOL 25 MG/1
25 TABLET ORAL 2 TIMES DAILY WITH MEALS
Status: ON HOLD | COMMUNITY
End: 2024-10-29 | Stop reason: HOSPADM

## 2024-10-18 RX ORDER — GABAPENTIN 300 MG/1
300 CAPSULE ORAL 3 TIMES DAILY
Status: DISCONTINUED | OUTPATIENT
Start: 2024-10-18 | End: 2024-10-21

## 2024-10-18 RX ORDER — FUROSEMIDE 10 MG/ML
40 INJECTION INTRAMUSCULAR; INTRAVENOUS 2 TIMES DAILY
Status: DISCONTINUED | OUTPATIENT
Start: 2024-10-19 | End: 2024-10-26

## 2024-10-18 RX ORDER — SODIUM CHLORIDE 0.9 % (FLUSH) 0.9 %
5-40 SYRINGE (ML) INJECTION EVERY 12 HOURS SCHEDULED
Status: DISCONTINUED | OUTPATIENT
Start: 2024-10-18 | End: 2024-10-19 | Stop reason: SDUPTHER

## 2024-10-18 RX ORDER — ISOSORBIDE MONONITRATE 30 MG/1
15 TABLET, EXTENDED RELEASE ORAL DAILY
Status: ON HOLD | COMMUNITY
End: 2024-10-29

## 2024-10-18 RX ORDER — ONDANSETRON 2 MG/ML
4 INJECTION INTRAMUSCULAR; INTRAVENOUS EVERY 6 HOURS PRN
Status: DISCONTINUED | OUTPATIENT
Start: 2024-10-18 | End: 2024-10-29 | Stop reason: HOSPADM

## 2024-10-18 RX ORDER — FUROSEMIDE 10 MG/ML
40 INJECTION INTRAMUSCULAR; INTRAVENOUS ONCE
Status: COMPLETED | OUTPATIENT
Start: 2024-10-18 | End: 2024-10-18

## 2024-10-18 RX ORDER — LANOLIN ALCOHOL/MO/W.PET/CERES
3 CREAM (GRAM) TOPICAL NIGHTLY PRN
Status: DISCONTINUED | OUTPATIENT
Start: 2024-10-18 | End: 2024-10-29 | Stop reason: HOSPADM

## 2024-10-18 RX ORDER — IOPAMIDOL 755 MG/ML
75 INJECTION, SOLUTION INTRAVASCULAR
Status: COMPLETED | OUTPATIENT
Start: 2024-10-18 | End: 2024-10-18

## 2024-10-18 RX ORDER — SODIUM CHLORIDE 0.9 % (FLUSH) 0.9 %
5-40 SYRINGE (ML) INJECTION PRN
Status: DISCONTINUED | OUTPATIENT
Start: 2024-10-18 | End: 2024-10-19 | Stop reason: SDUPTHER

## 2024-10-18 RX ORDER — ATORVASTATIN CALCIUM 80 MG/1
80 TABLET, FILM COATED ORAL NIGHTLY
Status: DISCONTINUED | OUTPATIENT
Start: 2024-10-18 | End: 2024-10-29 | Stop reason: HOSPADM

## 2024-10-18 RX ADMIN — SACUBITRIL AND VALSARTAN 1 TABLET: 49; 51 TABLET, FILM COATED ORAL at 21:27

## 2024-10-18 RX ADMIN — SODIUM CHLORIDE, PRESERVATIVE FREE 10 ML: 5 INJECTION INTRAVENOUS at 21:27

## 2024-10-18 RX ADMIN — EMPAGLIFLOZIN 10 MG: 10 TABLET, FILM COATED ORAL at 21:18

## 2024-10-18 RX ADMIN — ASPIRIN 81 MG: 81 TABLET, CHEWABLE ORAL at 21:17

## 2024-10-18 RX ADMIN — GABAPENTIN 300 MG: 300 CAPSULE ORAL at 21:18

## 2024-10-18 RX ADMIN — AMIODARONE HYDROCHLORIDE 150 MG: 1.5 INJECTION, SOLUTION INTRAVENOUS at 15:50

## 2024-10-18 RX ADMIN — FUROSEMIDE 40 MG: 10 INJECTION, SOLUTION INTRAMUSCULAR; INTRAVENOUS at 15:38

## 2024-10-18 RX ADMIN — ATORVASTATIN CALCIUM 80 MG: 80 TABLET, FILM COATED ORAL at 21:17

## 2024-10-18 RX ADMIN — CARVEDILOL 25 MG: 25 TABLET, FILM COATED ORAL at 21:17

## 2024-10-18 RX ADMIN — Medication 400 MG: at 21:27

## 2024-10-18 RX ADMIN — SPIRONOLACTONE 25 MG: 25 TABLET ORAL at 21:17

## 2024-10-18 RX ADMIN — AMIODARONE HYDROCHLORIDE 1 MG/MIN: 1.8 INJECTION, SOLUTION INTRAVENOUS at 16:04

## 2024-10-18 RX ADMIN — APIXABAN 5 MG: 5 TABLET, FILM COATED ORAL at 21:17

## 2024-10-18 RX ADMIN — AMIODARONE HYDROCHLORIDE 0.5 MG/MIN: 1.8 INJECTION, SOLUTION INTRAVENOUS at 22:02

## 2024-10-18 RX ADMIN — MORPHINE SULFATE 4 MG: 4 INJECTION, SOLUTION INTRAMUSCULAR; INTRAVENOUS at 11:57

## 2024-10-18 RX ADMIN — Medication 3 MG: at 23:12

## 2024-10-18 RX ADMIN — ONDANSETRON 4 MG: 2 INJECTION INTRAMUSCULAR; INTRAVENOUS at 21:18

## 2024-10-18 RX ADMIN — IOPAMIDOL 75 ML: 755 INJECTION, SOLUTION INTRAVENOUS at 12:49

## 2024-10-18 RX ADMIN — ISOSORBIDE MONONITRATE 15 MG: 30 TABLET, EXTENDED RELEASE ORAL at 21:18

## 2024-10-18 ASSESSMENT — LIFESTYLE VARIABLES
HOW OFTEN DO YOU HAVE A DRINK CONTAINING ALCOHOL: MONTHLY OR LESS
HOW MANY STANDARD DRINKS CONTAINING ALCOHOL DO YOU HAVE ON A TYPICAL DAY: 1 OR 2

## 2024-10-18 ASSESSMENT — PAIN SCALES - GENERAL
PAINLEVEL_OUTOF10: 5
PAINLEVEL_OUTOF10: 7

## 2024-10-18 ASSESSMENT — PAIN DESCRIPTION - LOCATION
LOCATION: LEG
LOCATION: LEG

## 2024-10-18 ASSESSMENT — PAIN DESCRIPTION - FREQUENCY: FREQUENCY: CONTINUOUS

## 2024-10-18 ASSESSMENT — PAIN DESCRIPTION - DESCRIPTORS: DESCRIPTORS: SHARP;SHOOTING

## 2024-10-18 ASSESSMENT — PAIN - FUNCTIONAL ASSESSMENT
PAIN_FUNCTIONAL_ASSESSMENT: ACTIVITIES ARE NOT PREVENTED
PAIN_FUNCTIONAL_ASSESSMENT: ACTIVITIES ARE NOT PREVENTED

## 2024-10-18 ASSESSMENT — PAIN DESCRIPTION - PAIN TYPE: TYPE: CHRONIC PAIN

## 2024-10-18 ASSESSMENT — PAIN DESCRIPTION - ORIENTATION
ORIENTATION: RIGHT
ORIENTATION: RIGHT

## 2024-10-18 ASSESSMENT — PAIN DESCRIPTION - ONSET: ONSET: ON-GOING

## 2024-10-18 NOTE — ED TRIAGE NOTES
Patient to ED c/o right leg pain and swelling. Leg is cool to touch. Patient has a history of vascular issues and toe removal. Patient c/o SOB also upon arrival.

## 2024-10-18 NOTE — H&P
Hospital Medicine History & Physical      PCP: Minnie Lilly DO    Date of Admission: 10/18/2024    Date of Service: Pt seen/examined on 10/18/2024 and Admitted to Inpatient with expected LOS greater than two midnights due to medical therapy.     Chief Complaint: Shortness of breath, leg pain      History Of Present Illness:      59 y.o. male who presented to [facility] with shortness of breath leg pain in his right leg and bilateral lower extremity swelling associated with shortness of breath as listed above mild abdominal distention denies fever chills headache blurry vision or double vision no chest pain, to note that patient with history of congestive heart failure and alcoholic cirrhosis along with ascites ran out of his diuretics and not taking diuretics at home for few days, stated that he is still taking other medication on presentation to ED found to be in A-fib RVR, vascular ultrasound ordered and done without acute occlusion, cardiology consulted from ED started on amiodarone drip being admitted for further management and treatment discussed with ED provider agree with plan to admit    Past Medical History:          Diagnosis Date    Abdominal pain 07/29/2021    Abnormal EKG 07/28/2016    Acute pancreatitis 07/29/2021    CHF (congestive heart failure) ([facility])     Cigarette smoker 08/25/2021    Cocaine abuse ([facility]) 03/28/2015    Last Assessment & Plan:  Formatting of this note might be different from the original. Counseled on cessation as increases risk of coronary vasospasm and further worsening of heart function.    Dehydration 07/28/2016    Diabetes mellitus (HCC)     History of cocaine abuse (HCC) 07/28/2016    History of MI (myocardial infarction) 07/28/2016    Hyperglycemia 07/28/2016    Hyperlipidemia     Hypertension     Morbid obesity with BMI of 45.0-49.9, adult 07/28/2016    Postural dizziness 07/28/2016    PUD (peptic ulcer disease) 03/01/2019       Past Surgical History:

## 2024-10-18 NOTE — ED PROVIDER NOTES
This is my MARGARET Supervisory and shared visit note:     This patient was seen by the advanced practice provider.     I personally saw the patient and made/approved the management plan and take responsibility for the patient management.      Briefly, 59 y.o. male presents with leg pain.  Patient also reports lower extremity swelling.  Patient denies chest pain.  Patient has shortness of breath at baseline.  Patient has a past medical history of CHF.  Last echo we reviewed show ejection fraction of 15 to 20%.  Patient also has a known history of PAD.    Focused exam:   Gen: awake, alert, and NAD  HEENT: NCAT. EOMI.   CV: Elevated heart rate, normal rhythm w/o MRG  Lungs: CTAB. No incr WOB.   Abdomen: Soft, nontender, nondistended. No rebound/guarding.   Neuro: Moving all extremities, fluent speech, follows commands     My EKG interpretation:Atrial fibrillation with rapid RVR, ventricular rate 165, left axis deviation, no STEMI    MDM:   Patient is hemodynamic stable upon arrival to the emergency department.  Patient is afebrile.  Patient tachycardic to 160s on arrival to the emergency department.  EKG concerning for atrial fibrillation with rapid ventricular response.  Patient has a known history of atrial fibrillation and is currently on anticoagulation.  Patient has a dopplerable pulse in right foot.  Patient has bilateral lower extremity edema as well.  Differential diagnose include but not limited to arrhythmia, CHF, ACS, electrolyte abnormality, pulmonary edema, PTX.  CBC shows leukopenia of 3.4, hemoglobin 12.6.  VBG within normal limits.  Hepatic function panel shows minor elevation in direct bilirubin at 1.5, magnesium within normal limits, BNP elevated to 5343, BMP within normal limits, urinalysis negative for UTI, lactate within normal limits, free T4 elevated at 3.6 consistent with hypothyroidism.  Troponin elevated to 117, repeat delta troponin elevated 62.  Troponin elevation most likely due to demand 
patient verbalized understanding and agreement with this plan of care.     CRITICAL CARE TIME   There was a high probability of life-threatening clinical deterioration in the patient's condition requiring my urgent intervention.  I personally saw the patient and independently provided 45 minutes of non-concurrent critical care out of the total shared critical care time provided, excluding separately reportable procedures.     FINAL IMPRESSION      1. Atrial fibrillation with RVR (HCC)    2. Acute on chronic congestive heart failure, unspecified heart failure type (HCC)    3. PAD (peripheral artery disease) (HCC)    4. Ascites due to alcoholic cirrhosis (HCC)          DISPOSITION/PLAN   DISPOSITION Admitted 10/18/2024 05:27:03 PM  Condition at Disposition: Data Unavailable      PATIENT REFERRED TO:  No follow-up provider specified.    DISCHARGE MEDICATIONS:  New Prescriptions    No medications on file       DISCONTINUED MEDICATIONS:  Discontinued Medications    AMIODARONE (CORDARONE) 200 MG TABLET    TAKE 1 TABLET BY MOUTH EVERY DAY    CARVEDILOL (COREG) 3.125 MG TABLET    Take 1 tablet by mouth 2 times daily (with meals)    DAPAGLIFLOZIN (FARXIGA) 5 MG TABLET    TAKE 1 TABLET BY MOUTH EVERY DAY IN THE MORNING    FERROUS SULFATE 324 (65 FE) MG EC TABLET    Take 1 tablet by mouth daily (with breakfast)    FLUTICASONE (FLONASE) 50 MCG/ACT NASAL SPRAY    2 sprays by Nasal route daily    HYDRALAZINE (APRESOLINE) 25 MG TABLET    Take 1 tablet by mouth every 8 hours    LOSARTAN (COZAAR) 25 MG TABLET    Take 1 tablet by mouth daily    PANTOPRAZOLE (PROTONIX) 40 MG TABLET    Take 1 tablet by mouth every morning (before breakfast)    POLYETHYLENE GLYCOL (GLYCOLAX) 17 GM/SCOOP POWDER    Take 17 g by mouth daily as needed for Constipation    POTASSIUM CHLORIDE (KLOR-CON M) 20 MEQ EXTENDED RELEASE TABLET    Take 1 tablet by mouth daily    TORSEMIDE 40 MG TABS    Take 40 mg by mouth daily            (Please note that portions of

## 2024-10-19 ENCOUNTER — APPOINTMENT (OUTPATIENT)
Dept: CT IMAGING | Age: 59
DRG: 192 | End: 2024-10-19
Payer: COMMERCIAL

## 2024-10-19 ENCOUNTER — APPOINTMENT (OUTPATIENT)
Age: 59
DRG: 192 | End: 2024-10-19
Attending: INTERNAL MEDICINE
Payer: COMMERCIAL

## 2024-10-19 LAB
ALBUMIN SERPL-MCNC: 3.3 G/DL (ref 3.4–5)
ALP SERPL-CCNC: 131 U/L (ref 40–129)
ALT SERPL-CCNC: 45 U/L (ref 10–40)
AMMONIA PLAS-SCNC: 53 UMOL/L (ref 16–60)
ANION GAP SERPL CALCULATED.3IONS-SCNC: 22 MMOL/L (ref 3–16)
AST SERPL-CCNC: 72 U/L (ref 15–37)
BASE EXCESS BLDV CALC-SCNC: -3.4 MMOL/L
BILIRUB DIRECT SERPL-MCNC: 1.3 MG/DL (ref 0–0.3)
BILIRUB INDIRECT SERPL-MCNC: 0.7 MG/DL (ref 0–1)
BILIRUB SERPL-MCNC: 2 MG/DL (ref 0–1)
BUN SERPL-MCNC: 40 MG/DL (ref 7–20)
CALCIUM SERPL-MCNC: 8.7 MG/DL (ref 8.3–10.6)
CHLORIDE SERPL-SCNC: 96 MMOL/L (ref 99–110)
CHOLEST SERPL-MCNC: 79 MG/DL (ref 0–199)
CO2 BLDV-SCNC: 25 MMOL/L
CO2 SERPL-SCNC: 19 MMOL/L (ref 21–32)
COHGB MFR BLDV: 2.5 %
CREAT SERPL-MCNC: 1.8 MG/DL (ref 0.8–1.3)
ECHO AO ROOT DIAM: 3 CM
ECHO AO ROOT INDEX: 1.15 CM/M2
ECHO AV PEAK GRADIENT: 2 MMHG
ECHO AV PEAK VELOCITY: 0.8 M/S
ECHO BSA: 2.7 M2
ECHO LA AREA 2C: 33.8 CM2
ECHO LA AREA 4C: 29.5 CM2
ECHO LA DIAMETER INDEX: 2.27 CM/M2
ECHO LA DIAMETER: 5.9 CM
ECHO LA MAJOR AXIS: 7.6 CM
ECHO LA MINOR AXIS: 7.3 CM
ECHO LA TO AORTIC ROOT RATIO: 1.97
ECHO LA VOL BP: 110 ML (ref 18–58)
ECHO LA VOL MOD A2C: 129 ML (ref 18–58)
ECHO LA VOL MOD A4C: 94 ML (ref 18–58)
ECHO LA VOL/BSA BIPLANE: 42 ML/M2 (ref 16–34)
ECHO LA VOLUME INDEX MOD A2C: 50 ML/M2 (ref 16–34)
ECHO LA VOLUME INDEX MOD A4C: 36 ML/M2 (ref 16–34)
ECHO LV EF PHYSICIAN: 15 %
ECHO LV FRACTIONAL SHORTENING: 20 % (ref 28–44)
ECHO LV INTERNAL DIMENSION DIASTOLE INDEX: 2.54 CM/M2
ECHO LV INTERNAL DIMENSION DIASTOLIC: 6.6 CM (ref 4.2–5.9)
ECHO LV INTERNAL DIMENSION SYSTOLIC INDEX: 2.04 CM/M2
ECHO LV INTERNAL DIMENSION SYSTOLIC: 5.3 CM
ECHO LV IVSD: 1.3 CM (ref 0.6–1)
ECHO LV MASS 2D: 409.3 G (ref 88–224)
ECHO LV MASS INDEX 2D: 157.4 G/M2 (ref 49–115)
ECHO LV POSTERIOR WALL DIASTOLIC: 1.3 CM (ref 0.6–1)
ECHO LV RELATIVE WALL THICKNESS RATIO: 0.39
ECHO PULMONARY ARTERY END DIASTOLIC PRESSURE: 4 MMHG
ECHO PV MAX VELOCITY: 0.4 M/S
ECHO PV PEAK GRADIENT: 1 MMHG
ECHO PV REGURGITANT MAX VELOCITY: 1 M/S
ECHO RA AREA 4C: 25.8 CM2
ECHO RA END SYSTOLIC VOLUME APICAL 4 CHAMBER INDEX BSA: 33 ML/M2
ECHO RA VOLUME: 86 ML
ECHO RV INTERNAL DIMENSION: 3.8 CM
ECHO RV TAPSE: 1.2 CM (ref 1.7–?)
EKG ATRIAL RATE: 60 BPM
EKG DIAGNOSIS: NORMAL
EKG P AXIS: 46 DEGREES
EKG P-R INTERVAL: 90 MS
EKG Q-T INTERVAL: 506 MS
EKG QRS DURATION: 118 MS
EKG QTC CALCULATION (BAZETT): 506 MS
EKG R AXIS: -17 DEGREES
EKG T AXIS: 211 DEGREES
EKG VENTRICULAR RATE: 60 BPM
EST. AVERAGE GLUCOSE BLD GHB EST-MCNC: 134.1 MG/DL
GFR SERPLBLD CREATININE-BSD FMLA CKD-EPI: 42 ML/MIN/{1.73_M2}
GLUCOSE BLD-MCNC: 159 MG/DL (ref 70–99)
GLUCOSE BLD-MCNC: 179 MG/DL (ref 70–99)
GLUCOSE BLD-MCNC: 185 MG/DL (ref 70–99)
GLUCOSE BLD-MCNC: 189 MG/DL (ref 70–99)
GLUCOSE BLD-MCNC: 197 MG/DL (ref 70–99)
GLUCOSE SERPL-MCNC: 202 MG/DL (ref 70–99)
HBA1C MFR BLD: 6.3 %
HCO3 BLDV-SCNC: 23 MMOL/L (ref 23–29)
HDLC SERPL-MCNC: 17 MG/DL (ref 40–60)
LDLC SERPL CALC-MCNC: 52 MG/DL
MAGNESIUM SERPL-MCNC: 2.39 MG/DL (ref 1.8–2.4)
METHGB MFR BLDV: 0.6 %
O2 THERAPY: ABNORMAL
PCO2 BLDV: 47.4 MMHG (ref 40–50)
PERFORMED ON: ABNORMAL
PH BLDV: 7.3 [PH] (ref 7.35–7.45)
PO2 BLDV: 68 MMHG
POTASSIUM SERPL-SCNC: 4.1 MMOL/L (ref 3.5–5.1)
PROT SERPL-MCNC: 6.8 G/DL (ref 6.4–8.2)
SAO2 % BLDV: 90 %
SODIUM SERPL-SCNC: 137 MMOL/L (ref 136–145)
TRIGL SERPL-MCNC: 51 MG/DL (ref 0–150)
VLDLC SERPL CALC-MCNC: 10 MG/DL

## 2024-10-19 PROCEDURE — 02HV33Z INSERTION OF INFUSION DEVICE INTO SUPERIOR VENA CAVA, PERCUTANEOUS APPROACH: ICD-10-PCS | Performed by: INTERNAL MEDICINE

## 2024-10-19 PROCEDURE — 36415 COLL VENOUS BLD VENIPUNCTURE: CPT

## 2024-10-19 PROCEDURE — 80048 BASIC METABOLIC PNL TOTAL CA: CPT

## 2024-10-19 PROCEDURE — 2580000003 HC RX 258: Performed by: INTERNAL MEDICINE

## 2024-10-19 PROCEDURE — 6370000000 HC RX 637 (ALT 250 FOR IP): Performed by: INTERNAL MEDICINE

## 2024-10-19 PROCEDURE — 99254 IP/OBS CNSLTJ NEW/EST MOD 60: CPT | Performed by: INTERNAL MEDICINE

## 2024-10-19 PROCEDURE — 51798 US URINE CAPACITY MEASURE: CPT

## 2024-10-19 PROCEDURE — 94761 N-INVAS EAR/PLS OXIMETRY MLT: CPT

## 2024-10-19 PROCEDURE — 80061 LIPID PANEL: CPT

## 2024-10-19 PROCEDURE — P9047 ALBUMIN (HUMAN), 25%, 50ML: HCPCS | Performed by: INTERNAL MEDICINE

## 2024-10-19 PROCEDURE — 2700000000 HC OXYGEN THERAPY PER DAY

## 2024-10-19 PROCEDURE — 83036 HEMOGLOBIN GLYCOSYLATED A1C: CPT

## 2024-10-19 PROCEDURE — 6360000004 HC RX CONTRAST MEDICATION: Performed by: INTERNAL MEDICINE

## 2024-10-19 PROCEDURE — 82140 ASSAY OF AMMONIA: CPT

## 2024-10-19 PROCEDURE — 6360000002 HC RX W HCPCS: Performed by: INTERNAL MEDICINE

## 2024-10-19 PROCEDURE — 93005 ELECTROCARDIOGRAM TRACING: CPT | Performed by: NURSE PRACTITIONER

## 2024-10-19 PROCEDURE — 2100000000 HC CCU R&B

## 2024-10-19 PROCEDURE — 80076 HEPATIC FUNCTION PANEL: CPT

## 2024-10-19 PROCEDURE — 93306 TTE W/DOPPLER COMPLETE: CPT | Performed by: INTERNAL MEDICINE

## 2024-10-19 PROCEDURE — 82803 BLOOD GASES ANY COMBINATION: CPT

## 2024-10-19 PROCEDURE — 6360000002 HC RX W HCPCS: Performed by: NURSE PRACTITIONER

## 2024-10-19 PROCEDURE — P9047 ALBUMIN (HUMAN), 25%, 50ML: HCPCS | Performed by: NURSE PRACTITIONER

## 2024-10-19 PROCEDURE — 93010 ELECTROCARDIOGRAM REPORT: CPT | Performed by: INTERNAL MEDICINE

## 2024-10-19 PROCEDURE — 6370000000 HC RX 637 (ALT 250 FOR IP): Performed by: HOSPITALIST

## 2024-10-19 PROCEDURE — 70450 CT HEAD/BRAIN W/O DYE: CPT

## 2024-10-19 PROCEDURE — C8929 TTE W OR WO FOL WCON,DOPPLER: HCPCS

## 2024-10-19 PROCEDURE — 83735 ASSAY OF MAGNESIUM: CPT

## 2024-10-19 RX ORDER — SODIUM CHLORIDE 0.9 % (FLUSH) 0.9 %
5-40 SYRINGE (ML) INJECTION PRN
Status: DISCONTINUED | OUTPATIENT
Start: 2024-10-19 | End: 2024-10-23

## 2024-10-19 RX ORDER — SODIUM CHLORIDE 0.9 % (FLUSH) 0.9 %
5-40 SYRINGE (ML) INJECTION EVERY 12 HOURS SCHEDULED
Status: DISCONTINUED | OUTPATIENT
Start: 2024-10-19 | End: 2024-10-23

## 2024-10-19 RX ORDER — ALBUMIN (HUMAN) 12.5 G/50ML
25 SOLUTION INTRAVENOUS ONCE
Status: COMPLETED | OUTPATIENT
Start: 2024-10-19 | End: 2024-10-19

## 2024-10-19 RX ORDER — LIDOCAINE HYDROCHLORIDE 10 MG/ML
50 INJECTION, SOLUTION EPIDURAL; INFILTRATION; INTRACAUDAL; PERINEURAL ONCE
Status: DISCONTINUED | OUTPATIENT
Start: 2024-10-19 | End: 2024-10-23

## 2024-10-19 RX ORDER — LIDOCAINE HYDROCHLORIDE 20 MG/ML
JELLY TOPICAL PRN
Status: DISCONTINUED | OUTPATIENT
Start: 2024-10-19 | End: 2024-10-29 | Stop reason: HOSPADM

## 2024-10-19 RX ORDER — SODIUM CHLORIDE 9 MG/ML
INJECTION, SOLUTION INTRAVENOUS PRN
Status: DISCONTINUED | OUTPATIENT
Start: 2024-10-19 | End: 2024-10-23

## 2024-10-19 RX ORDER — AMIODARONE HYDROCHLORIDE 200 MG/1
200 TABLET ORAL 2 TIMES DAILY
Status: DISCONTINUED | OUTPATIENT
Start: 2024-10-19 | End: 2024-10-29 | Stop reason: HOSPADM

## 2024-10-19 RX ORDER — ALBUMIN (HUMAN) 12.5 G/50ML
25 SOLUTION INTRAVENOUS 2 TIMES DAILY
Status: COMPLETED | OUTPATIENT
Start: 2024-10-19 | End: 2024-10-20

## 2024-10-19 RX ORDER — MILRINONE LACTATE 0.2 MG/ML
.0625-.75 INJECTION, SOLUTION INTRAVENOUS CONTINUOUS
Status: DISCONTINUED | OUTPATIENT
Start: 2024-10-19 | End: 2024-10-21

## 2024-10-19 RX ADMIN — APIXABAN 5 MG: 5 TABLET, FILM COATED ORAL at 10:26

## 2024-10-19 RX ADMIN — Medication 400 MG: at 19:36

## 2024-10-19 RX ADMIN — AMIODARONE HYDROCHLORIDE 200 MG: 200 TABLET ORAL at 20:40

## 2024-10-19 RX ADMIN — APIXABAN 5 MG: 5 TABLET, FILM COATED ORAL at 20:40

## 2024-10-19 RX ADMIN — SACUBITRIL AND VALSARTAN 1 TABLET: 49; 51 TABLET, FILM COATED ORAL at 10:44

## 2024-10-19 RX ADMIN — ALBUMIN (HUMAN) 25 G: 0.25 INJECTION, SOLUTION INTRAVENOUS at 03:47

## 2024-10-19 RX ADMIN — CARVEDILOL 25 MG: 25 TABLET, FILM COATED ORAL at 10:27

## 2024-10-19 RX ADMIN — ISOSORBIDE MONONITRATE 15 MG: 30 TABLET, EXTENDED RELEASE ORAL at 10:27

## 2024-10-19 RX ADMIN — ALBUMIN (HUMAN) 25 G: 0.25 INJECTION, SOLUTION INTRAVENOUS at 14:55

## 2024-10-19 RX ADMIN — ASPIRIN 81 MG: 81 TABLET, CHEWABLE ORAL at 10:26

## 2024-10-19 RX ADMIN — EMPAGLIFLOZIN 10 MG: 10 TABLET, FILM COATED ORAL at 10:26

## 2024-10-19 RX ADMIN — Medication 400 MG: at 10:25

## 2024-10-19 RX ADMIN — MILRINONE LACTATE IN DEXTROSE 0.3 MCG/KG/MIN: 200 INJECTION, SOLUTION INTRAVENOUS at 15:51

## 2024-10-19 RX ADMIN — INSULIN LISPRO 1 UNITS: 100 INJECTION, SOLUTION INTRAVENOUS; SUBCUTANEOUS at 12:51

## 2024-10-19 RX ADMIN — INSULIN LISPRO 1 UNITS: 100 INJECTION, SOLUTION INTRAVENOUS; SUBCUTANEOUS at 20:47

## 2024-10-19 RX ADMIN — ATORVASTATIN CALCIUM 80 MG: 80 TABLET, FILM COATED ORAL at 20:40

## 2024-10-19 RX ADMIN — GABAPENTIN 300 MG: 300 CAPSULE ORAL at 10:26

## 2024-10-19 RX ADMIN — MILRINONE LACTATE IN DEXTROSE 0.3 MCG/KG/MIN: 200 INJECTION, SOLUTION INTRAVENOUS at 20:36

## 2024-10-19 RX ADMIN — SPIRONOLACTONE 25 MG: 25 TABLET ORAL at 10:25

## 2024-10-19 RX ADMIN — SODIUM CHLORIDE, PRESERVATIVE FREE 10 ML: 5 INJECTION INTRAVENOUS at 10:29

## 2024-10-19 RX ADMIN — PERFLUTREN 1.5 ML: 6.52 INJECTION, SUSPENSION INTRAVENOUS at 09:54

## 2024-10-19 RX ADMIN — SODIUM CHLORIDE, PRESERVATIVE FREE 10 ML: 5 INJECTION INTRAVENOUS at 20:41

## 2024-10-19 RX ADMIN — GABAPENTIN 300 MG: 300 CAPSULE ORAL at 20:41

## 2024-10-19 RX ADMIN — GABAPENTIN 300 MG: 300 CAPSULE ORAL at 14:57

## 2024-10-19 RX ADMIN — LIDOCAINE HYDROCHLORIDE: 20 JELLY TOPICAL at 20:22

## 2024-10-19 ASSESSMENT — PAIN SCALES - GENERAL
PAINLEVEL_OUTOF10: 0

## 2024-10-19 NOTE — CODE DOCUMENTATION
RN observed pt's eyes bulging, head thrusting back and forth, unable to focus. Pt not responsive. Rapid response called.   Pt now responsive.   Dr. Snell at bedside- states could possibly be seizure activity.   NNO at this time.

## 2024-10-19 NOTE — SIGNIFICANT EVENT
APRN notified by RN of patient lethargic. Has converted to SR with BP 80/64 (MAP 69).  -no focal neuro deficit, follows commands  although patient does have slurred speech. CN II-XII intact  -will get VBG, head CT, ammonia, labs  -hold amio gtt  -will give albumin for hypotension - given fluid overload, will attempts to avoid fluid bolus. MAP is > 65  -further pending results of above    Isabella Andrade, CONSUELO - NP

## 2024-10-20 PROBLEM — I73.9 PAD (PERIPHERAL ARTERY DISEASE) (HCC): Status: ACTIVE | Noted: 2024-10-20

## 2024-10-20 LAB
ANION GAP SERPL CALCULATED.3IONS-SCNC: 12 MMOL/L (ref 3–16)
APTT BLD: 29.4 SEC (ref 22.1–36.4)
APTT BLD: 34.2 SEC (ref 22.1–36.4)
BACTERIA URNS QL MICRO: ABNORMAL /HPF
BILIRUB UR QL STRIP.AUTO: ABNORMAL
BUN SERPL-MCNC: 49 MG/DL (ref 7–20)
CALCIUM SERPL-MCNC: 8.5 MG/DL (ref 8.3–10.6)
CHLORIDE SERPL-SCNC: 95 MMOL/L (ref 99–110)
CLARITY UR: ABNORMAL
CO2 SERPL-SCNC: 31 MMOL/L (ref 21–32)
COLOR UR: ABNORMAL
CREAT SERPL-MCNC: 2.1 MG/DL (ref 0.8–1.3)
DEPRECATED RDW RBC AUTO: 15.8 % (ref 12.4–15.4)
EPI CELLS #/AREA URNS HPF: ABNORMAL /HPF (ref 0–5)
GFR SERPLBLD CREATININE-BSD FMLA CKD-EPI: 35 ML/MIN/{1.73_M2}
GLUCOSE BLD-MCNC: 177 MG/DL (ref 70–99)
GLUCOSE BLD-MCNC: 222 MG/DL (ref 70–99)
GLUCOSE BLD-MCNC: 225 MG/DL (ref 70–99)
GLUCOSE BLD-MCNC: 228 MG/DL (ref 70–99)
GLUCOSE BLD-MCNC: 229 MG/DL (ref 70–99)
GLUCOSE SERPL-MCNC: 188 MG/DL (ref 70–99)
GLUCOSE UR STRIP.AUTO-MCNC: 500 MG/DL
HCT VFR BLD AUTO: 33.8 % (ref 40.5–52.5)
HGB BLD-MCNC: 11 G/DL (ref 13.5–17.5)
HGB UR QL STRIP.AUTO: ABNORMAL
KETONES UR STRIP.AUTO-MCNC: NEGATIVE MG/DL
LEUKOCYTE ESTERASE UR QL STRIP.AUTO: ABNORMAL
MAGNESIUM SERPL-MCNC: 2.52 MG/DL (ref 1.8–2.4)
MCH RBC QN AUTO: 29.5 PG (ref 26–34)
MCHC RBC AUTO-ENTMCNC: 32.5 G/DL (ref 31–36)
MCV RBC AUTO: 90.8 FL (ref 80–100)
NITRITE UR QL STRIP.AUTO: NEGATIVE
NT-PROBNP SERPL-MCNC: 3330 PG/ML (ref 0–124)
PERFORMED ON: ABNORMAL
PH UR STRIP.AUTO: 6 [PH] (ref 5–8)
PLATELET # BLD AUTO: 174 K/UL (ref 135–450)
PMV BLD AUTO: 9.2 FL (ref 5–10.5)
POTASSIUM SERPL-SCNC: 3.4 MMOL/L (ref 3.5–5.1)
PROT UR STRIP.AUTO-MCNC: 100 MG/DL
RBC # BLD AUTO: 3.72 M/UL (ref 4.2–5.9)
RBC #/AREA URNS HPF: >100 /HPF (ref 0–4)
SODIUM SERPL-SCNC: 138 MMOL/L (ref 136–145)
SP GR UR STRIP.AUTO: 1.02 (ref 1–1.03)
UA COMPLETE W REFLEX CULTURE PNL UR: ABNORMAL
UA DIPSTICK W REFLEX MICRO PNL UR: YES
URN SPEC COLLECT METH UR: ABNORMAL
UROBILINOGEN UR STRIP-ACNC: 4 E.U./DL
WBC # BLD AUTO: 5.5 K/UL (ref 4–11)
WBC #/AREA URNS HPF: ABNORMAL /HPF (ref 0–5)

## 2024-10-20 PROCEDURE — 6360000002 HC RX W HCPCS: Performed by: INTERNAL MEDICINE

## 2024-10-20 PROCEDURE — 2580000003 HC RX 258: Performed by: INTERNAL MEDICINE

## 2024-10-20 PROCEDURE — 2500000003 HC RX 250 WO HCPCS: Performed by: HOSPITALIST

## 2024-10-20 PROCEDURE — 6370000000 HC RX 637 (ALT 250 FOR IP): Performed by: INTERNAL MEDICINE

## 2024-10-20 PROCEDURE — 99291 CRITICAL CARE FIRST HOUR: CPT | Performed by: INTERNAL MEDICINE

## 2024-10-20 PROCEDURE — 99255 IP/OBS CONSLTJ NEW/EST HI 80: CPT | Performed by: SURGERY

## 2024-10-20 PROCEDURE — 6360000002 HC RX W HCPCS: Performed by: HOSPITALIST

## 2024-10-20 PROCEDURE — P9047 ALBUMIN (HUMAN), 25%, 50ML: HCPCS | Performed by: INTERNAL MEDICINE

## 2024-10-20 PROCEDURE — 85027 COMPLETE CBC AUTOMATED: CPT

## 2024-10-20 PROCEDURE — 2100000000 HC CCU R&B

## 2024-10-20 PROCEDURE — 3E033XZ INTRODUCTION OF VASOPRESSOR INTO PERIPHERAL VEIN, PERCUTANEOUS APPROACH: ICD-10-PCS | Performed by: INTERNAL MEDICINE

## 2024-10-20 PROCEDURE — 81001 URINALYSIS AUTO W/SCOPE: CPT

## 2024-10-20 PROCEDURE — 83735 ASSAY OF MAGNESIUM: CPT

## 2024-10-20 PROCEDURE — P9047 ALBUMIN (HUMAN), 25%, 50ML: HCPCS | Performed by: HOSPITALIST

## 2024-10-20 PROCEDURE — 80048 BASIC METABOLIC PNL TOTAL CA: CPT

## 2024-10-20 PROCEDURE — 2700000000 HC OXYGEN THERAPY PER DAY

## 2024-10-20 PROCEDURE — 94761 N-INVAS EAR/PLS OXIMETRY MLT: CPT

## 2024-10-20 PROCEDURE — 83880 ASSAY OF NATRIURETIC PEPTIDE: CPT

## 2024-10-20 PROCEDURE — 85730 THROMBOPLASTIN TIME PARTIAL: CPT

## 2024-10-20 RX ORDER — HEPARIN SODIUM 1000 [USP'U]/ML
4000 INJECTION, SOLUTION INTRAVENOUS; SUBCUTANEOUS ONCE
Status: COMPLETED | OUTPATIENT
Start: 2024-10-20 | End: 2024-10-20

## 2024-10-20 RX ORDER — ALBUMIN (HUMAN) 12.5 G/50ML
25 SOLUTION INTRAVENOUS ONCE
Status: COMPLETED | OUTPATIENT
Start: 2024-10-20 | End: 2024-10-20

## 2024-10-20 RX ORDER — HEPARIN SODIUM 1000 [USP'U]/ML
2000 INJECTION, SOLUTION INTRAVENOUS; SUBCUTANEOUS PRN
Status: DISCONTINUED | OUTPATIENT
Start: 2024-10-20 | End: 2024-10-20

## 2024-10-20 RX ORDER — HEPARIN SODIUM AND DEXTROSE 10000; 5 [USP'U]/100ML; G/100ML
0-4000 INJECTION INTRAVENOUS CONTINUOUS
Status: DISCONTINUED | OUTPATIENT
Start: 2024-10-20 | End: 2024-10-24

## 2024-10-20 RX ORDER — HEPARIN SODIUM 1000 [USP'U]/ML
4000 INJECTION, SOLUTION INTRAVENOUS; SUBCUTANEOUS PRN
Status: DISCONTINUED | OUTPATIENT
Start: 2024-10-20 | End: 2024-10-20

## 2024-10-20 RX ORDER — NOREPINEPHRINE BITARTRATE 0.06 MG/ML
1-100 INJECTION, SOLUTION INTRAVENOUS CONTINUOUS
Status: DISCONTINUED | OUTPATIENT
Start: 2024-10-20 | End: 2024-10-29 | Stop reason: HOSPADM

## 2024-10-20 RX ADMIN — POTASSIUM CHLORIDE 40 MEQ: 1500 TABLET, EXTENDED RELEASE ORAL at 06:04

## 2024-10-20 RX ADMIN — HEPARIN SODIUM 1000 UNITS/HR: 10000 INJECTION, SOLUTION INTRAVENOUS at 10:21

## 2024-10-20 RX ADMIN — HEPARIN SODIUM 4000 UNITS: 1000 INJECTION INTRAVENOUS; SUBCUTANEOUS at 10:33

## 2024-10-20 RX ADMIN — MILRINONE LACTATE IN DEXTROSE 0.3 MCG/KG/MIN: 200 INJECTION, SOLUTION INTRAVENOUS at 10:52

## 2024-10-20 RX ADMIN — ALBUMIN (HUMAN) 25 G: 0.25 INJECTION, SOLUTION INTRAVENOUS at 10:16

## 2024-10-20 RX ADMIN — AMIODARONE HYDROCHLORIDE 200 MG: 200 TABLET ORAL at 21:05

## 2024-10-20 RX ADMIN — AMIODARONE HYDROCHLORIDE 200 MG: 200 TABLET ORAL at 09:40

## 2024-10-20 RX ADMIN — HEPARIN SODIUM 4000 UNITS: 1000 INJECTION INTRAVENOUS; SUBCUTANEOUS at 18:36

## 2024-10-20 RX ADMIN — ATORVASTATIN CALCIUM 80 MG: 80 TABLET, FILM COATED ORAL at 21:05

## 2024-10-20 RX ADMIN — SODIUM CHLORIDE, PRESERVATIVE FREE 10 ML: 5 INJECTION INTRAVENOUS at 21:05

## 2024-10-20 RX ADMIN — Medication 5 MCG/MIN: at 03:17

## 2024-10-20 RX ADMIN — Medication 400 MG: at 09:23

## 2024-10-20 RX ADMIN — MILRINONE LACTATE IN DEXTROSE 0.3 MCG/KG/MIN: 200 INJECTION, SOLUTION INTRAVENOUS at 18:36

## 2024-10-20 RX ADMIN — GABAPENTIN 300 MG: 300 CAPSULE ORAL at 21:05

## 2024-10-20 RX ADMIN — Medication 400 MG: at 17:08

## 2024-10-20 RX ADMIN — INSULIN LISPRO 1 UNITS: 100 INJECTION, SOLUTION INTRAVENOUS; SUBCUTANEOUS at 21:05

## 2024-10-20 RX ADMIN — ALBUMIN (HUMAN) 25 G: 0.25 INJECTION, SOLUTION INTRAVENOUS at 01:38

## 2024-10-20 RX ADMIN — INSULIN LISPRO 1 UNITS: 100 INJECTION, SOLUTION INTRAVENOUS; SUBCUTANEOUS at 12:27

## 2024-10-20 RX ADMIN — SODIUM CHLORIDE, PRESERVATIVE FREE 10 ML: 5 INJECTION INTRAVENOUS at 10:35

## 2024-10-20 RX ADMIN — GABAPENTIN 300 MG: 300 CAPSULE ORAL at 09:47

## 2024-10-20 RX ADMIN — GABAPENTIN 300 MG: 300 CAPSULE ORAL at 14:40

## 2024-10-20 RX ADMIN — INSULIN LISPRO 1 UNITS: 100 INJECTION, SOLUTION INTRAVENOUS; SUBCUTANEOUS at 17:08

## 2024-10-20 RX ADMIN — ASPIRIN 81 MG: 81 TABLET, CHEWABLE ORAL at 09:23

## 2024-10-20 RX ADMIN — FUROSEMIDE 40 MG: 10 INJECTION, SOLUTION INTRAMUSCULAR; INTRAVENOUS at 09:21

## 2024-10-20 RX ADMIN — MILRINONE LACTATE IN DEXTROSE 0.3 MCG/KG/MIN: 200 INJECTION, SOLUTION INTRAVENOUS at 03:19

## 2024-10-20 NOTE — ACP (ADVANCE CARE PLANNING)
Advance Care Planning     Advance Care Planning Activator (Inpatient)  Conversation Note      Date of ACP Conversation: 10/20/2024     Conversation Conducted with: Patient with Decision Making Capacity    ACP Activator: NAE Sims, W    Health Care Decision Maker:     Current Designated Health Care Decision Maker:     Primary Decision Maker: Ian Wilson - Child - [phone]    Secondary Decision Maker: Curly Vargas - Brother/Sister - [phone]    Care Preferences    Ventilation:  \"If you were in your present state of health and suddenly became very ill and were unable to breathe on your own, what would your preference be about the use of a ventilator (breathing machine) if it were available to you?\"      Would the patient desire the use of ventilator (breathing machine)?: yes    \"If your health worsens and it becomes clear that your chance of recovery is unlikely, what would your preference be about the use of a ventilator (breathing machine) if it were available to you?\"     Would the patient desire the use of ventilator (breathing machine)?: No      Resuscitation  \"CPR works best to restart the heart when there is a sudden event, like a heart attack, in someone who is otherwise healthy. Unfortunately, CPR does not typically restart the heart for people who have serious health conditions or who are very sick.\"    \"In the event your heart stopped as a result of an underlying serious health condition, would you want attempts to be made to restart your heart (answer \"yes\" for attempt to resuscitate) or would you prefer a natural death (answer \"no\" for do not attempt to resuscitate)?\" yes       [] Yes   [] No   Educated Patient / Decision Maker regarding differences between Advance Directives and portable DNR orders.    Length of ACP Conversation in minutes:  2    Conversation Outcomes:  ACP discussion completed    Follow-up plan:    [] Schedule follow-up conversation to continue planning  []

## 2024-10-20 NOTE — CARE COORDINATION
Case Management Assessment  Initial Evaluation    Date/Time of Evaluation: 10/20/2024 11:46 AM  Assessment Completed by: NAE Sims, DARYLW    If patient is discharged prior to next notation, then this note serves as note for discharge by case management.    Patient Name: Levy Vargas                   YOB: 1965  Diagnosis: PAD (peripheral artery disease) (HCC) [I73.9]  Atrial fibrillation with RVR (HCC) [I48.91]  Acute on chronic clinical systolic heart failure (HCC) [I50.23]  Ascites due to alcoholic cirrhosis (HCC) [K70.31]  Acute on chronic congestive heart failure, unspecified heart failure type (HCC) [I50.9]                   Date / Time: 10/18/2024 11:01 AM    Patient Admission Status: Inpatient   Readmission Risk (Low < 19, Mod (19-27), High > 27): Readmission Risk Score: 18.6    Current PCP: Minnie Lilly, DO  PCP verified by CM? Yes    Chart Reviewed: Yes      History Provided by: Patient  Patient Orientation: Alert and Oriented    Patient Cognition: Alert    Hospitalization in the last 30 days (Readmission):  No    If yes, Readmission Assessment in CM Navigator will be completed.    Advance Directives:      Code Status: Full Code   Patient's Primary Decision Maker is: Legal Next of Kin    Primary Decision Maker: Silvestre Wilsonaudrey - Child - [phone]    Secondary Decision Maker: Curly Vargas - Brother/Sister - [phone]    Discharge Planning:    Patient lives with: Family Members, Other (Comment) (brother) Type of Home: House  Primary Care Giver: Self  Patient Support Systems include: Children, Family Members   Current Financial resources: Medicaid  Current community resources: ECF/Home Care (? Unsure if home care remains open.)  Current services prior to admission: Home Care, [employer]/Passport            Current DME:              Type of Home Care services:  Aide Services, Meals on Wheels    ADLS  Prior functional level: Independent in ADLs/IADLs  Current functional level: Other

## 2024-10-20 NOTE — PROCEDURES
PROCEDURE NOTE  Date: 10/19/2024   Name: Levy Vargas  YOB: 1965    Procedures  PICC line placed in right right brachial in 1 attempts.  CHG dressing applied.  Report given to Babs FORDE RN.

## 2024-10-20 NOTE — CARE COORDINATION
CLYDE left message for Leandro Upton, admissions at [facility] to inquire about discharge date as well as the home care company they may have sent him home with.     CLYDE also left message for brother to confirm.    Respectfully submitted,    Vannessa SONI, ERIN  [facility]   [phone]    Electronically signed by NAE Sims, CHLOE on 10/20/2024 at 11:37 AM

## 2024-10-21 LAB
ANION GAP SERPL CALCULATED.3IONS-SCNC: 9 MMOL/L (ref 3–16)
APTT BLD: 67 SEC (ref 22.1–36.4)
APTT BLD: 85.2 SEC (ref 22.1–36.4)
APTT BLD: 94 SEC (ref 22.1–36.4)
BUN SERPL-MCNC: 53 MG/DL (ref 7–20)
CALCIUM SERPL-MCNC: 8.9 MG/DL (ref 8.3–10.6)
CHLORIDE SERPL-SCNC: 96 MMOL/L (ref 99–110)
CO2 SERPL-SCNC: 30 MMOL/L (ref 21–32)
CREAT SERPL-MCNC: 2 MG/DL (ref 0.8–1.3)
ECHO BSA: 2.73 M2
GFR SERPLBLD CREATININE-BSD FMLA CKD-EPI: 37 ML/MIN/{1.73_M2}
GLUCOSE BLD-MCNC: 157 MG/DL (ref 70–99)
GLUCOSE SERPL-MCNC: 188 MG/DL (ref 70–99)
MAGNESIUM SERPL-MCNC: 2.51 MG/DL (ref 1.8–2.4)
PERFORMED ON: ABNORMAL
POTASSIUM SERPL-SCNC: 3.9 MMOL/L (ref 3.5–5.1)
SODIUM SERPL-SCNC: 135 MMOL/L (ref 136–145)

## 2024-10-21 PROCEDURE — 2100000000 HC CCU R&B

## 2024-10-21 PROCEDURE — 6370000000 HC RX 637 (ALT 250 FOR IP): Performed by: INTERNAL MEDICINE

## 2024-10-21 PROCEDURE — 85014 HEMATOCRIT: CPT

## 2024-10-21 PROCEDURE — B2111ZZ FLUOROSCOPY OF MULTIPLE CORONARY ARTERIES USING LOW OSMOLAR CONTRAST: ICD-10-PCS | Performed by: INTERNAL MEDICINE

## 2024-10-21 PROCEDURE — 82947 ASSAY GLUCOSE BLOOD QUANT: CPT

## 2024-10-21 PROCEDURE — 94761 N-INVAS EAR/PLS OXIMETRY MLT: CPT

## 2024-10-21 PROCEDURE — 4A023N6 MEASUREMENT OF CARDIAC SAMPLING AND PRESSURE, RIGHT HEART, PERCUTANEOUS APPROACH: ICD-10-PCS | Performed by: INTERNAL MEDICINE

## 2024-10-21 PROCEDURE — 2580000003 HC RX 258: Performed by: INTERNAL MEDICINE

## 2024-10-21 PROCEDURE — 9990000010 HC NO CHARGE VISIT

## 2024-10-21 PROCEDURE — 99291 CRITICAL CARE FIRST HOUR: CPT | Performed by: INTERNAL MEDICINE

## 2024-10-21 PROCEDURE — 93503 INSERT/PLACE HEART CATHETER: CPT

## 2024-10-21 PROCEDURE — C1894 INTRO/SHEATH, NON-LASER: HCPCS | Performed by: INTERNAL MEDICINE

## 2024-10-21 PROCEDURE — 2500000003 HC RX 250 WO HCPCS: Performed by: INTERNAL MEDICINE

## 2024-10-21 PROCEDURE — 99231 SBSQ HOSP IP/OBS SF/LOW 25: CPT | Performed by: SURGERY

## 2024-10-21 PROCEDURE — 85730 THROMBOPLASTIN TIME PARTIAL: CPT

## 2024-10-21 PROCEDURE — C1751 CATH, INF, PER/CENT/MIDLINE: HCPCS | Performed by: INTERNAL MEDICINE

## 2024-10-21 PROCEDURE — 2709999900 HC NON-CHARGEABLE SUPPLY: Performed by: INTERNAL MEDICINE

## 2024-10-21 PROCEDURE — 97162 PT EVAL MOD COMPLEX 30 MIN: CPT

## 2024-10-21 PROCEDURE — 97110 THERAPEUTIC EXERCISES: CPT

## 2024-10-21 PROCEDURE — 80048 BASIC METABOLIC PNL TOTAL CA: CPT

## 2024-10-21 PROCEDURE — 84132 ASSAY OF SERUM POTASSIUM: CPT

## 2024-10-21 PROCEDURE — 82803 BLOOD GASES ANY COMBINATION: CPT

## 2024-10-21 PROCEDURE — 2500000003 HC RX 250 WO HCPCS: Performed by: HOSPITALIST

## 2024-10-21 PROCEDURE — 83735 ASSAY OF MAGNESIUM: CPT

## 2024-10-21 PROCEDURE — 2700000000 HC OXYGEN THERAPY PER DAY

## 2024-10-21 PROCEDURE — 93451 RIGHT HEART CATH: CPT | Performed by: INTERNAL MEDICINE

## 2024-10-21 PROCEDURE — P9047 ALBUMIN (HUMAN), 25%, 50ML: HCPCS | Performed by: INTERNAL MEDICINE

## 2024-10-21 PROCEDURE — 6360000002 HC RX W HCPCS: Performed by: INTERNAL MEDICINE

## 2024-10-21 PROCEDURE — 97530 THERAPEUTIC ACTIVITIES: CPT

## 2024-10-21 RX ORDER — ACETAMINOPHEN 325 MG/1
650 TABLET ORAL EVERY 4 HOURS PRN
Status: DISCONTINUED | OUTPATIENT
Start: 2024-10-21 | End: 2024-10-29 | Stop reason: HOSPADM

## 2024-10-21 RX ORDER — SODIUM CHLORIDE 0.9 % (FLUSH) 0.9 %
5-40 SYRINGE (ML) INJECTION EVERY 12 HOURS SCHEDULED
Status: DISCONTINUED | OUTPATIENT
Start: 2024-10-21 | End: 2024-10-29 | Stop reason: HOSPADM

## 2024-10-21 RX ORDER — MILRINONE LACTATE 0.2 MG/ML
.0625-.75 INJECTION, SOLUTION INTRAVENOUS CONTINUOUS
Status: DISCONTINUED | OUTPATIENT
Start: 2024-10-21 | End: 2024-10-23

## 2024-10-21 RX ORDER — GABAPENTIN 100 MG/1
100 CAPSULE ORAL 3 TIMES DAILY
Status: DISCONTINUED | OUTPATIENT
Start: 2024-10-21 | End: 2024-10-29 | Stop reason: HOSPADM

## 2024-10-21 RX ORDER — HEPARIN SODIUM 1000 [USP'U]/ML
2000 INJECTION, SOLUTION INTRAVENOUS; SUBCUTANEOUS ONCE
Status: COMPLETED | OUTPATIENT
Start: 2024-10-21 | End: 2024-10-21

## 2024-10-21 RX ORDER — ALBUMIN (HUMAN) 12.5 G/50ML
25 SOLUTION INTRAVENOUS EVERY 8 HOURS
Status: COMPLETED | OUTPATIENT
Start: 2024-10-21 | End: 2024-10-22

## 2024-10-21 RX ORDER — SODIUM CHLORIDE 9 MG/ML
INJECTION, SOLUTION INTRAVENOUS PRN
Status: DISCONTINUED | OUTPATIENT
Start: 2024-10-21 | End: 2024-10-29 | Stop reason: HOSPADM

## 2024-10-21 RX ORDER — LIDOCAINE HYDROCHLORIDE 10 MG/ML
INJECTION, SOLUTION INFILTRATION; PERINEURAL PRN
Status: DISCONTINUED | OUTPATIENT
Start: 2024-10-21 | End: 2024-10-21 | Stop reason: HOSPADM

## 2024-10-21 RX ORDER — SODIUM CHLORIDE 0.9 % (FLUSH) 0.9 %
5-40 SYRINGE (ML) INJECTION PRN
Status: DISCONTINUED | OUTPATIENT
Start: 2024-10-21 | End: 2024-10-29 | Stop reason: HOSPADM

## 2024-10-21 RX ADMIN — SODIUM CHLORIDE, PRESERVATIVE FREE 10 ML: 5 INJECTION INTRAVENOUS at 20:12

## 2024-10-21 RX ADMIN — MILRINONE LACTATE IN DEXTROSE 0.3 MCG/KG/MIN: 200 INJECTION, SOLUTION INTRAVENOUS at 17:59

## 2024-10-21 RX ADMIN — ALBUMIN (HUMAN) 25 G: 0.25 INJECTION, SOLUTION INTRAVENOUS at 13:31

## 2024-10-21 RX ADMIN — AMIODARONE HYDROCHLORIDE 200 MG: 200 TABLET ORAL at 20:04

## 2024-10-21 RX ADMIN — GABAPENTIN 100 MG: 100 CAPSULE ORAL at 20:05

## 2024-10-21 RX ADMIN — FUROSEMIDE 4 MG/HR: 10 INJECTION, SOLUTION INTRAMUSCULAR; INTRAVENOUS at 13:37

## 2024-10-21 RX ADMIN — Medication 400 MG: at 17:19

## 2024-10-21 RX ADMIN — MILRINONE LACTATE IN DEXTROSE 0.3 MCG/KG/MIN: 200 INJECTION, SOLUTION INTRAVENOUS at 10:05

## 2024-10-21 RX ADMIN — HEPARIN SODIUM 1870 UNITS/HR: 10000 INJECTION, SOLUTION INTRAVENOUS at 01:01

## 2024-10-21 RX ADMIN — Medication 400 MG: at 08:39

## 2024-10-21 RX ADMIN — HEPARIN SODIUM 2000 UNITS: 1000 INJECTION INTRAVENOUS; SUBCUTANEOUS at 00:58

## 2024-10-21 RX ADMIN — GABAPENTIN 100 MG: 100 CAPSULE ORAL at 13:55

## 2024-10-21 RX ADMIN — SODIUM CHLORIDE, PRESERVATIVE FREE 10 ML: 5 INJECTION INTRAVENOUS at 08:39

## 2024-10-21 RX ADMIN — ALBUMIN (HUMAN) 25 G: 0.25 INJECTION, SOLUTION INTRAVENOUS at 20:04

## 2024-10-21 RX ADMIN — ATORVASTATIN CALCIUM 80 MG: 80 TABLET, FILM COATED ORAL at 20:05

## 2024-10-21 RX ADMIN — AMIODARONE HYDROCHLORIDE 200 MG: 200 TABLET ORAL at 08:37

## 2024-10-21 RX ADMIN — POTASSIUM CHLORIDE 20 MEQ: 1500 TABLET, EXTENDED RELEASE ORAL at 17:19

## 2024-10-21 RX ADMIN — INSULIN LISPRO 1 UNITS: 100 INJECTION, SOLUTION INTRAVENOUS; SUBCUTANEOUS at 17:19

## 2024-10-21 RX ADMIN — MILRINONE LACTATE IN DEXTROSE 0.3 MCG/KG/MIN: 200 INJECTION, SOLUTION INTRAVENOUS at 02:33

## 2024-10-21 RX ADMIN — GABAPENTIN 300 MG: 300 CAPSULE ORAL at 08:37

## 2024-10-21 RX ADMIN — Medication 8 MCG/MIN: at 10:07

## 2024-10-21 RX ADMIN — ASPIRIN 81 MG: 81 TABLET, CHEWABLE ORAL at 08:37

## 2024-10-21 ASSESSMENT — PAIN SCALES - GENERAL
PAINLEVEL_OUTOF10: 0
PAINLEVEL_OUTOF10: 0
PAINLEVEL_OUTOF10: 4

## 2024-10-21 NOTE — DISCHARGE INSTR - COC
Continuity of Care Form    Patient Name: Levy Vargas   :  1965  MRN:  [id number]    Admit date:  10/18/2024  Discharge date:  10/29/24    Code Status Order: Full Code   Advance Directives:   Advance Care Flowsheet Documentation        Date/Time Healthcare Directive Type of Healthcare Directive Copy in Chart Healthcare Agent Appointed Healthcare Agent's Name Healthcare Agent's Phone Number    10/21/24 0958 No, patient does not have an advance directive for healthcare treatment  --  --  --  --  --                     Admitting Physician:  Geovani Blanchard MD  PCP: Minnie Lilly DO    Discharging Nurse: Albert  Discharging Hospital Unit/Room#: Q7B-0145/1308-01  Discharging Unit Phone Number: [phone]    Emergency Contact:   Extended Emergency Contact Information  Primary Emergency Contact: Curly Vargas  Address: [address] [place] [address]  Home Phone: [phone]  Mobile Phone: [phone]  Relation: Brother/Sister   needed? No  Secondary Emergency Contact: Ian Wilson  Home Phone: [phone]  Relation: Child    Past Surgical History:  Past Surgical History:   Procedure Laterality Date    CARDIAC CATHETERIZATION      LEG SURGERY Right 2023    RIGHT THIGH INCISION AND DRAINAGE WITH DRAIN PLACEMENT performed by Dominick Cid DO at [facility] OR    UPPER GASTROINTESTINAL ENDOSCOPY         Immunization History:   Immunization History   Administered Date(s) Administered    Hep A, HAVRIX, VAQTA, (age 19y+), IM, 1mL 2019    Hepatitis B 2013    Influenza Virus Vaccine 2012, 2017, 2019    Pneumococcal, PPSV23, PNEUMOVAX 23, (age 2y+), SC/IM, 0.5mL 2012    TDaP, ADACEL (age 10y-64y), BOOSTRIX (age 10y+), IM, 0.5mL 2016       Active Problems:  Patient Active Problem List   Diagnosis Code    Type 2 diabetes mellitus with hyperlipidemia (HCC) E11.69, E78.5    Diabetes mellitus with coincident hypertension (HCC) E11.9, I10

## 2024-10-21 NOTE — DISCHARGE INSTRUCTIONS
Extra Heart Failure Education/ Tools/ Resources:     https://Kaesu.com/publication/?d=962840   --- this is American Heart Association interactive Healthier Living with Heart Failure guidebook.  Please click hyperlink or copy / paste link into search bar. The QR Code is also available below. Use your mouse to scroll through the pages.  Lots of information about weight monitoring, diet tips, activity, meds, etc    Heart Failure Tools and Resources QR Code is below. It includes multiple resources to include symptom tracker, med tracker, further HF info, and access to a HF Support Network online Community    HF [place] Francy  -- this is a free smart phone francy available for iPhone and Android download.  Use your phone to track sodium / fluid intake, zone tool symptom tracking, weights, medications, etc. Click on this hyperlink  HF [place] Francy   for QR code for easy download or the link is also found in the below HF Tools and Resources.      DASH (Dietary Approach to Stop Hypertension) diet --  https://www.nhlbi.nih.gov/education/dash-eating-plan -- this diet is a flexible eating plan that promotes heart healthy eating style.  Click on hyperlink or copy / paste link into search bar.  Lots of low sodium recipes and tips.    https://www.Rentabilities.Macu[employer]ix/recipes  -- more free recipes

## 2024-10-21 NOTE — ANESTHESIA PRE-OP
H&P Update    I have reviewed the history and physical and examined the patient and find no relevant changes.   I have reviewed with the patient and/or family the risks, benefits, and alternatives to the procedure.    Pre-sedation Assessment    Patient:  Levy Vargas   :   1965  Intended Procedure: Right Heart Catheterization    [facility] nurse's notes reviewed and agreed.  Medications reviewed  Allergies: No Known Allergies      Pre-Procedure Assessment/Plan:  ASA 3 - Patient with moderate systemic disease with functional limitations  Mallampati 2  Level of Sedation Plan:No sedation    Anginal Classification w/in 2 weeks: 0    Heart Failure w/in 2 weeks: If yes NYHA class: 4    Post Procedure plan: Return to same level of care

## 2024-10-22 LAB
ALBUMIN SERPL-MCNC: 3.6 G/DL (ref 3.4–5)
ANION GAP SERPL CALCULATED.3IONS-SCNC: 7 MMOL/L (ref 3–16)
APTT BLD: 75.9 SEC (ref 22.1–36.4)
BASE EXCESS MIXED: 11
BASE EXCESS MIXED: 13
BASE EXCESS MIXED: 6
BASE EXCESS MIXED: 9
BODY TEMPERATURE: 97
BUN SERPL-MCNC: 44 MG/DL (ref 7–20)
CALCIUM SERPL-MCNC: 8.7 MG/DL (ref 8.3–10.6)
CHLORIDE SERPL-SCNC: 95 MMOL/L (ref 99–110)
CO2 SERPL-SCNC: 31 MMOL/L (ref 21–32)
CREAT SERPL-MCNC: 1.4 MG/DL (ref 0.8–1.3)
DEPRECATED RDW RBC AUTO: 16.3 % (ref 12.4–15.4)
GFR SERPLBLD CREATININE-BSD FMLA CKD-EPI: 57 ML/MIN/{1.73_M2}
GLUCOSE BLD-MCNC: 111 MG/DL (ref 70–99)
GLUCOSE BLD-MCNC: 136 MG/DL (ref 70–99)
GLUCOSE BLD-MCNC: 152 MG/DL (ref 70–99)
GLUCOSE BLD-MCNC: 188 MG/DL (ref 70–99)
GLUCOSE SERPL-MCNC: 122 MG/DL (ref 70–99)
HCO3, MIXED: 31.9 MMOL/L
HCO3, MIXED: 34.4 MMOL/L
HCO3, MIXED: 36.1 MMOL/L
HCO3, MIXED: 38.3 MMOL/L
HCT VFR BLD AUTO: 33.1 % (ref 40.5–52.5)
HCT VFR BLD AUTO: 36 % (ref 40.5–52.5)
HGB BLD CALC-MCNC: 12.1 GM/DL (ref 13.5–17.5)
HGB BLD-MCNC: 10.4 G/DL (ref 13.5–17.5)
MAGNESIUM SERPL-MCNC: 2.38 MG/DL (ref 1.8–2.4)
MCH RBC QN AUTO: 28.8 PG (ref 26–34)
MCHC RBC AUTO-ENTMCNC: 31.5 G/DL (ref 31–36)
MCV RBC AUTO: 91.6 FL (ref 80–100)
NT-PROBNP SERPL-MCNC: 2862 PG/ML (ref 0–124)
O2 SAT, MIXED: 52 %
O2 SAT, MIXED: 53 %
O2 SAT, MIXED: 60 %
O2 SAT, MIXED: 61 %
PCO2 MIXED: 57.8 MM HG
PCO2 MIXED: 58.3 MM HG
PCO2 MIXED: 64.4 MM HG
PCO2 MIXED: 65.7 MM HG
PERFORMED ON: ABNORMAL
PERFORMED ON: NORMAL
PH, MIXED: 7.35 (ref 7.35–7.45)
PH, MIXED: 7.36 (ref 7.35–7.45)
PH, MIXED: 7.37 (ref 7.35–7.45)
PH, MIXED: 7.38 (ref 7.35–7.45)
PHOSPHATE SERPL-MCNC: 2.8 MG/DL (ref 2.5–4.9)
PLATELET # BLD AUTO: 160 K/UL (ref 135–450)
PMV BLD AUTO: 9.2 FL (ref 5–10.5)
PO2 MIXED: 29 MM HG
PO2 MIXED: 30 MM HG
PO2 MIXED: 34 MM HG
PO2 MIXED: 34 MM HG
POC SAMPLE TYPE: ABNORMAL
POC SAMPLE TYPE: NORMAL
POTASSIUM BLD-SCNC: 3.5 MMOL/L (ref 3.5–5.1)
POTASSIUM BLD-SCNC: 3.8 MMOL/L (ref 3.5–5.1)
POTASSIUM SERPL-SCNC: 3.9 MMOL/L (ref 3.5–5.1)
RBC # BLD AUTO: 3.61 M/UL (ref 4.2–5.9)
SODIUM SERPL-SCNC: 133 MMOL/L (ref 136–145)
TCO2 CALC MIXED: 34 MMOL/L
TCO2 CALC MIXED: 36 MMOL/L
TCO2 CALC MIXED: 38 MMOL/L
TCO2 CALC MIXED: 40 MMOL/L
WBC # BLD AUTO: 4.8 K/UL (ref 4–11)

## 2024-10-22 PROCEDURE — 97530 THERAPEUTIC ACTIVITIES: CPT

## 2024-10-22 PROCEDURE — 97110 THERAPEUTIC EXERCISES: CPT

## 2024-10-22 PROCEDURE — 82803 BLOOD GASES ANY COMBINATION: CPT

## 2024-10-22 PROCEDURE — 99231 SBSQ HOSP IP/OBS SF/LOW 25: CPT | Performed by: SURGERY

## 2024-10-22 PROCEDURE — 6370000000 HC RX 637 (ALT 250 FOR IP): Performed by: INTERNAL MEDICINE

## 2024-10-22 PROCEDURE — P9047 ALBUMIN (HUMAN), 25%, 50ML: HCPCS | Performed by: INTERNAL MEDICINE

## 2024-10-22 PROCEDURE — 83735 ASSAY OF MAGNESIUM: CPT

## 2024-10-22 PROCEDURE — 2700000000 HC OXYGEN THERAPY PER DAY

## 2024-10-22 PROCEDURE — 85730 THROMBOPLASTIN TIME PARTIAL: CPT

## 2024-10-22 PROCEDURE — 80069 RENAL FUNCTION PANEL: CPT

## 2024-10-22 PROCEDURE — 36592 COLLECT BLOOD FROM PICC: CPT

## 2024-10-22 PROCEDURE — 83880 ASSAY OF NATRIURETIC PEPTIDE: CPT

## 2024-10-22 PROCEDURE — 6360000002 HC RX W HCPCS: Performed by: INTERNAL MEDICINE

## 2024-10-22 PROCEDURE — 2580000003 HC RX 258: Performed by: INTERNAL MEDICINE

## 2024-10-22 PROCEDURE — 82947 ASSAY GLUCOSE BLOOD QUANT: CPT

## 2024-10-22 PROCEDURE — 99291 CRITICAL CARE FIRST HOUR: CPT | Performed by: INTERNAL MEDICINE

## 2024-10-22 PROCEDURE — 94761 N-INVAS EAR/PLS OXIMETRY MLT: CPT

## 2024-10-22 PROCEDURE — 85027 COMPLETE CBC AUTOMATED: CPT

## 2024-10-22 PROCEDURE — 2100000000 HC CCU R&B

## 2024-10-22 PROCEDURE — 97166 OT EVAL MOD COMPLEX 45 MIN: CPT

## 2024-10-22 RX ORDER — CALCIUM CARBONATE 500 MG/1
500 TABLET, CHEWABLE ORAL 3 TIMES DAILY PRN
Status: DISCONTINUED | OUTPATIENT
Start: 2024-10-22 | End: 2024-10-29 | Stop reason: HOSPADM

## 2024-10-22 RX ADMIN — MILRINONE LACTATE 0.35 MCG/KG/MIN: 0.2 INJECTION, SOLUTION INTRAVENOUS at 01:11

## 2024-10-22 RX ADMIN — GABAPENTIN 100 MG: 100 CAPSULE ORAL at 07:57

## 2024-10-22 RX ADMIN — GABAPENTIN 100 MG: 100 CAPSULE ORAL at 20:51

## 2024-10-22 RX ADMIN — Medication 400 MG: at 07:57

## 2024-10-22 RX ADMIN — SODIUM CHLORIDE, PRESERVATIVE FREE 10 ML: 5 INJECTION INTRAVENOUS at 21:05

## 2024-10-22 RX ADMIN — Medication 400 MG: at 16:50

## 2024-10-22 RX ADMIN — FUROSEMIDE 10 MG/HR: 10 INJECTION, SOLUTION INTRAMUSCULAR; INTRAVENOUS at 16:49

## 2024-10-22 RX ADMIN — SODIUM CHLORIDE, PRESERVATIVE FREE 10 ML: 5 INJECTION INTRAVENOUS at 07:58

## 2024-10-22 RX ADMIN — ATORVASTATIN CALCIUM 80 MG: 80 TABLET, FILM COATED ORAL at 20:51

## 2024-10-22 RX ADMIN — MILRINONE LACTATE 0.35 MCG/KG/MIN: 0.2 INJECTION, SOLUTION INTRAVENOUS at 07:21

## 2024-10-22 RX ADMIN — GABAPENTIN 100 MG: 100 CAPSULE ORAL at 14:14

## 2024-10-22 RX ADMIN — ASPIRIN 81 MG: 81 TABLET, CHEWABLE ORAL at 07:57

## 2024-10-22 RX ADMIN — Medication 3 MG: at 20:58

## 2024-10-22 RX ADMIN — AMIODARONE HYDROCHLORIDE 200 MG: 200 TABLET ORAL at 07:57

## 2024-10-22 RX ADMIN — ISOSORBIDE MONONITRATE 15 MG: 30 TABLET, EXTENDED RELEASE ORAL at 07:57

## 2024-10-22 RX ADMIN — MILRINONE LACTATE 0.35 MCG/KG/MIN: 0.2 INJECTION, SOLUTION INTRAVENOUS at 14:13

## 2024-10-22 RX ADMIN — ANTACID TABLETS 500 MG: 500 TABLET, CHEWABLE ORAL at 20:51

## 2024-10-22 RX ADMIN — AMIODARONE HYDROCHLORIDE 200 MG: 200 TABLET ORAL at 20:51

## 2024-10-22 RX ADMIN — ONDANSETRON 4 MG: 4 TABLET, ORALLY DISINTEGRATING ORAL at 19:04

## 2024-10-22 RX ADMIN — HEPARIN SODIUM 1870 UNITS/HR: 10000 INJECTION, SOLUTION INTRAVENOUS at 03:57

## 2024-10-22 RX ADMIN — ALBUMIN (HUMAN) 25 G: 0.25 INJECTION, SOLUTION INTRAVENOUS at 03:53

## 2024-10-22 RX ADMIN — HEPARIN SODIUM 1870 UNITS/HR: 10000 INJECTION, SOLUTION INTRAVENOUS at 16:50

## 2024-10-22 RX ADMIN — INSULIN LISPRO 1 UNITS: 100 INJECTION, SOLUTION INTRAVENOUS; SUBCUTANEOUS at 20:54

## 2024-10-22 RX ADMIN — MILRINONE LACTATE 0.35 MCG/KG/MIN: 0.2 INJECTION, SOLUTION INTRAVENOUS at 21:04

## 2024-10-22 RX ADMIN — FUROSEMIDE 4 MG/HR: 10 INJECTION, SOLUTION INTRAMUSCULAR; INTRAVENOUS at 04:48

## 2024-10-22 ASSESSMENT — PAIN SCALES - GENERAL
PAINLEVEL_OUTOF10: 0

## 2024-10-22 NOTE — FLOWSHEET NOTE
10/21/24 2015   [place]   PAP (Systolic/Diastolic) 49/22   PAP (Mean) 33 mmHg   CVP (Mean) 24 mmHg   SVO2 (%) 51.7 %   CO (l/min) 4.8 l/min   CCO 4.8 L/Min   CI (l/min/m2) 1.8 l/min/m2   SVR (Using NBP Mean) 1150 dyne*sec/cm5   LE 1.13    0.99     Levo @2mcg, Milrinone @0.3mcg, Lasix @ 4mg. Dr. Hernandez notified. New orders received.

## 2024-10-22 NOTE — CARE COORDINATION
Call placed to [facility] to inquire which program he is getting his MOW and Aide from.  He is not listed in the [facility] Program.    Call placed to [facility]  to inquire about his home care that had been placed for him when he was recently discharged from their facility.  [facility] number [phone].    CHLOE Orozco     Case Management   [phone]    10/22/2024  9:16 AM

## 2024-10-22 NOTE — FLOWSHEET NOTE
10/22/24 1200   [place]   Core (Body) Temperature 98 °F (36.7 °C)   MAP (Monitor) 90   PAP (Systolic/Diastolic) 45/20   PAP (Mean) 28 mmHg   PAP Wave Form Appropriate wave forms;Rezeroed   CVP (mmHg) 13 mmHg   CVP (Mean) 13 mmHg   SVO2 (%) 53 %   CO (l/min) 5.3 l/min   CCO 5.3 L/Min   CI (l/min/m2) 1.9 l/min/m2   SVR (Using NBP Mean) 1162.26 dyne*sec/cm5   LE 1.92    1.06   SV (ml) 72 ml     Hemodynamics listed above. Patient currently on 0.35mcg/kg/min of primacor and 10mg/hr of lasix.  Hemodynamics sent to Dr. Hernandez. No new changes at this time.

## 2024-10-22 NOTE — FLOWSHEET NOTE
10/22/24 0215   [place]-Piper   PAP (Systolic/Diastolic) 56/31   PAP (Mean) 39 mmHg   PAP Wave Form Appropriate wave forms   CVP (Mean) 30 mmHg   SVO2 (%) 61 %   CO (l/min) 5.6 l/min   CCO 5.6 L/Min   CI (l/min/m2) 2 l/min/m2   SVR (Using NBP Mean) 957.14 dyne*sec/cm5   LE 0.83    1.2     Milrinone @0.3mcg, Lasix @ 4mg

## 2024-10-23 LAB
ALBUMIN SERPL-MCNC: 3.7 G/DL (ref 3.4–5)
ANION GAP SERPL CALCULATED.3IONS-SCNC: 7 MMOL/L (ref 3–16)
ANTI-XA UNFRAC HEPARIN: 0.5 IU/ML (ref 0.3–0.7)
APTT BLD: 88.5 SEC (ref 22.1–36.4)
BASE EXCESS MIXED: 10
BASE EXCESS MIXED: 11
BASE EXCESS MIXED: 15
BUN SERPL-MCNC: 32 MG/DL (ref 7–20)
CALCIUM SERPL-MCNC: 8.9 MG/DL (ref 8.3–10.6)
CHLORIDE SERPL-SCNC: 99 MMOL/L (ref 99–110)
CO2 SERPL-SCNC: 34 MMOL/L (ref 21–32)
CREAT SERPL-MCNC: 1.2 MG/DL (ref 0.8–1.3)
GFR SERPLBLD CREATININE-BSD FMLA CKD-EPI: 69 ML/MIN/{1.73_M2}
GLUCOSE BLD-MCNC: 108 MG/DL (ref 70–99)
GLUCOSE BLD-MCNC: 171 MG/DL (ref 70–99)
GLUCOSE BLD-MCNC: 178 MG/DL (ref 70–99)
GLUCOSE BLD-MCNC: 182 MG/DL (ref 70–99)
GLUCOSE BLD-MCNC: 203 MG/DL (ref 70–99)
GLUCOSE BLD-MCNC: 209 MG/DL (ref 70–99)
GLUCOSE SERPL-MCNC: 115 MG/DL (ref 70–99)
HCO3, MIXED: 35.7 MMOL/L
HCO3, MIXED: 35.8 MMOL/L
HCO3, MIXED: 39.8 MMOL/L
INR PPP: 1.21 (ref 0.85–1.15)
MAGNESIUM SERPL-MCNC: 2.03 MG/DL (ref 1.8–2.4)
O2 SAT, MIXED: 61 %
O2 SAT, MIXED: 61 %
O2 SAT, MIXED: 68 %
PCO2 MIXED: 56.6 MM HG
PCO2 MIXED: 61.4 MM HG
PCO2 MIXED: 63.2 MM HG
PERFORMED ON: ABNORMAL
PERFORMED ON: NORMAL
PH, MIXED: 7.36 (ref 7.35–7.45)
PH, MIXED: 7.41 (ref 7.35–7.45)
PH, MIXED: 7.42 (ref 7.35–7.45)
PHOSPHATE SERPL-MCNC: 3.3 MG/DL (ref 2.5–4.9)
PO2 MIXED: 32 MM HG
PO2 MIXED: 32 MM HG
PO2 MIXED: 38 MM HG
POC SAMPLE TYPE: ABNORMAL
POC SAMPLE TYPE: ABNORMAL
POC SAMPLE TYPE: NORMAL
POTASSIUM SERPL-SCNC: 4.2 MMOL/L (ref 3.5–5.1)
PROTHROMBIN TIME: 15.5 SEC (ref 11.9–14.9)
SODIUM SERPL-SCNC: 140 MMOL/L (ref 136–145)
TCO2 CALC MIXED: 38 MMOL/L
TCO2 CALC MIXED: 38 MMOL/L
TCO2 CALC MIXED: 42 MMOL/L

## 2024-10-23 PROCEDURE — 2100000000 HC CCU R&B

## 2024-10-23 PROCEDURE — 85520 HEPARIN ASSAY: CPT

## 2024-10-23 PROCEDURE — 97110 THERAPEUTIC EXERCISES: CPT

## 2024-10-23 PROCEDURE — 2580000003 HC RX 258: Performed by: INTERNAL MEDICINE

## 2024-10-23 PROCEDURE — 6370000000 HC RX 637 (ALT 250 FOR IP): Performed by: INTERNAL MEDICINE

## 2024-10-23 PROCEDURE — 85610 PROTHROMBIN TIME: CPT

## 2024-10-23 PROCEDURE — 99291 CRITICAL CARE FIRST HOUR: CPT | Performed by: INTERNAL MEDICINE

## 2024-10-23 PROCEDURE — 97116 GAIT TRAINING THERAPY: CPT

## 2024-10-23 PROCEDURE — 82947 ASSAY GLUCOSE BLOOD QUANT: CPT

## 2024-10-23 PROCEDURE — 82803 BLOOD GASES ANY COMBINATION: CPT

## 2024-10-23 PROCEDURE — 97530 THERAPEUTIC ACTIVITIES: CPT

## 2024-10-23 PROCEDURE — 6360000002 HC RX W HCPCS: Performed by: INTERNAL MEDICINE

## 2024-10-23 PROCEDURE — 83735 ASSAY OF MAGNESIUM: CPT

## 2024-10-23 PROCEDURE — 80069 RENAL FUNCTION PANEL: CPT

## 2024-10-23 PROCEDURE — 94761 N-INVAS EAR/PLS OXIMETRY MLT: CPT

## 2024-10-23 PROCEDURE — 97535 SELF CARE MNGMENT TRAINING: CPT

## 2024-10-23 PROCEDURE — 2700000000 HC OXYGEN THERAPY PER DAY

## 2024-10-23 PROCEDURE — 85730 THROMBOPLASTIN TIME PARTIAL: CPT

## 2024-10-23 RX ORDER — TRAMADOL HYDROCHLORIDE 50 MG/1
50 TABLET ORAL EVERY 6 HOURS PRN
Status: DISCONTINUED | OUTPATIENT
Start: 2024-10-23 | End: 2024-10-29 | Stop reason: HOSPADM

## 2024-10-23 RX ORDER — FERROUS SULFATE 324(65)MG
324 TABLET, DELAYED RELEASE (ENTERIC COATED) ORAL
Status: DISCONTINUED | OUTPATIENT
Start: 2024-10-23 | End: 2024-10-29 | Stop reason: HOSPADM

## 2024-10-23 RX ORDER — MILRINONE LACTATE 0.2 MG/ML
.0625-.75 INJECTION, SOLUTION INTRAVENOUS CONTINUOUS
Status: DISCONTINUED | OUTPATIENT
Start: 2024-10-23 | End: 2024-10-25

## 2024-10-23 RX ORDER — PANTOPRAZOLE SODIUM 40 MG/1
40 TABLET, DELAYED RELEASE ORAL
Status: DISCONTINUED | OUTPATIENT
Start: 2024-10-23 | End: 2024-10-29 | Stop reason: HOSPADM

## 2024-10-23 RX ADMIN — GABAPENTIN 100 MG: 100 CAPSULE ORAL at 08:52

## 2024-10-23 RX ADMIN — ASPIRIN 81 MG: 81 TABLET, CHEWABLE ORAL at 08:51

## 2024-10-23 RX ADMIN — Medication 400 MG: at 08:51

## 2024-10-23 RX ADMIN — PANTOPRAZOLE SODIUM 40 MG: 40 TABLET, DELAYED RELEASE ORAL at 14:00

## 2024-10-23 RX ADMIN — INSULIN LISPRO 1 UNITS: 100 INJECTION, SOLUTION INTRAVENOUS; SUBCUTANEOUS at 09:25

## 2024-10-23 RX ADMIN — GABAPENTIN 100 MG: 100 CAPSULE ORAL at 14:00

## 2024-10-23 RX ADMIN — ISOSORBIDE MONONITRATE 15 MG: 30 TABLET, EXTENDED RELEASE ORAL at 08:49

## 2024-10-23 RX ADMIN — GABAPENTIN 100 MG: 100 CAPSULE ORAL at 20:31

## 2024-10-23 RX ADMIN — Medication 400 MG: at 16:57

## 2024-10-23 RX ADMIN — SODIUM CHLORIDE, PRESERVATIVE FREE 10 ML: 5 INJECTION INTRAVENOUS at 08:52

## 2024-10-23 RX ADMIN — MILRINONE LACTATE 0.3 MCG/KG/MIN: 0.2 INJECTION, SOLUTION INTRAVENOUS at 10:32

## 2024-10-23 RX ADMIN — MILRINONE LACTATE 0.35 MCG/KG/MIN: 0.2 INJECTION, SOLUTION INTRAVENOUS at 03:15

## 2024-10-23 RX ADMIN — SODIUM CHLORIDE, PRESERVATIVE FREE 10 ML: 5 INJECTION INTRAVENOUS at 20:32

## 2024-10-23 RX ADMIN — MILRINONE LACTATE 0.3 MCG/KG/MIN: 0.2 INJECTION, SOLUTION INTRAVENOUS at 18:14

## 2024-10-23 RX ADMIN — HEPARIN SODIUM 1870 UNITS/HR: 10000 INJECTION, SOLUTION INTRAVENOUS at 07:39

## 2024-10-23 RX ADMIN — AMIODARONE HYDROCHLORIDE 200 MG: 200 TABLET ORAL at 20:31

## 2024-10-23 RX ADMIN — ACETAMINOPHEN 325MG 650 MG: 325 TABLET ORAL at 11:29

## 2024-10-23 RX ADMIN — AMIODARONE HYDROCHLORIDE 200 MG: 200 TABLET ORAL at 08:52

## 2024-10-23 RX ADMIN — FUROSEMIDE 5 MG/HR: 10 INJECTION, SOLUTION INTRAMUSCULAR; INTRAVENOUS at 03:11

## 2024-10-23 RX ADMIN — INSULIN LISPRO 1 UNITS: 100 INJECTION, SOLUTION INTRAVENOUS; SUBCUTANEOUS at 18:04

## 2024-10-23 RX ADMIN — FUROSEMIDE 5 MG/HR: 10 INJECTION, SOLUTION INTRAMUSCULAR; INTRAVENOUS at 20:34

## 2024-10-23 RX ADMIN — ATORVASTATIN CALCIUM 80 MG: 80 TABLET, FILM COATED ORAL at 20:31

## 2024-10-23 RX ADMIN — TRAMADOL HYDROCHLORIDE 50 MG: 50 TABLET, COATED ORAL at 12:47

## 2024-10-23 RX ADMIN — HEPARIN SODIUM 1870 UNITS/HR: 10000 INJECTION, SOLUTION INTRAVENOUS at 19:15

## 2024-10-23 ASSESSMENT — PAIN SCALES - GENERAL
PAINLEVEL_OUTOF10: 0
PAINLEVEL_OUTOF10: 6
PAINLEVEL_OUTOF10: 6

## 2024-10-23 ASSESSMENT — PAIN DESCRIPTION - ORIENTATION
ORIENTATION: RIGHT
ORIENTATION: RIGHT

## 2024-10-23 ASSESSMENT — PAIN DESCRIPTION - LOCATION
LOCATION: LEG
LOCATION: KNEE

## 2024-10-23 ASSESSMENT — PAIN DESCRIPTION - DESCRIPTORS
DESCRIPTORS: ACHING;DISCOMFORT;SPASM
DESCRIPTORS: ACHING;DISCOMFORT;SPASM

## 2024-10-23 NOTE — FLOWSHEET NOTE
10/23/24 1145   Bl[place]-Piper   Core (Body) Temperature 98.1 °F (36.7 °C)   MAP (Monitor) 81   PAP (Systolic/Diastolic) 44/16   PAP (Mean) 25 mmHg   PAP Wave Form Appropriate wave forms;Rezeroed   CVP (mmHg) 10 mmHg   CVP (Mean) 10 mmHg   SVO2 (%) 60.9 %   CO (l/min) 6.4 l/min   CCO 6.4 L/Min   CI (l/min/m2) 2.4 l/min/m2   SVR (Using NBP Mean) 887.5 dyne*sec/cm5   LE 2.8    1.15     Lasix @ 5mg/hr  Milrinone @ 0.3 mcg/kg/min  Heparin @ 1870 units/hr    Discussed with Dr. Hernandez.

## 2024-10-23 NOTE — FLOWSHEET NOTE
10/23/24 0400   [place]-Piper   Core (Body) Temperature 98 °F (36.7 °C)   MAP (Monitor) 85   PAP (Systolic/Diastolic) 57/26   PAP (Mean) 241 mmHg   PAP Wave Form Appropriate wave forms   CVP (mmHg) 17 mmHg   CVP (Mean) 16 mmHg   SVO2 (%) 68.1 %   CO (l/min) 8.8 l/min   CCO 8.8 L/Min   CI (l/min/m2) 3.3 l/min/m2   SVR (Using NBP Mean) 627.27 dyne*sec/cm5   LE 1.94    1.66   SV (ml) 96 ml     5mg/hr lasix  0.35mcg/kg/min milrinone  1870 units/hr heparin  Calculated with STEVO formula

## 2024-10-23 NOTE — FLOWSHEET NOTE
10/22/24 2000   [place]au[place]-Piper   Core (Body) Temperature 98.2 °F (36.8 °C)   MAP (Monitor) 83   PAP (Systolic/Diastolic) 70/34   PAP (Mean) 46 mmHg   PAP Wave Form Appropriate wave forms   CVP (mmHg) 19 mmHg   CVP (Mean) 23 mmHg   SVO2 (%) 60.5 %   CO (l/min) 6.9 l/min   CCO 6.9 L/Min   CI (l/min/m2) 2.6 l/min/m2   SVR (Using NBP Mean) 695.65 dyne*sec/cm5   LE 1.57    1.27   SV (ml) 75 ml     Lasix 10mg/hr  Milrinone 0.35 mcg/kg/min  Heparin 1870 units/hr   Dr. Hernandez aware of new numbers

## 2024-10-23 NOTE — CARE COORDINATION
Chart Reviewed.  Dr Mir ordered ARU consult.  Spoke with REp for ARU who states he is not appropriate for ARU.  Therapy Team is recommending home with home care.  Attempted to meet with pt but he was busy with multiple staff in the room.  Will try again.  CHLOE Orozco     Case Management   [phone]    10/23/2024  2:49 PM

## 2024-10-24 LAB
ALBUMIN SERPL-MCNC: 3.5 G/DL (ref 3.4–5)
ANION GAP SERPL CALCULATED.3IONS-SCNC: 7 MMOL/L (ref 3–16)
ANTI-XA UNFRAC HEPARIN: 0.3 IU/ML (ref 0.3–0.7)
BUN SERPL-MCNC: 21 MG/DL (ref 7–20)
C DIFF TOX A+B STL QL IA: NORMAL
CALCIUM SERPL-MCNC: 9 MG/DL (ref 8.3–10.6)
CHLORIDE SERPL-SCNC: 96 MMOL/L (ref 99–110)
CO2 SERPL-SCNC: 33 MMOL/L (ref 21–32)
CREAT SERPL-MCNC: 1.1 MG/DL (ref 0.8–1.3)
DEPRECATED RDW RBC AUTO: 16.3 % (ref 12.4–15.4)
GFR SERPLBLD CREATININE-BSD FMLA CKD-EPI: 77 ML/MIN/{1.73_M2}
GLUCOSE BLD-MCNC: 105 MG/DL (ref 70–99)
GLUCOSE BLD-MCNC: 117 MG/DL (ref 70–99)
GLUCOSE BLD-MCNC: 82 MG/DL (ref 70–99)
GLUCOSE BLD-MCNC: 82 MG/DL (ref 70–99)
GLUCOSE SERPL-MCNC: 106 MG/DL (ref 70–99)
HCT VFR BLD AUTO: 33.2 % (ref 40.5–52.5)
HEMOCCULT SP1 STL QL: ABNORMAL
HGB BLD-MCNC: 10.8 G/DL (ref 13.5–17.5)
MCH RBC QN AUTO: 29.8 PG (ref 26–34)
MCHC RBC AUTO-ENTMCNC: 32.7 G/DL (ref 31–36)
MCV RBC AUTO: 91.1 FL (ref 80–100)
NT-PROBNP SERPL-MCNC: 2727 PG/ML (ref 0–124)
PERFORMED ON: ABNORMAL
PERFORMED ON: ABNORMAL
PERFORMED ON: NORMAL
PERFORMED ON: NORMAL
PHOSPHATE SERPL-MCNC: 2.8 MG/DL (ref 2.5–4.9)
PLATELET # BLD AUTO: 195 K/UL (ref 135–450)
PMV BLD AUTO: 8.6 FL (ref 5–10.5)
POTASSIUM SERPL-SCNC: 3.9 MMOL/L (ref 3.5–5.1)
RBC # BLD AUTO: 3.64 M/UL (ref 4.2–5.9)
SODIUM SERPL-SCNC: 136 MMOL/L (ref 136–145)
WBC # BLD AUTO: 4.9 K/UL (ref 4–11)

## 2024-10-24 PROCEDURE — 97530 THERAPEUTIC ACTIVITIES: CPT

## 2024-10-24 PROCEDURE — 6370000000 HC RX 637 (ALT 250 FOR IP): Performed by: INTERNAL MEDICINE

## 2024-10-24 PROCEDURE — 94761 N-INVAS EAR/PLS OXIMETRY MLT: CPT

## 2024-10-24 PROCEDURE — 2580000003 HC RX 258: Performed by: INTERNAL MEDICINE

## 2024-10-24 PROCEDURE — 2100000000 HC CCU R&B

## 2024-10-24 PROCEDURE — 87449 NOS EACH ORGANISM AG IA: CPT

## 2024-10-24 PROCEDURE — 85027 COMPLETE CBC AUTOMATED: CPT

## 2024-10-24 PROCEDURE — 6370000000 HC RX 637 (ALT 250 FOR IP): Performed by: NURSE PRACTITIONER

## 2024-10-24 PROCEDURE — 6360000002 HC RX W HCPCS: Performed by: INTERNAL MEDICINE

## 2024-10-24 PROCEDURE — 97116 GAIT TRAINING THERAPY: CPT

## 2024-10-24 PROCEDURE — 82270 OCCULT BLOOD FECES: CPT

## 2024-10-24 PROCEDURE — 80069 RENAL FUNCTION PANEL: CPT

## 2024-10-24 PROCEDURE — 87324 CLOSTRIDIUM AG IA: CPT

## 2024-10-24 PROCEDURE — 99291 CRITICAL CARE FIRST HOUR: CPT | Performed by: INTERNAL MEDICINE

## 2024-10-24 PROCEDURE — 83880 ASSAY OF NATRIURETIC PEPTIDE: CPT

## 2024-10-24 PROCEDURE — 85520 HEPARIN ASSAY: CPT

## 2024-10-24 RX ORDER — CARVEDILOL 6.25 MG/1
6.25 TABLET ORAL 2 TIMES DAILY WITH MEALS
Status: DISCONTINUED | OUTPATIENT
Start: 2024-10-24 | End: 2024-10-25

## 2024-10-24 RX ORDER — LOPERAMIDE HYDROCHLORIDE 2 MG/1
4 CAPSULE ORAL ONCE
Status: COMPLETED | OUTPATIENT
Start: 2024-10-24 | End: 2024-10-24

## 2024-10-24 RX ADMIN — FUROSEMIDE 5 MG/HR: 10 INJECTION, SOLUTION INTRAMUSCULAR; INTRAVENOUS at 15:32

## 2024-10-24 RX ADMIN — GABAPENTIN 100 MG: 100 CAPSULE ORAL at 09:27

## 2024-10-24 RX ADMIN — SODIUM CHLORIDE, PRESERVATIVE FREE 10 ML: 5 INJECTION INTRAVENOUS at 20:51

## 2024-10-24 RX ADMIN — Medication 400 MG: at 15:30

## 2024-10-24 RX ADMIN — LOPERAMIDE HYDROCHLORIDE 4 MG: 2 CAPSULE ORAL at 22:46

## 2024-10-24 RX ADMIN — CARVEDILOL 6.25 MG: 6.25 TABLET, FILM COATED ORAL at 20:50

## 2024-10-24 RX ADMIN — HEPARIN SODIUM 1870 UNITS/HR: 10000 INJECTION, SOLUTION INTRAVENOUS at 10:11

## 2024-10-24 RX ADMIN — ATORVASTATIN CALCIUM 80 MG: 80 TABLET, FILM COATED ORAL at 20:49

## 2024-10-24 RX ADMIN — MILRINONE LACTATE 0.3 MCG/KG/MIN: 0.2 INJECTION, SOLUTION INTRAVENOUS at 02:38

## 2024-10-24 RX ADMIN — MILRINONE LACTATE 0.3 MCG/KG/MIN: 0.2 INJECTION, SOLUTION INTRAVENOUS at 10:14

## 2024-10-24 RX ADMIN — AMIODARONE HYDROCHLORIDE 200 MG: 200 TABLET ORAL at 20:49

## 2024-10-24 RX ADMIN — AMIODARONE HYDROCHLORIDE 200 MG: 200 TABLET ORAL at 09:27

## 2024-10-24 RX ADMIN — GABAPENTIN 100 MG: 100 CAPSULE ORAL at 20:49

## 2024-10-24 RX ADMIN — APIXABAN 5 MG: 5 TABLET, FILM COATED ORAL at 15:30

## 2024-10-24 RX ADMIN — ASPIRIN 81 MG: 81 TABLET, CHEWABLE ORAL at 09:27

## 2024-10-24 RX ADMIN — Medication 400 MG: at 09:27

## 2024-10-24 RX ADMIN — ISOSORBIDE MONONITRATE 15 MG: 30 TABLET, EXTENDED RELEASE ORAL at 09:27

## 2024-10-24 RX ADMIN — PANTOPRAZOLE SODIUM 40 MG: 40 TABLET, DELAYED RELEASE ORAL at 09:27

## 2024-10-24 RX ADMIN — MILRINONE LACTATE 0.25 MCG/KG/MIN: 0.2 INJECTION, SOLUTION INTRAVENOUS at 18:44

## 2024-10-24 RX ADMIN — GABAPENTIN 100 MG: 100 CAPSULE ORAL at 15:30

## 2024-10-24 ASSESSMENT — PAIN SCALES - GENERAL
PAINLEVEL_OUTOF10: 0
PAINLEVEL_OUTOF10: 0
PAINLEVEL_OUTOF10: 2
PAINLEVEL_OUTOF10: 0

## 2024-10-24 NOTE — CARE COORDINATION
Chart Reviewed.  Therapy recommends Home Care.  Met with patient to review.  Provided him with cms star rated list of agencies and education regarding CMS star rating system.  He would like [facility].  Called them from the room, they no longer take [facility].  He chose [facility].    The Plan for Transition of Care is related to the following treatment goals: to continue his progress in personal care and ambulatin needs in home setting.     The Patient  was provided with a choice of provider and agrees   with the discharge plan. [x] Yes [] No    Freedom of choice list was provided with basic dialogue that supports the patient's individualized plan of care/goals, treatment preferences and shares the quality data associated with the providers. [x] Yes [] No    Referral initiated to [facility] rep.  CHLOE Orozco     Case Management   [phone]    10/24/2024  4:07 PM    Pt is requesting a referral to [facility] for an aide.  Referral sent via [facility].  CHLOE Orozco     Case Management   [phone]    10/24/2024  4:09 PM

## 2024-10-24 NOTE — FLOWSHEET NOTE
10/24/24 1546   Height and Weight   Weight - Scale (!) 138.1 kg (304 lb 7.3 oz)   Weight Method Standing scale   BMI (Calculated) 40.3

## 2024-10-25 LAB
ALBUMIN SERPL-MCNC: 3.7 G/DL (ref 3.4–5)
ANION GAP SERPL CALCULATED.3IONS-SCNC: 7 MMOL/L (ref 3–16)
BASOPHILS # BLD: 0 K/UL (ref 0–0.2)
BASOPHILS NFR BLD: 0.4 %
BUN SERPL-MCNC: 17 MG/DL (ref 7–20)
CALCIUM SERPL-MCNC: 9.6 MG/DL (ref 8.3–10.6)
CHLORIDE SERPL-SCNC: 97 MMOL/L (ref 99–110)
CO2 SERPL-SCNC: 34 MMOL/L (ref 21–32)
CREAT SERPL-MCNC: 1 MG/DL (ref 0.8–1.3)
DEPRECATED RDW RBC AUTO: 16 % (ref 12.4–15.4)
EOSINOPHIL # BLD: 0.2 K/UL (ref 0–0.6)
EOSINOPHIL NFR BLD: 4.5 %
GFR SERPLBLD CREATININE-BSD FMLA CKD-EPI: 86 ML/MIN/{1.73_M2}
GLUCOSE BLD-MCNC: 108 MG/DL (ref 70–99)
GLUCOSE BLD-MCNC: 173 MG/DL (ref 70–99)
GLUCOSE BLD-MCNC: 182 MG/DL (ref 70–99)
GLUCOSE BLD-MCNC: 227 MG/DL (ref 70–99)
GLUCOSE SERPL-MCNC: 89 MG/DL (ref 70–99)
HCT VFR BLD AUTO: 33.4 % (ref 40.5–52.5)
HGB BLD-MCNC: 11.1 G/DL (ref 13.5–17.5)
LYMPHOCYTES # BLD: 0.7 K/UL (ref 1–5.1)
LYMPHOCYTES NFR BLD: 12.3 %
MAGNESIUM SERPL-MCNC: 1.64 MG/DL (ref 1.8–2.4)
MAGNESIUM SERPL-MCNC: 1.84 MG/DL (ref 1.8–2.4)
MCH RBC QN AUTO: 29.8 PG (ref 26–34)
MCHC RBC AUTO-ENTMCNC: 33.1 G/DL (ref 31–36)
MCV RBC AUTO: 89.9 FL (ref 80–100)
MONOCYTES # BLD: 0.7 K/UL (ref 0–1.3)
MONOCYTES NFR BLD: 12.9 %
NEUTROPHILS # BLD: 3.9 K/UL (ref 1.7–7.7)
NEUTROPHILS NFR BLD: 69.9 %
PERFORMED ON: ABNORMAL
PHOSPHATE SERPL-MCNC: 2.4 MG/DL (ref 2.5–4.9)
PLATELET # BLD AUTO: 206 K/UL (ref 135–450)
PMV BLD AUTO: 7.9 FL (ref 5–10.5)
POTASSIUM SERPL-SCNC: 4.2 MMOL/L (ref 3.5–5.1)
RBC # BLD AUTO: 3.72 M/UL (ref 4.2–5.9)
SODIUM SERPL-SCNC: 138 MMOL/L (ref 136–145)
WBC # BLD AUTO: 5.6 K/UL (ref 4–11)

## 2024-10-25 PROCEDURE — 6370000000 HC RX 637 (ALT 250 FOR IP): Performed by: INTERNAL MEDICINE

## 2024-10-25 PROCEDURE — 97530 THERAPEUTIC ACTIVITIES: CPT

## 2024-10-25 PROCEDURE — 6360000002 HC RX W HCPCS: Performed by: INTERNAL MEDICINE

## 2024-10-25 PROCEDURE — 2500000003 HC RX 250 WO HCPCS: Performed by: INTERNAL MEDICINE

## 2024-10-25 PROCEDURE — 2580000003 HC RX 258: Performed by: INTERNAL MEDICINE

## 2024-10-25 PROCEDURE — 99291 CRITICAL CARE FIRST HOUR: CPT | Performed by: INTERNAL MEDICINE

## 2024-10-25 PROCEDURE — 97116 GAIT TRAINING THERAPY: CPT

## 2024-10-25 PROCEDURE — 2100000000 HC CCU R&B

## 2024-10-25 PROCEDURE — 83735 ASSAY OF MAGNESIUM: CPT

## 2024-10-25 PROCEDURE — 85025 COMPLETE CBC W/AUTO DIFF WBC: CPT

## 2024-10-25 PROCEDURE — 36592 COLLECT BLOOD FROM PICC: CPT

## 2024-10-25 PROCEDURE — 80069 RENAL FUNCTION PANEL: CPT

## 2024-10-25 RX ORDER — MILRINONE LACTATE 0.2 MG/ML
.0625-.75 INJECTION, SOLUTION INTRAVENOUS CONTINUOUS
Status: DISCONTINUED | OUTPATIENT
Start: 2024-10-25 | End: 2024-10-29 | Stop reason: HOSPADM

## 2024-10-25 RX ORDER — TORSEMIDE 20 MG/1
50 TABLET ORAL DAILY
Status: DISCONTINUED | OUTPATIENT
Start: 2024-10-25 | End: 2024-10-29 | Stop reason: HOSPADM

## 2024-10-25 RX ORDER — METOPROLOL SUCCINATE 25 MG/1
25 TABLET, EXTENDED RELEASE ORAL EVERY EVENING
Status: DISCONTINUED | OUTPATIENT
Start: 2024-10-25 | End: 2024-10-29 | Stop reason: HOSPADM

## 2024-10-25 RX ADMIN — Medication 400 MG: at 17:20

## 2024-10-25 RX ADMIN — ISOSORBIDE MONONITRATE 15 MG: 30 TABLET, EXTENDED RELEASE ORAL at 07:55

## 2024-10-25 RX ADMIN — METOPROLOL SUCCINATE 25 MG: 25 TABLET, EXTENDED RELEASE ORAL at 17:20

## 2024-10-25 RX ADMIN — AMIODARONE HYDROCHLORIDE 200 MG: 200 TABLET ORAL at 20:30

## 2024-10-25 RX ADMIN — ATORVASTATIN CALCIUM 80 MG: 80 TABLET, FILM COATED ORAL at 20:30

## 2024-10-25 RX ADMIN — Medication 400 MG: at 07:55

## 2024-10-25 RX ADMIN — MILRINONE LACTATE 0.25 MCG/KG/MIN: 0.2 INJECTION, SOLUTION INTRAVENOUS at 12:19

## 2024-10-25 RX ADMIN — GABAPENTIN 100 MG: 100 CAPSULE ORAL at 12:16

## 2024-10-25 RX ADMIN — SODIUM PHOSPHATE, MONOBASIC, MONOHYDRATE AND SODIUM PHOSPHATE, DIBASIC, ANHYDROUS 15 MMOL: 142; 276 INJECTION, SOLUTION INTRAVENOUS at 11:44

## 2024-10-25 RX ADMIN — MILRINONE LACTATE 0.25 MCG/KG/MIN: 0.2 INJECTION, SOLUTION INTRAVENOUS at 02:52

## 2024-10-25 RX ADMIN — PANTOPRAZOLE SODIUM 40 MG: 40 TABLET, DELAYED RELEASE ORAL at 06:46

## 2024-10-25 RX ADMIN — ASPIRIN 81 MG: 81 TABLET, CHEWABLE ORAL at 07:55

## 2024-10-25 RX ADMIN — INSULIN LISPRO 1 UNITS: 100 INJECTION, SOLUTION INTRAVENOUS; SUBCUTANEOUS at 20:30

## 2024-10-25 RX ADMIN — GABAPENTIN 100 MG: 100 CAPSULE ORAL at 07:55

## 2024-10-25 RX ADMIN — TORSEMIDE 50 MG: 20 TABLET ORAL at 17:20

## 2024-10-25 RX ADMIN — SODIUM CHLORIDE, PRESERVATIVE FREE 10 ML: 5 INJECTION INTRAVENOUS at 07:58

## 2024-10-25 RX ADMIN — AMIODARONE HYDROCHLORIDE 200 MG: 200 TABLET ORAL at 07:55

## 2024-10-25 RX ADMIN — APIXABAN 5 MG: 5 TABLET, FILM COATED ORAL at 07:55

## 2024-10-25 RX ADMIN — GABAPENTIN 100 MG: 100 CAPSULE ORAL at 20:30

## 2024-10-25 RX ADMIN — APIXABAN 5 MG: 5 TABLET, FILM COATED ORAL at 20:30

## 2024-10-25 RX ADMIN — MAGNESIUM SULFATE HEPTAHYDRATE 2000 MG: 40 INJECTION, SOLUTION INTRAVENOUS at 04:53

## 2024-10-25 RX ADMIN — SPIRONOLACTONE 25 MG: 25 TABLET ORAL at 07:55

## 2024-10-25 RX ADMIN — TRAMADOL HYDROCHLORIDE 50 MG: 50 TABLET, COATED ORAL at 02:54

## 2024-10-25 RX ADMIN — SACUBITRIL AND VALSARTAN 1 TABLET: 24; 26 TABLET, FILM COATED ORAL at 17:20

## 2024-10-25 RX ADMIN — TRAMADOL HYDROCHLORIDE 50 MG: 50 TABLET, COATED ORAL at 12:16

## 2024-10-25 RX ADMIN — SODIUM CHLORIDE, PRESERVATIVE FREE 10 ML: 5 INJECTION INTRAVENOUS at 20:30

## 2024-10-25 RX ADMIN — CARVEDILOL 6.25 MG: 6.25 TABLET, FILM COATED ORAL at 07:55

## 2024-10-25 RX ADMIN — FERROUS SULFATE TAB EC 324 MG (65 MG FE EQUIVALENT) 324 MG: 324 (65 FE) TABLET DELAYED RESPONSE at 07:55

## 2024-10-25 RX ADMIN — INSULIN LISPRO 1 UNITS: 100 INJECTION, SOLUTION INTRAVENOUS; SUBCUTANEOUS at 17:21

## 2024-10-25 ASSESSMENT — PAIN DESCRIPTION - DESCRIPTORS
DESCRIPTORS: ACHING
DESCRIPTORS: ACHING
DESCRIPTORS: THROBBING

## 2024-10-25 ASSESSMENT — PAIN SCALES - GENERAL
PAINLEVEL_OUTOF10: 4
PAINLEVEL_OUTOF10: 7
PAINLEVEL_OUTOF10: 3
PAINLEVEL_OUTOF10: 6
PAINLEVEL_OUTOF10: 0

## 2024-10-25 ASSESSMENT — PAIN DESCRIPTION - ORIENTATION
ORIENTATION: RIGHT

## 2024-10-25 ASSESSMENT — PAIN DESCRIPTION - FREQUENCY: FREQUENCY: INTERMITTENT

## 2024-10-25 ASSESSMENT — PAIN DESCRIPTION - ONSET: ONSET: GRADUAL

## 2024-10-25 ASSESSMENT — PAIN DESCRIPTION - LOCATION
LOCATION: LEG;FOOT
LOCATION: LEG
LOCATION: LEG

## 2024-10-25 NOTE — CARE COORDINATION
Wound care aware of consult for R heel and R ankle open wound. No wounds present upon assessment. Pt with dry skin. Blue lotion applied and legs re-wrapped by bedside RN. No wound care needs. Will not continue to follow. Please re-consult if needed.  Electronically signed by Leigha Sotelo RN CWOCN on 10/25/2024 at 3:55 PM

## 2024-10-26 LAB
ALBUMIN SERPL-MCNC: 3.5 G/DL (ref 3.4–5)
ANION GAP SERPL CALCULATED.3IONS-SCNC: 7 MMOL/L (ref 3–16)
BUN SERPL-MCNC: 16 MG/DL (ref 7–20)
CALCIUM SERPL-MCNC: 9.3 MG/DL (ref 8.3–10.6)
CHLORIDE SERPL-SCNC: 97 MMOL/L (ref 99–110)
CO2 SERPL-SCNC: 33 MMOL/L (ref 21–32)
CREAT SERPL-MCNC: 1 MG/DL (ref 0.8–1.3)
GFR SERPLBLD CREATININE-BSD FMLA CKD-EPI: 86 ML/MIN/{1.73_M2}
GLUCOSE BLD-MCNC: 145 MG/DL (ref 70–99)
GLUCOSE BLD-MCNC: 147 MG/DL (ref 70–99)
GLUCOSE BLD-MCNC: 200 MG/DL (ref 70–99)
GLUCOSE BLD-MCNC: 210 MG/DL (ref 70–99)
GLUCOSE SERPL-MCNC: 162 MG/DL (ref 70–99)
PERFORMED ON: ABNORMAL
PHOSPHATE SERPL-MCNC: 3.7 MG/DL (ref 2.5–4.9)
POTASSIUM SERPL-SCNC: 4.1 MMOL/L (ref 3.5–5.1)
SODIUM SERPL-SCNC: 137 MMOL/L (ref 136–145)

## 2024-10-26 PROCEDURE — 94760 N-INVAS EAR/PLS OXIMETRY 1: CPT

## 2024-10-26 PROCEDURE — 6370000000 HC RX 637 (ALT 250 FOR IP): Performed by: INTERNAL MEDICINE

## 2024-10-26 PROCEDURE — 6360000002 HC RX W HCPCS: Performed by: INTERNAL MEDICINE

## 2024-10-26 PROCEDURE — 2100000000 HC CCU R&B

## 2024-10-26 PROCEDURE — 80069 RENAL FUNCTION PANEL: CPT

## 2024-10-26 PROCEDURE — 99233 SBSQ HOSP IP/OBS HIGH 50: CPT | Performed by: INTERNAL MEDICINE

## 2024-10-26 PROCEDURE — 94761 N-INVAS EAR/PLS OXIMETRY MLT: CPT

## 2024-10-26 PROCEDURE — 2580000003 HC RX 258: Performed by: INTERNAL MEDICINE

## 2024-10-26 RX ADMIN — INSULIN LISPRO 1 UNITS: 100 INJECTION, SOLUTION INTRAVENOUS; SUBCUTANEOUS at 12:22

## 2024-10-26 RX ADMIN — Medication 400 MG: at 09:07

## 2024-10-26 RX ADMIN — ISOSORBIDE MONONITRATE 15 MG: 30 TABLET, EXTENDED RELEASE ORAL at 09:07

## 2024-10-26 RX ADMIN — Medication 400 MG: at 17:36

## 2024-10-26 RX ADMIN — APIXABAN 5 MG: 5 TABLET, FILM COATED ORAL at 09:08

## 2024-10-26 RX ADMIN — GABAPENTIN 100 MG: 100 CAPSULE ORAL at 21:04

## 2024-10-26 RX ADMIN — GABAPENTIN 100 MG: 100 CAPSULE ORAL at 09:07

## 2024-10-26 RX ADMIN — MILRINONE LACTATE 0.15 MCG/KG/MIN: 0.2 INJECTION, SOLUTION INTRAVENOUS at 16:51

## 2024-10-26 RX ADMIN — TORSEMIDE 50 MG: 20 TABLET ORAL at 09:06

## 2024-10-26 RX ADMIN — APIXABAN 5 MG: 5 TABLET, FILM COATED ORAL at 21:03

## 2024-10-26 RX ADMIN — MILRINONE LACTATE 0.15 MCG/KG/MIN: 0.2 INJECTION, SOLUTION INTRAVENOUS at 02:28

## 2024-10-26 RX ADMIN — AMIODARONE HYDROCHLORIDE 200 MG: 200 TABLET ORAL at 09:08

## 2024-10-26 RX ADMIN — SODIUM CHLORIDE, PRESERVATIVE FREE 10 ML: 5 INJECTION INTRAVENOUS at 21:04

## 2024-10-26 RX ADMIN — INSULIN LISPRO 1 UNITS: 100 INJECTION, SOLUTION INTRAVENOUS; SUBCUTANEOUS at 21:04

## 2024-10-26 RX ADMIN — SODIUM CHLORIDE, PRESERVATIVE FREE 10 ML: 5 INJECTION INTRAVENOUS at 09:11

## 2024-10-26 RX ADMIN — Medication 3 MG: at 21:03

## 2024-10-26 RX ADMIN — SACUBITRIL AND VALSARTAN 1 TABLET: 24; 26 TABLET, FILM COATED ORAL at 09:09

## 2024-10-26 RX ADMIN — SACUBITRIL AND VALSARTAN 1 TABLET: 24; 26 TABLET, FILM COATED ORAL at 21:04

## 2024-10-26 RX ADMIN — GABAPENTIN 100 MG: 100 CAPSULE ORAL at 13:54

## 2024-10-26 RX ADMIN — METOPROLOL SUCCINATE 25 MG: 25 TABLET, EXTENDED RELEASE ORAL at 17:36

## 2024-10-26 RX ADMIN — AMIODARONE HYDROCHLORIDE 200 MG: 200 TABLET ORAL at 21:04

## 2024-10-26 RX ADMIN — ASPIRIN 81 MG: 81 TABLET, CHEWABLE ORAL at 09:09

## 2024-10-26 RX ADMIN — FERROUS SULFATE TAB EC 324 MG (65 MG FE EQUIVALENT) 324 MG: 324 (65 FE) TABLET DELAYED RESPONSE at 09:07

## 2024-10-26 RX ADMIN — PANTOPRAZOLE SODIUM 40 MG: 40 TABLET, DELAYED RELEASE ORAL at 07:20

## 2024-10-26 RX ADMIN — ATORVASTATIN CALCIUM 80 MG: 80 TABLET, FILM COATED ORAL at 21:03

## 2024-10-26 ASSESSMENT — PAIN SCALES - GENERAL
PAINLEVEL_OUTOF10: 0

## 2024-10-26 NOTE — CONSULTS
[facility]  HEART FAILURE PROGRAM      NAME:  Levy Vargas  MEDICAL RECORD NUMBER:  [id number]  AGE: 59 y.o.   GENDER: male  : 1965  ADMIT DATE: 10/18/2024  TODAY'S DATE:  10/24/2024    Subjective:     VISIT TYPE: evaluation     ADMITTING PHYSICIAN:  Geovani Blanchard MD    Code Status: Full Code    PAST MEDICAL HISTORY        Diagnosis Date    Abdominal pain 2021    Abnormal EKG 2016    Acute pancreatitis 2021    CHF (congestive heart failure) ([facility])     Cigarette smoker 2021    Cocaine abuse ([facility]) 2015    Last Assessment & Plan:  Formatting of this note might be different from the original. Counseled on cessation as increases risk of coronary vasospasm and further worsening of heart function.    Dehydration 2016    Diabetes mellitus (HCC)     History of cocaine abuse ([facility]) 2016    History of MI (myocardial infarction) 2016    Hyperglycemia 2016    Hyperlipidemia     Hypertension     Morbid obesity with BMI of 45.0-49.9, adult 2016    Postural dizziness 2016    PUD (peptic ulcer disease) 2019       SOCIAL HISTORY    Social History     Tobacco Use    Smoking status: Every Day     Current packs/day: 0.50     Average packs/day: 0.5 packs/day for 25.0 years (12.5 ttl pk-yrs)     Types: Cigarettes    Smokeless tobacco: Never   Vaping Use    Vaping status: Never Used   Substance Use Topics    Alcohol use: Not Currently    Drug use: Yes     Types: Marijuana (Weed)     Comment: daily         MEDICATIONS  Scheduled Meds:   apixaban  5 mg Oral BID    pantoprazole  40 mg Oral QAM AC    ferrous sulfate  324 mg Oral Daily with breakfast    gabapentin  100 mg Oral TID    sodium chloride flush  5-40 mL IntraVENous 2 times per day    amiodarone  200 mg Oral BID    aspirin  81 mg Oral Daily    atorvastatin  80 mg Oral Nightly    [Held by provider] carvedilol  25 mg Oral BID WC    [Held by provider] empagliflozin  10 mg Oral Daily    
Appt. Arranged with Heather HERNANDEZ patient also placed on wait list  
patient called seen in Urgent care Thursday, Z-pac given and complete still having symptoms (SOB,Wheezing,cough, some lighheadness) suggestion on what she can do or take.....   
of MI (myocardial infarction) 07/28/2016    Hyperglycemia 07/28/2016    Hyperlipidemia     Hypertension     Morbid obesity with BMI of 45.0-49.9, adult 07/28/2016    Postural dizziness 07/28/2016    PUD (peptic ulcer disease) 03/01/2019       Past Surgical History:  Past Surgical History:   Procedure Laterality Date    CARDIAC CATHETERIZATION      LEG SURGERY Right 8/25/2023    RIGHT THIGH INCISION AND DRAINAGE WITH DRAIN PLACEMENT performed by Dominick Cid DO at [facility] OR    UPPER GASTROINTESTINAL ENDOSCOPY       Home Medications:   Prior to Admission medications    Medication Sig Start Date End Date Taking? Authorizing Provider   carvedilol (COREG) 25 MG tablet Take 1 tablet by mouth 2 times daily (with meals)   Yes Tanesha Mccann MD   dapagliflozin (FARXIGA) 10 MG tablet Take 1 tablet by mouth every morning   Yes Tanesha Mccann MD   spironolactone (ALDACTONE) 25 MG tablet Take 1 tablet by mouth daily   Yes ProviderTanesha MD   furosemide (LASIX) 40 MG tablet Take 1 tablet by mouth 2 times daily   Yes ProviderTanesha MD   sacubitril-valsartan (ENTRESTO) 49-51 MG per tablet Take 1 tablet by mouth 2 times daily   Yes ProviderTanesha MD   Magnesium 400 MG TABS Take 400 mg by mouth in the morning and at bedtime   Yes ProviderTanesha MD   isosorbide mononitrate (IMDUR) 30 MG extended release tablet Take 0.5 tablets by mouth daily   Yes ProviderTanesha MD   gabapentin (NEURONTIN) 300 MG capsule Take 1 capsule by mouth 3 times daily.   Yes Tanesha Mccann MD   nitroGLYCERIN (NITROSTAT) 0.4 MG SL tablet Place 1 tablet under the tongue every 5 minutes as needed for Chest pain up to max of 3 total doses. If no relief after 1 dose, call 911.   Yes Tanesha Mccann MD   pantoprazole (PROTONIX) 40 MG tablet Take 1 tablet by mouth daily as needed (dyspepsia)   Yes Tanesha Mccann MD   apixaban (ELIQUIS) 5 MG TABS tablet Take 1 tablet by mouth 2 times daily 1/22/24  
rash, turgor wnl  Heent:  eomi, mmm  Neck: no bruits or jvd noted, thyroid normal  Cardiovascular:  S1, S2 without m/r/g  Respiratory: CTA B without w/r/r; respiratory effort normal  Abdomen:  +bs, soft distended  no hepatosplenomegaly  Ext: 2+ lower extremity edema  Psychiatric: Somnolent but awakens easily answering questions appropriately      Data/  CBC:   Lab Results   Component Value Date/Time    WBC 3.4 10/18/2024 11:41 AM    RBC 4.19 10/18/2024 11:41 AM    HGB 12.6 10/18/2024 11:41 AM    HCT 38.4 10/18/2024 11:41 AM    MCV 91.6 10/18/2024 11:41 AM    MCH 30.0 10/18/2024 11:41 AM    MCHC 32.7 10/18/2024 11:41 AM    RDW 15.8 10/18/2024 11:41 AM     10/18/2024 11:41 AM    MPV 9.2 10/18/2024 11:41 AM     BMP:    Lab Results   Component Value Date/Time     10/19/2024 03:13 AM    K 4.1 10/19/2024 03:13 AM    K 3.5 10/18/2024 11:41 AM    CL 96 10/19/2024 03:13 AM    CO2 19 10/19/2024 03:13 AM    BUN 40 10/19/2024 03:13 AM    CREATININE 1.8 10/19/2024 03:13 AM    CALCIUM 8.7 10/19/2024 03:13 AM    GFRAA >60 10/11/2022 05:03 AM    LABGLOM 42 10/19/2024 03:13 AM    LABGLOM 63 04/27/2024 05:03 AM    GLUCOSE 202 10/19/2024 03:13 AM         Assessment/  1-KENDRA on CKD stage III suspect in the setting of decompensated congestive heart failure, hypotension  A-fib with rapid ventricular rate, patient had been on Jardiance and Entresto as well as Aldactone ,rule out obstruction urinalysis shows trace ketones 100 protein 0-2 fine casts no WBCs  2-atrial fibrillation relation with rapid ventricular rate  3-hypotension ?  cardiac in nature  4 cirrhosis of liver  5 history of substance abuse    Plan/  1-we will hold his medications including Entresto Aldactone and Jardiance  2-Cardiology consulted may need right heart cath evaluation, suspect cor pulmonale  3-check bladder scan  4 cirrhosis of liver with hypoalbuminemia and elevated bilirubin,  give him albumin 25 g x 2 today  5 Place him on low-sodium diet with fluid 
    Personally reviewed and interpreted   EKG Interpretation: Sinus rhythm with nonspecific ST changes    Lab Review:   Lab Results   Component Value Date/Time    TRIG 51 10/19/2024 03:13 AM    HDL 17 10/19/2024 03:13 AM     Lab Results   Component Value Date/Time     10/19/2024 03:13 AM    K 4.1 10/19/2024 03:13 AM    K 3.5 10/18/2024 11:41 AM    BUN 40 10/19/2024 03:13 AM    CREATININE 1.8 10/19/2024 03:13 AM     Recent Labs     10/18/24  1141   WBC 3.4*   HGB 12.6*   HCT 38.4*        Echo 6/924:  - Left ventricle: The cavity size is moderately to severely dilated. Wall     thickness was increased in a pattern of mild LVH. Systolic function is     severely reduced. The estimated ejection fraction is 20-25%. There is     diffuse hypokinesis. No LV thrombus seen on the available views.   - Aortic valve: Poorly visualized.   - Mitral valve: There is mild thickening. There is at least moderate     regurgitation.   - Left atrium: The atrium is dilated.   - Right ventricle: The cavity size is dilated. Systolic function is mildly     reduced by visual assessment.   - Right atrium: The atrium is dilated.   - Atrial septum: Agitated saline contrast study in the baseline state, shows     no right-to-left atrial level shunt.   - Tricuspid valve: Incomplete leaflet coaptation. There is severe     regurgitation.   - Pulmonary arteries: Systolic pressure is most likely increased = at least     30 mmHg + the RA pressure.   - Inferior vena cava: The IVC is dilated. Respirophasic diameter changes are     blunted (< 50%), consistent with elevated central venous pressure.       Arterial Duplex:      Patient was in A-fib throughout the test with a heart rate of 170+.    There is no focal obstruction noted in the femoral arteries in the right thigh.    The right popliteal artery appears chronically occluded.    The posterior tibial and peroneal arteries appear to be occluded.    The right anterior tibial artery is 
platelets 206.  Occult blood in the stool was positive.  He had a C. difficile test that was negative.  He had a CAT scan of the abdomen and pelvis that showed fatty liver, moderate ascites.  This was done with contrast.  He denies any prior colonoscopy.  Denies seeing any blood in his stool and denies melena.  No abdominal pain.  He says he does not follow with a liver doctor.      1.  Anemia.  Hemoglobin was 10.7 in April 2024.  Today it is 11.1.  On admission it was 12.6.  Iron studies show percent iron saturation of 14% and a normal ferritin.  B12 and folate were normal a year ago.  MCV is 89.9.  2.  Heme positive stool. No prior colonoscopy.  3.  Abnormal LFTs and ascites.  He did have a prior paracentesis in April 2024 that showed a protein of 3.3.  Albumin was not checked.  This was negative for SBP.  The elevated protein would go along with cardiac ascites rather than ascites from cirrhosis.  It is unknown whether he has cirrhosis.  He would benefit from outpatient follow-up and FibroScan.    Recommendations:   -He has no evidence of overt GI bleeding.  Can have Outpatient colonoscopy to follow up on the heme + stool.  - Needs ultrasound/fibroscan to evaluate for cirrhosis, not done inpatient, will need outpatient follow-up to set this up.  -Serologic liver evaluation. Outpatient follow-up to follow up on this.  -Diagnostic Paracentesis for fluid studies.  Nursing checking with cardiology when they round to see if okay to do a paracentesis Monday and if okay to hold eliquis tomorrow for this.  There is no urgency to this and can be done as an outpatient if preferred.    Thank you for allowing me to participate in the care of this patient.  If you have any questions or concerns, please do not hesitate to contact me.    Steve Atkinson MD  [facility]  Office: [phone]    
Results   Component Value Date    ALT 45 (H) 10/19/2024    AST 72 (H) 10/19/2024    ALKPHOS 131 (H) 10/19/2024    BILITOT 2.0 (H) 10/19/2024       Most recent echocardiogram revealed:  10/19/24    Left Ventricle: Severely reduced left ventricular systolic function with a visually estimated EF of 10 -15%. Left ventricle is dilated. Mildly increased wall thickness. Severe global hypokinesis present. Indeterminate diastolic function due to abnormal heart rhythm.    Right Atrium: Single lead present in the right atrium. Right atrium is dilated.    Left Atrium: Not well visualized. Left atrium is severely dilated.    Right Ventricle: Right ventricle is mildly dilated. Moderately reduced systolic function.    Mitral Valve: Moderate regurgitation.    Tricuspid Valve: Moderate to severe regurgitation. The PASP cannot be evaluated due to reduced RV function and poor coaptation of tricuspid valve leaflets.    Most recent EKG revealed:  10/19/24  Sinus rhythm with short PRNon-specific intra-ventricular conduction delayST & T wave abnormality, consider inferior ischemiaST & T wave abnormality, consider anterolateral ischemiaProlonged QTAbnormal ECGWhen compared with ECG of 19-OCT-2024 02:59, (unconfirmed)No significant change was foundConfirmed by JOEL ALVARADO MD ([id number]) on 10/19/2024 2:17:09 PM     Most recent imaging studies revealed:    CT ABDOMEN PELVIS W IV CONTRAST 10/18/24  IMPRESSION:  Fatty replaced liver.  Moderate ascites in the abdomen and pelvis.  Overall no significant interval change.    Vascular US Duplex Lower Extremity Arteries Bilateral 10/18/24    Patient was in A-fib throughout the test with a heart rate of 170+.    There is no focal obstruction noted in the femoral arteries in the right thigh.    The right popliteal artery appears chronically occluded.    The posterior tibial and peroneal arteries appear to be occluded.    The right anterior tibial artery is occluded with reconstitution just above the

## 2024-10-27 LAB
ALBUMIN SERPL-MCNC: 3.4 G/DL (ref 3.4–5)
ANA SER QL IA: POSITIVE
ANION GAP SERPL CALCULATED.3IONS-SCNC: 9 MMOL/L (ref 3–16)
BUN SERPL-MCNC: 22 MG/DL (ref 7–20)
CALCIUM SERPL-MCNC: 9.3 MG/DL (ref 8.3–10.6)
CHLORIDE SERPL-SCNC: 98 MMOL/L (ref 99–110)
CO2 SERPL-SCNC: 33 MMOL/L (ref 21–32)
CREAT SERPL-MCNC: 1.1 MG/DL (ref 0.8–1.3)
GFR SERPLBLD CREATININE-BSD FMLA CKD-EPI: 77 ML/MIN/{1.73_M2}
GLUCOSE BLD-MCNC: 216 MG/DL (ref 70–99)
GLUCOSE BLD-MCNC: 222 MG/DL (ref 70–99)
GLUCOSE BLD-MCNC: 291 MG/DL (ref 70–99)
GLUCOSE SERPL-MCNC: 167 MG/DL (ref 70–99)
HAV IGM SERPL QL IA: NORMAL
HBV CORE IGM SERPL QL IA: NORMAL
HBV SURFACE AB SERPL IA-ACNC: <3.5 MIU/ML
HBV SURFACE AG SERPL QL IA: NORMAL
HCV AB SERPL QL IA: NORMAL
IGA SERPL-MCNC: 356 MG/DL (ref 70–400)
MAGNESIUM SERPL-MCNC: 1.67 MG/DL (ref 1.8–2.4)
PERFORMED ON: ABNORMAL
PHOSPHATE SERPL-MCNC: 4.7 MG/DL (ref 2.5–4.9)
POTASSIUM SERPL-SCNC: 4.3 MMOL/L (ref 3.5–5.1)
SODIUM SERPL-SCNC: 140 MMOL/L (ref 136–145)
TISSUE TRANSGLUTAMINASE IGA: <0.5 U/ML (ref 0–14)

## 2024-10-27 PROCEDURE — 82784 ASSAY IGA/IGD/IGG/IGM EACH: CPT

## 2024-10-27 PROCEDURE — 82103 ALPHA-1-ANTITRYPSIN TOTAL: CPT

## 2024-10-27 PROCEDURE — 80074 ACUTE HEPATITIS PANEL: CPT

## 2024-10-27 PROCEDURE — 82390 ASSAY OF CERULOPLASMIN: CPT

## 2024-10-27 PROCEDURE — 6360000002 HC RX W HCPCS: Performed by: INTERNAL MEDICINE

## 2024-10-27 PROCEDURE — 6370000000 HC RX 637 (ALT 250 FOR IP): Performed by: INTERNAL MEDICINE

## 2024-10-27 PROCEDURE — 83516 IMMUNOASSAY NONANTIBODY: CPT

## 2024-10-27 PROCEDURE — 99233 SBSQ HOSP IP/OBS HIGH 50: CPT

## 2024-10-27 PROCEDURE — 86706 HEP B SURFACE ANTIBODY: CPT

## 2024-10-27 PROCEDURE — 82105 ALPHA-FETOPROTEIN SERUM: CPT

## 2024-10-27 PROCEDURE — 94761 N-INVAS EAR/PLS OXIMETRY MLT: CPT

## 2024-10-27 PROCEDURE — 2580000003 HC RX 258: Performed by: INTERNAL MEDICINE

## 2024-10-27 PROCEDURE — 2700000000 HC OXYGEN THERAPY PER DAY

## 2024-10-27 PROCEDURE — 86708 HEPATITIS A ANTIBODY: CPT

## 2024-10-27 PROCEDURE — 86039 ANTINUCLEAR ANTIBODIES (ANA): CPT

## 2024-10-27 PROCEDURE — 86038 ANTINUCLEAR ANTIBODIES: CPT

## 2024-10-27 PROCEDURE — 2100000000 HC CCU R&B

## 2024-10-27 PROCEDURE — 83735 ASSAY OF MAGNESIUM: CPT

## 2024-10-27 PROCEDURE — 86015 ACTIN ANTIBODY EACH: CPT

## 2024-10-27 PROCEDURE — 80069 RENAL FUNCTION PANEL: CPT

## 2024-10-27 RX ADMIN — GABAPENTIN 100 MG: 100 CAPSULE ORAL at 15:12

## 2024-10-27 RX ADMIN — ATORVASTATIN CALCIUM 80 MG: 80 TABLET, FILM COATED ORAL at 20:28

## 2024-10-27 RX ADMIN — GABAPENTIN 100 MG: 100 CAPSULE ORAL at 20:28

## 2024-10-27 RX ADMIN — GABAPENTIN 100 MG: 100 CAPSULE ORAL at 08:56

## 2024-10-27 RX ADMIN — SODIUM CHLORIDE, PRESERVATIVE FREE 10 ML: 5 INJECTION INTRAVENOUS at 08:58

## 2024-10-27 RX ADMIN — MILRINONE LACTATE 0.15 MCG/KG/MIN: 0.2 INJECTION, SOLUTION INTRAVENOUS at 22:05

## 2024-10-27 RX ADMIN — SACUBITRIL AND VALSARTAN 1 TABLET: 24; 26 TABLET, FILM COATED ORAL at 08:55

## 2024-10-27 RX ADMIN — SODIUM CHLORIDE, PRESERVATIVE FREE 10 ML: 5 INJECTION INTRAVENOUS at 20:30

## 2024-10-27 RX ADMIN — FERROUS SULFATE TAB EC 324 MG (65 MG FE EQUIVALENT) 324 MG: 324 (65 FE) TABLET DELAYED RESPONSE at 08:56

## 2024-10-27 RX ADMIN — MILRINONE LACTATE 0.15 MCG/KG/MIN: 0.2 INJECTION, SOLUTION INTRAVENOUS at 09:06

## 2024-10-27 RX ADMIN — METOPROLOL SUCCINATE 25 MG: 25 TABLET, EXTENDED RELEASE ORAL at 17:27

## 2024-10-27 RX ADMIN — AMIODARONE HYDROCHLORIDE 200 MG: 200 TABLET ORAL at 08:56

## 2024-10-27 RX ADMIN — AMIODARONE HYDROCHLORIDE 200 MG: 200 TABLET ORAL at 20:28

## 2024-10-27 RX ADMIN — ISOSORBIDE MONONITRATE 15 MG: 30 TABLET, EXTENDED RELEASE ORAL at 08:56

## 2024-10-27 RX ADMIN — INSULIN LISPRO 2 UNITS: 100 INJECTION, SOLUTION INTRAVENOUS; SUBCUTANEOUS at 17:37

## 2024-10-27 RX ADMIN — SACUBITRIL AND VALSARTAN 1 TABLET: 24; 26 TABLET, FILM COATED ORAL at 20:28

## 2024-10-27 RX ADMIN — Medication 400 MG: at 08:55

## 2024-10-27 RX ADMIN — INSULIN LISPRO 1 UNITS: 100 INJECTION, SOLUTION INTRAVENOUS; SUBCUTANEOUS at 13:13

## 2024-10-27 RX ADMIN — PANTOPRAZOLE SODIUM 40 MG: 40 TABLET, DELAYED RELEASE ORAL at 06:35

## 2024-10-27 RX ADMIN — ASPIRIN 81 MG: 81 TABLET, CHEWABLE ORAL at 08:56

## 2024-10-27 RX ADMIN — INSULIN LISPRO 1 UNITS: 100 INJECTION, SOLUTION INTRAVENOUS; SUBCUTANEOUS at 20:27

## 2024-10-27 RX ADMIN — Medication 400 MG: at 17:27

## 2024-10-27 RX ADMIN — TORSEMIDE 50 MG: 20 TABLET ORAL at 08:55

## 2024-10-27 ASSESSMENT — PAIN SCALES - GENERAL
PAINLEVEL_OUTOF10: 0

## 2024-10-28 ENCOUNTER — APPOINTMENT (OUTPATIENT)
Dept: ULTRASOUND IMAGING | Age: 59
DRG: 192 | End: 2024-10-28
Attending: INTERNAL MEDICINE
Payer: COMMERCIAL

## 2024-10-28 LAB
ALBUMIN SERPL-MCNC: 3.6 G/DL (ref 3.4–5)
ANION GAP SERPL CALCULATED.3IONS-SCNC: 8 MMOL/L (ref 3–16)
BUN SERPL-MCNC: 22 MG/DL (ref 7–20)
CALCIUM SERPL-MCNC: 9 MG/DL (ref 8.3–10.6)
CHLORIDE SERPL-SCNC: 97 MMOL/L (ref 99–110)
CO2 SERPL-SCNC: 33 MMOL/L (ref 21–32)
CREAT SERPL-MCNC: 1.1 MG/DL (ref 0.8–1.3)
DEPRECATED RDW RBC AUTO: 16.3 % (ref 12.4–15.4)
GFR SERPLBLD CREATININE-BSD FMLA CKD-EPI: 77 ML/MIN/{1.73_M2}
GLUCOSE BLD-MCNC: 158 MG/DL (ref 70–99)
GLUCOSE BLD-MCNC: 177 MG/DL (ref 70–99)
GLUCOSE BLD-MCNC: 190 MG/DL (ref 70–99)
GLUCOSE BLD-MCNC: 222 MG/DL (ref 70–99)
GLUCOSE SERPL-MCNC: 237 MG/DL (ref 70–99)
HAV AB SER QL IA: POSITIVE
HCT VFR BLD AUTO: 30.9 % (ref 40.5–52.5)
HGB BLD-MCNC: 10.1 G/DL (ref 13.5–17.5)
MAGNESIUM SERPL-MCNC: 1.68 MG/DL (ref 1.8–2.4)
MCH RBC QN AUTO: 29.5 PG (ref 26–34)
MCHC RBC AUTO-ENTMCNC: 32.6 G/DL (ref 31–36)
MCV RBC AUTO: 90.6 FL (ref 80–100)
PERFORMED ON: ABNORMAL
PHOSPHATE SERPL-MCNC: 3.4 MG/DL (ref 2.5–4.9)
PLATELET # BLD AUTO: 198 K/UL (ref 135–450)
PMV BLD AUTO: 7.9 FL (ref 5–10.5)
POTASSIUM SERPL-SCNC: 3.9 MMOL/L (ref 3.5–5.1)
RBC # BLD AUTO: 3.41 M/UL (ref 4.2–5.9)
SODIUM SERPL-SCNC: 138 MMOL/L (ref 136–145)
WBC # BLD AUTO: 4.8 K/UL (ref 4–11)

## 2024-10-28 PROCEDURE — 2580000003 HC RX 258: Performed by: INTERNAL MEDICINE

## 2024-10-28 PROCEDURE — 6370000000 HC RX 637 (ALT 250 FOR IP): Performed by: INTERNAL MEDICINE

## 2024-10-28 PROCEDURE — 76705 ECHO EXAM OF ABDOMEN: CPT

## 2024-10-28 PROCEDURE — 80069 RENAL FUNCTION PANEL: CPT

## 2024-10-28 PROCEDURE — 97116 GAIT TRAINING THERAPY: CPT

## 2024-10-28 PROCEDURE — 97530 THERAPEUTIC ACTIVITIES: CPT

## 2024-10-28 PROCEDURE — 83735 ASSAY OF MAGNESIUM: CPT

## 2024-10-28 PROCEDURE — 85027 COMPLETE CBC AUTOMATED: CPT

## 2024-10-28 PROCEDURE — 36592 COLLECT BLOOD FROM PICC: CPT

## 2024-10-28 PROCEDURE — 99233 SBSQ HOSP IP/OBS HIGH 50: CPT | Performed by: INTERNAL MEDICINE

## 2024-10-28 PROCEDURE — 6360000002 HC RX W HCPCS: Performed by: INTERNAL MEDICINE

## 2024-10-28 PROCEDURE — 97535 SELF CARE MNGMENT TRAINING: CPT

## 2024-10-28 PROCEDURE — 2100000000 HC CCU R&B

## 2024-10-28 PROCEDURE — 94761 N-INVAS EAR/PLS OXIMETRY MLT: CPT

## 2024-10-28 RX ADMIN — GABAPENTIN 100 MG: 100 CAPSULE ORAL at 09:20

## 2024-10-28 RX ADMIN — MILRINONE LACTATE 0.15 MCG/KG/MIN: 0.2 INJECTION, SOLUTION INTRAVENOUS at 09:57

## 2024-10-28 RX ADMIN — ISOSORBIDE MONONITRATE 15 MG: 30 TABLET, EXTENDED RELEASE ORAL at 08:15

## 2024-10-28 RX ADMIN — Medication 400 MG: at 08:15

## 2024-10-28 RX ADMIN — Medication 400 MG: at 17:46

## 2024-10-28 RX ADMIN — SODIUM CHLORIDE, PRESERVATIVE FREE 10 ML: 5 INJECTION INTRAVENOUS at 08:20

## 2024-10-28 RX ADMIN — SACUBITRIL AND VALSARTAN 1 TABLET: 24; 26 TABLET, FILM COATED ORAL at 08:14

## 2024-10-28 RX ADMIN — APIXABAN 5 MG: 5 TABLET, FILM COATED ORAL at 20:35

## 2024-10-28 RX ADMIN — GABAPENTIN 100 MG: 100 CAPSULE ORAL at 20:35

## 2024-10-28 RX ADMIN — ATORVASTATIN CALCIUM 80 MG: 80 TABLET, FILM COATED ORAL at 20:35

## 2024-10-28 RX ADMIN — INSULIN LISPRO 1 UNITS: 100 INJECTION, SOLUTION INTRAVENOUS; SUBCUTANEOUS at 20:36

## 2024-10-28 RX ADMIN — PANTOPRAZOLE SODIUM 40 MG: 40 TABLET, DELAYED RELEASE ORAL at 08:14

## 2024-10-28 RX ADMIN — GABAPENTIN 100 MG: 100 CAPSULE ORAL at 14:36

## 2024-10-28 RX ADMIN — AMIODARONE HYDROCHLORIDE 200 MG: 200 TABLET ORAL at 20:35

## 2024-10-28 RX ADMIN — FERROUS SULFATE TAB EC 324 MG (65 MG FE EQUIVALENT) 324 MG: 324 (65 FE) TABLET DELAYED RESPONSE at 08:14

## 2024-10-28 RX ADMIN — AMIODARONE HYDROCHLORIDE 200 MG: 200 TABLET ORAL at 08:15

## 2024-10-28 RX ADMIN — TRAMADOL HYDROCHLORIDE 50 MG: 50 TABLET, COATED ORAL at 03:10

## 2024-10-28 RX ADMIN — ASPIRIN 81 MG: 81 TABLET, CHEWABLE ORAL at 08:15

## 2024-10-28 RX ADMIN — SODIUM CHLORIDE, PRESERVATIVE FREE 10 ML: 5 INJECTION INTRAVENOUS at 20:39

## 2024-10-28 RX ADMIN — Medication 3 MG: at 03:13

## 2024-10-28 RX ADMIN — TORSEMIDE 50 MG: 20 TABLET ORAL at 08:15

## 2024-10-28 RX ADMIN — INSULIN LISPRO 1 UNITS: 100 INJECTION, SOLUTION INTRAVENOUS; SUBCUTANEOUS at 08:18

## 2024-10-28 RX ADMIN — METOPROLOL SUCCINATE 25 MG: 25 TABLET, EXTENDED RELEASE ORAL at 17:46

## 2024-10-28 RX ADMIN — SACUBITRIL AND VALSARTAN 1 TABLET: 24; 26 TABLET, FILM COATED ORAL at 20:35

## 2024-10-28 ASSESSMENT — PAIN SCALES - GENERAL
PAINLEVEL_OUTOF10: 3
PAINLEVEL_OUTOF10: 0
PAINLEVEL_OUTOF10: 7
PAINLEVEL_OUTOF10: 0

## 2024-10-28 ASSESSMENT — PAIN DESCRIPTION - LOCATION
LOCATION: LEG
LOCATION: LEG

## 2024-10-28 ASSESSMENT — PAIN DESCRIPTION - ORIENTATION
ORIENTATION: RIGHT
ORIENTATION: RIGHT

## 2024-10-28 NOTE — CARE COORDINATION
Chart Reviewed.  Goal:   Home with brother and [facility].  Referral initiated to [facility] for aide services per his request.  Paracentesis today.  Following for DC needs.  CHLOE Orozco     Case Management   [phone]    10/28/2024  8:57 AM

## 2024-10-29 VITALS
TEMPERATURE: 98.1 F | WEIGHT: 290.57 LBS | RESPIRATION RATE: 18 BRPM | BODY MASS INDEX: 38.51 KG/M2 | SYSTOLIC BLOOD PRESSURE: 153 MMHG | HEIGHT: 73 IN | HEART RATE: 82 BPM | DIASTOLIC BLOOD PRESSURE: 92 MMHG | OXYGEN SATURATION: 100 %

## 2024-10-29 LAB
A1AT SERPL-MCNC: 184 MG/DL (ref 90–200)
AFP-TM SERPL-MCNC: 3.1 UG/L
ALBUMIN SERPL-MCNC: 3.6 G/DL (ref 3.4–5)
ANION GAP SERPL CALCULATED.3IONS-SCNC: 8 MMOL/L (ref 3–16)
ANTINUCLEAR AB INTERPRETIVE COMMENT: NORMAL
BUN SERPL-MCNC: 24 MG/DL (ref 7–20)
CALCIUM SERPL-MCNC: 9.4 MG/DL (ref 8.3–10.6)
CERULOPLASMIN SERPL-MCNC: 26 MG/DL (ref 15–30)
CHLORIDE SERPL-SCNC: 98 MMOL/L (ref 99–110)
CO2 SERPL-SCNC: 32 MMOL/L (ref 21–32)
CREAT SERPL-MCNC: 1.1 MG/DL (ref 0.8–1.3)
GFR SERPLBLD CREATININE-BSD FMLA CKD-EPI: 77 ML/MIN/{1.73_M2}
GLUCOSE BLD-MCNC: 143 MG/DL (ref 70–99)
GLUCOSE BLD-MCNC: 233 MG/DL (ref 70–99)
GLUCOSE SERPL-MCNC: 176 MG/DL (ref 70–99)
MAGNESIUM SERPL-MCNC: 1.76 MG/DL (ref 1.8–2.4)
NUCLEAR IGG SER QL IF: NORMAL
PERFORMED ON: ABNORMAL
PERFORMED ON: ABNORMAL
PHOSPHATE SERPL-MCNC: 3.4 MG/DL (ref 2.5–4.9)
POTASSIUM SERPL-SCNC: 4.2 MMOL/L (ref 3.5–5.1)
SMA IGG SER-ACNC: 45 UNITS (ref 0–19)
SMOOTH MUSCLE IGG TITR SER: ABNORMAL {TITER}
SODIUM SERPL-SCNC: 138 MMOL/L (ref 136–145)

## 2024-10-29 PROCEDURE — 80069 RENAL FUNCTION PANEL: CPT

## 2024-10-29 PROCEDURE — 6360000002 HC RX W HCPCS: Performed by: INTERNAL MEDICINE

## 2024-10-29 PROCEDURE — 2580000003 HC RX 258: Performed by: INTERNAL MEDICINE

## 2024-10-29 PROCEDURE — 6370000000 HC RX 637 (ALT 250 FOR IP): Performed by: INTERNAL MEDICINE

## 2024-10-29 PROCEDURE — 83735 ASSAY OF MAGNESIUM: CPT

## 2024-10-29 PROCEDURE — 94760 N-INVAS EAR/PLS OXIMETRY 1: CPT

## 2024-10-29 PROCEDURE — 99233 SBSQ HOSP IP/OBS HIGH 50: CPT | Performed by: INTERNAL MEDICINE

## 2024-10-29 RX ORDER — TORSEMIDE 10 MG/1
50 TABLET ORAL DAILY
Qty: 30 TABLET | Refills: 0 | Status: SHIPPED | OUTPATIENT
Start: 2024-10-30

## 2024-10-29 RX ORDER — MAGNESIUM SULFATE 1 G/100ML
1000 INJECTION INTRAVENOUS ONCE
Status: COMPLETED | OUTPATIENT
Start: 2024-10-29 | End: 2024-10-29

## 2024-10-29 RX ORDER — AMIODARONE HYDROCHLORIDE 200 MG/1
200 TABLET ORAL DAILY
Status: CANCELLED | OUTPATIENT
Start: 2024-10-30

## 2024-10-29 RX ORDER — ASPIRIN 81 MG/1
81 TABLET, CHEWABLE ORAL DAILY
Qty: 30 TABLET | Refills: 0 | Status: SHIPPED | OUTPATIENT
Start: 2024-10-29

## 2024-10-29 RX ORDER — ATORVASTATIN CALCIUM 80 MG/1
80 TABLET, FILM COATED ORAL NIGHTLY
Qty: 30 TABLET | Refills: 0 | Status: SHIPPED | OUTPATIENT
Start: 2024-10-29

## 2024-10-29 RX ORDER — ISOSORBIDE MONONITRATE 30 MG/1
15 TABLET, EXTENDED RELEASE ORAL DAILY
Qty: 30 TABLET | Refills: 0 | Status: SHIPPED | OUTPATIENT
Start: 2024-10-29

## 2024-10-29 RX ORDER — PANTOPRAZOLE SODIUM 40 MG/1
40 TABLET, DELAYED RELEASE ORAL DAILY PRN
Qty: 30 TABLET | Refills: 0 | Status: SHIPPED | OUTPATIENT
Start: 2024-10-29

## 2024-10-29 RX ORDER — CALCIUM CARBONATE 300MG(750)
400 TABLET,CHEWABLE ORAL 2 TIMES DAILY
Qty: 30 TABLET | Refills: 0 | Status: SHIPPED | OUTPATIENT
Start: 2024-10-29

## 2024-10-29 RX ORDER — AMIODARONE HYDROCHLORIDE 200 MG/1
200 TABLET ORAL DAILY
Qty: 30 TABLET | Refills: 0 | Status: SHIPPED | OUTPATIENT
Start: 2024-10-29

## 2024-10-29 RX ORDER — GABAPENTIN 100 MG/1
100 CAPSULE ORAL 3 TIMES DAILY
Qty: 90 CAPSULE | Refills: 3 | Status: CANCELLED | OUTPATIENT
Start: 2024-10-29

## 2024-10-29 RX ORDER — SACUBITRIL AND VALSARTAN 49; 51 MG/1; MG/1
1 TABLET, FILM COATED ORAL 2 TIMES DAILY
Qty: 60 TABLET | Refills: 0 | Status: SHIPPED | OUTPATIENT
Start: 2024-10-29

## 2024-10-29 RX ORDER — NITROGLYCERIN 0.4 MG/1
0.4 TABLET SUBLINGUAL EVERY 5 MIN PRN
Qty: 25 TABLET | Refills: 0 | Status: SHIPPED | OUTPATIENT
Start: 2024-10-29

## 2024-10-29 RX ORDER — METOPROLOL SUCCINATE 25 MG/1
25 TABLET, EXTENDED RELEASE ORAL EVERY EVENING
Qty: 30 TABLET | Refills: 0 | Status: SHIPPED | OUTPATIENT
Start: 2024-10-29

## 2024-10-29 RX ADMIN — AMIODARONE HYDROCHLORIDE 200 MG: 200 TABLET ORAL at 09:36

## 2024-10-29 RX ADMIN — SACUBITRIL AND VALSARTAN 1 TABLET: 24; 26 TABLET, FILM COATED ORAL at 09:36

## 2024-10-29 RX ADMIN — GABAPENTIN 100 MG: 100 CAPSULE ORAL at 09:36

## 2024-10-29 RX ADMIN — ISOSORBIDE MONONITRATE 15 MG: 30 TABLET, EXTENDED RELEASE ORAL at 09:36

## 2024-10-29 RX ADMIN — TORSEMIDE 50 MG: 20 TABLET ORAL at 09:36

## 2024-10-29 RX ADMIN — APIXABAN 5 MG: 5 TABLET, FILM COATED ORAL at 09:36

## 2024-10-29 RX ADMIN — ASPIRIN 81 MG: 81 TABLET, CHEWABLE ORAL at 09:36

## 2024-10-29 RX ADMIN — MAGNESIUM SULFATE HEPTAHYDRATE 1000 MG: 1 INJECTION, SOLUTION INTRAVENOUS at 09:42

## 2024-10-29 RX ADMIN — INSULIN LISPRO 1 UNITS: 100 INJECTION, SOLUTION INTRAVENOUS; SUBCUTANEOUS at 11:50

## 2024-10-29 RX ADMIN — MILRINONE LACTATE 0.15 MCG/KG/MIN: 0.2 INJECTION, SOLUTION INTRAVENOUS at 02:19

## 2024-10-29 RX ADMIN — Medication 400 MG: at 09:36

## 2024-10-29 RX ADMIN — PANTOPRAZOLE SODIUM 40 MG: 40 TABLET, DELAYED RELEASE ORAL at 09:36

## 2024-10-29 RX ADMIN — FERROUS SULFATE TAB EC 324 MG (65 MG FE EQUIVALENT) 324 MG: 324 (65 FE) TABLET DELAYED RESPONSE at 09:36

## 2024-10-29 RX ADMIN — SODIUM CHLORIDE, PRESERVATIVE FREE 10 ML: 5 INJECTION INTRAVENOUS at 09:37

## 2024-10-29 NOTE — CARE COORDINATION
Chart Reviewed.  Goal is home with brother, [facility] and [facility] for homemaker.  Family to transport home.  Following for DC needs.  CHLOE Orozco     Case Management   [phone]    10/29/2024  8:59 AM

## 2024-10-29 NOTE — CARE COORDINATION
[place] served to Dr Blanchard to request home PT orders as recommending by therapy Team.  Call placed to MARGO Small to inform of dc for today.  SHAHZAD completed.  CHLOE Orozco     Case Management   [phone]    10/29/2024  11:52 AM

## 2024-10-29 NOTE — NURSE NAVIGATOR
Discharge order noted. There is a follow up appointment scheduled with   Chyna Sexton NP 11/1/24@  1000am. He usually follows with Spencer Rosa CNP @ , however, the first available appointment is not until January. Mr. Vargas is aware   Spencer Rosa CNP 1/21/25 @330pm  Cardiology  All GDMT has been addressed  Dc wt 290 lbs standing

## 2024-10-29 NOTE — DISCHARGE SUMMARY
Hospital Medicine Discharge Summary    Patient ID: Levy Vargas      Patient's PCP: Minnie Lilly DO    Admit Date: 10/18/2024     Discharge Date:   10/29/2024    Admitting Provider: Geovani Blanchard MD     Discharge Provider: Geovani Blanchard MD     Discharge Diagnoses:       Active Hospital Problems    Diagnosis     PAD (peripheral artery disease) ([facility]) [I73.9]     Acute on chronic clinical systolic heart failure ([facility]) [I50.23]     Chronic systolic CHF (congestive heart failure) ([facility]) [I50.22]     Acute kidney injury (KENDRA) with acute tubular necrosis (ATN) ([facility]) [N17.0]        The patient was seen and examined on day of discharge and this discharge summary is in conjunction with any daily progress note from day of discharge.    Hospital Course:       From HPI:\"59 y.o. male who presented to [facility] with shortness of breath leg pain in his right leg and bilateral lower extremity swelling associated with shortness of breath as listed above mild abdominal distention denies fever chills headache blurry vision or double vision no chest pain, to note that patient with history of congestive heart failure and alcoholic cirrhosis along with ascites ran out of his diuretics and not taking diuretics at home for few days, stated that he is still taking other medication on presentation to ED found to be in A-fib RVR, vascular ultrasound ordered and done without acute occlusion, cardiology consulted from ED started on amiodarone drip being admitted for further management and treatment discussed with ED provider agree with plan to admit \"       Acute on chronic congestive heart failure, systolic with EF of 15% milrinone, to be continued for now.  Received Lasix drip, no Aldactone or Jardiance due to recent KENDRA, restart Entresto changed carvedilol to Toprol per cardiology.  No plan to start Aldactone or Jardiance at this time due to renal status, discussed with cardiology ok to discharge.   Acute on chronic

## 2024-10-29 NOTE — PROGRESS NOTES
[facility]   Daily Progress Note      Admit Date:  10/18/2024      Subjective:   Mr. Vargas is a 59 y.o. male with a past medical history significant for PAD and chronic systolic CHF who presented to [facility] ED with fluid retention.      Levy states that he had \"water on me\". He states that he has heart failure in the past and sees Dr. Rosa at  for this. He was in the hospital 3 weeks ago for this. He states that he has been gaining fluid over the last week. He was on lasix at his SNF but he relates that they \"kept taking me off my lasix\". He thinks that his dry weight is 260lbs.     He received a single dose of IV lasix with only mild diuresis but worsening renal function. His echo showed his LVEF to be ~15%. He was placed on milrinone and transferred to the CVU. He had some seizure like activity on the 5th floor prior to starting milrinone.     Interval History:  Milrinone at 0.35mcg/kg/min.Lasix drip at 5mg/hr, decreased overnight from 10mg/hr due to decreased urine output. No events overnight. 2 liters of urine output this am since 7am. He says that his breathing is better. Sinus rhythm on telemetry.      PCWP this am 11mmHg with RA pressure 5mmHg.     10/23/24 0400   [place]   Core (Body) Temperature 98 °F (36.7 °C)   MAP (Monitor) 85   PAP (Systolic/Diastolic) 57/26   PAP (Mean) 241 mmHg   PAP Wave Form Appropriate wave forms   CVP (mmHg) 17 mmHg   CVP (Mean) 16 mmHg   SVO2 (%) 68.1 %   CO (l/min) 8.8 l/min   CCO 8.8 L/Min   CI (l/min/m2) 3.3 l/min/m2   SVR (Using NBP Mean) 627.27 dyne*sec/cm5   LE 1.94    1.66   SV (ml) 96    .     Objective:     /70   Pulse 88   Temp 98 °F (36.7 °C) (Oral)   Resp 14   Ht 1.854 m (6' 0.99\")   Wt 135.5 kg (298 lb 11.6 oz)   SpO2 99%   BMI 39.42 kg/m²      Intake/Output Summary (Last 24 hours) at 10/23/2024 0952  Last data filed at 10/23/2024 0710  Gross per 24 hour   Intake 1996.85 ml   Output 7000 ml   Net -5003.15 ml 
      [facility]   Daily Progress Note      Admit Date:  10/18/2024      Subjective:   Mr. Vargas is a 59 y.o. male with a past medical history significant for PAD and chronic systolic CHF who presented to [facility] ED with fluid retention.      Levy states that he had \"water on me\". He states that he has heart failure in the past and sees Dr. Rosa at  for this. He was in the hospital 3 weeks ago for this. He states that he has been gaining fluid over the last week. He was on lasix at his SNF but he relates that they \"kept taking me off my lasix\". He thinks that his dry weight is 260lbs.     He received a single dose of IV lasix with only mild diuresis but worsening renal function. His echo showed his LVEF to be ~15%. He was placed on milrinone and transferred to the CVU. He had some seizure like activity on the 5th floor prior to starting milrinone.     Interval History:  Sinus rhythm on telemetry.  He remains on milrinone at 0.3 mcg/kg/min.  Norepinephrine is being titrated but currently at 8 mcg/kg/min.  No events overnight.  He has fair urine output.      Objective:     /84   Pulse 64   Temp 97.9 °F (36.6 °C) (Oral)   Resp 15   Ht 1.854 m (6' 0.99\")   Wt (!) 144.6 kg (318 lb 12.6 oz)   SpO2 98%   BMI 42.07 kg/m²      Intake/Output Summary (Last 24 hours) at 10/21/2024 1518  Last data filed at 10/21/2024 1431  Gross per 24 hour   Intake 1890.57 ml   Output 1707 ml   Net 183.57 ml       Physical Exam:  General:  Awake, alert, NAD  Skin:  Warm and dry  Neck:  JVD<8, no carotid bruits  Chest:  Clear to auscultation, no wheezes/rhonchi/rales  Cardiovascular:  RRR, normal S1/S2, no M/R/G  Abdomen:  Soft, nontender, +bowel sounds  Extremities:  Trace bilateral edema. Left great toe amputation.   Pulses: 2+ bilat carotid    2+ bilat radial    2+ bilat femoral        Medications:    albumin human 25%  25 g IntraVENous Q8H    gabapentin  100 mg Oral TID    sodium chloride flush  5-40 mL 
      [facility]   Daily Progress Note      Admit Date:  10/18/2024      Subjective:   Mr. Vargas is a 59 y.o. male with a past medical history significant for PAD and chronic systolic CHF who presented to [facility] ED with fluid retention.      Levy states that he had \"water on me\". He states that he has heart failure in the past and sees Dr. Rosa at  for this. He was in the hospital 3 weeks ago for this. He states that he has been gaining fluid over the last week. He was on lasix at his SNF but he relates that they \"kept taking me off my lasix\". He thinks that his dry weight is 260lbs.     He received a single dose of IV lasix with only mild diuresis but worsening renal function. His echo showed his LVEF to be ~15%. He was placed on milrinone and transferred to the CVU. He had some seizure like activity on the 5th floor prior to starting milrinone.     Interval History:  Sinus rhythm in the 60's. He remaisn on milrinone at 0.3mcg/kg/min. Norepinoeprhine started for hypotension and is currently running at 11mcg/kg/min. MAP 66mmHg.       Objective:     BP (!) 88/44   Pulse 63   Temp 97.1 °F (36.2 °C) (Oral)   Resp 10   Ht 1.854 m (6' 0.99\")   Wt (!) 145.1 kg (319 lb 14.2 oz)   SpO2 93%   BMI 42.21 kg/m²      Intake/Output Summary (Last 24 hours) at 10/20/2024 0826  Last data filed at 10/20/2024 0600  Gross per 24 hour   Intake 2254.24 ml   Output 555 ml   Net 1699.24 ml       Physical Exam:  General:  Awake, alert, NAD  Skin:  Warm and dry  Neck:  JVD<8, no carotid bruits  Chest:  Clear to auscultation, no wheezes/rhonchi/rales  Cardiovascular:  RRR, normal S1/S2, no M/R/G  Abdomen:  Soft, nontender, +bowel sounds  Extremities:  Trace bilateral edema. Left great toe amputation.   Pulses: 2+ bilat carotid    2+ bilat radial    2+ bilat femoral        Medications:    amiodarone  200 mg Oral BID    albumin human 25%  25 g IntraVENous BID    sodium chloride flush  5-40 mL IntraVENous 2 times 
      [facility]   Daily Progress Note      Admit Date:  10/18/2024      Subjective:   Mr. Vargas is a 59 y.o. male with a past medical history significant for PAD and chronic systolic CHF who presented to [facility] ED with fluid retention.      Levy states that he had \"water on me\". He states that he has heart failure in the past and sees Dr. Rosa at  for this. He was in the hospital 3 weeks ago for this. He states that he has been gaining fluid over the last week. He was on lasix at his SNF but he relates that they \"kept taking me off my lasix\". He thinks that his dry weight is 260lbs.     He received a single dose of IV lasix with only mild diuresis but worsening renal function. His echo showed his LVEF to be ~15%. He was placed on milrinone and transferred to the CVU. He had some seizure like activity on the 5th floor prior to starting milrinone.     Interval History:  Milrinone at 0.3mcg/kg/min. Lasix drip was stopped by Nephrology. Sinus rhythm on telemetry.  He reports that his breathing is better today.     .     Objective:     /82   Pulse 89   Temp 98.6 °F (37 °C) (Oral)   Resp 16   Ht 1.854 m (6' 0.99\")   Wt 133 kg (293 lb 3.4 oz)   SpO2 100%   BMI 38.69 kg/m²      Intake/Output Summary (Last 24 hours) at 10/25/2024 1307  Last data filed at 10/25/2024 1109  Gross per 24 hour   Intake 2726.11 ml   Output 8175 ml   Net -5448.89 ml       Physical Exam:  General:  Awake, alert, NAD  Skin:  Warm and dry  Neck:  JVD<8, no carotid bruits  Chest:  Clear to auscultation, no wheezes/rhonchi/rales  Cardiovascular:  RRR, normal S1/S2, no M/R/G  Abdomen:  Soft, nontender, +bowel sounds  Extremities:  1+ bilateral LE edema. Left great toe amputation.   Pulses: 2+ bilat carotid    2+ bilat radial    2+ bilat femoral        Medications:    torsemide  50 mg Oral Daily    sodium phosphate IVPB (PERIPHERAL line)  15 mmol IntraVENous Once    apixaban  5 mg Oral BID    carvedilol  6.25 mg Oral 
    Gastroenterology Progress Note    Levy Vargas is a 59 y.o. male patient.  Hospitalization Day:10    SUBJECTIVE:  Going for repeat paracentesis today.  No other acute events.      ROS:  Gastrointestinal ROS: no abdominal pain, change in bowel habits, or black or bloody stools    Physical    VITALS:  /72   Pulse 81   Temp 98.6 °F (37 °C) (Oral)   Resp 18   Ht 1.854 m (6' 0.99\")   Wt 132.4 kg (291 lb 14.2 oz)   SpO2 96%   BMI 38.52 kg/m²   TEMPERATURE:  Current - Temp: 98.6 °F (37 °C); Max - Temp  Av.3 °F (36.8 °C)  Min: 97.8 °F (36.6 °C)  Max: 98.6 °F (37 °C)    General:  Alert and oriented,  No apparent distress  Skin- without jaundice  Eyes: anicteric sclera  Cardiac: RRR, Nl s1s2, without murmurs  Lungs CTA Bilaterally, normal effort  Abdomen soft, distended, NT, no HSM, Bowel sounds normal  Ext: without edema  Neuro: no asterixis     Data    Data Review:    Recent Labs     10/28/24  0440   WBC 4.8   HGB 10.1*   HCT 30.9*   MCV 90.6        Recent Labs     10/26/24  0610 10/27/24  0620 10/28/24  0440    140 138   K 4.1 4.3 3.9   CL 97* 98* 97*   CO2 33* 33* 33*   PHOS 3.7 4.7 3.4   BUN 16 22* 22*   CREATININE 1.0 1.1 1.1     No results for input(s): \"AST\", \"ALT\", \"BILIDIR\", \"BILITOT\", \"ALKPHOS\" in the last 72 hours.    Invalid input(s): \"ALB\"  No results for input(s): \"LIPASE\", \"AMYLASE\" in the last 72 hours.  No results for input(s): \"PROTIME\", \"INR\" in the last 72 hours.    Radiology Review:    US ABDOMEN LIMITED   Final Result   No peritoneal fluid. Paracentesis was not performed.         CT HEAD WO CONTRAST   Final Result   1. No acute intracranial abnormality.   2. Mild fairly symmetric bilateral skin thickening and underlying edema of   the subcutaneous fatty tissues laterally adjacent the zygomatic arches   extending mildly inferiorly. Question of mild bilateral proptosis. Recommend   correlation with physical exam.         Vascular duplex lower extremity arteries 
    V2.0    [facility] [place]      Name:  Levy Vargas /Age/Sex: 1965  (59 y.o. male)   MRN & CSN:  [id number] & [id number] Encounter Date/Time: 10/27/2024 7:07 AM EDT   Location:  [id number] PCP: Minnie Lilly DO     Attending:Alli Snell MD       Hospital Day: 10    Assessment and Recommendations   Levy Vargas is a 59 y.o. male who presents with Acute on chronic clinical systolic heart failure (HCC)    59-year-old patient admitted to the hospital with A-fib RVR acute on chronic congestive heart failure, cardiology consulted started on Lasix, resume home medication patient developed increased creatinine transferred to cardiovascular unit started on milrinone nephrology consulted also patient on Lasix drip cardiology and nephrology continue to follow also vascular surgery consulted for chronic peripheral vascular disease with occlusion  Plan:      Acute on chronic congestive heart failure, systolic with EF of 15% milrinone, to be continued for now.  S/p Lasix drip now on Torsemide, no Aldactone or Jardiance due to recent KENDRA, continue Entresto, torsemide, milrinone.  Changed carvedilol to Toprol per cardiology no event last night discussed with nursing.  Cardiology note from  noted recommended to change Carvedilol to Toprol.  Acute on chronic renal failure, creatinine peaked at 2.1 currently 1.1 which is flat from yesterday discussed with nephrology hold on Aldactone for now.  Potential cardiogenic shock, patient was given albumin, milrinone and was on norepinephrine drip that is weaned off at this time continue to be on milrinone received Lasix drip which completed now, now on Torsemide  A-fib with RVR, now rate controlled in sinus rhythm, continue amiodarone, cardiology following.   Peripheral artery disease, ultrasound ordered and done in ED.  Vascular surgery consulted and no further recommendations at this time.  Hematuria, improving, follow-up as outpatient  Elevated 
    V2.0    [facility] Progress Note      Name:  Levy Vargas /Age/Sex: 1965  (59 y.o. male)   MRN & CSN:  [id number] & [id number] Encounter Date/Time: 10/25/2024 8:36 AM EDT   Location:  [id number] PCP: Minnie Lilly DO     Attending:Geovani Blanchard MD       Hospital Day: 8    Assessment and Recommendations   Levy Vargas is a 59 y.o. male who presents with Acute on chronic clinical systolic heart failure (HCC)    59-year-old patient admitted to the hospital with A-fib RVR acute on chronic congestive heart failure, cardiology consulted started on Lasix, resume home medication patient developed increased creatinine transferred to cardiovascular unit started on milrinone nephrology consulted also patient on Lasix drip cardiology and nephrology continue to follow also vascular surgery consulted for chronic peripheral vascular disease with occlusion  Plan:      Acute on chronic congestive heart failure, systolic with EF of 15% milrinone, Lasix drip cardiology nephrology following to note that initially creatinine increased transferred to CVU, currently creatinine improving, 1.0 today flat from 1.1 yesterday  Acute on chronic renal failure, creatinine peaked at 2.1 currently 1.0  Potential cardiogenic shock, patient was given albumin, milrinone and was on norepinephrine drip that is weaned off at this time  A-fib with RVR, amiodarone, cardiology following.   Peripheral artery disease, ultrasound ordered and done in ED.  Vascular surgery consulted and no further recommendations at this time.  Hematuria, improving, follow-up as outpatient  Elevated troponin due to congestive heart failure and tachyarrhythmia cardiology consulted  Anemia, appears chronic no signs of acute bleeding hemoglobin 11.1 relatively flat from 10.8 yesterday  Diabetes mellitus sliding scale      Diet ADULT DIET; Regular; 1500 ml   DVT Prophylaxis [] Lovenox, []  Heparin, [] SCDs, [] Ambulation,  [] Eliquis, [] Xarelto  [] Coumadin 
    V2.0    [facility] [place] Progress Note      Name:  Levy Vargas /Age/Sex: 1965  (59 y.o. male)   MRN & CSN:  [id number] & [id number] Encounter Date/Time: 10/24/2024 7:04 AM EDT   Location:  [id number]/1308-01 PCP: Minnie Lilly DO     Attending:Geovani Blanchard MD       Hospital Day: 7    Assessment and Recommendations   Levy Vargas is a 59 y.o. male who presents with Acute on chronic clinical systolic heart failure (HCC)    59-year-old patient admitted to the hospital with A-fib RVR acute on chronic congestive heart failure, cardiology consulted started on Lasix, resume home medication patient developed increased creatinine transferred to cardiovascular unit started on milrinone nephrology consulted also patient on Lasix drip cardiology and nephrology continue to follow also vascular surgery consulted for chronic peripheral vascular disease with occlusion  Plan:      Acute on chronic congestive heart failure, systolic with EF of 15% milrinone, Lasix drip cardiology nephrology following to note that initially creatinine increased transferred to CVU, currently creatinine improving  Acute on chronic renal failure, creatinine peaked at 2.1 currently 1.1 relatively flat from yesterday was 1.2  Potential cardiogenic shock, patient was given albumin, milrinone and was on norepinephrine drip that is weaned off at this time  A-fib with RVR, amiodarone, cardiology following.   Peripheral artery disease, ultrasound ordered and done in ED.  Vascular surgery consulted no further recommendations at this time  Hematuria, improving, follow-up as outpatient  Elevated troponin due to congestive heart failure and tachyarrhythmia cardiology consulted  Anemia, appears chronic no signs of acute bleeding hemoglobin relatively flat from 2 days ago 10.8  Diabetes mellitus sliding scale      Diet ADULT DIET; Regular; 1500 ml   DVT Prophylaxis [] Lovenox, []  Heparin, [] SCDs, [] Ambulation,  [] Eliquis, [] Xarelto  [] Coumadin 
    V2.0    [facility] [place] Progress Note      Name:  Levy Vargas /Age/Sex: 1965  (59 y.o. male)   MRN & CSN:  [id number] & [id number] Encounter Date/Time: 10/26/2024 7:07 AM EDT   Location:  [id number]/1308-01 PCP: Minnie Lilly DO     Attending:Geovani Blanchard MD       Hospital Day: 9    Assessment and Recommendations   Levy Vargas is a 59 y.o. male who presents with Acute on chronic clinical systolic heart failure (HCC)    59-year-old patient admitted to the hospital with A-fib RVR acute on chronic congestive heart failure, cardiology consulted started on Lasix, resume home medication patient developed increased creatinine transferred to cardiovascular unit started on milrinone nephrology consulted also patient on Lasix drip cardiology and nephrology continue to follow also vascular surgery consulted for chronic peripheral vascular disease with occlusion  Plan:      Acute on chronic congestive heart failure, systolic with EF of 15% milrinone, to be continued for now.  Received Lasix drip, no Aldactone or Jardiance due to recent KENDRA, restart Entresto changed carvedilol to Toprol per cardiology no event last night discussed with nursing.  Cardiology note from  noted recommended to change carvedilol to Toprol.  Acute on chronic renal failure, creatinine peaked at 2.1 currently 1.0 which is flat from yesterday discussed with nephrology hold on Aldactone for now.  Potential cardiogenic shock, patient was given albumin, milrinone and was on norepinephrine drip that is weaned off at this time continue to be on milrinone received Lasix drip which completed now  A-fib with RVR, amiodarone, cardiology following.   Peripheral artery disease, ultrasound ordered and done in ED.  Vascular surgery consulted and no further recommendations at this time.  Hematuria, improving, follow-up as outpatient  Elevated troponin due to congestive heart failure and tachyarrhythmia cardiology consulted  Anemia, appears 
    V2.0    [facility] [place]      Name:  Levy Vargas /Age/Sex: 1965  (59 y.o. male)   MRN & CSN:  [id number] & [id number] Encounter Date/Time: 10/20/2024 10:59 AM EDT   Location:  [id number]/1308-01 PCP: Minnie Lilly DO     Attending:Ekaterina Mir MD       Hospital Day: 3    Assessment and Recommendations   59-year-old male patient admitted to the hospital with cute on chronic congestive heart failure, A-fib RVR.  Treated with Lasix, developed increasing creatinine, and worsening low blood pressure.  Started on milrinone, transferred to cardiovascular unit.      Plan:   Acute on chronic systolic heart failure, LVEF is 15%, worsened from prior.  Patient has been treated with IV Lasix, developed worsening hypotension and renal function.  Continue milrinone.  Plan for right heart catheterization on Monday.  Cardiogenic shock, continue milrinone, and nor epi, continue ICU care.  IV albumin ordered.  Paroxysmal A-fib with RVR, continue p.o. amiodarone.  Holding Eliquis for planned right heart cath.  For now continue heparin gtt.  KEDNRA on CKD stage III, low urine output.  Cardiogenic. Holding Entresto, Aldactone, Jardiance, and Coreg for now.  Nephrology following.  Peripheral artery disease, completed ultrasounds, no significant obstruction.  Vascular surgery consulted.  Cirrhosis of the liver, moderate ascites on the CT abdomen  Type 2 diabetes, continue sliding scale    Diet ADULT DIET; Regular; 4 carb choices (60 gm/meal); No Added Salt (3-4 gm); 1500 ml   DVT Prophylaxis [] Lovenox, [x]  Heparin, [] SCDs, [] Ambulation,  [] Eliquis, [] Xarelto  [] Coumadin   Code Status Full Code       Surrogate Decision Maker/ POA      Personally reviewed Lab Studies and Imaging    Cardiology recommendations reviewed, patient is to continue on milrinone and norepinephrine for now.    Medical Decision Making:  The following items were considered in medical decision making:  Discussion of patient care with other 
  [facility]  Phone: [phone]  Fax: [phone]  [place]         Reason for Consult: KENDRA on CKD stage III / Volume overload    Requesting Physician:  Dr. Blanchard    History of Present Ilness: Mr Vargas is a 60 yo male with a past medical history significant for history of hypertension, HFrEF, cirrhosis and CKD III with a baseline creatinine of around 1.3 mg/dl -  he was seen last year in August for KENDRA in the setting of cardiorenal syndrome and his weight on discharge was 331 pounds    He presented because of lower extremity edema, abdominal distention and shortness of breath. He apparently ran out of his diuretic about a week ago    He reports no urinary symptoms except for decreased frequency and some hesitancy of urination. In the ER he was noted to have atrial fibrillation with rapid ventricular rate. He was started on amiodarone drip. Noted to have KENDRA with a creatinine of 1.8     His blood pressure had been low overnight running in the 70s and 80s systolic. We are asked to see him for his KENDRA    Transferred to ICU with Hypotension. ECHO shows EF10-15%. On Milrinone and Levo gtts. Plan for RHC later today 10/21/24    Subjective  In bed; NAD;     ROS neg for all system     No family present     Physical exam:   Constitutional:  VITALS:  BP (!) 112/49   Pulse 80   Temp 98.6 °F (37 °C) (Oral)   Resp 18   Ht 1.854 m (6' 0.99\")   Wt 132.4 kg (291 lb 14.2 oz)   SpO2 98%   BMI 38.52 kg/m²     Intake/Output Summary (Last 24 hours) at 10/28/2024 1222  Last data filed at 10/28/2024 1042  Gross per 24 hour   Intake 1578.96 ml   Output 3550 ml   Net -1971.04 ml     Patient Vitals for the past 96 hrs (Last 3 readings):   Weight   10/28/24 0900 132.4 kg (291 lb 14.2 oz)   10/27/24 0630 131.3 kg (289 lb 7.4 oz)   10/26/24 0500 132.2 kg (291 lb 7.2 oz)       Gen: awake, alert  Heent:  NC / AT  Cardiovascular:  Irregular, no rubs  Respiratory:diminished BS   Abdomen:  soft, non 
  [facility]  Phone: [phone]  Fax: [phone]  [employer]         Reason for Consult: KENDRA on CKD stage III / Volume overload    Requesting Physician:  Dr. Blanchard    History of Present Ilness: Mr Vargas is a 58 yo male with a past medical history significant for history of hypertension, HFrEF, cirrhosis and CKD III with a baseline creatinine of around 1.3 mg/dl -  he was seen last year in August for KENDRA in the setting of cardiorenal syndrome and his weight on discharge was 331 pounds    He presented because of lower extremity edema, abdominal distention and shortness of breath. He apparently ran out of his diuretic about a week ago    He reports no urinary symptoms except for decreased frequency and some hesitancy of urination. In the ER he was noted to have atrial fibrillation with rapid ventricular rate. He was started on amiodarone drip. Noted to have KENDRA with a creatinine of 1.8     His blood pressure had been low overnight running in the 70s and 80s systolic. We are asked to see him for his KENDRA    Transferred to ICU with Hypotension. ECHO shows EF10-15%. On Milrinone and Levo gtts. Plan for RHC later today 10/21/24    Subjective  In recliner; NAD; breathing better    Physical exam:   Constitutional:  VITALS:  BP (!) 111/49   Pulse 83   Temp 97.6 °F (36.4 °C) (Oral)   Resp 15   Ht 1.854 m (6' 0.99\")   Wt (!) 142.6 kg (314 lb 6 oz)   SpO2 99%   BMI 41.49 kg/m²     Intake/Output Summary (Last 24 hours) at 10/24/2024 1411  Last data filed at 10/24/2024 0630  Gross per 24 hour   Intake 730 ml   Output 3750 ml   Net -3020 ml     Patient Vitals for the past 96 hrs (Last 3 readings):   Weight   10/23/24 1000 (!) 142.6 kg (314 lb 6 oz)   10/23/24 0400 135.5 kg (298 lb 11.6 oz)   10/22/24 2000 (!) 138.5 kg (305 lb 5.4 oz)       Gen: awake, alert  Heent:  NC / AT  Cardiovascular:  Irregular, no rubs  Respiratory:diminished BS   Abdomen:  +bs, soft distended. + anterior abdominal 
  [facility]  Phone: [phone]  Fax: [phone]  [employer]         Reason for Consult: KENDRA on CKD stage III / Volume overload    Requesting Physician:  Dr. Blanchard    History of Present Ilness: Mr Vargas is a 60 yo male with a past medical history significant for history of hypertension, HFrEF, cirrhosis and CKD III with a baseline creatinine of around 1.3 mg/dl -  he was seen last year in August for KENDRA in the setting of cardiorenal syndrome and his weight on discharge was 331 pounds    He presented because of lower extremity edema, abdominal distention and shortness of breath. He apparently ran out of his diuretic about a week ago    He reports no urinary symptoms except for decreased frequency and some hesitancy of urination. In the ER he was noted to have atrial fibrillation with rapid ventricular rate. He was started on amiodarone drip. Noted to have KENDRA with a creatinine of 1.8     His blood pressure had been low overnight running in the 70s and 80s systolic. We are asked to see him for his KENDRA    Transferred to ICU with Hypotension. ECHO shows EF10-15%. On Milrinone and Levo gtts. Plan for RHC later today 10/21/24    Subjective  In bed; NAD; breathing better    Physical exam:   Constitutional:  VITALS:  /62   Pulse 79   Temp 98.6 °F (37 °C) (Oral)   Resp 17   Ht 1.854 m (6' 0.99\")   Wt 133 kg (293 lb 3.4 oz)   SpO2 97%   BMI 38.69 kg/m²     Intake/Output Summary (Last 24 hours) at 10/25/2024 1104  Last data filed at 10/25/2024 1045  Gross per 24 hour   Intake 2902.27 ml   Output 8825 ml   Net -5922.73 ml     Patient Vitals for the past 96 hrs (Last 3 readings):   Weight   10/25/24 0400 133 kg (293 lb 3.4 oz)   10/24/24 1546 (!) 138.1 kg (304 lb 7.3 oz)   10/23/24 1000 (!) 142.6 kg (314 lb 6 oz)       Gen: awake, alert  Heent:  NC / AT  Cardiovascular:  Irregular, no rubs  Respiratory:diminished BS   Abdomen:  soft, non distended  Ext:  1+ lower extremity edema 
  [facility]  Phone: [phone]  Fax: [phone]  [facility]         Reason for Consult: KENDRA on CKD stage III / Volume overload    Requesting Physician:  Dr. Blanchard    History of Present Ilness: Mr Vargas is a 58 yo male with a past medical history significant for history of hypertension, HFrEF, cirrhosis and CKD III with a baseline creatinine of around 1.3 mg/dl -  he was seen last year in August for KENDRA in the setting of cardiorenal syndrome and his weight on discharge was 331 pounds    He presented because of lower extremity edema, abdominal distention and shortness of breath. He apparently ran out of his diuretic about a week ago    He reports no urinary symptoms except for decreased frequency and some hesitancy of urination. In the ER he was noted to have atrial fibrillation with rapid ventricular rate. He was started on amiodarone drip. Noted to have KENDRA with a creatinine of 1.8     His blood pressure had been low overnight running in the 70s and 80s systolic. We are asked to see him for his KENDRA    Transferred to ICU with Hypotension. ECHO shows EF10-15%. On Milrinone and Levo gtts. Plan for RHC later today 10/21/24    Review of Systems:   Physical exam:   Constitutional:  VITALS:  /65   Pulse 70   Temp 98 °F (36.7 °C) (Oral)   Resp 17   Ht 1.854 m (6' 0.99\")   Wt (!) 144.6 kg (318 lb 12.6 oz)   SpO2 96%   BMI 42.07 kg/m²     Intake/Output Summary (Last 24 hours) at 10/21/2024 1143  Last data filed at 10/21/2024 0900  Gross per 24 hour   Intake 1807.72 ml   Output 1570 ml   Net 237.72 ml       Gen: awake, alert  Heent:  NC / AT  Cardiovascular:  Irregular, no rubs  Respiratory:diminished BS   Abdomen:  +bs, soft distended. + anterior abdominal wall  Ext:  3+ lower extremity edema extending up to anterior abdominal wall    Medications   albumin human 25%  25 g IntraVENous Q8H    gabapentin  100 mg Oral TID    amiodarone  200 mg Oral BID    sodium chloride flush  5-40 
  [facility]  Phone: [phone]  Fax: [phone]  [employer]         Reason for Consult: KENDRA on CKD stage III / Volume overload    Requesting Physician:  Dr. Blanchard    History of Present Ilness: Mr Vargas is a 60 yo male with a past medical history significant for history of hypertension, HFrEF, cirrhosis and CKD III with a baseline creatinine of around 1.3 mg/dl -  he was seen last year in August for KENDRA in the setting of cardiorenal syndrome and his weight on discharge was 331 pounds    He presented because of lower extremity edema, abdominal distention and shortness of breath. He apparently ran out of his diuretic about a week ago    He reports no urinary symptoms except for decreased frequency and some hesitancy of urination. In the ER he was noted to have atrial fibrillation with rapid ventricular rate. He was started on amiodarone drip. Noted to have KENDRA with a creatinine of 1.8     His blood pressure had been low overnight running in the 70s and 80s systolic. We are asked to see him for his KENDRA    Transferred to ICU with Hypotension. ECHO shows EF10-15%. On Milrinone and Levo gtts. Plan for RHC later today 10/21/24    Subjective  In bed; NAD; breathing better    Physical exam:   Constitutional:  VITALS:  BP (!) 116/103   Pulse 72   Temp 97.6 °F (36.4 °C) (Axillary)   Resp 16   Ht 1.854 m (6' 0.99\")   Wt 131.3 kg (289 lb 7.4 oz)   SpO2 100%   BMI 38.20 kg/m²     Intake/Output Summary (Last 24 hours) at 10/27/2024 1145  Last data filed at 10/27/2024 1109  Gross per 24 hour   Intake 946.71 ml   Output 3725 ml   Net -2778.29 ml     Patient Vitals for the past 96 hrs (Last 3 readings):   Weight   10/27/24 0630 131.3 kg (289 lb 7.4 oz)   10/26/24 0500 132.2 kg (291 lb 7.2 oz)   10/25/24 0400 133 kg (293 lb 3.4 oz)       Gen: awake, alert  Heent:  NC / AT  Cardiovascular:  Irregular, no rubs  Respiratory:diminished BS   Abdomen:  soft, non distended  Ext:  1+ lower extremity 
4 Eyes Skin Assessment     NAME:  Levy Vargas  YOB: 1965  MEDICAL RECORD NUMBER:  [id number]    The patient is being assessed for  Admission    I agree that at least one RN has performed a thorough Head to Toe Skin Assessment on the patient. ALL assessment sites listed below have been assessed.      Areas assessed by both nurses:    Head, Face, Ears, Shoulders, Back, Chest, Arms, Elbows, Hands, Sacrum. Buttock, Coccyx, Ischium, Legs. Feet and Heels, and Under Medical Devices         Does the Patient have a Wound? No noted wound(s)       Hudson Prevention initiated by RN: No  Wound Care Orders initiated by RN: No    Pressure Injury (Stage 3,4, Unstageable, DTI, NWPT, and Complex wounds) if present, place Wound referral order by RN under : No    New Ostomies, if present place, Ostomy referral order under : No     Nurse 1 eSignature: Electronically signed by Irais Burnette RN on 10/18/24 at 10:12 PM EDT    **SHARE this note so that the co-signing nurse can place an eSignature**    Nurse 2 eSignature: Electronically signed by Lali Torre RN on 10/18/24 at 11:03 PM EDT    
4 Eyes Skin Assessment     The patient is being assess for  Transfer to New Unit    I agree that 2 RN's have performed a thorough Head to Toe Skin Assessment on the patient. ALL assessment sites listed below have been assessed.       Areas assessed by both nurses: yes  [x]   Head, Face, and Ears   [x]   Shoulders, Back, and Chest  [x]   Arms, Elbows, and Hands   [x]   Coccyx, Sacrum, and IschIum  [x]   Legs, Feet, and Heels        Does the Patient have Skin Breakdown?  No         Hudson Prevention initiated:  Yes   Wound Care Orders initiated:  No      [facility] nurse consulted for Pressure Injury (Stage 3,4, Unstageable, DTI, NWPT, and Complex wounds), New and Established Ostomies:  No      Nurse 1 eSignature: Electronically signed by Clau Wilson RN on 10/19/24 at 8:08 PM EDT    **SHARE this note so that the co-signing nurse is able to place an eSignature**    Nurse 2 eSignature: {Esignature:[id number]}             
Assisted pt to ambulate to BR for BM. Instructed to pull call light cord when completed. Door closed for privacy.  Pt pulled cord, upon entering BR strong odor of fresh cigarette smoke noted. Pt instructed on hospital smoking policy, dangers of smoking with oxygen in proximity, and reinforced benefits of smoking cessation. Pt handed lighter an cigarette to RN, RN placed in pt belonging bag in closet.  HF nurse bedside at this time.  
At 13:44, Pt called via call light notifying this RN Pt was in pain. This RN responded immediately when Pt did not answer. Upon entering room, Pt was observed with fixed bulging eyes and moving head back and forth in a rapid manner. Pt did not respond to name being called nor were eyes responsive to movement in front of face. This RN called a rapid response due to these significant changes in the Pt's condition. Pt transferred to CV-ICU.Please see Rapid Response notes. Electronically signed by Mechelle Savage RN on 10/19/2024 at 3:04 PM   
Attempt to call report x2. No answer or call back from RN.   
BP 78/51(61) after albumin infusion. Dr. Thomson notified. Awaiting orders.   
BP 82/47(59). Dr. Thomson notified. Awaiting orders.   
CLINICAL PHARMACY NOTE: MEDS TO BEDS    Total # of Prescriptions Filled: 10   The following medications were delivered to the patient:      amiodarone 200 MG tablet  apixaban 5 MG Tabs tablet  aspirin 81 MG chewable tablet  atorvastatin 80 MG tablet  Entresto 49-51 MG per tablet  Magnesium 400 MG Tabs  metoprolol succinate 25 MG extended release tablet  nitroGLYCERIN 0.4 MG SL tablet  pantoprazole 40 MG tablet  torsemide 10 MG tablet      Additional Documentation: Isosorbide transferred to patient's home pharmacy where they can fill for a  covered by insurance    
Call placed to Dr Diaz office at 0989, received callback @ 1854 regarding bladder scan results of 374 and hasn't urinated since around 0300 this morning per chart. Also Told him about his Blood pressures being soft still.  Given orders to place a Damon catheter.   
Cardiology ok with paracentesis tomorrow. Okay to hold Eliquis doses today.  
Clinical Pharmacy Note  Heparin Dosing       Lab Results   Component Value Date/Time    APTT 34.2 10/20/2024 04:40 PM     Lab Results   Component Value Date/Time    HGB 11.0 10/20/2024 10:00 AM    HCT 33.8 10/20/2024 10:00 AM     10/20/2024 10:00 AM    INR 1.38 04/24/2024 03:20 PM       Current Infusion Rate: 1000 units/hr    Plan:  Bolus: 4000 units  Rate: increase to 1580 units/hr  Next aPTT: 10/21/24 0030    Pharmacy will continue to monitor and adjust based on aPTT results.  France Lyon [facility],10/20/2024,7:58 PM      
Clinical Pharmacy Note  Heparin Dosing       Lab Results   Component Value Date/Time    APTT 67.0 10/21/2024 12:15 AM     Lab Results   Component Value Date/Time    HGB 11.0 10/20/2024 10:00 AM    HCT 33.8 10/20/2024 10:00 AM     10/20/2024 10:00 AM    INR 1.38 04/24/2024 03:20 PM       Current Infusion Rate: 1580 units/hr    Plan:  Bolus: 2000 units  Rate: increase to 1870 units/hr  Next aPTT: 10/21/24 0800    Pharmacy will continue to monitor and adjust based on aPTT results.  Mateo Bob [facility],10/21/2024,12:56 AM  
Clinical Pharmacy Note  Heparin Dosing       Lab Results   Component Value Date/Time    APTT 75.9 10/22/2024 04:00 AM     Lab Results   Component Value Date/Time    HGB 10.4 10/22/2024 04:00 AM    HCT 33.1 10/22/2024 04:00 AM     10/22/2024 04:00 AM    INR 1.38 04/24/2024 03:20 PM       Current Infusion Rate: 1870 units/hr    Plan:  Rate: continue at 1870 units/hr  Next aPTT: 0600 10/23/24    Pharmacy will continue to monitor and adjust based on aPTT results.  Mateo Bob, PharmD  
Clinical Pharmacy Note  Heparin Dosing       Lab Results   Component Value Date/Time    APTT 85.2 10/21/2024 08:48 AM     Lab Results   Component Value Date/Time    HGB 11.0 10/20/2024 10:00 AM    HCT 33.8 10/20/2024 10:00 AM     10/20/2024 10:00 AM    INR 1.38 04/24/2024 03:20 PM       Current Infusion Rate: 1870 units/hr    Plan:  Rate: continue heparin at 1870 units/hr  Next aPTT: 10/21/24 1500    Pharmacy will continue to monitor and adjust based on aPTT results.  Lanre Jaimes [facility],10/21/2024,9:43 AM  
Clinical Pharmacy Note  Heparin Dosing       Lab Results   Component Value Date/Time    APTT 88.5 10/23/2024 06:00 AM     Lab Results   Component Value Date/Time    HGB 10.4 10/22/2024 04:00 AM    HCT 33.1 10/22/2024 04:00 AM     10/22/2024 04:00 AM    INR 1.38 04/24/2024 03:20 PM       Current Infusion Rate: 1870 units/hr    Plan:  Rate: continue at 1870 units/hr  Next aPTT: 0600 10/24/24    Pharmacy will continue to monitor and adjust based on aPTT results.  Mateo Bob, PharmD  
Clinical Pharmacy Note  Heparin Dosing       Lab Results   Component Value Date/Time    APTT 88.5 10/23/2024 06:00 AM     Lab Results   Component Value Date/Time    HGB 10.8 10/24/2024 06:20 AM    HCT 33.2 10/24/2024 06:20 AM     10/24/2024 06:20 AM    INR 1.21 10/23/2024 06:00 AM       Current Infusion Rate: 1870 units/hr    Plan:  Rate: continue at 1870 units/hr  Next aPTT: 0600 10/25/24    Pharmacy will continue to monitor and adjust based on aPTT results.  Mateo Bob, PharmD  
Clinical Pharmacy Note  Heparin Dosing       Lab Results   Component Value Date/Time    APTT 94.0 10/21/2024 03:35 PM     Lab Results   Component Value Date/Time    HGB 11.0 10/20/2024 10:00 AM    HCT 33.8 10/20/2024 10:00 AM     10/20/2024 10:00 AM    INR 1.38 04/24/2024 03:20 PM       Current Infusion Rate: 1870 units/hr    Plan:  Rate: continue heparin at 1870 units/hr  Next aPTT: 10/22/24 0600    Pharmacy will continue to monitor and adjust based on aPTT results.  Isabela Finch [facility],10/21/2024,5:25 PM  
Clinical Pharmacy Note  Heparin Dosing Consult    Levy Vargas is a 59 y.o. male ordered heparin per CAD/STEMI/NSTEMI/UA/AFIB nomogram by Dr. Hernandez.     Lab Results   Component Value Date/Time    APTT 29.4 10/20/2024 10:00 AM     Lab Results   Component Value Date/Time    HGB 11.0 10/20/2024 10:00 AM    HCT 33.8 10/20/2024 10:00 AM     10/20/2024 10:00 AM    INR 1.38 04/24/2024 03:20 PM       Ht Readings from Last 1 Encounters:   10/19/24 1.854 m (6' 0.99\")        Wt Readings from Last 1 Encounters:   10/20/24 (!) 145.1 kg (319 lb 14.2 oz)        Assessment/Plan:  Initial bolus: 4000 units  Initial infusion rate: 1000 units/hr  Next aPTT: 10/20/24 1630    Pharmacy will continue to monitor adjust heparin based on aPTT results using nomogram below:     CAD/STEMI/NSTEMI/UA/AFIB Heparin Nomogram     Initial Bolus: 60 units/kg Max Bolus: 4,000 units       Initial Rate: 12 units/kg/hr Max Initial Rate: 1,000 units/hr     aPTT < 59    Heparin 60 units/kg bolus Increase infusion by 4 units/kg/hr        (maximum 4,000 units)   aPTT 59.1-72.9 Heparin 30 units/kg bolus Increase infusion by 2 units/kg/hr        (maximum 2,000 units)   aPTT     No bolus   No change   aPTT 102.1-109 No bolus   Decrease infusion by 1 units/kg/hr   aPTT 109.1-122.9 No bolus     Decrease infusion by 2 units/kg/hr   aPTT > 123    Hold heparin for 1 hour Decrease infusion by 3 units/kg/hr     Obtain aPTT 6 hours after initial bolus and 6 hours after any dose change until two consecutive therapeutic aPTTs are achieved - then daily.   France Lyon [facility],10/20/2024,10:47 AM    
Comprehensive Nutrition Assessment    Type and Reason for Visit:  Initial, RD Nutrition Re-Screen/LOS    Nutrition Recommendations/Plan:   Continue regular diet w/ 1500 ml fluid restriction     Malnutrition Assessment:  Malnutrition Status:  No malnutrition (10/25/24 1155)      Nutrition Assessment:    Length of stay. Pt presenting with acute on chronic systolic heart failure. Pt's weight fluctuates greatly pta. No reported decreased intake due to lack of appetite. Since admit, pt has been eating well. Nutrition related labs reviewed. No nutrition concerns at this time. Current orders appropriate.    Nutrition Related Findings:    108 glucose, 2.4 phos Wound Type: None       Current Nutrition Intake & Therapies:    Average Meal Intake: %  Average Supplements Intake: None Ordered  ADULT DIET; Regular; 1500 ml    Anthropometric Measures:  Height: 185.4 cm (6' 0.99\")  Ideal Body Weight (IBW): 184 lbs (84 kg)       Current Body Weight: 133 kg (293 lb 3.4 oz),   IBW.    Current BMI (kg/m2): 38.7                               Estimated Daily Nutrient Needs:  Energy Requirements Based On: Kcal/kg  Weight Used for Energy Requirements: Current  Energy (kcal/day): 1995 - 2660 (15 - 20 kcal/kg)  Weight Used for Protein Requirements: Ideal  Protein (g/day): 100 - 125 (1.2 - 1.5 g/kg IBW)  Method Used for Fluid Requirements: 1 ml/kcal  Fluid (ml/day): or per provider    Nutrition Diagnosis:   No nutrition diagnosis at this time     Nutrition Interventions:   Food and/or Nutrient Delivery: Continue Current Diet  Nutrition Education/Counseling: Education not indicated  Coordination of Nutrition Care: Continue to monitor while inpatient       Goals:     Goals: Meet at least 75% of estimated needs, by next RD assessment       Nutrition Monitoring and Evaluation:   Behavioral-Environmental Outcomes: None Identified  Food/Nutrient Intake Outcomes: Food and Nutrient Intake  Physical Signs/Symptoms Outcomes: Biochemical Data, 
Consent for paracentesis scheduled for today obtained, in patient chart.   
Discharge order received. PICC line removed without issue; tip intact and sterile guaze and tegaderm applied to site after hemostasis achieved. EKG leads removed. AVS covered with pt and questions addressed. Pt has 11 new prescriptions; 10 being filled here and 1 being sent to [facility] on [address] per pt request. Pt belongings accounted for by pt and with pt. Will transport pt to private vehicle after med drop off at 1400.   
[facility]  Inpatient Progress Note    CC:         CHF severe cardiomyopathy          HPI:   This is a 59 y.o. male with a history of severe cardiomyopathy EF 15% admitted with fluid retention weight gain.  He has been treated with milrinone IV Lasix drip with good diuresis.  His dry weight is 260 pounds.    Dr. Hernandez's note states the patient should continue on milrinone with gradual weaning.  He started him on Entresto and switched carvedilol to Toprol because of hypotension.  Because of acute kidney dysfunction he is not a candidate for spironolactone or Jardiance.       Interval history  Has had good urine output on milrinone  Over 24 L out  Breathing is good  Paracentesis aborted because of small effusion        Intake/Output Summary (Last 24 hours) at 10/29/2024 0953  Last data filed at 10/29/2024 0948  Gross per 24 hour   Intake 1131.83 ml   Output 2825 ml   Net -1693.17 ml         Physical Examination:    BP (!) 155/100   Pulse 85   Temp 98 °F (36.7 °C) (Oral)   Resp 16   Ht 1.854 m (6' 0.99\")   Wt 131.8 kg (290 lb 9.1 oz)   SpO2 99%   BMI 38.34 kg/m²   HEENT:  Face: Atraumatic, Conjunctiva: Pink; non icteric,  Mucous Memb:  Moist, No thyromegaly or Lymphadenopathy  Respiratory:  Resp Assessment: Normal, Resp Auscultation: There  Cardiovascular:  Auscultation: nl S1 & S2, Palpation:  Nl PMI; No heaves or thrills, JVP:  normal  Abdomen: Soft, non-tender, Normal bowel sounds,  No organomegaly  Extremities: No Cyanosis or Clubbing; Edema none  Neurological: Oriented to time, place, and person, Non-anxious  Psychiatric: Normal mood and affect  Skin: Warm and dry,  No rash seen    Current Facility-Administered Medications: magnesium sulfate 1000 mg in dextrose 5% 100 mL IVPB, 1,000 mg, IntraVENous, Once  torsemide (DEMADEX) tablet 50 mg, 50 mg, Oral, Daily  milrinone (PRIMACOR) 20 mg in dextrose 5 % 100 mL infusion, 0.0625-0.75 mcg/kg/min, IntraVENous, Continuous  sacubitril-valsartan 
Medication Reconciliation    List of medications patient is currently taking is complete.     Source of information: 1. Conversation with patient at bedside                                      2. EPIC records      Notes regarding home medications:   1. Patient did not take any of his home medications prior to arrival to the ER today.  2. Patient indicates he is out of several of his medications.  Updated medication list reflects what patient is suppose to be taking regularly.    Lanre Jaimes [facility], PharmD, BCPS  10/18/2024 4:43 PM                
[facility]  Progress Note    10/23/2024 8:00 AM    Chief Complaint/Reason for Consult: PAD     Subjective: Pt seen sitting on edge of bed, intermittent discomfort to RLE but overall improved since admission    Vital Signs:  Vitals:    10/23/24 0615 10/23/24 0630 10/23/24 0645 10/23/24 0700   BP:    135/70   Pulse: 88 83 87 88   Resp: 13 10 15 14   Temp:       TempSrc:       SpO2:       Weight:       Height:           I/O:    Intake/Output Summary (Last 24 hours) at 10/23/2024 0800  Last data filed at 10/23/2024 0710  Gross per 24 hour   Intake 2326.26 ml   Output 7000 ml   Net -4673.74 ml       Physical Exam:   General: no apparent distress, alert and oriented, afebrile  Chest/Lungs: non labored breathing no tachypnea, retractions or cyanosis  Cardiac: Heart regular rate and rhythm  Extremities:   -RLE - Foot/ankle warm to touch, no significant tenderness with palpation  -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    Labs:   Lab Results   Component Value Date/Time     10/23/2024 04:10 AM    K 4.2 10/23/2024 04:10 AM    K 3.5 10/18/2024 11:41 AM    CL 99 10/23/2024 04:10 AM    CO2 34 10/23/2024 04:10 AM    BUN 32 10/23/2024 04:10 AM    CREATININE 1.2 10/23/2024 04:10 AM    GFRAA >60 10/11/2022 05:03 AM    LABGLOM 69 10/23/2024 04:10 AM    LABGLOM 63 04/27/2024 05:03 AM    GLUCOSE 115 10/23/2024 04:10 AM    PHOS 3.3 10/23/2024 04:10 AM    MG 2.03 10/23/2024 04:10 AM    CALCIUM 8.9 10/23/2024 04:10 AM     Lab Results   Component Value Date/Time    WBC 4.8 10/22/2024 04:00 AM    RBC 3.61 10/22/2024 04:00 AM    HGB 10.4 10/22/2024 04:00 AM    HCT 33.1 10/22/2024 04:00 AM    MCV 91.6 10/22/2024 04:00 AM    RDW 16.3 10/22/2024 04:00 AM     10/22/2024 04:00 AM     Lab Results   Component Value Date    INR 1.38 (H) 04/24/2024    PROTIME 17.1 (H) 04/24/2024        Scheduled Meds:    gabapentin  100 mg Oral TID    sodium chloride flush  5-40 mL IntraVENous 2 times per day    
NAME:  Levy Vargas  YOB: 1965  MEDICAL RECORD NUMBER:  [id number]    Shift Summary: Overall, uneventful shift. Patient turning self in bed. Castanon remains for accurate I&O; 4L out overnight. Patient expressed better rest since having swan out. Attempt standing weight when patient wakes up.     Family updated: No    Rhythm: Normal Sinus Rhythm     Most recent vitals:   Visit Vitals  /60   Pulse 69   Temp 97.4 °F (36.3 °C) (Axillary)   Resp (!) 7   Ht 1.854 m (6' 0.99\")   Wt (!) 142.6 kg (314 lb 6 oz)   SpO2 100%   BMI 41.49 kg/m²           No data found.    No data found.      Respiratory support needed (if any):  - O2 - NC - 2 lpm while sleeping    Admission weight Weight - Scale: (!) 144 kg (317 lb 7.4 oz) (10/18/24 1109)    Today's weight    Wt Readings from Last 1 Encounters:   10/23/24 (!) 142.6 kg (314 lb 6 oz)        Castanon need assessed each shift: YES -  - continue castanon r/t - fluid volume status  UOP >30ml/hr: YES  Last documented BM (in last 48 hrs):  Patient Vitals for the past 48 hrs:   Last BM (including prior to admit) Stool Occurrence   10/22/24 0805 10/22/24 --   10/22/24 1043 10/22/24 1                Restraints (in use currently or dc'd in last 12 hrs): No    Order current and documentation up to date? No    Lines/Drains reviewed @ bedside.  PICC 10/19/24 Right Brachial (Active)   Number of days: 4       Peripheral IV 10/19/24 Left;Anterior Forearm (Active)   Number of days: 5       Urinary Catheter 10/19/24 Castanon-Temperature (Active)   Number of days: 4         Drip rates at handoff:    milrinone 0.3 mcg/kg/min (10/24/24 0238)    furosemide (LASIX) 100 mg in sodium chloride 0.9 % 100 mL infusion 5 mg/hr (10/23/24 2034)    sodium chloride      norepinephrine Stopped (10/21/24 2108)    heparin (PORCINE) Infusion 1,870 Units/hr (10/23/24 1915)    dextrose         Lab Data:   CBC:   Recent Labs     10/22/24  0400 10/24/24  0620   WBC 4.8 4.9   HGB 10.4* 10.8*   HCT 33.1* 33.2* 
NAME:  Levy Vargas  YOB: 1965  MEDICAL RECORD NUMBER:  [id number]    Shift Summary: mixed improving. Lasix lowered due to low diastolic bp.     Family updated: No    Rhythm: Normal Sinus Rhythm     Most recent vitals:   Visit Vitals  /63   Pulse 87   Temp 98 °F (36.7 °C) (Oral)   Resp 15   Ht 1.854 m (6' 0.99\")   Wt 135.5 kg (298 lb 11.6 oz)   SpO2 99%   BMI 39.42 kg/m²           Patient Vitals for the past 12 hrs:   CO (l/min) CI (l/min/m2) SVO2 (%)  SV (ml) Core (Body) Temperature   10/23/24 0400 8.8 l/min 3.3 l/min/m2 68.1 % 1.66 96 ml 98 °F (36.7 °C)   10/22/24 2000 6.9 l/min 2.6 l/min/m2 60.5 % 1.27 75 ml 98.2 °F (36.8 °C)       Patient Vitals for the past 12 hrs:   LE CVP (Mean)   10/23/24 0545 1.87 15 mmHg   10/23/24 0530 1.65 17 mmHg   10/23/24 0515 1.59 17 mmHg   10/23/24 0500 1.53 19 mmHg   10/23/24 0445 1.6 20 mmHg   10/23/24 0430 1.55 20 mmHg   10/23/24 0415 1.18 22 mmHg   10/23/24 0400 1.94 16 mmHg   10/23/24 0345 1.56 18 mmHg   10/23/24 0330 2.23 13 mmHg   10/23/24 0315 1.57 21 mmHg   10/23/24 0300 1.5 24 mmHg   10/23/24 0245 1.47 19 mmHg   10/23/24 0230 1.56 18 mmHg   10/23/24 0215 1.63 19 mmHg   10/23/24 0200 1.25 24 mmHg   10/23/24 0145 1.38 21 mmHg   10/23/24 0130 1.87 15 mmHg   10/23/24 0115 1.47 19 mmHg   10/23/24 0100 1.45 20 mmHg   10/23/24 0045 1.47 19 mmHg   10/23/24 0030 1.53 19 mmHg   10/23/24 0015 1.29 24 mmHg   10/23/24 0000 1.3 23 mmHg   10/22/24 2345 1.3 23 mmHg   10/22/24 2330 1.43 21 mmHg   10/22/24 2315 1.67 18 mmHg   10/22/24 2300 1.43 21 mmHg   10/22/24 2245 1.67 18 mmHg   10/22/24 2230 1.7 20 mmHg   10/22/24 2215 1.55 20 mmHg   10/22/24 2200 1.75 20 mmHg   10/22/24 2145 1.6 20 mmHg   10/22/24 2130 1.65 20 mmHg   10/22/24 2115 1.5 22 mmHg   10/22/24 2100 1.3 27 mmHg   10/22/24 2045 1.82 22 mmHg   10/22/24 2030 1.84 19 mmHg   10/22/24 2015 1.67 21 mmHg   10/22/24 2000 1.57 23 mmHg   10/22/24 1945 1.15 26 mmHg   10/22/24 1930 1.5 20 mmHg   10/22/24 1915 
NAME:  Levy Vargas  YOB: 1965  MEDICAL RECORD NUMBER:  [id number]    Shift Summary: no significant events throughout night. Pt was CHG bathed, PICC line dressing and IV tubing changed. Pt complained of pain in RLE, PRN pain med given x1. Very poor sleep throughout night, did not go to bed till 3-4am\.     Family updated: Yes:       Rhythm: Normal Sinus Rhythm     Most recent vitals:   Visit Vitals  /80   Pulse 83   Temp 98.4 °F (36.9 °C) (Axillary)   Resp 18   Ht 1.854 m (6' 0.99\")   Wt 131.3 kg (289 lb 7.4 oz)   SpO2 96%   BMI 38.20 kg/m²           No data found.    No data found.      Respiratory support needed (if any):  - RA    Admission weight Weight - Scale: (!) 144 kg (317 lb 7.4 oz) (10/18/24 1109)    Today's weight    Wt Readings from Last 1 Encounters:   10/27/24 131.3 kg (289 lb 7.4 oz)        Castanon need assessed each shift: N/A - no castanon present  UOP >30ml/hr: YES  Last documented BM (in last 48 hrs):  Patient Vitals for the past 48 hrs:   Last BM (including prior to admit)   10/26/24 0817 10/25/24   10/26/24 2105 10/25/24   10/27/24 0800 10/27/24   10/27/24 2000 10/27/24                Restraints (in use currently or dc'd in last 12 hrs): No    Order current and documentation up to date? Yes    Lines/Drains reviewed @ bedside.  PICC 10/19/24 Right Brachial (Active)   Number of days: 8         Drip rates at handoff:    milrinone 0.15 mcg/kg/min (10/28/24 0454)    sodium chloride      norepinephrine Stopped (10/21/24 2108)    dextrose         Lab Data:   CBC:   Recent Labs     10/28/24  0440   WBC 4.8   HGB 10.1*   HCT 30.9*   MCV 90.6        BMP:    Recent Labs     10/27/24  0620 10/28/24  0440    138   K 4.3 3.9   CO2 33* 33*   BUN 22* 22*   CREATININE 1.1 1.1     LIVR: No results for input(s): \"AST\", \"ALT\" in the last 72 hours.  PT/INR: No results for input(s): \"INR\" in the last 72 hours.    Invalid input(s): \"PROT\"  APTT: No results for input(s): \"APTT\" in the 
NAME:  Levy Vargas  YOB: 1965  MEDICAL RECORD NUMBER:  [id number]    Shift Summary: Urine remains bloody but UO adequate overnight. Remains on levophed, milrinone and heparin gtt's. PTT at 00:00 subtherapeutic.  Next PTT due at 8:00. NPO since MN for right heart cath. Creatinine 2 this am.     Family updated: No    Rhythm: Normal Sinus Rhythm / atrial paced    Most recent vitals:   Visit Vitals  /66   Pulse 65   Temp 97.8 °F (36.6 °C) (Oral)   Resp 19   Ht 1.854 m (6' 0.99\")   Wt (!) 144.6 kg (318 lb 12.6 oz)   SpO2 98%   BMI 42.07 kg/m²           No data found.    No data found.      Respiratory support needed (if any):  - O2 - NC - 2 lpm    Admission weight Weight - Scale: (!) 144 kg (317 lb 7.4 oz) (10/18/24 1109)    Today's weight    Wt Readings from Last 1 Encounters:   10/21/24 (!) 144.6 kg (318 lb 12.6 oz)        Castanon need assessed each shift: YES -  - continue castanon r/t - strict I/O  UOP >30ml/hr: YES  Last documented BM (in last 48 hrs):  Patient Vitals for the past 48 hrs:   Last BM (including prior to admit)   10/20/24 0800 10/19/24   10/20/24 2000 10/19/24                Restraints (in use currently or dc'd in last 12 hrs): No    Order current and documentation up to date? No    Lines/Drains reviewed @ bedside.  PICC 10/19/24 Right Brachial (Active)   Number of days: 1       Peripheral IV 10/19/24 Left;Anterior Forearm (Active)   Number of days: 2       Urinary Catheter 10/19/24 Castanon-Temperature (Active)   Number of days: 1         Drip rates at handoff:    norepinephrine 10 mcg/min (10/21/24 0233)    heparin (PORCINE) Infusion 1,870 Units/hr (10/21/24 0101)    milrinone 0.3 mcg/kg/min (10/21/24 0233)    sodium chloride      dextrose         Lab Data:   CBC:   Recent Labs     10/18/24  1141 10/20/24  1000   WBC 3.4* 5.5   HGB 12.6* 11.0*   HCT 38.4* 33.8*   MCV 91.6 90.8    174     BMP:    Recent Labs     10/20/24  0505 10/21/24  0600    135*   K 3.4* 3.9   CO2 31 
NAME:  Levy Vargas  YOB: 1965  MEDICAL RECORD NUMBER:  [id number]    Shift Summary: Pt started on Heparin, Remains on levo and milrinone. More awake today, sat up on side of bed several times today.Urine now a rust color and hazy, UA sent. Ptt due 2200    Family updated: Yes:  Sister called    Rhythm:  pace at times, sinus arrythmia    Most recent vitals:   Visit Vitals  BP 96/61   Pulse 65   Temp 97.3 °F (36.3 °C) (Oral)   Resp 12   Ht 1.854 m (6' 0.99\")   Wt (!) 145.1 kg (319 lb 14.2 oz)   SpO2 98%   BMI 42.21 kg/m²           No data found.    No data found.      Respiratory support needed (if any):  - O2 - NC - 1 lpm    Admission weight Weight - Scale: (!) 144 kg (317 lb 7.4 oz) (10/18/24 1109)    Today's weight    Wt Readings from Last 1 Encounters:   10/20/24 (!) 145.1 kg (319 lb 14.2 oz)        Castanon need assessed each shift: YES -  - continue castanon r/t - Urine retention  UOP >30ml/hr: YES  Last documented BM (in last 48 hrs):  Patient Vitals for the past 48 hrs:   Last BM (including prior to admit) Stool Occurrence   10/19/24 0300 -- 0   10/20/24 0800 10/19/24 --                Restraints (in use currently or dc'd in last 12 hrs): No    Order current and documentation up to date? No    Lines/Drains reviewed @ bedside.  PICC 10/19/24 Right Brachial (Active)   Number of days: 0       Peripheral IV 10/19/24 Left;Anterior Forearm (Active)   Number of days: 1       Urinary Catheter 10/19/24 Castanon-Temperature (Active)   Number of days: 0         Drip rates at handoff:    norepinephrine 8 mcg/min (10/20/24 1924)    heparin (PORCINE) Infusion 1,580 Units/hr (10/20/24 1924)    milrinone 0.3 mcg/kg/min (10/20/24 1924)    sodium chloride      dextrose         Lab Data:   CBC:   Recent Labs     10/18/24  1141 10/20/24  1000   WBC 3.4* 5.5   HGB 12.6* 11.0*   HCT 38.4* 33.8*   MCV 91.6 90.8    174     BMP:    Recent Labs     10/19/24  0313 10/20/24  0505    138   K 4.1 3.4*   CO2 19* 31 
NAME:  Levy Vargas  YOB: 1965  MEDICAL RECORD NUMBER:  [id number]    Shift Summary: Remains on milrinone. Voiding per urinal. Weight obtained vial standing scale. VSS.     Family updated: No    Rhythm: Normal Sinus Rhythm     Most recent vitals:   Visit Vitals  BP (!) 111/55   Pulse 80   Temp 97.8 °F (36.6 °C) (Axillary)   Resp 14   Ht 1.854 m (6' 0.99\")   Wt 131.3 kg (289 lb 7.4 oz)   SpO2 100%   BMI 38.20 kg/m²           No data found.    No data found.      Respiratory support needed (if any):  - RA    Admission weight Weight - Scale: (!) 144 kg (317 lb 7.4 oz) (10/18/24 1109)    Today's weight    Wt Readings from Last 1 Encounters:   10/27/24 131.3 kg (289 lb 7.4 oz)        Castanon need assessed each shift: N/A - no castanon present  UOP >30ml/hr: YES  Last documented BM (in last 48 hrs):  Patient Vitals for the past 48 hrs:   Last BM (including prior to admit)   10/25/24 2000 10/25/24   10/25/24 2200 10/25/24   10/26/24 0000 10/25/24   10/26/24 0817 10/25/24   10/26/24 2105 10/25/24                Restraints (in use currently or dc'd in last 12 hrs): No    Order current and documentation up to date? No    Lines/Drains reviewed @ bedside.  PICC 10/19/24 Right Brachial (Active)   Number of days: 7       Peripheral IV 10/19/24 Left;Anterior Forearm (Active)   Number of days: 8         Drip rates at handoff:    milrinone 0.15 mcg/kg/min (10/27/24 0638)    sodium chloride      norepinephrine Stopped (10/21/24 2108)    dextrose         Lab Data:   CBC:   Recent Labs     10/25/24  0350   WBC 5.6   HGB 11.1*   HCT 33.4*   MCV 89.9        BMP:    Recent Labs     10/26/24  0610 10/27/24  0620    140   K 4.1 4.3   CO2 33* 33*   BUN 16 22*   CREATININE 1.0 1.1     LIVR: No results for input(s): \"AST\", \"ALT\" in the last 72 hours.  PT/INR: No results for input(s): \"INR\" in the last 72 hours.    Invalid input(s): \"PROT\"  APTT: No results for input(s): \"APTT\" in the last 72 hours.  ABG: No results for 
NAME:  Levy Vargas  YOB: 1965  MEDICAL RECORD NUMBER:  [id number]    Shift Summary: pt had 4-5 loose BM's throughout afternoon/evening. Heparin gtt stopped, eliquis started. Milrinone weaned to 0.25mcg/kg/min per Dr. Hernandez. Standing weight obtained. PT walked patient to chair in ivy, sat in recliner and up to restroom today.     Family updated: No    Rhythm: Normal Sinus Rhythm     Most recent vitals:   Visit Vitals  BP (!) 159/77   Pulse 87   Temp 98.2 °F (36.8 °C) (Oral)   Resp 12   Ht 1.854 m (6' 0.99\")   Wt (!) 138.1 kg (304 lb 7.3 oz)   SpO2 99%   BMI 40.18 kg/m²           No data found.    No data found.      Respiratory support needed (if any):  - RA    Admission weight Weight - Scale: (!) 144 kg (317 lb 7.4 oz) (10/18/24 1109)    Today's weight    Wt Readings from Last 1 Encounters:   10/24/24 (!) 138.1 kg (304 lb 7.3 oz)        Castanon need assessed each shift: YES -  - continue castanon r/t - 4100ml UOP  UOP >30ml/hr: YES  Last documented BM (in last 48 hrs):  Patient Vitals for the past 48 hrs:   Last BM (including prior to admit)   10/24/24 1547 10/24/24                Restraints (in use currently or dc'd in last 12 hrs): No    Order current and documentation up to date? No    Lines/Drains reviewed @ bedside.  PICC 10/19/24 Right Brachial (Active)   Number of days: 4       Peripheral IV 10/19/24 Left;Anterior Forearm (Active)   Number of days: 5       Urinary Catheter 10/19/24 Castanon-Temperature (Active)   Number of days: 4         Drip rates at handoff:    milrinone 0.25 mcg/kg/min (10/24/24 1844)    furosemide (LASIX) 100 mg in sodium chloride 0.9 % 100 mL infusion 5 mg/hr (10/24/24 1745)    sodium chloride      norepinephrine Stopped (10/21/24 2108)    dextrose         Lab Data:   CBC:   Recent Labs     10/22/24  0400 10/24/24  0620   WBC 4.8 4.9   HGB 10.4* 10.8*   HCT 33.1* 33.2*   MCV 91.6 91.1    195     BMP:    Recent Labs     10/23/24  0410 10/24/24  0620    136   K 
NAME:  Levy Vargas  YOB: 1965  MEDICAL RECORD NUMBER:  [id number]    Shift Summary: Voiding per urinal- 2400 ml overnight. Urine initially bloody but has cleared up overnight. Milrinone remains at 0.15 mcg/kg/min.     Family updated: Yes:  per pt    Rhythm: Normal Sinus Rhythm / atrial paced beats    Most recent vitals:   Visit Vitals  BP (!) 136/48   Pulse 88   Temp 98 °F (36.7 °C) (Oral)   Resp 18   Ht 1.854 m (6' 0.99\")   Wt 132.2 kg (291 lb 7.2 oz)   SpO2 99%   BMI 38.46 kg/m²           No data found.    No data found.      Respiratory support needed (if any):  - RA    Admission weight Weight - Scale: (!) 144 kg (317 lb 7.4 oz) (10/18/24 1109)    Today's weight    Wt Readings from Last 1 Encounters:   10/26/24 132.2 kg (291 lb 7.2 oz)        Castanon need assessed each shift: N/A - no castanon present  UOP >30ml/hr: YES  Last documented BM (in last 48 hrs):  Patient Vitals for the past 48 hrs:   Last BM (including prior to admit)   10/24/24 1547 10/24/24   10/24/24 1930 10/24/24   10/24/24 2033 10/24/24   10/24/24 2233 10/24/24   10/25/24 0737 10/25/24   10/25/24 2000 10/25/24   10/25/24 2200 10/25/24   10/26/24 0000 10/25/24                Restraints (in use currently or dc'd in last 12 hrs): No    Order current and documentation up to date? No    Lines/Drains reviewed @ bedside.  PICC 10/19/24 Right Brachial (Active)   Number of days: 6       Peripheral IV 10/19/24 Left;Anterior Forearm (Active)   Number of days: 7         Drip rates at handoff:    milrinone 0.15 mcg/kg/min (10/26/24 0600)    sodium chloride      norepinephrine Stopped (10/21/24 2108)    dextrose         Lab Data:   CBC:   Recent Labs     10/24/24  0620 10/25/24  0350   WBC 4.9 5.6   HGB 10.8* 11.1*   HCT 33.2* 33.4*   MCV 91.1 89.9    206     BMP:    Recent Labs     10/24/24  0620 10/25/24  0350    138   K 3.9 4.2   CO2 33* 34*   BUN 21* 17   CREATININE 1.1 1.0     LIVR: No results for input(s): \"AST\", \"ALT\" in the 
NAME:  Levy Vargas  YOB: 1965  MEDICAL RECORD NUMBER:  [id number]    Shift Summary: [facility]. [place] placement. Lasix gtt started. Titrating Levo    Family updated: Yes:  At bedside    Rhythm: Normal Sinus Rhythm / Atrial paced    Most recent vitals:   Visit Vitals  BP (!) 119/97   Pulse 79   Temp 98 °F (36.7 °C) (Oral)   Resp 14   Ht 1.854 m (6' 0.99\")   Wt (!) 144.6 kg (318 lb 12.6 oz)   SpO2 94%   BMI 42.07 kg/m²           Patient Vitals for the past 12 hrs:   CO (l/min) CI (l/min/m2) SVO2 (%)    10/21/24 1530 5.4 l/min 2 l/min/m2 59.5 % 0.93       Patient Vitals for the past 12 hrs:   LE CVP (Mean)   10/21/24 1800 0.96 25 mmHg         Respiratory support needed (if any):  - O2 - NC - 2 lpm    Admission weight Weight - Scale: (!) 144 kg (317 lb 7.4 oz) (10/18/24 1109)    Today's weight    Wt Readings from Last 1 Encounters:   10/21/24 (!) 144.6 kg (318 lb 12.6 oz)        Castanon need assessed each shift: YES -  - continue castanon r/t - lasix gtt  UOP >30ml/hr: YES  Last documented BM (in last 48 hrs):  Patient Vitals for the past 48 hrs:   Last BM (including prior to admit)   10/20/24 0800 10/19/24   10/20/24 2000 10/19/24                Restraints (in use currently or dc'd in last 12 hrs): No    Order current and documentation up to date? No    Lines/Drains reviewed @ bedside.  PICC 10/19/24 Right Brachial (Active)   Number of days: 1       Peripheral IV 10/19/24 Left;Anterior Forearm (Active)   Number of days: 2       Venous Sheath 10/21/24 Right (Active)   Number of days: 0       Urinary Catheter 10/19/24 Castanon-Temperature (Active)   Number of days: 1         Drip rates at handoff:    furosemide (LASIX) 100 mg in sodium chloride 0.9 % 100 mL infusion 4 mg/hr (10/21/24 1815)    sodium chloride      norepinephrine 2 mcg/min (10/21/24 1815)    heparin (PORCINE) Infusion 1,870 Units/hr (10/21/24 1815)    milrinone 0.3 mcg/kg/min (10/21/24 1815)    sodium chloride      dextrose         Lab Data:   
NAME:  Levy Vargas  YOB: 1965  MEDICAL RECORD NUMBER:  [id number]    Shift Summary: Pt remains A/Ox4. VSS. BP elevated this shift. Coreg started per Dr. Hernandez. Pt with multiple BMs this shift. C-diff negative. Imodium given x1 with improvement. 7-beat run of V-tach observed. Mag replaced. No other acute events overnight.     Family updated: No    Rhythm: Normal Sinus Rhythm     Most recent vitals:   Visit Vitals  /77   Pulse 94   Temp 98.1 °F (36.7 °C) (Axillary)   Resp 16   Ht 1.854 m (6' 0.99\")   Wt 133 kg (293 lb 3.4 oz)   SpO2 99%   BMI 38.69 kg/m²           No data found.    No data found.      Respiratory support needed (if any):  - RA    Admission weight Weight - Scale: (!) 144 kg (317 lb 7.4 oz) (10/18/24 1109)    Today's weight    Wt Readings from Last 1 Encounters:   10/25/24 133 kg (293 lb 3.4 oz)        Castanon need assessed each shift: YES -  - continue castanon r/t - Fluid volume management of critically ill. Lasix gtt.   UOP >30ml/hr: YES  Last documented BM (in last 48 hrs):  Patient Vitals for the past 48 hrs:   Last BM (including prior to admit)   10/24/24 1547 10/24/24   10/24/24 1930 10/24/24   10/24/24 2033 10/24/24   10/24/24 2233 10/24/24                Restraints (in use currently or dc'd in last 12 hrs): No    Order current and documentation up to date? No    Lines/Drains reviewed @ bedside.  PICC 10/19/24 Right Brachial (Active)   Number of days: 5       Peripheral IV 10/19/24 Left;Anterior Forearm (Active)   Number of days: 6       Urinary Catheter 10/19/24 Castanon-Temperature (Active)   Number of days: 5         Drip rates at handoff:    milrinone 0.25 mcg/kg/min (10/25/24 0252)    furosemide (LASIX) 100 mg in sodium chloride 0.9 % 100 mL infusion 5 mg/hr (10/24/24 1335)    sodium chloride      norepinephrine Stopped (10/21/24 2108)    dextrose         Lab Data:   CBC:   Recent Labs     10/24/24  0620 10/25/24  0350   WBC 4.9 5.6   HGB 10.8* 11.1*   HCT 33.2* 33.4* 
NAME:  Levy Vargas  YOB: 1965  MEDICAL RECORD NUMBER:  [id number]    Shift Summary: Pt vitals stable, Pt 2 episodes of diarrhea. Remains stable on milrinone.    Family updated: No    Rhythm: Normal Sinus Rhythm     Most recent vitals:   Visit Vitals  BP (!) 147/88   Pulse 94   Temp 97 °F (36.1 °C) (Oral)   Resp 18   Ht 1.854 m (6' 0.99\")   Wt 131.8 kg (290 lb 9.1 oz)   SpO2 98%   BMI 38.34 kg/m²           No data found.    No data found.      Respiratory support needed (if any):  - RA    Admission weight Weight - Scale: (!) 144 kg (317 lb 7.4 oz) (10/18/24 1109)    Today's weight    Wt Readings from Last 1 Encounters:   10/29/24 131.8 kg (290 lb 9.1 oz)        Castanon need assessed each shift: N/A - no castanon present  UOP >30ml/hr: YES  Last documented BM (in last 48 hrs):  Patient Vitals for the past 48 hrs:   Last BM (including prior to admit) Stool Occurrence   10/27/24 0800 10/27/24 --   10/27/24 2000 10/27/24 --   10/28/24 1600 -- 1   10/29/24 0400 -- 1                Restraints (in use currently or dc'd in last 12 hrs): No    Order current and documentation up to date? No    Lines/Drains reviewed @ bedside.  PICC 10/19/24 Right Brachial (Active)   Number of days: 9         Drip rates at handoff:    milrinone 0.15 mcg/kg/min (10/29/24 0219)    sodium chloride      norepinephrine Stopped (10/21/24 2108)    dextrose         Lab Data:   CBC:   Recent Labs     10/28/24  0440   WBC 4.8   HGB 10.1*   HCT 30.9*   MCV 90.6        BMP:    Recent Labs     10/28/24  0440 10/29/24  0448    138   K 3.9 4.2   CO2 33* 32   BUN 22* 24*   CREATININE 1.1 1.1     LIVR: No results for input(s): \"AST\", \"ALT\" in the last 72 hours.  PT/INR: No results for input(s): \"INR\" in the last 72 hours.    Invalid input(s): \"PROT\"  APTT: No results for input(s): \"APTT\" in the last 72 hours.  ABG: No results for input(s): \"PHART\", \"GEU2RWR\", \"PO2ART\" in the last 72 hours.    Any consults during the shift? No    Any 
NAME:  Levy Vargas  YOB: 1965  MEDICAL RECORD NUMBER:  [id number]    Shift Summary: PICC and Castanon placed. Low UO overnight. Hypotensive- albumin x 1 given and levophed started for MAP> 65. Cr up to 2.1 this am.     Family updated: No    Rhythm: Normal Sinus Rhythm / atrial paced    Most recent vitals:   Visit Vitals  BP (!) 100/52   Pulse 69   Temp 97.1 °F (36.2 °C) (Oral)   Resp (!) 9   Ht 1.854 m (6' 0.99\")   Wt (!) 145.1 kg (319 lb 14.2 oz)   SpO2 98%   BMI 42.21 kg/m²           No data found.    No data found.      Respiratory support needed (if any):  - O2 - NC - 2 lpm    Admission weight Weight - Scale: (!) 144 kg (317 lb 7.4 oz) (10/18/24 1109)    Today's weight    Wt Readings from Last 1 Encounters:   10/20/24 (!) 145.1 kg (319 lb 14.2 oz)        Castanon need assessed each shift: YES -  - continue castanon r/t - urinary retention  UOP >30ml/hr: NO - MD aware  Last documented BM (in last 48 hrs):  Patient Vitals for the past 48 hrs:   Stool Occurrence   10/19/24 0300 0                Restraints (in use currently or dc'd in last 12 hrs): No    Order current and documentation up to date? No    Lines/Drains reviewed @ bedside.  PICC 10/19/24 Right Brachial (Active)   Number of days: 0       Peripheral IV 10/19/24 Left;Anterior Forearm (Active)   Number of days: 1       Urinary Catheter 10/19/24 Castanon-Temperature (Active)   Number of days: 0         Drip rates at handoff:    norepinephrine 11 mcg/min (10/20/24 0647)    milrinone 0.3 mcg/kg/min (10/20/24 0600)    sodium chloride      dextrose         Lab Data:   CBC:   Recent Labs     10/18/24  1141   WBC 3.4*   HGB 12.6*   HCT 38.4*   MCV 91.6        BMP:    Recent Labs     10/19/24  0313 10/20/24  0505    138   K 4.1 3.4*   CO2 19* 31   BUN 40* 49*   CREATININE 1.8* 2.1*     LIVR:   Recent Labs     10/18/24  1141 10/19/24  0313   AST 62* 72*   ALT 35 45*     PT/INR: No results for input(s): \"INR\" in the last 72 hours.    Invalid 
NAME:  Levy Vargas  YOB: 1965  MEDICAL RECORD NUMBER:  [id number]    Shift Summary: Pt came from PCU aftr a rapid. Pt put call light on when nurse arrived she saw Pt shaking head back and forth Eyes bulging and not responding, Rapid was called. Pt brought to CVU with Hypotension, given albumin and started on Milrinone gtt, Pt has been very sleep but easily aroused. Pt has not urinated since 0300 , Bladder scan showed 374, awaiting  PICC team to place picc    Family updated: Yes:  Sister called    Rhythm: Paced     Most recent vitals:   Visit Vitals  /85   Pulse 60   Temp 97.8 °F (36.6 °C) (Oral)   Resp 12   Ht 1.854 m (6' 0.99\")   Wt (!) 141.1 kg (311 lb 1.1 oz)   SpO2 100%   BMI 41.05 kg/m²           No data found.    No data found.      Respiratory support needed (if any):  - O2 - NC - 2 lpm    Admission weight Weight - Scale: (!) 144 kg (317 lb 7.4 oz) (10/18/24 1109)    Today's weight    Wt Readings from Last 1 Encounters:   10/19/24 (!) 141.1 kg (311 lb 1.1 oz)        Castanon need assessed each shift: N/A - no castanon present  UOP >30ml/hr: NO - Notifed Nephrology  Last documented BM (in last 48 hrs):  Patient Vitals for the past 48 hrs:   Stool Occurrence   10/19/24 0300 0                Restraints (in use currently or dc'd in last 12 hrs): No    Order current and documentation up to date? No    Lines/Drains reviewed @ bedside.  Peripheral IV 10/19/24 Left;Anterior Forearm (Active)   Number of days: 0         Drip rates at handoff:    milrinone 0.3 mcg/kg/min (10/19/24 1551)    sodium chloride      dextrose         Lab Data:   CBC:   Recent Labs     10/18/24  1141   WBC 3.4*   HGB 12.6*   HCT 38.4*   MCV 91.6        BMP:    Recent Labs     10/18/24  1141 10/19/24  0313    137   K 3.5 4.1   CO2 28 19*   BUN 37* 40*   CREATININE 1.3 1.8*     LIVR:   Recent Labs     10/18/24  1141 10/19/24  0313   AST 62* 72*   ALT 35 45*     PT/INR: No results for input(s): \"INR\" in the last 72 
NAME:  Levy Vargas  YOB: 1965  MEDICAL RECORD NUMBER:  [id number]    Shift Summary: Pt weaned to RA. VSS. Remains on milrinone gtt per cardiology. Plan for paracentesis in the am. Ok to hold Eliquis per cardiology.     Family updated: No, family present.    Rhythm: Normal Sinus Rhythm     Most recent vitals:   Visit Vitals  BP (!) 147/78   Pulse 92   Temp 98.2 °F (36.8 °C) (Oral)   Resp 20   Ht 1.854 m (6' 0.99\")   Wt 131.3 kg (289 lb 7.4 oz)   SpO2 99%   BMI 38.20 kg/m²           No data found.    No data found.      Respiratory support needed (if any):  - RA    Admission weight Weight - Scale: (!) 144 kg (317 lb 7.4 oz) (10/18/24 1109)    Today's weight    Wt Readings from Last 1 Encounters:   10/27/24 131.3 kg (289 lb 7.4 oz)        Castanon need assessed each shift: N/A - no castanon present  UOP >30ml/hr: YES  Last documented BM (in last 48 hrs):  Patient Vitals for the past 48 hrs:   Last BM (including prior to admit)   10/25/24 2000 10/25/24   10/25/24 2200 10/25/24   10/26/24 0000 10/25/24   10/26/24 0817 10/25/24   10/26/24 2105 10/25/24   10/27/24 0800 10/27/24                Restraints (in use currently or dc'd in last 12 hrs): No    Order current and documentation up to date? No    Lines/Drains reviewed @ bedside.  PICC 10/19/24 Right Brachial (Active)   Number of days: 7       Peripheral IV 10/19/24 Left;Anterior Forearm (Active)   Number of days: 8         Drip rates at handoff:    milrinone 0.15 mcg/kg/min (10/27/24 0906)    sodium chloride      norepinephrine Stopped (10/21/24 2108)    dextrose         Lab Data:   CBC:   Recent Labs     10/25/24  0350   WBC 5.6   HGB 11.1*   HCT 33.4*   MCV 89.9        BMP:    Recent Labs     10/26/24  0610 10/27/24  0620    140   K 4.1 4.3   CO2 33* 33*   BUN 16 22*   CREATININE 1.0 1.1     LIVR: No results for input(s): \"AST\", \"ALT\" in the last 72 hours.  PT/INR: No results for input(s): \"INR\" in the last 72 hours.    Invalid input(s): 
NAME:  Levy Vargas  YOB: 1965  MEDICAL RECORD NUMBER:  [id number]    Shift Summary: Pt remains A/Ox 4. VSS. Levo weaned off. Mix 50.1%, CI 1.8 at 2015. Milrinone increased to 0.35mcg. Mix at 0215 improved at 61%. Lasix gtt continues with decent UO. BM this shift. No other acute events overnight.     Family updated: No    Rhythm: Normal Sinus Rhythm /Atrial Fib    Most recent vitals:   Visit Vitals  BP 94/68   Pulse 78   Temp 98 °F (36.7 °C) (Oral)   Resp 23   Ht 1.854 m (6' 0.99\")   Wt (!) 148.7 kg (327 lb 13.2 oz)   SpO2 99%   BMI 43.26 kg/m²           Patient Vitals for the past 12 hrs:   CO (l/min) CI (l/min/m2) SVO2 (%)  Core (Body) Temperature   10/22/24 0215 5.6 l/min 2 l/min/m2 61 % 1.2 --   10/21/24 2015 4.8 l/min 1.8 l/min/m2 51.7 % 0.99 98.1 °F (36.7 °C)       Patient Vitals for the past 12 hrs:   LE CVP (Mean)   10/22/24 0600 0.9 20 mmHg   10/22/24 0500 0.82 22 mmHg   10/22/24 0414 0.88 25 mmHg   10/22/24 0300 0.89 27 mmHg   10/22/24 0215 0.83 30 mmHg   10/22/24 0200 0.74 31 mmHg   10/22/24 0100 0.69 32 mmHg   10/22/24 0000 0.84 25 mmHg   10/21/24 2215 0.84 32 mmHg   10/21/24 2202 0.81 31 mmHg   10/21/24 2130 0.76 33 mmHg   10/21/24 2115 0.7 37 mmHg   10/21/24 2110 0.54 46 mmHg   10/21/24 2109 0.7 33 mmHg   10/21/24 2108 0.75 32 mmHg   10/21/24 2107 0.86 29 mmHg   10/21/24 2106 0.83 29 mmHg   10/21/24 2105 0.83 29 mmHg   10/21/24 2104 0.79 29 mmHg   10/21/24 2103 0.74 34 mmHg   10/21/24 2102 0.74 34 mmHg   10/21/24 2101 0.78 32 mmHg   10/21/24 2100 0.83 30 mmHg   10/21/24 2059 0.77 31 mmHg   10/21/24 2058 0.77 31 mmHg   10/21/24 2057 0.81 31 mmHg   10/21/24 2056 0.77 31 mmHg   10/21/24 2055 0.77 31 mmHg   10/21/24 2054 0.75 32 mmHg   10/21/24 2053 0.71 34 mmHg   10/21/24 2052 0.71 34 mmHg   10/21/24 2051 0.74 31 mmHg   10/21/24 2050 0.79 28 mmHg   10/21/24 2049 0.89 27 mmHg   10/21/24 2048 0.85 26 mmHg   10/21/24 2047 0.82 28 mmHg   10/21/24 2046 0.9 29 mmHg   10/21/24 2045 0.76 29 
NAME:  Levy Vargas  YOB: 1965  MEDICAL RECORD NUMBER:  [id number]    Shift Summary: Patient remains of .15 mcg/kg/min of milrinone. GI consulted and plan for paracentesis on Monday. Continuing to diuresis with 1875 ml output.     Family updated: No    Rhythm: Normal Sinus Rhythm  with PVC/PAC / Atrail paced     Most recent vitals:   Visit Vitals  /63   Pulse 91   Temp 98 °F (36.7 °C) (Oral)   Resp 18   Ht 1.854 m (6' 0.99\")   Wt 132.2 kg (291 lb 7.2 oz)   SpO2 99%   BMI 38.46 kg/m²           No data found.    No data found.      Respiratory support needed (if any):  - RA    Admission weight Weight - Scale: (!) 144 kg (317 lb 7.4 oz) (10/18/24 1109)    Today's weight    Wt Readings from Last 1 Encounters:   10/26/24 132.2 kg (291 lb 7.2 oz)        Castanon need assessed each shift: N/A - no castanon present  UOP >30ml/hr: YES  Last documented BM (in last 48 hrs):  Patient Vitals for the past 48 hrs:   Last BM (including prior to admit)   10/24/24 1930 10/24/24   10/24/24 2033 10/24/24   10/24/24 2233 10/24/24   10/25/24 0737 10/25/24   10/25/24 2000 10/25/24   10/25/24 2200 10/25/24   10/26/24 0000 10/25/24   10/26/24 0817 10/25/24                Restraints (in use currently or dc'd in last 12 hrs): No    Order current and documentation up to date? N/A  Lines/Drains reviewed @ bedside.  PICC 10/19/24 Right Brachial (Active)   Number of days: 6       Peripheral IV 10/19/24 Left;Anterior Forearm (Active)   Number of days: 7         Drip rates at handoff:    milrinone 0.15 mcg/kg/min (10/26/24 1651)    sodium chloride      norepinephrine Stopped (10/21/24 2108)    dextrose         Lab Data:   CBC:   Recent Labs     10/24/24  0620 10/25/24  0350   WBC 4.9 5.6   HGB 10.8* 11.1*   HCT 33.2* 33.4*   MCV 91.1 89.9    206     BMP:    Recent Labs     10/25/24  0350 10/26/24  0610    137   K 4.2 4.1   CO2 34* 33*   BUN 17 16   CREATININE 1.0 1.0     LIVR: No results for input(s): \"AST\", \"ALT\" in 
NAME:  Levy Vargas  YOB: 1965  MEDICAL RECORD NUMBER:  [id number]    Shift Summary: VSS. Patient had uneventful shift. Paracentesis was scheduled today, but they did not find any fluid to pull. Plan to discharge tomorrow.     Family updated: Yes:  .    Rhythm: Normal Sinus Rhythm     Most recent vitals:   Visit Vitals  /72   Pulse 81   Temp 98.6 °F (37 °C) (Oral)   Resp 18   Ht 1.854 m (6' 0.99\")   Wt 132.4 kg (291 lb 14.2 oz)   SpO2 96%   BMI 38.52 kg/m²           No data found.    No data found.      Respiratory support needed (if any):  - RA    Admission weight Weight - Scale: (!) 144 kg (317 lb 7.4 oz) (10/18/24 1109)    Today's weight    Wt Readings from Last 1 Encounters:   10/28/24 132.4 kg (291 lb 14.2 oz)        Castanon need assessed each shift: N/A - no castanon present  UOP >30ml/hr: YES  Last documented BM (in last 48 hrs):  Patient Vitals for the past 48 hrs:   Last BM (including prior to admit)   10/26/24 2105 10/25/24   10/27/24 0800 10/27/24   10/27/24 2000 10/27/24                Restraints (in use currently or dc'd in last 12 hrs): No    Order current and documentation up to date? No    Lines/Drains reviewed @ bedside.  PICC 10/19/24 Right Brachial (Active)   Number of days: 8         Drip rates at handoff:    milrinone 0.15 mcg/kg/min (10/28/24 0957)    sodium chloride      norepinephrine Stopped (10/21/24 2108)    dextrose         Lab Data:   CBC:   Recent Labs     10/28/24  0440   WBC 4.8   HGB 10.1*   HCT 30.9*   MCV 90.6        BMP:    Recent Labs     10/27/24  0620 10/28/24  0440    138   K 4.3 3.9   CO2 33* 33*   BUN 22* 22*   CREATININE 1.1 1.1     LIVR: No results for input(s): \"AST\", \"ALT\" in the last 72 hours.  PT/INR: No results for input(s): \"INR\" in the last 72 hours.    Invalid input(s): \"PROT\"  APTT: No results for input(s): \"APTT\" in the last 72 hours.  ABG: No results for input(s): \"PHART\", \"[id number]\", \"PO2ART\" in the last 72 hours.    Any consults 
Occupational Therapy  Facility/Department: [address]  Occupational Therapy Treatment Note    Name: Levy Vargas  : 1965  MRN: [id number]  Date of Service: 10/25/2024    Discharge Recommendations:  24 hour supervision or assist, Continue to assess pending progress, 2-3 sessions per week, Patient would benefit from continued therapy after discharge  OT Equipment Recommendations  Equipment Needed: Yes  Mobility Devices: ADL Assistive Devices  ADL Assistive Devices: Shower Chair with back     Levy Vargas scored a 17/24 on the AM-PAC ADL Inpatient form. Current research shows that an AM-PAC score of 18 or greater is typically associated with a discharge to the patient's home setting. Based on the patient's AM-PAC score, and their current ADL deficits, it is recommended that the patient have 2-3 sessions per week of Occupational Therapy at d/c to increase the patient's independence.  At this time, this patient demonstrates the endurance and safety to discharge home with HHOT (home vs OP services) and a follow up treatment frequency of 2-3x/wk.   Please see assessment section for further patient specific details.    If patient discharges prior to next session this note will serve as a discharge summary.  Please see below for the latest assessment towards goals.     Patient Diagnosis(es): The primary encounter diagnosis was Atrial fibrillation with RVR (HCC). Diagnoses of Acute on chronic congestive heart failure, unspecified heart failure type (HCC), PAD (peripheral artery disease) (HCC), Ascites due to alcoholic cirrhosis (HCC), Acute on chronic systolic congestive heart failure (HCC), and CHF (congestive heart failure) (HCC) were also pertinent to this visit.  Past Medical History:  has a past medical history of Abdominal pain, Abnormal EKG, Acute pancreatitis, CHF (congestive heart failure) (HCC), Cigarette smoker, Cocaine abuse (HCC), Dehydration, Diabetes mellitus (HCC), History of cocaine abuse (HCC), 
Occupational Therapy Attempt  Levy Vargas  10/21/2024  Cath Pool Room/PL    Pt is currently off the unit for R heart catheterization, will follow up once pt is back in their room and as therapy schedule allows.     Electronically signed by CRAIG Reyes/L [id number] on 10/21/24 at 12:27 PM EDT    
Patient BLE pedal pulses heard via doppler. Patients BLE both edematous and cool to touch.   
Patient arrived from ED via stretcher, ability to ambulate well with cane. Hypotensive, 's-150's afib RVR, pt on amio gtt, dyspnea with exertion, 96% on RA, RR 18, placed on 2 L for SOB and comfort at this time,  A&Ox3 CMU nnotified and confirmed, belongings and call light nearby. Electronically signed by Irais Burnette RN on 10/18/2024 at 7:58 PM    
Patient en route to new room of 5260. Patient alert and oriented upon departure from unit.  Amio gtt continued.  
Patient returns to room at this time.   
Patient to vascular at this time.   
Physical Therapy  Facility/Department: [address] CVICU  Physical Therapy Treatment Note    Name: Levy Vargas  : 1965  MRN: [id number]  Date of Service: 10/28/2024    Discharge Recommendations:  Continue to assess pending progress, Home with Home health PT   PT Equipment Recommendations  Equipment Needed: No  Other: Pt now reports that he has access to a Rolling Walker for use upon d/c as needed.      Levy Vargas scored a 18/24 on the AM-PAC short mobility form. Current research shows that an AM-PAC score of 18 or greater is typically associated with a discharge to the patient's home setting. Based on the patient's AM-PAC score and their current functional mobility deficits, it is recommended that the patient have 2-3 sessions per week of Physical Therapy at d/c to increase the patient's independence.  At this time, this patient demonstrates the endurance and safety to discharge home with Home PT Evaluation and a follow up treatment frequency of 2-3x/wk.  Please see assessment section for further patient specific details.    If patient discharges prior to next session this note will serve as a discharge summary.  Please see below for the latest assessment towards goals.     Assessment  Body Structures, Functions, Activity Limitations Requiring Skilled Therapeutic Intervention: Decreased functional mobility ;Decreased strength;Decreased safe awareness;Decreased endurance;Decreased balance  Assessment: 60 y/o male admit 10/18/2024 with CHF, PAD, Ascites due to ETOH Cirrhosis. CT Head : negative.  10/21/2024 Cardiac Cath : [place] placed (removed 10/23).    PMH as noted including CAD, A-Fib, Cardiomyopathy, Bradycardia, ICD (2024), CKD, PVD, CVA.  PTA pt recently staying with brother following acute care/SNF stay; house with few steps to enter and 1st floor bed/bath.  Reports independent daily care and functional mobility (with Cane).  Currently general weak (R>L) LEs although able to  Transfers/Amb short 
Physical Therapy  Facility/Department: [address] CVICU  Physical Therapy Treatment Note    Name: Levy Vargas  : 1965  MRN: [id number]  Date of Service: 10/23/2024    Discharge Recommendations:  Continue to assess pending progress, Home with Home health PT   PT Equipment Recommendations  Other: Will monitor for any equipt needs.      Levy Vargas scored a 17/24 on the AM-PAC short mobility form. Current research shows that an AM-PAC score of 18 or greater is typically associated with a discharge to the patient's home setting. Based on the patient's AM-PAC score and their current functional mobility deficits, it is recommended that the patient have 2-3 sessions per week of Physical Therapy at d/c to increase the patient's independence.  At this time, this patient demonstrates the endurance and safety to discharge home with Home PT Evaluation  and a follow up treatment frequency of 2-3x/wk.  Please see assessment section for further patient specific details.    If patient discharges prior to next session this note will serve as a discharge summary.  Please see below for the latest assessment towards goals.       Assessment  Body Structures, Functions, Activity Limitations Requiring Skilled Therapeutic Intervention: Decreased functional mobility ;Decreased strength;Decreased safe awareness;Decreased endurance;Decreased balance  Assessment: 58 y/o male admit 10/18/2024 with CHF, PAD, Ascites due to ETOH Cirrhosis. CT Head : negative.  10/21/2024 Cardiac Cath : [place] placed.    PMH as noted including CAD, A-Fib, Cardiomyopathy, Bradycardia, ICD (2024), CKD, PVD, CVA.  PTA pt recently staying with brother following acute care/SNF stay; house with few steps to enter and 1st floor bed/bath.  Reports independent daily care and functional mobility (with Cane).  Currently general weak (R>L) LEs although able to initiate Transfers/Amb short distances with Walker CGA (+1 for lines).  Endurance limited although lani well 
Physical Therapy  Facility/Department: [address] CVICU  Physical Therapy Treatment Note    Name: Levy Vargas  : 1965  MRN: [id number]  Date of Service: 10/24/2024    Discharge Recommendations:  Continue to assess pending progress, Home with Home health PT   PT Equipment Recommendations  Other: Will monitor for any equipt needs.  Bariatic Rolling Walker.      Levy Vargas scored a 17/ on the AM-PAC short mobility form. Current research shows that an AM-PAC score of 18 or greater is typically associated with a discharge to the patient's home setting. Based on the patient's AM-PAC score and their current functional mobility deficits, it is recommended that the patient have 2-3 sessions per week of Physical Therapy at d/c to increase the patient's independence.  At this time, this patient demonstrates the endurance and safety to discharge home with Home PT Evaluation  and a follow up treatment frequency of 2-3x/wk.  Please see assessment section for further patient specific details.    If patient discharges prior to next session this note will serve as a discharge summary.  Please see below for the latest assessment towards goals.       Assessment  Body Structures, Functions, Activity Limitations Requiring Skilled Therapeutic Intervention: Decreased functional mobility ;Decreased strength;Decreased safe awareness;Decreased endurance;Decreased balance  Assessment: 58 y/o male admit 10/18/2024 with CHF, PAD, Ascites due to ETOH Cirrhosis. CT Head : negative.  10/21/2024 Cardiac Cath : [place] placed (removed 10/23).    PMH as noted including CAD, A-Fib, Cardiomyopathy, Bradycardia, ICD (2024), CKD, PVD, CVA.  PTA pt recently staying with brother following acute care/SNF stay; house with few steps to enter and 1st floor bed/bath.  Reports independent daily care and functional mobility (with Cane).  Currently general weak (R>L) LEs although able to  Transfers/Amb short distances (40'x 2) with Walker CGA (+1 for 
Pt admitted to CVICU from PCU Bedside report completed with Mechelle RN. Per Mechelle Pt hasn't urinated in almost  12 hrs, She did bladder scan before bring patient down. showed 149ml in bladder, Per nurse she said Dr. Blanchard to have nurse bladder scan pt again in few hours if still hasn't urinated. Skin assessment completed With Lo DANIELS Pt is A & Ox4 Fall precautions in place. Bed alarm activated. Orientation to room provided.    
Pt experiencing low diastolic blood pressure 104/36 map 55. This RN called the on-call nephrologist Dr. Lombardi, who directed this RN to chamberlani the lasix gtt to 5 mg/hr to help improve pt BP. Will continue plan of care and monitor BP closely.  
Pt found in bed soaked with IV pulled out not alert as he was on admission delayed responses but appropriate, HR 60's, hypotensive 70's/50's MAP 66, amiodarone gtt paused, respirations unlabored with periods of apnea, pt does report sleep apnea Lupe NP made aware, EKG ordered, and labs, patient flipped back into NSR, pt does have pacemaker, A-paced, CT head ordered and completed, Albumin started and completed. BP currently 83/62 MAP 71, pt answering questions appropriately but delayed still, pt reporting he would just like to sleep.  Ongoing care Electronically signed by Irais Burnette RN on 10/19/2024 at 6:41 AM    
Pt instructed on castanon removal. Castanon removed without difficulty.  Urinal placed on bedside table, instructed pt on continued strict measurement of urine output. Instructed to call RN to empty after voiding or call RN for assistance if needing to stand to void. Pt states understanding. Call light in reach.  
Received verbal order to remove swan and venous sheath per Dr. Hernandez. Dressing removed, site cleansed with chlorhexadine, 1 suture removed. Pt instructed to bear down as venous sheath was removed. Manual pressure applied x 15 minutes. No bleeding noted. Area dressed with quick clot and tegaderm.     Electronically signed by Ju Desouza RN on 10/23/2024 at 2:30 PM    
Report called to RADHA Maldonado RN. Patient in bed, VSS on room air. All questions answered. Transport request placed.   
[facility]  Inpatient Progress Note    CC:         CHF severe cardiomyopathy          HPI:   This is a 59 y.o. male with a history of severe cardiomyopathy EF 15% admitted with fluid retention weight gain.  He has been treated with milrinone IV Lasix drip with good diuresis.  His dry weight is 260 pounds.    Dr. Hernandez's note states the patient should continue on milrinone with gradual weaning.  He started him on Entresto and switched carvedilol to Toprol because of hypotension.  Because of acute kidney dysfunction he is not a candidate for spironolactone or Jardiance.       Interval history  Laying flat in bed breathing comfortably  Says he is doing better  On torsemide 50 and milrinone    An attempt was made to do paracentesis but very little fluid so not done      Intake/Output Summary (Last 24 hours) at 10/28/2024 1206  Last data filed at 10/28/2024 1042  Gross per 24 hour   Intake 1578.96 ml   Output 3550 ml   Net -1971.04 ml         Physical Examination:    BP (!) 112/49   Pulse 80   Temp 98.6 °F (37 °C) (Oral)   Resp 18   Ht 1.854 m (6' 0.99\")   Wt 132.4 kg (291 lb 14.2 oz)   SpO2 98%   BMI 38.52 kg/m²   HEENT:  Face: Atraumatic, Conjunctiva: Pink; non icteric,  Mucous Memb:  Moist, No thyromegaly or Lymphadenopathy  Respiratory:  Resp Assessment: Normal, Resp Auscultation: There  Cardiovascular:  Auscultation: nl S1 & S2, Palpation:  Nl PMI; No heaves or thrills, JVP:  normal  Abdomen: Soft, non-tender, Normal bowel sounds,  No organomegaly  Extremities: No Cyanosis or Clubbing; Edema none  Neurological: Oriented to time, place, and person, Non-anxious  Psychiatric: Normal mood and affect  Skin: Warm and dry,  No rash seen    Current Facility-Administered Medications: torsemide (DEMADEX) tablet 50 mg, 50 mg, Oral, Daily  milrinone (PRIMACOR) 20 mg in dextrose 5 % 100 mL infusion, 0.0625-0.75 mcg/kg/min, IntraVENous, Continuous  sacubitril-valsartan (ENTRESTO) 24-26 MG per tablet 1 tablet, 
Spiritual Health History and Assessment/Progress Note  [facility]    (P) Initial Encounter,  ,  ,      Name: Levy Vargas MRN: [id number]    Age: 59 y.o.     Sex: male   Language: English   Episcopal: Confucianism   Acute on chronic clinical systolic heart failure (HCC)     Date: 10/22/2024            Total Time Calculated: (P) 5 min              Spiritual Assessment began in [facility]        Referral/Consult From: (P) Family (Father Kim)   Encounter Overview/Reason: (P) Initial Encounter  Service Provided For: (P) Patient    Staci, Belief, Meaning:   Patient is connected with a staci tradition or spiritual practice and has beliefs or practices that help with coping during difficult times    Community:  Patient is connected with a spiritual community and feels well-supported. Support system includes: Children and Extended family    Assessment and Plan of Care:     Patient Interventions include: Facilitated expression of thoughts and feelings and Other: Pt shared staci beliefs    Patient Plan of Care: Other: No further visits planned    Electronically signed by CHRIS Fritz on 10/22/2024 at 10:15 AM   
Spiritual Health History and Assessment/Progress Note  [facility]    Spiritual/Emotional Needs, Rituals, Rites and Sacraments,  , Adjustment to illness,      Name: Levy Vargas MRN: [id number]    Age: 59 y.o.     Sex: male   Language: English   Nondenominational: Adventism   Acute on chronic clinical systolic heart failure (HCC)     Date: 10/22/2024            Total Time Calculated: 20 min              Spiritual Assessment continued in [facility]        Referral/Consult From: Other    Encounter Overview/Reason: Spiritual/Emotional Needs, Rituals, Rites and Sacraments  Service Provided For: Patient    Staci, Belief, Meaning:   Patient is connected with a staci tradition or spiritual practice and has beliefs or practices that help with coping during difficult times  Family/Friends No family/friends present      Importance and Influence:  Patient has no beliefs influential to healthcare decision-making identified during this visit  Family/Friends No family/friends present    Community:  Patient feels well-supported. Support system includes: Children and Extended family  Family/Friends No family/friends present    Assessment and Plan of Care:     Patient Interventions include: Facilitated expression of thoughts and feelings, Explored spiritual coping/struggle/distress, Engaged in theological reflection, Affirmed coping skills/support systems, Provided sacramental/Lutheran ritual, and Facilitated life review and/ or legacy  Family/Friends Interventions include: No family/friends present    Patient Plan of Care: Spiritual Care available upon further referral  Family/Friends Plan of Care: No family/friends present    Electronically signed by Chaplain ADILENE on 10/22/2024 at 3:48 PM    
UO only 20 ml/hr last few hours. Dr. Thomson notified. No new orders.   
VASCULAR    Seen for chronic R leg ischemia. Denies foot pain.  Remains on pressors for support.  R foot cool with faint PTA doppler signals    A/P: Chronic severe RLE ischemia - multilevel occlusive disease with thrombosed popliteal stent.   Observation for now.   No plans for intervention currently as foot not jeopardized and heart status poor.   Agree that LV support device would threaten R foot perfusion.   Will follow.     Aakash العلي  
VASCULAR    Seen for chronic RLE ischemia - previous pt of the vascular service (intervention per S Jose Roberto 2/2023) & seen previously by this MD.  Cardiology note reviewed as well as plan of action.  No significant foot pain.    Remains on Milrinone for CHF as well as lasix drip  R foot warmer than yesterday - no breakdown    A/P: Chronic arterial occlusive disease R leg - no acute worsening.   Will follow at this time without plans for intervention as cardiac status managed.     Aakash العلي  
 100 mg Oral TID    sodium chloride flush  5-40 mL IntraVENous 2 times per day    amiodarone  200 mg Oral BID    aspirin  81 mg Oral Daily    atorvastatin  80 mg Oral Nightly    [Held by provider] carvedilol  25 mg Oral BID WC    [Held by provider] empagliflozin  10 mg Oral Daily    isosorbide mononitrate  15 mg Oral Daily    magnesium oxide  400 mg Oral BID WC    [Held by provider] sacubitril-valsartan  1 tablet Oral BID    [Held by provider] spironolactone  25 mg Oral Daily    [Held by provider] furosemide  40 mg IntraVENous BID    insulin lispro  0-4 Units SubCUTAneous 4x Daily WC       Data/  CBC:   Lab Results   Component Value Date/Time    WBC 4.8 10/22/2024 04:00 AM    RBC 3.61 10/22/2024 04:00 AM    HGB 10.4 10/22/2024 04:00 AM    HCT 33.1 10/22/2024 04:00 AM    MCV 91.6 10/22/2024 04:00 AM    MCH 28.8 10/22/2024 04:00 AM    MCHC 31.5 10/22/2024 04:00 AM    RDW 16.3 10/22/2024 04:00 AM     10/22/2024 04:00 AM    MPV 9.2 10/22/2024 04:00 AM     BMP:    Lab Results   Component Value Date/Time     10/23/2024 04:10 AM    K 4.2 10/23/2024 04:10 AM    K 3.5 10/18/2024 11:41 AM    CL 99 10/23/2024 04:10 AM    CO2 34 10/23/2024 04:10 AM    BUN 32 10/23/2024 04:10 AM    CREATININE 1.2 10/23/2024 04:10 AM    CALCIUM 8.9 10/23/2024 04:10 AM    GFRAA >60 10/11/2022 05:03 AM    LABGLOM 69 10/23/2024 04:10 AM    LABGLOM 63 04/27/2024 05:03 AM    GLUCOSE 115 10/23/2024 04:10 AM     Lab Results   Component Value Date    CALCIUM 8.9 10/23/2024    PHOS 3.3 10/23/2024         Assessment/Plan     KENDRA on CKD stage III - Improving with lasix / Milrinone gtt  - Suspect due to hemodynamic instability and hypotension to be primary cause of KENDRA  - Hemodynamics have improved and so has renal function  - Lasix gtt decreased 10/23/24 by Cardiology  - If BP drops any further start Midodrine 2.5 mg TID  - ? Component of CRS   - Maintain off Aldactone, Entresto, Jardiance     2. A Fib with RVR - plan per Cardiology    3. 
10:00 AM    MCV 90.8 10/20/2024 10:00 AM    MCH 29.5 10/20/2024 10:00 AM    MCHC 32.5 10/20/2024 10:00 AM    RDW 15.8 10/20/2024 10:00 AM     10/20/2024 10:00 AM    MPV 9.2 10/20/2024 10:00 AM     BMP:    Lab Results   Component Value Date/Time     10/20/2024 05:05 AM    K 3.4 10/20/2024 05:05 AM    K 3.5 10/18/2024 11:41 AM    CL 95 10/20/2024 05:05 AM    CO2 31 10/20/2024 05:05 AM    BUN 49 10/20/2024 05:05 AM    CREATININE 2.1 10/20/2024 05:05 AM    CALCIUM 8.5 10/20/2024 05:05 AM    GFRAA >60 10/11/2022 05:03 AM    LABGLOM 35 10/20/2024 05:05 AM    LABGLOM 63 04/27/2024 05:03 AM    GLUCOSE 188 10/20/2024 05:05 AM         Assessment/Plan   1-KENDRA on CKD stage III suspect in the setting of biventricular heart failure  , hypotension  A-fib with rapid ventricular rate, patient had been on Jardiance and Entresto as well as Aldactone  urinalysis shows trace ketones 100 protein 0-2 fine casts no WBCs Cr slowly worsening 2.1 mg today on Milrinone and Levophed Continue vasopressor and inotropic support Lasix per cardiology RHC tomorrow Keep off of Jardiance Aldactone and entresto  2-atrial fibrillation relation with rapid ventricular rate now in NSR  3-hypotension likely cardiogenic, moved to ICU   4 cirrhosis of liver given IV Albumin  off of Aldactone Bili stable   5 history of substance abuse  6 low-sodium diet with fluid restriction      Thank you for the consultation.  Please do not hesitate to call with questions.    Farrukh Underwood MD, FACP              
Result      CT ABDOMEN PELVIS W IV CONTRAST Additional Contrast? None   Final Result   Fatty replaced liver.      Moderate ascites in the abdomen and pelvis.      Overall no significant interval change.         XR CHEST PORTABLE   Final Result   Markedly enlarged cardiopericardial silhouette, similar to the prior exam.               ASSESSMENT: 59-year-old male with a past medical history of congestive heart failure, cocaine abuse, diabetes, myocardial infarction, hyperlipidemia, hypertension, morbid obesity, pancreatitis in 2021, peptic ulcer disease who presented on October 18 with right leg pain and swelling and shortness of breath and abdominal distention. ? history of alcohol induced cirrhosis with ascites  and prior paracentesis.  He was diagnosed with acute on chronic congestive heart failure, atrial fibrillation with rapid ventricular response.  He had acute renal failure and nephrology is following and this has resolved.  Vascular surgery saw him for lower extremity chronic ischemia.  He was treated with albumin, milrinone.  He was weaned off of a Levophed drip.  His atrial fibrillation was treated with amiodarone.  His labs show BUN 16 and creatinine 1.0.  LFTs this admission showed AST 72, ALT 45, alkaline phosphatase 131, total bilirubin 2.0.  CBC October 25 showed a white blood count 5.6 hemoglobin 11.1 and platelets 206.  Occult blood in the stool was positive.  He had a C. difficile test that was negative.  He had a CAT scan of the abdomen and pelvis that showed fatty liver, moderate ascites.  This was done with contrast.  He denies any prior colonoscopy.  Denies seeing any blood in his stool and denies melena.  No abdominal pain.  He says he does not follow with a liver doctor.       1.  Anemia.  Hemoglobin was 10.7 in April 2024. 10/28 was 10.1.  On admission it was 12.6.  Iron studies show percent iron saturation of 14% and a normal ferritin.  B12 and folate were normal a year ago.  MCV is 
\"AST\", \"ALT\" in the last 72 hours.  PT/INR:   Recent Labs     10/23/24  0600   INR 1.21*     APTT:   Recent Labs     10/23/24  0600   APTT 88.5*     ABG: No results for input(s): \"PHART\", \"RIR1YFX\", \"PO2ART\" in the last 72 hours.    Any consults during the shift? No    Any signed and held orders to be released?  No        4 Eyes Skin Assessment       The patient is being assessed for  Shift Handoff    I agree that at least one RN has performed a thorough Head to Toe Skin Assessment on the patient. ALL assessment sites listed below have been assessed.      Areas assessed by both nurses: Head, Face, Ears, Shoulders, Back, Chest, Arms, Elbows, Hands, Sacrum. Buttock, Coccyx, Ischium, Legs. Feet and Heels, and Under Medical Devices         Does the Patient have a Wound? No noted wound(s)    Wound Care Orders initiated by RN: No       Hudson Prevention initiated by RN: Yes    Pressure Injury (Stage 3,4, Unstageable, DTI, NWPT, and Complex wounds) if present, place Wound referral order by RN under : No    New Ostomies, if present place, Ostomy referral order under : No     Nurse 1 eSignature: Electronically signed by Namita Rivera RN on 10/25/24 at 6:27 PM EDT    **SHARE this note so that the co-signing nurse can place an eSignature**    Nurse 2 eSignature: Electronically signed by Babs Diaz RN on 10/25/24 at 7:34 PM EDT           
safe for d/c when medically stable.  Do not anticipate need for HHOT.  REQUIRES OT FOLLOW-UP: Yes  Activity Tolerance  Activity Tolerance: Patient limited by fatigue     Plan  Occupational Therapy Plan  Times Per Week: 3-5  Current Treatment Recommendations: Balance training, Functional mobility training, Endurance training, Pain management, Safety education & training, Patient/Caregiver education & training, Self-Care / ADL, Home management training    Restrictions  Restrictions/Precautions  Restrictions/Precautions: Fall Risk  Position Activity Restriction  Other position/activity restrictions: [place], Room Air.    Subjective  General  Chart Reviewed: Yes  Patient assessed for rehabilitation services?: Yes  Additional Pertinent Hx: 58 y/o male admit 10/18/2024 with CHF, PAD, Ascites due to ETOH Cirrhosis. CT Head: negative. Cardiac Cath 10/21 RLE ischemia and occlusive- chronically. PMH: CAD, A-Fib, Cardiomyopathy, Bradycardia, ICD (6/2024), CKD, PVD, CVA  Family / Caregiver Present: No  Referring Practitioner: Daniele Hernandez MD  Diagnosis: Heart failure  Subjective  Subjective: Pt laying in bed, reports up with RN to go to the bathroom routinely.  Complaining of fatigue and wanting to sleep  General Comment  Comments: RN ok to see     Social/Functional History  Social/Functional History  Lives With: Family (Brother.)  Type of Home: House  Home Layout: Able to Live on Main level with bedroom/bathroom  Home Access: Stairs to enter with rails (3 steps to enter.)  Bathroom Shower/Tub: Tub/Shower unit  Bathroom Toilet: Standard  Bathroom Equipment: Hand-held shower  Bathroom Accessibility: Accessible  Home Equipment: Cane  ADL Assistance: Independent  Homemaking Assistance:  (Shared with family.)  Ambulation Assistance: Independent (With Cane.)  Transfer Assistance: Independent  Active : No (Relies on family or medical transport.)  Patient's  Info: family transports or insurance.  Mode of 
    regurgitation.   - Left atrium: The atrium is dilated.   - Right ventricle: The cavity size is dilated. Systolic function is mildly     reduced by visual assessment.   - Right atrium: The atrium is dilated.   - Atrial septum: Agitated saline contrast study in the baseline state, shows     no right-to-left atrial level shunt.   - Tricuspid valve: Incomplete leaflet coaptation. There is severe     regurgitation.   - Pulmonary arteries: Systolic pressure is most likely increased = at least     30 mmHg + the RA pressure.   - Inferior vena cava: The IVC is dilated. Respirophasic diameter changes are     blunted (< 50%), consistent with elevated central venous pressure.         Arterial Duplex:      Patient was in A-fib throughout the test with a heart rate of 170+.    There is no focal obstruction noted in the femoral arteries in the right thigh.    The right popliteal artery appears chronically occluded.    The posterior tibial and peroneal arteries appear to be occluded.    The right anterior tibial artery is occluded with reconstitution just above the ankle. The distal anterior tibial artery and dorsalis pedis arteries exhibit severely hyeremic flow.    Velocities are reduced throughout the arterial system of the right lower extremity, this is likely secondary to cardiac output and heart rhythm.    Findings are consistent with chronic limb threatening ischemia in the right leg.    The left leg was not interrogated due to the patient's unstable vitals and need to return to the ED.     Assessment / Plan:     1.  Acute on Chronic systolic CHF, class IV  On review of Levy's echo here, his LVEF is much worse than previously reported. I believe he was developing cardiogenic shock.  He reports a dry weight that is much lower than his current weight.  Ananth's right heart catheterization today demonstrated a pulmonary capillary wedge pressure 20-25.  His right atrial pressure was also 20 mmHg consistent with significant 
10/21 RLE ischemia and occlusive- chronically. PMH: CAD, A-Fib, Cardiomyopathy, Bradycardia, ICD (6/2024), CKD, PVD, CVA  Family / Caregiver Present: No  Referring Practitioner: Daniele Hernandez MD  Diagnosis: Heart failure  Subjective  Subjective: Pt seated at side of bed upon arrival and agreeable to OT/PT eval/tx. Pt reporting discomfort at castanon site (RN aware)  General Comment  Comments: RN ok to see       Social/Functional History  Social/Functional History  Lives With: Family (Brother.)  Type of Home: House  Home Layout: Able to Live on Main level with bedroom/bathroom  Home Access: Stairs to enter with rails (3 steps to enter.)  Bathroom Shower/Tub: Tub/Shower unit  Bathroom Toilet: Standard  Bathroom Equipment: Hand-held shower  Bathroom Accessibility: Accessible  Home Equipment: Cane  ADL Assistance: Independent  Homemaking Assistance:  (Shared with family.)  Ambulation Assistance: Independent (With Cane.)  Transfer Assistance: Independent  Active : No (Relies on family or medical transport.)  Patient's  Info: family transports or insurance.  Mode of Transportation: Car  Occupation: On disability  Additional Comments: Pt has been staying with brother following recent acute care admit/SNF setting (was home ~2 weeks prior to this hospitalization).  Prior, living with girlfriend in house with 5 steps to enter and 2nd floor bed/bath.    Objective             Safety Devices  Type of Devices: Call light within reach;Chair alarm in place;Patient at risk for falls;Left in chair;Nurse notified        AROM: Within functional limits  PROM: Within functional limits  Strength: Within functional limits  Coordination: Within functional limits  Tone: Normal  ADL  Toileting: Dependent/Total  Toileting Skilled Clinical Factors: castanon  Functional Mobility: Minimal assistance;Increased time to complete;Adaptive equipment  Functional Mobility Skilled Clinical Factors: RW. EOB>bedside chair (4-5 steps) with 
EDT    **SHARE this note so that the co-signing nurse can place an eSignature**    Nurse 2 eSignature: Electronically signed by Marium Woods RN on 10/23/24 at 7:37 PM EDT           
Jardiance and Aldactone going forward  - Damon removed 10/25/24    2. A Fib with RVR - plan per Cardiology    3. Hypotension - resolved off offending agents and with Levophed    4. Cirrhosis     5. History of substance abuse    6. Anemia CKD - Hemoglobin > target  - AL not indicated at this point in time    7. CKD-MBD - Phosphorus low - replaced - corrected    8. HFrEF - continue diuresis  - continue current dose of Torsemide    9. Chronic arterial occlusive disease R leg - no acute worsening. Vascular plans to follow at this time without plans for intervention    Prognosis Guarded      
Levophed    4. Cirrhosis     5. History of substance abuse    6. Anemia CKD - Hemoglobin at target  - AL not indicated at this point in time    7. CKD-MBD - Phosphorus at target    8. HFrEF - continue diuresis    9. Chronic arterial occlusive disease R leg - no acute worsening. Vascular plans to follow at this time without plans for intervention    Prognosis Guarded      
The cavity size is moderately to severely dilated. Wall     thickness was increased in a pattern of mild LVH. Systolic function is     severely reduced. The estimated ejection fraction is 20-25%. There is     diffuse hypokinesis. No LV thrombus seen on the available views.   - Aortic valve: Poorly visualized.   - Mitral valve: There is mild thickening. There is at least moderate     regurgitation.   - Left atrium: The atrium is dilated.   - Right ventricle: The cavity size is dilated. Systolic function is mildly     reduced by visual assessment.   - Right atrium: The atrium is dilated.   - Atrial septum: Agitated saline contrast study in the baseline state, shows     no right-to-left atrial level shunt.   - Tricuspid valve: Incomplete leaflet coaptation. There is severe     regurgitation.   - Pulmonary arteries: Systolic pressure is most likely increased = at least     30 mmHg + the RA pressure.   - Inferior vena cava: The IVC is dilated. Respirophasic diameter changes are     blunted (< 50%), consistent with elevated central venous pressure.         Arterial Duplex:      Patient was in A-fib throughout the test with a heart rate of 170+.    There is no focal obstruction noted in the femoral arteries in the right thigh.    The right popliteal artery appears chronically occluded.    The posterior tibial and peroneal arteries appear to be occluded.    The right anterior tibial artery is occluded with reconstitution just above the ankle. The distal anterior tibial artery and dorsalis pedis arteries exhibit severely hyeremic flow.    Velocities are reduced throughout the arterial system of the right lower extremity, this is likely secondary to cardiac output and heart rhythm.    Findings are consistent with chronic limb threatening ischemia in the right leg.    The left leg was not interrogated due to the patient's unstable vitals and need to return to the ED.     Assessment / Plan:     1.  Acute on Chronic systolic 
level with bedroom/bathroom  Home Access: Stairs to enter with rails (3 steps to enter.)  Bathroom Shower/Tub: Tub/Shower unit  Bathroom Toilet: Standard  Bathroom Equipment: Hand-held shower  Bathroom Accessibility: Accessible  Home Equipment: Cane  ADL Assistance: Independent  Homemaking Assistance:  (Shared with family.)  Ambulation Assistance: Independent (With Cane.)  Transfer Assistance: Independent  Active : No (Relies on family or medical transport.)  Patient's  Info: family transports or insurance.  Mode of Transportation: Car  Occupation: On disability  Additional Comments: Pt has been staying with brother following recent acute care admit/SNF setting (d/c to brother's home ~2 weeks prior to this hospitalization).  Prior, living with girlfriend in house with 5 steps to enter and 2nd floor bed/bath.  Vision/Hearing  Vision  Vision: Within Functional Limits  Hearing  Hearing: Within functional limits    Cognition   Orientation  Overall Orientation Status: Within Functional Limits  Cognition  Overall Cognitive Status: WFL    Objective       Observation/Palpation  Observation: Hands : Flex Contractures : Bilat 4th/5th fingers (Duputrens).  L Great Toe Amp.  Gross Assessment  Sensation: Impaired (Diminished R Distal LE.)                   Bed mobility  Supine to Sit: Stand by assistance  Sit to Supine: Stand by assistance  Transfers  Sit to Stand: Stand by assistance;Contact guard assistance  Stand to Sit: Stand by assistance;Contact guard assistance  Comment: Transfers completed with Walker : from eob,  bedside chair.  Cues cont for safe postitioning/hand placement.  Ambulation  Surface: Level tile  Device: Rolling Walker  Distance: Pt amb 50' x 2  with Walker CGA.  Diminished step length/clearance (R>L); foot flat contact (R LE ext rot contact).  Cues for safe transitional mvts.  Endurance limited although improving.    PT did Ace Wrap Bilat LEs per nursing request prior oob.                     
living with girlfriend in house with 5 steps to enter and 2nd floor bed/bath.  Vision/Hearing  Vision  Vision: Within Functional Limits  Hearing  Hearing: Within functional limits    Cognition   Orientation  Overall Orientation Status: Within Functional Limits  Cognition  Overall Cognitive Status: WFL    Objective                              Bed mobility  Bed Mobility Comments: Pt sitting at eob upon PT arrival.  Transfers  Sit to Stand: Minimal Assistance  Stand to Sit: Minimal Assistance  Comment: Transfers completed with Walker : from eob, bedside chair, recliner.  Cues cont for safe postitioning/hand placement.  Ambulation  Surface: Level tile  Device: Rolling Walker  Distance: Pt amb 4-5 eob to bedside chair, additional 4-5 steps bedside to recliner with Walker Min assist (+1 for line manage/safety). Diminished step length/clearance; weak R>L LEs.  Cues for safe transitional mvts.        Exercise Treatment: Sitting : Chair Push-ups 5 reps x 2.  Stand with Walker : Marching 10 reps x 2, Mini-Squats 10 reps.         [facility] - Mobility    AM-PAC Basic Mobility - Inpatient   How much help is needed turning from your back to your side while in a flat bed without using bedrails?: None  How much help is needed moving from lying on your back to sitting on the side of a flat bed without using bedrails?: A Little  How much help is needed moving to and from a bed to a chair?: A Little  How much help is needed standing up from a chair using your arms?: A Little  How much help is needed walking in hospital room?: A Lot  How much help is needed climbing 3-5 steps with a railing?: Total  AM-PAC Inpatient Mobility Raw Score : 16  AM-PAC Inpatient T-Scale Score : 40.78  Mobility Inpatient CMS 0-100% Score: 54.16  Mobility Inpatient CMS G-Code Modifier : CK         Goals  Short Term Goals  Time Frame for Short Term Goals: Upon d/c acute care setting.  Short Term Goal 1: Bed Mob Supervision.  Short Term Goal 2: Transfers with 
release tablet Take 0.5 tablets by mouth daily Yes ProviderTanesha MD   gabapentin (NEURONTIN) 300 MG capsule Take 1 capsule by mouth 3 times daily. Yes Tanesha Mccann MD   nitroGLYCERIN (NITROSTAT) 0.4 MG SL tablet Place 1 tablet under the tongue every 5 minutes as needed for Chest pain up to max of 3 total doses. If no relief after 1 dose, call 911. Yes Tanesha Mccann MD   pantoprazole (PROTONIX) 40 MG tablet Take 1 tablet by mouth daily as needed (dyspepsia) Yes ProviderTanesha MD   apixaban (ELIQUIS) 5 MG TABS tablet Take 1 tablet by mouth 2 times daily Yes Farooq Perez MD   atorvastatin (LIPITOR) 80 MG tablet TAKE 1 TABLET BY MOUTH NIGHTLY Yes Minnie Lilly,    aspirin ([employer] ASPIRIN ADULT LOW DOSE) 81 MG chewable tablet Take 1 tablet by mouth daily Yes Jose E Reid, APRN - CNP        Scheduled Meds:   torsemide  50 mg Oral Daily    sacubitril-valsartan  1 tablet Oral BID    metoprolol succinate  25 mg Oral QPM    apixaban  5 mg Oral BID    pantoprazole  40 mg Oral QAM AC    ferrous sulfate  324 mg Oral Daily with breakfast    gabapentin  100 mg Oral TID    sodium chloride flush  5-40 mL IntraVENous 2 times per day    amiodarone  200 mg Oral BID    aspirin  81 mg Oral Daily    atorvastatin  80 mg Oral Nightly    [Held by provider] empagliflozin  10 mg Oral Daily    isosorbide mononitrate  15 mg Oral Daily    magnesium oxide  400 mg Oral BID WC    insulin lispro  0-4 Units SubCUTAneous 4x Daily WC     Continuous Infusions:   milrinone 0.15 mcg/kg/min (10/27/24 0638)    sodium chloride      norepinephrine Stopped (10/21/24 2108)    dextrose       PRN Meds:traMADol, calcium carbonate, sodium chloride flush, sodium chloride, acetaminophen, lidocaine, nitroGLYCERIN, ondansetron **OR** ondansetron, polyethylene glycol, potassium chloride **OR** potassium alternative oral replacement, magnesium sulfate, glucose, dextrose bolus **OR** dextrose bolus, glucagon (rDNA), dextrose, 
signed by Emili Colon RN on 10/22/24 at 5:27 PM EDT    **SHARE this note so that the co-signing nurse can place an eSignature**    Nurse 2 eSignature: Electronically signed by Francisco Javier Hyde RN on 10/22/24 at 9:14 PM EDT          
Total  AM-PAC Inpatient Mobility Raw Score : 16  AM-PAC Inpatient T-Scale Score : 40.78  Mobility Inpatient CMS 0-100% Score: 54.16  Mobility Inpatient CMS G-Code Modifier : CK           Goals  Short Term Goals  Time Frame for Short Term Goals: Upon d/c acute care setting.  Short Term Goal 1: Bed Mob Supervision.  Short Term Goal 2: Transfers with assist device SBA/Supervision.  Short Term Goal 3: Amb with assist device 50-75' SBA.  Short Term Goal 4: Stair Negotiation as approp.  Patient Goals   Patient Goals : Return home with brother.       Education  Patient Education  Education Given To: Patient  Education Provided Comments: Role of PT, POC, Need to call for assist.  Education Method: Verbal  Barriers to Learning: None  Education Outcome: Verbalized understanding;Continued education needed      Therapy Time   Individual Concurrent Group Co-treatment   Time In 0830         Time Out 0910         Minutes 40                 Janey Sandoval, PT         
abnormal heart rhythm.    Right Atrium: Single lead present in the right atrium. Right atrium is dilated.    Left Atrium: Not well visualized. Left atrium is severely dilated.    Right Ventricle: Right ventricle is mildly dilated. Moderately reduced systolic function.    Mitral Valve: Moderate regurgitation.    Tricuspid Valve: Moderate to severe regurgitation. The PASP cannot be evaluated due to reduced RV function and poor coaptation of tricuspid valve leaflets.       Right heart catheterization: 10/21/2024    Evidence of right-sided volume overload with right atrial pressure of 20 and pulmonary capillary wedge pressure of 24 to 27 mmHg.    Instantaneous pulmonary artery pressure 44/28 for mean pulmonary arterial pressure 34 mmHg.    Mixed venous oxygen saturation low at 54%.    ASSESSMENT AND PLAN:       Acute on chronic systolic heart failure  Admitted with weight gain fluid retention  NYHA class IV  EF 15%  Admission weight 305   today's weight 291   baseline weight 261  Fluid balance: -18 L      yesterday's urine output 4.4 L  Patient is on isosorbide, Toprol-XL 25, Entresto, torsemide, milrinone  Continue milrinone and torsemide  Aiming to get to his baseline rate    Paroxysmal atrial fibrillation  On amiodarone 200 twice daily and Eliquis  In sinus rhythm    Peripheral vascular disease  Reportedly chronic with no pain        Followed by vascular surgery  QING Perez M.D  10/26/2024     
paracentesis in April 2024 that showed a protein of 3.3.  Albumin was not checked.  This was negative for SBP.  The elevated protein would go along with cardiac ascites rather than ascites from cirrhosis.  It is unknown whether he has cirrhosis.  He would benefit from outpatient follow-up and FibroScan.     Recommendations:   -He has no evidence of overt GI bleeding.  Can have Outpatient colonoscopy to follow up on the heme + stool. I put in discharge instructions for him to follow up in our office.  - Needs ultrasound/fibroscan to evaluate for cirrhosis.  Fibroscan not done at [facility] so will need set up as outpatient.    -Serologic liver evaluation. Outpatient follow-up to follow up on this.  -Therapeutic and Diagnostic Paracentesis for fluid studies on Monday.   Awaiting to hear from cardiology whether they are okay with this or prefer to hold off.  Holding eliquis until we hear from cardiology and nurse will call and I can put in orders if paracentesis desired.     Thank you for allowing me to participate in the care of your patient.  Please feel free to contact me with any concerns.  [phone]    Mitch Atkinson MD      
A-fib throughout the test with a heart rate of 170+.   There is no focal obstruction noted in the femoral arteries in the right thigh.   The right popliteal artery appears chronically occluded.   The posterior tibial and peroneal arteries appear to be occluded.   The right anterior tibial artery is occluded with reconstitution just above the ankle. The distal anterior tibial artery and dorsalis pedis arteries exhibit severely hyeremic flow.   Velocities are reduced throughout the arterial system of the right lower extremity, this is likely secondary to cardiac output and heart rhythm.   Findings are consistent with chronic limb threatening ischemia in the right leg.   The left leg was not interrogated due to the patient's unstable vitals and need to return to the ED. Summary: Moderate to severe atherosclerotic plaque noted in the right lower extremity vessels.  No significant stenosis noted in the right common femoral or SFA based on velocity criteria.  Patient noted to be in A-fib with RVR which may be falsely decreasing the velocities.  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     CT ABDOMEN PELVIS W IV CONTRAST Additional Contrast? None    Result Date: 10/18/2024  EXAMINATION: CT OF THE ABDOMEN AND PELVIS WITH CONTRAST 10/18/2024 12:46 pm TECHNIQUE: CT of the abdomen and pelvis was performed with the administration of intravenous contrast. Multiplanar reformatted images are provided for review. Automated exposure control, iterative reconstruction, and/or weight based adjustment of the mA/kV was utilized to reduce the radiation dose to as low as reasonably achievable. COMPARISON: CT scan 23 April 2024, 23 August 2023 HISTORY: ORDERING SYSTEM PROVIDED HISTORY: ascites, hx of cirrhosis TECHNOLOGIST PROVIDED HISTORY: Reason for exam:->ascites, hx of cirrhosis Additional Contrast?->None Decision Support Exception - unselect if not a suspected or 
GLUCOSEU 500 10/20/2024 06:18 PM    KETUA Negative 10/20/2024 06:18 PM     Urine Cultures: No results found for: \"LABURIN\"  Blood Cultures: No results found for: \"BC\"  No results found for: \"BLOODCULT2\"  Organism: No results found for: \"ORG\"      Electronically signed by Geovani Blanchard MD on 10/28/2024 at 11:56 AM  Comment: Please note this report has been produced using speech recognition software and may contain errors related to that system including errors in grammar, punctuation, and spelling, as well as words and phrases that may be inappropriate. If there are any questions or concerns, please feel free to contact the dictating provider for clarification.     
washing, rinsing, drying)?: A Little  How much help is needed for toileting (which includes using toilet, bedpan, or urinal)?: Total  How much help is needed for putting on and taking off regular upper body clothing?: A Little  How much help is needed for taking care of personal grooming?: A Little  How much help for eating meals?: None  AM-PAC Inpatient Daily Activity Raw Score: 16  AM-PAC Inpatient ADL T-Scale Score : 35.96  ADL Inpatient CMS 0-100% Score: 53.32  ADL Inpatient CMS G-Code Modifier : CK    Goals  Short Term Goals  Time Frame for Short Term Goals: prior to d/c: ongoing/met if noted 10/23  Short Term Goal 1: bed mobility Min A  Short Term Goal 2: ADL tx CGA, met, progress to SBA  Short Term Goal 3: UB bathing/dressing seated SUP  Short Term Goal 4: BUE exercises in all planes to improve strength and endurance for ADL and fxl tx  Short Term Goal 5: tolerate 2 min standing grooming with SBA  Patient Goals   Patient goals : return to brother's home      Therapy Time   Individual Concurrent Group Co-treatment   Time In 1015         Time Out 1055         Minutes 40         Timed Code Treatment Minutes: 40 Minutes (8 ADL 32 TA)       NAMRATA Wade, OTR/L     
focal infiltrates or pleural effusions.  A left subclavian dual lead pacemaker is in place.     Markedly enlarged cardiopericardial silhouette, similar to the prior exam.       CBC:   Recent Labs     10/20/24  1000 10/21/24  1533 10/22/24  0400   WBC 5.5  --  4.8   HGB 11.0* 12.1* 10.4*     --  160     BMP:    Recent Labs     10/20/24  0505 10/21/24  0600 10/22/24  0400    135* 133*   K 3.4* 3.9 3.9   CL 95* 96* 95*   CO2 31 30 31   BUN 49* 53* 44*   CREATININE 2.1* 2.0* 1.4*   GLUCOSE 188* 188* 122*     Hepatic:   No results for input(s): \"AST\", \"ALT\", \"BILITOT\", \"ALKPHOS\" in the last 72 hours.    Invalid input(s): \"ALB\"    Lipids:   Lab Results   Component Value Date/Time    CHOL 79 10/19/2024 03:13 AM    HDL 17 10/19/2024 03:13 AM    TRIG 51 10/19/2024 03:13 AM     Hemoglobin A1C:   Lab Results   Component Value Date/Time    LABA1C 6.3 10/19/2024 03:13 AM     TSH:   Lab Results   Component Value Date/Time    TSH 0.06 10/18/2024 11:41 AM     Troponin: No results found for: \"TROPONINT\"  Lactic Acid:   No results for input(s): \"LACTA\" in the last 72 hours.    BNP:   Recent Labs     10/20/24  0505 10/22/24  0400   PROBNP 3,330* 2,862*     UA:  Lab Results   Component Value Date/Time    NITRU Negative 10/20/2024 06:18 PM    COLORU RED 10/20/2024 06:18 PM    PHUR 6.0 10/20/2024 06:18 PM    PHUR 7.0 08/03/2023 09:20 PM    PHUR 7.0 08/03/2023 09:20 PM    WBCUA 6-9 10/20/2024 06:18 PM    RBCUA >100 10/20/2024 06:18 PM    MUCUS 1+ 03/19/2022 05:10 PM    TRICHOMONAS None Seen 03/19/2022 05:10 PM    BACTERIA Rare 10/20/2024 06:18 PM    CLARITYU TURBID 10/20/2024 06:18 PM    LEUKOCYTESUR TRACE 10/20/2024 06:18 PM    UROBILINOGEN 4.0 10/20/2024 06:18 PM    BILIRUBINUR SMALL 10/20/2024 06:18 PM    BLOODU LARGE 10/20/2024 06:18 PM    GLUCOSEU 500 10/20/2024 06:18 PM    KETUA Negative 10/20/2024 06:18 PM   +  Urine Cultures: No results found for: \"LABURIN\"  Blood Cultures: No results found for: \"BC\"  No results found 
the dictating provider for clarification.   
using speech recognition software and may contain errors related to that system including errors in grammar, punctuation, and spelling, as well as words and phrases that may be inappropriate. If there are any questions or concerns, please feel free to contact the dictating provider for clarification.

## 2024-10-29 NOTE — CARE COORDINATION
SOCIAL WORK DISCHARGE SUMMARY        DATE OF DISCHARGE:     Tuesday, 10-      LOCATION:    home with brother        Discharging to Facility/ Agency   Name:  [facility]    Address: [address] [place] [address]  Phone: [phone]  Fax: [phone]           TIME:   family        PHARMACY:        DME:    none    CHLOE Orozco     Case Management   [phone]    10/29/2024  11:54 AM

## 2024-10-29 NOTE — PLAN OF CARE
Problem: Safety - Adult  Goal: Free from fall injury  10/23/2024 1840 by Ju Desouza RN  Outcome: Progressing     Problem: Chronic Conditions and Co-morbidities  Goal: Patient's chronic conditions and co-morbidity symptoms are monitored and maintained or improved  10/23/2024 1840 by Ju Desouza RN  Outcome: Progressing     Problem: Discharge Planning  Goal: Discharge to home or other facility with appropriate resources  10/23/2024 1840 by Ju Desouza RN  Outcome: Progressing     Problem: Pain  Goal: Verbalizes/displays adequate comfort level or baseline comfort level  10/23/2024 1840 by Ju Desouza RN  Outcome: Progressing     Problem: ABCDS Injury Assessment  Goal: Absence of physical injury  10/23/2024 1840 by Ju Desouza RN  Outcome: Progressing     Problem: Skin/Tissue Integrity  Goal: Absence of new skin breakdown  Description: 1.  Monitor for areas of redness and/or skin breakdown  2.  Assess vascular access sites hourly  3.  Every 4-6 hours minimum:  Change oxygen saturation probe site  4.  Every 4-6 hours:  If on nasal continuous positive airway pressure, respiratory therapy assess nares and determine need for appliance change or resting period.  10/23/2024 1840 by Ju Desouza RN  Outcome: Progressing     Problem: Respiratory - Adult  Goal: Achieves optimal ventilation and oxygenation  10/23/2024 1840 by Ju Desouza RN  Outcome: Progressing     Problem: Cardiovascular - Adult  Goal: Maintains optimal cardiac output and hemodynamic stability  10/23/2024 1840 by Ju Desouza RN  Outcome: Progressing     Problem: Cardiovascular - Adult  Goal: Absence of cardiac dysrhythmias or at baseline  10/23/2024 1840 by Ju Desouza RN  Outcome: Progressing     
  Problem: Safety - Adult  Goal: Free from fall injury  10/25/2024 0942 by Namita Rivera RN  Outcome: Progressing  Flowsheets (Taken 10/20/2024 2127 by Babs Diaz RN)  Free From Fall Injury: Instruct family/caregiver on patient safety  10/24/2024 2232 by Abilio Tom RN  Outcome: Progressing     Problem: Chronic Conditions and Co-morbidities  Goal: Patient's chronic conditions and co-morbidity symptoms are monitored and maintained or improved  10/25/2024 0942 by Namita Rivera RN  Outcome: Progressing  Flowsheets (Taken 10/25/2024 0942)  Care Plan - Patient's Chronic Conditions and Co-Morbidity Symptoms are Monitored and Maintained or Improved:   Monitor and assess patient's chronic conditions and comorbid symptoms for stability, deterioration, or improvement   Collaborate with multidisciplinary team to address chronic and comorbid conditions and prevent exacerbation or deterioration   Update acute care plan with appropriate goals if chronic or comorbid symptoms are exacerbated and prevent overall improvement and discharge  10/24/2024 2232 by Abilio Tom RN  Outcome: Progressing  Flowsheets (Taken 10/24/2024 2000)  Care Plan - Patient's Chronic Conditions and Co-Morbidity Symptoms are Monitored and Maintained or Improved:   Monitor and assess patient's chronic conditions and comorbid symptoms for stability, deterioration, or improvement   Collaborate with multidisciplinary team to address chronic and comorbid conditions and prevent exacerbation or deterioration     Problem: Discharge Planning  Goal: Discharge to home or other facility with appropriate resources  10/25/2024 0942 by Namita Rivera RN  Outcome: Progressing  Flowsheets (Taken 10/25/2024 0942)  Discharge to home or other facility with appropriate resources:   Identify barriers to discharge with patient and caregiver   Arrange for needed discharge resources and transportation as appropriate   Identify discharge learning needs (meds, 
  Problem: Safety - Adult  Goal: Free from fall injury  10/29/2024 0833 by Dar Hendrickson, RN  Outcome: Progressing  Note: Pt is a fall risk. Fall risk protocol in place. See Mccullough Fall Score. Pt bed is in low position, bed alarm is on, side rails up, fall risk bracelet applied., non-skid footwear in use. Patient/family educated on fall risk protocol, instructed to call for assistance when needed and belongings are in reach.assistance. Will continue with hourly rounds for po intake, pain needs, toileting and repositioning as needed. Will continue to monitor for needs.     Problem: Pain  Goal: Verbalizes/displays adequate comfort level or baseline comfort level  10/29/2024 0833 by Dar Hendrickson, RN  Outcome: Progressing  Note: Pt has not had any c/o pain thus far this shift. Pt resting comfortably in bed. Will monitor.      Problem: Skin/Tissue Integrity  Goal: Absence of new skin breakdown  Description: 1.  Monitor for areas of redness and/or skin breakdown  2.  Assess vascular access sites hourly  3.  Every 4-6 hours minimum:  Change oxygen saturation probe site  4.  Every 4-6 hours:  If on nasal continuous positive airway pressure, respiratory therapy assess nares and determine need for appliance change or resting period.  10/29/2024 0833 by Dar Hendrickson, RN  Outcome: Progressing  Note: Pt at risk for skin breakdown. See Hudson score. Pt is able to reposition self in bed. Heels elevated off bed. Sacral heart mepilex intact to protect, site inspected and intact underneath.  Will continue to turn and reposition patient every two hours and as needed. Will continue to keep patient clean and dry, applying skin care cream as needed. Pillows used for repositioning q2hs.  Will continue to monitor and assess for skin breakdown.     
  Problem: Safety - Adult  Goal: Free from fall injury  Outcome: Progressing     Problem: Chronic Conditions and Co-morbidities  Goal: Patient's chronic conditions and co-morbidity symptoms are monitored and maintained or improved  Outcome: Progressing     Problem: Discharge Planning  Goal: Discharge to home or other facility with appropriate resources  Outcome: Progressing     Problem: Pain  Goal: Verbalizes/displays adequate comfort level or baseline comfort level  Outcome: Progressing     Problem: ABCDS Injury Assessment  Goal: Absence of physical injury  Outcome: Progressing     
  Problem: Safety - Adult  Goal: Free from fall injury  Outcome: Progressing     Problem: Chronic Conditions and Co-morbidities  Goal: Patient's chronic conditions and co-morbidity symptoms are monitored and maintained or improved  Outcome: Progressing     Problem: Discharge Planning  Goal: Discharge to home or other facility with appropriate resources  Outcome: Progressing     Problem: Pain  Goal: Verbalizes/displays adequate comfort level or baseline comfort level  Outcome: Progressing     Problem: ABCDS Injury Assessment  Goal: Absence of physical injury  Outcome: Progressing     Problem: Skin/Tissue Integrity  Goal: Absence of new skin breakdown  Description: 1.  Monitor for areas of redness and/or skin breakdown  2.  Assess vascular access sites hourly  3.  Every 4-6 hours minimum:  Change oxygen saturation probe site  4.  Every 4-6 hours:  If on nasal continuous positive airway pressure, respiratory therapy assess nares and determine need for appliance change or resting period.  Outcome: Progressing     Problem: Respiratory - Adult  Goal: Achieves optimal ventilation and oxygenation  Outcome: Progressing     Problem: Cardiovascular - Adult  Goal: Maintains optimal cardiac output and hemodynamic stability  Outcome: Progressing  Goal: Absence of cardiac dysrhythmias or at baseline  Outcome: Progressing     
  Problem: Safety - Adult  Goal: Free from fall injury  Outcome: Progressing     Problem: Chronic Conditions and Co-morbidities  Goal: Patient's chronic conditions and co-morbidity symptoms are monitored and maintained or improved  Outcome: Progressing     Problem: Discharge Planning  Goal: Discharge to home or other facility with appropriate resources  Outcome: Progressing     Problem: Pain  Goal: Verbalizes/displays adequate comfort level or baseline comfort level  Outcome: Progressing     Problem: ABCDS Injury Assessment  Goal: Absence of physical injury  Outcome: Progressing     Problem: Skin/Tissue Integrity  Goal: Absence of new skin breakdown  Description: 1.  Monitor for areas of redness and/or skin breakdown  2.  Assess vascular access sites hourly  3.  Every 4-6 hours minimum:  Change oxygen saturation probe site  4.  Every 4-6 hours:  If on nasal continuous positive airway pressure, respiratory therapy assess nares and determine need for appliance change or resting period.  Outcome: Progressing     Problem: Respiratory - Adult  Goal: Achieves optimal ventilation and oxygenation  Outcome: Progressing     Problem: Cardiovascular - Adult  Goal: Maintains optimal cardiac output and hemodynamic stability  Outcome: Progressing  Goal: Absence of cardiac dysrhythmias or at baseline  Outcome: Progressing     Problem: Neurosensory - Adult  Goal: Achieves maximal functionality and self care  Outcome: Progressing     Problem: Skin/Tissue Integrity - Adult  Goal: Skin integrity remains intact  Outcome: Progressing     Problem: Musculoskeletal - Adult  Goal: Return mobility to safest level of function  Outcome: Progressing  Goal: Return ADL status to a safe level of function  Outcome: Progressing     Problem: Gastrointestinal - Adult  Goal: Minimal or absence of nausea and vomiting  Outcome: Progressing  Goal: Maintains or returns to baseline bowel function  Outcome: Progressing  Goal: Maintains adequate nutritional 
  Problem: Safety - Adult  Goal: Free from fall injury  Outcome: Progressing     Problem: Chronic Conditions and Co-morbidities  Goal: Patient's chronic conditions and co-morbidity symptoms are monitored and maintained or improved  Outcome: Progressing     Problem: Pain  Goal: Verbalizes/displays adequate comfort level or baseline comfort level  Outcome: Progressing     Problem: ABCDS Injury Assessment  Goal: Absence of physical injury  Outcome: Progressing     Problem: Skin/Tissue Integrity  Goal: Absence of new skin breakdown  Description: 1.  Monitor for areas of redness and/or skin breakdown  2.  Assess vascular access sites hourly  3.  Every 4-6 hours minimum:  Change oxygen saturation probe site  4.  Every 4-6 hours:  If on nasal continuous positive airway pressure, respiratory therapy assess nares and determine need for appliance change or resting period.  Outcome: Progressing     Problem: Respiratory - Adult  Goal: Achieves optimal ventilation and oxygenation  Outcome: Progressing     Problem: Skin/Tissue Integrity - Adult  Goal: Skin integrity remains intact  Outcome: Progressing     
  Problem: Safety - Adult  Goal: Free from fall injury  Outcome: Progressing     Problem: Chronic Conditions and Co-morbidities  Goal: Patient's chronic conditions and co-morbidity symptoms are monitored and maintained or improved  Outcome: Progressing  Flowsheets (Taken 10/24/2024 0800)  Care Plan - Patient's Chronic Conditions and Co-Morbidity Symptoms are Monitored and Maintained or Improved:   Monitor and assess patient's chronic conditions and comorbid symptoms for stability, deterioration, or improvement   Collaborate with multidisciplinary team to address chronic and comorbid conditions and prevent exacerbation or deterioration   Update acute care plan with appropriate goals if chronic or comorbid symptoms are exacerbated and prevent overall improvement and discharge     Problem: Discharge Planning  Goal: Discharge to home or other facility with appropriate resources  Outcome: Progressing  Flowsheets (Taken 10/24/2024 0800)  Discharge to home or other facility with appropriate resources:   Identify barriers to discharge with patient and caregiver   Arrange for needed discharge resources and transportation as appropriate   Identify discharge learning needs (meds, wound care, etc)   Arrange for interpreters to assist at discharge as needed   Refer to discharge planning if patient needs post-hospital services based on physician order or complex needs related to functional status, cognitive ability or social support system     Problem: Pain  Goal: Verbalizes/displays adequate comfort level or baseline comfort level  Outcome: Progressing     Problem: Cardiovascular - Adult  Goal: Maintains optimal cardiac output and hemodynamic stability  Outcome: Progressing  Flowsheets (Taken 10/24/2024 0800)  Maintains optimal cardiac output and hemodynamic stability:   Monitor blood pressure and heart rate   Monitor urine output and notify Licensed Independent Practitioner for values outside of normal range   Assess for signs 
  Problem: Safety - Adult  Goal: Free from fall injury  Outcome: Progressing  Flowsheets  Taken 10/20/2024 2127 by Babs Diaz RN  Free From Fall Injury: Instruct family/caregiver on patient safety  TaProblem: Chronic Conditions and Co-morbidities  Goal: Patient's chronic conditions and co-morbidity symptoms are monitored and maintained or improved  Outcome: Progressing  Flowsheets  Taken 10/20/2024 1200 by Clau Wilson RN  Care Plan - Patient's Chronic Conditions and Co-Morbidity Symptoms are Monitored and Maintained or Improved:   Monitor and assess patient's chronic conditions and comorbid symptoms for stability, deterioration, or improvement   Collaborate with multidisciplinary team to address chronic and comorbid conditions and prevent exacerbation or deterioration   Update acute care plan with appropriate goals if chronic or comorbid symptoms are exacerbated and prevent overall improvement and discharge    Problem: Pain  Goal: Verbalizes/displays adequate comfort level or baseline comfort level  Outcome: Progressing  Flowsheets  Taken 10/20/2024 1200 by Clau Wilson RN  Verbalizes/displays adequate comfort level or baseline comfort level:   Encourage patient to monitor pain and request assistance   Assess pain using appropriate pain scale   Administer analgesics based on type and severity of pain and evaluate response   Implement non-pharmacological measures as appropriate and evaluate response   Consider cultural and social influences on pain and pain management   Notify Licensed Independent Practitioner if interventions unsuccessful or patient reports new pain    Problem: ABCDS Injury Assessment  Goal: Absence of physical injury  Outcome: Progressing  Flowsheets (Taken 10/20/2024 2127)  Absence of Physical Injury: Implement safety measures based on patient assessment     Problem: Skin/Tissue Integrity  Goal: Absence of new skin breakdown  Description: 1.  Monitor for areas of redness and/or 
  Problem: Safety - Adult  Goal: Free from fall injury  Outcome: Progressing  Flowsheets (Taken 10/19/2024 2253)  Free From Fall Injury: Instruct family/caregiver on patient safety     Problem: Chronic Conditions and Co-morbidities  Goal: Patient's chronic conditions and co-morbidity symptoms are monitored and maintained or improved  Outcome: Progressing  Flowsheets (Taken 10/19/2024 1410 by Clau Wilson, RN)  Care Plan - Patient's Chronic Conditions and Co-Morbidity Symptoms are Monitored and Maintained or Improved:   Monitor and assess patient's chronic conditions and comorbid symptoms for stability, deterioration, or improvement   Collaborate with multidisciplinary team to address chronic and comorbid conditions and prevent exacerbation or deterioration   Update acute care plan with appropriate goals if chronic or comorbid symptoms are exacerbated and prevent overall improvement and discharge     Problem: Pain  Goal: Verbalizes/displays adequate comfort level or baseline comfort level  Outcome: Progressing  Flowsheets (Taken 10/19/2024 1410 by Clau Wilson, RN)  Verbalizes/displays adequate comfort level or baseline comfort level:   Encourage patient to monitor pain and request assistance   Assess pain using appropriate pain scale   Administer analgesics based on type and severity of pain and evaluate response   Implement non-pharmacological measures as appropriate and evaluate response   Consider cultural and social influences on pain and pain management   Notify Licensed Independent Practitioner if interventions unsuccessful or patient reports new pain     Problem: ABCDS Injury Assessment  Goal: Absence of physical injury  Outcome: Progressing  Flowsheets (Taken 10/19/2024 2253)  Absence of Physical Injury: Implement safety measures based on patient assessment     Problem: Skin/Tissue Integrity  Goal: Absence of new skin breakdown  Description: 1.  Monitor for areas of redness and/or skin breakdown  2.  
Problem: Safety - Adult  Goal: Free from fall injury  10/27/2024 1127 by Jonna Odonnell RN  Outcome: Progressing       Problem: Chronic Conditions and Co-morbidities  Goal: Patient's chronic conditions and co-morbidity symptoms are monitored and maintained or improved  10/27/2024 1127 by Jonna Odonnell RN  Outcome: Progressing       Problem: Discharge Planning  Goal: Discharge to home or other facility with appropriate resources  10/27/2024 1127 by Jonna Odonnell RN  Outcome: Progressing    Problem: Pain  Goal: Verbalizes/displays adequate comfort level or baseline comfort level  10/27/2024 1127 by Jonna Odonnell RN  Outcome: Progressing    Problem: ABCDS Injury Assessment  Goal: Absence of physical injury  10/27/2024 1127 by Jonna Odonnell RN  Outcome: Progressing    Problem: Skin/Tissue Integrity  Goal: Absence of new skin breakdown  Description: 1.  Monitor for areas of redness and/or skin breakdown  2.  Assess vascular access sites hourly  3.  Every 4-6 hours minimum:  Change oxygen saturation probe site  4.  Every 4-6 hours:  If on nasal continuous positive airway pressure, respiratory therapy assess nares and determine need for appliance change or resting period.  10/27/2024 1127 by Jonna Odonnell RN  Outcome: Progressing    Problem: Respiratory - Adult  Goal: Achieves optimal ventilation and oxygenation  10/27/2024 1127 by Jonna Odonnell RN  Outcome: Progressing    Problem: Cardiovascular - Adult  Goal: Maintains optimal cardiac output and hemodynamic stability  10/27/2024 1127 by Jonna Odonnell RN  Outcome: Progressing    Goal: Absence of cardiac dysrhythmias or at baseline  10/27/2024 1127 by Jonna Odonnell RN  Outcome: Progressing    Problem: Neurosensory - Adult  Goal: Achieves maximal functionality and self care  10/27/2024 1127 by Jonna Odonnell RN  Outcome: Progressing       Problem: Skin/Tissue Integrity - Adult  Goal: Skin 
needed discharge resources and transportation as appropriate   Identify discharge learning needs (meds, wound care, etc)   Arrange for interpreters to assist at discharge as needed   Refer to discharge planning if patient needs post-hospital services based on physician order or complex needs related to functional status, cognitive ability or social support system     Problem: Pain  Goal: Verbalizes/displays adequate comfort level or baseline comfort level  10/24/2024 2232 by Abilio oTm RN  Outcome: Progressing  Flowsheets (Taken 10/24/2024 2000)  Verbalizes/displays adequate comfort level or baseline comfort level:   Encourage patient to monitor pain and request assistance   Assess pain using appropriate pain scale   Implement non-pharmacological measures as appropriate and evaluate response  10/24/2024 1653 by Marium Martin RN  Outcome: Progressing     Problem: ABCDS Injury Assessment  Goal: Absence of physical injury  Outcome: Progressing     Problem: Skin/Tissue Integrity  Goal: Absence of new skin breakdown  Description: 1.  Monitor for areas of redness and/or skin breakdown  2.  Assess vascular access sites hourly  3.  Every 4-6 hours minimum:  Change oxygen saturation probe site  4.  Every 4-6 hours:  If on nasal continuous positive airway pressure, respiratory therapy assess nares and determine need for appliance change or resting period.  Outcome: Progressing     Problem: Respiratory - Adult  Goal: Achieves optimal ventilation and oxygenation  Outcome: Progressing     Problem: Cardiovascular - Adult  Goal: Maintains optimal cardiac output and hemodynamic stability  10/24/2024 2232 by Abilio Tom, RN  Outcome: Progressing  Flowsheets (Taken 10/24/2024 2000)  Maintains optimal cardiac output and hemodynamic stability:   Monitor blood pressure and heart rate   Monitor urine output and notify Licensed Independent Practitioner for values outside of normal range   Assess for signs of decreased 
Electrolytes - Adult  Goal: Glucose maintained within prescribed range  10/25/2024 2103 by Babs Diaz, RN  Outcome: Progressing  Flowsheets (Taken 10/25/2024 2000)  Glucose maintained within prescribed range: Monitor blood glucose as ordered     Problem: Hematologic - Adult  Goal: Maintains hematologic stability  10/25/2024 2103 by Babs Diaz, RN  Outcome: Progressing  Flowsheets (Taken 10/25/2024 2000)  Maintains hematologic stability: Assess for signs and symptoms of bleeding or hemorrhage     
Progressing  Flowsheets (Taken 10/28/2024 2000)  Skin Integrity Remains Intact:   Assess vascular access sites hourly   Monitor for areas of redness and/or skin breakdown   Every 4-6 hours minimum: Change oxygen saturation probe site     Problem: Musculoskeletal - Adult  Goal: Return mobility to safest level of function  Outcome: Progressing  Flowsheets (Taken 10/28/2024 2000)  Return Mobility to Safest Level of Function:   Assess patient stability and activity tolerance for standing, transferring and ambulating with or without assistive devices   Ensure adequate protection for wounds/incisions during mobilization   Obtain physical therapy/occupational therapy consults as needed  Goal: Return ADL status to a safe level of function  Outcome: Progressing  Flowsheets (Taken 10/28/2024 2000)  Return ADL Status to a Safe Level of Function:   Assess activities of daily living deficits and provide assistive devices as needed   Obtain physical therapy/occupational therapy consults as needed   Assist and instruct patient to increase activity and self care as tolerated   Administer medication as ordered     Problem: Gastrointestinal - Adult  Goal: Minimal or absence of nausea and vomiting  Outcome: Progressing  Flowsheets (Taken 10/28/2024 2000)  Minimal or absence of nausea and vomiting:   Administer IV fluids as ordered to ensure adequate hydration   Maintain NPO status until nausea and vomiting are resolved   Nasogastric tube to low intermittent suction as ordered   Administer ordered antiemetic medications as needed   Provide nonpharmacologic comfort measures as appropriate   Advance diet as tolerated, if ordered  Goal: Maintains or returns to baseline bowel function  Outcome: Progressing  Flowsheets (Taken 10/28/2024 2000)  Maintains or returns to baseline bowel function:   Administer ordered medications as needed   Encourage mobilization and activity   Administer IV fluids as ordered to ensure adequate hydration   
2000)  Maintains hematologic stability:   Assess for signs and symptoms of bleeding or hemorrhage   Monitor labs for bleeding or clotting disorders   Administer blood products/factors as ordered     
stability  10/28/2024 1033 by Garth Landin, RN  Outcome: Progressing  10/28/2024 0606 by Avni Avila, RN  Outcome: Progressing  Flowsheets (Taken 10/27/2024 2000)  Maintains hematologic stability:   Assess for signs and symptoms of bleeding or hemorrhage   Monitor labs for bleeding or clotting disorders   Administer blood products/factors as ordered

## 2024-10-29 NOTE — CARE COORDINATION
Called [facility] to inform of DC date.  Called [phone].    CHLOE Orozco     Case Management   [phone]    10/29/2024  12:01 PM

## 2024-10-30 ENCOUNTER — TELEPHONE (OUTPATIENT)
Dept: PRIMARY CARE CLINIC | Age: 59
End: 2024-10-30

## 2024-10-30 NOTE — TELEPHONE ENCOUNTER
DR Lilly is unable to sign orders for a patient not seen since 2022. Geovanny has been notified of this information on VM and call if any questions.

## 2024-10-30 NOTE — TELEPHONE ENCOUNTER
Left message on machine per HIPPA  TCM hosp f/u please transfer call to [place] ext [phone]    Care Transitions Initial Follow Up Call    Outreach made within 2 business days of discharge: Yes    Patient: Levy Vargas Patient : 1965   MRN: [id number]  Reason for Admission: Acute on chronic systolic heart failure (HCC)   Discharge Date: 10/29/24       Spoke with: left message    Discharge department/facility: [place]    Scheduled appointment with PCP within 7-14 days    Follow Up  Future Appointments   Date Time Provider Department Center   2024 10:00 AM Chyna Sexton, APRN - CNP I [facility]       Suad Tyson MA

## 2024-10-30 NOTE — TELEPHONE ENCOUNTER
[facility] wanting to know if Dr. Lilly would be willing to sign home care orders for PT, OT and skilled nursing for patient. PT was just discharged from [facility] yesterday.     PT's last appt with Dr. Lilly was in April 2022.    Please call Geovanny back to adv: [phone]

## 2024-10-31 ENCOUNTER — FOLLOWUP TELEPHONE ENCOUNTER (OUTPATIENT)
Dept: ADMINISTRATIVE | Age: 59
End: 2024-10-31

## 2024-10-31 NOTE — PROGRESS NOTES
Attempted to reach patient x3 for follow up call. Initial call went to voicemail. Immediately called back (twice) each time call picked up and then disconnected--attempted to identify myself prior to being disconnected. Pt does have home care, and a follow up call scheduled tomorrow.

## 2024-11-02 LAB — MITOCHONDRIA M2 AB SER IA-ACNC: 2.1 U/ML (ref 0–4)

## 2024-11-22 ENCOUNTER — FOLLOWUP TELEPHONE ENCOUNTER (OUTPATIENT)
Dept: ADMINISTRATIVE | Age: 59
End: 2024-11-22

## 2024-12-11 ENCOUNTER — HOSPITAL ENCOUNTER (INPATIENT)
Age: 59
LOS: 23 days | Discharge: NURSING, REHAB OR LONG-TERM CARE | End: 2025-01-03
Attending: EMERGENCY MEDICINE
Payer: COMMERCIAL

## 2024-12-11 ENCOUNTER — APPOINTMENT (OUTPATIENT)
Dept: GENERAL RADIOLOGY | Age: 59
End: 2024-12-11
Payer: COMMERCIAL

## 2024-12-11 DIAGNOSIS — I48.19 PERSISTENT ATRIAL FIBRILLATION (HCC): ICD-10-CM

## 2024-12-11 DIAGNOSIS — I48.91 ATRIAL FIBRILLATION WITH RAPID VENTRICULAR RESPONSE (HCC): Primary | ICD-10-CM

## 2024-12-11 LAB
ANION GAP SERPL CALCULATED.3IONS-SCNC: 14 MMOL/L (ref 3–16)
BASOPHILS # BLD: 0 K/UL (ref 0–0.2)
BASOPHILS NFR BLD: 0.8 %
BUN SERPL-MCNC: 38 MG/DL (ref 7–20)
CALCIUM SERPL-MCNC: 9.8 MG/DL (ref 8.3–10.6)
CHLORIDE SERPL-SCNC: 95 MMOL/L (ref 99–110)
CO2 SERPL-SCNC: 36 MMOL/L (ref 21–32)
CREAT SERPL-MCNC: 1.4 MG/DL (ref 0.8–1.3)
DEPRECATED RDW RBC AUTO: 18.5 % (ref 12.4–15.4)
EOSINOPHIL # BLD: 0.1 K/UL (ref 0–0.6)
EOSINOPHIL NFR BLD: 2.4 %
GFR SERPLBLD CREATININE-BSD FMLA CKD-EPI: 57 ML/MIN/{1.73_M2}
GLUCOSE SERPL-MCNC: 92 MG/DL (ref 70–99)
HCT VFR BLD AUTO: 38.3 % (ref 40.5–52.5)
HGB BLD-MCNC: 12.3 G/DL (ref 13.5–17.5)
LYMPHOCYTES # BLD: 0.6 K/UL (ref 1–5.1)
LYMPHOCYTES NFR BLD: 12.7 %
MAGNESIUM SERPL-MCNC: 2.41 MG/DL (ref 1.8–2.4)
MCH RBC QN AUTO: 27.9 PG (ref 26–34)
MCHC RBC AUTO-ENTMCNC: 32 G/DL (ref 31–36)
MCV RBC AUTO: 87 FL (ref 80–100)
MONOCYTES # BLD: 0.4 K/UL (ref 0–1.3)
MONOCYTES NFR BLD: 8 %
NEUTROPHILS # BLD: 3.6 K/UL (ref 1.7–7.7)
NEUTROPHILS NFR BLD: 76.1 %
NT-PROBNP SERPL-MCNC: 6743 PG/ML (ref 0–124)
PLATELET # BLD AUTO: 189 K/UL (ref 135–450)
PMV BLD AUTO: 9.2 FL (ref 5–10.5)
POTASSIUM SERPL-SCNC: 2.7 MMOL/L (ref 3.5–5.1)
RBC # BLD AUTO: 4.4 M/UL (ref 4.2–5.9)
SODIUM SERPL-SCNC: 145 MMOL/L (ref 136–145)
TROPONIN, HIGH SENSITIVITY: 35 NG/L (ref 0–22)
TROPONIN, HIGH SENSITIVITY: 36 NG/L (ref 0–22)
WBC # BLD AUTO: 4.8 K/UL (ref 4–11)

## 2024-12-11 PROCEDURE — 96365 THER/PROPH/DIAG IV INF INIT: CPT

## 2024-12-11 PROCEDURE — 6360000002 HC RX W HCPCS: Performed by: EMERGENCY MEDICINE

## 2024-12-11 PROCEDURE — 96374 THER/PROPH/DIAG INJ IV PUSH: CPT

## 2024-12-11 PROCEDURE — 99285 EMERGENCY DEPT VISIT HI MDM: CPT

## 2024-12-11 PROCEDURE — 71045 X-RAY EXAM CHEST 1 VIEW: CPT

## 2024-12-11 PROCEDURE — 5A2204Z RESTORATION OF CARDIAC RHYTHM, SINGLE: ICD-10-PCS | Performed by: INTERNAL MEDICINE

## 2024-12-11 PROCEDURE — 83735 ASSAY OF MAGNESIUM: CPT

## 2024-12-11 PROCEDURE — 2500000003 HC RX 250 WO HCPCS: Performed by: EMERGENCY MEDICINE

## 2024-12-11 PROCEDURE — 6370000000 HC RX 637 (ALT 250 FOR IP): Performed by: EMERGENCY MEDICINE

## 2024-12-11 PROCEDURE — 2100000000 HC CCU R&B

## 2024-12-11 PROCEDURE — 83880 ASSAY OF NATRIURETIC PEPTIDE: CPT

## 2024-12-11 PROCEDURE — 6370000000 HC RX 637 (ALT 250 FOR IP)

## 2024-12-11 PROCEDURE — 2700000000 HC OXYGEN THERAPY PER DAY

## 2024-12-11 PROCEDURE — 94760 N-INVAS EAR/PLS OXIMETRY 1: CPT

## 2024-12-11 PROCEDURE — B24BZZ4 ULTRASONOGRAPHY OF HEART WITH AORTA, TRANSESOPHAGEAL: ICD-10-PCS | Performed by: INTERNAL MEDICINE

## 2024-12-11 PROCEDURE — 2500000003 HC RX 250 WO HCPCS: Performed by: REGISTERED NURSE

## 2024-12-11 PROCEDURE — 93005 ELECTROCARDIOGRAM TRACING: CPT | Performed by: EMERGENCY MEDICINE

## 2024-12-11 PROCEDURE — 85025 COMPLETE CBC W/AUTO DIFF WBC: CPT

## 2024-12-11 PROCEDURE — 80048 BASIC METABOLIC PNL TOTAL CA: CPT

## 2024-12-11 PROCEDURE — 84484 ASSAY OF TROPONIN QUANT: CPT

## 2024-12-11 PROCEDURE — 2580000003 HC RX 258: Performed by: EMERGENCY MEDICINE

## 2024-12-11 RX ORDER — POTASSIUM CHLORIDE 7.45 MG/ML
10 INJECTION INTRAVENOUS PRN
Status: DISCONTINUED | OUTPATIENT
Start: 2024-12-11 | End: 2024-12-15 | Stop reason: ALTCHOICE

## 2024-12-11 RX ORDER — METOPROLOL SUCCINATE 25 MG/1
25 TABLET, EXTENDED RELEASE ORAL EVERY EVENING
Status: DISCONTINUED | OUTPATIENT
Start: 2024-12-11 | End: 2025-01-03 | Stop reason: HOSPADM

## 2024-12-11 RX ORDER — POLYETHYLENE GLYCOL 3350 17 G/17G
17 POWDER, FOR SOLUTION ORAL DAILY PRN
Status: DISCONTINUED | OUTPATIENT
Start: 2024-12-11 | End: 2025-01-03 | Stop reason: HOSPADM

## 2024-12-11 RX ORDER — ONDANSETRON 4 MG/1
4 TABLET, ORALLY DISINTEGRATING ORAL EVERY 8 HOURS PRN
Status: DISCONTINUED | OUTPATIENT
Start: 2024-12-11 | End: 2025-01-03 | Stop reason: HOSPADM

## 2024-12-11 RX ORDER — POTASSIUM CHLORIDE 1500 MG/1
40 TABLET, EXTENDED RELEASE ORAL PRN
Status: DISCONTINUED | OUTPATIENT
Start: 2024-12-11 | End: 2024-12-15 | Stop reason: ALTCHOICE

## 2024-12-11 RX ORDER — POTASSIUM CHLORIDE 750 MG/1
40 TABLET, EXTENDED RELEASE ORAL ONCE
Status: COMPLETED | OUTPATIENT
Start: 2024-12-11 | End: 2024-12-11

## 2024-12-11 RX ORDER — ATORVASTATIN CALCIUM 80 MG/1
80 TABLET, FILM COATED ORAL NIGHTLY
Status: DISCONTINUED | OUTPATIENT
Start: 2024-12-11 | End: 2025-01-03 | Stop reason: HOSPADM

## 2024-12-11 RX ORDER — POTASSIUM CHLORIDE 7.45 MG/ML
10 INJECTION INTRAVENOUS ONCE
Status: COMPLETED | OUTPATIENT
Start: 2024-12-11 | End: 2024-12-11

## 2024-12-11 RX ORDER — GABAPENTIN 300 MG/1
300 CAPSULE ORAL 3 TIMES DAILY
Status: DISCONTINUED | OUTPATIENT
Start: 2024-12-11 | End: 2024-12-12

## 2024-12-11 RX ORDER — ASPIRIN 81 MG/1
81 TABLET, CHEWABLE ORAL DAILY
Status: DISCONTINUED | OUTPATIENT
Start: 2024-12-12 | End: 2025-01-03 | Stop reason: HOSPADM

## 2024-12-11 RX ORDER — SODIUM CHLORIDE 9 MG/ML
INJECTION, SOLUTION INTRAVENOUS PRN
Status: DISCONTINUED | OUTPATIENT
Start: 2024-12-11 | End: 2025-01-03 | Stop reason: HOSPADM

## 2024-12-11 RX ORDER — TORSEMIDE 100 MG/1
50 TABLET ORAL DAILY
Status: DISCONTINUED | OUTPATIENT
Start: 2024-12-12 | End: 2024-12-12

## 2024-12-11 RX ORDER — DILTIAZEM HYDROCHLORIDE 5 MG/ML
10 INJECTION INTRAVENOUS ONCE
Status: COMPLETED | OUTPATIENT
Start: 2024-12-11 | End: 2024-12-11

## 2024-12-11 RX ORDER — DILTIAZEM HYDROCHLORIDE 5 MG/ML
10 INJECTION INTRAVENOUS ONCE
Status: DISCONTINUED | OUTPATIENT
Start: 2024-12-11 | End: 2024-12-23

## 2024-12-11 RX ORDER — ISOSORBIDE MONONITRATE 30 MG/1
15 TABLET, EXTENDED RELEASE ORAL DAILY
Status: DISCONTINUED | OUTPATIENT
Start: 2024-12-12 | End: 2024-12-12

## 2024-12-11 RX ORDER — ONDANSETRON 2 MG/ML
4 INJECTION INTRAMUSCULAR; INTRAVENOUS EVERY 6 HOURS PRN
Status: DISCONTINUED | OUTPATIENT
Start: 2024-12-11 | End: 2025-01-03 | Stop reason: HOSPADM

## 2024-12-11 RX ORDER — SODIUM CHLORIDE 0.9 % (FLUSH) 0.9 %
5-40 SYRINGE (ML) INJECTION PRN
Status: DISCONTINUED | OUTPATIENT
Start: 2024-12-11 | End: 2025-01-03 | Stop reason: HOSPADM

## 2024-12-11 RX ORDER — MAGNESIUM SULFATE IN WATER 40 MG/ML
2000 INJECTION, SOLUTION INTRAVENOUS PRN
Status: DISCONTINUED | OUTPATIENT
Start: 2024-12-11 | End: 2024-12-15 | Stop reason: ALTCHOICE

## 2024-12-11 RX ORDER — PANTOPRAZOLE SODIUM 40 MG/1
40 TABLET, DELAYED RELEASE ORAL DAILY PRN
Status: DISCONTINUED | OUTPATIENT
Start: 2024-12-11 | End: 2025-01-03 | Stop reason: HOSPADM

## 2024-12-11 RX ORDER — SODIUM CHLORIDE 0.9 % (FLUSH) 0.9 %
5-40 SYRINGE (ML) INJECTION EVERY 12 HOURS SCHEDULED
Status: DISCONTINUED | OUTPATIENT
Start: 2024-12-11 | End: 2025-01-03 | Stop reason: HOSPADM

## 2024-12-11 RX ADMIN — DILTIAZEM HYDROCHLORIDE 10 MG: 5 INJECTION, SOLUTION INTRAVENOUS at 20:04

## 2024-12-11 RX ADMIN — DILTIAZEM HYDROCHLORIDE 10 MG: 5 INJECTION, SOLUTION INTRAVENOUS at 21:37

## 2024-12-11 RX ADMIN — AMIODARONE HYDROCHLORIDE 150 MG: 1.5 INJECTION, SOLUTION INTRAVENOUS at 23:47

## 2024-12-11 RX ADMIN — POTASSIUM CHLORIDE 40 MEQ: 750 TABLET, EXTENDED RELEASE ORAL at 20:39

## 2024-12-11 RX ADMIN — SACUBITRIL AND VALSARTAN 1 TABLET: 49; 51 TABLET, FILM COATED ORAL at 23:56

## 2024-12-11 RX ADMIN — POTASSIUM CHLORIDE 10 MEQ: 7.46 INJECTION, SOLUTION INTRAVENOUS at 20:48

## 2024-12-11 RX ADMIN — METOPROLOL SUCCINATE 25 MG: 25 TABLET, EXTENDED RELEASE ORAL at 23:56

## 2024-12-11 RX ADMIN — DILTIAZEM HYDROCHLORIDE 5 MG/HR: 5 INJECTION, SOLUTION INTRAVENOUS at 20:11

## 2024-12-11 RX ADMIN — ATORVASTATIN CALCIUM 80 MG: 80 TABLET, FILM COATED ORAL at 23:56

## 2024-12-11 RX ADMIN — APIXABAN 5 MG: 5 TABLET, FILM COATED ORAL at 23:56

## 2024-12-11 ASSESSMENT — PAIN - FUNCTIONAL ASSESSMENT: PAIN_FUNCTIONAL_ASSESSMENT: ACTIVITIES ARE NOT PREVENTED

## 2024-12-11 ASSESSMENT — PAIN SCALES - GENERAL: PAINLEVEL_OUTOF10: 6

## 2024-12-11 ASSESSMENT — PAIN DESCRIPTION - LOCATION: LOCATION: LEG

## 2024-12-11 ASSESSMENT — PAIN DESCRIPTION - ORIENTATION: ORIENTATION: RIGHT

## 2024-12-11 ASSESSMENT — PAIN DESCRIPTION - DESCRIPTORS: DESCRIPTORS: SHARP;POUNDING;PRESSURE

## 2024-12-11 ASSESSMENT — PAIN DESCRIPTION - PAIN TYPE: TYPE: CHRONIC PAIN

## 2024-12-12 LAB
ANION GAP SERPL CALCULATED.3IONS-SCNC: 10 MMOL/L (ref 3–16)
ANION GAP SERPL CALCULATED.3IONS-SCNC: 12 MMOL/L (ref 3–16)
ANION GAP SERPL CALCULATED.3IONS-SCNC: 13 MMOL/L (ref 3–16)
ANION GAP SERPL CALCULATED.3IONS-SCNC: 15 MMOL/L (ref 3–16)
BACTERIA URNS QL MICRO: ABNORMAL /HPF
BASOPHILS # BLD: 0 K/UL (ref 0–0.2)
BASOPHILS NFR BLD: 0.4 %
BILIRUB UR QL STRIP.AUTO: ABNORMAL
BUN SERPL-MCNC: 37 MG/DL (ref 7–20)
BUN SERPL-MCNC: 38 MG/DL (ref 7–20)
BUN SERPL-MCNC: 39 MG/DL (ref 7–20)
BUN SERPL-MCNC: 42 MG/DL (ref 7–20)
CALCIUM SERPL-MCNC: 7.6 MG/DL (ref 8.3–10.6)
CALCIUM SERPL-MCNC: 8.2 MG/DL (ref 8.3–10.6)
CALCIUM SERPL-MCNC: 8.8 MG/DL (ref 8.3–10.6)
CALCIUM SERPL-MCNC: 8.9 MG/DL (ref 8.3–10.6)
CHARACTER UR: ABNORMAL
CHLORIDE SERPL-SCNC: 95 MMOL/L (ref 99–110)
CHLORIDE SERPL-SCNC: 95 MMOL/L (ref 99–110)
CHLORIDE SERPL-SCNC: 96 MMOL/L (ref 99–110)
CHLORIDE SERPL-SCNC: 99 MMOL/L (ref 99–110)
CLARITY UR: ABNORMAL
CO2 SERPL-SCNC: 31 MMOL/L (ref 21–32)
CO2 SERPL-SCNC: 34 MMOL/L (ref 21–32)
CO2 SERPL-SCNC: 36 MMOL/L (ref 21–32)
CO2 SERPL-SCNC: 36 MMOL/L (ref 21–32)
COLOR UR: ABNORMAL
CREAT SERPL-MCNC: 1.3 MG/DL (ref 0.8–1.3)
CREAT SERPL-MCNC: 1.5 MG/DL (ref 0.8–1.3)
CREAT SERPL-MCNC: 1.7 MG/DL (ref 0.8–1.3)
CREAT SERPL-MCNC: 2.1 MG/DL (ref 0.8–1.3)
CREAT UR-MCNC: 189 MG/DL (ref 39–259)
DEPRECATED RDW RBC AUTO: 18.5 % (ref 12.4–15.4)
EKG ATRIAL RATE: 133 BPM
EKG DIAGNOSIS: NORMAL
EKG DIAGNOSIS: NORMAL
EKG Q-T INTERVAL: 322 MS
EKG Q-T INTERVAL: 372 MS
EKG QRS DURATION: 118 MS
EKG QRS DURATION: 124 MS
EKG QTC CALCULATION (BAZETT): 514 MS
EKG QTC CALCULATION (BAZETT): 555 MS
EKG R AXIS: -30 DEGREES
EKG R AXIS: -34 DEGREES
EKG T AXIS: 180 DEGREES
EKG T AXIS: 186 DEGREES
EKG VENTRICULAR RATE: 134 BPM
EKG VENTRICULAR RATE: 153 BPM
EOSINOPHIL # BLD: 0.1 K/UL (ref 0–0.6)
EOSINOPHIL NFR BLD: 1.3 %
GFR SERPLBLD CREATININE-BSD FMLA CKD-EPI: 35 ML/MIN/{1.73_M2}
GFR SERPLBLD CREATININE-BSD FMLA CKD-EPI: 45 ML/MIN/{1.73_M2}
GFR SERPLBLD CREATININE-BSD FMLA CKD-EPI: 53 ML/MIN/{1.73_M2}
GFR SERPLBLD CREATININE-BSD FMLA CKD-EPI: 63 ML/MIN/{1.73_M2}
GLUCOSE SERPL-MCNC: 127 MG/DL (ref 70–99)
GLUCOSE SERPL-MCNC: 135 MG/DL (ref 70–99)
GLUCOSE SERPL-MCNC: 151 MG/DL (ref 70–99)
GLUCOSE SERPL-MCNC: 162 MG/DL (ref 70–99)
GLUCOSE UR STRIP.AUTO-MCNC: NEGATIVE MG/DL
HCT VFR BLD AUTO: 34.2 % (ref 40.5–52.5)
HGB BLD-MCNC: 10.9 G/DL (ref 13.5–17.5)
HGB UR QL STRIP.AUTO: ABNORMAL
HYALINE CASTS #/AREA URNS LPF: ABNORMAL /LPF (ref 0–2)
INR PPP: 1.7 (ref 0.85–1.15)
KETONES UR STRIP.AUTO-MCNC: ABNORMAL MG/DL
LEUKOCYTE ESTERASE UR QL STRIP.AUTO: ABNORMAL
LYMPHOCYTES # BLD: 0.6 K/UL (ref 1–5.1)
LYMPHOCYTES NFR BLD: 10.9 %
MAGNESIUM SERPL-MCNC: 2.14 MG/DL (ref 1.8–2.4)
MAGNESIUM SERPL-MCNC: 2.38 MG/DL (ref 1.8–2.4)
MAGNESIUM SERPL-MCNC: 2.39 MG/DL (ref 1.8–2.4)
MCH RBC QN AUTO: 27.8 PG (ref 26–34)
MCHC RBC AUTO-ENTMCNC: 31.9 G/DL (ref 31–36)
MCV RBC AUTO: 87.1 FL (ref 80–100)
MONOCYTES # BLD: 0.5 K/UL (ref 0–1.3)
MONOCYTES NFR BLD: 9.5 %
NEUTROPHILS # BLD: 4.3 K/UL (ref 1.7–7.7)
NEUTROPHILS NFR BLD: 77.9 %
NITRITE UR QL STRIP.AUTO: POSITIVE
PH UR STRIP.AUTO: 5 [PH] (ref 5–8)
PLATELET # BLD AUTO: 181 K/UL (ref 135–450)
PMV BLD AUTO: 9.5 FL (ref 5–10.5)
POTASSIUM SERPL-SCNC: 2.9 MMOL/L (ref 3.5–5.1)
POTASSIUM SERPL-SCNC: 3.1 MMOL/L (ref 3.5–5.1)
POTASSIUM SERPL-SCNC: 3.4 MMOL/L (ref 3.5–5.1)
POTASSIUM SERPL-SCNC: 3.7 MMOL/L (ref 3.5–5.1)
PROT UR STRIP.AUTO-MCNC: 100 MG/DL
PROTHROMBIN TIME: 20.1 SEC (ref 11.9–14.9)
RBC # BLD AUTO: 3.93 M/UL (ref 4.2–5.9)
RBC #/AREA URNS HPF: ABNORMAL /HPF (ref 0–4)
SODIUM SERPL-SCNC: 142 MMOL/L (ref 136–145)
SODIUM SERPL-SCNC: 143 MMOL/L (ref 136–145)
SODIUM SERPL-SCNC: 143 MMOL/L (ref 136–145)
SODIUM SERPL-SCNC: 144 MMOL/L (ref 136–145)
SODIUM UR-SCNC: 25 MMOL/L
SP GR UR STRIP.AUTO: 1.02 (ref 1–1.03)
T4 FREE SERPL-MCNC: 4.1 NG/DL (ref 0.9–1.8)
TSH SERPL DL<=0.005 MIU/L-ACNC: 0.05 UIU/ML (ref 0.27–4.2)
UA COMPLETE W REFLEX CULTURE PNL UR: ABNORMAL
UA DIPSTICK W REFLEX MICRO PNL UR: YES
URN SPEC COLLECT METH UR: ABNORMAL
UROBILINOGEN UR STRIP-ACNC: 2 E.U./DL
WBC # BLD AUTO: 5.5 K/UL (ref 4–11)
WBC #/AREA URNS HPF: ABNORMAL /HPF (ref 0–5)

## 2024-12-12 PROCEDURE — 2500000003 HC RX 250 WO HCPCS

## 2024-12-12 PROCEDURE — 51702 INSERT TEMP BLADDER CATH: CPT

## 2024-12-12 PROCEDURE — 2500000003 HC RX 250 WO HCPCS: Performed by: REGISTERED NURSE

## 2024-12-12 PROCEDURE — 84439 ASSAY OF FREE THYROXINE: CPT

## 2024-12-12 PROCEDURE — 6360000002 HC RX W HCPCS: Performed by: INTERNAL MEDICINE

## 2024-12-12 PROCEDURE — 94761 N-INVAS EAR/PLS OXIMETRY MLT: CPT

## 2024-12-12 PROCEDURE — 82570 ASSAY OF URINE CREATININE: CPT

## 2024-12-12 PROCEDURE — 84443 ASSAY THYROID STIM HORMONE: CPT

## 2024-12-12 PROCEDURE — 81001 URINALYSIS AUTO W/SCOPE: CPT

## 2024-12-12 PROCEDURE — 93010 ELECTROCARDIOGRAM REPORT: CPT | Performed by: INTERNAL MEDICINE

## 2024-12-12 PROCEDURE — 0BH17EZ INSERTION OF ENDOTRACHEAL AIRWAY INTO TRACHEA, VIA NATURAL OR ARTIFICIAL OPENING: ICD-10-PCS

## 2024-12-12 PROCEDURE — 5A0945A ASSISTANCE WITH RESPIRATORY VENTILATION, 24-96 CONSECUTIVE HOURS, HIGH NASAL FLOW/VELOCITY: ICD-10-PCS

## 2024-12-12 PROCEDURE — 93005 ELECTROCARDIOGRAM TRACING: CPT

## 2024-12-12 PROCEDURE — 85025 COMPLETE CBC W/AUTO DIFF WBC: CPT

## 2024-12-12 PROCEDURE — 80048 BASIC METABOLIC PNL TOTAL CA: CPT

## 2024-12-12 PROCEDURE — 2100000000 HC CCU R&B

## 2024-12-12 PROCEDURE — 99255 IP/OBS CONSLTJ NEW/EST HI 80: CPT | Performed by: INTERNAL MEDICINE

## 2024-12-12 PROCEDURE — 36415 COLL VENOUS BLD VENIPUNCTURE: CPT

## 2024-12-12 PROCEDURE — 85610 PROTHROMBIN TIME: CPT

## 2024-12-12 PROCEDURE — 2580000003 HC RX 258: Performed by: INTERNAL MEDICINE

## 2024-12-12 PROCEDURE — 2580000003 HC RX 258

## 2024-12-12 PROCEDURE — 6360000002 HC RX W HCPCS

## 2024-12-12 PROCEDURE — 83735 ASSAY OF MAGNESIUM: CPT

## 2024-12-12 PROCEDURE — 6370000000 HC RX 637 (ALT 250 FOR IP)

## 2024-12-12 PROCEDURE — 6370000000 HC RX 637 (ALT 250 FOR IP): Performed by: INTERNAL MEDICINE

## 2024-12-12 PROCEDURE — 51798 US URINE CAPACITY MEASURE: CPT

## 2024-12-12 PROCEDURE — 2700000000 HC OXYGEN THERAPY PER DAY

## 2024-12-12 PROCEDURE — 84300 ASSAY OF URINE SODIUM: CPT

## 2024-12-12 RX ORDER — DIGOXIN 0.25 MG/ML
500 INJECTION INTRAMUSCULAR; INTRAVENOUS ONCE
Status: COMPLETED | OUTPATIENT
Start: 2024-12-12 | End: 2024-12-12

## 2024-12-12 RX ORDER — MILRINONE LACTATE 0.2 MG/ML
.0625-.75 INJECTION, SOLUTION INTRAVENOUS CONTINUOUS
Status: DISCONTINUED | OUTPATIENT
Start: 2024-12-12 | End: 2025-01-03 | Stop reason: HOSPADM

## 2024-12-12 RX ORDER — GABAPENTIN 300 MG/1
300 CAPSULE ORAL NIGHTLY
Status: DISCONTINUED | OUTPATIENT
Start: 2024-12-13 | End: 2025-01-03 | Stop reason: HOSPADM

## 2024-12-12 RX ORDER — GABAPENTIN 100 MG/1
200 CAPSULE ORAL 3 TIMES DAILY
Status: DISCONTINUED | OUTPATIENT
Start: 2024-12-12 | End: 2024-12-12

## 2024-12-12 RX ORDER — SPIRONOLACTONE 25 MG/1
25 TABLET ORAL DAILY
Status: DISCONTINUED | OUTPATIENT
Start: 2024-12-12 | End: 2025-01-03 | Stop reason: HOSPADM

## 2024-12-12 RX ORDER — FUROSEMIDE 10 MG/ML
40 INJECTION INTRAMUSCULAR; INTRAVENOUS ONCE
Status: COMPLETED | OUTPATIENT
Start: 2024-12-12 | End: 2024-12-12

## 2024-12-12 RX ADMIN — FUROSEMIDE 40 MG: 10 INJECTION, SOLUTION INTRAMUSCULAR; INTRAVENOUS at 06:06

## 2024-12-12 RX ADMIN — METOPROLOL SUCCINATE 25 MG: 25 TABLET, EXTENDED RELEASE ORAL at 17:54

## 2024-12-12 RX ADMIN — SODIUM CHLORIDE, PRESERVATIVE FREE 10 ML: 5 INJECTION INTRAVENOUS at 00:00

## 2024-12-12 RX ADMIN — POTASSIUM CHLORIDE 10 MEQ: 7.46 INJECTION, SOLUTION INTRAVENOUS at 06:13

## 2024-12-12 RX ADMIN — SACUBITRIL AND VALSARTAN 1 TABLET: 49; 51 TABLET, FILM COATED ORAL at 08:34

## 2024-12-12 RX ADMIN — AMIODARONE HYDROCHLORIDE 1 MG/MIN: 1.8 INJECTION, SOLUTION INTRAVENOUS at 00:01

## 2024-12-12 RX ADMIN — DIGOXIN 500 MCG: 0.25 INJECTION INTRAMUSCULAR; INTRAVENOUS at 16:25

## 2024-12-12 RX ADMIN — FUROSEMIDE 5 MG/HR: 10 INJECTION, SOLUTION INTRAMUSCULAR; INTRAVENOUS at 10:38

## 2024-12-12 RX ADMIN — GABAPENTIN 300 MG: 300 CAPSULE ORAL at 08:33

## 2024-12-12 RX ADMIN — MILRINONE LACTATE 0.2 MCG/KG/MIN: 0.2 INJECTION, SOLUTION INTRAVENOUS at 23:45

## 2024-12-12 RX ADMIN — APIXABAN 5 MG: 5 TABLET, FILM COATED ORAL at 08:39

## 2024-12-12 RX ADMIN — POTASSIUM CHLORIDE 10 MEQ: 7.46 INJECTION, SOLUTION INTRAVENOUS at 01:50

## 2024-12-12 RX ADMIN — ISOSORBIDE MONONITRATE 15 MG: 30 TABLET, EXTENDED RELEASE ORAL at 08:33

## 2024-12-12 RX ADMIN — POTASSIUM CHLORIDE 10 MEQ: 7.46 INJECTION, SOLUTION INTRAVENOUS at 04:47

## 2024-12-12 RX ADMIN — ONDANSETRON 4 MG: 2 INJECTION INTRAMUSCULAR; INTRAVENOUS at 09:29

## 2024-12-12 RX ADMIN — TORSEMIDE 50 MG: 100 TABLET ORAL at 08:33

## 2024-12-12 RX ADMIN — POTASSIUM CHLORIDE 40 MEQ: 1500 TABLET, EXTENDED RELEASE ORAL at 15:31

## 2024-12-12 RX ADMIN — POTASSIUM CHLORIDE 10 MEQ: 7.46 INJECTION, SOLUTION INTRAVENOUS at 07:50

## 2024-12-12 RX ADMIN — ASPIRIN 81 MG 81 MG: 81 TABLET ORAL at 08:34

## 2024-12-12 RX ADMIN — ATORVASTATIN CALCIUM 80 MG: 80 TABLET, FILM COATED ORAL at 20:04

## 2024-12-12 RX ADMIN — MILRINONE LACTATE 0.2 MCG/KG/MIN: 0.2 INJECTION, SOLUTION INTRAVENOUS at 15:52

## 2024-12-12 RX ADMIN — SPIRONOLACTONE 25 MG: 25 TABLET ORAL at 12:40

## 2024-12-12 RX ADMIN — CHLOROTHIAZIDE SODIUM 500 MG: 500 INJECTION, POWDER, LYOPHILIZED, FOR SOLUTION INTRAVENOUS at 19:58

## 2024-12-12 RX ADMIN — GABAPENTIN 300 MG: 300 CAPSULE ORAL at 00:45

## 2024-12-12 RX ADMIN — APIXABAN 5 MG: 5 TABLET, FILM COATED ORAL at 20:04

## 2024-12-12 RX ADMIN — POTASSIUM CHLORIDE 10 MEQ: 7.46 INJECTION, SOLUTION INTRAVENOUS at 03:03

## 2024-12-12 RX ADMIN — SODIUM CHLORIDE, PRESERVATIVE FREE 10 ML: 5 INJECTION INTRAVENOUS at 08:40

## 2024-12-12 RX ADMIN — SACUBITRIL AND VALSARTAN 1 TABLET: 49; 51 TABLET, FILM COATED ORAL at 20:37

## 2024-12-12 RX ADMIN — POTASSIUM BICARBONATE 40 MEQ: 782 TABLET, EFFERVESCENT ORAL at 12:39

## 2024-12-12 RX ADMIN — AMIODARONE HYDROCHLORIDE 0.5 MG/MIN: 1.8 INJECTION, SOLUTION INTRAVENOUS at 06:06

## 2024-12-12 RX ADMIN — AMIODARONE HYDROCHLORIDE 0.5 MG/MIN: 1.8 INJECTION, SOLUTION INTRAVENOUS at 17:06

## 2024-12-12 RX ADMIN — POTASSIUM CHLORIDE 10 MEQ: 7.46 INJECTION, SOLUTION INTRAVENOUS at 09:00

## 2024-12-12 RX ADMIN — FUROSEMIDE 40 MG: 10 INJECTION, SOLUTION INTRAMUSCULAR; INTRAVENOUS at 10:40

## 2024-12-12 ASSESSMENT — PAIN SCALES - GENERAL
PAINLEVEL_OUTOF10: 0
PAINLEVEL_OUTOF10: 0

## 2024-12-12 NOTE — PROGRESS NOTES
NAME:  Levy Vargas  YOB: 1965  MEDICAL RECORD NUMBER:  [id number]    Shift Summary: pt brought to CVU on dilitazim gtt for afib rvr. Pt changed to Amio and given bolus due to HR sustaining in the 150-160s. HR better controlled with amio. Cardiology consult called. K+ 2.9; 40 of  60 mEq replaced. Pt voided once, and had 1 BM. Total bed change x 2.     Family updated: Yes:  pt self updating     Rhythm:  Afib-RVR    Most recent vitals:   Visit Vitals  BP 98/67   Pulse (!) 135   Temp 98.7 °F (37.1 °C) (Axillary)   Resp 20   Ht 1.854 m (6' 1\")   Wt (!) 145.4 kg (320 lb 8.8 oz)   SpO2 100%   BMI 42.29 kg/m²           No data found.    No data found.      Respiratory support needed (if any):  - O2 - NC - 1 lpm    Admission weight Weight - Scale: (!) 145.4 kg (320 lb 8.8 oz) (12/11/24 2306)    Today's weight    Wt Readings from Last 1 Encounters:   12/11/24 (!) 145.4 kg (320 lb 8.8 oz)        Castanon need assessed each shift: N/A - no castanon present  UOP >30ml/hr: YES  Last documented BM (in last 48 hrs):  Patient Vitals for the past 48 hrs:   Last BM (including prior to admit)   12/11/24 2300 12/11/24 12/12/24 0600 12/12/24                Restraints (in use currently or dc'd in last 12 hrs): No    Order current and documentation up to date? Yes    Lines/Drains reviewed @ bedside.  Peripheral IV 12/11/24 Posterior;Right Hand (Active)   Number of days: 0       Peripheral IV 12/11/24 Left;Posterior Hand (Active)   Number of days: 0         Drip rates at handoff:    Amiodarone 0.5 mg/min (12/12/24 0637)    sodium chloride         Lab Data:   CBC:   Recent Labs     12/11/24  1918 12/12/24  0359   WBC 4.8 5.5   HGB 12.3* 10.9*   HCT 38.3* 34.2*   MCV 87.0 87.1    181     BMP:    Recent Labs     12/12/24  0034 12/12/24  0359    143   K 2.9* 3.1*   CO2 34* 36*   BUN 37* 39*   CREATININE 1.3 1.5*     LIVR: No results for input(s): \"AST\", \"ALT\" in the last 72 hours.  PT/INR: No results for input(s):

## 2024-12-12 NOTE — PROGRESS NOTES
4 Eyes Skin Assessment     NAME:  Levy Vargas  YOB: 1965  MEDICAL RECORD NUMBER:  [id number]    The patient is being assessed for  Admission    I agree that at least one RN has performed a thorough Head to Toe Skin Assessment on the patient. ALL assessment sites listed below have been assessed.      Areas assessed by both nurses:    Head, Face, Ears, Shoulders, Back, Chest, Arms, Elbows, Hands, Sacrum. Buttock, Coccyx, Ischium, Legs. Feet and Heels, and Under Medical Devices         Does the Patient have a Wound? Yes wound(s) were present on assessment. LDA wound assessment was Initiated and completed by RN wound noted on R anterior ankle       Hudson Prevention initiated by RN: No  Wound Care Orders initiated by RN: No    Pressure Injury (Stage 3,4, Unstageable, DTI, NWPT, and Complex wounds) if present, place Wound referral order by RN under : No    New Ostomies, if present place, Ostomy referral order under : No     Nurse 1 eSignature: Electronically signed by Avni Avila RN on 12/11/24 at 11:37 PM EST    **SHARE this note so that the co-signing nurse can place an eSignature**    Nurse 2 eSignature: Electronically signed by FREIDA SMILEY RN on 12/14/24 at 6:55 AM EST

## 2024-12-12 NOTE — CARE COORDINATION
12/12/24 1011   Service Assessment   Patient Orientation Alert and Oriented   Cognition Alert  (Able to engage in conversation, then stops, reawakens and starts again)   Primary Caregiver Self   Accompanied By/Relationship no one at bedside   Support Systems Family Members  (lives with a brother)   Patient's Healthcare Decision Maker is: Legal Next of Kin  (Aarti, Ian)   PCP Verified by CM No  (He does not have a pcp, he was agreeable to me setting up for him)   Prior Functional Level Independent in ADLs/IADLs   Current Functional Level Assistance with the following:;Bathing;Dressing;Toileting;Cooking;Housework;Shopping;Mobility   Can patient return to prior living arrangement Unknown at present   Ability to make needs known: Fair   Family able to assist with home care needs: Other (comment)  (Unknown at this time)   Would you like for me to discuss the discharge plan with any other family members/significant others, and if so, who? Yes   Financial Resources Medicaid   Community Resources None   CM/SW Referral No PCP   Discharge Planning   Type of Residence House   Living Arrangements Family Members  (lives wiht his brother)   Current Services Prior To Admission Durable Medical Equipment   Current DME Prior to Arrival [facility]   Potential Assistance Needed JEWELL/Passport;Skilled Nursing Facility;Home Care  (Pt is asking for COA for homemaker, shower chair and TSF.)   DME Ordered? No   Potential Assistance Purchasing Medications No   Type of Home Care Services None   Patient expects to be discharged to: House   One/Two Story Residence Split level   # of Interior Steps 3   Interior Rails Right   Lift Chair Available No   History of falls? 0   Services At/After Discharge   Transition of Care Consult (CM Consult) Home Health;SNF  (Possible need for SNF vs home care and he is requesting COA assistance)   Internal Home Health Yes   Partner SNF Yes    Resource Information Provided? No   Mode of Transport at

## 2024-12-12 NOTE — H&P
V2.0  History and Physical      Name:  Levy Vargas /Age/Sex: 1965  (59 y.o. male)   MRN & CSN:  [id number] & [id number] Encounter Date/Time: 2024 10:25 PM EST   Location:  15 PCP: No primary care provider on file.       Hospital Day: 1    Assessment and Plan:   Levy Vargas is a 59 y.o. male with a pmh of A-fib, hypertension, HFrEF, cirrhosis, hyperlipidemia, diabetes mellitus type II, CKD 3 baseline creatinine 1.3, COPD, tobacco dependence, history of cocaine abuse, history of MI, peptic ulcer disease who presents with A-fib ([facility])    Hospital Problems             Last Modified POA    * (Principal) A-fib ([facility]) 2024 Yes       Plan:  #A-fib with RVR, patient missed his home medication  - Initially placed on diltiazem no improvement with heart rate.  EP on board recommended amnio bolus and drip continue.  - Patient on Eliquis continue  - Will monitor on telemetry    #Congestive heart failure exacerbation  - Chest x-ray no acute process, stable cardiomegaly  - BNP 6723, troponin initial 35 seconds at 36  - Patient on torsemide 10 mg  - Will provide 1 dose IV Lasix  - I's and O's Daily weights  Previously patient was admitted in our hospital with a similar symptoms he was hypotensive and fluid overload.  He was on milrinone / Lasix gtt.  - Cardiologist on board    #Hypokalemia  - Potassium 2.7, magnesium normal 2.4  - Potassium has been repleted and will continue to monitor    # Acute on chronic kidney disease stage III  - Baseline creatinine 1.3.  Currently creatinine 1.4 near to baseline, BUN 38 and EGFR 35  - Fluid with caution due to congestive heart failure  - Will monitor with the morning lab    DVT Prophylaxis: Eliquis  Diet: Regular diet  Code Status: Full code    Disposition:   Current Living situation: Home  Expected Disposition: Home  Estimated D/C: 2 to 3 days    Diet ADULT DIET; Regular   DVT Prophylaxis [] Lovenox, []  Heparin, [] SCDs, [] Ambulation,  [] Eliquis, [] Xarelto,  packs/day for 25.0 years (12.5 ttl pk-yrs)     Types: Cigarettes    Smokeless tobacco: Never   Vaping Use    Vaping status: Never Used   Substance and Sexual Activity    Alcohol use: Not Currently    Drug use: Yes     Types: Marijuana (Weed)     Comment: daily    Sexual activity: Not Currently     Social Determinants of Health     Food Insecurity: Food Insecurity Present (10/18/2024)    Hunger Vital Sign     Worried About Running Out of Food in the Last Year: Sometimes true     Ran Out of Food in the Last Year: Sometimes true   Transportation Needs: Unmet Transportation Needs (10/18/2024)    PRAPARE - Transportation     Lack of Transportation (Medical): Yes     Lack of Transportation (Non-Medical): Yes   Housing Stability: High Risk (10/18/2024)    Housing Stability Vital Sign     Unable to Pay for Housing in the Last Year: Yes     Number of Times Moved in the Last Year: 2     Homeless in the Last Year: No       Medications:   Medications:    dilTIAZem  10 mg IntraVENous Once    apixaban  5 mg Oral BID    [START ON 12/12/2024] aspirin  81 mg Oral Daily    atorvastatin  80 mg Oral Nightly    gabapentin  300 mg Oral TID    [START ON 12/12/2024] isosorbide mononitrate  15 mg Oral Daily    metoprolol succinate  25 mg Oral QPM    sacubitril-valsartan  1 tablet Oral BID    [START ON 12/12/2024] torsemide  50 mg Oral Daily    sodium chloride flush  5-40 mL IntraVENous 2 times per day      Infusions:    dilTIAZem 12.5 mg/hr (12/11/24 2216)    sodium chloride       PRN Meds: pantoprazole, 40 mg, Daily PRN  sodium chloride flush, 5-40 mL, PRN  sodium chloride, , PRN  potassium chloride, 40 mEq, PRN   Or  potassium alternative oral replacement, 40 mEq, PRN   Or  potassium chloride, 10 mEq, PRN  magnesium sulfate, 2,000 mg, PRN  ondansetron, 4 mg, Q8H PRN   Or  ondansetron, 4 mg, Q6H PRN  polyethylene glycol, 17 g, Daily PRN        Labs      CBC:   Recent Labs     12/11/24 1918   WBC 4.8   HGB 12.3*        BMP:

## 2024-12-12 NOTE — ED NOTES
Report called to MAURISIO Amaro for Rm 1310. Questions addressed and report accepted. Transport by RN to be performed as soon as available.

## 2024-12-12 NOTE — PROGRESS NOTES
V2.0    [facility] Progress Note      Name:  Levy Vargas /Age/Sex: 1965  (59 y.o. male)   MRN & CSN:  [id number] & [id number] Encounter Date/Time: 2024 10:45 AM EST   Location:  [address]- PCP: No primary care provider on file.     Attending:Tierra Marrufo MD       Hospital Day: 2    Assessment and Recommendations   Levy Vargas is a 59 y.o. male with pmh of HFrEF, cirrhosis, HLD, HTN, CKD 3, DM2, A-fib who presents with A-fib (HCC) with RVR      Plan:   A-fib with RVR  Patient attributes to being compliant with his medication but has missed doses since he ran out.  ICU admission  Continuous cardiac monitor  Electrophysiology consulted  Patient was given 1 dose of Cardizem  Patient is currently on Amio drip status post bolus  Eliquis 5 mg twice daily  Metoprolol XL      2.  Congestive heart failure  Metoprolol XL  Entresto 49-51 mg twice daily    3.  Hypertension  Entresto 49-51 mg twice daily  Metoprolol XL 25 mg daily    Due to the immediate potential for life-threatening deterioration. I spent 31 minutes providing critical care. There was a high probability of clinically significant/life threatening deterioration in the patient's condition which required my urgent intervention. This time excludes time spent performing procedures but includes time spent on direct patient care, history retrieval, review of the chart and discussions with patient, and consultants    Diet ADULT DIET; Regular; No Added Salt (3-4 gm); 1500 ml   DVT Prophylaxis [] Lovenox, []  Heparin, [] SCDs, [x] Ambulation,  [x] Eliquis, [] Xarelto  [] Coumadin   Code Status Full Code   Disposition From: Home  Expected Disposition: Home  Estimated Date of Discharge: 1-2 days  Patient requires continued admission due to upon clinical improvement   Surrogate Decision Maker/ POA       Personally reviewed Lab Studies and Imaging       Medical Decision Making:  The following items were considered in medical decision      Hemoglobin A1C:   Lab Results   Component Value Date/Time    LABA1C 6.3 10/19/2024 03:13 AM     TSH:   Lab Results   Component Value Date/Time    TSH 0.06 10/18/2024 11:41 AM     Troponin: No results found for: \"TROPONINT\"  Lactic Acid: No results for input(s): \"LACTA\" in the last 72 hours.  BNP:   Recent Labs     12/11/24  1918   PROBNP 6,743*     UA:  Lab Results   Component Value Date/Time    NITRU Negative 10/20/2024 06:18 PM    COLORU RED 10/20/2024 06:18 PM    PHUR 6.0 10/20/2024 06:18 PM    PHUR 7.0 08/03/2023 09:20 PM    PHUR 7.0 08/03/2023 09:20 PM    WBCUA 6-9 10/20/2024 06:18 PM    RBCUA >100 10/20/2024 06:18 PM    MUCUS 1+ 03/19/2022 05:10 PM    TRICHOMONAS None Seen 03/19/2022 05:10 PM    BACTERIA Rare 10/20/2024 06:18 PM    CLARITYU TURBID 10/20/2024 06:18 PM    LEUKOCYTESUR TRACE 10/20/2024 06:18 PM    UROBILINOGEN 4.0 10/20/2024 06:18 PM    BILIRUBINUR SMALL 10/20/2024 06:18 PM    BLOODU LARGE 10/20/2024 06:18 PM    GLUCOSEU 500 10/20/2024 06:18 PM    KETUA Negative 10/20/2024 06:18 PM     Urine Cultures: No results found for: \"LABURIN\"  Blood Cultures: No results found for: \"BC\"  No results found for: \"BLOODCULT2\"  Organism: No results found for: \"ORG\"      Electronically signed by Tierra Marrufo MD on 12/12/2024 at 10:45 AM  Comment: Please note this report has been produced using speech recognition software and may contain errors related to that system including errors in grammar, punctuation, and spelling, as well as words and phrases that may be inappropriate. If there are any questions or concerns, please feel free to contact the dictating provider for clarification.

## 2024-12-12 NOTE — PROGRESS NOTES
Medication Reconciliation    List of medications patient is currently taking is complete.     Source(s) of information:   Conversation with patient/family at bedside  EPIC records     Notes regarding home medications:   Patient takes gabapentin 300 mg qPM so updated frequency  Added: omeprazole 20 mg (removed pantoprazole)  Patient doesn't have dispense hx on many of his newer medications (last fill 10/29/2024 following his last hospital admission). Patient has not taken his medications since running out. He will need refills on many of his medications at discharge.     Kim Parr, PharmD Candidate  12/12/2024 11:42 AM

## 2024-12-12 NOTE — CONSULTS
[facility]    Levy Vargas  1965 December 12, 2024    Reason for Consult: \"Afib with RVR\"    CC: Dizziness    HPI:  The patient is 59 y.o. male with a past medical history significant for paroxysmal atrial fibrillation,  PAD and chronic systolic CHF with LVEF 15-20% who presented to [facility] with dizziness and elevated blood pressure in the 200's systolic. He had been admitted in October 2024 with acute on chronic systolic CHF and discharged 10/29/24.I was asked to see him for atrial fibrillation with RVR. He was on amiodarone 200mg daily and Eliquis 5mg bid at home. His CHF regimen includes Toprol XL 25mg daily, Entresto 49/51mg bid and torsemide 50mg daily with Imdur 15mg daily. He follows with Dr. Rosa at  Cardiology.    Levy states that a week ago he had trouble getting up from the toilet. He says that he was weak. He says his breathing was not great but it was not the shortness of breath that kept him from getting up.     He denies any chest pain or tightness.    He was placed on a amiodarone drip in the ED for his rapid atrial fibrillation.     His dry weight is reportedly 260lbs but he was discharged at 290lbs in October 2024. His CHF follow up calls from our nurses went unanswered despite 4 attempts. He says his last weigh was 285lbs a week after being discharged to home. He no longer has Home Care.    He says he takes his medications at home religiously including his Eliquis twice daily.     Review of Systems:  Constitutional: No fatigue, weakness, night sweats or fever.   HEENT: No new vision difficulties or ringing in the ears.  Respiratory: No new SOB, PND, orthopnea or cough.   Cardiovascular: See HPI   GI: No n/v, diarrhea, constipation, abdominal pain or changes in bowel habits.  No melena, no hematochezia  : No urinary frequency, urgency, incontinence, hematuria or dysuria.  Skin: No cyanosis or skin lesions.  Musculoskeletal: No new muscle or joint

## 2024-12-12 NOTE — PROGRESS NOTES
NAME:  Levy Vargas  YOB: 1965  MEDICAL RECORD NUMBER:  [id number]    Shift Summary: Lasix drip started for edema and 40mg pushed, external cath, retention of 385 on bladder scanner so castanon placed, lasix for poor output, milrinone on, digoxin 500mcg pushed for HR, patient more lethargic as shift progressed, very weak ambulation so bedrest recommended, diuril ordered per nephro    Family updated: Yes:  .    Rhythm: Atrial Fibrillation     Most recent vitals:   Visit Vitals  BP 98/67   Pulse (!) 121   Temp 98.7 °F (37.1 °C) (Axillary)   Resp (!) 8   Ht 1.854 m (6' 1\")   Wt (!) 145.4 kg (320 lb 8.8 oz)   SpO2 100%   BMI 42.29 kg/m²           No data found.    No data found.      Respiratory support needed (if any):  - O2 - NC - 2 lpm    Admission weight Weight - Scale: (!) 145.4 kg (320 lb 8.8 oz) (12/11/24 2306)    Today's weight    Wt Readings from Last 1 Encounters:   12/11/24 (!) 145.4 kg (320 lb 8.8 oz)        Castanon need assessed each shift: YES -  - continue castanon r/t - poor output and retention  UOP >30ml/hr: NO - 40 for shift  Last documented BM (in last 48 hrs):  Patient Vitals for the past 48 hrs:   Last BM (including prior to admit)   12/11/24 2300 12/11/24 12/12/24 0600 12/12/24                Restraints (in use currently or dc'd in last 12 hrs): No    Order current and documentation up to date? No    Lines/Drains reviewed @ bedside.  Peripheral IV 12/11/24 Posterior;Right Hand (Active)   Number of days: 0       Peripheral IV 12/11/24 Left;Posterior Hand (Active)   Number of days: 0         Drip rates at handoff:    Amiodarone 0.5 mg/min (12/12/24 0637)    furosemide (LASIX) 100 mg in sodium chloride 0.9 % 100 mL infusion 5 mg/hr (12/12/24 1038)    sodium chloride         Lab Data:   CBC:   Recent Labs     12/11/24  1918 12/12/24  0359   WBC 4.8 5.5   HGB 12.3* 10.9*   HCT 38.3* 34.2*   MCV 87.0 87.1    181     BMP:    Recent Labs     12/12/24  0034 12/12/24  0359    143   K

## 2024-12-12 NOTE — ED PROVIDER NOTES
[facility] EMERGENCY DEPARTMENT    CHIEF COMPLAINT  Hypertension (Pt states he has BP of 210 systolic at home.  ) and Dizziness (Pt complains of being lightheaded today.  Onset for \"some time\"   pt denies chest pain, denies shortness of breath)       HISTORY OF PRESENT ILLNESS  Levy Vargas is a 59 y.o. male who presents to the ED complaining of lightheadedness.  Patient reports generalized weakness that has been going on for the past week or so.  Has had increased lightheadedness today particular with positional changes.  Denies chest pain or shortness of breath above baseline (he smokes).  Reports leg swelling which may have slightly increased recently.  In triage found to be in atrial fibrillation with rapid ventricular response.  Reportedly hypertensive at home but blood pressure is normal here.    Past Medical History:   Diagnosis Date    Abdominal pain 07/29/2021    Abnormal EKG 07/28/2016    Acute pancreatitis 07/29/2021    Atrial fibrillation (HCC)     CHF (congestive heart failure) (HCC)     Cigarette smoker 08/25/2021    Cocaine abuse (HCC) 03/28/2015    Last Assessment & Plan:  Formatting of this note might be different from the original. Counseled on cessation as increases risk of coronary vasospasm and further worsening of heart function.    Dehydration 07/28/2016    Diabetes mellitus (HCC)     History of cocaine abuse (HCC) 07/28/2016    History of MI (myocardial infarction) 07/28/2016    Hyperglycemia 07/28/2016    Hyperlipidemia     Hypertension     Morbid obesity with BMI of 45.0-49.9, adult 07/28/2016    Postural dizziness 07/28/2016    PUD (peptic ulcer disease) 03/01/2019     Past Surgical History:   Procedure Laterality Date    CARDIAC CATHETERIZATION      CARDIAC PROCEDURE N/A 10/21/2024    Right heart cath performed by Daniele Hernandez MD at [facility] CARDIAC CATH LAB    LEG SURGERY Right 8/25/2023    RIGHT THIGH INCISION AND DRAINAGE WITH DRAIN PLACEMENT performed by Dominick  Non-distended. No rebound or guarding.   Musculoskeletal: Extremities symmetric. No deformities.  2+ bilateral lower extremity edema  Skin: Warm and dry. No acute rashes.   Neurological: Alert and oriented. No focal neurologic deficits      LABS  I have reviewed all labs for this visit.         RADIOLOGY  I have reviewed all radiographic studies for this visit.   XR CHEST PORTABLE   Final Result   No acute process.      Stable cardiomegaly                ECG  A-fib with RVR rate of 153 bpm.    ED COURSE/MDM    59 y.o. male presents with lightheadedness.  Found to be in atrial fibrillation with rapid ventricular response with heart rates in the 150s.  Started on diltiazem infusion.  Labs demonstrating hypokalemia.  Have initiated replacement.  BNP and troponin mildly elevated.  EKG with no obvious ischemia and I have a low suspicion for ACS.  Chest x-ray shows no acute intrathoracic process.  Discussed with the hospitalist.  Plan to admit.    - Consults:   None         I, Froylan Rondon MD, am the primary clinician of record.     During the patient's ED course, the patient was given:  Medications   dilTIAZem injection 10 mg (10 mg IntraVENous Given 12/11/24 2004)     Followed by   dilTIAZem 125 mg in sodium chloride 0.9 % 125 mL infusion (12.5 mg/hr IntraVENous Rate/Dose Change 12/11/24 3246)   dilTIAZem injection 10 mg (has no administration in time range)   apixaban (ELIQUIS) tablet 5 mg (has no administration in time range)   aspirin chewable tablet 81 mg (has no administration in time range)   atorvastatin (LIPITOR) tablet 80 mg (has no administration in time range)   gabapentin (NEURONTIN) capsule 300 mg (has no administration in time range)   isosorbide mononitrate (IMDUR) extended release tablet 15 mg (has no administration in time range)   metoprolol succinate (TOPROL XL) extended release tablet 25 mg (has no administration in time range)   pantoprazole (PROTONIX) tablet 40 mg (has no administration in time

## 2024-12-12 NOTE — DISCHARGE INSTRUCTIONS
Extra Heart Failure Education/ Tools/ Resources:     https://Invisible Puppy.com/[email or web address]   --- this is American Heart Association interactive Healthier Living with Heart Failure guidebook.  Please click hyperlink or copy / paste link into search bar. The QR Code is also available below. Use your mouse to scroll through the pages.  Lots of information about weight monitoring, diet tips, activity, meds, etc    Heart Failure Tools and Resources QR Code is below. It includes multiple resources to include symptom tracker, med tracker, further HF info, and access to a HF Support Network online Community    HF Stonewall Francy  -- this is a free smart phone francy available for iPhone and Android download.  Use your phone to track sodium / fluid intake, zone tool symptom tracking, weights, medications, etc. Click on this hyperlink  HF Stonewall Francy   for QR code for easy download or the link is also found in the below HF Tools and Resources.      DASH (Dietary Approach to Stop Hypertension) diet --  https://www.nhlbi.nih.gov/education/dash-eating-plan -- this diet is a flexible eating plan that promotes heart healthy eating style.  Click on hyperlink or copy / paste link into search bar.  Lots of low sodium recipes and tips.    https://www.MyJobCompany.Writer.ly/recipes  -- more free recipes

## 2024-12-12 NOTE — CARE COORDINATION
Advance Care Planning   Healthcare Decision Maker:    Primary Decision Maker: Silvestre Wilsonjabierjuliette - Child - [phone]    Secondary Decision Maker: Curly Vargas - Brother/Sister - [phone]        No Adv Directive in chart.      CHLOE Orozco     Case Management   [phone]    12/12/2024  10:11 AM

## 2024-12-12 NOTE — PROGRESS NOTES
Messaged internal Medicine ANP due to pt sustaining HR in the 150-160s and maxed on Diltizim gtt for HR controll. It was made aware that pt states that he take amiodarone at home but had ran out of prescription. Orders to stop diltizim gtt and to give a 150 mg bakari bolus and start amio gtt were received, as well as cariology consult.    Electronically signed by Avni Avila RN on 12/12/2024 at 12:41 AM

## 2024-12-12 NOTE — CARE COORDINATION
Chart reviewed.  Spoke with bedside RNViviane and Dr Hernandez who reports he has no pcp, stopped taking all medications. Dr Hernandez offered Hospice but he denied at this time.    Case Management Assessment  Initial Evaluation    Date/Time of Evaluation: 12/12/2024 10:17 AM  Assessment Completed by: CHLOE Stinson    If patient is discharged prior to next notation, then this note serves as note for discharge by case management.    Patient Name: Levy Vargas                   YOB: 1965  Diagnosis: A-fib (HCC) [I48.91]  Atrial fibrillation with rapid ventricular response (HCC) [I48.91]                   Date / Time: 12/11/2024  6:20 PM    Patient Admission Status: Inpatient   Readmission Risk (Low < 19, Mod (19-27), High > 27): Readmission Risk Score: 22.2    Current PCP: No primary care provider on file.  PCP verified by CM? No (He does not have a pcp, he was agreeable to me setting up for him)    Chart Reviewed: Yes      History Provided by:    Patient Orientation: Alert and Oriented    Patient Cognition: Alert (Able to engage in conversation, then stops, reawakens and starts again)    Hospitalization in the last 30 days (Readmission):  No    If yes, Readmission Assessment in CM Navigator will be completed.    Advance Directives:      Code Status: Full Code   Patient's Primary Decision Maker is: Legal Next of Kin (Ian Broussard)    Primary Decision Maker: Ian Wilson - Child - [phone]    Secondary Decision Maker: Curly Vargas - Brother/Sister - [phone]    Discharge Planning:    Patient lives with: Family Members (lives wiht his brother) Type of Home: House  Primary Care Giver: Self  Patient Support Systems include: Family Members (lives with a brother)   Current Financial resources: Medicaid  Current community resources: None  Current services prior to admission: Durable Medical Equipment            Current DME: Walker            Type of Home Care services:

## 2024-12-13 ENCOUNTER — APPOINTMENT (OUTPATIENT)
Dept: GENERAL RADIOLOGY | Age: 59
End: 2024-12-13
Payer: COMMERCIAL

## 2024-12-13 PROBLEM — R57.0 CARDIOGENIC SHOCK: Status: ACTIVE | Noted: 2024-12-13

## 2024-12-13 PROBLEM — J96.01 ACUTE RESPIRATORY FAILURE WITH HYPOXIA: Status: ACTIVE | Noted: 2024-12-13

## 2024-12-13 LAB
ALBUMIN SERPL-MCNC: 3 G/DL (ref 3.4–5)
ALBUMIN SERPL-MCNC: 3 G/DL (ref 3.4–5)
ANION GAP SERPL CALCULATED.3IONS-SCNC: 11 MMOL/L (ref 3–16)
ANION GAP SERPL CALCULATED.3IONS-SCNC: 15 MMOL/L (ref 3–16)
ANION GAP SERPL CALCULATED.3IONS-SCNC: 9 MMOL/L (ref 3–16)
BASE EXCESS BLDA CALC-SCNC: 14 MMOL/L (ref -3–3)
BUN SERPL-MCNC: 46 MG/DL (ref 7–20)
BUN SERPL-MCNC: 47 MG/DL (ref 7–20)
BUN SERPL-MCNC: 47 MG/DL (ref 7–20)
C DIFF TOX A+B STL QL IA: NORMAL
CA-I BLD-SCNC: 1.13 MMOL/L (ref 1.12–1.32)
CALCIUM SERPL-MCNC: 8.6 MG/DL (ref 8.3–10.6)
CALCIUM SERPL-MCNC: 8.6 MG/DL (ref 8.3–10.6)
CALCIUM SERPL-MCNC: 8.8 MG/DL (ref 8.3–10.6)
CHLORIDE SERPL-SCNC: 92 MMOL/L (ref 99–110)
CHLORIDE SERPL-SCNC: 95 MMOL/L (ref 99–110)
CHLORIDE SERPL-SCNC: 96 MMOL/L (ref 99–110)
CK SERPL-CCNC: 128 U/L (ref 39–308)
CO2 BLDA-SCNC: 39 MMOL/L
CO2 SERPL-SCNC: 34 MMOL/L (ref 21–32)
CO2 SERPL-SCNC: 36 MMOL/L (ref 21–32)
CO2 SERPL-SCNC: 37 MMOL/L (ref 21–32)
CREAT SERPL-MCNC: 2.6 MG/DL (ref 0.8–1.3)
CREAT SERPL-MCNC: 2.7 MG/DL (ref 0.8–1.3)
CREAT SERPL-MCNC: 2.9 MG/DL (ref 0.8–1.3)
DEPRECATED RDW RBC AUTO: 18.6 % (ref 12.4–15.4)
GFR SERPLBLD CREATININE-BSD FMLA CKD-EPI: 24 ML/MIN/{1.73_M2}
GFR SERPLBLD CREATININE-BSD FMLA CKD-EPI: 26 ML/MIN/{1.73_M2}
GFR SERPLBLD CREATININE-BSD FMLA CKD-EPI: 27 ML/MIN/{1.73_M2}
GLUCOSE BLD-MCNC: 117 MG/DL (ref 70–99)
GLUCOSE SERPL-MCNC: 106 MG/DL (ref 70–99)
GLUCOSE SERPL-MCNC: 107 MG/DL (ref 70–99)
GLUCOSE SERPL-MCNC: 129 MG/DL (ref 70–99)
HCO3 BLDA-SCNC: 37.1 MMOL/L (ref 21–29)
HCT VFR BLD AUTO: 33.1 % (ref 40.5–52.5)
HCT VFR BLD AUTO: 33.5 % (ref 40.5–52.5)
HCT VFR BLD AUTO: 38 % (ref 40.5–52.5)
HGB BLD CALC-MCNC: 13.1 GM/DL (ref 13.5–17.5)
HGB BLD-MCNC: 10.6 G/DL (ref 13.5–17.5)
HGB BLD-MCNC: 11 G/DL (ref 13.5–17.5)
INHALED O2 FLOW RATE: 80 L/MIN
MAGNESIUM SERPL-MCNC: 2.27 MG/DL (ref 1.8–2.4)
MCH RBC QN AUTO: 27.8 PG (ref 26–34)
MCHC RBC AUTO-ENTMCNC: 32 G/DL (ref 31–36)
MCV RBC AUTO: 86.9 FL (ref 80–100)
PCO2 BLDA: 44.4 MM HG (ref 35–45)
PERFORMED ON: ABNORMAL
PERFORMED ON: ABNORMAL
PH BLDA: 7.53 [PH] (ref 7.35–7.45)
PHOSPHATE SERPL-MCNC: 2.8 MG/DL (ref 2.5–4.9)
PHOSPHATE SERPL-MCNC: 3.1 MG/DL (ref 2.5–4.9)
PLATELET # BLD AUTO: 197 K/UL (ref 135–450)
PMV BLD AUTO: 9.3 FL (ref 5–10.5)
PO2 BLDA: 132 MM HG (ref 75–108)
POC SAMPLE TYPE: ABNORMAL
POC SAMPLE TYPE: ABNORMAL
POTASSIUM SERPL-SCNC: 3.7 MMOL/L (ref 3.5–5.1)
POTASSIUM SERPL-SCNC: 4.2 MMOL/L (ref 3.5–5.1)
POTASSIUM SERPL-SCNC: 4.2 MMOL/L (ref 3.5–5.1)
RBC # BLD AUTO: 3.81 M/UL (ref 4.2–5.9)
SAO2 % BLDA: 99 % (ref 93–100)
SODIUM SERPL-SCNC: 141 MMOL/L (ref 136–145)
SODIUM SERPL-SCNC: 141 MMOL/L (ref 136–145)
SODIUM SERPL-SCNC: 143 MMOL/L (ref 136–145)
WBC # BLD AUTO: 8.6 K/UL (ref 4–11)

## 2024-12-13 PROCEDURE — 82947 ASSAY GLUCOSE BLOOD QUANT: CPT

## 2024-12-13 PROCEDURE — 82330 ASSAY OF CALCIUM: CPT

## 2024-12-13 PROCEDURE — 5A1945Z RESPIRATORY VENTILATION, 24-96 CONSECUTIVE HOURS: ICD-10-PCS

## 2024-12-13 PROCEDURE — 2500000003 HC RX 250 WO HCPCS: Performed by: INTERNAL MEDICINE

## 2024-12-13 PROCEDURE — 2580000003 HC RX 258

## 2024-12-13 PROCEDURE — 36556 INSERT NON-TUNNEL CV CATH: CPT | Performed by: INTERNAL MEDICINE

## 2024-12-13 PROCEDURE — 90945 DIALYSIS ONE EVALUATION: CPT

## 2024-12-13 PROCEDURE — 02HV33Z INSERTION OF INFUSION DEVICE INTO SUPERIOR VENA CAVA, PERCUTANEOUS APPROACH: ICD-10-PCS | Performed by: INTERNAL MEDICINE

## 2024-12-13 PROCEDURE — 37799 UNLISTED PX VASCULAR SURGERY: CPT

## 2024-12-13 PROCEDURE — 2500000003 HC RX 250 WO HCPCS

## 2024-12-13 PROCEDURE — 6370000000 HC RX 637 (ALT 250 FOR IP): Performed by: INTERNAL MEDICINE

## 2024-12-13 PROCEDURE — 99291 CRITICAL CARE FIRST HOUR: CPT | Performed by: INTERNAL MEDICINE

## 2024-12-13 PROCEDURE — 2580000003 HC RX 258: Performed by: INTERNAL MEDICINE

## 2024-12-13 PROCEDURE — 82550 ASSAY OF CK (CPK): CPT

## 2024-12-13 PROCEDURE — 80069 RENAL FUNCTION PANEL: CPT

## 2024-12-13 PROCEDURE — 2700000000 HC OXYGEN THERAPY PER DAY

## 2024-12-13 PROCEDURE — 85014 HEMATOCRIT: CPT

## 2024-12-13 PROCEDURE — 2580000003 HC RX 258: Performed by: FAMILY MEDICINE

## 2024-12-13 PROCEDURE — 6360000002 HC RX W HCPCS: Performed by: INTERNAL MEDICINE

## 2024-12-13 PROCEDURE — 6360000002 HC RX W HCPCS: Performed by: FAMILY MEDICINE

## 2024-12-13 PROCEDURE — 6370000000 HC RX 637 (ALT 250 FOR IP)

## 2024-12-13 PROCEDURE — 94761 N-INVAS EAR/PLS OXIMETRY MLT: CPT

## 2024-12-13 PROCEDURE — 36556 INSERT NON-TUNNEL CV CATH: CPT

## 2024-12-13 PROCEDURE — 87449 NOS EACH ORGANISM AG IA: CPT

## 2024-12-13 PROCEDURE — 84100 ASSAY OF PHOSPHORUS: CPT

## 2024-12-13 PROCEDURE — 85018 HEMOGLOBIN: CPT

## 2024-12-13 PROCEDURE — 71045 X-RAY EXAM CHEST 1 VIEW: CPT

## 2024-12-13 PROCEDURE — 83735 ASSAY OF MAGNESIUM: CPT

## 2024-12-13 PROCEDURE — 31500 INSERT EMERGENCY AIRWAY: CPT

## 2024-12-13 PROCEDURE — 94002 VENT MGMT INPAT INIT DAY: CPT

## 2024-12-13 PROCEDURE — 82803 BLOOD GASES ANY COMBINATION: CPT

## 2024-12-13 PROCEDURE — 82040 ASSAY OF SERUM ALBUMIN: CPT

## 2024-12-13 PROCEDURE — 36620 INSERTION CATHETER ARTERY: CPT

## 2024-12-13 PROCEDURE — 51702 INSERT TEMP BLADDER CATH: CPT

## 2024-12-13 PROCEDURE — 36415 COLL VENOUS BLD VENIPUNCTURE: CPT

## 2024-12-13 PROCEDURE — 87324 CLOSTRIDIUM AG IA: CPT

## 2024-12-13 PROCEDURE — 85027 COMPLETE CBC AUTOMATED: CPT

## 2024-12-13 PROCEDURE — 31500 INSERT EMERGENCY AIRWAY: CPT | Performed by: INTERNAL MEDICINE

## 2024-12-13 PROCEDURE — 5A1D90Z PERFORMANCE OF URINARY FILTRATION, CONTINUOUS, GREATER THAN 18 HOURS PER DAY: ICD-10-PCS

## 2024-12-13 PROCEDURE — 2100000000 HC CCU R&B

## 2024-12-13 PROCEDURE — 3E1M39Z IRRIGATION OF PERITONEAL CAVITY USING DIALYSATE, PERCUTANEOUS APPROACH: ICD-10-PCS

## 2024-12-13 PROCEDURE — 36600 WITHDRAWAL OF ARTERIAL BLOOD: CPT

## 2024-12-13 RX ORDER — FENTANYL CITRATE-0.9 % NACL/PF 10 MCG/ML
25-200 PLASTIC BAG, INJECTION (ML) INTRAVENOUS CONTINUOUS
Status: DISCONTINUED | OUTPATIENT
Start: 2024-12-13 | End: 2024-12-17

## 2024-12-13 RX ORDER — HYDROCORTISONE SODIUM SUCCINATE 100 MG/2ML
50 INJECTION INTRAMUSCULAR; INTRAVENOUS EVERY 8 HOURS
Status: DISCONTINUED | OUTPATIENT
Start: 2024-12-13 | End: 2024-12-19

## 2024-12-13 RX ORDER — VASOPRESSIN IN DEXTROSE 5 % 20/100 ML
.01-.03 PLASTIC BAG, INJECTION (ML) INTRAVENOUS CONTINUOUS
Status: DISCONTINUED | OUTPATIENT
Start: 2024-12-13 | End: 2024-12-18

## 2024-12-13 RX ORDER — CALCIUM GLUCONATE 20 MG/ML
2000 INJECTION, SOLUTION INTRAVENOUS PRN
Status: DISCONTINUED | OUTPATIENT
Start: 2024-12-13 | End: 2024-12-25 | Stop reason: ALTCHOICE

## 2024-12-13 RX ORDER — MIDODRINE HYDROCHLORIDE 5 MG/1
5 TABLET ORAL ONCE
Status: COMPLETED | OUTPATIENT
Start: 2024-12-13 | End: 2024-12-13

## 2024-12-13 RX ORDER — MAGNESIUM SULFATE 1 G/100ML
1000 INJECTION INTRAVENOUS PRN
Status: DISCONTINUED | OUTPATIENT
Start: 2024-12-13 | End: 2024-12-25 | Stop reason: ALTCHOICE

## 2024-12-13 RX ORDER — PROPOFOL 10 MG/ML
5-50 INJECTION, EMULSION INTRAVENOUS CONTINUOUS
Status: DISCONTINUED | OUTPATIENT
Start: 2024-12-13 | End: 2024-12-17

## 2024-12-13 RX ORDER — CALCIUM GLUCONATE 20 MG/ML
1000 INJECTION, SOLUTION INTRAVENOUS PRN
Status: DISCONTINUED | OUTPATIENT
Start: 2024-12-13 | End: 2024-12-25 | Stop reason: ALTCHOICE

## 2024-12-13 RX ORDER — POTASSIUM CHLORIDE 29.8 MG/ML
20 INJECTION INTRAVENOUS PRN
Status: DISCONTINUED | OUTPATIENT
Start: 2024-12-13 | End: 2024-12-25 | Stop reason: ALTCHOICE

## 2024-12-13 RX ORDER — NOREPINEPHRINE BITARTRATE 0.06 MG/ML
1-100 INJECTION, SOLUTION INTRAVENOUS CONTINUOUS
Status: DISCONTINUED | OUTPATIENT
Start: 2024-12-13 | End: 2024-12-18

## 2024-12-13 RX ORDER — SODIUM CHLORIDE 0.9 % (FLUSH) 0.9 %
1.1-1.9 SYRINGE (ML) INJECTION PRN
Status: DISCONTINUED | OUTPATIENT
Start: 2024-12-13 | End: 2024-12-25 | Stop reason: ALTCHOICE

## 2024-12-13 RX ORDER — PROPOFOL 10 MG/ML
INJECTION, EMULSION INTRAVENOUS
Status: DISPENSED
Start: 2024-12-13 | End: 2024-12-14

## 2024-12-13 RX ORDER — NOREPINEPHRINE BITARTRATE 0.06 MG/ML
INJECTION, SOLUTION INTRAVENOUS
Status: COMPLETED
Start: 2024-12-13 | End: 2024-12-13

## 2024-12-13 RX ADMIN — AMIODARONE HYDROCHLORIDE 0.5 MG/MIN: 1.8 INJECTION, SOLUTION INTRAVENOUS at 17:32

## 2024-12-13 RX ADMIN — CEFEPIME 2000 MG: 2 INJECTION, POWDER, FOR SOLUTION INTRAVENOUS at 17:52

## 2024-12-13 RX ADMIN — APIXABAN 5 MG: 5 TABLET, FILM COATED ORAL at 09:32

## 2024-12-13 RX ADMIN — FUROSEMIDE 10 MG/HR: 10 INJECTION, SOLUTION INTRAMUSCULAR; INTRAVENOUS at 00:26

## 2024-12-13 RX ADMIN — SODIUM CHLORIDE: 9 INJECTION, SOLUTION INTRAVENOUS at 17:33

## 2024-12-13 RX ADMIN — PROPOFOL 40 MCG/KG/MIN: 10 INJECTION, EMULSION INTRAVENOUS at 21:59

## 2024-12-13 RX ADMIN — AMIODARONE HYDROCHLORIDE 0.5 MG/MIN: 1.8 INJECTION, SOLUTION INTRAVENOUS at 03:59

## 2024-12-13 RX ADMIN — MILRINONE LACTATE 0.2 MCG/KG/MIN: 0.2 INJECTION, SOLUTION INTRAVENOUS at 09:09

## 2024-12-13 RX ADMIN — CALCIUM GLUCONATE 2000 MG: 20 INJECTION, SOLUTION INTRAVENOUS at 17:33

## 2024-12-13 RX ADMIN — SODIUM CHLORIDE, PRESERVATIVE FREE 10 ML: 5 INJECTION INTRAVENOUS at 09:35

## 2024-12-13 RX ADMIN — HYDROCORTISONE SODIUM SUCCINATE 50 MG: 100 INJECTION, POWDER, FOR SOLUTION INTRAMUSCULAR; INTRAVENOUS at 17:44

## 2024-12-13 RX ADMIN — MILRINONE LACTATE 0.2 MCG/KG/MIN: 0.2 INJECTION, SOLUTION INTRAVENOUS at 23:10

## 2024-12-13 RX ADMIN — Medication: at 17:58

## 2024-12-13 RX ADMIN — PROPOFOL 40 MCG/KG/MIN: 10 INJECTION, EMULSION INTRAVENOUS at 16:16

## 2024-12-13 RX ADMIN — VASOPRESSIN 0.03 UNITS/MIN: 0.2 INJECTION INTRAVENOUS at 15:56

## 2024-12-13 RX ADMIN — ASPIRIN 81 MG 81 MG: 81 TABLET ORAL at 09:36

## 2024-12-13 RX ADMIN — PROPOFOL 20 MCG/KG/MIN: 10 INJECTION, EMULSION INTRAVENOUS at 13:51

## 2024-12-13 RX ADMIN — SPIRONOLACTONE 25 MG: 25 TABLET ORAL at 09:33

## 2024-12-13 RX ADMIN — SODIUM CHLORIDE, PRESERVATIVE FREE 40 MG: 5 INJECTION INTRAVENOUS at 17:45

## 2024-12-13 RX ADMIN — Medication 5 MCG/MIN: at 13:41

## 2024-12-13 RX ADMIN — Medication 50 MCG/HR: at 14:22

## 2024-12-13 RX ADMIN — NOREPINEPHRINE BITARTRATE 5 MCG/MIN: 64 SOLUTION INTRAVENOUS at 13:41

## 2024-12-13 RX ADMIN — Medication: at 23:25

## 2024-12-13 RX ADMIN — MIDODRINE HYDROCHLORIDE 5 MG: 5 TABLET ORAL at 10:10

## 2024-12-13 RX ADMIN — PROPOFOL 40 MCG/KG/MIN: 10 INJECTION, EMULSION INTRAVENOUS at 18:40

## 2024-12-13 ASSESSMENT — PULMONARY FUNCTION TESTS
PIF_VALUE: 21
PIF_VALUE: 28
PIF_VALUE: 20
PIF_VALUE: 19
PIF_VALUE: 19
PIF_VALUE: 18
PIF_VALUE: 24
PIF_VALUE: 26
PIF_VALUE: 22
PIF_VALUE: 21
PIF_VALUE: 34
PIF_VALUE: 20
PIF_VALUE: 19
PIF_VALUE: 23
PIF_VALUE: 21
PIF_VALUE: 21
PIF_VALUE: 20
PIF_VALUE: 21
PIF_VALUE: 20
PIF_VALUE: 27
PIF_VALUE: 24
PIF_VALUE: 19
PIF_VALUE: 24
PIF_VALUE: 19
PIF_VALUE: 28
PIF_VALUE: 22
PIF_VALUE: 24
PIF_VALUE: 24
PIF_VALUE: 25
PIF_VALUE: 31
PIF_VALUE: 20
PIF_VALUE: 26
PIF_VALUE: 41
PIF_VALUE: 29
PIF_VALUE: 26
PIF_VALUE: 26
PIF_VALUE: 27
PIF_VALUE: 19
PIF_VALUE: 20
PIF_VALUE: 27
PIF_VALUE: 22
PIF_VALUE: 19
PIF_VALUE: 19
PIF_VALUE: 23
PIF_VALUE: 19
PIF_VALUE: 25
PIF_VALUE: 21
PIF_VALUE: 30
PIF_VALUE: 27
PIF_VALUE: 42
PIF_VALUE: 20
PIF_VALUE: 19
PIF_VALUE: 19
PIF_VALUE: 39
PIF_VALUE: 40

## 2024-12-13 ASSESSMENT — PAIN SCALES - GENERAL
PAINLEVEL_OUTOF10: 0
PAINLEVEL_OUTOF10: 0

## 2024-12-13 NOTE — PROCEDURES
PATIENT NAME: Levy Vargas  MEDICAL RECORD NO. [id number]  DATE: 12/13/2024      PROCEDURE PERFORMED:  Left Internal Jugular Vein Central Line Insertion  ANESTHESIA:  Local utilizing 1% lidocaine  ESTIMATED BLOOD LOSS:  Less than 5 ml  COMPLICATIONS:  None immediately appreciated.    Consent: obtained explaining indications, benefits, possible complications and alternatives explained.     PROCEDURE:  A timeout was initiated by the bedside nurse and was confirmed by those present.  The patient was placed in a supine position. The skin overlying the Left Internal Jugular Vein was prepped with chlorhexadine and draped in sterile fashion. The skin was infiltrated with local anesthetic. Through the anesthetized region, the introducer needle was inserted into the internal jugular vein returning dark red non pulsatile blood. A guidewire was placed through the center of the needle with no resistance. This is non tunneled catheter. A small incision made in the skin with a #11 scalpel blade. The dilator was inserted into the skin and vein over guidewire using Seldinger technique. The dilator was then removed and triple lumen catheter was placed in the vein over the guidewire using Seldinger technique terminating in the SVC, right atrium area.The guidewire was then removed and all ports aspirated and flushed appropriately.   The catheter then secured using silk suture and a temporary sterile dressing was applied.  No immediate complication was evident.  All sponge, instrument and needle counts were correct at the completion of the procedure.    Postprocedural chest x-ray showed good position of the catheter with no evidence of hemothorax or pneumothorax. The patient tolerated the procedure well with no immediate complication evident.     Raquel Shirley MD  12/13/2024, 4:56 PM

## 2024-12-13 NOTE — PROGRESS NOTES
Clinical Pharmacy Note  Dose Adjustment    Levy Vargas is receiving cefepime for penumonia. Based on the patient's Estimated Creatinine Clearance: Estimated Creatinine Clearance: 44 mL/min (A) (based on SCr of 2.7 mg/dL (H)). urine output and indication, the dose has been adjusted to 2gm extended infusion Q12H per protocol.    Pharmacy will continue to monitor and adjust dose as needed for changes in renal function.    Natalya Moctezuma [facility], 12/13/2024 1:40 PM

## 2024-12-13 NOTE — PROGRESS NOTES
Dr. Hernandez to bedside. Updating family on patient condition- called pt's daughter, daughter adding pt's brother to call     No answer from brother, will attempt to call again shortly and will update Dr. Hernandez

## 2024-12-13 NOTE — PROGRESS NOTES
Dr. Hernandez spoke to pt's brother. Will come speak to family after finishing cath lab case. Brother did ask if we could call pt's other brother Fr Marcellus Alvarez, confirmed with daughter- she is okay with giving him information    1233: Attempted to call Fr Marcellus Alvarez per Brother Curly and Dr. Hernandez request, no answer. HIPAA voicemail left with [facility] callback number

## 2024-12-13 NOTE — PROGRESS NOTES
Dr. Marrufo to bedside- patient hypotensive MD aware. Orders received for critical care consult no further orders at this time.     Rosario Ferrari RN

## 2024-12-13 NOTE — PROCEDURES
ICU PROCEDURE - ENDOTRACHEAL INTUBATION    Levy Vargas     MRN#: [id number]  24      Acct #:[unfilled]     : 1965        TIME OUT: taken    Permission obtained, risks/benefits reviewed:    ANESTHESIA:   []Ketamine  []Ativan  [] Morphine  [x]Propofol  [x]Other medications: Kvng      ESTIMATED BLOOD LOSS:  None.    COMPLICATIONS:  [x]N/A  [] Other:    LARYNGOSCOPIC AIRWAY GRADE (CORMACK-LEHANE):[]1  [x]2a  []2b []3  []4        INTUBATION EQUIPMENT USED:  [x] Direct laryngoscope only    OUTCOME: Successful placement of #  7.5  Taperguard Evac endotracheal via   [x]Oral route    INSERTION DEPTH:  27    cm from   [x]lip           CONFIRMATION OF TUBE POSITION:   [x]Capnography - Strong & repeatable exhaled CO2 detection   [x]Multiple point auscultation   [x]SpO2 response   [x]STAT X-ray   []Bronchoscopic assessment    UNUSUAL FINDINGS:    PROCEDURE:   Done by RT:  Pee Naranjo RCP with me present during the whole procedure.   Using direct laryngoscopy, the vocal cords were visualized and the endotracheal tube was placed through the cords under direct vision.     Good breath sounds were auscultated bilaterally without sounds over abdomen.  Appropriate strong & repeatable exhaled CO2 detection was confirmed.       Electronically signed by Raquel Shirley MD on 2024 at 5:00 PM

## 2024-12-13 NOTE — PROGRESS NOTES
Wasted 5 ml of Kvng and 3 ml of propofol post intubation    Electronically signed by Eliane Atkinson RN on 12/13/2024 at 3:40 PM   Electronically signed by Irasema Montero RN on 12/13/2024 at 3:41 PM

## 2024-12-13 NOTE — PROGRESS NOTES
[facility]   Daily Progress Note      Admit Date:  12/11/2024      Subjective:   Mr. Vargas is 59 y.o. male with a past medical history significant for paroxysmal atrial fibrillation,  PAD and chronic systolic CHF with LVEF 15-20% who presented to [facility] with dizziness and elevated blood pressure in the 200's systolic. He had been admitted in October 2024 with acute on chronic systolic CHF and discharged 10/29/24.I was asked to see him for atrial fibrillation with RVR. He was on amiodarone 200mg daily and Eliquis 5mg bid at home. His CHF regimen includes Toprol XL 25mg daily, Entresto 49/51mg bid and torsemide 50mg daily with Imdur 15mg daily. He follows with Dr. Rosa at  Cardiology.     Levy states that a week ago he had trouble getting up from the toilet. He says that he was weak. He says his breathing was not great but it was not the shortness of breath that kept him from getting up.      He denies any chest pain or tightness.     He was placed on a amiodarone drip in the ED for his rapid atrial fibrillation.  He was having decreased urine output and some hypotension yesterday.  He was placed on the milrinone drip as well as IV Lasix.    Interval history:  Ananth is critically ill this morning.  He is had no urine output for the last 12 hours.  He has rapid atrial fibrillation on telemetry despite being on the amiodarone drip.      Objective:     BP (!) 81/58   Pulse (!) 140   Temp 97.7 °F (36.5 °C) (Bladder)   Resp 17   Ht 1.854 m (6' 1\")   Wt (!) 145.3 kg (320 lb 5.3 oz)   SpO2 96%   BMI 42.26 kg/m²      Intake/Output Summary (Last 24 hours) at 12/13/2024 1605  Last data filed at 12/13/2024 1414  Gross per 24 hour   Intake 1657.2 ml   Output 465 ml   Net 1192.2 ml       Physical Exam:  General: Arousable but confused and lethargic.  Skin:  Warm and dry  Neck:  JVD<8, no carotid bruits  Chest:  Clear to auscultation, no wheezes/rhonchi/rales  Cardiovascular:  RRR, normal S1/S2,  \"CKTOTAL;3\"  FASTING LIPID PANEL:  Lab Results   Component Value Date/Time    CHOL 79 10/19/2024 03:13 AM    HDL 17 10/19/2024 03:13 AM    TRIG 51 10/19/2024 03:13 AM     Assessment / Plan:     Atrial fibrillation with RVR  The amiodarone drip is not controlling his heart rate adequately.    Hold Eliquis with hematemesis..     Acute on chronic Systolic CHF, class 4  Ananth pat has become even more ill over the last 24 hours.  He is in cardiogenic shock.  He has become an uric with requirement to start CVVHD.  I had a discussion with his family that he is really not a candidate for further advanced heart failure therapies including transplant or LVAD placement.  At this time I would even hold off placing a left ventricular support device such as an Impella with his hematemesis.  Continue supportive care with the IV milrinone drip.  Hold any nephrotoxic agents as well as beta-blockade.     Acute on chronic kidney injury   Levy is now anuric.  Nephrology is following.  Plans to start CVVHD now.    Total critical care time 45 minutes.    I had multiple discussions with multiple family members.  The last family member side of discussion with where his 3 siblings.  Apparently they were unaware that Levy had such as severe cardiomyopathy.  I did explain how ill he was and likely there would be no recovery from this.  They verbalized understanding.    Signed:  JOEL ALVARADO MD

## 2024-12-13 NOTE — PROGRESS NOTES
NAME:  Levy Vargas  YOB: 1965  MEDICAL RECORD NUMBER:  [id number]    Shift Summary: Lasix drip started,  castanon  remains. Diuril added, pt still having poor output. One time dose of Diuril and only 100 ml output.     Family updated: No    Rhythm: Atrial Fibrillation  RVR    Most recent vitals:   Visit Vitals  BP (!) 89/61   Pulse (!) 139   Temp 97.4 °F (36.3 °C) (Bladder)   Resp 12   Ht 1.854 m (6' 1\")   Wt (!) 145.4 kg (320 lb 8.8 oz)   SpO2 (!) 89%   BMI 42.29 kg/m²           No data found.    No data found.      Respiratory support needed (if any):  - O2 - NC - 4 lpm    Admission weight Weight - Scale: (!) 145.4 kg (320 lb 8.8 oz) (12/11/24 2306)    Today's weight    Wt Readings from Last 1 Encounters:   12/11/24 (!) 145.4 kg (320 lb 8.8 oz)        Castanon need assessed each shift: YES -  - continue castanon r/t -    UOP >30ml/hr: NO -    Last documented BM (in last 48 hrs):  Patient Vitals for the past 48 hrs:   Last BM (including prior to admit)   12/11/24 2300 12/11/24 12/12/24 0600 12/12/24 12/12/24 1000 12/12/24                Restraints (in use currently or dc'd in last 12 hrs): No    Order current and documentation up to date? No    Lines/Drains reviewed @ bedside.  Peripheral IV 12/11/24 Posterior;Right Hand (Active)   Number of days: 1       Peripheral IV 12/11/24 Left;Posterior Hand (Active)   Number of days: 1       Urinary Catheter 12/12/24 Castanon-Temperature (Active)   Number of days: 0         Drip rates at handoff:    Amiodarone 0.5 mg/min (12/12/24 1706)    furosemide (LASIX) 100 mg in sodium chloride 0.9 % 100 mL infusion 10 mg/hr (12/13/24 0026)    milrinone 0.2 mcg/kg/min (12/12/24 2345)    sodium chloride         Lab Data:   CBC:   Recent Labs     12/11/24  1918 12/12/24  0359   WBC 4.8 5.5   HGB 12.3* 10.9*   HCT 38.3* 34.2*   MCV 87.0 87.1    181     BMP:    Recent Labs     12/12/24  1251 12/12/24  1926    142   K 3.4* 3.7   CO2 31 36*   BUN 38* 42*   CREATININE

## 2024-12-13 NOTE — PROGRESS NOTES
Late entry due to patient care     Pt with 2 watery BM at 12 pm assessment. This made a total for 3 watery BM since this RN took over. Message sent to hopitalist Dr. Marrufo and rectal tube was placed. Shortly afterwards pt started to excessively projectile vomit. Appeared to be bloody/coffee ground emesis. Dr. Silver was at bedside at this time. A stat ABG was drawn and decision to place NG tube was placed. Decision was made to intubate. At this time Dr. Hernandez was at bedside along with family. Family agreeable to intubation and CRRT. Dr. Kwong also popped back to bedside during this and CRRT orders were placed. Pt was intubated, see previous note. Sedated with propofol gtt and levo gtt for BP. Fentanyl gtt added shortly after for additional sedation. Pt was also line with vascath and triple lumen cvc. Stat chest x-ray was order to verify all line placements. Pt with soft, narrow BP on multiple extremities. Dr Santacruz updated and order obtained for arterial line. Consent obtained and called up to ICU RN to place.

## 2024-12-13 NOTE — PROGRESS NOTES
Pt's brother called back. Answered his questions to the best of my abilities. Informed Dr. Hernandez that brother called and had questions for him. Dr. Hernandez did leave unit with brother's contact number

## 2024-12-13 NOTE — CONSULTS
cessation as increases risk of coronary vasospasm and further worsening of heart function.    Dehydration 07/28/2016    Diabetes mellitus (HCC)     History of cocaine abuse (HCC) 07/28/2016    History of MI (myocardial infarction) 07/28/2016    Hyperglycemia 07/28/2016    Hyperlipidemia     Hypertension     Morbid obesity with BMI of 45.0-49.9, adult 07/28/2016    Postural dizziness 07/28/2016    PUD (peptic ulcer disease) 03/01/2019       Past Surgical History:   Procedure Laterality Date    CARDIAC CATHETERIZATION      CARDIAC PROCEDURE N/A 10/21/2024    Right heart cath performed by Daniele Hernandez MD at [facility] CARDIAC CATH LAB    LEG SURGERY Right 8/25/2023    RIGHT THIGH INCISION AND DRAINAGE WITH DRAIN PLACEMENT performed by Dominick Cid DO at [facility] OR    UPPER GASTROINTESTINAL ENDOSCOPY         Allergies:  No Known Allergies     Social Determinants of Health with Concerns     Tobacco Use: High Risk (10/18/2024)    Patient History     Smoking Tobacco Use: Every Day     Smokeless Tobacco Use: Never     Passive Exposure: Not on file   Financial Resource Strain: Not on file   Food Insecurity: Food Insecurity Present (12/12/2024)    Hunger Vital Sign     Worried About Running Out of Food in the Last Year: Sometimes true     Ran Out of Food in the Last Year: Sometimes true   Transportation Needs: Unmet Transportation Needs (12/12/2024)    PRAPARE - Transportation     Lack of Transportation (Medical): Yes     Lack of Transportation (Non-Medical): Yes   Physical Activity: Not on file   Stress: Not on file   Social Connections: Not on file   Intimate Partner Violence: Not on file   Housing Stability: High Risk (12/12/2024)    Housing Stability Vital Sign     Unable to Pay for Housing in the Last Year: Yes     Number of Times Moved in the Last Year: 2     Homeless in the Last Year: No   Utilities: Patient Declined (12/12/2024)    [facility] Utilities     Threatened with loss of utilities: Patient declined   Recent  taking place with the family. Patient will need CRRT if patient/family decide to continue aggressive care. Orders to follow pending patient/family decision.     HFrEF: decompensated. Wt was 131.5 kg on 9/25/2024 during last cardiology appt   - TTE (10/19/2024): LVEF 10-15%. Severe global hypokinesis. Indeterminate diastolic dysfx. LA severely dilated.    - Failed diuresis   - On milrinone gtt   - Volume removal with CRRT if tolerated pending goals of care    - Strict I/Os. Daily weights.   - Cardiology following    Hypotension   - 1x dose of midodrine ordered   - CC/pulmonology consulted   - Goals of care pending    Afib RVR   - On amio gtt   - Cardiology following    Anemia   - Hgb > goal   - Monitor    CC time: > 30 min    Will discuss with nephrology attending physician, Dr. Kwong.  See attestation for additional recommendations.    CONSUELO Contreras - CNP

## 2024-12-13 NOTE — PROGRESS NOTES
Wasted 5 ml of Kvng and 3 mL of propofol post intubation    Electronically signed by Eliane Atkinson RN on 12/13/2024 at 3:39 PM

## 2024-12-13 NOTE — PLAN OF CARE
Problem: Chronic Conditions and Co-morbidities  Goal: Patient's chronic conditions and co-morbidity symptoms are monitored and maintained or improved  Outcome: Progressing     Problem: Pain  Goal: Verbalizes/displays adequate comfort level or baseline comfort level  Outcome: Progressing     Problem: Safety - Adult  Goal: Free from fall injury  Outcome: Progressing     Problem: Respiratory - Adult  Goal: Achieves optimal ventilation and oxygenation  Outcome: Progressing     Problem: Cardiovascular - Adult  Goal: Maintains optimal cardiac output and hemodynamic stability  Outcome: Progressing

## 2024-12-13 NOTE — FLOWSHEET NOTE
12/13/24 1505 12/13/24 1507 12/13/24 1510   Vitals   BP (!) 75/64 (!) 84/70 (!) 75/62   MAP (Calculated) 68 75 66   MAP (mmHg) 70 76 69   BP Location Right upper arm Right lower arm Right lower arm      12/13/24 1512   Vitals   BP (!) 81/58   MAP (Calculated) 66   MAP (mmHg) 67   BP Location Left upper arm     Message sent updating Dr. Silver, order to obtain arterial line

## 2024-12-13 NOTE — PROGRESS NOTES
Arterial Catheter Insertion Procedure Note    Consent: The family members were counseled regarding the procedure, its indications, risks, potential complications and alternatives, and any questions were answered. Consent was obtained to proceed.    Procedure: Insertion of Arterial Catheter    Indications:  Hypotension, Shock    Estimated Blood Loss: Minimal    Procedure Details   Informed consent was obtained for the procedure, including sedation.  Risks of hemorrhage, arrhythmia, infection and adverse drug reaction were discussed.   A time out was performed at 1550  Ultrasound used and Left Radial artery was noted to be patent.  Collatoral flow was confirmed with an Abhilash's Test.  Under sterile conditions the skin above the Left Radial artery was prepped with Chloraprep and covered with a sterile drape after donning mask, sterile gown, annd sterile gloves.  A 22-gauge needle was inserted into the Left Radial artery.  A guide wire was then passed easily through the needle which was advanced with no resistance. Good flash of blood returned. The catheter was advanced over the existing wire without resistance or complication and subsequently sutured into place after pressure tubing connected and waveform verified on monitor.    Findings:  There were no complications nor changes to vital signs. Patient tolerated procedure well.    Total time of procedure: 15 minutes

## 2024-12-13 NOTE — PROGRESS NOTES
Page placed to Dr. Hernandez in regards to patient status including patient hypotensive. Awaiting call back      Rosario Ferrari RN

## 2024-12-13 NOTE — PROGRESS NOTES
Physician Progress Note      PATIENT:               ANCELMO STEVENSON  CSN #:                  [id number]  :                       1965  ADMIT DATE:       2024 6:20 PM  DISCH DATE:  RESPONDING  PROVIDER #:        Tierra Marrufo MD          QUERY TEXT:    Pt admitted with afib RVR and acute on chronic systolic CHF.  Pt noted to have   CKD III.  On admission:  BUN 38, creatinine 1.4, GFR 57 and on :  BUN   46, creatinine 2.6, GFR 27.  If possible, please document in the progress   notes and discharge summary if you are evaluating and/or treating any of the   following:    The medical record reflects the following:  Risk Factors: HTN, CHF, CKD, on Lasix infusion  Clinical Indicators:  On admission:  BUN 38, creatinine 1.4, GFR 57 and on   :  BUN 46, creatinine 2.6, GFR 27  Treatment:  monitoring renal labs, nephrology consult pending    Defined by Kidney Disease Improving Global Outcomes (KDIGO) clinical practice   guideline for acute kidney injury:  -Increase in SCr by greater than or equal to 0.3 mg/dl within 48 hours; or  -Increase or decrease in SCr to greater than or equal to 1.5 times baseline,   which is known or presumed to have occurred within the prior 7 days; or  -Urine volume < 0.5ml/kg/h for 6 hours  Options provided:  -- Acute kidney failure  -- Other - I will add my own diagnosis  -- Disagree - Not applicable / Not valid  -- Disagree - Clinically unable to determine / Unknown  -- Refer to Clinical Documentation Reviewer    PROVIDER RESPONSE TEXT:    This patient is in Acute kidney failure.    Query created by: Leslie Harris on 2024 5:49 AM      Electronically signed by:  Tierra Marrufo MD 2024 11:12 AM

## 2024-12-13 NOTE — CONSULTS
for  evaluation.  No evidence of intraluminal filling defect to suggest pulmonary  embolism.  Main pulmonary artery is enlarged in caliber measuring 4 cm.    Mediastinum: No evidence of mediastinal lymphadenopathy.  The heart and  pericardium demonstrate no acute abnormality.  The heart appears mildly  enlarged in size.  There is no acute abnormality of the thoracic aorta.    Lungs/pleura: The lungs are without acute process.  No focal consolidation or  pulmonary edema.  No evidence of pleural effusion or pneumothorax.    Upper Abdomen: Findings in the abdomen are reported separately.    Soft Tissues/Bones: No acute bone or soft tissue abnormality.    Impression  No evidence of pulmonary embolism or acute pulmonary abnormality.    Enlarged caliber of the main pulmonary artery measuring 4 cm may be found in  the setting of pulmonary artery hypertension.      CXR PA/LAT: Results for orders placed during the hospital encounter of 04/23/24    XR CHEST (2 VW)    Narrative  EXAMINATION:  TWO XRAY VIEWS OF THE CHEST    4/23/2024 1:02 pm    COMPARISON:  08/03/2023    HISTORY:  ORDERING SYSTEM PROVIDED HISTORY: SOB  TECHNOLOGIST PROVIDED HISTORY:  Reason for exam:->SOB  Reason for Exam: Abdominal Pain    FINDINGS:  The lungs are without acute focal process.  There is no effusion or  pneumothorax. The cardiomediastinal silhouette is without acute process. The  osseous structures are without acute process.    Impression  No acute process.      CXR portable: Results for orders placed during the hospital encounter of 12/11/24    XR CHEST PORTABLE    Narrative  EXAMINATION:  ONE XRAY VIEW OF THE CHEST    12/11/2024 7:18 pm    COMPARISON:  10/18/2024    HISTORY:  ORDERING SYSTEM PROVIDED HISTORY: Pain  TECHNOLOGIST PROVIDED HISTORY:  Reason for exam:->Pain  Reason for Exam: dizzy, high blood pressure    FINDINGS:  Transvenous pacer remains in place.    The lungs are without acute focal process.  There is no effusion

## 2024-12-13 NOTE — PROGRESS NOTES
Received consult. Attempted to see but getting a line. Recommend keeping on a PPI drip for now. Patient currently too unstable to perform an endoscopy and would support with blood products and pressors. Low suspicion coffee ground emesis is driving hypotension with no overt blood reported. Continue to optimize cardiac function.

## 2024-12-13 NOTE — PROGRESS NOTES
Dr. Silver at bedside for RSI. Levo gtt started per order. Verbal order from Dr. Silver to increase levo to 10 mcg.   1340: 4 ml propofol  1342: 50 DARY  1343: 3 ml propofol    1345: ETT: 7 1/2 , 27 @ lip

## 2024-12-13 NOTE — PROGRESS NOTES
Department of Internal Medicine  Nephrology Progress Note          REASON FOR CONSULTATION:  Acute kidney injury.     HISTORY OF PRESENTING ILLNESS:  A 59-year-old  male with past medical history significant for chronic kidney disease stage 3B, morbid obesity, hypertension, severe cardiomyopathy, ejection fraction of 10% to 15%, diabetes mellitus type 2, chronic Afib, cirrhosis of liver, COPD, history of cocaine abuse, coronary artery disease, status post MI, came to ER complaining of shortness of breath, dyspnea on exertion.  The patient was found to be in congestive heart failure with atrial fibrillation with RVR.  Admitted in ICU, started on Lasix drip.  Renal consultation has been called because of low urine output and worsening kidney function.  Overnight, the patient remained on high-dose Lasix drip, and Diuril was added without much luck.  At the time of consultation, the patient is drowsy and sleepy, not able to give me any information.      Events noted ,will start pressers . Family wants to try CRRT .   ICU team placing temp vas cath .   REVIEW OF SYSTEMS:  Unobtainable     Physical Exam:    VITALS:  BP 91/64   Pulse (!) 120   Temp 97.7 °F (36.5 °C) (Bladder)   Resp 20   Ht 1.854 m (6' 1\")   Wt (!) 145.3 kg (320 lb 5.3 oz)   SpO2 96%   BMI 42.26 kg/m²   24HR INTAKE/OUTPUT:    Intake/Output Summary (Last 24 hours) at 12/13/2024 1341  Last data filed at 12/13/2024 1200  Gross per 24 hour   Intake 1317.79 ml   Output 145 ml   Net 1172.79 ml       Constitutional:  resting   Respiratory:  Scattered Rhonchi   Gastrointestinal:  No  tenderness.  Normal Bowel Sounds  Cardiovascular:  S1, S2 irregular   Edema:  +++edema    DATA:    CBC:  Lab Results   Component Value Date/Time    WBC 5.5 12/12/2024 03:59 AM    RBC 3.93 12/12/2024 03:59 AM    HGB 10.9 12/12/2024 03:59 AM    HCT 34.2 12/12/2024 03:59 AM    MCV 87.1 12/12/2024 03:59 AM    MCH 27.8 12/12/2024 03:59 AM    MCHC 31.9 12/12/2024

## 2024-12-13 NOTE — PROCEDURES
PATIENT NAME: Levy Vargas  MEDICAL RECORD NO. [id number]  DATE: 12/13/2024      PROCEDURE PERFORMED:  Right Internal Jugular Vein Central Line Insertion  ANESTHESIA:  Local utilizing 1% lidocaine  ESTIMATED BLOOD LOSS:  Less than 5 ml  COMPLICATIONS:  None immediately appreciated.    Consent: obtained explaining indications, benefits, possible complications and alternatives explained.     PROCEDURE:  A timeout was initiated by the bedside nurse and was confirmed by those present.  The patient was placed in a supine position. The skin overlying the Right Internal Jugular Vein was prepped with chlorhexadine and draped in sterile fashion. The skin was infiltrated with local anesthetic. Through the anesthetized region, the introducer needle was inserted into the internal jugular vein returning dark red non pulsatile blood. A guidewire was placed through the center of the needle with no resistance. This is non tunneled catheter. A small incision made in the skin with a #11 scalpel blade. The dilator was inserted into the skin and vein over guidewire using Seldinger technique. The dilator was then removed and 20 cm dialysis  catheter was placed in the vein over the guidewire using Seldinger technique terminating in the SVC, right atrium area.The guidewire was then removed and all ports aspirated and flushed appropriately.   The catheter then secured using silk suture and a temporary sterile dressing was applied.  No immediate complication was evident.  All sponge, instrument and needle counts were correct at the completion of the procedure.    Postprocedural chest x-ray showed good position of the catheter with no evidence of hemothorax or pneumothorax. The patient tolerated the procedure well with no immediate complication evident.     Raquel Shirley MD  12/13/2024, 4:55 PM

## 2024-12-13 NOTE — CONSULTS
[facility]          [address] [place] [address]                              CONSULTATION      PATIENT NAME: ANCELMO STEVENSON                : 1965  MED REC NO: [id number]                      ROOM: [place] [address]  ACCOUNT NO: [id number]                       ADMIT DATE: 2024  PROVIDER: Mikel Kwong MD      CONSULT DATE: 2024    REASON FOR CONSULTATION:  Acute kidney injury.    HISTORY OF PRESENTING ILLNESS:  A 59-year-old  male with past medical history significant for chronic kidney disease stage 3B, morbid obesity, hypertension, severe cardiomyopathy, ejection fraction of 10% to 15%, diabetes mellitus type 2, chronic Afib, cirrhosis of liver, COPD, history of cocaine abuse, coronary artery disease, status post MI, came to ER complaining of shortness of breath, dyspnea on exertion.  The patient was found to be in congestive heart failure with atrial fibrillation with RVR.  Admitted in ICU, started on Lasix drip.  Renal consultation has been called because of low urine output and worsening kidney function.  Overnight, the patient remained on high-dose Lasix drip, and Diuril was added without much luck.  At the time of consultation, the patient is drowsy and sleepy, not able to give me any information.  Cardiology is in the process of deciding on the goals of care.  From kidney standpoint, the patient needs to be started on continuous renal replacement therapy.  I was not able to discuss with the patient to get a direction or able to get in touch with the family.    OUTPATIENT MEDICATIONS:  Include Cordarone, Toprol, Demadex, aspirin, Eliquis, Lipitor, Protonix, Neurontin.    PAST MEDICAL HISTORY:    1. Chronic Afib.  2. Morbid obesity.  3. COPD.  4. Coronary artery disease, status post MI.  5. Congestive heart failure with severe cardiomyopathy, ejection fraction of 10% to 15%.  6. Chronic liver disease.  7. History of cocaine

## 2024-12-14 LAB
ANION GAP SERPL CALCULATED.3IONS-SCNC: 10 MMOL/L (ref 3–16)
ANION GAP SERPL CALCULATED.3IONS-SCNC: 11 MMOL/L (ref 3–16)
ANION GAP SERPL CALCULATED.3IONS-SCNC: 12 MMOL/L (ref 3–16)
ANION GAP SERPL CALCULATED.3IONS-SCNC: 12 MMOL/L (ref 3–16)
BASE EXCESS BLDA CALC-SCNC: 10 MMOL/L (ref -3–3)
BASE EXCESS BLDA CALC-SCNC: 8 MMOL/L (ref -3–3)
BASE EXCESS BLDA CALC-SCNC: 8 MMOL/L (ref -3–3)
BASOPHILS # BLD: 0 K/UL (ref 0–0.2)
BASOPHILS NFR BLD: 0.1 %
BUN SERPL-MCNC: 36 MG/DL (ref 7–20)
BUN SERPL-MCNC: 38 MG/DL (ref 7–20)
BUN SERPL-MCNC: 40 MG/DL (ref 7–20)
BUN SERPL-MCNC: 43 MG/DL (ref 7–20)
CA-I BLD-SCNC: 1.11 MMOL/L (ref 1.12–1.32)
CA-I BLD-SCNC: 1.12 MMOL/L (ref 1.12–1.32)
CA-I BLD-SCNC: 1.12 MMOL/L (ref 1.12–1.32)
CA-I BLD-SCNC: 1.13 MMOL/L (ref 1.12–1.32)
CALCIUM SERPL-MCNC: 8.9 MG/DL (ref 8.3–10.6)
CALCIUM SERPL-MCNC: 9 MG/DL (ref 8.3–10.6)
CHLORIDE SERPL-SCNC: 94 MMOL/L (ref 99–110)
CHLORIDE SERPL-SCNC: 94 MMOL/L (ref 99–110)
CHLORIDE SERPL-SCNC: 95 MMOL/L (ref 99–110)
CHLORIDE SERPL-SCNC: 96 MMOL/L (ref 99–110)
CO2 BLDA-SCNC: 33 MMOL/L
CO2 BLDA-SCNC: 34 MMOL/L
CO2 BLDA-SCNC: 36 MMOL/L
CO2 SERPL-SCNC: 29 MMOL/L (ref 21–32)
CO2 SERPL-SCNC: 30 MMOL/L (ref 21–32)
CO2 SERPL-SCNC: 30 MMOL/L (ref 21–32)
CO2 SERPL-SCNC: 32 MMOL/L (ref 21–32)
CREAT SERPL-MCNC: 1.9 MG/DL (ref 0.8–1.3)
CREAT SERPL-MCNC: 2.1 MG/DL (ref 0.8–1.3)
CREAT SERPL-MCNC: 2.3 MG/DL (ref 0.8–1.3)
CREAT SERPL-MCNC: 2.5 MG/DL (ref 0.8–1.3)
DEPRECATED RDW RBC AUTO: 18.6 % (ref 12.4–15.4)
EOSINOPHIL # BLD: 0 K/UL (ref 0–0.6)
EOSINOPHIL NFR BLD: 0 %
GFR SERPLBLD CREATININE-BSD FMLA CKD-EPI: 29 ML/MIN/{1.73_M2}
GFR SERPLBLD CREATININE-BSD FMLA CKD-EPI: 32 ML/MIN/{1.73_M2}
GFR SERPLBLD CREATININE-BSD FMLA CKD-EPI: 35 ML/MIN/{1.73_M2}
GFR SERPLBLD CREATININE-BSD FMLA CKD-EPI: 40 ML/MIN/{1.73_M2}
GLUCOSE BLD-MCNC: 148 MG/DL (ref 70–99)
GLUCOSE BLD-MCNC: 163 MG/DL (ref 70–99)
GLUCOSE BLD-MCNC: 180 MG/DL (ref 70–99)
GLUCOSE SERPL-MCNC: 129 MG/DL (ref 70–99)
GLUCOSE SERPL-MCNC: 143 MG/DL (ref 70–99)
GLUCOSE SERPL-MCNC: 152 MG/DL (ref 70–99)
GLUCOSE SERPL-MCNC: 162 MG/DL (ref 70–99)
HCO3 BLDA-SCNC: 31.8 MMOL/L (ref 21–29)
HCO3 BLDA-SCNC: 32.2 MMOL/L (ref 21–29)
HCO3 BLDA-SCNC: 34.3 MMOL/L (ref 21–29)
HCT VFR BLD AUTO: 33.1 % (ref 40.5–52.5)
HCT VFR BLD AUTO: 33.6 % (ref 40.5–52.5)
HCT VFR BLD AUTO: 33.8 % (ref 40.5–52.5)
HGB BLD-MCNC: 10.7 G/DL (ref 13.5–17.5)
HGB BLD-MCNC: 10.7 G/DL (ref 13.5–17.5)
HGB BLD-MCNC: 10.8 G/DL (ref 13.5–17.5)
INHALED O2 FLOW RATE: 45 L/MIN
INHALED O2 FLOW RATE: 80 L/MIN
LACTATE BLD-SCNC: 1.3 MMOL/L (ref 0.4–2)
LACTATE BLD-SCNC: 1.52 MMOL/L (ref 0.4–2)
LACTATE BLDV-SCNC: 1.5 MMOL/L (ref 0.4–2)
LYMPHOCYTES # BLD: 0.2 K/UL (ref 1–5.1)
LYMPHOCYTES NFR BLD: 2.6 %
MAGNESIUM SERPL-MCNC: 2.23 MG/DL (ref 1.8–2.4)
MAGNESIUM SERPL-MCNC: 2.25 MG/DL (ref 1.8–2.4)
MAGNESIUM SERPL-MCNC: 2.39 MG/DL (ref 1.8–2.4)
MCH RBC QN AUTO: 27.5 PG (ref 26–34)
MCHC RBC AUTO-ENTMCNC: 31.7 G/DL (ref 31–36)
MCV RBC AUTO: 86.7 FL (ref 80–100)
MONOCYTES # BLD: 0.3 K/UL (ref 0–1.3)
MONOCYTES NFR BLD: 3.7 %
NEUTROPHILS # BLD: 7.2 K/UL (ref 1.7–7.7)
NEUTROPHILS NFR BLD: 93.6 %
PCO2 BLDA: 45.2 MM HG (ref 35–45)
PCO2 BLDA: 45.8 MM HG (ref 35–45)
PCO2 BLDA: 49.7 MM HG (ref 35–45)
PERFORMED ON: ABNORMAL
PH BLDA: 7.45 [PH] (ref 7.35–7.45)
PH BLDA: 7.46 [PH] (ref 7.35–7.45)
PH BLDA: 7.46 [PH] (ref 7.35–7.45)
PH BLDV: 7.47 [PH] (ref 7.35–7.45)
PH BLDV: 7.48 [PH] (ref 7.35–7.45)
PH BLDV: 7.48 [PH] (ref 7.35–7.45)
PH BLDV: 7.5 [PH] (ref 7.35–7.45)
PHOSPHATE SERPL-MCNC: 3.1 MG/DL (ref 2.5–4.9)
PHOSPHATE SERPL-MCNC: 3.2 MG/DL (ref 2.5–4.9)
PLATELET # BLD AUTO: 198 K/UL (ref 135–450)
PMV BLD AUTO: 9.1 FL (ref 5–10.5)
PO2 BLDA: 110.4 MM HG (ref 75–108)
PO2 BLDA: 76.2 MM HG (ref 75–108)
PO2 BLDA: 79.4 MM HG (ref 75–108)
POC SAMPLE TYPE: ABNORMAL
POTASSIUM SERPL-SCNC: 4 MMOL/L (ref 3.5–5.1)
POTASSIUM SERPL-SCNC: 4 MMOL/L (ref 3.5–5.1)
POTASSIUM SERPL-SCNC: 4.1 MMOL/L (ref 3.5–5.1)
POTASSIUM SERPL-SCNC: 4.2 MMOL/L (ref 3.5–5.1)
RBC # BLD AUTO: 3.9 M/UL (ref 4.2–5.9)
SAO2 % BLDA: 96 % (ref 93–100)
SAO2 % BLDA: 96 % (ref 93–100)
SAO2 % BLDA: 98 % (ref 93–100)
SODIUM SERPL-SCNC: 134 MMOL/L (ref 136–145)
SODIUM SERPL-SCNC: 136 MMOL/L (ref 136–145)
SODIUM SERPL-SCNC: 136 MMOL/L (ref 136–145)
SODIUM SERPL-SCNC: 139 MMOL/L (ref 136–145)
WBC # BLD AUTO: 7.7 K/UL (ref 4–11)

## 2024-12-14 PROCEDURE — 83605 ASSAY OF LACTIC ACID: CPT

## 2024-12-14 PROCEDURE — 82330 ASSAY OF CALCIUM: CPT

## 2024-12-14 PROCEDURE — 2500000003 HC RX 250 WO HCPCS

## 2024-12-14 PROCEDURE — 6360000002 HC RX W HCPCS: Performed by: INTERNAL MEDICINE

## 2024-12-14 PROCEDURE — 6370000000 HC RX 637 (ALT 250 FOR IP)

## 2024-12-14 PROCEDURE — 83735 ASSAY OF MAGNESIUM: CPT

## 2024-12-14 PROCEDURE — 94003 VENT MGMT INPAT SUBQ DAY: CPT

## 2024-12-14 PROCEDURE — 85014 HEMATOCRIT: CPT

## 2024-12-14 PROCEDURE — 37799 UNLISTED PX VASCULAR SURGERY: CPT

## 2024-12-14 PROCEDURE — 84100 ASSAY OF PHOSPHORUS: CPT

## 2024-12-14 PROCEDURE — 82947 ASSAY GLUCOSE BLOOD QUANT: CPT

## 2024-12-14 PROCEDURE — 90945 DIALYSIS ONE EVALUATION: CPT

## 2024-12-14 PROCEDURE — 80048 BASIC METABOLIC PNL TOTAL CA: CPT

## 2024-12-14 PROCEDURE — 2580000003 HC RX 258: Performed by: INTERNAL MEDICINE

## 2024-12-14 PROCEDURE — 2580000003 HC RX 258

## 2024-12-14 PROCEDURE — 99291 CRITICAL CARE FIRST HOUR: CPT | Performed by: INTERNAL MEDICINE

## 2024-12-14 PROCEDURE — 94761 N-INVAS EAR/PLS OXIMETRY MLT: CPT

## 2024-12-14 PROCEDURE — 2700000000 HC OXYGEN THERAPY PER DAY

## 2024-12-14 PROCEDURE — 2100000000 HC CCU R&B

## 2024-12-14 PROCEDURE — 85025 COMPLETE CBC W/AUTO DIFF WBC: CPT

## 2024-12-14 PROCEDURE — 2500000003 HC RX 250 WO HCPCS: Performed by: INTERNAL MEDICINE

## 2024-12-14 PROCEDURE — 85018 HEMOGLOBIN: CPT

## 2024-12-14 PROCEDURE — 2580000003 HC RX 258: Performed by: FAMILY MEDICINE

## 2024-12-14 PROCEDURE — 99233 SBSQ HOSP IP/OBS HIGH 50: CPT | Performed by: NURSE PRACTITIONER

## 2024-12-14 PROCEDURE — 82803 BLOOD GASES ANY COMBINATION: CPT

## 2024-12-14 PROCEDURE — 6360000002 HC RX W HCPCS: Performed by: FAMILY MEDICINE

## 2024-12-14 RX ADMIN — HYDROCORTISONE SODIUM SUCCINATE 50 MG: 100 INJECTION, POWDER, FOR SOLUTION INTRAMUSCULAR; INTRAVENOUS at 08:19

## 2024-12-14 RX ADMIN — Medication: at 18:27

## 2024-12-14 RX ADMIN — HYDROCORTISONE SODIUM SUCCINATE 50 MG: 100 INJECTION, POWDER, FOR SOLUTION INTRAMUSCULAR; INTRAVENOUS at 00:30

## 2024-12-14 RX ADMIN — Medication: at 04:20

## 2024-12-14 RX ADMIN — VASOPRESSIN 0.03 UNITS/MIN: 0.2 INJECTION INTRAVENOUS at 01:54

## 2024-12-14 RX ADMIN — ASPIRIN 81 MG 81 MG: 81 TABLET ORAL at 08:19

## 2024-12-14 RX ADMIN — PROPOFOL 40 MCG/KG/MIN: 10 INJECTION, EMULSION INTRAVENOUS at 11:53

## 2024-12-14 RX ADMIN — PROPOFOL 40 MCG/KG/MIN: 10 INJECTION, EMULSION INTRAVENOUS at 13:55

## 2024-12-14 RX ADMIN — SODIUM CHLORIDE, PRESERVATIVE FREE 10 ML: 5 INJECTION INTRAVENOUS at 08:20

## 2024-12-14 RX ADMIN — Medication: at 14:00

## 2024-12-14 RX ADMIN — MILRINONE LACTATE 0.2 MCG/KG/MIN: 0.2 INJECTION, SOLUTION INTRAVENOUS at 12:51

## 2024-12-14 RX ADMIN — Medication: at 09:13

## 2024-12-14 RX ADMIN — ATORVASTATIN CALCIUM 80 MG: 80 TABLET, FILM COATED ORAL at 19:51

## 2024-12-14 RX ADMIN — CALCIUM GLUCONATE 1000 MG: 20 INJECTION, SOLUTION INTRAVENOUS at 19:50

## 2024-12-14 RX ADMIN — HYDROCORTISONE SODIUM SUCCINATE 50 MG: 100 INJECTION, POWDER, FOR SOLUTION INTRAMUSCULAR; INTRAVENOUS at 16:57

## 2024-12-14 RX ADMIN — PROPOFOL 40 MCG/KG/MIN: 10 INJECTION, EMULSION INTRAVENOUS at 00:29

## 2024-12-14 RX ADMIN — VASOPRESSIN 0.03 UNITS/MIN: 0.2 INJECTION INTRAVENOUS at 13:37

## 2024-12-14 RX ADMIN — PROPOFOL 40 MCG/KG/MIN: 10 INJECTION, EMULSION INTRAVENOUS at 19:49

## 2024-12-14 RX ADMIN — PROPOFOL 40 MCG/KG/MIN: 10 INJECTION, EMULSION INTRAVENOUS at 16:55

## 2024-12-14 RX ADMIN — AMIODARONE HYDROCHLORIDE 0.5 MG/MIN: 1.8 INJECTION, SOLUTION INTRAVENOUS at 06:30

## 2024-12-14 RX ADMIN — SODIUM CHLORIDE, PRESERVATIVE FREE 40 MG: 5 INJECTION INTRAVENOUS at 00:29

## 2024-12-14 RX ADMIN — CALCIUM GLUCONATE 1000 MG: 20 INJECTION, SOLUTION INTRAVENOUS at 06:37

## 2024-12-14 RX ADMIN — CEFEPIME 2000 MG: 2 INJECTION, POWDER, FOR SOLUTION INTRAVENOUS at 13:37

## 2024-12-14 RX ADMIN — Medication 75 MCG/HR: at 08:39

## 2024-12-14 RX ADMIN — AMIODARONE HYDROCHLORIDE 0.5 MG/MIN: 1.8 INJECTION, SOLUTION INTRAVENOUS at 17:31

## 2024-12-14 RX ADMIN — SODIUM CHLORIDE, PRESERVATIVE FREE 40 MG: 5 INJECTION INTRAVENOUS at 12:21

## 2024-12-14 RX ADMIN — CEFEPIME 2000 MG: 2 INJECTION, POWDER, FOR SOLUTION INTRAVENOUS at 01:52

## 2024-12-14 RX ADMIN — CALCIUM GLUCONATE 1000 MG: 20 INJECTION, SOLUTION INTRAVENOUS at 12:19

## 2024-12-14 RX ADMIN — PROPOFOL 40 MCG/KG/MIN: 10 INJECTION, EMULSION INTRAVENOUS at 08:40

## 2024-12-14 RX ADMIN — PROPOFOL 40 MCG/KG/MIN: 10 INJECTION, EMULSION INTRAVENOUS at 03:17

## 2024-12-14 RX ADMIN — PROPOFOL 40 MCG/KG/MIN: 10 INJECTION, EMULSION INTRAVENOUS at 23:06

## 2024-12-14 ASSESSMENT — PULMONARY FUNCTION TESTS
PIF_VALUE: 19
PIF_VALUE: 19
PIF_VALUE: 17
PIF_VALUE: 20
PIF_VALUE: 19
PIF_VALUE: 18
PIF_VALUE: 17
PIF_VALUE: 19
PIF_VALUE: 20
PIF_VALUE: 19
PIF_VALUE: 19
PIF_VALUE: 20
PIF_VALUE: 18
PIF_VALUE: 20
PIF_VALUE: 19
PIF_VALUE: 18
PIF_VALUE: 22
PIF_VALUE: 18
PIF_VALUE: 18
PIF_VALUE: 20
PIF_VALUE: 19
PIF_VALUE: 20
PIF_VALUE: 18
PIF_VALUE: 20
PIF_VALUE: 18
PIF_VALUE: 18
PIF_VALUE: 19
PIF_VALUE: 17
PIF_VALUE: 19
PIF_VALUE: 20
PIF_VALUE: 18
PIF_VALUE: 19
PIF_VALUE: 18
PIF_VALUE: 19
PIF_VALUE: 20
PIF_VALUE: 19
PIF_VALUE: 19
PIF_VALUE: 20
PIF_VALUE: 19
PIF_VALUE: 19
PIF_VALUE: 18
PIF_VALUE: 19
PIF_VALUE: 19
PIF_VALUE: 18
PIF_VALUE: 20
PIF_VALUE: 18
PIF_VALUE: 18
PIF_VALUE: 19
PIF_VALUE: 20
PIF_VALUE: 18
PIF_VALUE: 19
PIF_VALUE: 21
PIF_VALUE: 19
PIF_VALUE: 19
PIF_VALUE: 18
PIF_VALUE: 20
PIF_VALUE: 26
PIF_VALUE: 20
PIF_VALUE: 20
PIF_VALUE: 17
PIF_VALUE: 19
PIF_VALUE: 21
PIF_VALUE: 19
PIF_VALUE: 21
PIF_VALUE: 18
PIF_VALUE: 19
PIF_VALUE: 19
PIF_VALUE: 21
PIF_VALUE: 18
PIF_VALUE: 19
PIF_VALUE: 19
PIF_VALUE: 18
PIF_VALUE: 20
PIF_VALUE: 18
PIF_VALUE: 20
PIF_VALUE: 20
PIF_VALUE: 18
PIF_VALUE: 19
PIF_VALUE: 19
PIF_VALUE: 18
PIF_VALUE: 20
PIF_VALUE: 20
PIF_VALUE: 18
PIF_VALUE: 20
PIF_VALUE: 19
PIF_VALUE: 20
PIF_VALUE: 19
PIF_VALUE: 19

## 2024-12-14 NOTE — PROGRESS NOTES
V2.0    [facility] Progress Note      Name:  Levy Vargas /Age/Sex: 1965  (59 y.o. male)   MRN & CSN:  [id number] & [id number] Encounter Date/Time: 2024 10:45 AM EST   Location:  [place] [address]- PCP: No primary care provider on file.     Attending:Tierra Marrufo MD       Hospital Day: 4    Assessment and Recommendations   Levy Vargas is a 59 y.o. male with pmh of HFrEF, cirrhosis, HLD, HTN, CKD 3, DM2, A-fib who presents with Cardiogenic shock with RVR    Patient was examined this morning.    Patient was having coffee-ground emesis so he was intubated to protect his airway.  GI was consulted plans no EGD at this time due to cardiogenic shock  Currently patient is on CRRT  Continues to be on amnio, levo, vasopressin      Plan:   A-fib with RVR  Patient attributes to being compliant with his medication but has missed doses since he ran out.  ICU admission  Continuous cardiac monitor  Electrophysiology consulted  Patient was given 1 dose of Cardizem  Patient is currently on Amio drip status post bolus  Eliquis 5 mg twice daily  Metoprolol XL      2.  Acute on chronic congestive heart failure  Metoprolol XL  Entresto 49-51 mg twice daily    3.  Hypertension  Entresto 49-51 mg twice daily  Metoprolol XL 25 mg daily    4.  Coffee-ground emesis  GI consulted and following    5.  Cardiogenic shock  Acute on chronic congestive heart failure  NYHA IV, with left ventricular ejection fraction of 10-15%  Not on ARNI, SGLT2i, MRA due to CKD    6.  Acute respiratory failure  Currently intubated  Started cefepime 2 g every 12 hours  Pulmonology on board      Due to the immediate potential for life-threatening deterioration. I spent 31 minutes providing critical care. There was a high probability of clinically significant/life threatening deterioration in the patient's condition which required my urgent intervention. This time excludes time spent performing procedures but includes time spent on direct patient

## 2024-12-14 NOTE — CONSULTS
GASTROENTEROLOGY INPATIENT CONSULTATION:        IDENTIFYING DATA/REASON FOR CONSULTATION   PATIENT:  Levy Vargas  MRN:  [id number]  ADMIT DATE: 12/11/2024  TIME OF EVALUATION: 12/14/2024 8:38 AM  HOSPITAL STAY:   LOS: 3 days     REASON FOR CONSULTATION:  Coffee Ground Emesis    HISTORY OF PRESENT ILLNESS   Levy Vargas is a 59-year-old male with a past medical history of congestive heart failure, cocaine abuse, diabetes, myocardial infarction, hyperlipidemia, hypertension, morbid obesity, pancreatitis in 2021, peptic ulcer disease who presents with dizziness and weakness. He was found to have A-fib with RVR and was placed on amiodarone drip. He has had hypotension with concern for cardiogenic shock. Yesterday he had 2 episodes of dark appearing emesis. Patient had an NG tube placed and was intubated. There has been concern for coffee-ground emesis. Patient has also had KENDRA with an urea. Patient was started on CVVHD. RN reports some trauma with NG placement.     No prior EGD/Colonoscopy.     PAST MEDICAL, SURGICAL, FAMILY, and SOCIAL HISTORY     Past Medical History:   Diagnosis Date    Abdominal pain 07/29/2021    Abnormal EKG 07/28/2016    Acute pancreatitis 07/29/2021    Atrial fibrillation (HCC)     CHF (congestive heart failure) ([facility])     Cigarette smoker 08/25/2021    Cocaine abuse (HCC) 03/28/2015    Last Assessment & Plan:  Formatting of this note might be different from the original. Counseled on cessation as increases risk of coronary vasospasm and further worsening of heart function.    Dehydration 07/28/2016    Diabetes mellitus (HCC)     History of cocaine abuse (HCC) 07/28/2016    History of MI (myocardial infarction) 07/28/2016    Hyperglycemia 07/28/2016    Hyperlipidemia     Hypertension     Morbid obesity with BMI of 45.0-49.9, adult 07/28/2016    Postural dizziness 07/28/2016    PUD (peptic ulcer disease) 03/01/2019     Past Surgical History:   Procedure Laterality Date    CARDIAC

## 2024-12-14 NOTE — PROGRESS NOTES
Department of Internal Medicine  Nephrology Progress Note          REASON FOR CONSULTATION:  Acute kidney injury.     HISTORY OF PRESENTING ILLNESS:  A 59-year-old  male with past medical history significant for chronic kidney disease stage 3B, morbid obesity, hypertension, severe cardiomyopathy, ejection fraction of 10% to 15%, diabetes mellitus type 2, chronic Afib, cirrhosis of liver, COPD, history of cocaine abuse, coronary artery disease, status post MI, came to ER complaining of shortness of breath, dyspnea on exertion.  The patient was found to be in congestive heart failure with atrial fibrillation with RVR.  Admitted in ICU, started on Lasix drip.  Renal consultation has been called because of low urine output and worsening kidney function.  Overnight, the patient remained on high-dose Lasix drip, and Diuril was added without much luck.  At the time of consultation, the patient is drowsy and sleepy, not able to give me any information.      Events noted ,on vent/ pressers . On vent .   REVIEW OF SYSTEMS:  Unobtainable     Physical Exam:    VITALS:  /66   Pulse (!) 128   Temp 99.1 °F (37.3 °C) (Bladder)   Resp 18   Ht 1.854 m (6' 1\")   Wt 135.6 kg (298 lb 15.1 oz)   SpO2 96%   BMI 39.44 kg/m²   24HR INTAKE/OUTPUT:    Intake/Output Summary (Last 24 hours) at 12/14/2024 1401  Last data filed at 12/14/2024 1300  Gross per 24 hour   Intake 2556.59 ml   Output 3851 ml   Net -1294.41 ml       Constitutional:  on vent   Respiratory:  Scattered Rhonchi   Gastrointestinal:  No  tenderness.  Normal Bowel Sounds  Cardiovascular:  S1, S2 irregular   Edema:  +++edema    DATA:    CBC:  Lab Results   Component Value Date/Time    WBC 7.7 12/14/2024 05:45 AM    RBC 3.90 12/14/2024 05:45 AM    HGB 10.8 12/14/2024 12:00 PM    HCT 33.6 12/14/2024 12:00 PM    MCV 86.7 12/14/2024 05:45 AM    MCH 27.5 12/14/2024 05:45 AM    MCHC 31.7 12/14/2024 05:45 AM    RDW 18.6 12/14/2024 05:45 AM

## 2024-12-14 NOTE — PLAN OF CARE
Problem: Safety - Adult  Goal: Free from fall injury  12/14/2024 0700 by Mateo Alvarez RN  Outcome: Progressing  Flowsheets (Taken 12/13/2024 2020 by Eliane Atkinson RN)  Free From Fall Injury: Instruct family/caregiver on patient safety     Problem: Respiratory - Adult  Goal: Achieves optimal ventilation and oxygenation  12/14/2024 0700 by Mateo Alvarez RN  Outcome: Progressing  Flowsheets (Taken 12/13/2024 2020 by Eliane Atkinson RN)  Achieves optimal ventilation and oxygenation:   Assess for changes in respiratory status   Respiratory therapy support as indicated   Assess the need for suctioning and aspirate as needed   Position to facilitate oxygenation and minimize respiratory effort     Problem: Cardiovascular - Adult  Goal: Absence of cardiac dysrhythmias or at baseline  12/14/2024 0700 by Mateo Alvarez RN  Outcome: Not Progressing  Flowsheets (Taken 12/13/2024 2020 by Eliane Atkinson RN)  Absence of cardiac dysrhythmias or at baseline:   Monitor cardiac rate and rhythm   Assess for signs of decreased cardiac output   Administer antiarrhythmia medication and electrolyte replacement as ordered     Problem: Metabolic/Fluid and Electrolytes - Adult  Goal: Hemodynamic stability and optimal renal function maintained  12/14/2024 0700 by Mateo Alvarez RN  Outcome: Not Progressing  Flowsheets (Taken 12/14/2024 0700)  Hemodynamic stability and optimal renal function maintained:   Monitor labs and assess for signs and symptoms of volume excess or deficit   Monitor intake, output and patient weight   Monitor urine specific gravity, serum osmolarity and serum sodium as indicated or ordered   Monitor response to interventions for patient's volume status, including labs, urine output, blood pressure (other measures as available)     Problem: Metabolic/Fluid and Electrolytes - Adult  Goal: Glucose maintained within prescribed range  12/14/2024 0700 by Mateo Alvarez

## 2024-12-14 NOTE — PROGRESS NOTES
NAME:  Levy Vargas  YOB: 1965  MEDICAL RECORD NUMBER:  [id number]    Shift Summary: Lethargic during shift. Vomited x5- brown, coffee ground, bloody/red looking. NG was placed- 70 cm, low intermittent suction. Decision to intubate- ETT 27. R IJ vascath placed for CRRT. L IJ CVC placed. Started on levo and vaso. Fent and prop for sedation. Arterial line for BP management. Abx and steriods added. UOP drastically increased at around 1730.     Family updated: Yes:  Daughter and brother, multiple other family members at bedside     Rhythm: Atrial Fibrillation w/ RVR    Most recent vitals:   Visit Vitals  BP 90/68   Pulse (!) 142   Temp 97.6 °F (36.4 °C) (Bladder)   Resp 18   Ht 1.854 m (6' 1\")   Wt (!) 145.3 kg (320 lb 5.3 oz)   SpO2 98%   BMI 42.26 kg/m²      ABP (Arterial line BP): 98/52  ABP Mean (Arterial Line Mean): 66 mmHg    No data found.    No data found.      Respiratory support needed (if any):  - Ventilator Settings:  Vent Days 1    Vt (Set, mL): 0 mL  Resp Rate (Set): 16 bpm  FiO2 : 80 %    PEEP/CPAP (cmH2O): 5       Admission weight Weight - Scale: (!) 145.4 kg (320 lb 8.8 oz) (12/11/24 2306)    Today's weight    Wt Readings from Last 1 Encounters:   12/13/24 (!) 145.3 kg (320 lb 5.3 oz)        Castanon need assessed each shift: YES -  - continue castanon r/t - strict I&O, CRRT   UOP >30ml/hr: YES ( at beginning of shift was only having 5-15 mL/hr. Around 1730 output picked up to >100 per hour   Last documented BM (in last 48 hrs):  Patient Vitals for the past 48 hrs:   Last BM (including prior to admit) Stool Occurrence   12/11/24 2300 12/11/24 --   12/12/24 0600 12/12/24 --   12/12/24 1000 12/12/24 --   12/13/24 0800 12/13/24 1   12/13/24 0903 -- 1   12/13/24 1045 -- 1   12/13/24 1200 12/13/24 --                Restraints (in use currently or dc'd in last 12 hrs): Yes    Order current and documentation up to date? Yes    Lines/Drains reviewed @ bedside.  CVC  12/13/24 Left Internal jugular  (Active)   Number of days: 0       Peripheral IV 12/11/24 Posterior;Right Hand (Active)   Number of days: 1       Peripheral IV 12/11/24 Left;Posterior Hand (Active)   Number of days: 1       Arterial Line 12/13/24 Left Radial (Active)   Number of days: 0       Hemodialysis Central Access - Temporary Right Neck (Active)   Number of days: 0       Urinary Catheter 12/12/24 Damon-Temperature (Active)   Number of days: 1         Drip rates at handoff:    norepinephrine 6 mcg/min (12/13/24 1800)    propofol 40 mcg/kg/min (12/13/24 1840)    prismaSATE BGK 4/2.5 1,000 mL/hr at 12/13/24 1758    prismaSATE BGK 4/2.5 500 mL/hr at 12/13/24 1758    prismaSATE BGK 4/2.5 500 mL/hr at 12/13/24 1758    fentaNYL 50 mcg/hr (12/13/24 1800)    VASOpressin 0.03 Units/min (12/13/24 1800)    Amiodarone 0.5 mg/min (12/13/24 1800)    milrinone 0.2 mcg/kg/min (12/13/24 1800)    sodium chloride Stopped (12/13/24 1735)       Lab Data:   CBC:   Recent Labs     12/12/24  0359 12/13/24  1726   WBC 5.5 8.6   HGB 10.9* 10.6*   HCT 34.2* 33.1*   MCV 87.1 86.9    197     BMP:    Recent Labs     12/13/24  0404 12/13/24  0847    141   K 4.2 4.2   CO2 36* 37*   BUN 46* 47*   CREATININE 2.6* 2.7*     LIVR: No results for input(s): \"AST\", \"ALT\" in the last 72 hours.  PT/INR:   Recent Labs     12/12/24  0359   INR 1.70*     APTT: No results for input(s): \"APTT\" in the last 72 hours.  ABG: No results for input(s): \"PHART\", \"MHG0FXX\", \"PO2ART\" in the last 72 hours.    Any consults during the shift? Yes: Consults received: : GI    Any signed and held orders to be released?  No        4 Eyes Skin Assessment       The patient is being assessed for  Shift Handoff    I agree that at least one RN has performed a thorough Head to Toe Skin Assessment on the patient. ALL assessment sites listed below have been assessed.      Areas assessed by both nurses: Head, Face, Ears, Shoulders, Back, Chest, Arms, Elbows, Hands, Sacrum. Buttock, Coccyx, Ischium,

## 2024-12-14 NOTE — PLAN OF CARE
Problem: Cardiovascular - Adult  Goal: Maintains optimal cardiac output and hemodynamic stability  12/14/2024 0749 by [place] Colon RN  Outcome: Not Progressing  12/13/2024 2022 by Eliane Atkinson RN  Outcome: Progressing  Flowsheets (Taken 12/13/2024 2020)  Maintains optimal cardiac output and hemodynamic stability:   Monitor blood pressure and heart rate   Monitor urine output and notify Licensed Independent Practitioner for values outside of normal range   Assess for signs of decreased cardiac output   Administer fluid and/or volume expanders as ordered   Administer vasoactive medications as ordered  Goal: Absence of cardiac dysrhythmias or at baseline  12/14/2024 0749 by [place] Colon RN  Outcome: Not Progressing  12/14/2024 0700 by Mateo Alvarez RN  Outcome: Not Progressing  Flowsheets (Taken 12/13/2024 2020 by Eliane Atkinson RN)  Absence of cardiac dysrhythmias or at baseline:   Monitor cardiac rate and rhythm   Assess for signs of decreased cardiac output   Administer antiarrhythmia medication and electrolyte replacement as ordered  12/13/2024 2022 by Eliane Atkinson RN  Outcome: Progressing  Flowsheets (Taken 12/13/2024 2020)  Absence of cardiac dysrhythmias or at baseline:   Monitor cardiac rate and rhythm   Assess for signs of decreased cardiac output   Administer antiarrhythmia medication and electrolyte replacement as ordered     Problem: Metabolic/Fluid and Electrolytes - Adult  Goal: Hemodynamic stability and optimal renal function maintained  12/14/2024 0749 by [place] Colon RN  Outcome: Not Progressing  12/14/2024 0700 by Mateo Alvarez RN  Outcome: Not Progressing  Flowsheets (Taken 12/14/2024 0700)  Hemodynamic stability and optimal renal function maintained:   Monitor labs and assess for signs and symptoms of volume excess or deficit   Monitor intake, output and patient weight   Monitor urine specific gravity, serum osmolarity and serum sodium as indicated or  maintained within prescribed range:   Monitor blood glucose as ordered   Assess for signs and symptoms of hyperglycemia and hypoglycemia     Problem: Safety - Medical Restraint  Goal: Remains free of injury from restraints (Restraint for Interference with Medical Device)  Description: INTERVENTIONS:  1. Determine that other, less restrictive measures have been tried or would not be effective before applying the restraint  2. Evaluate the patient's condition at the time of restraint application  3. Inform patient/family regarding the reason for restraint  4. Q2H: Monitor safety, psychosocial status, comfort, nutrition and hydration  12/14/2024 0749 by [place] Colon RN  Outcome: Progressing  12/13/2024 2022 by Eliane Atkinson RN  Outcome: Progressing  Flowsheets (Taken 12/13/2024 2022)  Remains free of injury from restraints (restraint for interference with medical device):   Determine that other, less restrictive measures have been tried or would not be effective before applying the restraint   Evaluate the patient's condition at the time of restraint application   Every 2 hours: Monitor safety, psychosocial status, comfort, nutrition and hydration   Inform patient/family regarding the reason for restraint     Problem: Neurosensory - Adult  Goal: Achieves stable or improved neurological status  12/14/2024 0749 by [place] Cooln RN  Outcome: Progressing  12/13/2024 2022 by Eliane Atkinson RN  Outcome: Progressing  Flowsheets (Taken 12/13/2024 2022)  Achieves stable or improved neurological status: Assess for and report changes in neurological status     Problem: Skin/Tissue Integrity - Adult  Goal: Skin integrity remains intact  12/14/2024 0749 by [place] Colon RN  Outcome: Progressing  12/13/2024 2022 by Eliane Atkinson RN  Outcome: Progressing  Flowsheets (Taken 12/13/2024 2022)  Skin Integrity Remains Intact:   Monitor for areas of redness and/or skin breakdown   Assess vascular access sites hourly   Every 4-6

## 2024-12-14 NOTE — PROGRESS NOTES
Pulmonary Critical Care Progress Note     Patient's name:  Levy Vargas  Medical Record Number: [id number]  Patient's account/billing number: [id number]  Patient's YOB: 1965  Age: 59 y.o.  Date of Admission: 12/11/2024  6:20 PM  Date of Consult: 12/14/2024      Primary Care Physician: No primary care provider on file.      Code Status: Full Code    Reason for consult: cardiogenic shock     Assessment and Plan     Cardiogenic shock  KENDRA on cKD  Acute respiratory failure with hypoxia  Upper GI bleed  Obesity        Plan:  Vent support, settings reviewed and adjusted   Pressors to keep MAP more than 65  Inotrope per cardiology  CRRT per nephrology, fluid removal per nephrology   Amiodarone, consider dig  Hold Eliquis, PPI twice daily, monitor H&H transfuse to keep more than 7   GI on board   Hold blood pressure medications  Stress dose steroid  Cefepime for 7 days  Due to the immediate potential for life-threatening deterioration due to above I spent 34 minutes providing critical care.  This time is excluding time spent performing procedures.  This note was transcribed using Dragon Dictation software. Please disregard any translational errors.        HISTORY OF PRESENT ILLNESS:   Mr./Ms. Levy Vargas is a 59 y.o. gentleman with past medical history stated below significant for severe cardiomyopathy with EF of 15 to 20%, peripheral arterial disease, CKD, hypertension, noncompliance with medications, atrial fibrillation was admitted to the ICU with acute on chronic heart failure cardiogenic shock acute renal failure.  Currently with altered mental status and projectile vomiting of coffee-ground/blood          Past Medical History:        Diagnosis Date    Abdominal pain 07/29/2021    Abnormal EKG 07/28/2016    Acute pancreatitis 07/29/2021    Atrial fibrillation (HCC)     CHF (congestive heart failure) (HCC)     Cigarette smoker 08/25/2021    Cocaine

## 2024-12-14 NOTE — PROGRESS NOTES
ABG result from this am;   FiO2 inadvertently added as 80% but vent on 60% at the tme of ABG flor   Latest Reference Range & Units 12/14/24 06:40   pH, Arterial 7.350 - 7.450  7.447   pCO2, Arterial 35.0 - 45.0 mm Hg 49.7 (H)   pO2, Arterial 75.0 - 108.0 mm Hg 110.4 (H)   HCO3, Arterial 21.0 - 29.0 mmol/L 34.3 (H)   TCO2 (calc), Art Not Established mmol/L 36   Base Excess, Arterial -3 - 3  10 (H)   O2 Sat, Arterial 93 - 100 % 98   Sample Type  ART   FIO2  80

## 2024-12-14 NOTE — PROGRESS NOTES
[facility] [place]   Daily Progress Note      Admit Date:  12/11/2024    CC: Dizziness    HPI:   Levy Vargas is a 59 y.o. male with PMH PAF, PAD, SHF.  Adm 10/2024 with decompensated HF. Follows with advanced HF at  ([facility])    Adm to [facility] with dizziness and SBP 200s  Cardiology consulted for AF RVR  On amio and lasix gtt.   Hospitalization complicated by hypotension and low UO/Cardiogenic shock. Started on milrinone gtt. Started on CVVHD.    Uneventful night.   CRRT running even- had increased UO  Currently on Amio, Levo, vasopressin for hemodynamic support.  FIO2 weaned from 80 to 50%    Review of Systems:   Unable to obtain - intubated/sedated    Objective:   /66   Pulse (!) 120   Temp 98.5 °F (36.9 °C) (Bladder)   Resp 17   Ht 1.854 m (6' 1\")   Wt 135.6 kg (298 lb 15.1 oz)   SpO2 98%   BMI 39.44 kg/m²         Intake/Output Summary (Last 24 hours) at 12/14/2024 0719  Last data filed at 12/14/2024 0700  Gross per 24 hour   Intake 2749.78 ml   Output 3219 ml   Net -469.22 ml     I/O since adm: +1.2L (Neg 469 last 24 hrs)    WEIGHT:Admit Weight - Scale: (!) 145.4 kg (320 lb 8.8 oz)         Today  Weight - Scale: 135.6 kg (298 lb 15.1 oz)   DRY WEIGHT:  Wt Readings from Last 3 Encounters:   12/14/24 135.6 kg (298 lb 15.1 oz)   10/29/24 131.8 kg (290 lb 9.1 oz)   04/27/24 134.4 kg (296 lb 3.2 oz)       Physical Exam:  GEN: Appears ill  SKIN: Brown, warm, dry.   LUNG: AP diameter normal. Diminished.  HEART: S1S2 A/IRR. No carotid bruit. No murmur, rub or gallop.  ABD: Soft, nontender. +BS X 4 quads. No hepatomegaly.   EXT: Radial and pedal pulses 2+ and symmetric. Without varicosities. + generalized  edema.  MUSCSKEL: Good ROM X4 extremities. No deformity.   NEURO: A/O X3. Calm and cooperative.     Telemetry: AF HR 130s    Medications:    pantoprazole (PROTONIX) 40 mg in sodium chloride (PF) 0.9 % 10 mL injection  40 mg IntraVENous Q12H    cefepime  2,000 mg IntraVENous Q12H    hydrocortisone  support- milrinone, levo, vaso. Goal MAP >65.  Really hard to know his volume status/ perfusion- he does not have a swan. BP has been stable and UO has improved .   Cont to manage hypervolemia with CRRT per Neph.     3.) GIB:   ? Coffee ground emesis- now with NG tube with tan drainage, no blood  GI consulted, no plan for EGD D/T cardiogenic shock  Cont PPI    4.) KEDNRA/CKD  Poor response to lasix and diuril- became anuric  Neph consulted, poor prognosis for renal recovery  Started CRRT- stopped lasix, cont cardiac support  Had some UO overnight    5.) Acute resp failure:   Intubated/sedated- management per ICU team    Complex medical decision making 55 min    Electronically signed by CONSUELO Mcintyre - CNP on 12/14/2024 at 7:19 AM

## 2024-12-14 NOTE — PLAN OF CARE
Problem: Chronic Conditions and Co-morbidities  Goal: Patient's chronic conditions and co-morbidity symptoms are monitored and maintained or improved  Outcome: Progressing  Flowsheets (Taken 12/13/2024 0800 by Rosario Ferrari RN)  Care Plan - Patient's Chronic Conditions and Co-Morbidity Symptoms are Monitored and Maintained or Improved: Monitor and assess patient's chronic conditions and comorbid symptoms for stability, deterioration, or improvement     Problem: Pain  Goal: Verbalizes/displays adequate comfort level or baseline comfort level  Outcome: Progressing  Flowsheets (Taken 12/13/2024 2020)  Verbalizes/displays adequate comfort level or baseline comfort level:   Assess pain using appropriate pain scale   Administer analgesics based on type and severity of pain and evaluate response     Problem: Safety - Adult  Goal: Free from fall injury  Outcome: Progressing  Flowsheets (Taken 12/13/2024 2020)  Free From Fall Injury: Instruct family/caregiver on patient safety     Problem: Respiratory - Adult  Goal: Achieves optimal ventilation and oxygenation  Outcome: Progressing  Flowsheets (Taken 12/13/2024 2020)  Achieves optimal ventilation and oxygenation:   Assess for changes in respiratory status   Respiratory therapy support as indicated   Assess the need for suctioning and aspirate as needed   Position to facilitate oxygenation and minimize respiratory effort     Problem: Cardiovascular - Adult  Goal: Maintains optimal cardiac output and hemodynamic stability  Outcome: Progressing  Flowsheets (Taken 12/13/2024 2020)  Maintains optimal cardiac output and hemodynamic stability:   Monitor blood pressure and heart rate   Monitor urine output and notify Licensed Independent Practitioner for values outside of normal range   Assess for signs of decreased cardiac output   Administer fluid and/or volume expanders as ordered   Administer vasoactive medications as ordered  Goal: Absence of cardiac dysrhythmias or at  interference with medical device):   Determine that other, less restrictive measures have been tried or would not be effective before applying the restraint   Evaluate the patient's condition at the time of restraint application   Every 2 hours: Monitor safety, psychosocial status, comfort, nutrition and hydration   Inform patient/family regarding the reason for restraint

## 2024-12-14 NOTE — PROGRESS NOTES
NAME:  Levy Vargas  YOB: 1965  MEDICAL RECORD NUMBER:  [id number]    Shift Summary: No acute events overnight. CRRT running throughout the night without incident. Fluid removal goal was net even, and patient UO > than input for most of the night, and patient finished the shift net negative. No significant change to pressor requirements overnight.     Family updated: Yes:  family at bedside prior to visiting hours finishing    Rhythm: Atrial Fibrillation     Most recent vitals:   Visit Vitals  /66   Pulse (!) 134   Temp 98.5 °F (36.9 °C) (Bladder)   Resp 16   Ht 1.854 m (6' 1\")   Wt 135.6 kg (298 lb 15.1 oz)   SpO2 99%   BMI 39.44 kg/m²      ABP (Arterial line BP): 103/65  ABP Mean (Arterial Line Mean): 78 mmHg    No data found.    No data found.      Respiratory support needed (if any):  - Ventilator Settings:  Vent Days 1    Vt (Set, mL): 0 mL  Resp Rate (Set): 16 bpm  FiO2 : 60 %    PEEP/CPAP (cmH2O): 5       Admission weight Weight - Scale: (!) 145.4 kg (320 lb 8.8 oz) (12/11/24 2306)    Today's weight    Wt Readings from Last 1 Encounters:   12/14/24 135.6 kg (298 lb 15.1 oz)        Castanon need assessed each shift: YES -  - continue castanon r/t - retention  UOP >30ml/hr: YES  Last documented BM (in last 48 hrs):  Patient Vitals for the past 48 hrs:   Last BM (including prior to admit) Stool Occurrence   12/12/24 1000 12/12/24 --   12/13/24 0800 12/13/24 1   12/13/24 0903 -- 1   12/13/24 1045 -- 1   12/13/24 1200 12/13/24 --                Restraints (in use currently or dc'd in last 12 hrs): Yes    Order current and documentation up to date? Yes    Lines/Drains reviewed @ bedside.  CVC  12/13/24 Left Internal jugular (Active)   Number of days: 0       Peripheral IV 12/11/24 Posterior;Right Hand (Active)   Number of days: 2       Peripheral IV 12/11/24 Left;Posterior Hand (Active)   Number of days: 2       Arterial Line 12/13/24 Left Radial (Active)   Number of days: 0       Hemodialysis  by RN: No       [place] Prevention initiated by RN: Yes    Pressure Injury (Stage 3,4, Unstageable, DTI, NWPT, and Complex wounds) if present, place Wound referral order by RN under : No    New Ostomies, if present place, Ostomy referral order under : No     Nurse 1 eSignature: Electronically signed by Mateo Alvarez RN on 12/14/24 at 6:53 AM EST    **SHARE this note so that the co-signing nurse can place an eSignature**    Nurse 2 eSignature: Electronically signed by David Colon RN on 12/14/24 at 7:43 AM EST

## 2024-12-14 NOTE — PROGRESS NOTES
Late entry due to patient care    Arterial line was placed by Melvi FORDE in L radial. L CVC site continues to ooze. Multiple dressing changes to CVC via sterile technique. Vaso was also added for BP support to maintain a MAP greater than 65. CRRT was primed and able to be started at 1820. It was noted that UOP started to  at around 1730 and maintained >200 mL/hr.

## 2024-12-14 NOTE — PROGRESS NOTES
Physician Progress Note      PATIENT:               ANCELMO STEVENSON  [facility] #:                  [id number]  :                       1965  ADMIT DATE:       2024 6:20 PM  DISCH DATE:  RESPONDING  PROVIDER #:        Tierra Marrufo MD          QUERY TEXT:    Patient admitted with acute on chronic systolic CHF.  Noted to have paroxysmal   atrial fibrillation and is maintained on Eliquis.  If possible, please   document in progress notes and discharge summary if you are evaluating and/or   treating any of the following:?  ?  The medical record reflects the following:  Risk Factors: CHF, HTN, DM, prior MI  Clinical Indicators: paroxysmal atrial fibrillation  Treatment: Eliquis  Options provided:  -- Secondary hypercoagulable state in a patient with atrial fibrillation  -- Other - I will add my own diagnosis  -- Disagree - Not applicable / Not valid  -- Disagree - Clinically unable to determine / Unknown  -- Refer to Clinical Documentation Reviewer    PROVIDER RESPONSE TEXT:    This patient has secondary hypercoagulable state in a patient with atrial   fibrillation.    Query created by: Leslie Harris on 2024 5:49 AM      Electronically signed by:  Tierra Marrufo MD 2024 1:10 PM

## 2024-12-15 LAB
ANION GAP SERPL CALCULATED.3IONS-SCNC: 11 MMOL/L (ref 3–16)
ANION GAP SERPL CALCULATED.3IONS-SCNC: 12 MMOL/L (ref 3–16)
ANION GAP SERPL CALCULATED.3IONS-SCNC: 12 MMOL/L (ref 3–16)
ANION GAP SERPL CALCULATED.3IONS-SCNC: 9 MMOL/L (ref 3–16)
BASE EXCESS BLDA CALC-SCNC: 4 MMOL/L (ref -3–3)
BASE EXCESS BLDA CALC-SCNC: 6 MMOL/L (ref -3–3)
BASE EXCESS BLDA CALC-SCNC: 6 MMOL/L (ref -3–3)
BASOPHILS # BLD: 0 K/UL (ref 0–0.2)
BASOPHILS NFR BLD: 0.3 %
BUN SERPL-MCNC: 34 MG/DL (ref 7–20)
BUN SERPL-MCNC: 35 MG/DL (ref 7–20)
BUN SERPL-MCNC: 35 MG/DL (ref 7–20)
BUN SERPL-MCNC: 36 MG/DL (ref 7–20)
CA-I BLD-SCNC: 1.12 MMOL/L (ref 1.12–1.32)
CA-I BLD-SCNC: 1.14 MMOL/L (ref 1.12–1.32)
CA-I BLD-SCNC: 1.15 MMOL/L (ref 1.12–1.32)
CA-I BLD-SCNC: 1.17 MMOL/L (ref 1.12–1.32)
CALCIUM SERPL-MCNC: 8.5 MG/DL (ref 8.3–10.6)
CALCIUM SERPL-MCNC: 8.6 MG/DL (ref 8.3–10.6)
CHLORIDE SERPL-SCNC: 96 MMOL/L (ref 99–110)
CHLORIDE SERPL-SCNC: 97 MMOL/L (ref 99–110)
CHLORIDE SERPL-SCNC: 98 MMOL/L (ref 99–110)
CHLORIDE SERPL-SCNC: 98 MMOL/L (ref 99–110)
CO2 BLDA-SCNC: 29 MMOL/L
CO2 BLDA-SCNC: 31 MMOL/L
CO2 BLDA-SCNC: 31 MMOL/L
CO2 SERPL-SCNC: 26 MMOL/L (ref 21–32)
CO2 SERPL-SCNC: 27 MMOL/L (ref 21–32)
CREAT SERPL-MCNC: 1.6 MG/DL (ref 0.8–1.3)
CREAT SERPL-MCNC: 1.7 MG/DL (ref 0.8–1.3)
CREAT SERPL-MCNC: 1.8 MG/DL (ref 0.8–1.3)
CREAT SERPL-MCNC: 1.8 MG/DL (ref 0.8–1.3)
DEPRECATED RDW RBC AUTO: 18.8 % (ref 12.4–15.4)
EOSINOPHIL # BLD: 0 K/UL (ref 0–0.6)
EOSINOPHIL NFR BLD: 0 %
GFR SERPLBLD CREATININE-BSD FMLA CKD-EPI: 42 ML/MIN/{1.73_M2}
GFR SERPLBLD CREATININE-BSD FMLA CKD-EPI: 42 ML/MIN/{1.73_M2}
GFR SERPLBLD CREATININE-BSD FMLA CKD-EPI: 45 ML/MIN/{1.73_M2}
GFR SERPLBLD CREATININE-BSD FMLA CKD-EPI: 49 ML/MIN/{1.73_M2}
GLUCOSE BLD-MCNC: 192 MG/DL (ref 70–99)
GLUCOSE BLD-MCNC: 197 MG/DL (ref 70–99)
GLUCOSE BLD-MCNC: 207 MG/DL (ref 70–99)
GLUCOSE SERPL-MCNC: 175 MG/DL (ref 70–99)
GLUCOSE SERPL-MCNC: 175 MG/DL (ref 70–99)
GLUCOSE SERPL-MCNC: 178 MG/DL (ref 70–99)
GLUCOSE SERPL-MCNC: 179 MG/DL (ref 70–99)
HCO3 BLDA-SCNC: 27.7 MMOL/L (ref 21–29)
HCO3 BLDA-SCNC: 29.5 MMOL/L (ref 21–29)
HCO3 BLDA-SCNC: 30 MMOL/L (ref 21–29)
HCT VFR BLD AUTO: 30.6 % (ref 40.5–52.5)
HCT VFR BLD AUTO: 31.3 % (ref 40.5–52.5)
HGB BLD-MCNC: 10.1 G/DL (ref 13.5–17.5)
HGB BLD-MCNC: 10.2 G/DL (ref 13.5–17.5)
INHALED O2 FLOW RATE: 40 L/MIN
LACTATE BLD-SCNC: 1.12 MMOL/L (ref 0.4–2)
LACTATE BLD-SCNC: 1.24 MMOL/L (ref 0.4–2)
LACTATE BLD-SCNC: 1.3 MMOL/L (ref 0.4–2)
LYMPHOCYTES # BLD: 0.2 K/UL (ref 1–5.1)
LYMPHOCYTES NFR BLD: 3.2 %
MAGNESIUM SERPL-MCNC: 2.4 MG/DL (ref 1.8–2.4)
MAGNESIUM SERPL-MCNC: 2.41 MG/DL (ref 1.8–2.4)
MCH RBC QN AUTO: 28 PG (ref 26–34)
MCHC RBC AUTO-ENTMCNC: 32.5 G/DL (ref 31–36)
MCV RBC AUTO: 86.4 FL (ref 80–100)
MONOCYTES # BLD: 0.6 K/UL (ref 0–1.3)
MONOCYTES NFR BLD: 7.6 %
NEUTROPHILS # BLD: 6.7 K/UL (ref 1.7–7.7)
NEUTROPHILS NFR BLD: 88.9 %
PCO2 BLDA: 40.9 MM HG (ref 35–45)
PCO2 BLDA: 41.1 MM HG (ref 35–45)
PCO2 BLDA: 43.5 MM HG (ref 35–45)
PERFORMED ON: ABNORMAL
PH BLDA: 7.44 [PH] (ref 7.35–7.45)
PH BLDA: 7.45 [PH] (ref 7.35–7.45)
PH BLDA: 7.47 [PH] (ref 7.35–7.45)
PH BLDV: 7.47 [PH] (ref 7.35–7.45)
PH BLDV: 7.47 [PH] (ref 7.35–7.45)
PH BLDV: 7.49 [PH] (ref 7.35–7.45)
PH BLDV: 7.5 [PH] (ref 7.35–7.45)
PHOSPHATE SERPL-MCNC: 2.7 MG/DL (ref 2.5–4.9)
PHOSPHATE SERPL-MCNC: 3 MG/DL (ref 2.5–4.9)
PLATELET # BLD AUTO: 179 K/UL (ref 135–450)
PMV BLD AUTO: 8.7 FL (ref 5–10.5)
PO2 BLDA: 60.2 MM HG (ref 75–108)
PO2 BLDA: 69.7 MM HG (ref 75–108)
PO2 BLDA: 71 MM HG (ref 75–108)
POC SAMPLE TYPE: ABNORMAL
POTASSIUM SERPL-SCNC: 4.1 MMOL/L (ref 3.5–5.1)
POTASSIUM SERPL-SCNC: 4.1 MMOL/L (ref 3.5–5.1)
POTASSIUM SERPL-SCNC: 4.2 MMOL/L (ref 3.5–5.1)
POTASSIUM SERPL-SCNC: 4.2 MMOL/L (ref 3.5–5.1)
POTASSIUM SERPL-SCNC: 4.3 MMOL/L (ref 3.5–5.1)
RBC # BLD AUTO: 3.62 M/UL (ref 4.2–5.9)
SAO2 % BLDA: 92 % (ref 93–100)
SAO2 % BLDA: 94 % (ref 93–100)
SAO2 % BLDA: 95 % (ref 93–100)
SODIUM SERPL-SCNC: 132 MMOL/L (ref 136–145)
SODIUM SERPL-SCNC: 134 MMOL/L (ref 136–145)
SODIUM SERPL-SCNC: 137 MMOL/L (ref 136–145)
SODIUM SERPL-SCNC: 137 MMOL/L (ref 136–145)
WBC # BLD AUTO: 7.5 K/UL (ref 4–11)

## 2024-12-15 PROCEDURE — 99233 SBSQ HOSP IP/OBS HIGH 50: CPT | Performed by: NURSE PRACTITIONER

## 2024-12-15 PROCEDURE — 82803 BLOOD GASES ANY COMBINATION: CPT

## 2024-12-15 PROCEDURE — 82330 ASSAY OF CALCIUM: CPT

## 2024-12-15 PROCEDURE — 85025 COMPLETE CBC W/AUTO DIFF WBC: CPT

## 2024-12-15 PROCEDURE — 6360000002 HC RX W HCPCS: Performed by: INTERNAL MEDICINE

## 2024-12-15 PROCEDURE — 90945 DIALYSIS ONE EVALUATION: CPT

## 2024-12-15 PROCEDURE — 2500000003 HC RX 250 WO HCPCS

## 2024-12-15 PROCEDURE — 2580000003 HC RX 258

## 2024-12-15 PROCEDURE — 2580000003 HC RX 258: Performed by: FAMILY MEDICINE

## 2024-12-15 PROCEDURE — 2580000003 HC RX 258: Performed by: INTERNAL MEDICINE

## 2024-12-15 PROCEDURE — 2700000000 HC OXYGEN THERAPY PER DAY

## 2024-12-15 PROCEDURE — 2100000000 HC CCU R&B

## 2024-12-15 PROCEDURE — 2500000003 HC RX 250 WO HCPCS: Performed by: INTERNAL MEDICINE

## 2024-12-15 PROCEDURE — 6360000002 HC RX W HCPCS: Performed by: FAMILY MEDICINE

## 2024-12-15 PROCEDURE — 83605 ASSAY OF LACTIC ACID: CPT

## 2024-12-15 PROCEDURE — 99291 CRITICAL CARE FIRST HOUR: CPT | Performed by: INTERNAL MEDICINE

## 2024-12-15 PROCEDURE — 94761 N-INVAS EAR/PLS OXIMETRY MLT: CPT

## 2024-12-15 PROCEDURE — 82947 ASSAY GLUCOSE BLOOD QUANT: CPT

## 2024-12-15 PROCEDURE — 6370000000 HC RX 637 (ALT 250 FOR IP)

## 2024-12-15 PROCEDURE — 37799 UNLISTED PX VASCULAR SURGERY: CPT

## 2024-12-15 PROCEDURE — 85018 HEMOGLOBIN: CPT

## 2024-12-15 PROCEDURE — 84100 ASSAY OF PHOSPHORUS: CPT

## 2024-12-15 PROCEDURE — 85014 HEMATOCRIT: CPT

## 2024-12-15 PROCEDURE — 94003 VENT MGMT INPAT SUBQ DAY: CPT

## 2024-12-15 PROCEDURE — 83735 ASSAY OF MAGNESIUM: CPT

## 2024-12-15 PROCEDURE — 80048 BASIC METABOLIC PNL TOTAL CA: CPT

## 2024-12-15 RX ORDER — DIGOXIN 0.25 MG/ML
125 INJECTION INTRAMUSCULAR; INTRAVENOUS ONCE
Status: COMPLETED | OUTPATIENT
Start: 2024-12-15 | End: 2024-12-15

## 2024-12-15 RX ADMIN — DIGOXIN 125 MCG: 0.25 INJECTION INTRAMUSCULAR; INTRAVENOUS at 12:13

## 2024-12-15 RX ADMIN — AMIODARONE HYDROCHLORIDE 0.5 MG/MIN: 1.8 INJECTION, SOLUTION INTRAVENOUS at 16:44

## 2024-12-15 RX ADMIN — AMIODARONE HYDROCHLORIDE 0.5 MG/MIN: 1.8 INJECTION, SOLUTION INTRAVENOUS at 05:57

## 2024-12-15 RX ADMIN — SODIUM CHLORIDE, PRESERVATIVE FREE 10 ML: 5 INJECTION INTRAVENOUS at 08:58

## 2024-12-15 RX ADMIN — PROPOFOL 35 MCG/KG/MIN: 10 INJECTION, EMULSION INTRAVENOUS at 12:22

## 2024-12-15 RX ADMIN — PROPOFOL 35 MCG/KG/MIN: 10 INJECTION, EMULSION INTRAVENOUS at 10:47

## 2024-12-15 RX ADMIN — Medication 5 MCG/MIN: at 03:01

## 2024-12-15 RX ADMIN — PROPOFOL 35 MCG/KG/MIN: 10 INJECTION, EMULSION INTRAVENOUS at 04:27

## 2024-12-15 RX ADMIN — VASOPRESSIN 0.03 UNITS/MIN: 0.2 INJECTION INTRAVENOUS at 23:22

## 2024-12-15 RX ADMIN — VASOPRESSIN 0.03 UNITS/MIN: 0.2 INJECTION INTRAVENOUS at 10:44

## 2024-12-15 RX ADMIN — MILRINONE LACTATE 0.2 MCG/KG/MIN: 0.2 INJECTION, SOLUTION INTRAVENOUS at 10:48

## 2024-12-15 RX ADMIN — Medication 75 MCG/HR: at 01:27

## 2024-12-15 RX ADMIN — MILRINONE LACTATE 0.2 MCG/KG/MIN: 0.2 INJECTION, SOLUTION INTRAVENOUS at 22:20

## 2024-12-15 RX ADMIN — CEFEPIME 2000 MG: 2 INJECTION, POWDER, FOR SOLUTION INTRAVENOUS at 09:43

## 2024-12-15 RX ADMIN — Medication: at 21:19

## 2024-12-15 RX ADMIN — Medication: at 00:14

## 2024-12-15 RX ADMIN — PROPOFOL 40 MCG/KG/MIN: 10 INJECTION, EMULSION INTRAVENOUS at 01:25

## 2024-12-15 RX ADMIN — VASOPRESSIN 0.03 UNITS/MIN: 0.2 INJECTION INTRAVENOUS at 01:38

## 2024-12-15 RX ADMIN — ASPIRIN 81 MG 81 MG: 81 TABLET ORAL at 08:57

## 2024-12-15 RX ADMIN — PROPOFOL 35 MCG/KG/MIN: 10 INJECTION, EMULSION INTRAVENOUS at 18:45

## 2024-12-15 RX ADMIN — HYDROCORTISONE SODIUM SUCCINATE 50 MG: 100 INJECTION, POWDER, FOR SOLUTION INTRAMUSCULAR; INTRAVENOUS at 18:05

## 2024-12-15 RX ADMIN — Medication: at 21:21

## 2024-12-15 RX ADMIN — PROPOFOL 35 MCG/KG/MIN: 10 INJECTION, EMULSION INTRAVENOUS at 15:42

## 2024-12-15 RX ADMIN — Medication: at 19:01

## 2024-12-15 RX ADMIN — PROPOFOL 35 MCG/KG/MIN: 10 INJECTION, EMULSION INTRAVENOUS at 07:27

## 2024-12-15 RX ADMIN — SODIUM CHLORIDE, PRESERVATIVE FREE 40 MG: 5 INJECTION INTRAVENOUS at 12:17

## 2024-12-15 RX ADMIN — Medication: at 09:42

## 2024-12-15 RX ADMIN — Medication: at 14:35

## 2024-12-15 RX ADMIN — Medication: at 04:37

## 2024-12-15 RX ADMIN — CEFEPIME 2000 MG: 2 INJECTION, POWDER, FOR SOLUTION INTRAVENOUS at 18:01

## 2024-12-15 RX ADMIN — ATORVASTATIN CALCIUM 80 MG: 80 TABLET, FILM COATED ORAL at 20:47

## 2024-12-15 RX ADMIN — HYDROCORTISONE SODIUM SUCCINATE 50 MG: 100 INJECTION, POWDER, FOR SOLUTION INTRAMUSCULAR; INTRAVENOUS at 01:30

## 2024-12-15 RX ADMIN — Medication 100 MCG/HR: at 13:10

## 2024-12-15 RX ADMIN — SODIUM CHLORIDE, PRESERVATIVE FREE 40 MG: 5 INJECTION INTRAVENOUS at 01:31

## 2024-12-15 RX ADMIN — HYDROCORTISONE SODIUM SUCCINATE 50 MG: 100 INJECTION, POWDER, FOR SOLUTION INTRAMUSCULAR; INTRAVENOUS at 08:58

## 2024-12-15 RX ADMIN — MILRINONE LACTATE 0.2 MCG/KG/MIN: 0.2 INJECTION, SOLUTION INTRAVENOUS at 01:40

## 2024-12-15 RX ADMIN — Medication: at 00:00

## 2024-12-15 RX ADMIN — CALCIUM GLUCONATE 1000 MG: 20 INJECTION, SOLUTION INTRAVENOUS at 20:40

## 2024-12-15 RX ADMIN — CEFEPIME 2000 MG: 2 INJECTION, POWDER, FOR SOLUTION INTRAVENOUS at 01:46

## 2024-12-15 ASSESSMENT — PULMONARY FUNCTION TESTS
PIF_VALUE: 19
PIF_VALUE: 18
PIF_VALUE: 17
PIF_VALUE: 20
PIF_VALUE: 17
PIF_VALUE: 18
PIF_VALUE: 19
PIF_VALUE: 18
PIF_VALUE: 17
PIF_VALUE: 18
PIF_VALUE: 18
PIF_VALUE: 17
PIF_VALUE: 18
PIF_VALUE: 18
PIF_VALUE: 17
PIF_VALUE: 18
PIF_VALUE: 20
PIF_VALUE: 18
PIF_VALUE: 17
PIF_VALUE: 18
PIF_VALUE: 19
PIF_VALUE: 17
PIF_VALUE: 19
PIF_VALUE: 19
PIF_VALUE: 17
PIF_VALUE: 18
PIF_VALUE: 18
PIF_VALUE: 20
PIF_VALUE: 18
PIF_VALUE: 19
PIF_VALUE: 18
PIF_VALUE: 17
PIF_VALUE: 18
PIF_VALUE: 17
PIF_VALUE: 19
PIF_VALUE: 20
PIF_VALUE: 18
PIF_VALUE: 19
PIF_VALUE: 17
PIF_VALUE: 19
PIF_VALUE: 18
PIF_VALUE: 18
PIF_VALUE: 17
PIF_VALUE: 18
PIF_VALUE: 18
PIF_VALUE: 19
PIF_VALUE: 18
PIF_VALUE: 19
PIF_VALUE: 24
PIF_VALUE: 18
PIF_VALUE: 17
PIF_VALUE: 18
PIF_VALUE: 19
PIF_VALUE: 18
PIF_VALUE: 17
PIF_VALUE: 20
PIF_VALUE: 19
PIF_VALUE: 18
PIF_VALUE: 17
PIF_VALUE: 18
PIF_VALUE: 19
PIF_VALUE: 18
PIF_VALUE: 19
PIF_VALUE: 18
PIF_VALUE: 17
PIF_VALUE: 18
PIF_VALUE: 17
PIF_VALUE: 18
PIF_VALUE: 19
PIF_VALUE: 19
PIF_VALUE: 18
PIF_VALUE: 18
PIF_VALUE: 19
PIF_VALUE: 17
PIF_VALUE: 18
PIF_VALUE: 19
PIF_VALUE: 18
PIF_VALUE: 17
PIF_VALUE: 18
PIF_VALUE: 19
PIF_VALUE: 17
PIF_VALUE: 18
PIF_VALUE: 20
PIF_VALUE: 18
PIF_VALUE: 18
PIF_VALUE: 19

## 2024-12-15 NOTE — PROGRESS NOTES
V2.0    [facility] [place] Progress [place]      Name:  Levy Vargas /Age/Sex: 1965  (59 y.o. male)   MRN & CSN:  [id number] & [id number] Encounter Date/Time: 12/15/2024 10:45 AM EST   Location:  [address]- PCP: No primary care provider on file.     Attending:Tierra Marrufo MD       Hospital Day: 5    Assessment and Recommendations   Levy Vargas is a 59 y.o. male with pmh of HFrEF, cirrhosis, HLD, HTN, CKD 3, DM2, A-fib who presents with Cardiogenic shock with RVR    Patient was examined this morning.  Continues to be intubated and sedated.  Patient does not follow commands due to sedation    GI was consulted plans no EGD at this time due to cardiogenic shock but will continue to follow  Nephrology was consulted, currently patient is on CRRT with possible removal of fluid today  Cardiology on board, for cardiogenic shock, currently on milrinone GGT    Continues to be on amnio, levo, vasopressin      Plan:   A-fib with RVR  Patient attributes to being compliant with his medication but has missed doses since he ran out.  ICU admission  Continuous cardiac monitor  Electrophysiology consulted  Patient was given 1 dose of Cardizem  Patient is currently on Amio drip status post bolus  Eliquis 5 mg twice daily  Metoprolol XL      2.  Acute on chronic congestive heart failure  Metoprolol XL  Entresto 49-51 mg twice daily    3.  Hypertension  Entresto 49-51 mg twice daily  Metoprolol XL 25 mg daily    4.  Coffee-ground emesis  GI consulted and following    5.  Cardiogenic shock  Acute on chronic congestive heart failure  NYHA IV, with left ventricular ejection fraction of 10-15%  Not on ARNI, SGLT2i, MRA due to CKD    6.  Acute respiratory failure  Currently intubated  Started cefepime 2 g every 12 hours  Pulmonology on board      Due to the immediate potential for life-threatening deterioration. I spent 31 minutes providing critical care. There was a high probability of clinically significant/life threatening  0.9 % 250 mL IVPB, 6 mmol, PRN   Or  sodium phosphate 12 mmol in sodium chloride 0.9 % 250 mL IVPB, 12 mmol, PRN   Or  sodium phosphate 18 mmol in sodium chloride 0.9 % 500 mL IVPB, 18 mmol, PRN   Or  sodium phosphate 24 mmol in sodium chloride 0.9 % 500 mL IVPB, 24 mmol, PRN  sodium chloride flush, 1.1-1.9 mL, PRN   And  sodium chloride flush, 1.1-1.9 mL, PRN  pantoprazole, 40 mg, Daily PRN  sodium chloride flush, 5-40 mL, PRN  sodium chloride, , PRN  ondansetron, 4 mg, Q8H PRN   Or  ondansetron, 4 mg, Q6H PRN  polyethylene glycol, 17 g, Daily PRN        Labs and Imaging   XR CHEST PORTABLE    Result Date: 12/11/2024  EXAMINATION: ONE XRAY VIEW OF THE CHEST 12/11/2024 7:18 pm COMPARISON: 10/18/2024 HISTORY: ORDERING SYSTEM PROVIDED HISTORY: Pain TECHNOLOGIST PROVIDED HISTORY: Reason for exam:->Pain Reason for Exam: dizzy, high blood pressure FINDINGS: Transvenous pacer remains in place. The lungs are without acute focal process.  There is no effusion or pneumothorax. The cardiomediastinal silhouette is stable. The osseous structures are stable.     No acute process. Stable cardiomegaly       CBC:   Recent Labs     12/13/24  1726 12/13/24  2005 12/14/24  0545 12/14/24  1200 12/14/24  1830 12/15/24  0508   WBC 8.6  --  7.7  --   --  7.5   HGB 13.1*  10.6*   < > 10.7* 10.8* 10.7* 10.2*     --  198  --   --  179    < > = values in this interval not displayed.     BMP:    Recent Labs     12/14/24  1830 12/15/24  0107 12/15/24  0508    137 137   K 4.2 4.3 4.2  4.2   CL 96* 98* 98*   CO2 29 27 27   BUN 36* 35* 36*   CREATININE 1.9* 1.8* 1.8*   GLUCOSE 162* 175* 179*     Hepatic: No results for input(s): \"AST\", \"ALT\", \"BILITOT\", \"ALKPHOS\" in the last 72 hours.    Invalid input(s): \"ALB\"  Lipids:   Lab Results   Component Value Date/Time    CHOL 79 10/19/2024 03:13 AM    HDL 17 10/19/2024 03:13 AM    TRIG 51 10/19/2024 03:13 AM     Hemoglobin A1C:   Lab Results   Component Value Date/Time    LABA1C 6.3

## 2024-12-15 NOTE — PROGRESS NOTES
NAME:  Levy Vargas  YOB: 1965  MEDICAL RECORD NUMBER:  [id number]    Shift Summary: Urine output dropped off to minimal again after increasing overnight, remains on CRRT. Unable to wean pressors much, remains on Levo and Vaso. Milrinone dose unchanged. Amio dose remains unchanged, remains in Afib RVR. Family still pushing for aggressive care at this time.    Family updated: Yes:  daughter at bedside    Rhythm: Atrial Fibrillation     Most recent vitals:   Visit Vitals  BP 99/61   Pulse (!) 129   Temp 98.8 °F (37.1 °C) (Bladder)   Resp 18   Ht 1.854 m (6' 1\")   Wt 135.6 kg (298 lb 15.1 oz)   SpO2 98%   BMI 39.44 kg/m²      ABP (Arterial line BP): 99/61  ABP Mean (Arterial Line Mean): 74 mmHg    No data found.    No data found.      Respiratory support needed (if any):  - Ventilator Settings:  Vent Days 1    Vt (Set, mL): 0 mL  Resp Rate (Set): 18 bpm  FiO2 : 45 %    PEEP/CPAP (cmH2O): 5       Admission weight Weight - Scale: (!) 145.4 kg (320 lb 8.8 oz) (12/11/24 2306)    Today's weight    Wt Readings from Last 1 Encounters:   12/14/24 135.6 kg (298 lb 15.1 oz)        Castanon need assessed each shift: YES -  - continue castanon r/t - urinary retention  UOP >30ml/hr: NO - CRRT  Last documented BM (in last 48 hrs):  Patient Vitals for the past 48 hrs:   Last BM (including prior to admit) Stool Occurrence   12/13/24 0800 12/13/24 1   12/13/24 0903 -- 1   12/13/24 1045 -- 1   12/13/24 1200 12/13/24 --   12/14/24 0800 12/13/24 --                Restraints (in use currently or dc'd in last 12 hrs): Yes    Order current and documentation up to date? Yes    Lines/Drains reviewed @ bedside.  CVC  12/13/24 Left Internal jugular (Active)   Number of days: 1       Peripheral IV 12/11/24 Posterior;Right Hand (Active)   Number of days: 2       Peripheral IV 12/11/24 Left;Posterior Hand (Active)   Number of days: 2       Arterial Line 12/13/24 Left Radial (Active)   Number of days: 1       Hemodialysis Central Access -

## 2024-12-15 NOTE — PROGRESS NOTES
NAME:  Levy Vargas  YOB: 1965  MEDICAL RECORD NUMBER:  [id number]    Shift Summary: Now removing 25-50 mL/hr on CRRT as tolerated per Nephrology. Weaning pressors as tolerated. Remains intubated/sedated. Digoxin x1 per Pulmonology and Cardiology. NG output now bilious, and slowing down. Urine output remains minimal.     Family updated: Yes:  Daughter at bedside    Rhythm: Atrial Fibrillation     Most recent vitals:   Visit Vitals  /68   Pulse (!) 119   Temp 97.8 °F (36.6 °C) (Bladder)   Resp 16   Ht 1.854 m (6' 1\")   Wt 134.6 kg (296 lb 11.8 oz)   SpO2 97%   BMI 39.15 kg/m²      ABP (Arterial line BP): 109/66  ABP Mean (Arterial Line Mean): 80 mmHg    No data found.    No data found.      Respiratory support needed (if any):  - Ventilator Settings:  Vent Days 2    Vt (Set, mL): 0 mL  Resp Rate (Set): 18 bpm  FiO2 : 40 %    PEEP/CPAP (cmH2O): 5       Admission weight Weight - Scale: (!) 145.4 kg (320 lb 8.8 oz) (12/11/24 2306)    Today's weight    Wt Readings from Last 1 Encounters:   12/15/24 134.6 kg (296 lb 11.8 oz)        Castanon need assessed each shift: YES -  - continue castanon r/t - urinary retention  UOP >30ml/hr: NO - CRRT  Last documented BM (in last 48 hrs):  Patient Vitals for the past 48 hrs:   Last BM (including prior to admit)   12/14/24 0800 12/13/24   12/14/24 2000 12/14/24   12/15/24 0800 12/15/24                Restraints (in use currently or dc'd in last 12 hrs): Yes    Order current and documentation up to date? Yes    Lines/Drains reviewed @ bedside.  CVC  12/13/24 Left Internal jugular (Active)   Number of days: 2       Peripheral IV 12/11/24 Posterior;Right Hand (Active)   Number of days: 3       Peripheral IV 12/11/24 Left;Posterior Hand (Active)   Number of days: 3       Arterial Line 12/13/24 Left Radial (Active)   Number of days: 2       Hemodialysis Central Access - Temporary Right Neck (Active)   Number of days: 2       Urinary Catheter 12/12/24 Castanon-Temperature  (Active)   Number of days: 3         Drip rates at handoff:    norepinephrine 2 mcg/min (12/15/24 1600)    propofol 35 mcg/kg/min (12/15/24 1600)    prismaSATE BGK 4/2.5 1,000 mL/hr at 12/15/24 1435    prismaSATE BGK 4/2.5 500 mL/hr at 12/15/24 0942    prismaSATE BGK 4/2.5 500 mL/hr at 12/15/24 0942    fentaNYL 100 mcg/hr (12/15/24 1600)    VASOpressin 0.03 Units/min (12/15/24 1600)    Amiodarone 0.5 mg/min (12/15/24 1600)    milrinone 0.2 mcg/kg/min (12/15/24 1600)    sodium chloride 5 mL/hr at 12/15/24 1600       Lab Data:   CBC:   Recent Labs     12/14/24  0545 12/14/24  1200 12/15/24  0508 12/15/24  1200   WBC 7.7  --  7.5  --    HGB 10.7*   < > 10.2* 10.1*   HCT 33.8*   < > 31.3* 30.6*   MCV 86.7  --  86.4  --      --  179  --     < > = values in this interval not displayed.     BMP:    Recent Labs     12/15/24  0508 12/15/24  1200    132*   K 4.2  4.2 4.1   CO2 27 27   BUN 36* 35*   CREATININE 1.8* 1.7*     LIVR: No results for input(s): \"AST\", \"ALT\" in the last 72 hours.  PT/INR: No results for input(s): \"INR\" in the last 72 hours.    Invalid input(s): \"PROT\"  APTT: No results for input(s): \"APTT\" in the last 72 hours.  ABG:   Recent Labs     12/15/24  0649 12/15/24  1204   PHART 7.436 7.447   PIY8HES 41.1 43.5   PO2ART 69.7* 71.0*       Any consults during the shift? No    Any signed and held orders to be released?  No        4 Eyes Skin Assessment       The patient is being assessed for  Shift Handoff    I agree that at least one RN has performed a thorough Head to Toe Skin Assessment on the patient. ALL assessment sites listed below have been assessed.      Areas assessed by both nurses: Head, Face, Ears, Shoulders, Back, Chest, Arms, Elbows, Hands, Sacrum. Buttock, Coccyx, Ischium, Legs. Feet and Heels, and Under Medical Devices         Does the Patient have a Wound? Yes wound(s) were present on assessment. LDA wound assessment was Initiated and completed by RN    Wound Care Orders initiated

## 2024-12-15 NOTE — PROGRESS NOTES
NAME:  Levy Vargas  YOB: 1965  MEDICAL RECORD NUMBER:  [id number]    Shift Summary: No acute events overnight. CRRT continues, goal net even, minor reduction in pressors dosing per eMAR overnight. Minor reduction in Propofol dosing per eMAR.    Family updated: Yes:  at bedside last eening before visiting hours finished    Rhythm: Atrial Fibrillation  RVR    Most recent vitals:   Visit Vitals  /67   Pulse (!) 117   Temp 98.8 °F (37.1 °C) (Bladder)   Resp 18   Ht 1.854 m (6' 1\")   Wt 134.6 kg (296 lb 11.8 oz)   SpO2 97%   BMI 39.15 kg/m²      ABP (Arterial line BP): 108/63  ABP Mean (Arterial Line Mean): 78 mmHg    No data found.    No data found.      Respiratory support needed (if any):  - Ventilator Settings:  Vent Days 2    Vt (Set, mL): 0 mL  Resp Rate (Set): 18 bpm  FiO2 : 40 %    PEEP/CPAP (cmH2O): 5       Admission weight Weight - Scale: (!) 145.4 kg (320 lb 8.8 oz) (12/11/24 2306)    Today's weight    Wt Readings from Last 1 Encounters:   12/15/24 134.6 kg (296 lb 11.8 oz)        Castanon need assessed each shift: YES -  - continue castanon r/t - retention & careful UO monitoring in the setting of KENDRA  UOP >30ml/hr: NO - oliguric on CRRT  Last documented BM (in last 48 hrs):  Patient Vitals for the past 48 hrs:   Last BM (including prior to admit) Stool Occurrence   12/13/24 0800 12/13/24 1   12/13/24 0903 -- 1   12/13/24 1045 -- 1   12/13/24 1200 12/13/24 --   12/14/24 0800 12/13/24 --   12/14/24 2000 12/14/24 --                Restraints (in use currently or dc'd in last 12 hrs): Yes    Order current and documentation up to date? Yes    Lines/Drains reviewed @ bedside.  CVC  12/13/24 Left Internal jugular (Active)   Number of days: 1       Peripheral IV 12/11/24 Posterior;Right Hand (Active)   Number of days: 3       Peripheral IV 12/11/24 Left;Posterior Hand (Active)   Number of days: 3       Arterial Line 12/13/24 Left Radial (Active)   Number of days: 1       Hemodialysis Central Access  - Temporary Right Neck (Active)   Number of days: 1       Urinary Catheter 12/12/24 Damon-Temperature (Active)   Number of days: 2         Drip rates at handoff:    norepinephrine 4 mcg/min (12/15/24 0600)    propofol 35 mcg/kg/min (12/15/24 0600)    prismaSATE BGK 4/2.5 1,000 mL/hr at 12/15/24 0437    prismaSATE BGK 4/2.5 500 mL/hr at 12/15/24 0014    prismaSATE BGK 4/2.5 500 mL/hr at 12/15/24 0014    fentaNYL 75 mcg/hr (12/15/24 0600)    VASOpressin 0.03 Units/min (12/15/24 0600)    Amiodarone 0.5 mg/min (12/15/24 0600)    milrinone 0.2 mcg/kg/min (12/15/24 0600)    sodium chloride 5 mL/hr at 12/15/24 0600       Lab Data:   CBC:   Recent Labs     12/14/24  0545 12/14/24  1200 12/14/24  1830 12/15/24  0508   WBC 7.7  --   --  7.5   HGB 10.7*   < > 10.7* 10.2*   HCT 33.8*   < > 33.1* 31.3*   MCV 86.7  --   --  86.4     --   --  179    < > = values in this interval not displayed.     BMP:    Recent Labs     12/14/24  1830 12/15/24  0107 12/15/24  0508    137 137   K 4.2 4.3 4.2  4.2   CO2 29 27 27   BUN 36* 35*  --    CREATININE 1.9* 1.8* 1.8*     LIVR: No results for input(s): \"AST\", \"ALT\" in the last 72 hours.  PT/INR: No results for input(s): \"INR\" in the last 72 hours.    Invalid input(s): \"PROT\"  APTT: No results for input(s): \"APTT\" in the last 72 hours.  ABG:   Recent Labs     12/14/24  1815 12/15/24  0649   PHART 7.455* 7.436   [id number] 45.2* 41.1   PO2ART 76.2 69.7*       Any consults during the shift? No    Any signed and held orders to be released?  No        4 Eyes Skin Assessment       The patient is being assessed for  Shift Handoff    I agree that at least one RN has performed a thorough Head to Toe Skin Assessment on the patient. ALL assessment sites listed below have been assessed.      Areas assessed by both nurses:         Does the Patient have a Wound? Yes wound(s) were present on assessment. LDA wound assessment was Initiated and completed by RN    Wound Care Orders initiated by RN:

## 2024-12-15 NOTE — PLAN OF CARE
Problem: Cardiovascular - Adult  Goal: Absence of cardiac dysrhythmias or at baseline  Outcome: Not Progressing     Problem: Metabolic/Fluid and Electrolytes - Adult  Goal: Hemodynamic stability and optimal renal function maintained  Outcome: Not Progressing

## 2024-12-15 NOTE — PROGRESS NOTES
Pulmonary Critical Care Progress Note     Patient's name:  Levy Vargas  Medical Record Number: [id number]  Patient's account/billing number: [id number]  Patient's YOB: 1965  Age: 59 y.o.  Date of Admission: 12/11/2024  6:20 PM  Date of Consult: 12/15/2024      Primary Care Physician: No primary care provider on file.      Code Status: Full Code    Reason for consult: cardiogenic shock     Assessment and Plan     Cardiogenic shock, severe cardiomyopathy EF of 15%  KENDRA on CKD  Acute respiratory failure with hypoxia  Upper GI bleed  Obesity        Plan:  Vent support, settings reviewed and adjusted   Pressors to keep MAP more than 65  CRRT per nephrology, recommend starting fluid removal   Amiodarone, dig  Hold Eliquis, PPI twice daily, monitor H&H transfuse to keep more than 7   GI on board   Hold blood pressure medications  Stress dose steroid  Cefepime for 7 days  Due to the immediate potential for life-threatening deterioration due to above I spent 34 minutes providing critical care.  This time is excluding time spent performing procedures.  This note was transcribed using Dragon Dictation software. Please disregard any translational errors.        HISTORY OF PRESENT ILLNESS:   Mr./Ms. Levy Vargas is a 59 y.o. gentleman with past medical history stated below significant for severe cardiomyopathy with EF of 15 to 20%, peripheral arterial disease, CKD, hypertension, noncompliance with medications, atrial fibrillation was admitted to the ICU with acute on chronic heart failure cardiogenic shock acute renal failure.  Currently with altered mental status and projectile vomiting of coffee-ground/blood          REVIEW OF SYSTEMS:  Review of Systems -   Unable to obtain sedated on mechanical ventilation        Physical Exam:    Vitals: /67   Pulse (!) 108   Temp 97.9 °F (36.6 °C) (Bladder)   Resp 18   Ht 1.854 m (6' 1\")   Wt 134.6 kg (296 lb

## 2024-12-15 NOTE — PROGRESS NOTES
INPATIENT CONSULTATION:    IDENTIFYING DATA/REASON FOR CONSULTATION   PATIENT:  Levy Vargas  MRN:  [id number]  ADMIT DATE: 12/11/2024  TIME OF EVALUATION: 12/15/2024 6:04 AM  HOSPITAL STAY:   LOS: 4 days   CONSULTING PHYSICIAN:  REASON FOR CONSULTATION:    Subjective:    -No events overnight. Bilious return in NG.     MEDICATIONS   SCHEDULED:  pantoprazole (PROTONIX) 40 mg in sodium chloride (PF) 0.9 % 10 mL injection, 40 mg, Q12H  cefepime, 2,000 mg, Q12H  hydrocortisone sodium succinate PF, 50 mg, Q8H  [Held by provider] spironolactone, 25 mg, Daily  [Held by provider] gabapentin, 300 mg, Nightly  dilTIAZem, 10 mg, Once  [Held by provider] apixaban, 5 mg, BID  aspirin, 81 mg, Daily  atorvastatin, 80 mg, Nightly  [Held by provider] metoprolol succinate, 25 mg, QPM  [Held by provider] sacubitril-valsartan, 1 tablet, BID  sodium chloride flush, 5-40 mL, 2 times per day      FLUIDS/DRIPS:     norepinephrine 4 mcg/min (12/15/24 0600)    propofol 35 mcg/kg/min (12/15/24 0600)    prismaSATE BGK 4/2.5 1,000 mL/hr at 12/15/24 0437    prismaSATE BGK 4/2.5 500 mL/hr at 12/15/24 0014    prismaSATE BGK 4/2.5 500 mL/hr at 12/15/24 0014    fentaNYL 75 mcg/hr (12/15/24 0600)    VASOpressin 0.03 Units/min (12/15/24 0600)    Amiodarone 0.5 mg/min (12/15/24 0600)    milrinone 0.2 mcg/kg/min (12/15/24 0600)    sodium chloride 5 mL/hr at 12/15/24 0600     PRNs: potassium chloride, 20 mEq, PRN  magnesium sulfate, 1,000 mg, PRN  calcium gluconate, 1,000 mg, PRN   Or  calcium gluconate, 2,000 mg, PRN   Or  calcium gluconate, 3,000 mg, PRN   Or  calcium gluconate, 4,000 mg, PRN  sodium phosphate 6 mmol in sodium chloride 0.9 % 250 mL IVPB, 6 mmol, PRN   Or  sodium phosphate 12 mmol in sodium chloride 0.9 % 250 mL IVPB, 12 mmol, PRN   Or  sodium phosphate 18 mmol in sodium chloride 0.9 % 500 mL IVPB, 18 mmol, PRN   Or  sodium phosphate 24 mmol in sodium chloride 0.9 % 500 mL IVPB, 24 mmol, PRN  sodium chloride flush, 1.1-1.9 mL, PRN    Range    Calcium, Ionized 1.14 1.12 - 1.32 mmol/L    pH, Josh 7.499 (H) 7.350 - 7.450     Other Labs    Imaging    ASSESSMENT AND RECOMMENDATIONS   Levy Vargas is a 59-year-old male with a past medical history of congestive heart failure, cocaine abuse, diabetes, myocardial infarction, hyperlipidemia, hypertension, morbid obesity, pancreatitis in 2021, peptic ulcer disease who presents with dizziness and weakness. Patient currently in cardiogenic shock. Coffee-ground emesis-Hemoglobin is relatively stable and I have a low suspicion that any GI bleeding is contributing to his hypotension. Patient remains on multiple pressors for cardiogenic shock .      IMPRESSION:     Coffee Ground emesis   Cardiogenic shock  KENDRA      RECOMMENDATIONS:    -Bilious return in NG. H/H stable.Given his current critical illness I would defer an EGD unless has overt bleeding with significant drop in hemoglobin. Continue to hold anticoagulation. Continue PPI. Will sign off.     If you have any questions or need any further information, please feel free to contact anyone on our consult team.  Thank you for allowing us to participate in the care of Levy Vargas.    Darnell Bethea MD  [facility]

## 2024-12-15 NOTE — PROGRESS NOTES
Department of Internal Medicine  Nephrology Progress Note          REASON FOR CONSULTATION:  Acute kidney injury.     HISTORY OF PRESENTING ILLNESS:  A 59-year-old  male with past medical history significant for chronic kidney disease stage 3B, morbid obesity, hypertension, severe cardiomyopathy, ejection fraction of 10% to 15%, diabetes mellitus type 2, chronic Afib, cirrhosis of liver, COPD, history of cocaine abuse, coronary artery disease, status post MI, came to ER complaining of shortness of breath, dyspnea on exertion.  The patient was found to be in congestive heart failure with atrial fibrillation with RVR.  Admitted in ICU, started on Lasix drip.  Renal consultation has been called because of low urine output and worsening kidney function.  Overnight, the patient remained on high-dose Lasix drip, and Diuril was added without much luck.  At the time of consultation, the patient is drowsy and sleepy, not able to give me any information.      Events noted ,on vent/ pressers .   Seen on CRRT , tolerating well .  REVIEW OF SYSTEMS:  Unobtainable     Physical Exam:    VITALS:  /61   Pulse (!) 108   Temp 98 °F (36.7 °C) (Bladder)   Resp 18   Ht 1.854 m (6' 1\")   Wt 134.6 kg (296 lb 11.8 oz)   SpO2 97%   BMI 39.15 kg/m²   24HR INTAKE/OUTPUT:    Intake/Output Summary (Last 24 hours) at 12/15/2024 1335  Last data filed at 12/15/2024 1300  Gross per 24 hour   Intake 2442.28 ml   Output 2502 ml   Net -59.72 ml       Constitutional:  on vent   Respiratory:  Scattered Rhonchi   Gastrointestinal:  No  tenderness.  Normal Bowel Sounds  Cardiovascular:  S1, S2 irregular   Edema:  +++edema    DATA:    CBC:  Lab Results   Component Value Date/Time    WBC 7.5 12/15/2024 05:08 AM    RBC 3.62 12/15/2024 05:08 AM    HGB 10.1 12/15/2024 12:00 PM    HCT 30.6 12/15/2024 12:00 PM    MCV 86.4 12/15/2024 05:08 AM    MCH 28.0 12/15/2024 05:08 AM    MCHC 32.5 12/15/2024 05:08 AM    RDW 18.8 12/15/2024 05:08  AM     12/15/2024 05:08 AM    MPV 8.7 12/15/2024 05:08 AM     CMP:  Lab Results   Component Value Date/Time     12/15/2024 12:00 PM    K 4.1 12/15/2024 12:00 PM    K 4.2 12/15/2024 05:08 AM    CL 96 12/15/2024 12:00 PM    CO2 27 12/15/2024 12:00 PM    BUN 35 12/15/2024 12:00 PM    CREATININE 1.7 12/15/2024 12:00 PM    GFRAA >60 10/11/2022 05:03 AM    AGRATIO 0.9 04/24/2024 04:47 AM    LABGLOM 45 12/15/2024 12:00 PM    LABGLOM 63 04/27/2024 05:03 AM    GLUCOSE 178 12/15/2024 12:00 PM    CALCIUM 8.6 12/15/2024 12:00 PM    BILITOT 2.0 10/19/2024 03:13 AM    ALKPHOS 131 10/19/2024 03:13 AM    AST 72 10/19/2024 03:13 AM    ALT 45 10/19/2024 03:13 AM      Hepatic Function Panel:   Lab Results   Component Value Date/Time    ALKPHOS 131 10/19/2024 03:13 AM    ALT 45 10/19/2024 03:13 AM    AST 72 10/19/2024 03:13 AM    BILITOT 2.0 10/19/2024 03:13 AM    BILIDIR 1.3 10/19/2024 03:13 AM    IBILI 0.7 10/19/2024 03:13 AM      Phosphorus:   Lab Results   Component Value Date/Time    PHOS 2.7 12/15/2024 12:00 PM       ASSESSMENT:  Principal Problem:    Cardiogenic shock  Active Problems:    Acute kidney injury superimposed on chronic kidney disease (HCC)    Atrial fibrillation with rapid ventricular response (HCC)    Acute on chronic systolic CHF (congestive heart failure), NYHA class 4 (HCC)    A-fib (HCC)    Acute respiratory failure with hypoxia  Resolved Problems:    * No resolved hospital problems. *      PLAN:  KENDRA from CRS/Cardiogenic shock . No UOP.  On  CRRT.Keep even for now .  On  pressers . Keep  MAP 65 .  Start UF 25-50 cc/he as tolerated .     2- Severe Cardiomyopathy EF 10-15%    See tx as above .     3-A fib with RVR tx plan per cards .  Ok to start Digoxin. Monitor level .     4-Hypotension On  pressers .    5-Ac resp failure on vent .     Over all prognosis poor .       Mikel Kwong MD, FACP

## 2024-12-15 NOTE — PROGRESS NOTES
[facility] [place]   Daily Progress Note      Admit Date:  12/11/2024    CC: Dizziness    HPI:   Levy Vargas is a 59 y.o. male with PMH PAF, PAD, SHF.  Adm 10/2024 with decompensated HF. Follows with advanced HF at  ([facility])    Adm to [facility] with dizziness and SBP 200s  Cardiology consulted for AF RVR  On amio and lasix gtt.   Hospitalization complicated by hypotension and low UO/Cardiogenic shock. Started on milrinone gtt. Started on CVVHD.    Had some improved UO 12/13>12/14,now dropped off again.  CRRT running even with goal MAP >65.  Currently on Amio, Levo, vasopressin for hemodynamic support.  Able to wean levo, vaso slightly overnight  FIO2 weaned from 80 to 40%- intabated /sedated    Review of Systems:   Unable to obtain - intubated/sedated    Objective:   /67   Pulse (!) 117   Temp 98.8 °F (37.1 °C) (Bladder)   Resp 18   Ht 1.854 m (6' 1\")   Wt 134.6 kg (296 lb 11.8 oz)   SpO2 97%   BMI 39.15 kg/m²         Intake/Output Summary (Last 24 hours) at 12/15/2024 0657  Last data filed at 12/15/2024 0600  Gross per 24 hour   Intake 2505.54 ml   Output 2427 ml   Net 78.54 ml     I/O since adm: +1.3L (+44 last 24 hrs)    WEIGHT:Admit Weight - Scale: (!) 145.4 kg (320 lb 8.8 oz)         Today  Weight - Scale: 134.6 kg (296 lb 11.8 oz)   DRY WEIGHT:  Wt Readings from Last 3 Encounters:   12/15/24 134.6 kg (296 lb 11.8 oz)   10/29/24 131.8 kg (290 lb 9.1 oz)   04/27/24 134.4 kg (296 lb 3.2 oz)       Physical Exam:  GEN: Appears ill  SKIN: Brown, warm, dry.   LUNG: AP diameter normal. Diminished.  HEART: S1S2 A/IRR. No carotid bruit. No murmur, rub or gallop.  ABD: Soft, nontender. +BS X 4 quads. No hepatomegaly.   EXT: Radial and pedal pulses 2+ and symmetric. Without varicosities. + generalized  edema.  MUSCSKEL: Good ROM X4 extremities. No deformity.   NEURO: A/O X3. Calm and cooperative.     Telemetry: AF HR 130s    Medications:    pantoprazole (PROTONIX) 40 mg in sodium chloride (PF) 0.9 %  10.7* 10.2*   HCT 33.1*   < > 33.8* 33.6* 33.1* 31.3*   MCV 86.9  --  86.7  --   --  86.4     --  198  --   --  179    < > = values in this interval not displayed.     BMP:   Recent Labs     12/14/24  0030 12/14/24  0545 12/14/24  1200 12/14/24  1830 12/15/24  0107 12/15/24  0508      < > 134* 136 137 137   K 4.0   < > 4.1 4.2 4.3 4.2  4.2   CL 95*   < > 94* 96* 98* 98*   CO2 32   < > 30 29 27 27   PHOS 3.1  --  3.2  --  3.0  --    BUN 43*   < > 38* 36* 35*  --    CREATININE 2.5*   < > 2.1* 1.9* 1.8* 1.8*    < > = values in this interval not displayed.     GLUCOSE:   Recent Labs     12/14/24  1830 12/15/24  0107 12/15/24  0508   GLUCOSE 162* 175* 179*     LIVER PROFILE:   Lab Results   Component Value Date/Time    AST 72 10/19/2024 03:13 AM    ALT 45 10/19/2024 03:13 AM    LIPASE 33.0 04/23/2024 12:08 PM    BILIDIR 1.3 10/19/2024 03:13 AM    BILITOT 2.0 10/19/2024 03:13 AM    ALKPHOS 131 10/19/2024 03:13 AM     PT/INR:   Lab Results   Component Value Date/Time    PROTIME 20.1 12/12/2024 03:59 AM    INR 1.70 12/12/2024 03:59 AM    INR 1.21 10/23/2024 06:00 AM    INR 1.38 04/24/2024 03:20 PM     APTT:   Lab Results   Component Value Date/Time    APTT 88.5 10/23/2024 06:00 AM     Pro-BNP:    Lab Results   Component Value Date/Time    PROBNP 6,743 12/11/2024 07:18 PM    PROBNP 2,727 10/24/2024 06:20 AM    PROBNP 2,862 10/22/2024 04:00 AM     Parameters:   > 450 pg/mL under age 50  > 900 pg/mL ages 50-75  > 1800 pg/mL over age 75      FASTING LIPID PANEL:  Lab Results   Component Value Date/Time    CHOL 79 10/19/2024 03:13 AM    HDL 17 10/19/2024 03:13 AM    TRIG 51 10/19/2024 03:13 AM     Cardiac Enzymes:   Troponin, High Sensitivity (ng/L)   Date Value   12/11/2024 36 (H)   12/11/2024 35 (H)   10/18/2024 62 (H)   10/18/2024 117 (H)   04/23/2024 78 (H)   04/23/2024 74 (H)      A1C:   Hemoglobin A1C   Date Value Ref Range Status   10/19/2024 6.3 See comment % Final     Comment:     Comment:  Diagnosis of

## 2024-12-16 LAB
ALBUMIN SERPL-MCNC: 3 G/DL (ref 3.4–5)
ANION GAP SERPL CALCULATED.3IONS-SCNC: 10 MMOL/L (ref 3–16)
ANION GAP SERPL CALCULATED.3IONS-SCNC: 11 MMOL/L (ref 3–16)
ANION GAP SERPL CALCULATED.3IONS-SCNC: 13 MMOL/L (ref 3–16)
ANTI-XA UNFRAC HEPARIN: 0.16 IU/ML (ref 0.3–0.7)
ANTI-XA UNFRAC HEPARIN: 0.27 IU/ML (ref 0.3–0.7)
BASE EXCESS BLDA CALC-SCNC: 13 MMOL/L (ref -3–3)
BASOPHILS # BLD: 0 K/UL (ref 0–0.2)
BASOPHILS NFR BLD: 0.3 %
BUN SERPL-MCNC: 32 MG/DL (ref 7–20)
BUN SERPL-MCNC: 34 MG/DL (ref 7–20)
BUN SERPL-MCNC: 34 MG/DL (ref 7–20)
CA-I BLD-SCNC: 1.12 MMOL/L (ref 1.12–1.32)
CA-I BLD-SCNC: 1.13 MMOL/L (ref 1.12–1.32)
CA-I BLD-SCNC: 1.15 MMOL/L (ref 1.12–1.32)
CALCIUM SERPL-MCNC: 8.4 MG/DL (ref 8.3–10.6)
CALCIUM SERPL-MCNC: 8.4 MG/DL (ref 8.3–10.6)
CALCIUM SERPL-MCNC: 8.6 MG/DL (ref 8.3–10.6)
CHLORIDE SERPL-SCNC: 95 MMOL/L (ref 99–110)
CHLORIDE SERPL-SCNC: 98 MMOL/L (ref 99–110)
CHLORIDE SERPL-SCNC: 99 MMOL/L (ref 99–110)
CO2 BLDA-SCNC: 39 MMOL/L
CO2 SERPL-SCNC: 25 MMOL/L (ref 21–32)
CO2 SERPL-SCNC: 25 MMOL/L (ref 21–32)
CO2 SERPL-SCNC: 26 MMOL/L (ref 21–32)
CREAT SERPL-MCNC: 1.4 MG/DL (ref 0.8–1.3)
CREAT SERPL-MCNC: 1.6 MG/DL (ref 0.8–1.3)
CREAT SERPL-MCNC: 1.6 MG/DL (ref 0.8–1.3)
DEPRECATED RDW RBC AUTO: 18.7 % (ref 12.4–15.4)
EOSINOPHIL # BLD: 0 K/UL (ref 0–0.6)
EOSINOPHIL NFR BLD: 0 %
GFR SERPLBLD CREATININE-BSD FMLA CKD-EPI: 49 ML/MIN/{1.73_M2}
GFR SERPLBLD CREATININE-BSD FMLA CKD-EPI: 49 ML/MIN/{1.73_M2}
GFR SERPLBLD CREATININE-BSD FMLA CKD-EPI: 57 ML/MIN/{1.73_M2}
GLUCOSE BLD-MCNC: 123 MG/DL (ref 70–99)
GLUCOSE BLD-MCNC: 173 MG/DL (ref 70–99)
GLUCOSE SERPL-MCNC: 161 MG/DL (ref 70–99)
GLUCOSE SERPL-MCNC: 168 MG/DL (ref 70–99)
GLUCOSE SERPL-MCNC: 175 MG/DL (ref 70–99)
HCO3 BLDA-SCNC: 37.6 MMOL/L (ref 21–29)
HCT VFR BLD AUTO: 30.5 % (ref 40.5–52.5)
HCT VFR BLD AUTO: 31.3 % (ref 40.5–52.5)
HCT VFR BLD AUTO: 32.3 % (ref 40.5–52.5)
HGB BLD-MCNC: 10.2 G/DL (ref 13.5–17.5)
HGB BLD-MCNC: 10.2 G/DL (ref 13.5–17.5)
HGB BLD-MCNC: 9.9 G/DL (ref 13.5–17.5)
LYMPHOCYTES # BLD: 0.3 K/UL (ref 1–5.1)
LYMPHOCYTES NFR BLD: 3 %
MAGNESIUM SERPL-MCNC: 2.43 MG/DL (ref 1.8–2.4)
MAGNESIUM SERPL-MCNC: 2.48 MG/DL (ref 1.8–2.4)
MCH RBC QN AUTO: 28.3 PG (ref 26–34)
MCHC RBC AUTO-ENTMCNC: 32.6 G/DL (ref 31–36)
MCV RBC AUTO: 86.7 FL (ref 80–100)
MONOCYTES # BLD: 0.7 K/UL (ref 0–1.3)
MONOCYTES NFR BLD: 8.3 %
NEUTROPHILS # BLD: 7.3 K/UL (ref 1.7–7.7)
NEUTROPHILS NFR BLD: 88.4 %
PCO2 BLDA: 56.5 MM HG (ref 35–45)
PERFORMED ON: ABNORMAL
PERFORMED ON: ABNORMAL
PH BLDA: 7.43 [PH] (ref 7.35–7.45)
PH BLDV: 7.45 [PH] (ref 7.35–7.45)
PH BLDV: 7.46 [PH] (ref 7.35–7.45)
PH BLDV: 7.47 [PH] (ref 7.35–7.45)
PHOSPHATE SERPL-MCNC: 2.4 MG/DL (ref 2.5–4.9)
PHOSPHATE SERPL-MCNC: 2.6 MG/DL (ref 2.5–4.9)
PLATELET # BLD AUTO: 158 K/UL (ref 135–450)
PMV BLD AUTO: 9 FL (ref 5–10.5)
PO2 BLDA: 81.9 MM HG (ref 75–108)
POC SAMPLE TYPE: ABNORMAL
POTASSIUM SERPL-SCNC: 4.2 MMOL/L (ref 3.5–5.1)
POTASSIUM SERPL-SCNC: 4.3 MMOL/L (ref 3.5–5.1)
POTASSIUM SERPL-SCNC: 4.4 MMOL/L (ref 3.5–5.1)
POTASSIUM SERPL-SCNC: 4.4 MMOL/L (ref 3.5–5.1)
RBC # BLD AUTO: 3.61 M/UL (ref 4.2–5.9)
SAO2 % BLDA: 96 % (ref 93–100)
SODIUM SERPL-SCNC: 133 MMOL/L (ref 136–145)
SODIUM SERPL-SCNC: 134 MMOL/L (ref 136–145)
SODIUM SERPL-SCNC: 135 MMOL/L (ref 136–145)
WBC # BLD AUTO: 8.3 K/UL (ref 4–11)

## 2024-12-16 PROCEDURE — 2580000003 HC RX 258: Performed by: INTERNAL MEDICINE

## 2024-12-16 PROCEDURE — 85014 HEMATOCRIT: CPT

## 2024-12-16 PROCEDURE — 94761 N-INVAS EAR/PLS OXIMETRY MLT: CPT

## 2024-12-16 PROCEDURE — 84100 ASSAY OF PHOSPHORUS: CPT

## 2024-12-16 PROCEDURE — 83735 ASSAY OF MAGNESIUM: CPT

## 2024-12-16 PROCEDURE — 2580000003 HC RX 258

## 2024-12-16 PROCEDURE — 2500000003 HC RX 250 WO HCPCS

## 2024-12-16 PROCEDURE — 90945 DIALYSIS ONE EVALUATION: CPT

## 2024-12-16 PROCEDURE — 6360000002 HC RX W HCPCS: Performed by: INTERNAL MEDICINE

## 2024-12-16 PROCEDURE — 99291 CRITICAL CARE FIRST HOUR: CPT | Performed by: INTERNAL MEDICINE

## 2024-12-16 PROCEDURE — 6370000000 HC RX 637 (ALT 250 FOR IP)

## 2024-12-16 PROCEDURE — 2500000003 HC RX 250 WO HCPCS: Performed by: INTERNAL MEDICINE

## 2024-12-16 PROCEDURE — 82330 ASSAY OF CALCIUM: CPT

## 2024-12-16 PROCEDURE — 85018 HEMOGLOBIN: CPT

## 2024-12-16 PROCEDURE — 85520 HEPARIN ASSAY: CPT

## 2024-12-16 PROCEDURE — 2580000003 HC RX 258: Performed by: FAMILY MEDICINE

## 2024-12-16 PROCEDURE — 85025 COMPLETE CBC W/AUTO DIFF WBC: CPT

## 2024-12-16 PROCEDURE — 2100000000 HC CCU R&B

## 2024-12-16 PROCEDURE — 94003 VENT MGMT INPAT SUBQ DAY: CPT

## 2024-12-16 PROCEDURE — APPNB15 APP NON BILLABLE TIME 0-15 MINS: Performed by: NURSE PRACTITIONER

## 2024-12-16 PROCEDURE — 2700000000 HC OXYGEN THERAPY PER DAY

## 2024-12-16 PROCEDURE — 80069 RENAL FUNCTION PANEL: CPT

## 2024-12-16 PROCEDURE — 6360000002 HC RX W HCPCS: Performed by: FAMILY MEDICINE

## 2024-12-16 RX ORDER — HEPARIN SODIUM 1000 [USP'U]/ML
4000 INJECTION, SOLUTION INTRAVENOUS; SUBCUTANEOUS ONCE
Status: COMPLETED | OUTPATIENT
Start: 2024-12-16 | End: 2024-12-16

## 2024-12-16 RX ORDER — HEPARIN SODIUM 1000 [USP'U]/ML
2000 INJECTION, SOLUTION INTRAVENOUS; SUBCUTANEOUS ONCE
Status: COMPLETED | OUTPATIENT
Start: 2024-12-16 | End: 2024-12-16

## 2024-12-16 RX ORDER — HEPARIN SODIUM 10000 [USP'U]/100ML
0-4000 INJECTION, SOLUTION INTRAVENOUS CONTINUOUS
Status: DISCONTINUED | OUTPATIENT
Start: 2024-12-16 | End: 2024-12-26

## 2024-12-16 RX ADMIN — SODIUM CHLORIDE, PRESERVATIVE FREE 40 MG: 5 INJECTION INTRAVENOUS at 13:44

## 2024-12-16 RX ADMIN — Medication: at 20:57

## 2024-12-16 RX ADMIN — MILRINONE LACTATE 0.2 MCG/KG/MIN: 0.2 INJECTION, SOLUTION INTRAVENOUS at 10:22

## 2024-12-16 RX ADMIN — PROPOFOL 10 MCG/KG/MIN: 10 INJECTION, EMULSION INTRAVENOUS at 20:28

## 2024-12-16 RX ADMIN — SODIUM CHLORIDE: 9 INJECTION, SOLUTION INTRAVENOUS at 20:19

## 2024-12-16 RX ADMIN — SODIUM CHLORIDE, PRESERVATIVE FREE 10 ML: 5 INJECTION INTRAVENOUS at 20:12

## 2024-12-16 RX ADMIN — CALCIUM GLUCONATE 1000 MG: 20 INJECTION, SOLUTION INTRAVENOUS at 02:10

## 2024-12-16 RX ADMIN — Medication: at 18:03

## 2024-12-16 RX ADMIN — SODIUM CHLORIDE, PRESERVATIVE FREE 10 ML: 5 INJECTION INTRAVENOUS at 09:05

## 2024-12-16 RX ADMIN — Medication: at 15:23

## 2024-12-16 RX ADMIN — AMIODARONE HYDROCHLORIDE 0.5 MG/MIN: 1.8 INJECTION, SOLUTION INTRAVENOUS at 18:06

## 2024-12-16 RX ADMIN — Medication: at 15:47

## 2024-12-16 RX ADMIN — Medication: at 00:18

## 2024-12-16 RX ADMIN — Medication: at 07:03

## 2024-12-16 RX ADMIN — Medication: at 05:30

## 2024-12-16 RX ADMIN — Medication: at 11:36

## 2024-12-16 RX ADMIN — SODIUM PHOSPHATE, MONOBASIC, MONOHYDRATE AND SODIUM PHOSPHATE, DIBASIC, ANHYDROUS 6 MMOL: 142; 276 INJECTION, SOLUTION INTRAVENOUS at 20:23

## 2024-12-16 RX ADMIN — HYDROCORTISONE SODIUM SUCCINATE 50 MG: 100 INJECTION, POWDER, FOR SOLUTION INTRAMUSCULAR; INTRAVENOUS at 02:04

## 2024-12-16 RX ADMIN — CEFEPIME 2000 MG: 2 INJECTION, POWDER, FOR SOLUTION INTRAVENOUS at 18:08

## 2024-12-16 RX ADMIN — Medication: at 20:28

## 2024-12-16 RX ADMIN — Medication 50 MCG/HR: at 01:20

## 2024-12-16 RX ADMIN — MILRINONE LACTATE 0.2 MCG/KG/MIN: 0.2 INJECTION, SOLUTION INTRAVENOUS at 21:21

## 2024-12-16 RX ADMIN — VASOPRESSIN 0.03 UNITS/MIN: 0.2 INJECTION INTRAVENOUS at 23:03

## 2024-12-16 RX ADMIN — PROPOFOL 30 MCG/KG/MIN: 10 INJECTION, EMULSION INTRAVENOUS at 02:04

## 2024-12-16 RX ADMIN — PROPOFOL 20 MCG/KG/MIN: 10 INJECTION, EMULSION INTRAVENOUS at 10:19

## 2024-12-16 RX ADMIN — Medication: at 05:28

## 2024-12-16 RX ADMIN — ASPIRIN 81 MG 81 MG: 81 TABLET ORAL at 09:08

## 2024-12-16 RX ADMIN — SODIUM CHLORIDE, PRESERVATIVE FREE 40 MG: 5 INJECTION INTRAVENOUS at 02:04

## 2024-12-16 RX ADMIN — HEPARIN SODIUM 4000 UNITS: 1000 INJECTION INTRAVENOUS; SUBCUTANEOUS at 11:56

## 2024-12-16 RX ADMIN — CEFEPIME 2000 MG: 2 INJECTION, POWDER, FOR SOLUTION INTRAVENOUS at 09:02

## 2024-12-16 RX ADMIN — ATORVASTATIN CALCIUM 80 MG: 80 TABLET, FILM COATED ORAL at 21:08

## 2024-12-16 RX ADMIN — HYDROCORTISONE SODIUM SUCCINATE 50 MG: 100 INJECTION, POWDER, FOR SOLUTION INTRAMUSCULAR; INTRAVENOUS at 18:08

## 2024-12-16 RX ADMIN — CEFEPIME 2000 MG: 2 INJECTION, POWDER, FOR SOLUTION INTRAVENOUS at 02:08

## 2024-12-16 RX ADMIN — HYDROCORTISONE SODIUM SUCCINATE 50 MG: 100 INJECTION, POWDER, FOR SOLUTION INTRAMUSCULAR; INTRAVENOUS at 09:04

## 2024-12-16 RX ADMIN — HEPARIN SODIUM 2000 UNITS: 1000 INJECTION INTRAVENOUS; SUBCUTANEOUS at 18:56

## 2024-12-16 RX ADMIN — AMIODARONE HYDROCHLORIDE 0.5 MG/MIN: 1.8 INJECTION, SOLUTION INTRAVENOUS at 05:47

## 2024-12-16 RX ADMIN — VASOPRESSIN 0.03 UNITS/MIN: 0.2 INJECTION INTRAVENOUS at 11:30

## 2024-12-16 RX ADMIN — Medication: at 23:03

## 2024-12-16 RX ADMIN — PROPOFOL 30 MCG/KG/MIN: 10 INJECTION, EMULSION INTRAVENOUS at 05:45

## 2024-12-16 RX ADMIN — HEPARIN SODIUM 1000 UNITS/HR: 10000 INJECTION, SOLUTION INTRAVENOUS at 11:57

## 2024-12-16 ASSESSMENT — PULMONARY FUNCTION TESTS
PIF_VALUE: 18
PIF_VALUE: 18
PIF_VALUE: 19
PIF_VALUE: 18
PIF_VALUE: 18
PIF_VALUE: 20
PIF_VALUE: 19
PIF_VALUE: 17
PIF_VALUE: 19
PIF_VALUE: 19
PIF_VALUE: 18
PIF_VALUE: 19
PIF_VALUE: 20
PIF_VALUE: 18
PIF_VALUE: 19
PIF_VALUE: 19
PIF_VALUE: 20
PIF_VALUE: 18
PIF_VALUE: 20
PIF_VALUE: 19
PIF_VALUE: 18
PIF_VALUE: 18
PIF_VALUE: 19
PIF_VALUE: 19
PIF_VALUE: 20
PIF_VALUE: 18
PIF_VALUE: 17
PIF_VALUE: 18
PIF_VALUE: 19
PIF_VALUE: 18
PIF_VALUE: 21
PIF_VALUE: 18
PIF_VALUE: 19
PIF_VALUE: 18
PIF_VALUE: 18
PIF_VALUE: 19
PIF_VALUE: 18
PIF_VALUE: 18
PIF_VALUE: 19
PIF_VALUE: 18
PIF_VALUE: 19
PIF_VALUE: 19
PIF_VALUE: 18
PIF_VALUE: 19
PIF_VALUE: 18
PIF_VALUE: 19
PIF_VALUE: 18
PIF_VALUE: 19
PIF_VALUE: 18
PIF_VALUE: 19
PIF_VALUE: 18
PIF_VALUE: 17
PIF_VALUE: 21
PIF_VALUE: 18
PIF_VALUE: 20
PIF_VALUE: 19
PIF_VALUE: 18
PIF_VALUE: 21
PIF_VALUE: 18
PIF_VALUE: 19
PIF_VALUE: 18
PIF_VALUE: 19
PIF_VALUE: 18
PIF_VALUE: 21
PIF_VALUE: 26
PIF_VALUE: 19
PIF_VALUE: 18
PIF_VALUE: 19
PIF_VALUE: 18
PIF_VALUE: 19
PIF_VALUE: 19
PIF_VALUE: 18
PIF_VALUE: 18
PIF_VALUE: 19
PIF_VALUE: 18
PIF_VALUE: 19
PIF_VALUE: 19
PIF_VALUE: 20
PIF_VALUE: 18
PIF_VALUE: 20
PIF_VALUE: 18
PIF_VALUE: 19
PIF_VALUE: 18
PIF_VALUE: 19
PIF_VALUE: 18
PIF_VALUE: 19
PIF_VALUE: 21
PIF_VALUE: 20
PIF_VALUE: 19
PIF_VALUE: 18
PIF_VALUE: 21
PIF_VALUE: 18
PIF_VALUE: 19
PIF_VALUE: 20
PIF_VALUE: 17
PIF_VALUE: 18
PIF_VALUE: 19
PIF_VALUE: 18
PIF_VALUE: 19
PIF_VALUE: 18
PIF_VALUE: 18

## 2024-12-16 ASSESSMENT — PAIN SCALES - GENERAL: PAINLEVEL_OUTOF10: 0

## 2024-12-16 NOTE — CONSULTS
Clinical Pharmacy Note  Heparin Dosing Consult    Levy Vargas is a 59 y.o. male ordered heparin per CAD/STEMI/NSTEMI/UA/AFIB nomogram by Dr. Daniele Hernandez.     Lab Results   Component Value Date/Time    ANTIXAUHEP 0.30 10/24/2024 06:20 AM      Lab Results   Component Value Date/Time    HGB 10.2 12/16/2024 06:25 AM    HCT 31.3 12/16/2024 06:25 AM     12/16/2024 06:25 AM    INR 1.70 12/12/2024 03:59 AM       Ht Readings from Last 1 Encounters:   12/11/24 1.854 m (6' 1\")        Wt Readings from Last 1 Encounters:   12/16/24 133.7 kg (294 lb 12.1 oz)        Assessment/Plan:  Initial bolus: 4000 units  Initial infusion rate: 1000 units/hr  Next anti-Xa:: 12/16/2024 1800    Pharmacy will continue to monitor adjust heparin based on anti-Xa results using nomogram below:     CAD/STEMI/NSTEMI/UA/AFIB Heparin Nomogram     Initial Bolus: 60 units/kg Max Bolus: 4,000 units       Initial Rate: 12 units/kg/hr Max Initial Rate: 1,000 units/hr     anti-Xa Bolus Titration   < 0.1 Heparin 60 units/kg bolus Increase drip by 4 units/kg/hr   0.1 - 0.29 Heparin 30 units/kg bolus Increase drip by 2 units/kg/hr   0.3 - 0.7 No Bolus No Change   0.71 - 0.8 No Bolus Decrease drip by 1 units/kg/hr   0.81 - 0.99 No Bolus Decrease drip by 2 units/kg/hr   > 1 Hold Heparin for 1 hour Decrease drip by 3 units/kg/hr     Obtain anti-Xa 6 hours after initial bolus and 6 hours after any dose change until two consecutive therapeutic anti-Xa levels are achieved - then daily.     Goldie Oliver, PharmEVENS  12/16/2024 11:23 AM

## 2024-12-16 NOTE — PROGRESS NOTES
V2.0    [facility] Progress Note      Name:  Levy Vargas /Age/Sex: 1965  (59 y.o. male)   MRN & CSN:  [id number] & [id number] Encounter Date/Time: 2024 10:45 AM EST   Location:  [address]- PCP: No primary care provider on file.     Attending:Tierra Marrufo MD       Hospital Day: 6    Assessment and Recommendations   Levy Vargas is a 59 y.o. male with pmh of HFrEF, cirrhosis, HLD, HTN, CKD 3, DM2, A-fib who presents with Cardiogenic shock with RVR    Patient was examined this morning.  Continues to be intubated and sedated.  Patient does not follow commands due to sedation    GI was consulted plans no EGD at this time due to cardiogenic shock but will continue to follow    Nephrology was consulted, currently patient is on CRRT with fluid removal today     cardiology on board, for cardiogenic shock, currently on milrinone GGT    Continues to be on amnio, levo, vasopressin      Plan:   A-fib with RVR  Patient attributes to being compliant with his medication but has missed doses since he ran out.  ICU admission  Continuous cardiac monitor  Electrophysiology consulted  Patient was given 1 dose of Cardizem  Patient is currently on Amio drip status post bolus  Eliquis 5 mg twice daily  Metoprolol XL      2.  Acute on chronic congestive heart failure  Metoprolol XL  Entresto 49-51 mg twice daily    3.  Hypertension  Entresto 49-51 mg twice daily  Metoprolol XL 25 mg daily    4.  Coffee-ground emesis  GI consulted and following    5.  Cardiogenic shock  Acute on chronic congestive heart failure  NYHA IV, with left ventricular ejection fraction of 10-15%  Not on ARNI, SGLT2i, MRA due to CKD    6.  Acute respiratory failure  Currently intubated  Started cefepime 2 g every 12 hours  Pulmonology on board      Due to the immediate potential for life-threatening deterioration. I spent 31 minutes providing critical care. There was a high probability of clinically significant/life threatening deterioration  phosphate 6 mmol in sodium chloride 0.9 % 250 mL IVPB, 6 mmol, PRN   Or  sodium phosphate 12 mmol in sodium chloride 0.9 % 250 mL IVPB, 12 mmol, PRN   Or  sodium phosphate 18 mmol in sodium chloride 0.9 % 500 mL IVPB, 18 mmol, PRN   Or  sodium phosphate 24 mmol in sodium chloride 0.9 % 500 mL IVPB, 24 mmol, PRN  sodium chloride flush, 1.1-1.9 mL, PRN   And  sodium chloride flush, 1.1-1.9 mL, PRN  pantoprazole, 40 mg, Daily PRN  sodium chloride flush, 5-40 mL, PRN  sodium chloride, , PRN  ondansetron, 4 mg, Q8H PRN   Or  ondansetron, 4 mg, Q6H PRN  polyethylene glycol, 17 g, Daily PRN        Labs and Imaging   XR CHEST PORTABLE    Result Date: 12/11/2024  EXAMINATION: ONE XRAY VIEW OF THE CHEST 12/11/2024 7:18 pm COMPARISON: 10/18/2024 HISTORY: ORDERING SYSTEM PROVIDED HISTORY: Pain TECHNOLOGIST PROVIDED HISTORY: Reason for exam:->Pain Reason for Exam: dizzy, high blood pressure FINDINGS: Transvenous pacer remains in place. The lungs are without acute focal process.  There is no effusion or pneumothorax. The cardiomediastinal silhouette is stable. The osseous structures are stable.     No acute process. Stable cardiomegaly       CBC:   Recent Labs     12/14/24  0545 12/14/24  1200 12/15/24  0508 12/15/24  1200 12/16/24  0026 12/16/24  0625   WBC 7.7  --  7.5  --   --  8.3   HGB 10.7*   < > 10.2* 10.1* 9.9* 10.2*     --  179  --   --  158    < > = values in this interval not displayed.     BMP:    Recent Labs     12/15/24  1813 12/16/24  0026 12/16/24  0625   * 134* 133*   K 4.1 4.2 4.4  4.4   CL 97* 98* 95*   CO2 26 26 25   BUN 34* 34* 34*   CREATININE 1.6* 1.6* 1.6*   GLUCOSE 175* 175* 168*     Hepatic: No results for input(s): \"AST\", \"ALT\", \"BILITOT\", \"ALKPHOS\" in the last 72 hours.    Invalid input(s): \"ALB\"  Lipids:   Lab Results   Component Value Date/Time    CHOL 79 10/19/2024 03:13 AM    HDL 17 10/19/2024 03:13 AM    TRIG 51 10/19/2024 03:13 AM     Hemoglobin A1C:   Lab Results   Component Value

## 2024-12-16 NOTE — PROGRESS NOTES
Consult to pharmacy was place per Dr. Hernandez for a heparin gtt. Orders noted. Labs drawn. Bolus given and gtt started see MAR

## 2024-12-16 NOTE — PROGRESS NOTES
Pulmonary Progress Note    Date of Admission: 12/11/2024   LOS: 5 days     CC:  Chief Complaint   Patient presents with    Hypertension     Pt states he has BP of 210 systolic at home.      Dizziness     Pt complains of being lightheaded today.  Onset for \"some time\"   pt denies chest pain, denies shortness of breath           Assessment/Plan       Cardiogenic shock,  Cardiomyopathy, LVEF 15%  -Currently on fixed dose milrinone  -Titrate vasopressors goal MAP 65    KENDRA requiring RRT  -CRRT per nephrology    Upper GI bleed  -Eliquis on hold  -Protonix twice daily  -Monitor serial CBC, transfuse PRBCs for hemoglobin less than 7  -GI consulted    Due to the immediate potential for life-threatening deterioration due to cardiogenic shock and respiratory failure, I spent 33 minutes providing critical care.  This time is excluding time spent performing separately billable procedures.    HPI/Subjective  No acute events overnight remains on ventilator minimal vent settings.    ROS:   Unable to obtain due to mechanical ventilation      Intake/Output Summary (Last 24 hours) at 12/16/2024 0905  Last data filed at 12/16/2024 0800  Gross per 24 hour   Intake 2123.44 ml   Output 2753 ml   Net -629.56 ml         PHYSICAL EXAM:   Blood pressure 93/61, pulse (!) 109, temperature 99.2 °F (37.3 °C), temperature source Bladder, resp. rate 19, height 1.854 m (6' 1\"), weight 133.7 kg (294 lb 12.1 oz), SpO2 96%.'  Gen:  No acute distress.   Resp:  No crackles. No wheezes. No rhonchi. No dullness on percussion.  CV: Regular rate. Regular rhythm. No murmur or rub. No edema.   M/S: No cyanosis. No clubbing.    Neuro: Intubated and sedated          Labs reviewed:  CBC:   Recent Labs     12/14/24  0545 12/14/24  1200 12/15/24  0508 12/15/24  1200 12/16/24  0026 12/16/24  0625   WBC 7.7  --  7.5  --   --  8.3   HGB 10.7*   < > 10.2* 10.1* 9.9* 10.2*   HCT 33.8*   < > 31.3* 30.6* 30.5* 31.3*   MCV 86.7  --  86.4  --   --  86.7     --

## 2024-12-16 NOTE — FLOWSHEET NOTE
12/16/24 1445   Family/Significant Other Communication   Reason Updates at bedside   Name Ednisha   Relationship Child   Method of Communication In Person

## 2024-12-16 NOTE — PROGRESS NOTES
Clinical Pharmacy Note  Heparin Dosing       Lab Results   Component Value Date/Time    ANTIXAUHEP 0.27 12/16/2024 06:15 PM      Lab Results   Component Value Date/Time    HGB 10.2 12/16/2024 06:15 PM    HCT 32.3 12/16/2024 06:15 PM     12/16/2024 06:25 AM    INR 1.70 12/12/2024 03:59 AM       Current Infusion Rate: 1000 units/hr    Plan:  Bolus: 2000 units  Rate: increase to 1270 units/hr  Next anti-Xa level: 12/17/24 0100    Pharmacy will continue to monitor and adjust based on anti-Xa results.    Elizabeth Gresham RPH, PharmD 12/16/2024 6:57 PM

## 2024-12-16 NOTE — CONSULTS
[facility]  Palliative Care   Consult Note    NAME:  Levy Vargas  MEDICAL RECORD NUMBER:  [id number]  AGE: 59 y.o.   GENDER: male  : 1965  TODAY'S DATE:  2024    Subjective     Reason for Consult:  goals of care  Visit Type: Initial Consult      Levy Vargas is a 59 y.o. male referred by:   [x] Physician  PAST MEDICAL HISTORY      Diagnosis Date    Abdominal pain 2021    Abnormal EKG 2016    Acute pancreatitis 2021    Atrial fibrillation (HCC)     CHF (congestive heart failure) (HCC)     Cigarette smoker 2021    Cocaine abuse (HCC) 2015    Last Assessment & Plan:  Formatting of this note might be different from the original. Counseled on cessation as increases risk of coronary vasospasm and further worsening of heart function.    Dehydration 2016    Diabetes mellitus (HCC)     History of cocaine abuse (HCC) 2016    History of MI (myocardial infarction) 2016    Hyperglycemia 2016    Hyperlipidemia     Hypertension     Morbid obesity with BMI of 45.0-49.9, adult 2016    Postural dizziness 2016    PUD (peptic ulcer disease) 2019       PAST SURGICAL HISTORY  Past Surgical History:   Procedure Laterality Date    CARDIAC CATHETERIZATION      CARDIAC PROCEDURE N/A 10/21/2024    Right heart cath performed by Daniele Hernandez MD at [facility], [place] CARDIAC CATH LAB    LEG SURGERY Right 2023    RIGHT THIGH INCISION AND DRAINAGE WITH DRAIN PLACEMENT performed by Dominick Cid DO at [facility] [place] OR    UPPER GASTROINTESTINAL ENDOSCOPY         FAMILY HISTORY  No family history on file.    SOCIAL HISTORY  Social History     Tobacco Use    Smoking status: Every Day     Current packs/day: 0.50     Average packs/day: 0.5 packs/day for 25.0 years (12.5 ttl pk-yrs)     Types: Cigarettes    Smokeless tobacco: Never   Vaping Use    Vaping status: Never Used   Substance Use Topics    Alcohol use: Not Currently    Drug use: Yes     Types:

## 2024-12-16 NOTE — PROGRESS NOTES
Medical Devices         Does the Patient have a Wound? Yes wound(s) were present on assessment. LDA wound assessment was Initiated and completed by RN    Wound Care Orders initiated by RN: No       Hudson Prevention initiated by RN: Yes    Pressure Injury (Stage 3,4, Unstageable, DTI, NWPT, and Complex wounds) if present, place Wound referral order by RN under : No    New Ostomies, if present place, Ostomy referral order under : No     Nurse 1 eSignature: Electronically signed by Mateo Alvarez RN on 12/16/24 at 6:45 AM EST    **SHARE this note so that the co-signing nurse can place an eSignature**    Nurse 2 eSignature: Electronically signed by Eliane Atkinson RN on 12/16/24 at 7:22 AM EST

## 2024-12-16 NOTE — FLOWSHEET NOTE
12/16/24 1515   Art Line   ABP (Arterial line BP) (!) 82/63   ABP Mean (Arterial Line Mean) 70 mmHg     BP has been remaining in the 80's/ 60's for a bit now. Currently on 1 mcg of levo with the set 0.03 units/min of vaso. 15 mcg of propofol and 50 mcg of fentanyl for sedation. Confirmed with Dr. Major that it's okay with SBP in the 80's as long as MAP maintains greater than 65

## 2024-12-16 NOTE — PROGRESS NOTES
Nephrology Progress Note   FID3Axial Exchange.Inflection      Reason for consultation:  KENDRA on CKD 2 -- baseline Cr ~ 0.9-1.3 mg/dL     HPI: Levy Vargas is a 60 yo male with a PMHx of CKD 2, h/o AKIs, HFrEF, non-compliance, DM, HTN, afib, h/o cocaine use, CAD, HLD. Patient presents to  ED on 12/11/2024 with complaints of BLE edema, lightheadedness, and weakness. Patient is currently drowsy and it is difficult to obtain history. Currently oriented x3. Patient tells me he has been taking his CHF medications everyday. Per chart review, there has been some questions regarding his outpatient compliance and not keeping in touch with his CHF nurses. Pt is currently grossly volume overloaded. He weighed 131.5 kg on 9/25/2024 during his last cardiology appt. He currently weighs 145.3 kg. Currently dyspneic and on 4 L NC. CXR is negative for acute findings. In afib RVR this admission. On amio gtt.     We have been consulted for KENDRA on CKD 2 management. Cr upon admission was 1.3 mg/dL. Today, Cr is 2.6 mg/dL. Patient has received a generous amount of IVP and oral diuretic yesterday and was also started on a lasix gtt at 10 mg/hr. Despite this, pt has remained anuric with 150 mL UOP/24 hrs and 25 mL UOP documented today. BP has been as low as 63/38 mmHg since admission. Started on milrinone gtt yesterday.     Subjective:    The patient has been seen and examined. Labs and chart reviewed. Remains intubated on 40% FiO2. Currently on milrinone @ 0.2 mcg/kg/min, and vasopressin 0.03 units/min and off Levophed. Tolerating CRRT with net 25 mL/hr. 245 mL UOP/24 hrs. Wt is trending down.     There were not complications overnight.    Patient review of systems: YESICA -- intubated       Allergies:  No Known Allergies     Scheduled Meds:   cefepime  2,000 mg IntraVENous Q8H    pantoprazole (PROTONIX) 40 mg in sodium chloride (PF) 0.9 % 10 mL injection  40 mg IntraVENous Q12H    hydrocortisone sodium succinate PF  50 mg IntraVENous Q8H    [Held by  hours) at 12/16/2024 1417  Last data filed at 12/16/2024 1414  Gross per 24 hour   Intake 2113.37 ml   Output 3126 ml   Net -1012.63 ml       Patient Vitals for the past 96 hrs (Last 3 readings):   Weight   12/16/24 0600 133.7 kg (294 lb 12.1 oz)   12/15/24 0559 134.6 kg (296 lb 11.8 oz)   12/14/24 0630 135.6 kg (298 lb 15.1 oz)       General: intubated  HEENT: Normocephalic, atraumatic  Chest: diminished to auscultation  CVS: tachy, irregular  Abdomen: soft, non tender  Extremities: 2+ pitting edema  : castanon in place   Access: R [facility]       LAB DATA:    CBC:   Lab Results   Component Value Date/Time    WBC 8.3 12/16/2024 06:25 AM    RBC 3.61 12/16/2024 06:25 AM    HGB 10.2 12/16/2024 06:25 AM    HCT 31.3 12/16/2024 06:25 AM    MCV 86.7 12/16/2024 06:25 AM    MCH 28.3 12/16/2024 06:25 AM    MCHC 32.6 12/16/2024 06:25 AM    RDW 18.7 12/16/2024 06:25 AM     12/16/2024 06:25 AM    MPV 9.0 12/16/2024 06:25 AM     BMP:    Lab Results   Component Value Date/Time     12/16/2024 06:25 AM    K 4.4 12/16/2024 06:25 AM    K 4.4 12/16/2024 06:25 AM    CL 95 12/16/2024 06:25 AM    CO2 25 12/16/2024 06:25 AM    BUN 34 12/16/2024 06:25 AM    CREATININE 1.6 12/16/2024 06:25 AM    CALCIUM 8.6 12/16/2024 06:25 AM    GFRAA >60 10/11/2022 05:03 AM    LABGLOM 49 12/16/2024 06:25 AM    LABGLOM 63 04/27/2024 05:03 AM    GLUCOSE 168 12/16/2024 06:25 AM     Ionized Calcium:  No components found for: \"IONCA\"  Magnesium:    Lab Results   Component Value Date/Time    MG 2.48 12/16/2024 12:26 AM     Phosphorus:    Lab Results   Component Value Date/Time    PHOS 2.6 12/16/2024 12:26 AM     U/A:    Lab Results   Component Value Date/Time    COLORU DARK YELLOW 12/12/2024 06:45 PM    PHUR 5.0 12/12/2024 06:45 PM    PHUR 7.0 08/03/2023 09:20 PM    PHUR 7.0 08/03/2023 09:20 PM    WBCUA 3-5 12/12/2024 06:45 PM    RBCUA 5-10 12/12/2024 06:45 PM    MUCUS 1+ 03/19/2022 05:10 PM    TRICHOMONAS None Seen 03/19/2022 05:10 PM

## 2024-12-16 NOTE — PROGRESS NOTES
[facility]   Daily Progress Note      Admit Date:  12/11/2024      Subjective:   Mr. Vargas is 59 y.o. male with a past medical history significant for paroxysmal atrial fibrillation,  PAD and chronic systolic CHF with LVEF 15-20% who presented to [facility] with dizziness and elevated blood pressure in the 200's systolic. He had been admitted in October 2024 with acute on chronic systolic CHF and discharged 10/29/24.I was asked to see him for atrial fibrillation with RVR. He was on amiodarone 200mg daily and Eliquis 5mg bid at home. His CHF regimen includes Toprol XL 25mg daily, Entresto 49/51mg bid and torsemide 50mg daily with Imdur 15mg daily. He follows with Dr. Rosa at  Cardiology.     Levy states that a week ago he had trouble getting up from the toilet. He says that he was weak. He says his breathing was not great but it was not the shortness of breath that kept him from getting up.      He denies any chest pain or tightness.     He was placed on a amiodarone drip in the ED for his rapid atrial fibrillation.  He was having decreased urine output and some hypotension yesterday.  He was placed on the milrinone drip as well as IV Lasix.    Interval history:  Lvey remains on the ventilator with an FiO2 of 40%. His norepinephrine has been weaned off but he remains on milrinone at 0.2mcg/kg/min and vasopressin.    CRRT running and now removing 25mL/hour.     A-fib with rates in the 110's on telemetry.  Amiodarone drip running at 0.5mg/hr.       Objective:     BP 93/61   Pulse (!) 119   Temp 99.2 °F (37.3 °C) (Bladder)   Resp 18   Ht 1.854 m (6' 1\")   Wt 133.7 kg (294 lb 12.1 oz)   SpO2 96%   BMI 38.89 kg/m²      Intake/Output Summary (Last 24 hours) at 12/16/2024 1007  Last data filed at 12/16/2024 1005  Gross per 24 hour   Intake 2206.23 ml   Output 2917 ml   Net -710.77 ml       Physical Exam:  General: sedated  Skin:  Warm and dry  Neck:  JVD<8, no carotid bruits  Chest:   10/19/2024 03:13 AM    TRIG 51 10/19/2024 03:13 AM     Assessment / Plan:     Atrial fibrillation with RVR  The amiodarone drip is not controlling his heart rate adequately.    Eliquis has been held when he was having hematemesis.  This seems to have subsided.  Start heparin drip now and lieu of oral anticoagulation given renal failure.     Acute on chronic Systolic CHF, class 4  His vasopressor requirements have been weaned.  Starting some fluid removal on CVVHD with 25 mL/h.    I have discussed with his family that he is really not a candidate for further advanced heart failure therapies including transplant or LVAD placement.  Hold off on mechanical support as he is stable and slightly improving.  Continue supportive care with the IV milrinone drip.  Hold any nephrotoxic agents as well as beta-blockade.     Acute on chronic kidney injury   Levy is stll making some urine. Nephrology is following.  CVVHD running with removal of 25mL/hour of fluid.    4.  Cardiogenic shock  He is steadily improving.  Palliative care to discuss goals of care with the family.  I would like to hold off on any mechanical support as there is not much of an endpoint if you would be unable to be weaned off of it.  Continue vasopressin for now.  Continue inotropic support with milrinone at 0.2 mcg/kg/min.    Total critical care time 45 minutes.    I had multiple discussions with multiple family members last week.  They understand how acutely critically ill he is on top of his chronic disease of which they were not aware.    Signed:  JOEL ALVARADO MD

## 2024-12-16 NOTE — PROGRESS NOTES
NAME:  Levy Vargas  YOB: 1965  MEDICAL RECORD NUMBER:  [id number]    Shift Summary: CRRT continues with removing 25 mL/hr. Dr. Rosario changed dialysate and post fluids. Dr. Hernandez added heparin gtt. Palliative consult. Labs changed to Q12. Attempts to wean levo- sensitive BP, up and down throughout the day    Family updated: Yes:  Daughters at bedside     Rhythm: Atrial Fibrillation     Most recent vitals:   Visit Vitals  BP (!) 82/67   Pulse (!) 134   Temp 99.1 °F (37.3 °C) (Bladder)   Resp 18   Ht 1.854 m (6' 1\")   Wt 133.7 kg (294 lb 12.1 oz)   SpO2 97%   BMI 38.89 kg/m²      ABP (Arterial line BP): 136/83  ABP Mean (Arterial Line Mean): 98 mmHg    No data found.    No data found.      Respiratory support needed (if any):  - Ventilator Settings:  Vent Days 4    Vt (Set, mL): 0 mL  Resp Rate (Set): 18 bpm  FiO2 : 40 %    PEEP/CPAP (cmH2O): 5       Admission weight Weight - Scale: (!) 145.4 kg (320 lb 8.8 oz) (12/11/24 2306)    Today's weight    Wt Readings from Last 1 Encounters:   12/16/24 133.7 kg (294 lb 12.1 oz)        Castanon need assessed each shift: YES -  - continue castanon r/t - strict I&O, CRRT   UOP >30ml/hr: NO - CRRT, 7-25 mL/hr  Last documented BM (in last 48 hrs):  Patient Vitals for the past 48 hrs:   Last BM (including prior to admit)   12/14/24 2000 12/14/24   12/15/24 0800 12/15/24   12/16/24 0800 12/16/24                Restraints (in use currently or dc'd in last 12 hrs): Yes    Order current and documentation up to date? Yes    Lines/Drains reviewed @ bedside.  CVC  12/13/24 Left Internal jugular (Active)   Number of days: 3       Peripheral IV 12/11/24 Posterior;Right Hand (Active)   Number of days: 4       Peripheral IV 12/11/24 Left;Posterior Hand (Active)   Number of days: 4       Arterial Line 12/13/24 Left Radial (Active)   Number of days: 3       Hemodialysis Central Access - Temporary Right Neck (Active)   Number of days: 3       Urinary Catheter 12/12/24

## 2024-12-16 NOTE — PROGRESS NOTES
Dr. Rosario from [facility] at bedside. Instructed to increase dialysate to 2,000 mL and post fluids to 1,000 mL. Martine STYLES informed they will update orders

## 2024-12-16 NOTE — PLAN OF CARE
Problem: Chronic Conditions and Co-morbidities  Goal: Patient's chronic conditions and co-morbidity symptoms are monitored and maintained or improved  Outcome: Progressing  Flowsheets (Taken 12/15/2024 0800 by David Colon RN)  Care Plan - Patient's Chronic Conditions and Co-Morbidity Symptoms are Monitored and Maintained or Improved:   Monitor and assess patient's chronic conditions and comorbid symptoms for stability, deterioration, or improvement   Collaborate with multidisciplinary team to address chronic and comorbid conditions and prevent exacerbation or deterioration   Update acute care plan with appropriate goals if chronic or comorbid symptoms are exacerbated and prevent overall improvement and discharge     Problem: Pain  Goal: Verbalizes/displays adequate comfort level or baseline comfort level  Outcome: Progressing  Flowsheets (Taken 12/15/2024 0800 by David Colon RN)  Verbalizes/displays adequate comfort level or baseline comfort level:   Encourage patient to monitor pain and request assistance   Assess pain using appropriate pain scale   Administer analgesics based on type and severity of pain and evaluate response   Implement non-pharmacological measures as appropriate and evaluate response   Consider cultural and social influences on pain and pain management   Notify Licensed Independent Practitioner if interventions unsuccessful or patient reports new pain     Problem: Safety - Adult  Goal: Free from fall injury  Outcome: Progressing  Flowsheets (Taken 12/13/2024 2020)  Free From Fall Injury: Instruct family/caregiver on patient safety     Problem: Respiratory - Adult  Goal: Achieves optimal ventilation and oxygenation  Outcome: Progressing  Flowsheets (Taken 12/15/2024 0800 by David Colon RN)  Achieves optimal ventilation and oxygenation:   Assess for changes in respiratory status   Assess for changes in mentation and behavior   Position to facilitate oxygenation and minimize  tolerated, if ordered   Nutrition consult to assist patient with adequate nutrition and appropriate food choices     Problem: Genitourinary - Adult  Goal: Urinary catheter remains patent  Outcome: Progressing  Flowsheets (Taken 12/15/2024 0800 by David Colon, RN)  Urinary catheter remains patent: Assess patency of urinary catheter     Problem: Safety - Medical Restraint  Goal: Remains free of injury from restraints (Restraint for Interference with Medical Device)  Description: INTERVENTIONS:  1. Determine that other, less restrictive measures have been tried or would not be effective before applying the restraint  2. Evaluate the patient's condition at the time of restraint application  3. Inform patient/family regarding the reason for restraint  4. Q2H: Monitor safety, psychosocial status, comfort, nutrition and hydration  Outcome: Progressing  Flowsheets (Taken 12/15/2024 0828 by David Colon, RN)  Remains free of injury from restraints (restraint for interference with medical device):   Determine that other, less restrictive measures have been tried or would not be effective before applying the restraint   Evaluate the patient's condition at the time of restraint application   Inform patient/family regarding the reason for restraint   Every 2 hours: Monitor safety, psychosocial status, comfort, nutrition and hydration

## 2024-12-17 LAB
ALBUMIN SERPL-MCNC: 2.8 G/DL (ref 3.4–5)
ALBUMIN SERPL-MCNC: 3 G/DL (ref 3.4–5)
ANION GAP SERPL CALCULATED.3IONS-SCNC: 8 MMOL/L (ref 3–16)
ANION GAP SERPL CALCULATED.3IONS-SCNC: 9 MMOL/L (ref 3–16)
ANTI-XA UNFRAC HEPARIN: 0.18 IU/ML (ref 0.3–0.7)
ANTI-XA UNFRAC HEPARIN: 0.4 IU/ML (ref 0.3–0.7)
ANTI-XA UNFRAC HEPARIN: 0.42 IU/ML (ref 0.3–0.7)
BASE EXCESS BLDA CALC-SCNC: 3 MMOL/L (ref -3–3)
BUN SERPL-MCNC: 23 MG/DL (ref 7–20)
BUN SERPL-MCNC: 25 MG/DL (ref 7–20)
CA-I BLD-SCNC: 1.08 MMOL/L (ref 1.12–1.32)
CA-I BLD-SCNC: 1.14 MMOL/L (ref 1.12–1.32)
CALCIUM SERPL-MCNC: 7.7 MG/DL (ref 8.3–10.6)
CALCIUM SERPL-MCNC: 8.4 MG/DL (ref 8.3–10.6)
CHLORIDE SERPL-SCNC: 99 MMOL/L (ref 99–110)
CHLORIDE SERPL-SCNC: 99 MMOL/L (ref 99–110)
CO2 BLDA-SCNC: 29 MMOL/L
CO2 SERPL-SCNC: 24 MMOL/L (ref 21–32)
CO2 SERPL-SCNC: 25 MMOL/L (ref 21–32)
CREAT SERPL-MCNC: 1.1 MG/DL (ref 0.8–1.3)
CREAT SERPL-MCNC: 1.1 MG/DL (ref 0.8–1.3)
DEPRECATED RDW RBC AUTO: 18.7 % (ref 12.4–15.4)
GFR SERPLBLD CREATININE-BSD FMLA CKD-EPI: 77 ML/MIN/{1.73_M2}
GFR SERPLBLD CREATININE-BSD FMLA CKD-EPI: 77 ML/MIN/{1.73_M2}
GLUCOSE BLD-MCNC: 131 MG/DL (ref 70–99)
GLUCOSE BLD-MCNC: 146 MG/DL (ref 70–99)
GLUCOSE BLD-MCNC: 148 MG/DL (ref 70–99)
GLUCOSE BLD-MCNC: 151 MG/DL (ref 70–99)
GLUCOSE BLD-MCNC: 163 MG/DL (ref 70–99)
GLUCOSE SERPL-MCNC: 138 MG/DL (ref 70–99)
GLUCOSE SERPL-MCNC: 153 MG/DL (ref 70–99)
HCO3 BLDA-SCNC: 27.6 MMOL/L (ref 21–29)
HCT VFR BLD AUTO: 29.6 % (ref 40.5–52.5)
HCT VFR BLD AUTO: 31.6 % (ref 40.5–52.5)
HGB BLD-MCNC: 10.1 G/DL (ref 13.5–17.5)
HGB BLD-MCNC: 9.5 G/DL (ref 13.5–17.5)
MAGNESIUM SERPL-MCNC: 2.34 MG/DL (ref 1.8–2.4)
MAGNESIUM SERPL-MCNC: 2.46 MG/DL (ref 1.8–2.4)
MCH RBC QN AUTO: 27.7 PG (ref 26–34)
MCHC RBC AUTO-ENTMCNC: 32 G/DL (ref 31–36)
MCV RBC AUTO: 86.6 FL (ref 80–100)
PCO2 BLDA: 42.7 MM HG (ref 35–45)
PERFORMED ON: ABNORMAL
PERFORMED ON: NORMAL
PH BLDA: 7.42 [PH] (ref 7.35–7.45)
PH BLDV: 7.43 [PH] (ref 7.35–7.45)
PH BLDV: 7.47 [PH] (ref 7.35–7.45)
PHOSPHATE SERPL-MCNC: 2.1 MG/DL (ref 2.5–4.9)
PHOSPHATE SERPL-MCNC: 2.1 MG/DL (ref 2.5–4.9)
PHOSPHATE SERPL-MCNC: 2.3 MG/DL (ref 2.5–4.9)
PLATELET # BLD AUTO: 146 K/UL (ref 135–450)
PMV BLD AUTO: 8.9 FL (ref 5–10.5)
PO2 BLDA: 76.1 MM HG (ref 75–108)
POC SAMPLE TYPE: NORMAL
POTASSIUM SERPL-SCNC: 3.9 MMOL/L (ref 3.5–5.1)
POTASSIUM SERPL-SCNC: 4.2 MMOL/L (ref 3.5–5.1)
RBC # BLD AUTO: 3.42 M/UL (ref 4.2–5.9)
SAO2 % BLDA: 95 % (ref 93–100)
SODIUM SERPL-SCNC: 131 MMOL/L (ref 136–145)
SODIUM SERPL-SCNC: 133 MMOL/L (ref 136–145)
WBC # BLD AUTO: 7.7 K/UL (ref 4–11)

## 2024-12-17 PROCEDURE — 2100000000 HC CCU R&B

## 2024-12-17 PROCEDURE — 2700000000 HC OXYGEN THERAPY PER DAY

## 2024-12-17 PROCEDURE — 85014 HEMATOCRIT: CPT

## 2024-12-17 PROCEDURE — 6360000002 HC RX W HCPCS: Performed by: NURSE PRACTITIONER

## 2024-12-17 PROCEDURE — 94761 N-INVAS EAR/PLS OXIMETRY MLT: CPT

## 2024-12-17 PROCEDURE — 6360000002 HC RX W HCPCS: Performed by: FAMILY MEDICINE

## 2024-12-17 PROCEDURE — 37799 UNLISTED PX VASCULAR SURGERY: CPT

## 2024-12-17 PROCEDURE — 6360000002 HC RX W HCPCS: Performed by: INTERNAL MEDICINE

## 2024-12-17 PROCEDURE — 83735 ASSAY OF MAGNESIUM: CPT

## 2024-12-17 PROCEDURE — 90945 DIALYSIS ONE EVALUATION: CPT

## 2024-12-17 PROCEDURE — 6370000000 HC RX 637 (ALT 250 FOR IP)

## 2024-12-17 PROCEDURE — 2500000003 HC RX 250 WO HCPCS: Performed by: INTERNAL MEDICINE

## 2024-12-17 PROCEDURE — 2580000003 HC RX 258: Performed by: INTERNAL MEDICINE

## 2024-12-17 PROCEDURE — 2580000003 HC RX 258

## 2024-12-17 PROCEDURE — 82803 BLOOD GASES ANY COMBINATION: CPT

## 2024-12-17 PROCEDURE — 99291 CRITICAL CARE FIRST HOUR: CPT | Performed by: INTERNAL MEDICINE

## 2024-12-17 PROCEDURE — 94003 VENT MGMT INPAT SUBQ DAY: CPT

## 2024-12-17 PROCEDURE — 82330 ASSAY OF CALCIUM: CPT

## 2024-12-17 PROCEDURE — 80069 RENAL FUNCTION PANEL: CPT

## 2024-12-17 PROCEDURE — 85027 COMPLETE CBC AUTOMATED: CPT

## 2024-12-17 PROCEDURE — 85018 HEMOGLOBIN: CPT

## 2024-12-17 PROCEDURE — 84100 ASSAY OF PHOSPHORUS: CPT

## 2024-12-17 PROCEDURE — 6360000002 HC RX W HCPCS

## 2024-12-17 PROCEDURE — 2580000003 HC RX 258: Performed by: FAMILY MEDICINE

## 2024-12-17 PROCEDURE — 6370000000 HC RX 637 (ALT 250 FOR IP): Performed by: INTERNAL MEDICINE

## 2024-12-17 PROCEDURE — 2500000003 HC RX 250 WO HCPCS

## 2024-12-17 PROCEDURE — 85520 HEPARIN ASSAY: CPT

## 2024-12-17 RX ORDER — HEPARIN SODIUM 1000 [USP'U]/ML
2000 INJECTION, SOLUTION INTRAVENOUS; SUBCUTANEOUS ONCE
Status: COMPLETED | OUTPATIENT
Start: 2024-12-17 | End: 2024-12-17

## 2024-12-17 RX ORDER — DIPHENHYDRAMINE HYDROCHLORIDE 50 MG/ML
12.5 INJECTION INTRAMUSCULAR; INTRAVENOUS ONCE
Status: COMPLETED | OUTPATIENT
Start: 2024-12-17 | End: 2024-12-17

## 2024-12-17 RX ORDER — AMIODARONE HYDROCHLORIDE 200 MG/1
200 TABLET ORAL 2 TIMES DAILY
Status: DISCONTINUED | OUTPATIENT
Start: 2024-12-17 | End: 2025-01-03 | Stop reason: HOSPADM

## 2024-12-17 RX ADMIN — AMIODARONE HYDROCHLORIDE 200 MG: 200 TABLET ORAL at 14:22

## 2024-12-17 RX ADMIN — Medication: at 16:28

## 2024-12-17 RX ADMIN — CALCIUM GLUCONATE 1000 MG: 20 INJECTION, SOLUTION INTRAVENOUS at 09:25

## 2024-12-17 RX ADMIN — SODIUM PHOSPHATE, MONOBASIC, MONOHYDRATE AND SODIUM PHOSPHATE, DIBASIC, ANHYDROUS 6 MMOL: 142; 276 INJECTION, SOLUTION INTRAVENOUS at 14:36

## 2024-12-17 RX ADMIN — ASPIRIN 81 MG 81 MG: 81 TABLET ORAL at 09:52

## 2024-12-17 RX ADMIN — Medication: at 11:48

## 2024-12-17 RX ADMIN — Medication: at 19:04

## 2024-12-17 RX ADMIN — Medication: at 06:18

## 2024-12-17 RX ADMIN — AMIODARONE HYDROCHLORIDE 0.5 MG/MIN: 50 INJECTION, SOLUTION INTRAVENOUS at 06:28

## 2024-12-17 RX ADMIN — HYDROCORTISONE SODIUM SUCCINATE 50 MG: 100 INJECTION, POWDER, FOR SOLUTION INTRAMUSCULAR; INTRAVENOUS at 01:51

## 2024-12-17 RX ADMIN — SODIUM CHLORIDE, PRESERVATIVE FREE 40 MG: 5 INJECTION INTRAVENOUS at 01:50

## 2024-12-17 RX ADMIN — Medication: at 01:22

## 2024-12-17 RX ADMIN — DIPHENHYDRAMINE HYDROCHLORIDE 12.5 MG: 50 INJECTION INTRAMUSCULAR; INTRAVENOUS at 23:09

## 2024-12-17 RX ADMIN — Medication: at 06:43

## 2024-12-17 RX ADMIN — CEFEPIME 2000 MG: 2 INJECTION, POWDER, FOR SOLUTION INTRAVENOUS at 02:01

## 2024-12-17 RX ADMIN — SODIUM CHLORIDE, PRESERVATIVE FREE 40 MG: 5 INJECTION INTRAVENOUS at 14:23

## 2024-12-17 RX ADMIN — Medication: at 22:05

## 2024-12-17 RX ADMIN — HYDROCORTISONE SODIUM SUCCINATE 50 MG: 100 INJECTION, POWDER, FOR SOLUTION INTRAMUSCULAR; INTRAVENOUS at 18:18

## 2024-12-17 RX ADMIN — Medication 50 MCG/HR: at 00:05

## 2024-12-17 RX ADMIN — SODIUM CHLORIDE, PRESERVATIVE FREE 10 ML: 5 INJECTION INTRAVENOUS at 20:56

## 2024-12-17 RX ADMIN — Medication: at 21:30

## 2024-12-17 RX ADMIN — AMIODARONE HYDROCHLORIDE 200 MG: 200 TABLET ORAL at 20:56

## 2024-12-17 RX ADMIN — Medication: at 11:03

## 2024-12-17 RX ADMIN — Medication: at 13:56

## 2024-12-17 RX ADMIN — CEFEPIME 2000 MG: 2 INJECTION, POWDER, FOR SOLUTION INTRAVENOUS at 10:33

## 2024-12-17 RX ADMIN — PROPOFOL 10 MCG/KG/MIN: 10 INJECTION, EMULSION INTRAVENOUS at 06:25

## 2024-12-17 RX ADMIN — Medication: at 02:01

## 2024-12-17 RX ADMIN — Medication: at 08:42

## 2024-12-17 RX ADMIN — ATORVASTATIN CALCIUM 80 MG: 80 TABLET, FILM COATED ORAL at 20:56

## 2024-12-17 RX ADMIN — SODIUM CHLORIDE, PRESERVATIVE FREE 10 ML: 5 INJECTION INTRAVENOUS at 10:42

## 2024-12-17 RX ADMIN — Medication: at 03:19

## 2024-12-17 RX ADMIN — MILRINONE LACTATE 0.2 MCG/KG/MIN: 0.2 INJECTION, SOLUTION INTRAVENOUS at 21:32

## 2024-12-17 RX ADMIN — HYDROCORTISONE SODIUM SUCCINATE 50 MG: 100 INJECTION, POWDER, FOR SOLUTION INTRAMUSCULAR; INTRAVENOUS at 10:33

## 2024-12-17 RX ADMIN — HEPARIN SODIUM 1540 UNITS/HR: 10000 INJECTION, SOLUTION INTRAVENOUS at 08:12

## 2024-12-17 RX ADMIN — MILRINONE LACTATE 0.2 MCG/KG/MIN: 0.2 INJECTION, SOLUTION INTRAVENOUS at 09:27

## 2024-12-17 RX ADMIN — VASOPRESSIN 0.03 UNITS/MIN: 0.2 INJECTION INTRAVENOUS at 10:59

## 2024-12-17 RX ADMIN — Medication: at 03:42

## 2024-12-17 RX ADMIN — CEFEPIME 2000 MG: 2 INJECTION, POWDER, FOR SOLUTION INTRAVENOUS at 18:17

## 2024-12-17 RX ADMIN — SODIUM CHLORIDE: 9 INJECTION, SOLUTION INTRAVENOUS at 23:02

## 2024-12-17 RX ADMIN — HEPARIN SODIUM 2000 UNITS: 1000 INJECTION INTRAVENOUS; SUBCUTANEOUS at 01:53

## 2024-12-17 ASSESSMENT — PULMONARY FUNCTION TESTS
PIF_VALUE: 17
PIF_VALUE: 17
PIF_VALUE: 11
PIF_VALUE: 14
PIF_VALUE: 17
PIF_VALUE: 18
PIF_VALUE: 19
PIF_VALUE: 11
PIF_VALUE: 25
PIF_VALUE: 18
PIF_VALUE: 18
PIF_VALUE: 19
PIF_VALUE: 27
PIF_VALUE: 19
PIF_VALUE: 19
PIF_VALUE: 27
PIF_VALUE: 18
PIF_VALUE: 23
PIF_VALUE: 18
PIF_VALUE: 22
PIF_VALUE: 14
PIF_VALUE: 15
PIF_VALUE: 25
PIF_VALUE: 17
PIF_VALUE: 25
PIF_VALUE: 17
PIF_VALUE: 19
PIF_VALUE: 17
PIF_VALUE: 14
PIF_VALUE: 17
PIF_VALUE: 19
PIF_VALUE: 14
PIF_VALUE: 19
PIF_VALUE: 20
PIF_VALUE: 18
PIF_VALUE: 17
PIF_VALUE: 11
PIF_VALUE: 18
PIF_VALUE: 17
PIF_VALUE: 11
PIF_VALUE: 11
PIF_VALUE: 19
PIF_VALUE: 17
PIF_VALUE: 18
PIF_VALUE: 19
PIF_VALUE: 18
PIF_VALUE: 19

## 2024-12-17 ASSESSMENT — PAIN DESCRIPTION - LOCATION
LOCATION: GENERALIZED
LOCATION: GENERALIZED

## 2024-12-17 ASSESSMENT — PAIN SCALES - GENERAL
PAINLEVEL_OUTOF10: 0
PAINLEVEL_OUTOF10: 2
PAINLEVEL_OUTOF10: 2
PAINLEVEL_OUTOF10: 0

## 2024-12-17 ASSESSMENT — PAIN DESCRIPTION - PAIN TYPE: TYPE: CHRONIC PAIN;ACUTE PAIN

## 2024-12-17 ASSESSMENT — PAIN DESCRIPTION - DESCRIPTORS: DESCRIPTORS: SORE

## 2024-12-17 NOTE — PLAN OF CARE
Problem: Chronic Conditions and Co-morbidities  Goal: Patient's chronic conditions and co-morbidity symptoms are monitored and maintained or improved  Outcome: Progressing  Flowsheets (Taken 12/16/2024 2000)  Care Plan - Patient's Chronic Conditions and Co-Morbidity Symptoms are Monitored and Maintained or Improved:   Monitor and assess patient's chronic conditions and comorbid symptoms for stability, deterioration, or improvement   Collaborate with multidisciplinary team to address chronic and comorbid conditions and prevent exacerbation or deterioration   Update acute care plan with appropriate goals if chronic or comorbid symptoms are exacerbated and prevent overall improvement and discharge     Problem: Pain  Goal: Verbalizes/displays adequate comfort level or baseline comfort level  Outcome: Progressing  Flowsheets  Taken 12/17/2024 0000  Verbalizes/displays adequate comfort level or baseline comfort level:   Encourage patient to monitor pain and request assistance   Administer analgesics based on type and severity of pain and evaluate response   Assess pain using appropriate pain scale   Implement non-pharmacological measures as appropriate and evaluate response   Consider cultural and social influences on pain and pain management  Taken 12/16/2024 2000  Verbalizes/displays adequate comfort level or baseline comfort level:   Encourage patient to monitor pain and request assistance   Administer analgesics based on type and severity of pain and evaluate response   Assess pain using appropriate pain scale   Implement non-pharmacological measures as appropriate and evaluate response     Problem: Safety - Adult  Goal: Free from fall injury  Outcome: Progressing     Problem: Respiratory - Adult  Goal: Achieves optimal ventilation and oxygenation  Outcome: Progressing  Flowsheets (Taken 12/16/2024 2000)  Achieves optimal ventilation and oxygenation:   Assess for changes in mentation and behavior   Assess for  (other measures as available)  Goal: Glucose maintained within prescribed range  Outcome: Progressing  Flowsheets (Taken 12/16/2024 2000)  Glucose maintained within prescribed range:   Assess for signs and symptoms of hyperglycemia and hypoglycemia   Monitor blood glucose as ordered   Administer ordered medications to maintain glucose within target range   Instruct patient on self management of diabetes and initiate consult as needed   Assess barriers to adequate nutritional intake and initiate nutrition consult as needed     Problem: Neurosensory - Adult  Goal: Achieves stable or improved neurological status  Outcome: Progressing  Flowsheets (Taken 12/16/2024 2000)  Achieves stable or improved neurological status:   Assess for and report changes in neurological status   Maintain blood pressure and fluid volume within ordered parameters to optimize cerebral perfusion and minimize risk of hemorrhage   Initiate measures to prevent increased intracranial pressure   Monitor temperature, glucose, and sodium. Initiate appropriate interventions as ordered     Problem: Skin/Tissue Integrity - Adult  Goal: Skin integrity remains intact  Outcome: Progressing  Flowsheets (Taken 12/16/2024 2000)  Skin Integrity Remains Intact:   Monitor for areas of redness and/or skin breakdown   Assess vascular access sites hourly   Every 4-6 hours minimum: Change oxygen saturation probe site  Goal: Incisions, wounds, or drain sites healing without S/S of infection  Outcome: Progressing  Flowsheets (Taken 12/16/2024 2000)  Incisions, Wounds, or Drain Sites Healing Without Sign and Symptoms of Infection:   ADMISSION and DAILY: Assess and document risk factors for pressure ulcer development   TWICE DAILY: Assess and document skin integrity   Implement wound care per orders   Initiate pressure ulcer prevention bundle as indicated     Problem: Musculoskeletal - Adult  Goal: Return mobility to safest level of function  Outcome:

## 2024-12-17 NOTE — PROGRESS NOTES
[facility]   Daily Progress Note      Admit Date:  12/11/2024      Subjective:   Mr. Vargas is 59 y.o. male with a past medical history significant for paroxysmal atrial fibrillation,  PAD and chronic systolic CHF with LVEF 15-20% who presented to [facility] with dizziness and elevated blood pressure in the 200's systolic. He had been admitted in October 2024 with acute on chronic systolic CHF and discharged 10/29/24.I was asked to see him for atrial fibrillation with RVR. He was on amiodarone 200mg daily and Eliquis 5mg bid at home. His CHF regimen includes Toprol XL 25mg daily, Entresto 49/51mg bid and torsemide 50mg daily with Imdur 15mg daily. He follows with Dr. Rosa at  Cardiology.     Levy states that a week ago he had trouble getting up from the toilet. He says that he was weak. He says his breathing was not great but it was not the shortness of breath that kept him from getting up.      He denies any chest pain or tightness.     He was placed on a amiodarone drip in the ED for his rapid atrial fibrillation.  He was having decreased urine output and some hypotension yesterday.  He was placed on the milrinone drip as well as IV Lasix.    Interval history:  Levy is to be extubated later this morning. He is on CRRT and fluid being removed at 25mL/hr. He remains on milrinone at 0.03mcg/kg/min and vasopressin.  Levy shakes his head \"no\" when I ask him if he still wants us to do everything for him.   A-fib with rates in the 110's on telemetry.  Amiodarone drip running at 0.5mg/hr.       Objective:     BP 93/60   Pulse (!) 137   Temp 99 °F (37.2 °C) (Bladder)   Resp 15   Ht 1.854 m (6' 1\")   Wt 134.5 kg (296 lb 8.3 oz)   SpO2 92%   BMI 39.12 kg/m²      Intake/Output Summary (Last 24 hours) at 12/17/2024 1308  Last data filed at 12/17/2024 1300  Gross per 24 hour   Intake 2224.97 ml   Output 2781 ml   Net -556.03 ml       Physical Exam:  General: sedated  Skin:  Warm and dry  Neck:

## 2024-12-17 NOTE — PROGRESS NOTES
Pulmonary Progress Note    Date of Admission: 12/11/2024   LOS: 6 days     CC:  Chief Complaint   Patient presents with    Hypertension     Pt states he has BP of 210 systolic at home.      Dizziness     Pt complains of being lightheaded today.  Onset for \"some time\"   pt denies chest pain, denies shortness of breath           Assessment/Plan   Mechanical ventilation  -Full vent support, Daily SAT/SBT    Cardiogenic shock,  Cardiomyopathy, LVEF 15%  -Currently on fixed dose milrinone  -Titrate vasopressors goal MAP 65    KENDRA requiring RRT  -CRRT per nephrology    Upper GI bleed  -Eliquis on hold  -Protonix twice daily  -Monitor serial CBC, transfuse PRBCs for hemoglobin less than 7  -GI consulted    Due to the immediate potential for life-threatening deterioration due to cardiogenic shock and respiratory failure, I spent 31 minutes providing critical care.  This time is excluding time spent performing separately billable procedures.    HPI/Subjective  No events overnight.  On vent    ROS:   Unable to obtain due to mechanical ventilation      Intake/Output Summary (Last 24 hours) at 12/17/2024 0916  Last data filed at 12/17/2024 0900  Gross per 24 hour   Intake 2159.92 ml   Output 2981 ml   Net -821.08 ml         PHYSICAL EXAM:   Blood pressure 93/60, pulse (!) 116, temperature 98.5 °F (36.9 °C), temperature source Bladder, resp. rate 18, height 1.854 m (6' 1\"), weight 134.5 kg (296 lb 8.3 oz), SpO2 93%.'  Gen:  No acute distress.   Resp:  No crackles. No wheezes. No rhonchi. No dullness on percussion.  CV: Regular rate. Regular rhythm. No murmur or rub. No edema.   M/S: No cyanosis. No clubbing.    Neuro: Intubated and sedated          Labs reviewed:  CBC:   Recent Labs     12/15/24  0508 12/15/24  1200 12/16/24  0625 12/16/24  1815 12/17/24  0612   WBC 7.5  --  8.3  --  7.7   HGB 10.2*   < > 10.2* 10.2* 9.5*   HCT 31.3*   < > 31.3* 32.3* 29.6*   MCV 86.4  --  86.7  --  86.6     --  158  --  146    < > =

## 2024-12-17 NOTE — PROGRESS NOTES
Clinical Pharmacy Note  Heparin Dosing       Lab Results   Component Value Date/Time    ANTIXAUHEP 0.18 12/17/2024 01:13 AM      Lab Results   Component Value Date/Time    HGB 10.2 12/16/2024 06:15 PM    HCT 32.3 12/16/2024 06:15 PM     12/16/2024 06:25 AM    INR 1.70 12/12/2024 03:59 AM       Current Infusion Rate: 1270 units/hr    Plan:  Bolus: 2000 units  Rate: increase to 1540 units/hr  Next anti-Xa level: 0800 12/17/24    Pharmacy will continue to monitor and adjust based on anti-Xa results.  Mateo Bob, PharmD

## 2024-12-17 NOTE — PROGRESS NOTES
Clinical Pharmacy Note  Heparin Dosing       Lab Results   Component Value Date/Time    ANTIXAUHEP 0.42 12/17/2024 08:09 AM      Lab Results   Component Value Date/Time    HGB 9.5 12/17/2024 06:12 AM    HCT 29.6 12/17/2024 06:12 AM     12/17/2024 06:12 AM    INR 1.70 12/12/2024 03:59 AM       Current Infusion Rate: 1540 units/hr    Plan:  Rate: continue at 1540 units/hr  Next anti-Xa level: 1400 12/17/24    Pharmacy will continue to monitor and adjust based on anti-Xa results.  Isabela Finch RPH, PharmD, 12/17/2024 9:00 AM

## 2024-12-17 NOTE — PROGRESS NOTES
V2.0    [facility] [place]      Name:  Levy Vargas /Age/Sex: 1965  (59 y.o. male)   MRN & CSN:  [id number] & [id number] Encounter Date/Time: 2024 8:54 AM EST   Location:  [place] [address] PCP: No primary care provider on file.     Attending:Geovani Blanchard MD       Hospital Day: 7    Assessment and Recommendations   Levy Vargas is a 59 y.o. male who presents with Cardiogenic shock      Plan:   Cardiogenic shock, with cardiomyopathy with EF of 15%, continue to be intubated cardiology following  Acute on chronic congestive heart failure cardiology following, continue to be on milrinone drip at this time  A-fib with RVR patient on amiodarone drip Eliquis on hold and patient started on heparin drip per cardiology.  Monitor closely hemoglobin noted  KENDRA, nephrology consulted continue to be on CRRT  Anemia, hemoglobin 9.5 down from 10.2 yesterday relatively stable  Coffee-ground emesis GI consulted Protonix GI consulted no immediate plan for EGD at this time  Acute respiratory failure currently intubated    Total critical care time including but not limited to ordering and following on critical labs ordering critical IV medication in a patient with potential life-threatening complications 35 minutes  Diet Diet NPO   DVT Prophylaxis [] Lovenox, []  Heparin, [] SCDs, [] Ambulation,  [] Eliquis, [] Xarelto  [] Coumadin   Code Status Full Code             Personally reviewed Lab Studies and Imaging     Discussed management of the case with intensivist who recommended keep intubated    Drugs that require monitoring for toxicity include heparin and the method of monitoring was hemoglobin to assure no toxicity as bleeding    Medical Decision Making:  The following items were considered in medical decision making:  Discussion of patient care with other providers  Reviewed clinical lab tests  Reviewed radiology tests  Reviewed other diagnostic tests/interventions  Independent review of radiologic  abdomen.  Left jugular central venous catheter appears to terminate in the jugular vein.  Right jugular central venous catheter terminates in the inferior right atrium.  Transvenous pacer in place.  Right upper lobe opacity partially related to rotation of the patient.  No pneumothorax.  No pleural effusion.     Mild right upper lobe volume loss, majority of opacity in the right upper lobe is related to rotation of the patient Endotracheal tube approximately 6 cm above the juan a Right jugular central venous catheter terminates in the inferior right atrium     XR CHEST PORTABLE    Result Date: 12/11/2024  EXAMINATION: ONE XRAY VIEW OF THE CHEST 12/11/2024 7:18 pm COMPARISON: 10/18/2024 HISTORY: ORDERING SYSTEM PROVIDED HISTORY: Pain TECHNOLOGIST PROVIDED HISTORY: Reason for exam:->Pain Reason for Exam: dizzy, high blood pressure FINDINGS: Transvenous pacer remains in place. The lungs are without acute focal process.  There is no effusion or pneumothorax. The cardiomediastinal silhouette is stable. The osseous structures are stable.     No acute process. Stable cardiomegaly       CBC:   Recent Labs     12/15/24  0508 12/15/24  1200 12/16/24  0625 12/16/24  1815 12/17/24  0612   WBC 7.5  --  8.3  --  7.7   HGB 10.2*   < > 10.2* 10.2* 9.5*     --  158  --  146    < > = values in this interval not displayed.     BMP:    Recent Labs     12/16/24  0625 12/16/24  1815 12/17/24  0612   * 135* 131*   K 4.4  4.4 4.3 3.9   CL 95* 99 99   CO2 25 25 24   BUN 34* 32* 25*   CREATININE 1.6* 1.4* 1.1   GLUCOSE 168* 161* 153*     Hepatic: No results for input(s): \"AST\", \"ALT\", \"BILITOT\", \"ALKPHOS\" in the last 72 hours.    Invalid input(s): \"ALB\"  Lipids:   Lab Results   Component Value Date/Time    CHOL 79 10/19/2024 03:13 AM    HDL 17 10/19/2024 03:13 AM    TRIG 51 10/19/2024 03:13 AM     Hemoglobin A1C:   Lab Results   Component Value Date/Time    LABA1C 6.3 10/19/2024 03:13 AM     TSH:   Lab Results   Component Value

## 2024-12-17 NOTE — PROGRESS NOTES
Clinical Pharmacy Note  Heparin Dosing       Lab Results   Component Value Date/Time    ANTIXAUHEP 0.40 12/17/2024 03:30 PM      Lab Results   Component Value Date/Time    HGB 9.5 12/17/2024 06:12 AM    HCT 29.6 12/17/2024 06:12 AM     12/17/2024 06:12 AM    INR 1.70 12/12/2024 03:59 AM       Current Infusion Rate: 1540 units/hr    Plan:  Rate: continue at 1540 units/hr  Next anti-Xa level: 0600 12/18/24    Pharmacy will continue to monitor and adjust based on anti-Xa results.  Franec Lyon [facility],12/17/2024,5:33 PM

## 2024-12-17 NOTE — PLAN OF CARE
Problem: Chronic Conditions and Co-morbidities  Goal: Patient's chronic conditions and co-morbidity symptoms are monitored and maintained or improved  12/17/2024 1154 by Leticia Morales RN  Outcome: Progressing  12/17/2024 0026 by Melissa Pollard RN  Outcome: Progressing  Flowsheets (Taken 12/16/2024 2000)  Care Plan - Patient's Chronic Conditions and Co-Morbidity Symptoms are Monitored and Maintained or Improved:   Monitor and assess patient's chronic conditions and comorbid symptoms for stability, deterioration, or improvement   Collaborate with multidisciplinary team to address chronic and comorbid conditions and prevent exacerbation or deterioration   Update acute care plan with appropriate goals if chronic or comorbid symptoms are exacerbated and prevent overall improvement and discharge     Problem: Pain  Goal: Verbalizes/displays adequate comfort level or baseline comfort level  12/17/2024 1154 by Leticia Morales RN  Outcome: Progressing  12/17/2024 0026 by Melissa Pollard RN  Outcome: Progressing  Flowsheets  Taken 12/17/2024 0000  Verbalizes/displays adequate comfort level or baseline comfort level:   Encourage patient to monitor pain and request assistance   Administer analgesics based on type and severity of pain and evaluate response   Assess pain using appropriate pain scale   Implement non-pharmacological measures as appropriate and evaluate response   Consider cultural and social influences on pain and pain management  Taken 12/16/2024 2000  Verbalizes/displays adequate comfort level or baseline comfort level:   Encourage patient to monitor pain and request assistance   Administer analgesics based on type and severity of pain and evaluate response   Assess pain using appropriate pain scale   Implement non-pharmacological measures as appropriate and evaluate response     Problem: Safety - Adult  Goal: Free from fall injury  12/17/2024 1154 by Leticia Morales RN  Outcome: Progressing  12/17/2024

## 2024-12-17 NOTE — PROGRESS NOTES
Attempted to call Pt's daughter Ian Wilson but LM. Called other daughter Sammi Wilson and provided updates and informed that he was extubate.

## 2024-12-17 NOTE — PROGRESS NOTES
NAME:  Levy Vargas  YOB: 1965  MEDICAL RECORD NUMBER:  [id number]    Shift Summary: SAT/SBT. Extubated to 4 L NC. A&O x3. Pt unsure of situation. Pt's HR in 130-140's. Amio gtt off and po given through NG. Attempted to wean Vaso gtt, MAP dropped below goal of 65. Turned back on and MAP maintained above 65. CRRT continued with no complications.    Family updated: Yes:  at bedside. Both daughters and Pt's brothers    Rhythm: Atrial Fibrillation RVR    Most recent vitals:   Visit Vitals  /66   Pulse (!) 130   Temp 99.6 °F (37.6 °C) (Bladder)   Resp 16   Ht 1.854 m (6' 1\")   Wt 134.5 kg (296 lb 8.3 oz)   SpO2 92%   BMI 39.12 kg/m²      ABP (Arterial line BP): 95/54  ABP Mean (Arterial Line Mean): 67 mmHg        Respiratory support needed (if any):  - O2 - NC - 2 lpm    Admission weight Weight - Scale: (!) 145.4 kg (320 lb 8.8 oz) (12/11/24 2306)    Today's weight    Wt Readings from Last 1 Encounters:   12/17/24 134.5 kg (296 lb 8.3 oz)        Castanon need assessed each shift: YES -  - continue castanon r/t - strict I&O CRRT  UOP >30ml/hr: NO - NEPHROLOGY AWARE  Last documented BM (in last 48 hrs):  Patient Vitals for the past 48 hrs:   Last BM (including prior to admit)   12/16/24 0800 12/16/24                Restraints (in use currently or dc'd in last 12 hrs): Yes    Order current and documentation up to date? Yes    Lines/Drains reviewed @ bedside.  CVC  12/13/24 Left Internal jugular (Active)   Number of days: 4       Peripheral IV 12/11/24 Posterior;Right Hand (Active)   Number of days: 5       Peripheral IV 12/11/24 Left;Posterior Hand (Active)   Number of days: 5       Arterial Line 12/13/24 Left Radial (Active)   Number of days: 4       Hemodialysis Central Access - Temporary Right Neck (Active)   Number of days: 4       Urinary Catheter 12/12/24 Castanon-Temperature (Active)   Number of days: 5         Drip rates at handoff:    heparin (PORCINE) Infusion 1,540 Units/hr (12/17/24 1700)

## 2024-12-17 NOTE — PROGRESS NOTES
Pt was changed to spontaneous on ventilator by RT. Obtained a ABG via arterial line. CC Dr Major notified via phone of ABG results and verbal order to extubate. This RN notified RT.

## 2024-12-17 NOTE — PROGRESS NOTES
Narcotic Waste Documentation    Administered 30 mL of 100 mL and wasted 70 mL per [facility] policy with secondary RN witness. Electronically signed by Leticia Morales RN on 12/17/2024 at 4:39 PM   Electronically signed by Rosana Rodrigues RN on 12/17/2024 at 7:40 PM

## 2024-12-17 NOTE — PROGRESS NOTES
Nephrology Progress Note   [facility]      Reason for consultation:  KENDRA on CKD 2 -- baseline Cr ~ 0.9-1.3 mg/dL     HPI: Levy Vargas is a 60 yo male with a PMHx of CKD 2, h/o AKIs, HFrEF, non-compliance, DM, HTN, afib, h/o cocaine use, CAD, HLD. Patient presents to  ED on 12/11/2024 with complaints of BLE edema, lightheadedness, and weakness. Patient is currently drowsy and it is difficult to obtain history. Currently oriented x3. Patient tells me he has been taking his CHF medications everyday. Per chart review, there has been some questions regarding his outpatient compliance and not keeping in touch with his CHF nurses. Pt is currently grossly volume overloaded. He weighed 131.5 kg on 9/25/2024 during his last cardiology appt. He currently weighs 145.3 kg. Currently dyspneic and on 4 L NC. CXR is negative for acute findings. In afib RVR this admission. On amio gtt.     We have been consulted for KENDRA on CKD 2 management. Cr upon admission was 1.3 mg/dL. Today, Cr is 2.6 mg/dL. Patient has received a generous amount of IVP and oral diuretic yesterday and was also started on a lasix gtt at 10 mg/hr. Despite this, pt has remained anuric with 150 mL UOP/24 hrs and 25 mL UOP documented today. BP has been as low as 63/38 mmHg since admission. Started on milrinone gtt yesterday.     Subjective:    The patient has been seen and examined. Labs and chart reviewed. Remains intubated on 40% FiO2. Currently on milrinone @ 0.2 mcg/kg/min and vasopressin 0.03 units/min. Levo off since yesterday. Tolerating CRRT with net 25 mL/hr. 287 mL UOP/24 hrs. Net negative 873 mL yesterday.     There were not complications overnight.    Patient review of systems: YESICA -- intubated       Allergies:  No Known Allergies     Scheduled Meds:   cefepime  2,000 mg IntraVENous Q8H    pantoprazole (PROTONIX) 40 mg in sodium chloride (PF) 0.9 % 10 mL injection  40 mg IntraVENous Q12H    hydrocortisone sodium succinate PF  50 mg IntraVENous

## 2024-12-17 NOTE — PROGRESS NOTES
[facility]   Respiratory Therapy        Extubation Assessment        Name:  Levy Vargas  Medical Record Number:  [id number]  Age: 59 y.o.   Gender: male  : 1965  Today's Date:  2024  Room:  Z6M-6642/1310-01      Assessment       Patient Admission Diagnosis      Allergies  No Known Allergies         BP 93/60   Pulse (!) 131   Temp 99 °F (37.2 °C) (Bladder)   Resp 16   Ht 1.854 m (6' 1\")   Wt 134.5 kg (296 lb 8.3 oz)   SpO2 97%   BMI 39.12 kg/m²       The patient's spontaneous breathing trial results were reviewed with  MD.    The order was received to extubate the patient.  The patient's sedation was turned off by the bedside nurse.  The patient was extubated at 12:35 and placed on  Nasal Cannula at 4 lpm . The patient was stable at the time of extubation. Stridor was not present      Patient/caregiver was educated on the extubation process:  Yes      Level of patient/caregiver understanding able to:   [] Verbalize understanding   [] Demonstrate understanding       [] Teach back        [] Needs reinforcement       []  No available caregiver               []  Other:       Response to education:  Fair       Teaching Time:  10  minutes         Pee Naranjo RCP on 2024 at 12:54 PM

## 2024-12-17 NOTE — PROGRESS NOTES
Pt extubated  @ 1235 with RT at bedside. Placed on 4 L NC. Pt tolerated procedure well and able to cough up thick white/clear secretions

## 2024-12-17 NOTE — PROGRESS NOTES
[place]  Palliative Care   Progress Note    NAME:  Levy Vargas  MEDICAL RECORD NUMBER:  [id number]  AGE: 59 y.o.   GENDER: male  : 1965  TODAY'S DATE:  2024    Subjective: Off of vent, waking up, still drowsy.     Objective:    Lab Results   Component Value Date    WBC 7.7 2024    HGB 9.5 (L) 2024    HCT 29.6 (L) 2024    MCV 86.6 2024     2024     Lab Results   Component Value Date    CREATININE 1.1 2024    BUN 25 (H) 2024     (L) 2024    K 3.9 2024    CL 99 2024    CO2 24 2024     Lab Results   Component Value Date    ALT 45 (H) 10/19/2024    AST 72 (H) 10/19/2024    ALKPHOS 131 (H) 10/19/2024    BILITOT 2.0 (H) 10/19/2024       Plan: daughter's present, he tells his daughter's he wants to live not die, we have discussed is it all dependent on how he continues to recover from kidney standpoint and weaning of meds. Daughter's want to do what he wants, when his daughter's are present he states he wants to live and not die.       Code Status: Full Code  Discharge Environment:  [] Hospice Consult Agency:  [] Inpatient Hospice    [] Home with Hospice Care   [] ECF with Hospice  [] ECF skilled care with Hospice to follow   [] Other:    Teaching Time:  0hours  30 min     I will continue to follow Mr. Vargas's care as needed.      Thank you for allowing me to participate in the care of Mr. Vargas .     Electronically signed by Radha Weems, RN, BSN,CHPN on 2024 at 2:53 PM  Palliative Care Nurse [place]  Office: [phone]

## 2024-12-17 NOTE — PROGRESS NOTES
NAME:  Levy Vargas  YOB: 1965  MEDICAL RECORD NUMBER:  [id number]    Shift Summary: Followed commands in BLE with decreased sedation. Continues with non-purposeful movement in BUE. Unable to completely wean sedation d/t increased HR to 145 and decreased vent compliance. Levo is off, vaso is still at 0.03, didn't tolerate lower dose    Family updated: No    Rhythm: Atrial Fibrillation     Most recent vitals:   Visit Vitals  BP 93/60   Pulse (!) 101   Temp 98.1 °F (36.7 °C) (Bladder)   Resp 18   Ht 1.854 m (6' 1\")   Wt 134.5 kg (296 lb 8.3 oz)   SpO2 (!) 87%   BMI 39.12 kg/m²      ABP (Arterial line BP): 90/56  ABP Mean (Arterial Line Mean): 67 mmHg    No data found.    No data found.      Respiratory support needed (if any):  - Ventilator Settings:  Vent Days 5    Vt (Set, mL): 0 mL  Resp Rate (Set): 18 bpm  FiO2 : 40 %    PEEP/CPAP (cmH2O): 5       Admission weight Weight - Scale: (!) 145.4 kg (320 lb 8.8 oz) (12/11/24 2306)    Today's weight    Wt Readings from Last 1 Encounters:   12/17/24 134.5 kg (296 lb 8.3 oz)        Castanon need assessed each shift: YES -  - continue castanon r/t - strict I&O,CRRT  UOP >30ml/hr: NO - CRRT  Last documented BM (in last 48 hrs):  Patient Vitals for the past 48 hrs:   Last BM (including prior to admit)   12/15/24 0800 12/15/24   12/16/24 0800 12/16/24                Restraints (in use currently or dc'd in last 12 hrs): Yes    Order current and documentation up to date? Yes    Lines/Drains reviewed @ bedside.  CVC  12/13/24 Left Internal jugular (Active)   Number of days: 3       Peripheral IV 12/11/24 Posterior;Right Hand (Active)   Number of days: 5       Peripheral IV 12/11/24 Left;Posterior Hand (Active)   Number of days: 5       Arterial Line 12/13/24 Left Radial (Active)   Number of days: 3       Hemodialysis Central Access - Temporary Right Neck (Active)   Number of days: 3       Urinary Catheter 12/12/24 Castanon-Temperature (Active)   Number of days: 4

## 2024-12-18 ENCOUNTER — APPOINTMENT (OUTPATIENT)
Dept: GENERAL RADIOLOGY | Age: 59
End: 2024-12-18
Payer: COMMERCIAL

## 2024-12-18 LAB
ALBUMIN SERPL-MCNC: 2.7 G/DL (ref 3.4–5)
ALBUMIN SERPL-MCNC: 3.2 G/DL (ref 3.4–5)
ANION GAP SERPL CALCULATED.3IONS-SCNC: 8 MMOL/L (ref 3–16)
ANION GAP SERPL CALCULATED.3IONS-SCNC: 9 MMOL/L (ref 3–16)
ANTI-XA UNFRAC HEPARIN: 0.39 IU/ML (ref 0.3–0.7)
BASE EXCESS BLDA CALC-SCNC: 2 MMOL/L (ref -3–3)
BASE EXCESS BLDA CALC-SCNC: 4 MMOL/L (ref -3–3)
BUN SERPL-MCNC: 15 MG/DL (ref 7–20)
BUN SERPL-MCNC: 21 MG/DL (ref 7–20)
CA-I BLD-SCNC: 1.22 MMOL/L (ref 1.12–1.32)
CA-I BLD-SCNC: 1.24 MMOL/L (ref 1.12–1.32)
CA-I BLD-SCNC: ABNORMAL MMOL/L (ref 1.12–1.32)
CALCIUM SERPL-MCNC: 7.1 MG/DL (ref 8.3–10.6)
CALCIUM SERPL-MCNC: 8.3 MG/DL (ref 8.3–10.6)
CHLORIDE SERPL-SCNC: 101 MMOL/L (ref 99–110)
CHLORIDE SERPL-SCNC: 110 MMOL/L (ref 99–110)
CO2 BLDA-SCNC: 28 MMOL/L
CO2 BLDA-SCNC: 30 MMOL/L
CO2 SERPL-SCNC: 21 MMOL/L (ref 21–32)
CO2 SERPL-SCNC: 25 MMOL/L (ref 21–32)
CREAT SERPL-MCNC: 0.9 MG/DL (ref 0.8–1.3)
CREAT SERPL-MCNC: 1.2 MG/DL (ref 0.8–1.3)
DEPRECATED RDW RBC AUTO: 18.8 % (ref 12.4–15.4)
GFR SERPLBLD CREATININE-BSD FMLA CKD-EPI: 69 ML/MIN/{1.73_M2}
GFR SERPLBLD CREATININE-BSD FMLA CKD-EPI: >90 ML/MIN/{1.73_M2}
GLUCOSE BLD-MCNC: 112 MG/DL (ref 70–99)
GLUCOSE BLD-MCNC: 139 MG/DL (ref 70–99)
GLUCOSE BLD-MCNC: 149 MG/DL (ref 70–99)
GLUCOSE BLD-MCNC: 97 MG/DL (ref 70–99)
GLUCOSE SERPL-MCNC: 127 MG/DL (ref 70–99)
GLUCOSE SERPL-MCNC: 83 MG/DL (ref 70–99)
HCO3 BLDA-SCNC: 26.7 MMOL/L (ref 21–29)
HCO3 BLDA-SCNC: 28.2 MMOL/L (ref 21–29)
HCT VFR BLD AUTO: 30.7 % (ref 40.5–52.5)
HGB BLD-MCNC: 9.6 G/DL (ref 13.5–17.5)
MAGNESIUM SERPL-MCNC: 2.13 MG/DL (ref 1.8–2.4)
MAGNESIUM SERPL-MCNC: 2.55 MG/DL (ref 1.8–2.4)
MCH RBC QN AUTO: 27.7 PG (ref 26–34)
MCHC RBC AUTO-ENTMCNC: 31.4 G/DL (ref 31–36)
MCV RBC AUTO: 88 FL (ref 80–100)
PCO2 BLDA: 41.2 MM HG (ref 35–45)
PCO2 BLDA: 42.3 MM HG (ref 35–45)
PERFORMED ON: ABNORMAL
PERFORMED ON: NORMAL
PERFORMED ON: NORMAL
PH BLDA: 7.42 [PH] (ref 7.35–7.45)
PH BLDA: 7.43 [PH] (ref 7.35–7.45)
PH BLDV: ABNORMAL [PH] (ref 7.35–7.45)
PHOSPHATE SERPL-MCNC: 1.4 MG/DL (ref 2.5–4.9)
PHOSPHATE SERPL-MCNC: 1.9 MG/DL (ref 2.5–4.9)
PLATELET # BLD AUTO: 155 K/UL (ref 135–450)
PMV BLD AUTO: 8.7 FL (ref 5–10.5)
PO2 BLDA: 70.2 MM HG (ref 75–108)
PO2 BLDA: 73.8 MM HG (ref 75–108)
POC SAMPLE TYPE: ABNORMAL
POC SAMPLE TYPE: ABNORMAL
POC SAMPLE TYPE: NORMAL
POTASSIUM BLD-SCNC: 4.1 MMOL/L (ref 3.5–5.1)
POTASSIUM SERPL-SCNC: 3.4 MMOL/L (ref 3.5–5.1)
POTASSIUM SERPL-SCNC: 4.1 MMOL/L (ref 3.5–5.1)
RBC # BLD AUTO: 3.48 M/UL (ref 4.2–5.9)
SAO2 % BLDA: 94 % (ref 93–100)
SAO2 % BLDA: 95 % (ref 93–100)
SODIUM BLD-SCNC: 139 MMOL/L (ref 136–145)
SODIUM SERPL-SCNC: 135 MMOL/L (ref 136–145)
SODIUM SERPL-SCNC: 139 MMOL/L (ref 136–145)
TRIGL SERPL-MCNC: 68 MG/DL (ref 0–150)
WBC # BLD AUTO: 7.8 K/UL (ref 4–11)

## 2024-12-18 PROCEDURE — 2580000003 HC RX 258: Performed by: NURSE PRACTITIONER

## 2024-12-18 PROCEDURE — 6360000002 HC RX W HCPCS: Performed by: INTERNAL MEDICINE

## 2024-12-18 PROCEDURE — 6370000000 HC RX 637 (ALT 250 FOR IP)

## 2024-12-18 PROCEDURE — 94761 N-INVAS EAR/PLS OXIMETRY MLT: CPT

## 2024-12-18 PROCEDURE — 2500000003 HC RX 250 WO HCPCS: Performed by: HOSPITALIST

## 2024-12-18 PROCEDURE — 99291 CRITICAL CARE FIRST HOUR: CPT | Performed by: INTERNAL MEDICINE

## 2024-12-18 PROCEDURE — 37799 UNLISTED PX VASCULAR SURGERY: CPT

## 2024-12-18 PROCEDURE — P9047 ALBUMIN (HUMAN), 25%, 50ML: HCPCS

## 2024-12-18 PROCEDURE — 80069 RENAL FUNCTION PANEL: CPT

## 2024-12-18 PROCEDURE — 82947 ASSAY GLUCOSE BLOOD QUANT: CPT

## 2024-12-18 PROCEDURE — 6360000002 HC RX W HCPCS: Performed by: NURSE PRACTITIONER

## 2024-12-18 PROCEDURE — 84295 ASSAY OF SERUM SODIUM: CPT

## 2024-12-18 PROCEDURE — 85027 COMPLETE CBC AUTOMATED: CPT

## 2024-12-18 PROCEDURE — APPNB15 APP NON BILLABLE TIME 0-15 MINS: Performed by: NURSE PRACTITIONER

## 2024-12-18 PROCEDURE — 6360000002 HC RX W HCPCS

## 2024-12-18 PROCEDURE — 2580000003 HC RX 258

## 2024-12-18 PROCEDURE — 90945 DIALYSIS ONE EVALUATION: CPT

## 2024-12-18 PROCEDURE — 36592 COLLECT BLOOD FROM PICC: CPT

## 2024-12-18 PROCEDURE — 84478 ASSAY OF TRIGLYCERIDES: CPT

## 2024-12-18 PROCEDURE — 84132 ASSAY OF SERUM POTASSIUM: CPT

## 2024-12-18 PROCEDURE — 2500000003 HC RX 250 WO HCPCS: Performed by: FAMILY MEDICINE

## 2024-12-18 PROCEDURE — 2580000003 HC RX 258: Performed by: INTERNAL MEDICINE

## 2024-12-18 PROCEDURE — 6360000002 HC RX W HCPCS: Performed by: FAMILY MEDICINE

## 2024-12-18 PROCEDURE — 6360000002 HC RX W HCPCS: Performed by: HOSPITALIST

## 2024-12-18 PROCEDURE — 2100000000 HC CCU R&B

## 2024-12-18 PROCEDURE — 2700000000 HC OXYGEN THERAPY PER DAY

## 2024-12-18 PROCEDURE — 2500000003 HC RX 250 WO HCPCS

## 2024-12-18 PROCEDURE — 93005 ELECTROCARDIOGRAM TRACING: CPT | Performed by: NURSE PRACTITIONER

## 2024-12-18 PROCEDURE — 85520 HEPARIN ASSAY: CPT

## 2024-12-18 PROCEDURE — 2500000003 HC RX 250 WO HCPCS: Performed by: INTERNAL MEDICINE

## 2024-12-18 PROCEDURE — 6370000000 HC RX 637 (ALT 250 FOR IP): Performed by: INTERNAL MEDICINE

## 2024-12-18 PROCEDURE — 92610 EVALUATE SWALLOWING FUNCTION: CPT

## 2024-12-18 PROCEDURE — 82803 BLOOD GASES ANY COMBINATION: CPT

## 2024-12-18 PROCEDURE — 71045 X-RAY EXAM CHEST 1 VIEW: CPT

## 2024-12-18 PROCEDURE — 83735 ASSAY OF MAGNESIUM: CPT

## 2024-12-18 PROCEDURE — 82330 ASSAY OF CALCIUM: CPT

## 2024-12-18 RX ORDER — LORAZEPAM 2 MG/ML
0.5 INJECTION INTRAMUSCULAR ONCE
Status: DISCONTINUED | OUTPATIENT
Start: 2024-12-18 | End: 2024-12-23

## 2024-12-18 RX ORDER — ALBUMIN (HUMAN) 12.5 G/50ML
25 SOLUTION INTRAVENOUS EVERY 8 HOURS
Status: COMPLETED | OUTPATIENT
Start: 2024-12-18 | End: 2024-12-19

## 2024-12-18 RX ORDER — DIPHENHYDRAMINE HYDROCHLORIDE 50 MG/ML
12.5 INJECTION INTRAMUSCULAR; INTRAVENOUS ONCE
Status: COMPLETED | OUTPATIENT
Start: 2024-12-18 | End: 2024-12-18

## 2024-12-18 RX ORDER — DEXMEDETOMIDINE HYDROCHLORIDE 4 UG/ML
.1-1.5 INJECTION, SOLUTION INTRAVENOUS CONTINUOUS
Status: DISCONTINUED | OUTPATIENT
Start: 2024-12-18 | End: 2024-12-29 | Stop reason: HOSPADM

## 2024-12-18 RX ADMIN — VASOPRESSIN 0.03 UNITS/MIN: 0.2 INJECTION INTRAVENOUS at 00:45

## 2024-12-18 RX ADMIN — Medication: at 12:42

## 2024-12-18 RX ADMIN — SODIUM CHLORIDE, PRESERVATIVE FREE 40 MG: 5 INJECTION INTRAVENOUS at 01:49

## 2024-12-18 RX ADMIN — PHENYLEPHRINE HYDROCHLORIDE 30 MCG/MIN: 50 INJECTION INTRAVENOUS at 10:12

## 2024-12-18 RX ADMIN — Medication: at 08:46

## 2024-12-18 RX ADMIN — POTASSIUM CHLORIDE 20 MEQ: 29.8 INJECTION, SOLUTION INTRAVENOUS at 20:37

## 2024-12-18 RX ADMIN — HYDROCORTISONE SODIUM SUCCINATE 50 MG: 100 INJECTION, POWDER, FOR SOLUTION INTRAMUSCULAR; INTRAVENOUS at 08:30

## 2024-12-18 RX ADMIN — Medication: at 10:19

## 2024-12-18 RX ADMIN — ASPIRIN 81 MG 81 MG: 81 TABLET ORAL at 08:31

## 2024-12-18 RX ADMIN — Medication: at 13:02

## 2024-12-18 RX ADMIN — Medication: at 22:48

## 2024-12-18 RX ADMIN — Medication: at 03:37

## 2024-12-18 RX ADMIN — AMIODARONE HYDROCHLORIDE 200 MG: 200 TABLET ORAL at 20:21

## 2024-12-18 RX ADMIN — Medication: at 10:18

## 2024-12-18 RX ADMIN — SODIUM PHOSPHATE, MONOBASIC, MONOHYDRATE AND SODIUM PHOSPHATE, DIBASIC, ANHYDROUS 12 MMOL: 142; 276 INJECTION, SOLUTION INTRAVENOUS at 05:53

## 2024-12-18 RX ADMIN — POTASSIUM CHLORIDE 20 MEQ: 29.8 INJECTION, SOLUTION INTRAVENOUS at 19:32

## 2024-12-18 RX ADMIN — Medication: at 20:33

## 2024-12-18 RX ADMIN — SODIUM CHLORIDE, PRESERVATIVE FREE 10 ML: 5 INJECTION INTRAVENOUS at 08:28

## 2024-12-18 RX ADMIN — Medication: at 18:29

## 2024-12-18 RX ADMIN — CEFEPIME 2000 MG: 2 INJECTION, POWDER, FOR SOLUTION INTRAVENOUS at 01:50

## 2024-12-18 RX ADMIN — ALBUMIN (HUMAN) 25 G: 0.25 INJECTION, SOLUTION INTRAVENOUS at 20:18

## 2024-12-18 RX ADMIN — Medication: at 08:01

## 2024-12-18 RX ADMIN — DEXMEDETOMIDINE HYDROCHLORIDE 0.2 MCG/KG/HR: 400 INJECTION INTRAVENOUS at 22:25

## 2024-12-18 RX ADMIN — Medication: at 02:54

## 2024-12-18 RX ADMIN — CEFEPIME 2000 MG: 2 INJECTION, POWDER, FOR SOLUTION INTRAVENOUS at 17:42

## 2024-12-18 RX ADMIN — SODIUM CHLORIDE, PRESERVATIVE FREE 10 ML: 5 INJECTION INTRAVENOUS at 20:21

## 2024-12-18 RX ADMIN — HYDROCORTISONE SODIUM SUCCINATE 50 MG: 100 INJECTION, POWDER, FOR SOLUTION INTRAMUSCULAR; INTRAVENOUS at 01:49

## 2024-12-18 RX ADMIN — HEPARIN SODIUM 1540 UNITS/HR: 10000 INJECTION, SOLUTION INTRAVENOUS at 03:02

## 2024-12-18 RX ADMIN — Medication: at 00:15

## 2024-12-18 RX ADMIN — ATORVASTATIN CALCIUM 80 MG: 80 TABLET, FILM COATED ORAL at 20:21

## 2024-12-18 RX ADMIN — HYDROCORTISONE SODIUM SUCCINATE 50 MG: 100 INJECTION, POWDER, FOR SOLUTION INTRAMUSCULAR; INTRAVENOUS at 17:45

## 2024-12-18 RX ADMIN — Medication: at 17:54

## 2024-12-18 RX ADMIN — ALBUMIN (HUMAN) 25 G: 0.25 INJECTION, SOLUTION INTRAVENOUS at 14:41

## 2024-12-18 RX ADMIN — MILRINONE LACTATE 0.2 MCG/KG/MIN: 0.2 INJECTION, SOLUTION INTRAVENOUS at 09:57

## 2024-12-18 RX ADMIN — Medication: at 15:08

## 2024-12-18 RX ADMIN — Medication: at 20:16

## 2024-12-18 RX ADMIN — SODIUM PHOSPHATE, MONOBASIC, MONOHYDRATE AND SODIUM PHOSPHATE, DIBASIC, ANHYDROUS 18 MMOL: 142; 276 INJECTION, SOLUTION INTRAVENOUS at 21:59

## 2024-12-18 RX ADMIN — Medication: at 23:37

## 2024-12-18 RX ADMIN — HEPARIN SODIUM 1540 UNITS/HR: 10000 INJECTION, SOLUTION INTRAVENOUS at 20:20

## 2024-12-18 RX ADMIN — Medication: at 00:09

## 2024-12-18 RX ADMIN — AMIODARONE HYDROCHLORIDE 200 MG: 200 TABLET ORAL at 08:31

## 2024-12-18 RX ADMIN — MILRINONE LACTATE 0.2 MCG/KG/MIN: 0.2 INJECTION, SOLUTION INTRAVENOUS at 21:26

## 2024-12-18 RX ADMIN — DIPHENHYDRAMINE HYDROCHLORIDE 12.5 MG: 50 INJECTION INTRAMUSCULAR; INTRAVENOUS at 05:53

## 2024-12-18 RX ADMIN — CEFEPIME 2000 MG: 2 INJECTION, POWDER, FOR SOLUTION INTRAVENOUS at 09:57

## 2024-12-18 ASSESSMENT — PAIN SCALES - GENERAL
PAINLEVEL_OUTOF10: 0

## 2024-12-18 NOTE — PLAN OF CARE
Problem: Pain  Goal: Verbalizes/displays adequate comfort level or baseline comfort level  12/18/2024 0819 by David Colon RN  Outcome: Progressing  Flowsheets (Taken 12/18/2024 0800)  Verbalizes/displays adequate comfort level or baseline comfort level:   Encourage patient to monitor pain and request assistance   Assess pain using appropriate pain scale   Administer analgesics based on type and severity of pain and evaluate response   Implement non-pharmacological measures as appropriate and evaluate response   Consider cultural and social influences on pain and pain management   Notify Licensed Independent Practitioner if interventions unsuccessful or patient reports new pain  12/18/2024 0338 by Avni Avila, RN  Outcome: Progressing  Flowsheets (Taken 12/17/2024 2000)  Verbalizes/displays adequate comfort level or baseline comfort level:   Encourage patient to monitor pain and request assistance   Assess pain using appropriate pain scale   Implement non-pharmacological measures as appropriate and evaluate response   Notify Licensed Independent Practitioner if interventions unsuccessful or patient reports new pain   Consider cultural and social influences on pain and pain management   Administer analgesics based on type and severity of pain and evaluate response     Problem: Safety - Adult  Goal: Free from fall injury  12/18/2024 0819 by David Colon RN  Outcome: Progressing  12/18/2024 0338 by Avni Avila RN  Outcome: Progressing     Problem: Respiratory - Adult  Goal: Achieves optimal ventilation and oxygenation  12/18/2024 0819 by David Colon RN  Outcome: Progressing  Flowsheets (Taken 12/18/2024 0800)  Achieves optimal ventilation and oxygenation:   Assess for changes in respiratory status   Assess for changes in mentation and behavior   Position to facilitate oxygenation and minimize respiratory effort   Oxygen supplementation based on oxygen saturation or arterial blood gases    Practitioner for values outside of normal range   Assess for signs of decreased cardiac output   Administer vasoactive medications as ordered   Administer fluid and/or volume expanders as ordered  Goal: Absence of cardiac dysrhythmias or at baseline  12/18/2024 0819 by David Colon RN  Outcome: Not Progressing  Flowsheets (Taken 12/18/2024 0800)  Absence of cardiac dysrhythmias or at baseline:   Monitor cardiac rate and rhythm   Assess for signs of decreased cardiac output   Administer antiarrhythmia medication and electrolyte replacement as ordered  12/18/2024 0338 by Avni Avila RN  Outcome: Not Progressing  Flowsheets (Taken 12/17/2024 2000)  Absence of cardiac dysrhythmias or at baseline:   Monitor cardiac rate and rhythm   Assess for signs of decreased cardiac output   Administer antiarrhythmia medication and electrolyte replacement as ordered     Problem: Musculoskeletal - Adult  Goal: Return mobility to safest level of function  12/18/2024 0819 by David Colon RN  Outcome: Not Progressing  Flowsheets (Taken 12/18/2024 0800)  Return Mobility to Safest Level of Function:   Assess patient stability and activity tolerance for standing, transferring and ambulating with or without assistive devices   Assist with transfers and ambulation using safe patient handling equipment as needed   Ensure adequate protection for wounds/incisions during mobilization   Obtain physical therapy/occupational therapy consults as needed   Instruct patient/family in ordered activity level  12/18/2024 0338 by Avni Avila, RN  Outcome: Not Progressing  Flowsheets (Taken 12/17/2024 2000)  Return Mobility to Safest Level of Function:   Assess patient stability and activity tolerance for standing, transferring and ambulating with or without assistive devices   Assist with transfers and ambulation using safe patient handling equipment as needed   Ensure adequate protection for wounds/incisions during mobilization

## 2024-12-18 NOTE — CARE COORDINATION
Chart Reviewed.  Pt is extubated, continues with drips,CRRT.  Pt removed NGT but was replaced.  Palliative care following and met with family about goals of care.     Will need PT/OT evals to assist in determining DC plan.    CHLOE Orozco     Case Management   [phone]    12/18/2024  9:03 AM

## 2024-12-18 NOTE — PROGRESS NOTES
NAME:  Levy Vargas  YOB: 1965  MEDICAL RECORD NUMBER:  [id number]    Shift Summary: CRRT end of shift daily total; -542, goal -600    Pt given benadryl x 2 for generalized itching and restlessness. VASO and oxygen attempted to be weaned but unsuccessful. NGT replaced and bridled after pt removed NGT. Xray ordered to confirm placement. Currently @70cm R nare. Pt was fully bathed, linen changed. Pt did not sleep at all throughout night.  12mmol phos replaced     Family updated: Yes:  at bedside    Rhythm:  AFIB RVR     Most recent vitals:   Visit Vitals  /62   Pulse (!) 140   Temp 99.2 °F (37.3 °C)   Resp 13   Ht 1.854 m (6' 1\")   Wt 129.6 kg (285 lb 11.5 oz)   SpO2 97%   BMI 37.70 kg/m²      ABP (Arterial line BP): 108/75  ABP Mean (Arterial Line Mean): 87 mmHg    No data found.    No data found.      Respiratory support needed (if any):  - O2 - NC - 5 lpm    Admission weight Weight - Scale: (!) 145.4 kg (320 lb 8.8 oz) (12/11/24 2306)    Today's weight    Wt Readings from Last 1 Encounters:   12/18/24 129.6 kg (285 lb 11.5 oz)        Castanon need assessed each shift: YES -  - continue castanon r/t - CRRT & strict Io's   UOP >30ml/hr: NO - CRRT   Last documented BM (in last 48 hrs):  Patient Vitals for the past 48 hrs:   Last BM (including prior to admit)   12/16/24 0800 12/16/24 12/17/24 2000 12/17/24                Restraints (in use currently or dc'd in last 12 hrs): No    Order current and documentation up to date? Yes    Lines/Drains reviewed @ bedside.  CVC  12/13/24 Left Internal jugular (Active)   Number of days: 4       Peripheral IV 12/11/24 Posterior;Right Hand (Active)   Number of days: 6       Peripheral IV 12/11/24 Left;Posterior Hand (Active)   Number of days: 6       Arterial Line 12/13/24 Left Radial (Active)   Number of days: 4       Hemodialysis Central Access - Temporary Right Neck (Active)   Number of days: 4       Urinary Catheter 12/12/24 Castanon-Temperature (Active)   Number

## 2024-12-18 NOTE — PROGRESS NOTES
NAME:  Levy Vargas  YOB: 1965  MEDICAL RECORD NUMBER:  [id number]    Shift Summary: Delirious at times, has not slept in days. Intermittently oriented. Failed swallow eval, remain NPO. At one point today, requested palliative and wanted to go home with hospice, but changed his mind 30 minutes later. Continues to ask for food and drinks despite failing swallow evaluation. Offers to change his code status to DNR if that means he can eat and drink, but when asked says he would want to be Full code again right after eating/drinking. Also asking to go outside to smoke. Possible FRANTZ/Cardioversion tomorrow.    Family updated: Yes:  Daughter Ian at bedside    Rhythm: Atrial Fibrillation     Most recent vitals:   Visit Vitals  /65   Pulse (!) 164   Temp 99.2 °F (37.3 °C) (Bladder)   Resp 20   Ht 1.854 m (6' 1\")   Wt 129.6 kg (285 lb 11.5 oz)   SpO2 96%   BMI 37.70 kg/m²      ABP (Arterial line BP): (!) 106/92  ABP Mean (Arterial Line Mean): 93 mmHg    No data found.    No data found.      Respiratory support needed (if any):  - O2 - NC - 9 lpm    Admission weight Weight - Scale: (!) 145.4 kg (320 lb 8.8 oz) (12/11/24 2306)    Today's weight    Wt Readings from Last 1 Encounters:   12/18/24 129.6 kg (285 lb 11.5 oz)        Castanon need assessed each shift: YES -  - continue castanon r/t - retention  UOP >30ml/hr: NO - CRRT  Last documented BM (in last 48 hrs):  Patient Vitals for the past 48 hrs:   Last BM (including prior to admit)   12/17/24 2000 12/17/24 12/18/24 0800 12/18/24                Restraints (in use currently or dc'd in last 12 hrs): No    Order current and documentation up to date? No    Lines/Drains reviewed @ bedside.  CVC  12/13/24 Left Internal jugular (Active)   Number of days: 5       Peripheral IV 12/11/24 Posterior;Right Hand (Active)   Number of days: 6       Peripheral IV 12/11/24 Left;Posterior Hand (Active)   Number of days: 6       Arterial Line 12/13/24 Left Radial  RN    Wound Care Orders initiated by RN: No       Hudson Prevention initiated by RN: Yes    Pressure Injury (Stage 3,4, Unstageable, DTI, NWPT, and Complex wounds) if present, place Wound referral order by RN under : No    New Ostomies, if present place, Ostomy referral order under : No     Nurse 1 eSignature: Electronically signed by David Colon RN on 12/18/24 at 5:23 PM EST    **SHARE this note so that the co-signing nurse can place an eSignature**    Nurse 2 eSignature: Electronically signed by Babs Diaz RN on 12/18/24 at 10:54 PM EST

## 2024-12-18 NOTE — PROGRESS NOTES
40 mg IntraVENous Q12H    hydrocortisone sodium succinate PF  50 mg IntraVENous Q8H    [Held by provider] spironolactone  25 mg Oral Daily    [Held by provider] gabapentin  300 mg Oral Nightly    dilTIAZem  10 mg IntraVENous Once    [Held by provider] apixaban  5 mg Oral BID    aspirin  81 mg Oral Daily    atorvastatin  80 mg Oral Nightly    [Held by provider] metoprolol succinate  25 mg Oral QPM    [Held by provider] sacubitril-valsartan  1 tablet Oral BID    sodium chloride flush  5-40 mL IntraVENous 2 times per day        phenylephrine (DL-SYNEPHRINE) 50 mg in sodium chloride 0.9 % 250 mL infusion 30 mcg/min (24 1012)    heparin (PORCINE) Infusion 1,540 Units/hr (24 1000)    prismaSATE BGK 4/2.5 2,000 mL/hr at 24 1018    prismaSATE BGK 4/2.5 500 mL/hr at 24 1019    prismaSATE BGK 4/2.5 1,000 mL/hr at 24 0846    milrinone 0.2 mcg/kg/min (24 1000)    sodium chloride Stopped (24 0957)       PRN Meds:potassium chloride, magnesium sulfate, calcium gluconate **OR** calcium gluconate **OR** calcium gluconate **OR** calcium gluconate, sodium phosphate 6 mmol in sodium chloride 0.9 % 250 mL IVPB **OR** sodium phosphate 12 mmol in sodium chloride 0.9 % 250 mL IVPB **OR** sodium phosphate 18 mmol in sodium chloride 0.9 % 500 mL IVPB **OR** sodium phosphate 24 mmol in sodium chloride 0.9 % 500 mL IVPB, sodium chloride flush **AND** sodium chloride flush, pantoprazole, sodium chloride flush, sodium chloride, ondansetron **OR** ondansetron, polyethylene glycol    Physical Exam:    TEMPERATURE:  Current - Temp: 99 °F (37.2 °C); Max - Temp  Av.2 °F (37.3 °C)  Min: 98.8 °F (37.1 °C)  Max: 99.6 °F (37.6 °C)  RESPIRATIONS RANGE: Resp  Av.7  Min: 8  Max: 26  PULSE RANGE: Pulse  Av.7  Min: 118  Max: 159  BLOOD PRESSURE RANGE:  Systolic (24hrs), Av , Min:95 , Max:117   ; Diastolic (24hrs), Av, Min:48, Max:91    24HR INTAKE/OUTPUT:    Intake/Output Summary (Last 24

## 2024-12-18 NOTE — CARE COORDINATION
CHLOE was called to the room by family to discuss something.  Met with dgtr and brother who report they are in need of obtaining his bank account information.  They report they cannot find his bank card so they called his bank to have the card discontinued.  They are asking for me to have the MD write a letter stating his condition as of now so they can reissue the card.    Call placed to [employer] [phone] to ask the process.  They will need to 1.  Either have the patient come to the bank to show id to get the card opened again, 2.  Have POW from a Legal professional to have a family member appointed.    Updated family.  CHLOE Orozco     Case Management   [phone]    12/18/2024  2:36 PM

## 2024-12-18 NOTE — PROGRESS NOTES
V2.0    [facility] [place] Progress Note      Name:  Levy Vargas /Age/Sex: 1965  (59 y.o. male)   MRN & CSN:  [id number] & [id number] Encounter Date/Time: 2024 8:50 AM EST   Location:  [place] [address] PCP: No primary care provider on file.     Attending:Geovani Blanchard MD       Hospital Day: 8    Assessment and Recommendations   Levy Vargas is a 59 y.o. male who presents with Cardiogenic shock      Plan:   Cardiogenic shock, with cardiomyopathy with EF of 15%, extubated yesterday  Acute on chronic congestive heart failure cardiology following, continue to be on milrinone drip.  Patient also on norepinephrine and vasopressin  A-fib with RVR patient on amiodarone drip Eliquis on hold and patient started on heparin drip cardiology continue to follow  KENDRA, nephrology consulted continue to be on CRRT  Anemia, hemoglobin 9.6 this morning relatively flat from 9.5 yesterday morning  Coffee-ground emesis GI consulted Protonix GI consulted no immediate plan for EGD at this time  Acute respiratory failure currently intubated    Total critical care time including but not limited to ordering and following on critical labs, ordering critical IV medication in a patient with potential life-threatening complications discussion with other subspecialty and not including any procedure time 32 minutes  Diet Diet NPO   DVT Prophylaxis [] Lovenox, []  Heparin, [] SCDs, [] Ambulation,  [] Eliquis, [] Xarelto  [] Coumadin   Code Status Full Code             Personally reviewed Lab Studies and Imaging     Discussed management of the case with cardiology who recommended milrinone      Drugs that require monitoring for toxicity include heparin and the method of monitoring was hemoglobin to assure no bleeding as toxicity    Medical Decision Making:  The following items were considered in medical decision making:  Discussion of patient care with other providers  Reviewed clinical lab tests  Reviewed radiology tests  Reviewed other  cardiomegaly       CBC:   Recent Labs     12/16/24  0625 12/16/24  1815 12/17/24  0612 12/17/24  1823 12/18/24  0405   WBC 8.3  --  7.7  --  7.8   HGB 10.2*   < > 9.5* 10.1* 9.6*     --  146  --  155    < > = values in this interval not displayed.     BMP:    Recent Labs     12/17/24  0612 12/17/24  1823 12/18/24  0404   * 133* 135*   K 3.9 4.2 4.1   CL 99 99 101   CO2 24 25 25   BUN 25* 23* 21*   CREATININE 1.1 1.1 1.2   GLUCOSE 153* 138* 127*     Hepatic: No results for input(s): \"AST\", \"ALT\", \"BILITOT\", \"ALKPHOS\" in the last 72 hours.    Invalid input(s): \"ALB\"  Lipids:   Lab Results   Component Value Date/Time    CHOL 79 10/19/2024 03:13 AM    HDL 17 10/19/2024 03:13 AM    TRIG 68 12/18/2024 04:04 AM     Hemoglobin A1C:   Lab Results   Component Value Date/Time    LABA1C 6.3 10/19/2024 03:13 AM     TSH:   Lab Results   Component Value Date/Time    TSH 0.05 12/12/2024 12:34 AM     Troponin: No results found for: \"TROPONINT\"  Lactic Acid: No results for input(s): \"LACTA\" in the last 72 hours.  BNP: No results for input(s): \"PROBNP\" in the last 72 hours.  UA:  Lab Results   Component Value Date/Time    NITRU POSITIVE 12/12/2024 06:45 PM    COLORU DARK YELLOW 12/12/2024 06:45 PM    PHUR 5.0 12/12/2024 06:45 PM    PHUR 7.0 08/03/2023 09:20 PM    PHUR 7.0 08/03/2023 09:20 PM    WBCUA 3-5 12/12/2024 06:45 PM    RBCUA 5-10 12/12/2024 06:45 PM    MUCUS 1+ 03/19/2022 05:10 PM    TRICHOMONAS None Seen 03/19/2022 05:10 PM    BACTERIA 2+ 12/12/2024 06:45 PM    CLARITYU CLOUDY 12/12/2024 06:45 PM    LEUKOCYTESUR SMALL 12/12/2024 06:45 PM    UROBILINOGEN 2.0 12/12/2024 06:45 PM    BILIRUBINUR MODERATE 12/12/2024 06:45 PM    BLOODU LARGE 12/12/2024 06:45 PM    GLUCOSEU Negative 12/12/2024 06:45 PM    KETUA TRACE 12/12/2024 06:45 PM     Urine Cultures: No results found for: \"LABURIN\"  Blood Cultures: No results found for: \"BC\"  No results found for: \"BLOODCULT2\"  Organism: No results found for:

## 2024-12-18 NOTE — PROGRESS NOTES
Clinical Pharmacy Note  Heparin Dosing       Lab Results   Component Value Date/Time    ANTIXAUHEP 0.39 12/18/2024 05:48 AM      Lab Results   Component Value Date/Time    HGB 9.6 12/18/2024 04:05 AM    HCT 30.7 12/18/2024 04:05 AM     12/18/2024 04:05 AM    INR 1.70 12/12/2024 03:59 AM       Current Infusion Rate: 1540 units/hr    Plan:  Bolus: none  Rate: 1540 units/hr  Next anti-Xa level: 12/19/2024 0600    No change to rate at this time.    Pharmacy will continue to monitor and adjust based on anti-Xa results.     Goldie Oliver, PharmD  12/18/2024 7:44 AM

## 2024-12-18 NOTE — PROGRESS NOTES
Pulmonary Progress Note    Date of Admission: 12/11/2024   LOS: 7 days     CC:  Chief Complaint   Patient presents with    Hypertension     Pt states he has BP of 210 systolic at home.      Dizziness     Pt complains of being lightheaded today.  Onset for \"some time\"   pt denies chest pain, denies shortness of breath           Assessment/Plan   Acute hypoxemic respiratory failure  -Extubated yesterday, continue to wean goal saturation 90%    Cardiogenic shock,  Cardiomyopathy, LVEF 15%  -Currently on fixed dose milrinone  -We are not titrating vasopressin, can switch to Rosalio-Synephrine goal MAP 65    Lethargy  -Likely due to critical illness myopathy and terrible cardiac output    KENDRA requiring RRT  -CRRT per nephrology    Upper GI bleed  -Eliquis on hold, but is currently on a heparin drip  -Protonix twice daily  -Hemoglobins have been stable  -GI consulted    Due to the immediate potential for life-threatening deterioration due to shock, I spent 32 minutes providing critical care.  This time is excluding time spent performing procedures.        HPI/Subjective  No events overnight.  Extubated remains very lethargic.    ROS:   + Fatigue  -Negative chest pain  -Negative nausea      Intake/Output Summary (Last 24 hours) at 12/18/2024 0910  Last data filed at 12/18/2024 0900  Gross per 24 hour   Intake 1829.21 ml   Output 2222 ml   Net -392.79 ml         PHYSICAL EXAM:   Blood pressure (!) 95/48, pulse (!) 146, temperature 99 °F (37.2 °C), temperature source Bladder, resp. rate 21, height 1.854 m (6' 1\"), weight 129.6 kg (285 lb 11.5 oz), SpO2 91%.'  Gen:  No acute distress.   Resp:  No crackles. No wheezes. No rhonchi. No dullness on percussion.  CV: Regular rate. Regular rhythm. No murmur or rub. No edema.   M/S: No cyanosis. No clubbing.    Neuro: Awakens but lethargic          Labs reviewed:  CBC:   Recent Labs     12/16/24  0625 12/16/24  1815 12/17/24  0612 12/17/24  1823 12/18/24  0405   WBC 8.3  --  7.7  --     Catheter Indications Urinary retention (acute or chronic), continuous bladder irrigation or bladder outlet obstruction;Need for fluid volume management of the critically ill patient in a critical care setting 12/18/24 0800   Site Assessment No urethral drainage 12/18/24 0800   Urine Color Tea 12/18/24 0800   Urine Appearance Hazy 12/18/24 0800   Urine Odor Malodorous 12/18/24 0800   Collection Container Standard 12/18/24 0800   Securement Method Securing device (Describe) 12/18/24 0800   Catheter Care  Perineal wipes 12/18/24 0800   Catheter Best Practices  Drainage tube clipped to bed;Catheter secured to thigh;Tamper seal intact;Bag below bladder;Bag not on floor;Lack of dependent loop in tubing;Drainage bag less than half full 12/18/24 0800   Status Draining;Patent 12/18/24 0800   Output (mL) 10 mL 12/18/24 0800   Number of days: 5         Thank you for this consult,    Mandeep Major MD  [facility] Pulmonary, Critical Care, and Sleep Medicine

## 2024-12-18 NOTE — PROGRESS NOTES
[facility]   Daily Progress Note      Admit Date:  12/11/2024      Subjective:   Mr. Vargas is 59 y.o. male with a past medical history significant for paroxysmal atrial fibrillation,  PAD and chronic systolic CHF with LVEF 15-20% who presented to [facility] with dizziness and elevated blood pressure in the 200's systolic. He had been admitted in October 2024 with acute on chronic systolic CHF and discharged 10/29/24.I was asked to see him for atrial fibrillation with RVR. He was on amiodarone 200mg daily and Eliquis 5mg bid at home. His CHF regimen includes Toprol XL 25mg daily, Entresto 49/51mg bid and torsemide 50mg daily with Imdur 15mg daily. He follows with Dr. Rosa at  Cardiology.     Levy states that a week ago he had trouble getting up from the toilet. He says that he was weak. He says his breathing was not great but it was not the shortness of breath that kept him from getting up.      He denies any chest pain or tightness.     He was placed on a amiodarone drip in the ED for his rapid atrial fibrillation.  He was having decreased urine output and some hypotension yesterday.  He was placed on the milrinone drip as well as IV Lasix.    Interval history:  He remains on milrinone at 0.03mcg/kg/min.  He is off vasopressin but now on norepinephrine at 10 mcg/kg/min.  He is somewhat obtunded with copious oral secretions requiring frequent suctioning.  A-fib with rates in the 110's on telemetry.      Objective:     BP (!) 95/48   Pulse (!) 154   Temp 99 °F (37.2 °C) (Bladder)   Resp 14   Ht 1.854 m (6' 1\")   Wt 129.6 kg (285 lb 11.5 oz)   SpO2 93%   BMI 37.70 kg/m²      Intake/Output Summary (Last 24 hours) at 12/18/2024 0828  Last data filed at 12/18/2024 0800  Gross per 24 hour   Intake 1805.86 ml   Output 2201 ml   Net -395.14 ml       Physical Exam:  General: sedated  Skin:  Warm and dry  Neck:  JVD<8, no carotid bruits  Chest: Normal respiratory effort.diminished bilaterally  but clear, no wheezes/rhonchi/rales  Cardiovascular:  Irreg irreg and tachy, normal S1/S2, no M/R/G  Abdomen:  Soft, nontender, +bowel sounds  Extremities:  1+ bilateral lower extremity edema  Pulses: 2+ bilat carotid    2+ bilat radial    2+ bilat femoral        Medications:    amiodarone  200 mg Per NG tube BID    cefepime  2,000 mg IntraVENous Q8H    pantoprazole (PROTONIX) 40 mg in sodium chloride (PF) 0.9 % 10 mL injection  40 mg IntraVENous Q12H    hydrocortisone sodium succinate PF  50 mg IntraVENous Q8H    [Held by provider] spironolactone  25 mg Oral Daily    [Held by provider] gabapentin  300 mg Oral Nightly    dilTIAZem  10 mg IntraVENous Once    [Held by provider] apixaban  5 mg Oral BID    aspirin  81 mg Oral Daily    atorvastatin  80 mg Oral Nightly    [Held by provider] metoprolol succinate  25 mg Oral QPM    [Held by provider] sacubitril-valsartan  1 tablet Oral BID    sodium chloride flush  5-40 mL IntraVENous 2 times per day      heparin (PORCINE) Infusion 1,540 Units/hr (12/18/24 0800)    norepinephrine Stopped (12/16/24 2130)    prismaSATE BGK 4/2.5 2,000 mL/hr at 12/18/24 0801    prismaSATE BGK 4/2.5 500 mL/hr at 12/18/24 0009    prismaSATE BGK 4/2.5 1,000 mL/hr at 12/18/24 0337    VASOpressin Stopped (12/18/24 0654)    milrinone 0.2 mcg/kg/min (12/18/24 0800)    sodium chloride Stopped (12/18/24 0553)       Lab Data:  CBC:   Recent Labs     12/16/24  0625 12/16/24  1815 12/17/24  0612 12/17/24  1823 12/18/24  0405   WBC 8.3  --  7.7  --  7.8   HGB 10.2*   < > 9.5* 10.1* 9.6*     --  146  --  155    < > = values in this interval not displayed.     BMP:    Recent Labs     12/17/24  0612 12/17/24  1823 12/18/24  0404   * 133* 135*   K 3.9 4.2 4.1   CO2 24 25 25   BUN 25* 23* 21*   CREATININE 1.1 1.1 1.2     Lab Results   Component Value Date/Time    CHOL 79 10/19/2024 03:13 AM    HDL 17 10/19/2024 03:13 AM    TRIG 68 12/18/2024 04:04 AM     Assessment / Plan:     Atrial fibrillation

## 2024-12-18 NOTE — PROGRESS NOTES
[facility]: [place] 1 CVICU  DYSPHAGIA BEDSIDE SWALLOW EVALUATION     Patient: Levy Vargas   : 1965   MRN: [id number]      Evaluation Date: 2024     Admitting Diagnosis:   A-fib (HCC) [I48.91]  Atrial fibrillation with rapid ventricular response (HCC) [I48.91]   has a past medical history of Abdominal pain, Abnormal EKG, Acute pancreatitis, Atrial fibrillation (HCC), CHF (congestive heart failure) (HCC), Cigarette smoker, Cocaine abuse (HCC), Dehydration, Diabetes mellitus (HCC), History of cocaine abuse (HCC), History of MI (myocardial infarction), Hyperglycemia, Hyperlipidemia, Hypertension, Morbid obesity with BMI of 45.0-49.9, adult, Postural dizziness, and PUD (peptic ulcer disease).   has a past surgical history that includes Cardiac catheterization; Upper gastrointestinal endoscopy; Leg Surgery (Right, 2023); and Cardiac procedure (N/A, 10/21/2024).  Allergies: NKA  Dysphagia History including instrumental assessment: 5/10/2024 seen by  at [facility] with rec for reg/thin  GI history: consults for GI bleeding; concerns for cirrhosis  ENT history: none on record  Baseline/Prior Level of Function: Living Status: admitted from home with family; Baseline diet: regular/thin    Onset Date: 2024        Reason for referral: SLP evaluation orders received due to recent intubation                         CURRENT ENCOUNTER DIAGNOSTICS/COURSE OF ADMISSION     2024 admitted with c/o lightheadedness. MD ADMISSION H&P HPI: \"Levy Vargas is a 59 y.o. male with pmh of A-fib, hypertension, HFrEF, cirrhosis, hyperlipidemia, diabetes mellitus type II, CKD 3 baseline creatinine 1.3, COPD, tobacco dependence, history of cocaine abuse, history of MI, peptic ulcer disease  who presents with lightheadedness. Patient was seen in the room awake alert oriented to person place and situation.  Patient stated he had dizziness and generalized weakness going on for last week.  He also felt his dizziness was

## 2024-12-18 NOTE — PROGRESS NOTES
One time dose of 12.5mg IV benadryl ordered by internal medicine ANP, due to pt having increased itching generalized, and restlessness due to itching @2309. Pt responded well to intervention.     VASOpressin titrated off @2311. Pt Blood pressure sustaining MAP in 80s.     Electronically signed by Avni Avila RN on 12/18/2024 at 12:34 AM

## 2024-12-18 NOTE — PROGRESS NOTES
@0500 pt was found to have pulled NGT out. Internal medicine MD was notified, orders to replace NGT, bridle and xray to confirm placement were given. NGT was replaced @ 70 cm R nare, bridle placed and Xray order confirmed. Orders for one time dose of benadryl given again for itching.     Electronically signed by Avni Aivla RN on 12/18/2024 at 6:43 AM

## 2024-12-19 ENCOUNTER — HOSPITAL ENCOUNTER (INPATIENT)
Age: 59
Discharge: HOME | End: 2024-12-21
Attending: INTERNAL MEDICINE
Payer: COMMERCIAL

## 2024-12-19 VITALS
SYSTOLIC BLOOD PRESSURE: 99 MMHG | HEIGHT: 73 IN | WEIGHT: 286 LBS | BODY MASS INDEX: 37.91 KG/M2 | DIASTOLIC BLOOD PRESSURE: 66 MMHG

## 2024-12-19 LAB
ALBUMIN SERPL-MCNC: 3.5 G/DL (ref 3.4–5)
ALBUMIN SERPL-MCNC: 3.5 G/DL (ref 3.4–5)
ANION GAP SERPL CALCULATED.3IONS-SCNC: 10 MMOL/L (ref 3–16)
ANION GAP SERPL CALCULATED.3IONS-SCNC: 10 MMOL/L (ref 3–16)
ANTI-XA UNFRAC HEPARIN: 0.32 IU/ML (ref 0.3–0.7)
BASE EXCESS BLDA CALC-SCNC: -1 MMOL/L (ref -3–3)
BUN SERPL-MCNC: 16 MG/DL (ref 7–20)
BUN SERPL-MCNC: 20 MG/DL (ref 7–20)
CALCIUM SERPL-MCNC: 8.5 MG/DL (ref 8.3–10.6)
CALCIUM SERPL-MCNC: 8.6 MG/DL (ref 8.3–10.6)
CHLORIDE SERPL-SCNC: 101 MMOL/L (ref 99–110)
CHLORIDE SERPL-SCNC: 103 MMOL/L (ref 99–110)
CO2 BLDA-SCNC: 25 MMOL/L
CO2 SERPL-SCNC: 23 MMOL/L (ref 21–32)
CO2 SERPL-SCNC: 24 MMOL/L (ref 21–32)
CREAT SERPL-MCNC: 1.2 MG/DL (ref 0.8–1.3)
CREAT SERPL-MCNC: 1.4 MG/DL (ref 0.8–1.3)
DEPRECATED RDW RBC AUTO: 19.5 % (ref 12.4–15.4)
ECHO BSA: 2.58 M2
EKG ATRIAL RATE: 86 BPM
EKG DIAGNOSIS: NORMAL
EKG Q-T INTERVAL: 330 MS
EKG QRS DURATION: 114 MS
EKG QTC CALCULATION (BAZETT): 530 MS
EKG R AXIS: -20 DEGREES
EKG T AXIS: 152 DEGREES
EKG VENTRICULAR RATE: 155 BPM
FERRITIN SERPL IA-MCNC: 307 NG/ML (ref 30–400)
FOLATE SERPL-MCNC: 11.1 NG/ML (ref 4.78–24.2)
GFR SERPLBLD CREATININE-BSD FMLA CKD-EPI: 57 ML/MIN/{1.73_M2}
GFR SERPLBLD CREATININE-BSD FMLA CKD-EPI: 69 ML/MIN/{1.73_M2}
GLUCOSE BLD-MCNC: 151 MG/DL (ref 70–99)
GLUCOSE BLD-MCNC: 94 MG/DL (ref 70–99)
GLUCOSE SERPL-MCNC: 122 MG/DL (ref 70–99)
GLUCOSE SERPL-MCNC: 135 MG/DL (ref 70–99)
HCO3 BLDA-SCNC: 23.8 MMOL/L (ref 21–29)
HCT VFR BLD AUTO: 31.2 % (ref 40.5–52.5)
HGB BLD-MCNC: 9.8 G/DL (ref 13.5–17.5)
IRON SATN MFR SERPL: 18 % (ref 20–50)
IRON SERPL-MCNC: 41 UG/DL (ref 59–158)
MCH RBC QN AUTO: 27.7 PG (ref 26–34)
MCHC RBC AUTO-ENTMCNC: 31.5 G/DL (ref 31–36)
MCV RBC AUTO: 88 FL (ref 80–100)
PCO2 BLDA: 39.7 MM HG (ref 35–45)
PERFORMED ON: ABNORMAL
PERFORMED ON: NORMAL
PH BLDA: 7.39 [PH] (ref 7.35–7.45)
PHOSPHATE SERPL-MCNC: 2.3 MG/DL (ref 2.5–4.9)
PHOSPHATE SERPL-MCNC: 2.8 MG/DL (ref 2.5–4.9)
PLATELET # BLD AUTO: 150 K/UL (ref 135–450)
PMV BLD AUTO: 8.7 FL (ref 5–10.5)
PO2 BLDA: 56.7 MM HG (ref 75–108)
POC SAMPLE TYPE: ABNORMAL
POTASSIUM SERPL-SCNC: 4.4 MMOL/L (ref 3.5–5.1)
POTASSIUM SERPL-SCNC: 4.4 MMOL/L (ref 3.5–5.1)
RBC # BLD AUTO: 3.54 M/UL (ref 4.2–5.9)
SAO2 % BLDA: 89 % (ref 93–100)
SODIUM SERPL-SCNC: 135 MMOL/L (ref 136–145)
SODIUM SERPL-SCNC: 136 MMOL/L (ref 136–145)
TIBC SERPL-MCNC: 226 UG/DL (ref 260–445)
TRANSFERRIN SERPL-MCNC: 195 MG/DL (ref 200–360)
VIT B12 SERPL-MCNC: 1094 PG/ML (ref 211–911)
WBC # BLD AUTO: 7.7 K/UL (ref 4–11)

## 2024-12-19 PROCEDURE — 92960 CARDIOVERSION ELECTRIC EXT: CPT

## 2024-12-19 PROCEDURE — 6360000002 HC RX W HCPCS: Performed by: INTERNAL MEDICINE

## 2024-12-19 PROCEDURE — 92960 CARDIOVERSION ELECTRIC EXT: CPT | Performed by: INTERNAL MEDICINE

## 2024-12-19 PROCEDURE — 6360000002 HC RX W HCPCS: Performed by: FAMILY MEDICINE

## 2024-12-19 PROCEDURE — 84466 ASSAY OF TRANSFERRIN: CPT

## 2024-12-19 PROCEDURE — 85027 COMPLETE CBC AUTOMATED: CPT

## 2024-12-19 PROCEDURE — 2580000003 HC RX 258: Performed by: INTERNAL MEDICINE

## 2024-12-19 PROCEDURE — P9047 ALBUMIN (HUMAN), 25%, 50ML: HCPCS

## 2024-12-19 PROCEDURE — 2580000003 HC RX 258

## 2024-12-19 PROCEDURE — 82947 ASSAY GLUCOSE BLOOD QUANT: CPT

## 2024-12-19 PROCEDURE — 93325 DOPPLER ECHO COLOR FLOW MAPG: CPT | Performed by: INTERNAL MEDICINE

## 2024-12-19 PROCEDURE — 2500000003 HC RX 250 WO HCPCS: Performed by: FAMILY MEDICINE

## 2024-12-19 PROCEDURE — 2700000000 HC OXYGEN THERAPY PER DAY

## 2024-12-19 PROCEDURE — 90945 DIALYSIS ONE EVALUATION: CPT

## 2024-12-19 PROCEDURE — 82607 VITAMIN B-12: CPT

## 2024-12-19 PROCEDURE — 2500000003 HC RX 250 WO HCPCS: Performed by: HOSPITALIST

## 2024-12-19 PROCEDURE — 99291 CRITICAL CARE FIRST HOUR: CPT | Performed by: INTERNAL MEDICINE

## 2024-12-19 PROCEDURE — 2500000003 HC RX 250 WO HCPCS: Performed by: INTERNAL MEDICINE

## 2024-12-19 PROCEDURE — 85520 HEPARIN ASSAY: CPT

## 2024-12-19 PROCEDURE — 6360000002 HC RX W HCPCS

## 2024-12-19 PROCEDURE — 2500000003 HC RX 250 WO HCPCS

## 2024-12-19 PROCEDURE — 6370000000 HC RX 637 (ALT 250 FOR IP)

## 2024-12-19 PROCEDURE — 93010 ELECTROCARDIOGRAM REPORT: CPT | Performed by: INTERNAL MEDICINE

## 2024-12-19 PROCEDURE — 82803 BLOOD GASES ANY COMBINATION: CPT

## 2024-12-19 PROCEDURE — 99233 SBSQ HOSP IP/OBS HIGH 50: CPT | Performed by: INTERNAL MEDICINE

## 2024-12-19 PROCEDURE — 82746 ASSAY OF FOLIC ACID SERUM: CPT

## 2024-12-19 PROCEDURE — 80069 RENAL FUNCTION PANEL: CPT

## 2024-12-19 PROCEDURE — 82728 ASSAY OF FERRITIN: CPT

## 2024-12-19 PROCEDURE — 94761 N-INVAS EAR/PLS OXIMETRY MLT: CPT

## 2024-12-19 PROCEDURE — 2100000000 HC CCU R&B

## 2024-12-19 PROCEDURE — 93312 ECHO TRANSESOPHAGEAL: CPT | Performed by: INTERNAL MEDICINE

## 2024-12-19 PROCEDURE — 83540 ASSAY OF IRON: CPT

## 2024-12-19 PROCEDURE — 6370000000 HC RX 637 (ALT 250 FOR IP): Performed by: INTERNAL MEDICINE

## 2024-12-19 RX ORDER — MIDAZOLAM HYDROCHLORIDE 1 MG/ML
INJECTION, SOLUTION INTRAMUSCULAR; INTRAVENOUS
Status: COMPLETED
Start: 2024-12-19 | End: 2024-12-19

## 2024-12-19 RX ORDER — FENTANYL CITRATE 50 UG/ML
INJECTION, SOLUTION INTRAMUSCULAR; INTRAVENOUS
Status: DISCONTINUED
Start: 2024-12-19 | End: 2024-12-19 | Stop reason: WASHOUT

## 2024-12-19 RX ORDER — PHENYLEPHRINE HCL IN 0.9% NACL 1 MG/10 ML
SYRINGE (ML) INTRAVENOUS
Status: DISCONTINUED
Start: 2024-12-19 | End: 2024-12-19 | Stop reason: WASHOUT

## 2024-12-19 RX ORDER — HEPARIN SODIUM 1000 [USP'U]/ML
INJECTION, SOLUTION INTRAVENOUS; SUBCUTANEOUS PRN
Status: DISCONTINUED | OUTPATIENT
Start: 2024-12-19 | End: 2024-12-26

## 2024-12-19 RX ORDER — MIDAZOLAM HYDROCHLORIDE 1 MG/ML
1 INJECTION, SOLUTION INTRAMUSCULAR; INTRAVENOUS ONCE
Status: COMPLETED | OUTPATIENT
Start: 2024-12-19 | End: 2024-12-19

## 2024-12-19 RX ORDER — HYDROCORTISONE SODIUM SUCCINATE 100 MG/2ML
50 INJECTION INTRAMUSCULAR; INTRAVENOUS DAILY
Status: COMPLETED | OUTPATIENT
Start: 2024-12-19 | End: 2024-12-20

## 2024-12-19 RX ADMIN — ALBUMIN (HUMAN) 25 G: 0.25 INJECTION, SOLUTION INTRAVENOUS at 04:04

## 2024-12-19 RX ADMIN — ALBUMIN (HUMAN) 25 G: 0.25 INJECTION, SOLUTION INTRAVENOUS at 12:08

## 2024-12-19 RX ADMIN — DEXMEDETOMIDINE HYDROCHLORIDE 0.3 MCG/KG/HR: 400 INJECTION INTRAVENOUS at 06:02

## 2024-12-19 RX ADMIN — Medication: at 01:19

## 2024-12-19 RX ADMIN — SODIUM PHOSPHATE, MONOBASIC, MONOHYDRATE AND SODIUM PHOSPHATE, DIBASIC, ANHYDROUS 6 MMOL: 142; 276 INJECTION, SOLUTION INTRAVENOUS at 08:26

## 2024-12-19 RX ADMIN — MILRINONE LACTATE 0.2 MCG/KG/MIN: 0.2 INJECTION, SOLUTION INTRAVENOUS at 08:57

## 2024-12-19 RX ADMIN — IRON SUCROSE 200 MG: 20 INJECTION, SOLUTION INTRAVENOUS at 11:19

## 2024-12-19 RX ADMIN — CEFEPIME 2000 MG: 2 INJECTION, POWDER, FOR SOLUTION INTRAVENOUS at 02:02

## 2024-12-19 RX ADMIN — AMIODARONE HYDROCHLORIDE 200 MG: 200 TABLET ORAL at 09:25

## 2024-12-19 RX ADMIN — MILRINONE LACTATE 0.2 MCG/KG/MIN: 0.2 INJECTION, SOLUTION INTRAVENOUS at 19:42

## 2024-12-19 RX ADMIN — HYDROCORTISONE SODIUM SUCCINATE 50 MG: 100 INJECTION, POWDER, FOR SOLUTION INTRAMUSCULAR; INTRAVENOUS at 01:57

## 2024-12-19 RX ADMIN — Medication: at 06:20

## 2024-12-19 RX ADMIN — MIDAZOLAM HYDROCHLORIDE 1 MG: 1 INJECTION, SOLUTION INTRAMUSCULAR; INTRAVENOUS at 15:22

## 2024-12-19 RX ADMIN — HYDROCORTISONE SODIUM SUCCINATE 50 MG: 100 INJECTION, POWDER, FOR SOLUTION INTRAMUSCULAR; INTRAVENOUS at 09:27

## 2024-12-19 RX ADMIN — SODIUM CHLORIDE, PRESERVATIVE FREE 10 ML: 5 INJECTION INTRAVENOUS at 19:53

## 2024-12-19 RX ADMIN — Medication: at 04:43

## 2024-12-19 RX ADMIN — Medication: at 23:27

## 2024-12-19 RX ADMIN — HYDROCORTISONE SODIUM SUCCINATE 50 MG: 100 INJECTION, POWDER, FOR SOLUTION INTRAMUSCULAR; INTRAVENOUS at 18:52

## 2024-12-19 RX ADMIN — CEFEPIME 2000 MG: 2 INJECTION, POWDER, FOR SOLUTION INTRAVENOUS at 10:05

## 2024-12-19 RX ADMIN — HEPARIN SODIUM 1.2 UNITS: 1000 INJECTION INTRAVENOUS; SUBCUTANEOUS at 15:55

## 2024-12-19 RX ADMIN — SODIUM CHLORIDE, PRESERVATIVE FREE 40 MG: 5 INJECTION INTRAVENOUS at 01:57

## 2024-12-19 RX ADMIN — DEXMEDETOMIDINE HYDROCHLORIDE 0.1 MCG/KG/HR: 400 INJECTION INTRAVENOUS at 16:48

## 2024-12-19 RX ADMIN — HEPARIN SODIUM 1540 UNITS/HR: 10000 INJECTION, SOLUTION INTRAVENOUS at 13:40

## 2024-12-19 RX ADMIN — SODIUM CHLORIDE, PRESERVATIVE FREE 10 ML: 5 INJECTION INTRAVENOUS at 08:48

## 2024-12-19 RX ADMIN — SODIUM CHLORIDE: 9 INJECTION, SOLUTION INTRAVENOUS at 16:53

## 2024-12-19 RX ADMIN — Medication: at 03:53

## 2024-12-19 RX ADMIN — HEPARIN SODIUM 1300 UNITS: 1000 INJECTION INTRAVENOUS; SUBCUTANEOUS at 15:57

## 2024-12-19 RX ADMIN — ASPIRIN 81 MG 81 MG: 81 TABLET ORAL at 09:25

## 2024-12-19 RX ADMIN — Medication: at 21:08

## 2024-12-19 RX ADMIN — CEFEPIME 2000 MG: 2 INJECTION, POWDER, FOR SOLUTION INTRAVENOUS at 17:47

## 2024-12-19 RX ADMIN — Medication: at 20:51

## 2024-12-19 RX ADMIN — SODIUM CHLORIDE, PRESERVATIVE FREE 40 MG: 5 INJECTION INTRAVENOUS at 13:06

## 2024-12-19 RX ADMIN — MIDAZOLAM 1 MG: 1 INJECTION INTRAMUSCULAR; INTRAVENOUS at 15:22

## 2024-12-19 ASSESSMENT — PAIN SCALES - GENERAL
PAINLEVEL_OUTOF10: 0
PAINLEVEL_OUTOF10: 0

## 2024-12-19 NOTE — PROGRESS NOTES
Patient's daughter and family now agreeable to FRANTZ Cardioversion. CRRT temporarily stopped at this time. 179 ml blood returned to patient.

## 2024-12-19 NOTE — PROGRESS NOTES
[facility]   Daily Progress Note      Admit Date:  12/11/2024      Subjective:   Mr. Vargas is 59 y.o. male with a past medical history significant for paroxysmal atrial fibrillation,  PAD and chronic systolic CHF with LVEF 15-20% who presented to [facility] with dizziness and elevated blood pressure in the 200's systolic. He had been admitted in October 2024 with acute on chronic systolic CHF and discharged 10/29/24.I was asked to see him for atrial fibrillation with RVR. He was on amiodarone 200mg daily and Eliquis 5mg bid at home. His CHF regimen includes Toprol XL 25mg daily, Entresto 49/51mg bid and torsemide 50mg daily with Imdur 15mg daily. He follows with Dr. Rosa at  Cardiology.     Levy states that a week ago he had trouble getting up from the toilet. He says that he was weak. He says his breathing was not great but it was not the shortness of breath that kept him from getting up.      He denies any chest pain or tightness.     He was placed on a amiodarone drip in the ED for his rapid atrial fibrillation.  He was having decreased urine output and some hypotension yesterday.  He was placed on the milrinone drip as well as IV Lasix.    Interval history:  He remains on milrinone at 0.02mcg/kg/min.  He is off vasopressors at this point. His daughter and brother are at the bedside.  A-fib with rates in the 110's on telemetry.      Objective:     BP 99/64   Pulse (!) 138   Temp 98.4 °F (36.9 °C) (Bladder)   Resp 13   Ht 1.854 m (6' 1\")   Wt 130.1 kg (286 lb 13.1 oz)   SpO2 98%   BMI 37.84 kg/m²      Intake/Output Summary (Last 24 hours) at 12/19/2024 1446  Last data filed at 12/19/2024 1400  Gross per 24 hour   Intake 2604.26 ml   Output 3241 ml   Net -636.74 ml       Physical Exam:  General: Lethargic  Skin:  Warm and dry  Neck:  JVD<8, no carotid bruits  Chest: Normal respiratory effort.diminished bilaterally but clear, no wheezes/rhonchi/rales  Cardiovascular:  Irreg irreg and  RVR  Continue oral amiodarone 200mg bid.  Continue heparin drip now and lieu of oral anticoagulation given renal failure.  We will plan on performing a FRANTZ guided cardioversion today.  The family wants us to do everything we can and they understand the risks of stroke, possible reintubation and death.     Acute on chronic Systolic CHF, class 4  Continue fluid removal on CVVHD with 25 mL/h.    He is really not a candidate for further advanced heart failure therapies including transplant or LVAD placement.  Palliative ambulatory inotropes could be an option if he is in a facility but would not if he is at home.   Hold off on mechanical support as he is stable and slightly improving.  Continue supportive care with the IV milrinone drip.  Hold any nephrotoxic agents as well as beta-blockade.     Acute on chronic kidney injury   No further urine output but removing fluid with CRRT. Nephrology is following.  CVVHD running with removal of 25mL/hour of fluid.    4.  Cardiogenic shock  He is steadily improving.  Palliative care has discussed goals of care with the family.  I would avoid placing mechanical support for Levy.  He is on norepinephrine at 10 mcg/kg/min presently.  Continue inotropic support with milrinone at 0.2 mcg/kg/min.    Total critical care time 45 minutes.    I once again had an extensive discussion with Levy and his family 9daughters) regarding the severity of his illness.  I discussed the risks and benefits of FRANTZ cardioversion.  They understand the risks of stroke death and possible reintubation doing this.  They want us to do what ever we can for him. The patient himself is not able to make rational decisions.    Signed:  JOEL ALVARADO MD

## 2024-12-19 NOTE — PROGRESS NOTES
Nephrology Progress Note   [email or web address]      Reason for consultation:  KENDRA on CKD 2 -- baseline Cr ~ 0.9-1.3 mg/dL     HPI: Levy Vargas is a 58 yo male with a PMHx of CKD 2, h/o AKIs, HFrEF, non-compliance, DM, HTN, afib, h/o cocaine use, CAD, HLD. Patient presents to  ED on 12/11/2024 with complaints of BLE edema, lightheadedness, and weakness. Patient is currently drowsy and it is difficult to obtain history. Currently oriented x3. Patient tells me he has been taking his CHF medications everyday. Per chart review, there has been some questions regarding his outpatient compliance and not keeping in touch with his CHF nurses. Pt is currently grossly volume overloaded. He weighed 131.5 kg on 9/25/2024 during his last cardiology appt. He currently weighs 145.3 kg. Currently dyspneic and on 4 L NC. CXR is negative for acute findings. In afib RVR this admission. On amio gtt.     We have been consulted for KENDRA on CKD 2 management. Cr upon admission was 1.3 mg/dL. Today, Cr is 2.6 mg/dL. Patient has received a generous amount of IVP and oral diuretic yesterday and was also started on a lasix gtt at 10 mg/hr. Despite this, pt has remained anuric with 150 mL UOP/24 hrs and 25 mL UOP documented today. BP has been as low as 63/38 mmHg since admission. Started on milrinone gtt yesterday.     Subjective:    The patient has been seen and examined. Labs and chart reviewed. Extubated yesterday. Currently on 8 L HFNC. Remains on milrinone gtt. Seen on CRRT with net 25 mL UF/hr. UOP remains poor with 195 mL UOP/24 hrs.     There were not complications overnight.    Patient review of systems: Denies SOB       Allergies:  No Known Allergies     Scheduled Meds:   albumin human 25%  25 g IntraVENous Q8H    LORazepam  0.5 mg IntraVENous Once    amiodarone  200 mg Per NG tube BID    cefepime  2,000 mg IntraVENous Q8H    pantoprazole (PROTONIX) 40 mg in sodium chloride (PF) 0.9 % 10 mL injection  40 mg IntraVENous Q12H

## 2024-12-19 NOTE — PROGRESS NOTES
Pt delirious and has not slept the last two nights per report and per family. Pt continuously talking and pulling at NG and oxygen. Arlin Andrade NP notified for precedex orders or for something else for delirium. EKG ordered. Low dose benzo ordered and recommend reaching out to critical care. Rosario Jamison NP notified for critical care. She recommends reaching out to cardiology. Cardiology paged.

## 2024-12-19 NOTE — PROGRESS NOTES
2 mg vial of versed and 100 mcg vial of fentanyl removed from omnicell for FRANTZ Cardioversion per verbal order from Dr. Hernandez. 1 mg of versed giving. Remaining medication wasted in omnicell with MAURISIO Blanchard.

## 2024-12-19 NOTE — ANESTHESIA PRE-OP
H&P Update    I have reviewed the history and physical and examined the patient and find no relevant changes.   I have reviewed with the patient and/or family the risks, benefits, and alternatives to the procedure.    Pre-sedation Assessment    Patient:  Levy Vargas   :   1965  Intended Procedure: FRANTZ/Cardioversion    X[facility] nurse's notes reviewed and agreed.  Medications reviewed  Allergies: No Known Allergies      Pre-Procedure Assessment/Plan:  ASA 4 - Patient with severe systemic disease that is a constant threat to life  Mallampati 3  Level of Sedation Plan:Deep sedation    Anginal Classification w/in 2 weeks: 0    Heart Failure w/in 2 weeks: If yes NYHA class: 4    Post Procedure plan: Return to same level of care

## 2024-12-19 NOTE — PROGRESS NOTES
Cruz Mccarthy Np for cardiology called back and states he reached out to Dr. Soler and they are OK with precedex. They would like the hospitalist to order and manage since they are in house. Messaged Arlin Andrade NP. Awaiting order.

## 2024-12-19 NOTE — PROGRESS NOTES
1450 Dr. Hernandez and appropriate staff at bedside for FRANTZ.  Consent signed.  Appropriate equipment at bedside including emergency equipment.  1450 Defib pads placed anterior/posterior  Time out observed.  BP monitoring via A-line  Hurricane spray 1455  Patient medicated with Versed 1 mg IVP at 1459 per ADITI Nunez   1502  Mouth suctioned  1502 FRANTZ probe passed and images obtained.  1505 FRANTZ probe removed without difficulty, mouth suctioned. No blood on probe  1506  20 mg Brevital given IVP per Dr Hernandez  1507  Synchronized cardioversion at 200 joules converted to NSR  1510 Procedure complete.  Vital signs remain stable. No current complaints from patient. No distress noted.  Post vitals:  BP 99/57 P 70 R 12 O2 sat 93    Handoff report to bedside nurse.

## 2024-12-19 NOTE — PROGRESS NOTES
Comprehensive Nutrition Assessment    Type and Reason for Visit:  Initial, NPO/clear liquid, LOS (TF ordering and management)    Nutrition Recommendations/Plan:   If tube feeding able to be initiated recommend Nepro (renal)start at 15 ml/hr.  Increase by 10 ml every 4 hours to goal rate of 45 ml/hr (based on 20 hours) 1620 calories, 73 grams of protein, 654 ml free water.  Prosource Protein pouch once per day providing an additional 80 calories and 20 grams  flush with 30 ml water before and after administration.    Water flush 30 ml every 4 hours.  Will monitor TF intake and tolerance, blood sugar trends, and fluid and electrolyte balances     Malnutrition Assessment:  Malnutrition Status:  At risk for malnutrition (12/19/24 1248)    Context:  Chronic Illness     Findings of the 6 clinical characteristics of malnutrition:  Energy Intake:  Mild decrease in energy intake  Weight Loss:   (weight fluctuating significantly)     Body Fat Loss:  Unable to assess     Muscle Mass Loss:  Unable to assess    Fluid Accumulation:  Unable to assess     Strength:  Not Performed    Nutrition Assessment:    Patient admitted with cardiogenic shock.  Extubated on 12/18.  NPO at this time.  Patient was to have a FRANTZ today but now post poned per patient's daughter's request.  Currently on CRRT, history of acute on chronic CKD.  Nutritionall at risk for decline due to lack of nutrition source and increased protein needs due to CRRT requirement.    Nutrition Related Findings:    Na 135, Phos 2.3 on 12/19; last BM on 12/18 Wound Type: Venous Stasis       Current Nutrition Intake & Therapies:    Average Meal Intake: NPO  Average Supplements Intake: NPO  Diet NPO  Current Tube Feeding (TF) Orders:  Current TF Provides: Nepro start at 15 ml/hr.  Increase by 10 ml every 4 hours to goal rate of 45 ml/hr (based on 20 hours) 1620 calories, 73 grams of protein, 654 ml free water.  Prosource Protein pouch once per day, flush with 30 ml before  Water flush 30 ml every 4 hours.    Anthropometric Measures:  Height: 185.4 cm (6' 1\")  Ideal Body Weight (IBW): 184 lbs (84 kg)       Current Body Weight: 130 kg (286 lb 9.6 oz),   IBW. Weight Source: Bed scale  Current BMI (kg/m2): 37.8                             BMI Categories: Obese Class 2 (BMI 35.0 -39.9)    Estimated Daily Nutrient Needs:  Energy Requirements Based On: Kcal/kg  Weight Used for Energy Requirements: Current  Energy (kcal/day): 7382-8266 (15-18 kcal/130.1 kg)  Weight Used for Protein Requirements: Ideal  Protein (g/day): 100-125 (1.2-1.5 g/83.6 kg)  Method Used for Fluid Requirements: 1 ml/kcal  Fluid (ml/day):      Nutrition Diagnosis:   Inadequate protein-energy intake related to lack or limited access to food as evidenced by NPO or clear liquid status due to medical condition    Nutrition Interventions:   Food and/or Nutrient Delivery: Start Tube Feeding (pending medical stability and goals of care)  Nutrition Education/Counseling:  (monitor need)  Coordination of Nutrition Care: Continue to monitor while inpatient       Goals:  Goals: Initiation of nutrition, within 2 days  Type of Goal: New goal       Nutrition Monitoring and Evaluation:      Food/Nutrient Intake Outcomes: Progression of Nutrition  Physical Signs/Symptoms Outcomes: Biochemical Data, Nutrition Focused Physical Findings    Discharge Planning:    Too soon to determine     JENNY YEUNG RD, LD  Contact: Office: [phone]; [place]: [phone]

## 2024-12-19 NOTE — PROGRESS NOTES
Clinical Pharmacy Note  Heparin Dosing       Lab Results   Component Value Date/Time    ANTIXAUHEP 0.32 12/19/2024 06:00 AM      Lab Results   Component Value Date/Time    HGB 9.8 12/19/2024 06:00 AM    HCT 31.2 12/19/2024 06:00 AM     12/19/2024 06:00 AM    INR 1.70 12/12/2024 03:59 AM       Current Infusion Rate: 1540 units/hr    Plan:  Rate: continue 1540 units/hr  Next anti-Xa level: 0600 12/20/24    Pharmacy will continue to monitor and adjust based on anti-Xa results.     Elizabeth Gresham RPH, PharmD 12/19/2024 8:07 AM

## 2024-12-19 NOTE — PROGRESS NOTES
NAME:  Levy Vargas  YOB: 1965  MEDICAL RECORD NUMBER:  [id number]    Shift Summary: Pt delirious at beginning of shift and has not slept in 2 days. Precedex gtt started. Dl weaned off. Removing 25ml/hr with CRRT. Plan for FRANTZ/cardioversion today.    Family updated: Yes:  at bedside last night    Rhythm: Atrial Fibrillation     Most recent vitals:   Visit Vitals  BP (!) 130/94   Pulse (!) 157   Temp 98.8 °F (37.1 °C) (Bladder)   Resp 16   Ht 1.854 m (6' 1\")   Wt 130.1 kg (286 lb 13.1 oz)   SpO2 98%   BMI 37.84 kg/m²      ABP (Arterial line BP): 113/74  ABP Mean (Arterial Line Mean): 87 mmHg    No data found.    No data found.      Respiratory support needed (if any):  - O2 - NC - 6 lpm    Admission weight Weight - Scale: (!) 145.4 kg (320 lb 8.8 oz) (12/11/24 2306)    Today's weight    Wt Readings from Last 1 Encounters:   12/19/24 130.1 kg (286 lb 13.1 oz)        Castanon need assessed each shift: YES -  - continue castanon r/t - retention  UOP >30ml/hr: NO - CRRT  Last documented BM (in last 48 hrs):  Patient Vitals for the past 48 hrs:   Last BM (including prior to admit)   12/17/24 2000 12/17/24 12/18/24 0800 12/18/24 12/18/24 2000 12/18/24                Restraints (in use currently or dc'd in last 12 hrs): No    Order current and documentation up to date? No    Lines/Drains reviewed @ bedside.  CVC  12/13/24 Left Internal jugular (Active)   Number of days: 5       Peripheral IV 12/11/24 Posterior;Right Hand (Active)   Number of days: 7       Peripheral IV 12/11/24 Left;Posterior Hand (Active)   Number of days: 7       Arterial Line 12/13/24 Left Radial (Active)   Number of days: 5       Hemodialysis Central Access - Temporary Right Neck (Active)   Number of days: 5       Urinary Catheter 12/12/24 Castanon-Temperature (Active)   Number of days: 6         Drip rates at handoff:    phenylephrine (DL-SYNEPHRINE) 50 mg in sodium chloride 0.9 % 250 mL infusion Stopped (12/18/24 1918)    dexmedeTOMIDine

## 2024-12-19 NOTE — PROGRESS NOTES
Pulmonary Progress Note    Date of Admission: 12/11/2024   LOS: 8 days     CC:  Chief Complaint   Patient presents with    Hypertension     Pt states he has BP of 210 systolic at home.      Dizziness     Pt complains of being lightheaded today.  Onset for \"some time\"   pt denies chest pain, denies shortness of breath           Assessment/Plan   Acute hypoxemic respiratory failure  -wean goal saturation 90%  -Aspiration precautions    Cardiogenic shock,  Cardiomyopathy, LVEF 15%  -Currently on fixed dose milrinone  -Weaned off vasoactive agents  -Wean stress dose steroids to twice daily, then daily starting tomorrow    Lethargy  -Improved    KENDRA requiring RRT  -CRRT per nephrology    Upper GI bleed  -Eliquis on hold, but is currently on a heparin drip  -Protonix twice daily  -Stable Hgb  -GI consulted            HPI/Subjective  No events overnight.  Doing okay postextubation still remains on 6 L of oxygen.  No complaints this morning    ROS:   + Fatigue  -Negative chest pain  -Negative nausea      Intake/Output Summary (Last 24 hours) at 12/19/2024 0952  Last data filed at 12/19/2024 0900  Gross per 24 hour   Intake 2036.63 ml   Output 2801 ml   Net -764.37 ml         PHYSICAL EXAM:   Blood pressure 98/64, pulse (!) 137, temperature 98.8 °F (37.1 °C), temperature source Bladder, resp. rate (!) 9, height 1.854 m (6' 1\"), weight 130.1 kg (286 lb 13.1 oz), SpO2 97%.'  Gen:  No acute distress.   Resp:  No crackles. No wheezes. No rhonchi. No dullness on percussion.  CV: Regular rate. Regular rhythm. No murmur or rub. No edema.   M/S: No cyanosis. No clubbing.    Neuro: Awakens more interactive today          Labs reviewed:  CBC:   Recent Labs     12/17/24  0612 12/17/24  1823 12/18/24  0405 12/19/24  0600   WBC 7.7  --  7.8 7.7   HGB 9.5* 10.1* 9.6* 9.8*   HCT 29.6* 31.6* 30.7* 31.2*   MCV 86.6  --  88.0 88.0     --  155 150     BMP:   Recent Labs     12/18/24  0404 12/18/24  1756 12/19/24  0600   * 139

## 2024-12-19 NOTE — OP NOTE
Operative Note      Patient: Levy Vargas  YOB: 1965  MRN: [id number]    Date of Procedure: 12/11/2024      Diagnosis: Atrial fibrillation with rapid ventricular response  Post-Op Diagnosis: Same           Transesophageal echocardiogram with cardioversion    Anesthesia: Moderate IV sedation    Estimated Blood Loss (mL): Minimal    Complications: None    Findings:    Successful synchronized cardioversion from atrial fibrillation with rapid ventricular response to normal sinus rhythm with a single 200 J synchronized biphasic cardioversion shock.    Detailed Description of Procedure:   Informed consent was obtained from the patient's family and next of kin as well as the patient himself.  Identity was verified with wristband and chart.  Emergency equipment including airway was in place.  Cardioversion pads were placed in anterior posterior position.    After completion of the transesophageal echocardiogram demonstrating no left atrial or left atrial appendage thrombus, he received a single 200 J synchronized biphasic cardioversion shock.    Patient converted from atrial fibrillation with rapid ventricular response to a normal sinus rhythm with intermittent AV pacing.    Conscious sedation was given with 1 mg of IV Versed for the FRANTZ and 20 mg of IV Brevital for the cardioversion procedure.    Patient recovered with no neurologic deficits.  There were no complications from the procedure.    Electronically signed by JEOL ALVARADO MD on 12/19/2024 at 5:19 PM

## 2024-12-19 NOTE — PROGRESS NOTES
Call received from Dr. Hernandez. MD planning for FRANTZ Cardioversion tentatively around 1:00 pm. MD requests this RN to notify ICU Pulmonology and Echo. Telephone order placed.

## 2024-12-19 NOTE — PROGRESS NOTES
SLP NOTE    Chart reviewed and patient discussed with RN. Patient NPO for procedure planned for later this date. ST to hold this date with plan to hold re-attempt until a later date pending status unless otherwise notified.    Thank you.  Marium Lopez MA, CCC-SLP, #[id number]  Speech-Language Pathologist  Portable phone: [phone]

## 2024-12-19 NOTE — PROGRESS NOTES
V2.0    [facility] [place]      Name:  Levy Vargas /Age/Sex: 1965  (59 y.o. male)   MRN & CSN:  [id number] & [id number] Encounter Date/Time: 2024 9:05 AM EST   Location:  [place] [address] PCP: No primary care provider on file.     Attending:Geovani Blanchard MD       Hospital Day: 9    Assessment and Recommendations   Levy Vargas is a 59 y.o. male who presents with Cardiogenic shock    Plan:   Cardiogenic shock, with cardiomyopathy with EF of 15%, extubated yesterday  Acute on chronic congestive heart failure cardiology following, continue to be on milrinone drip.  Patient off norepinephrine, vasopressin discontinued continue to be high risk for life-threatening complications  A-fib with RVR patient on amiodarone drip Eliquis on hold possible FRANTZ today  KENDRA, nephrology consulted continue to be on CRRT  Acute metabolic encephalopathy with lethargy multifactorial  Anemia, hemoglobin 9.8 this morning relatively flat.  Coffee-ground emesis GI consulted Protonix GI consulted no immediate plan for EGD at this time  Acute respiratory failure currently extubated    Total critical care time included but not limited to ordering and following on critical labs, ordering critical IV medication in a patient with potential life-threatening complications, discussion with other subspecialty, not including any procedure time 33 minutes      Diet Diet NPO   DVT Prophylaxis [] Lovenox, []  Heparin, [] SCDs, [] Ambulation,  [] Eliquis, [] Xarelto  [] Coumadin   Code Status Full Code             Personally reviewed Lab Studies and Imaging     Discussed management of the case with intensivist who recommended pressors        Medical Decision Making:  The following items were considered in medical decision making:  Discussion of patient care with other providers  Reviewed clinical lab tests  Reviewed radiology tests  Reviewed other diagnostic tests/interventions  Independent review of radiologic images  Microbiology  metoprolol succinate  25 mg Oral QPM    [Held by provider] sacubitril-valsartan  1 tablet Oral BID    sodium chloride flush  5-40 mL IntraVENous 2 times per day      Infusions:    phenylephrine (DL-SYNEPHRINE) 50 mg in sodium chloride 0.9 % 250 mL infusion Stopped (12/18/24 1918)    dexmedeTOMIDine 0.3 mcg/kg/hr (12/19/24 0700)    heparin (PORCINE) Infusion 1,540 Units/hr (12/19/24 0700)    prismaSATE BGK 4/2.5 2,000 mL/hr at 12/19/24 0620    prismaSATE BGK 4/2.5 500 mL/hr at 12/18/24 2033    prismaSATE BGK 4/2.5 1,000 mL/hr at 12/19/24 0443    milrinone 0.2 mcg/kg/min (12/19/24 0857)    sodium chloride 5 mL/hr at 12/19/24 0700     PRN Meds: potassium chloride, 20 mEq, PRN  magnesium sulfate, 1,000 mg, PRN  calcium gluconate, 1,000 mg, PRN   Or  calcium gluconate, 2,000 mg, PRN   Or  calcium gluconate, 3,000 mg, PRN   Or  calcium gluconate, 4,000 mg, PRN  sodium phosphate 6 mmol in sodium chloride 0.9 % 250 mL IVPB, 6 mmol, PRN   Or  sodium phosphate 12 mmol in sodium chloride 0.9 % 250 mL IVPB, 12 mmol, PRN   Or  sodium phosphate 18 mmol in sodium chloride 0.9 % 500 mL IVPB, 18 mmol, PRN   Or  sodium phosphate 24 mmol in sodium chloride 0.9 % 500 mL IVPB, 24 mmol, PRN  sodium chloride flush, 1.1-1.9 mL, PRN   And  sodium chloride flush, 1.1-1.9 mL, PRN  pantoprazole, 40 mg, Daily PRN  sodium chloride flush, 5-40 mL, PRN  sodium chloride, , PRN  ondansetron, 4 mg, Q8H PRN   Or  ondansetron, 4 mg, Q6H PRN  polyethylene glycol, 17 g, Daily PRN        Labs and Imaging   XR CHEST PORTABLE    Result Date: 12/18/2024  EXAMINATION: ONE XRAY VIEW OF THE CHEST 12/18/2024 5:37 am COMPARISON: 12/13/2024 HISTORY: ORDERING SYSTEM PROVIDED HISTORY: NGT placment verification TECHNOLOGIST PROVIDED HISTORY: Reason for exam:->NGT placment verification Reason for Exam: ng FINDINGS: Patient body habitus limits evaluation of fine pulmonary parenchymal changes. Heart is enlarged. Scattered opacities are seen throughout the lungs

## 2024-12-19 NOTE — PROGRESS NOTES
Patient's Daughter Ian at bedside. She is requesting to postpone the FRANTZ Cardioversion for now.

## 2024-12-19 NOTE — PLAN OF CARE
Problem: Chronic Conditions and Co-morbidities  Goal: Patient's chronic conditions and co-morbidity symptoms are monitored and maintained or improved  Outcome: Progressing     Problem: Pain  Goal: Verbalizes/displays adequate comfort level or baseline comfort level  Outcome: Progressing     Problem: Safety - Adult  Goal: Free from fall injury  Outcome: Progressing  Flowsheets (Taken 12/18/2024 2257)  Free From Fall Injury: Instruct family/caregiver on patient safety     Problem: Respiratory - Adult  Goal: Achieves optimal ventilation and oxygenation  Outcome: Progressing  Flowsheets (Taken 12/18/2024 2000)  Achieves optimal ventilation and oxygenation: Assess for changes in respiratory status     Problem: Cardiovascular - Adult  Goal: Maintains optimal cardiac output and hemodynamic stability  Outcome: Progressing  Flowsheets (Taken 12/18/2024 2000)  Maintains optimal cardiac output and hemodynamic stability: Monitor blood pressure and heart rate     Problem: Cardiovascular - Adult  Goal: Absence of cardiac dysrhythmias or at baseline  Outcome: Not Progressing  Flowsheets (Taken 12/18/2024 2000)  Absence of cardiac dysrhythmias or at baseline: Monitor cardiac rate and rhythm     Problem: Metabolic/Fluid and Electrolytes - Adult  Goal: Electrolytes maintained within normal limits  Outcome: Progressing  Flowsheets (Taken 12/18/2024 2000)  Electrolytes maintained within normal limits: Monitor labs and assess patient for signs and symptoms of electrolyte imbalances     Problem: Metabolic/Fluid and Electrolytes - Adult  Goal: Hemodynamic stability and optimal renal function maintained  Outcome: Progressing  Flowsheets (Taken 12/18/2024 2000)  Hemodynamic stability and optimal renal function maintained: Monitor labs and assess for signs and symptoms of volume excess or deficit     Problem: Metabolic/Fluid and Electrolytes - Adult  Goal: Glucose maintained within prescribed range  Outcome: Progressing  Flowsheets (Taken

## 2024-12-19 NOTE — PROGRESS NOTES
Patient's daughter Ian is requesting for patient to be transferred to [facility]. Dr. Hernandez and Dr. Blanchard notified.

## 2024-12-20 LAB
ALBUMIN SERPL-MCNC: 3.3 G/DL (ref 3.4–5)
ALBUMIN SERPL-MCNC: 3.4 G/DL (ref 3.4–5)
ANION GAP SERPL CALCULATED.3IONS-SCNC: 10 MMOL/L (ref 3–16)
ANION GAP SERPL CALCULATED.3IONS-SCNC: 8 MMOL/L (ref 3–16)
ANTI-XA UNFRAC HEPARIN: 0.16 IU/ML (ref 0.3–0.7)
ANTI-XA UNFRAC HEPARIN: 0.3 IU/ML (ref 0.3–0.7)
ANTI-XA UNFRAC HEPARIN: 0.42 IU/ML (ref 0.3–0.7)
BUN SERPL-MCNC: 15 MG/DL (ref 7–20)
BUN SERPL-MCNC: 18 MG/DL (ref 7–20)
CA-I BLD-SCNC: 1.14 MMOL/L (ref 1.12–1.32)
CALCIUM SERPL-MCNC: 8.5 MG/DL (ref 8.3–10.6)
CALCIUM SERPL-MCNC: 8.5 MG/DL (ref 8.3–10.6)
CHLORIDE SERPL-SCNC: 101 MMOL/L (ref 99–110)
CHLORIDE SERPL-SCNC: 102 MMOL/L (ref 99–110)
CO2 SERPL-SCNC: 24 MMOL/L (ref 21–32)
CO2 SERPL-SCNC: 26 MMOL/L (ref 21–32)
CREAT SERPL-MCNC: 1.2 MG/DL (ref 0.8–1.3)
CREAT SERPL-MCNC: 1.3 MG/DL (ref 0.8–1.3)
DEPRECATED RDW RBC AUTO: 19.1 % (ref 12.4–15.4)
GFR SERPLBLD CREATININE-BSD FMLA CKD-EPI: 63 ML/MIN/{1.73_M2}
GFR SERPLBLD CREATININE-BSD FMLA CKD-EPI: 69 ML/MIN/{1.73_M2}
GLUCOSE BLD-MCNC: 121 MG/DL (ref 70–99)
GLUCOSE BLD-MCNC: 129 MG/DL (ref 70–99)
GLUCOSE BLD-MCNC: 153 MG/DL (ref 70–99)
GLUCOSE SERPL-MCNC: 146 MG/DL (ref 70–99)
GLUCOSE SERPL-MCNC: 93 MG/DL (ref 70–99)
HCT VFR BLD AUTO: 31.5 % (ref 40.5–52.5)
HGB BLD-MCNC: 10.1 G/DL (ref 13.5–17.5)
MAGNESIUM SERPL-MCNC: 2.47 MG/DL (ref 1.8–2.4)
MCH RBC QN AUTO: 28.1 PG (ref 26–34)
MCHC RBC AUTO-ENTMCNC: 32.2 G/DL (ref 31–36)
MCV RBC AUTO: 87.4 FL (ref 80–100)
PERFORMED ON: ABNORMAL
PH BLDV: 7.44 [PH] (ref 7.35–7.45)
PHOSPHATE SERPL-MCNC: 1.6 MG/DL (ref 2.5–4.9)
PHOSPHATE SERPL-MCNC: 1.7 MG/DL (ref 2.5–4.9)
PLATELET # BLD AUTO: 156 K/UL (ref 135–450)
PMV BLD AUTO: 8.9 FL (ref 5–10.5)
POTASSIUM SERPL-SCNC: 4.1 MMOL/L (ref 3.5–5.1)
POTASSIUM SERPL-SCNC: 4.3 MMOL/L (ref 3.5–5.1)
POTASSIUM SERPL-SCNC: 4.3 MMOL/L (ref 3.5–5.1)
RBC # BLD AUTO: 3.61 M/UL (ref 4.2–5.9)
SODIUM SERPL-SCNC: 135 MMOL/L (ref 136–145)
SODIUM SERPL-SCNC: 136 MMOL/L (ref 136–145)
WBC # BLD AUTO: 7.5 K/UL (ref 4–11)

## 2024-12-20 PROCEDURE — 94761 N-INVAS EAR/PLS OXIMETRY MLT: CPT

## 2024-12-20 PROCEDURE — 6370000000 HC RX 637 (ALT 250 FOR IP): Performed by: INTERNAL MEDICINE

## 2024-12-20 PROCEDURE — 2100000000 HC CCU R&B

## 2024-12-20 PROCEDURE — 6370000000 HC RX 637 (ALT 250 FOR IP)

## 2024-12-20 PROCEDURE — 90945 DIALYSIS ONE EVALUATION: CPT

## 2024-12-20 PROCEDURE — 85027 COMPLETE CBC AUTOMATED: CPT

## 2024-12-20 PROCEDURE — 2580000003 HC RX 258: Performed by: INTERNAL MEDICINE

## 2024-12-20 PROCEDURE — 2500000003 HC RX 250 WO HCPCS: Performed by: HOSPITALIST

## 2024-12-20 PROCEDURE — 2500000003 HC RX 250 WO HCPCS: Performed by: INTERNAL MEDICINE

## 2024-12-20 PROCEDURE — 85520 HEPARIN ASSAY: CPT

## 2024-12-20 PROCEDURE — 6360000002 HC RX W HCPCS: Performed by: INTERNAL MEDICINE

## 2024-12-20 PROCEDURE — 99291 CRITICAL CARE FIRST HOUR: CPT | Performed by: INTERNAL MEDICINE

## 2024-12-20 PROCEDURE — 82330 ASSAY OF CALCIUM: CPT

## 2024-12-20 PROCEDURE — 2580000003 HC RX 258

## 2024-12-20 PROCEDURE — 97129 THER IVNTJ 1ST 15 MIN: CPT

## 2024-12-20 PROCEDURE — 92526 ORAL FUNCTION THERAPY: CPT

## 2024-12-20 PROCEDURE — 83735 ASSAY OF MAGNESIUM: CPT

## 2024-12-20 PROCEDURE — 99233 SBSQ HOSP IP/OBS HIGH 50: CPT | Performed by: INTERNAL MEDICINE

## 2024-12-20 PROCEDURE — 6360000002 HC RX W HCPCS: Performed by: FAMILY MEDICINE

## 2024-12-20 PROCEDURE — 2500000003 HC RX 250 WO HCPCS: Performed by: FAMILY MEDICINE

## 2024-12-20 PROCEDURE — 2700000000 HC OXYGEN THERAPY PER DAY

## 2024-12-20 PROCEDURE — 2500000003 HC RX 250 WO HCPCS

## 2024-12-20 PROCEDURE — 80069 RENAL FUNCTION PANEL: CPT

## 2024-12-20 RX ORDER — INSULIN LISPRO 100 [IU]/ML
0-4 INJECTION, SOLUTION INTRAVENOUS; SUBCUTANEOUS
Status: DISCONTINUED | OUTPATIENT
Start: 2024-12-20 | End: 2025-01-03 | Stop reason: HOSPADM

## 2024-12-20 RX ORDER — HEPARIN SODIUM 1000 [USP'U]/ML
2000 INJECTION, SOLUTION INTRAVENOUS; SUBCUTANEOUS ONCE
Status: COMPLETED | OUTPATIENT
Start: 2024-12-20 | End: 2024-12-20

## 2024-12-20 RX ORDER — QUETIAPINE FUMARATE 25 MG/1
25 TABLET, FILM COATED ORAL NIGHTLY
Status: DISCONTINUED | OUTPATIENT
Start: 2024-12-20 | End: 2025-01-03 | Stop reason: HOSPADM

## 2024-12-20 RX ADMIN — SODIUM CHLORIDE, PRESERVATIVE FREE 10 ML: 5 INJECTION INTRAVENOUS at 21:42

## 2024-12-20 RX ADMIN — HEPARIN SODIUM 1810 UNITS/HR: 10000 INJECTION, SOLUTION INTRAVENOUS at 18:04

## 2024-12-20 RX ADMIN — SODIUM PHOSPHATE, MONOBASIC, MONOHYDRATE AND SODIUM PHOSPHATE, DIBASIC, ANHYDROUS 12 MMOL: 142; 276 INJECTION, SOLUTION INTRAVENOUS at 21:11

## 2024-12-20 RX ADMIN — Medication: at 14:37

## 2024-12-20 RX ADMIN — DEXMEDETOMIDINE HYDROCHLORIDE 0.2 MCG/KG/HR: 400 INJECTION INTRAVENOUS at 11:29

## 2024-12-20 RX ADMIN — Medication: at 02:12

## 2024-12-20 RX ADMIN — SODIUM CHLORIDE, PRESERVATIVE FREE 10 ML: 5 INJECTION INTRAVENOUS at 09:03

## 2024-12-20 RX ADMIN — Medication: at 19:40

## 2024-12-20 RX ADMIN — MILRINONE LACTATE 0.2 MCG/KG/MIN: 0.2 INJECTION, SOLUTION INTRAVENOUS at 07:18

## 2024-12-20 RX ADMIN — Medication: at 04:41

## 2024-12-20 RX ADMIN — HEPARIN SODIUM 1540 UNITS/HR: 10000 INJECTION, SOLUTION INTRAVENOUS at 05:21

## 2024-12-20 RX ADMIN — Medication: at 09:37

## 2024-12-20 RX ADMIN — SODIUM CHLORIDE, PRESERVATIVE FREE 40 MG: 5 INJECTION INTRAVENOUS at 14:01

## 2024-12-20 RX ADMIN — SODIUM PHOSPHATE, MONOBASIC, MONOHYDRATE AND SODIUM PHOSPHATE, DIBASIC, ANHYDROUS 18 MMOL: 142; 276 INJECTION, SOLUTION INTRAVENOUS at 08:12

## 2024-12-20 RX ADMIN — QUETIAPINE FUMARATE 25 MG: 25 TABLET ORAL at 21:41

## 2024-12-20 RX ADMIN — HYDROCORTISONE SODIUM SUCCINATE 50 MG: 100 INJECTION, POWDER, FOR SOLUTION INTRAMUSCULAR; INTRAVENOUS at 09:03

## 2024-12-20 RX ADMIN — MILRINONE LACTATE 0.2 MCG/KG/MIN: 0.2 INJECTION, SOLUTION INTRAVENOUS at 18:03

## 2024-12-20 RX ADMIN — HEPARIN SODIUM 2000 UNITS: 1000 INJECTION INTRAVENOUS; SUBCUTANEOUS at 07:26

## 2024-12-20 RX ADMIN — Medication: at 07:24

## 2024-12-20 RX ADMIN — Medication: at 12:05

## 2024-12-20 RX ADMIN — AMIODARONE HYDROCHLORIDE 200 MG: 200 TABLET ORAL at 11:58

## 2024-12-20 RX ADMIN — Medication: at 22:09

## 2024-12-20 RX ADMIN — Medication: at 11:29

## 2024-12-20 RX ADMIN — AMIODARONE HYDROCHLORIDE 200 MG: 200 TABLET ORAL at 21:41

## 2024-12-20 RX ADMIN — SODIUM CHLORIDE, PRESERVATIVE FREE 40 MG: 5 INJECTION INTRAVENOUS at 02:17

## 2024-12-20 RX ADMIN — CEFEPIME 2000 MG: 2 INJECTION, POWDER, FOR SOLUTION INTRAVENOUS at 02:16

## 2024-12-20 RX ADMIN — DEXMEDETOMIDINE HYDROCHLORIDE 0.2 MCG/KG/HR: 400 INJECTION INTRAVENOUS at 07:34

## 2024-12-20 RX ADMIN — ATORVASTATIN CALCIUM 80 MG: 80 TABLET, FILM COATED ORAL at 21:41

## 2024-12-20 RX ADMIN — Medication: at 12:03

## 2024-12-20 RX ADMIN — ASPIRIN 81 MG 81 MG: 81 TABLET ORAL at 11:57

## 2024-12-20 RX ADMIN — Medication: at 02:09

## 2024-12-20 ASSESSMENT — PAIN SCALES - GENERAL
PAINLEVEL_OUTOF10: 0

## 2024-12-20 NOTE — PROGRESS NOTES
SLP NOTE    Dysphagia tx/follow-up/re-assessment attempted. Patient reported to be sleeping soundly with RN rec to re-attempt. ST to re-attempt as schedule permits unless otherwise notified.    Thank you.  Marium Lopez MA, CCC-SLP, #[id number]  Speech-Language Pathologist  Portable phone: [phone]

## 2024-12-20 NOTE — PROGRESS NOTES
Nephrology Progress Note   [employer]      Reason for consultation:  KENDRA on CKD 2 -- baseline Cr ~ 0.9-1.3 mg/dL     HPI: Levy Vargas is a 60 yo male with a PMHx of CKD 2, h/o AKIs, HFrEF, non-compliance, DM, HTN, afib, h/o cocaine use, CAD, HLD. Patient presents to  ED on 12/11/2024 with complaints of BLE edema, lightheadedness, and weakness. Patient is currently drowsy and it is difficult to obtain history. Currently oriented x3. Patient tells me he has been taking his CHF medications everyday. Per chart review, there has been some questions regarding his outpatient compliance and not keeping in touch with his CHF nurses. Pt is currently grossly volume overloaded. He weighed 131.5 kg on 9/25/2024 during his last cardiology appt. He currently weighs 145.3 kg. Currently dyspneic and on 4 L NC. CXR is negative for acute findings. In afib RVR this admission. On amio gtt.     We have been consulted for KENDRA on CKD 2 management. Cr upon admission was 1.3 mg/dL. Today, Cr is 2.6 mg/dL. Patient has received a generous amount of IVP and oral diuretic yesterday and was also started on a lasix gtt at 10 mg/hr. Despite this, pt has remained anuric with 150 mL UOP/24 hrs and 25 mL UOP documented today. BP has been as low as 63/38 mmHg since admission. Started on milrinone gtt yesterday.     Subjective:    The patient has been seen and examined. Labs and chart reviewed. Currently on 4 L NC. Remains on milrinone gtt. Seen on CRRT with net 25 mL UF/hr. UOP remains poor with 194 mL UOP/24 hrs.     There were not complications overnight.    Patient review of systems: Denies SOB       Allergies:  No Known Allergies     Scheduled Meds:   QUEtiapine  25 mg Oral Nightly    hydrocortisone sodium succinate PF  50 mg IntraVENous Daily    LORazepam  0.5 mg IntraVENous Once    amiodarone  200 mg Per NG tube BID    pantoprazole (PROTONIX) 40 mg in sodium chloride (PF) 0.9 % 10 mL injection  40 mg IntraVENous Q12H    [Held by provider]     LEUKOCYTESUR SMALL 12/12/2024 06:45 PM    UROBILINOGEN 2.0 12/12/2024 06:45 PM    BILIRUBINUR MODERATE 12/12/2024 06:45 PM    BLOODU LARGE 12/12/2024 06:45 PM    GLUCOSEU Negative 12/12/2024 06:45 PM         IMPRESSION/RECOMMENDATIONS:      Anuric KENDRA on CKD 2: baseline Cr ~ 0.9-1.3 mg/dL. Etiology 2/2 cardiorenal syndrome vs cardiogenic shock.              - R temp vascath placed and CRRT initiated 12/13/2024              - Continue CRRT with net UF 25 mL/hr -- Modified CRRT prescription to achieve adequate clearance 12/16/24               - Continue q12 labs     2. Cardiogenic shock / HFrEF. Wt was 131.5 kg on 9/25/2024 during last cardiology appt              - TTE (10/19/2024): LVEF 10-15%. Severe global hypokinesis. Indeterminate diastolic dysfx. LA severely dilated.               - Intubated 12/13/2024 - extubated 12/17/24              - Failed diuresis. Volume removal as tolerated with CRRT               - S/p Albumin to help support BP               - On milrinone and off pressors at the moment              - Strict I/Os. Daily weights.              - Cardiology and CC team following     3. Afib RVR              - Now in NSR s/p cardioversion 12/19/24              - Cardiology following     4. GI bleed              - Hgb 10.1 g/dL              - Iron sat 18; transferrin 195 -- s/p venofer    - B12 and folate adequate     5. Hypophosphatemia              - Replace phosphorus per CRRT PRN replacement protocol    Will discuss with nephrology attending physician, Dr. Rosario.  See attestation for additional recommendations.    Martine Larios, CONSUELO - CNP

## 2024-12-20 NOTE — PLAN OF CARE
Problem: Chronic Conditions and Co-morbidities  Goal: Patient's chronic conditions and co-morbidity symptoms are monitored and maintained or improved  12/20/2024 0616 by Marium Woods RN  Outcome: Progressing  Flowsheets (Taken 12/19/2024 2040)  Care Plan - Patient's Chronic Conditions and Co-Morbidity Symptoms are Monitored and Maintained or Improved:   Monitor and assess patient's chronic conditions and comorbid symptoms for stability, deterioration, or improvement   Collaborate with multidisciplinary team to address chronic and comorbid conditions and prevent exacerbation or deterioration   Update acute care plan with appropriate goals if chronic or comorbid symptoms are exacerbated and prevent overall improvement and discharge  12/19/2024 1953 by Irasema Montero RN  Outcome: Progressing  Flowsheets (Taken 12/19/2024 0800)  Care Plan - Patient's Chronic Conditions and Co-Morbidity Symptoms are Monitored and Maintained or Improved:   Monitor and assess patient's chronic conditions and comorbid symptoms for stability, deterioration, or improvement   Collaborate with multidisciplinary team to address chronic and comorbid conditions and prevent exacerbation or deterioration   Update acute care plan with appropriate goals if chronic or comorbid symptoms are exacerbated and prevent overall improvement and discharge     Problem: Pain  Goal: Verbalizes/displays adequate comfort level or baseline comfort level  12/20/2024 0616 by Marium Woods, RN  Outcome: Progressing  12/19/2024 1953 by Irasema oMntero RN  Outcome: Progressing  Flowsheets (Taken 12/19/2024 1949 by Marium Woods, RN)  Verbalizes/displays adequate comfort level or baseline comfort level:   Encourage patient to monitor pain and request assistance   Administer analgesics based on type and severity of pain and evaluate response   Assess pain using appropriate pain scale   Implement non-pharmacological measures as appropriate and evaluate  optimal renal function maintained:   Monitor labs and assess for signs and symptoms of volume excess or deficit   Monitor intake, output and patient weight   Monitor urine specific gravity, serum osmolarity and serum sodium as indicated or ordered   Monitor response to interventions for patient's volume status, including labs, urine output, blood pressure (other measures as available)   Encourage oral intake as appropriate   Instruct patient on fluid and nutrition restrictions as appropriate  12/19/2024 1953 by Irasema Montero, RN  Outcome: Progressing  Flowsheets (Taken 12/19/2024 0800)  Hemodynamic stability and optimal renal function maintained:   Monitor labs and assess for signs and symptoms of volume excess or deficit   Monitor intake, output and patient weight   Monitor response to interventions for patient's volume status, including labs, urine output, blood pressure (other measures as available)  Goal: Glucose maintained within prescribed range  12/20/2024 0616 by Marium Woods, RN  Outcome: Progressing  Flowsheets (Taken 12/19/2024 2040)  Glucose maintained within prescribed range:   Monitor blood glucose as ordered   Assess for signs and symptoms of hyperglycemia and hypoglycemia   Administer ordered medications to maintain glucose within target range   Assess barriers to adequate nutritional intake and initiate nutrition consult as needed   Instruct patient on self management of diabetes and initiate consult as needed  12/19/2024 1953 by Irasema Montero, RN  Outcome: Progressing  Flowsheets (Taken 12/19/2024 0800)  Glucose maintained within prescribed range:   Monitor blood glucose as ordered   Assess for signs and symptoms of hyperglycemia and hypoglycemia   Administer ordered medications to maintain glucose within target range     Problem: Neurosensory - Adult  Goal: Achieves stable or improved neurological status  12/20/2024 0616 by Marium Woods, RN  Outcome: Progressing  Flowsheets (Taken

## 2024-12-20 NOTE — PROGRESS NOTES
Clinical Pharmacy Note  Heparin Dosing       Lab Results   Component Value Date/Time    ANTIXAUHEP 0.42 12/20/2024 01:00 PM      Lab Results   Component Value Date/Time    HGB 10.1 12/20/2024 06:15 AM    HCT 31.5 12/20/2024 06:15 AM     12/20/2024 06:15 AM    INR 1.70 12/12/2024 03:59 AM       Current Infusion Rate: 1810 units/hr    Plan:  Rate: continue 1810 units/hr  Next anti-Xa level: 1900 12/20/24    Pharmacy will continue to monitor and adjust based on anti-Xa results.  France Lyon [facility],12/20/2024,1:52 PM

## 2024-12-20 NOTE — PROGRESS NOTES
Pulmonary Progress Note    Date of Admission: 12/11/2024   LOS: 9 days     CC:  Chief Complaint   Patient presents with    Hypertension     Pt states he has BP of 210 systolic at home.      Dizziness     Pt complains of being lightheaded today.  Onset for \"some time\"   pt denies chest pain, denies shortness of breath           Assessment/Plan   Acute hypoxemic respiratory failure  -wean goal saturation 90%  -Aspiration precautions    Cardiogenic shock,  Cardiomyopathy, LVEF 15%  -Currently on fixed dose milrinone  -Off Levophed  -Stress dose steroids completed today    Lethargy  -Improved    KENDRA requiring RRT  -CRRT per nephrology    Upper GI bleed  -Eliquis on hold, but is currently on a heparin drip  -Protonix twice daily  -Stable Hgb  -GI consulted      Acute encephalopathy  -Likely metabolic versus cardiac output.  -Completed antibiotics  -Seroquel 25      HPI/Subjective  No events overnight.  Intermittent confusion but more awake and interactive.    ROS:   Negative diarrhea  -Negative chest pain  -Negative nausea      Intake/Output Summary (Last 24 hours) at 12/20/2024 1001  Last data filed at 12/20/2024 0900  Gross per 24 hour   Intake 1394.35 ml   Output 2384 ml   Net -989.65 ml         PHYSICAL EXAM:   Blood pressure (!) 142/67, pulse 81, temperature 98 °F (36.7 °C), temperature source Bladder, resp. rate 11, height 1.854 m (6' 1\"), weight 129.3 kg (285 lb 0.9 oz), SpO2 98%.'  Gen:  No acute distress.   Resp:  No crackles. No wheezes. No rhonchi. No dullness on percussion.  CV: Regular rate. Regular rhythm. No murmur or rub. No edema.   M/S: No cyanosis. No clubbing.    Neuro: Awakens more interactive today          Labs reviewed:  CBC:   Recent Labs     12/18/24  0405 12/19/24  0600 12/20/24  0615   WBC 7.8 7.7 7.5   HGB 9.6* 9.8* 10.1*   HCT 30.7* 31.2* 31.5*   MCV 88.0 88.0 87.4    150 156     BMP:   Recent Labs     12/19/24  0600 12/19/24  1758 12/20/24  0615   * 136 136   K 4.4 4.4 4.3

## 2024-12-20 NOTE — PROGRESS NOTES
NAME:  Levy Vargas  YOB: 1965  MEDICAL RECORD NUMBER:  [id number]    Shift Summary: Remains on CRRT, tolerating fluid pull of 25mL/hr. Passed swallow eval. Tolerating soft/bite sized diet. Pills whole in apple sauce or pudding, tolerated. Remains on Milrinone, and Heparin gtt. Therapeutic x1. On low dose Precedex for delirium to encourage rest/lower stimulation. Starting PO Seroquel tonight. More oriented throughout the day.    Family updated: Yes:  Ian at bedside    Rhythm: Normal Sinus Rhythm     Most recent vitals:   Visit Vitals  BP (!) 109/56   Pulse 83   Temp 99.1 °F (37.3 °C) (Bladder)   Resp 13   Ht 1.854 m (6' 1\")   Wt 129.3 kg (285 lb 0.9 oz)   SpO2 94%   BMI 37.61 kg/m²      ABP (Arterial line BP): 97/75  ABP Mean (Arterial Line Mean): 232 mmHg    No data found.    No data found.      Respiratory support needed (if any):  - O2 - NC - 3 lpm    Admission weight Weight - Scale: (!) 145.4 kg (320 lb 8.8 oz) (12/11/24 2306)    Today's weight    Wt Readings from Last 1 Encounters:   12/20/24 129.3 kg (285 lb 0.9 oz)        Castanon need assessed each shift: YES -  - continue castanon r/t - retention/CRRT  UOP >30ml/hr: NO - CRRT  Last documented BM (in last 48 hrs):  Patient Vitals for the past 48 hrs:   Last BM (including prior to admit)   12/18/24 2000 12/18/24 12/19/24 0800 12/18/24 12/19/24 1515 12/18/24 12/19/24 2040 12/19/24 12/20/24 0800 12/20/24                Restraints (in use currently or dc'd in last 12 hrs): No    Order current and documentation up to date? No    Lines/Drains reviewed @ bedside.  CVC  12/13/24 Left Internal jugular (Active)   Number of days: 7       Peripheral IV 12/11/24 Posterior;Right Hand (Active)   Number of days: 8       Peripheral IV 12/11/24 Left;Posterior Hand (Active)   Number of days: 8       Arterial Line 12/13/24 Left Radial (Active)   Number of days: 7       Hemodialysis Central Access - Temporary Right Neck (Active)   Number of days: 7        Urinary Catheter 12/12/24 Damon-Temperature (Active)   Number of days: 8         Drip rates at handoff:    phenylephrine (DL-SYNEPHRINE) 50 mg in sodium chloride 0.9 % 250 mL infusion Stopped (12/18/24 1918)    dexmedeTOMIDine 0.2 mcg/kg/hr (12/20/24 1800)    heparin (PORCINE) Infusion 1,810 Units/hr (12/20/24 1804)    prismaSATE BGK 4/2.5 2,000 mL/hr at 12/20/24 1437    prismaSATE BGK 4/2.5 500 mL/hr at 12/20/24 1129    prismaSATE BGK 4/2.5 1,000 mL/hr at 12/20/24 1205    milrinone 0.2 mcg/kg/min (12/20/24 1803)    sodium chloride 5 mL/hr at 12/20/24 1800       Lab Data:   CBC:   Recent Labs     12/19/24  0600 12/20/24  0615   WBC 7.7 7.5   HGB 9.8* 10.1*   HCT 31.2* 31.5*   MCV 88.0 87.4    156     BMP:    Recent Labs     12/19/24  1758 12/20/24  0615    136   K 4.4 4.3  4.3   CO2 23 24   BUN 20 18   CREATININE 1.4* 1.3     LIVR: No results for input(s): \"AST\", \"ALT\" in the last 72 hours.  PT/INR: No results for input(s): \"INR\" in the last 72 hours.    Invalid input(s): \"PROT\"  APTT: No results for input(s): \"APTT\" in the last 72 hours.  ABG:   Recent Labs     12/18/24  0353 12/19/24  1648   PHART 7.433 7.386   JLP0DSE 42.3 39.7   PO2ART 73.8* 56.7*       Any consults during the shift? No    Any signed and held orders to be released?  No        4 Eyes Skin Assessment       The patient is being assessed for  Shift Handoff    I agree that at least one RN has performed a thorough Head to Toe Skin Assessment on the patient. ALL assessment sites listed below have been assessed.      Areas assessed by both nurses: Head, Face, Ears, Shoulders, Back, Chest, Arms, Elbows, Hands, Sacrum. Buttock, Coccyx, Ischium, Legs. Feet and Heels, and Under Medical Devices         Does the Patient have a Wound? Yes wound(s) were present on assessment. LDA wound assessment was Initiated and completed by RN    Wound Care Orders initiated by RN: Yes       [place]udson Prevention initiated by RN: Yes    Pressure Injury (Stage

## 2024-12-20 NOTE — PROGRESS NOTES
Clinical Pharmacy Note  Heparin Dosing       Lab Results   Component Value Date/Time    ANTIXAUHEP 0.16 12/20/2024 06:14 AM      Lab Results   Component Value Date/Time    HGB 10.1 12/20/2024 06:15 AM    HCT 31.5 12/20/2024 06:15 AM     12/20/2024 06:15 AM    INR 1.70 12/12/2024 03:59 AM       Current Infusion Rate: 1540 units/hr    Plan:  Bolus: 2000 units  Rate: 1810 units/hr  Next anti-Xa level: 1300 12/20/24    Pharmacy will continue to monitor and adjust based on anti-Xa results.    Mila Menon, PharmD

## 2024-12-20 NOTE — PROGRESS NOTES
V2.0    [facility] Progress Note      Name:  Levy Vargas /Age/Sex: 1965  (59 y.o. male)   MRN & CSN:  [id number] & [id number] Encounter Date/Time: 2024 8:48 AM EST   Location:  [place] [address] PCP: No primary care provider on file.     Attending:Geovani Blanchard MD       Hospital Day: 10    Assessment and Recommendations   Levy Vargas is a 59 y.o. male who presents with Cardiogenic shock      Plan:     Cardiogenic shock, with cardiomyopathy with EF of 15%, extubated, multiple discussions with cardiology and with family members at different time yesterday.  Acute on chronic congestive heart failure cardiology following, continue to be on milrinone drip.  Patient off norepinephrine, vasopressin.    A-fib with RVR status post FRANTZ  KENDRA, nephrology consulted continue to be on CRRT.  Acute metabolic encephalopathy with lethargy multifactorial fluctuating still confused  Anemia, hemoglobin 10.1 this morning  Coffee-ground emesis GI consulted Protonix GI consulted no immediate plan for EGD at this time  Acute respiratory failure currently extubated  Total critical care time including but not limited to ordering and following on critical labs, ordering critical IV medication discussion with other subspecialty in a patient with potential life-threatening complications 32 minutes  Diet Diet NPO   DVT Prophylaxis [] Lovenox, []  Heparin, [] SCDs, [] Ambulation,  [] Eliquis, [] Xarelto  [] Coumadin   Code Status Full Code             Personally reviewed Lab Studies and Imaging     Discussed management of the case with cardiology who recommended milrinone    Rhythm strip independently interpreted by myself heart rate 99 QTc 458 no ST elevation      Medical Decision Making:  The following items were considered in medical decision making:  Discussion of patient care with other providers  Reviewed clinical lab tests  Reviewed radiology tests  Reviewed other diagnostic tests/interventions  Independent review of      Urine Cultures: No results found for: \"LABURIN\"  Blood Cultures: No results found for: \"BC\"  No results found for: \"BLOODCULT2\"  Organism: No results found for: \"ORG\"      Electronically signed by Geovani Blanchard MD on 12/20/2024 at 8:48 AM  Comment: Please note this report has been produced using speech recognition software and may contain errors related to that system including errors in grammar, punctuation, and spelling, as well as words and phrases that may be inappropriate. If there are any questions or concerns, please feel free to contact the dictating provider for clarification.

## 2024-12-20 NOTE — PROGRESS NOTES
[facility]  SLP DYSPHAGIA THERAPY    Patient: Levy Vargas   : 1965   MRN: [id number]    Treatment Date: 2024   Admitting Diagnosis:   A-fib (HCC) [I48.91]  Atrial fibrillation with rapid ventricular response (HCC) [I48.91]   has a past medical history of Abdominal pain (2021), Abnormal EKG (2016), Acute pancreatitis (2021), Atrial fibrillation (HCC), CHF (congestive heart failure) ([facility]), Cigarette smoker (2021), Cocaine abuse (HCC) (2015), Dehydration (2016), Diabetes mellitus ([facility]), History of cocaine abuse (HCC) (2016), History of MI (myocardial infarction) (2016), Hyperglycemia (2016), Hyperlipidemia, Hypertension, Morbid obesity with BMI of 45.0-49.9, adult (2016), Postural dizziness (2016), and PUD (peptic ulcer disease) (2019).   has a past surgical history that includes Cardiac catheterization; Upper gastrointestinal endoscopy; Leg Surgery (Right, 2023); and Cardiac procedure (N/A, 10/21/2024).  Allergies: NKA  Dysphagia History including instrumental assessment: 5/10/2024 seen by  at [facility] with rec for reg/thin  GI history: consults for GI bleeding; concerns for cirrhosis  ENT history: none on record  Baseline/Prior Level of Function: Living Status: admitted from home with family; Baseline diet: regular/thin    Onset: 2024     Treatment Diagnosis: Oropharyngeal dysphagia    CURRENT ENCOUNTER DIAGNOSTICS/COURSE OF ADMISSION     2024 admitted with c/o lightheadedness. MD ADMISSION H&P HPI: \"Levy Vargas is a 59 y.o. male with pmh of A-fib, hypertension, HFrEF, cirrhosis, hyperlipidemia, diabetes mellitus type II, CKD 3 baseline creatinine 1.3, COPD, tobacco dependence, history of cocaine abuse, history of MI, peptic ulcer disease  who presents with lightheadedness. Patient was seen in the room awake alert oriented to person place and situation.  Patient stated he had dizziness and generalized

## 2024-12-20 NOTE — PROGRESS NOTES
[facility]   Daily Progress Note      Admit Date:  12/11/2024      Subjective:   Mr. Vargas is 59 y.o. male with a past medical history significant for paroxysmal atrial fibrillation,  PAD and chronic systolic CHF with LVEF 15-20% who presented to [facility] with dizziness and elevated blood pressure in the 200's systolic. He had been admitted in October 2024 with acute on chronic systolic CHF and discharged 10/29/24.I was asked to see him for atrial fibrillation with RVR. He was on amiodarone 200mg daily and Eliquis 5mg bid at home. His CHF regimen includes Toprol XL 25mg daily, Entresto 49/51mg bid and torsemide 50mg daily with Imdur 15mg daily. He follows with Dr. Rosa at  Cardiology.     Levy states that a week ago he had trouble getting up from the toilet. He says that he was weak. He says his breathing was not great but it was not the shortness of breath that kept him from getting up.      He denies any chest pain or tightness.     He was placed on a amiodarone drip in the ED for his rapid atrial fibrillation.  He was having decreased urine output and some hypotension yesterday.  He was placed on the milrinone drip as well as IV Lasix.    Interval history:  He remains on milrinone at 0.2mcg/kg/min.  Sinus rhythm on telemetry. Currently on 3L of xygen by NC now, down from 6 yesterday.       Objective:     /66   Pulse 81   Temp 98.3 °F (36.8 °C) (Bladder)   Resp 14   Ht 1.854 m (6' 1\")   Wt 129.3 kg (285 lb 0.9 oz)   SpO2 97%   BMI 37.61 kg/m²      Intake/Output Summary (Last 24 hours) at 12/20/2024 1238  Last data filed at 12/20/2024 1200  Gross per 24 hour   Intake 1400.67 ml   Output 2322 ml   Net -921.33 ml       Physical Exam:  General: Lethargic  Skin:  Warm and dry  Neck:  JVD<8, no carotid bruits  Chest: Normal respiratory effort.diminished bilaterally but clear, no wheezes/rhonchi/rales  Cardiovascular:  Irreg irreg and tachy, normal S1/S2, no M/R/G  Abdomen:  Soft,  nontender, +bowel sounds  Extremities:  1+ bilateral lower extremity edema  Pulses: 2+ bilat carotid    2+ bilat radial    2+ bilat femoral        Medications:    QUEtiapine  25 mg Oral Nightly    insulin lispro  0-4 Units SubCUTAneous 4x Daily WC    LORazepam  0.5 mg IntraVENous Once    amiodarone  200 mg Per NG tube BID    pantoprazole (PROTONIX) 40 mg in sodium chloride (PF) 0.9 % 10 mL injection  40 mg IntraVENous Q12H    [Held by provider] spironolactone  25 mg Oral Daily    [Held by provider] gabapentin  300 mg Oral Nightly    dilTIAZem  10 mg IntraVENous Once    [Held by provider] apixaban  5 mg Oral BID    aspirin  81 mg Oral Daily    atorvastatin  80 mg Oral Nightly    [Held by provider] metoprolol succinate  25 mg Oral QPM    [Held by provider] sacubitril-valsartan  1 tablet Oral BID    sodium chloride flush  5-40 mL IntraVENous 2 times per day      phenylephrine (DL-SYNEPHRINE) 50 mg in sodium chloride 0.9 % 250 mL infusion Stopped (12/18/24 1918)    dexmedeTOMIDine 0.2 mcg/kg/hr (12/20/24 1200)    heparin (PORCINE) Infusion 1,810 Units/hr (12/20/24 1200)    prismaSATE BGK 4/2.5 2,000 mL/hr at 12/20/24 1203    prismaSATE BGK 4/2.5 500 mL/hr at 12/20/24 1129    prismaSATE BGK 4/2.5 1,000 mL/hr at 12/20/24 1205    milrinone 0.2 mcg/kg/min (12/20/24 1200)    sodium chloride Stopped (12/20/24 0812)       Lab Data:  CBC:   Recent Labs     12/18/24  0405 12/19/24  0600 12/20/24  0615   WBC 7.8 7.7 7.5   HGB 9.6* 9.8* 10.1*    150 156     BMP:    Recent Labs     12/19/24  0600 12/19/24  1758 12/20/24  0615   * 136 136   K 4.4 4.4 4.3  4.3   CO2 24 23 24   BUN 16 20 18   CREATININE 1.2 1.4* 1.3     Lab Results   Component Value Date/Time    CHOL 79 10/19/2024 03:13 AM    HDL 17 10/19/2024 03:13 AM    TRIG 68 12/18/2024 04:04 AM     Assessment / Plan:     Atrial fibrillation with RVR  Levy remains in sinus rhythm today.  Continue oral amiodarone 200mg bid.  Continue heparin drip now and lieu of

## 2024-12-20 NOTE — PLAN OF CARE
Problem: Pain  Goal: Verbalizes/displays adequate comfort level or baseline comfort level  12/20/2024 0815 by David Colon RN  Outcome: Progressing  12/20/2024 0616 by Marium Woods RN  Outcome: Progressing  12/19/2024 1953 by Irasema Montero RN  Outcome: Progressing  Flowsheets (Taken 12/19/2024 1949 by Marium Woods RN)  Verbalizes/displays adequate comfort level or baseline comfort level:   Encourage patient to monitor pain and request assistance   Administer analgesics based on type and severity of pain and evaluate response   Assess pain using appropriate pain scale   Implement non-pharmacological measures as appropriate and evaluate response   Consider cultural and social influences on pain and pain management   Notify Licensed Independent Practitioner if interventions unsuccessful or patient reports new pain     Problem: Safety - Adult  Goal: Free from fall injury  12/20/2024 0815 by David Colon RN  Outcome: Progressing  12/20/2024 0616 by Marium Woods RN  Outcome: Progressing     Problem: Respiratory - Adult  Goal: Achieves optimal ventilation and oxygenation  12/20/2024 0815 by David Colon RN  Outcome: Progressing  12/20/2024 0616 by Marium Woods RN  Outcome: Progressing  Flowsheets (Taken 12/19/2024 2040)  Achieves optimal ventilation and oxygenation:   Assess for changes in respiratory status   Assess for changes in mentation and behavior   Oxygen supplementation based on oxygen saturation or arterial blood gases   Position to facilitate oxygenation and minimize respiratory effort   Initiate smoking cessation protocol as indicated   Encourage broncho-pulmonary hygiene including cough, deep breathe, incentive spirometry   Assess the need for suctioning and aspirate as needed   Respiratory therapy support as indicated   Assess and instruct to report shortness of breath or any respiratory difficulty  12/19/2024 1953 by Irasema Montero RN  Outcome:  Without Sign and Symptoms of Infection:   ADMISSION and DAILY: Assess and document risk factors for pressure ulcer development   TWICE DAILY: Assess and document skin integrity     Problem: Musculoskeletal - Adult  Goal: Return mobility to safest level of function  12/20/2024 0815 by David Colon RN  Outcome: Progressing  12/20/2024 0616 by Marium Woods RN  Outcome: Progressing  Flowsheets (Taken 12/19/2024 2040)  Return Mobility to Safest Level of Function:   Assess patient stability and activity tolerance for standing, transferring and ambulating with or without assistive devices   Assist with transfers and ambulation using safe patient handling equipment as needed   Ensure adequate protection for wounds/incisions during mobilization   Obtain physical therapy/occupational therapy consults as needed   Apply continuous passive motion per provider or physical therapy orders to increase flexion toward goal   Instruct patient/family in ordered activity level     Problem: Gastrointestinal - Adult  Goal: Minimal or absence of nausea and vomiting  12/20/2024 0815 by David Colon RN  Outcome: Progressing  12/20/2024 0616 by Marium Woods RN  Outcome: Progressing  Flowsheets (Taken 12/19/2024 2040)  Minimal or absence of nausea and vomiting:   Administer IV fluids as ordered to ensure adequate hydration   Maintain NPO status until nausea and vomiting are resolved   Administer ordered antiemetic medications as needed   Provide nonpharmacologic comfort measures as appropriate   Nutrition consult to assist patient with adequate nutrition and appropriate food choices  12/19/2024 1953 by Irasema Montero RN  Outcome: Progressing     Problem: Genitourinary - Adult  Goal: Urinary catheter remains patent  12/20/2024 0815 by David Colon RN  Outcome: Progressing  12/20/2024 0616 by Marium Woods RN  Outcome: Progressing  Flowsheets (Taken 12/19/2024 2040)  Urinary catheter remains patent:   Assess

## 2024-12-20 NOTE — PLAN OF CARE
Problem: Skin/Tissue Integrity - Adult  Goal: Skin integrity remains intact  Outcome: Not Progressing  Flowsheets (Taken 12/19/2024 0800)  Skin Integrity Remains Intact: Monitor for areas of redness and/or skin breakdown  - Possible DTI to Right Nare     Problem: Chronic Conditions and Co-morbidities  Goal: Patient's chronic conditions and co-morbidity symptoms are monitored and maintained or improved  Outcome: Progressing  Flowsheets (Taken 12/19/2024 0800)  Care Plan - Patient's Chronic Conditions and Co-Morbidity Symptoms are Monitored and Maintained or Improved:   Monitor and assess patient's chronic conditions and comorbid symptoms for stability, deterioration, or improvement   Collaborate with multidisciplinary team to address chronic and comorbid conditions and prevent exacerbation or deterioration   Update acute care plan with appropriate goals if chronic or comorbid symptoms are exacerbated and prevent overall improvement and discharge     Problem: Pain  Goal: Verbalizes/displays adequate comfort level or baseline comfort level  Outcome: Progressing  Flowsheets (Taken 12/19/2024 1949 by Marium Woods, RN)  Verbalizes/displays adequate comfort level or baseline comfort level:   Encourage patient to monitor pain and request assistance   Administer analgesics based on type and severity of pain and evaluate response   Assess pain using appropriate pain scale   Implement non-pharmacological measures as appropriate and evaluate response   Consider cultural and social influences on pain and pain management   Notify Licensed Independent Practitioner if interventions unsuccessful or patient reports new pain     Problem: Respiratory - Adult  Goal: Achieves optimal ventilation and oxygenation  Outcome: Progressing  Flowsheets (Taken 12/19/2024 0800)  Achieves optimal ventilation and oxygenation:   Assess for changes in respiratory status   Assess for changes in mentation and behavior   Position to facilitate  range     Problem: Neurosensory - Adult  Goal: Achieves stable or improved neurological status  Outcome: Progressing       Problem: Skin/Tissue Integrity - Adult  Goal: Incisions, wounds, or drain sites healing without S/S of infection  Outcome: Progressing  Flowsheets (Taken 12/19/2024 0800)  Incisions, Wounds, or Drain Sites Healing Without Sign and Symptoms of Infection:   ADMISSION and DAILY: Assess and document risk factors for pressure ulcer development   TWICE DAILY: Assess and document skin integrity     Problem: Musculoskeletal - Adult  Goal: Return mobility to safest level of function  Recent Flowsheet Documentation  Taken 12/19/2024 0800 by Irasema Montero RN  Return Mobility to Safest Level of Function: Instruct patient/family in ordered activity level     Problem: Gastrointestinal - Adult  Goal: Minimal or absence of nausea and vomiting  Outcome: Progressing     Problem: Genitourinary - Adult  Goal: Urinary catheter remains patent  Outcome: Progressing  Flowsheets (Taken 12/19/2024 0800)  Urinary catheter remains patent: Assess patency of urinary catheter     Problem: Safety - Medical Restraint  Goal: Remains free of injury from restraints (Restraint for Interference with Medical Device)  Description: INTERVENTIONS:  1. Determine that other, less restrictive measures have been tried or would not be effective before applying the restraint  2. Evaluate the patient's condition at the time of restraint application  3. Inform patient/family regarding the reason for restraint  4. Q2H: Monitor safety, psychosocial status, comfort, nutrition and hydration  Outcome: Completed     Problem: ABCDS Injury Assessment  Goal: Absence of physical injury  Outcome: Progressing     Problem: Nutrition Deficit:  Goal: Optimize nutritional status  Outcome: Progressing

## 2024-12-20 NOTE — PROGRESS NOTES
NAME:  Levy Vargas  YOB: 1965  MEDICAL RECORD NUMBER:  [id number]    Shift Summary: FRANTZ Cardioversion today. 1 mg versed. 200 J x1, converted to A-Paced/NSR. Patient's mental status waxing and waning. Oriented to self in AM, Oriented x3 after cardioversion. CRRT restarted at 1839 . Goal Net - 25/hr. NG removed for FRANTZ today, possible DTI on Right nare.     Family updated: Yes:  family at bedside.    Rhythm: Normal Sinus Rhythm , A-Paced    Most recent vitals:   Visit Vitals  /60   Pulse 73   Temp 97.2 °F (36.2 °C)   Resp (!) 9   Ht 1.854 m (6' 1\")   Wt 130.1 kg (286 lb 13.1 oz)   SpO2 93%   BMI 37.84 kg/m²      ABP (Arterial line BP): 108/61  ABP Mean (Arterial Line Mean): 74 mmHg    No data found.    No data found.      Respiratory support needed (if any):  6 L via NC    Admission weight Weight - Scale: (!) 145.4 kg (320 lb 8.8 oz) (12/11/24 2306)    Today's weight    Wt Readings from Last 1 Encounters:   12/19/24 130.1 kg (286 lb 13.1 oz)        Castanon need assessed each shift: YES -  - continue castanon r/t - strict I's/O/s  UOP >30ml/hr: No CRRT  Last documented BM (in last 48 hrs):  Patient Vitals for the past 48 hrs:   Last BM (including prior to admit)   12/17/24 2000 12/17/24 12/18/24 0800 12/18/24 12/18/24 2000 12/18/24 12/19/24 0800 12/18/24 12/19/24 1515 12/18/24                Restraints (in use currently or dc'd in last 12 hrs): No    Order current and documentation up to date? No    Lines/Drains reviewed @ bedside.  CVC  12/13/24 Left Internal jugular (Active)   Number of days: 6       Peripheral IV 12/11/24 Posterior;Right Hand (Active)   Number of days: 7       Peripheral IV 12/11/24 Left;Posterior Hand (Active)   Number of days: 7       Arterial Line 12/13/24 Left Radial (Active)   Number of days: 6       Hemodialysis Central Access - Temporary Right Neck (Active)   Number of days: 6       Urinary Catheter 12/12/24 Castanon-Temperature (Active)   Number of days: 7

## 2024-12-20 NOTE — PROGRESS NOTES
NAME:  Levy Vargas  YOB: 1965  MEDICAL RECORD NUMBER:  [id number]    Shift Summary: Patient oriented to self, place and time. Did not sleep overnight but calm t/o shift; precedex off since yesterday. Remained in sinus rhythm; a paced at times. VSS. CRRT running; ordered fluid removal goal 25 mL/hr. Remains off of pressors; milrinone at 0.2.     Family updated: No    Rhythm: Normal Sinus Rhythm     Most recent vitals:   Visit Vitals  /69   Pulse 100   Temp 98.4 °F (36.9 °C) (Bladder)   Resp 17   Ht 1.854 m (6' 1\")   Wt 129.3 kg (285 lb 0.9 oz)   SpO2 (!) 79%   BMI 37.61 kg/m²      ABP (Arterial line BP): 146/68  ABP Mean (Arterial Line Mean): 91 mmHg    No data found.    No data found.      Respiratory support needed (if any):  - RA    Admission weight Weight - Scale: (!) 145.4 kg (320 lb 8.8 oz) (12/11/24 2306)    Today's weight    Wt Readings from Last 1 Encounters:   12/20/24 129.3 kg (285 lb 0.9 oz)        Castanon need assessed each shift: YES -  - continue castanon r/t - CRRT  UOP >30ml/hr: NO - CRRT  Last documented BM (in last 48 hrs):  Patient Vitals for the past 48 hrs:   Last BM (including prior to admit)   12/18/24 0800 12/18/24 12/18/24 2000 12/18/24 12/19/24 0800 12/18/24 12/19/24 1515 12/18/24 12/19/24 2040 12/19/24                Restraints (in use currently or dc'd in last 12 hrs): No    Order current and documentation up to date? No    Lines/Drains reviewed @ bedside.  CVC  12/13/24 Left Internal jugular (Active)   Number of days: 6       Peripheral IV 12/11/24 Posterior;Right Hand (Active)   Number of days: 8       Peripheral IV 12/11/24 Left;Posterior Hand (Active)   Number of days: 8       Arterial Line 12/13/24 Left Radial (Active)   Number of days: 6       Hemodialysis Central Access - Temporary Right Neck (Active)   Number of days: 6       Urinary Catheter 12/12/24 Castanon-Temperature (Active)   Number of days: 7         Drip rates at handoff:    phenylephrine

## 2024-12-21 LAB
ALBUMIN SERPL-MCNC: 3.2 G/DL (ref 3.4–5)
ALBUMIN SERPL-MCNC: 3.2 G/DL (ref 3.4–5)
ANION GAP SERPL CALCULATED.3IONS-SCNC: 8 MMOL/L (ref 3–16)
ANION GAP SERPL CALCULATED.3IONS-SCNC: 9 MMOL/L (ref 3–16)
ANTI-XA UNFRAC HEPARIN: 0.32 IU/ML (ref 0.3–0.7)
BUN SERPL-MCNC: 12 MG/DL (ref 7–20)
BUN SERPL-MCNC: 13 MG/DL (ref 7–20)
CA-I BLD-SCNC: 1.13 MMOL/L (ref 1.12–1.32)
CA-I BLD-SCNC: 1.13 MMOL/L (ref 1.12–1.32)
CALCIUM SERPL-MCNC: 8.1 MG/DL (ref 8.3–10.6)
CALCIUM SERPL-MCNC: 8.2 MG/DL (ref 8.3–10.6)
CHLORIDE SERPL-SCNC: 102 MMOL/L (ref 99–110)
CHLORIDE SERPL-SCNC: 102 MMOL/L (ref 99–110)
CO2 SERPL-SCNC: 24 MMOL/L (ref 21–32)
CO2 SERPL-SCNC: 26 MMOL/L (ref 21–32)
CREAT SERPL-MCNC: 1.2 MG/DL (ref 0.8–1.3)
CREAT SERPL-MCNC: 1.2 MG/DL (ref 0.8–1.3)
DEPRECATED RDW RBC AUTO: 19.1 % (ref 12.4–15.4)
GFR SERPLBLD CREATININE-BSD FMLA CKD-EPI: 69 ML/MIN/{1.73_M2}
GFR SERPLBLD CREATININE-BSD FMLA CKD-EPI: 69 ML/MIN/{1.73_M2}
GLUCOSE BLD-MCNC: 114 MG/DL (ref 70–99)
GLUCOSE BLD-MCNC: 127 MG/DL (ref 70–99)
GLUCOSE BLD-MCNC: 139 MG/DL (ref 70–99)
GLUCOSE BLD-MCNC: 93 MG/DL (ref 70–99)
GLUCOSE SERPL-MCNC: 120 MG/DL (ref 70–99)
GLUCOSE SERPL-MCNC: 129 MG/DL (ref 70–99)
HCT VFR BLD AUTO: 30.9 % (ref 40.5–52.5)
HGB BLD-MCNC: 10.1 G/DL (ref 13.5–17.5)
MAGNESIUM SERPL-MCNC: 2.48 MG/DL (ref 1.8–2.4)
MAGNESIUM SERPL-MCNC: 2.49 MG/DL (ref 1.8–2.4)
MCH RBC QN AUTO: 28.4 PG (ref 26–34)
MCHC RBC AUTO-ENTMCNC: 32.7 G/DL (ref 31–36)
MCV RBC AUTO: 86.8 FL (ref 80–100)
PERFORMED ON: ABNORMAL
PERFORMED ON: NORMAL
PH BLDV: 7.37 [PH] (ref 7.35–7.45)
PH BLDV: 7.41 [PH] (ref 7.35–7.45)
PHOSPHATE SERPL-MCNC: 1.6 MG/DL (ref 2.5–4.9)
PHOSPHATE SERPL-MCNC: 1.7 MG/DL (ref 2.5–4.9)
PLATELET # BLD AUTO: 141 K/UL (ref 135–450)
PMV BLD AUTO: 8.7 FL (ref 5–10.5)
POTASSIUM SERPL-SCNC: 3.9 MMOL/L (ref 3.5–5.1)
POTASSIUM SERPL-SCNC: 3.9 MMOL/L (ref 3.5–5.1)
RBC # BLD AUTO: 3.56 M/UL (ref 4.2–5.9)
SODIUM SERPL-SCNC: 135 MMOL/L (ref 136–145)
SODIUM SERPL-SCNC: 136 MMOL/L (ref 136–145)
WBC # BLD AUTO: 7 K/UL (ref 4–11)

## 2024-12-21 PROCEDURE — 97129 THER IVNTJ 1ST 15 MIN: CPT

## 2024-12-21 PROCEDURE — 85520 HEPARIN ASSAY: CPT

## 2024-12-21 PROCEDURE — 6360000002 HC RX W HCPCS: Performed by: FAMILY MEDICINE

## 2024-12-21 PROCEDURE — 2500000003 HC RX 250 WO HCPCS: Performed by: HOSPITALIST

## 2024-12-21 PROCEDURE — 2100000000 HC CCU R&B

## 2024-12-21 PROCEDURE — 99232 SBSQ HOSP IP/OBS MODERATE 35: CPT | Performed by: INTERNAL MEDICINE

## 2024-12-21 PROCEDURE — 2500000003 HC RX 250 WO HCPCS: Performed by: FAMILY MEDICINE

## 2024-12-21 PROCEDURE — 36556 INSERT NON-TUNNEL CV CATH: CPT

## 2024-12-21 PROCEDURE — 6360000002 HC RX W HCPCS: Performed by: INTERNAL MEDICINE

## 2024-12-21 PROCEDURE — 85027 COMPLETE CBC AUTOMATED: CPT

## 2024-12-21 PROCEDURE — 6360000002 HC RX W HCPCS

## 2024-12-21 PROCEDURE — 92526 ORAL FUNCTION THERAPY: CPT

## 2024-12-21 PROCEDURE — 82330 ASSAY OF CALCIUM: CPT

## 2024-12-21 PROCEDURE — 6370000000 HC RX 637 (ALT 250 FOR IP): Performed by: INTERNAL MEDICINE

## 2024-12-21 PROCEDURE — 2500000003 HC RX 250 WO HCPCS

## 2024-12-21 PROCEDURE — 2580000003 HC RX 258

## 2024-12-21 PROCEDURE — 83735 ASSAY OF MAGNESIUM: CPT

## 2024-12-21 PROCEDURE — 80069 RENAL FUNCTION PANEL: CPT

## 2024-12-21 PROCEDURE — 2500000003 HC RX 250 WO HCPCS: Performed by: INTERNAL MEDICINE

## 2024-12-21 PROCEDURE — 90945 DIALYSIS ONE EVALUATION: CPT

## 2024-12-21 PROCEDURE — 2580000003 HC RX 258: Performed by: INTERNAL MEDICINE

## 2024-12-21 PROCEDURE — 6370000000 HC RX 637 (ALT 250 FOR IP)

## 2024-12-21 PROCEDURE — APPNB15 APP NON BILLABLE TIME 0-15 MINS: Performed by: NURSE PRACTITIONER

## 2024-12-21 PROCEDURE — 99291 CRITICAL CARE FIRST HOUR: CPT | Performed by: NURSE PRACTITIONER

## 2024-12-21 RX ORDER — GUAIFENESIN/DEXTROMETHORPHAN 100-10MG/5
5 SYRUP ORAL EVERY 4 HOURS PRN
Status: DISCONTINUED | OUTPATIENT
Start: 2024-12-21 | End: 2025-01-03 | Stop reason: HOSPADM

## 2024-12-21 RX ADMIN — Medication: at 08:26

## 2024-12-21 RX ADMIN — Medication: at 05:50

## 2024-12-21 RX ADMIN — SODIUM PHOSPHATE, MONOBASIC, MONOHYDRATE AND SODIUM PHOSPHATE, DIBASIC, ANHYDROUS 12 MMOL: 142; 276 INJECTION, SOLUTION INTRAVENOUS at 19:55

## 2024-12-21 RX ADMIN — AMIODARONE HYDROCHLORIDE 200 MG: 200 TABLET ORAL at 23:07

## 2024-12-21 RX ADMIN — Medication: at 21:30

## 2024-12-21 RX ADMIN — SODIUM PHOSPHATE, MONOBASIC, MONOHYDRATE AND SODIUM PHOSPHATE, DIBASIC, ANHYDROUS 12 MMOL: 142; 276 INJECTION, SOLUTION INTRAVENOUS at 10:01

## 2024-12-21 RX ADMIN — HEPARIN SODIUM 1810 UNITS/HR: 10000 INJECTION, SOLUTION INTRAVENOUS at 09:36

## 2024-12-21 RX ADMIN — DEXMEDETOMIDINE HYDROCHLORIDE 0.2 MCG/KG/HR: 400 INJECTION INTRAVENOUS at 03:03

## 2024-12-21 RX ADMIN — Medication: at 13:40

## 2024-12-21 RX ADMIN — QUETIAPINE FUMARATE 25 MG: 25 TABLET ORAL at 23:07

## 2024-12-21 RX ADMIN — Medication: at 03:14

## 2024-12-21 RX ADMIN — Medication: at 08:25

## 2024-12-21 RX ADMIN — ONDANSETRON 4 MG: 2 INJECTION INTRAMUSCULAR; INTRAVENOUS at 22:06

## 2024-12-21 RX ADMIN — SODIUM CHLORIDE, PRESERVATIVE FREE 40 MG: 5 INJECTION INTRAVENOUS at 03:03

## 2024-12-21 RX ADMIN — ASPIRIN 81 MG 81 MG: 81 TABLET ORAL at 08:32

## 2024-12-21 RX ADMIN — SODIUM CHLORIDE, PRESERVATIVE FREE 40 MG: 5 INJECTION INTRAVENOUS at 15:42

## 2024-12-21 RX ADMIN — SODIUM CHLORIDE, PRESERVATIVE FREE 10 ML: 5 INJECTION INTRAVENOUS at 08:34

## 2024-12-21 RX ADMIN — MILRINONE LACTATE 0.2 MCG/KG/MIN: 0.2 INJECTION, SOLUTION INTRAVENOUS at 06:35

## 2024-12-21 RX ADMIN — SODIUM CHLORIDE, PRESERVATIVE FREE 10 ML: 5 INJECTION INTRAVENOUS at 21:00

## 2024-12-21 RX ADMIN — AMIODARONE HYDROCHLORIDE 200 MG: 200 TABLET ORAL at 08:32

## 2024-12-21 RX ADMIN — MILRINONE LACTATE 0.2 MCG/KG/MIN: 0.2 INJECTION, SOLUTION INTRAVENOUS at 19:16

## 2024-12-21 RX ADMIN — ATORVASTATIN CALCIUM 80 MG: 80 TABLET, FILM COATED ORAL at 23:06

## 2024-12-21 RX ADMIN — ONDANSETRON 4 MG: 2 INJECTION INTRAMUSCULAR; INTRAVENOUS at 15:42

## 2024-12-21 RX ADMIN — Medication: at 00:43

## 2024-12-21 ASSESSMENT — PAIN SCALES - GENERAL
PAINLEVEL_OUTOF10: 0
PAINLEVEL_OUTOF10: 0

## 2024-12-21 NOTE — PROGRESS NOTES
NAME:  Levy Vargas  YOB: 1965  MEDICAL RECORD NUMBER:  [id number]    Shift Summary: Tolerating CRRT with fluid removal of 25cc/hr. On soft and bite sized diet; tolerating. Rested better with seroquel and low dose precedex last evening. VSS. Minimal to no output of rectal tube. Remains on milrinone; off of pressors. Oriented to person, place and time.     Family updated: No    Rhythm: Normal Sinus Rhythm     Most recent vitals:   Visit Vitals  BP (!) 103/58   Pulse 74   Temp 98.7 °F (37.1 °C) (Bladder)   Resp (!) 8   Ht 1.854 m (6' 1\")   Wt 128.4 kg (283 lb 1.1 oz)   SpO2 94%   BMI 37.35 kg/m²      ABP (Arterial line BP): 107/57  ABP Mean (Arterial Line Mean): 69 mmHg    No data found.    No data found.      Respiratory support needed (if any):  - O2 - NC - 2 lpm    Admission weight Weight - Scale: (!) 145.4 kg (320 lb 8.8 oz) (12/11/24 2306)    Today's weight    Wt Readings from Last 1 Encounters:   12/21/24 128.4 kg (283 lb 1.1 oz)        Castanon need assessed each shift: YES -  - continue castanon r/t - CRRT  UOP >30ml/hr: NO - CRRT  Last documented BM (in last 48 hrs):  Patient Vitals for the past 48 hrs:   Last BM (including prior to admit)   12/19/24 0800 12/18/24 12/19/24 1515 12/18/24 12/19/24 2040 12/19/24 12/20/24 0800 12/20/24 12/20/24 1922 12/20/24                Restraints (in use currently or dc'd in last 12 hrs): No    Order current and documentation up to date? No    Lines/Drains reviewed @ bedside.  CVC  12/13/24 Left Internal jugular (Active)   Number of days: 7       Peripheral IV 12/11/24 Posterior;Right Hand (Active)   Number of days: 9       Peripheral IV 12/11/24 Left;Posterior Hand (Active)   Number of days: 9       Arterial Line 12/13/24 Left Radial (Active)   Number of days: 7       Hemodialysis Central Access - Temporary Right Neck (Active)   Number of days: 7       Urinary Catheter 12/12/24 Castanon-Temperature (Active)   Number of days: 8         Drip rates at handoff:  Ostomies, if present place, Ostomy referral order under : No     Nurse 1 eSignature: Electronically signed by Marium Woods RN on 12/21/24 at 7:23 AM EST    **SHARE this note so that the co-signing nurse can place an eSignature**    Nurse 2 eSignature: Electronically signed by Rosana Rodrigues RN on 12/21/24 at 7:48 AM EST

## 2024-12-21 NOTE — PROGRESS NOTES
micro tests reviewed      Subjective:     Chief Complaint: Weakness    Levy Vargas is a 59 y.o. male who presents with weakness shortness of breath overall feeling better nurse at bedside      Review of Systems:      Pertinent positives and negatives discussed in HPI    Objective:     Intake/Output Summary (Last 24 hours) at 12/21/2024 0905  Last data filed at 12/21/2024 0800  Gross per 24 hour   Intake 2403.57 ml   Output 3765 ml   Net -1361.43 ml      Vitals:   Vitals:    12/21/24 0615 12/21/24 0630 12/21/24 0645 12/21/24 0700   BP:       Pulse: 72 71 72 74   Resp: 17 (!) 9 15 (!) 8   Temp:       TempSrc:       SpO2: 94% 94% 94% 94%   Weight:       Height:             Physical Exam:      Physical Exam Performed:    BP (!) 103/58   Pulse 74   Temp 98.7 °F (37.1 °C) (Bladder)   Resp (!) 8   Ht 1.854 m (6' 1\")   Wt 128.4 kg (283 lb 1.1 oz)   SpO2 94%   BMI 37.35 kg/m²     General appearance: No apparent distress  Respiratory: Scattered rhonchi  Cardiovascular: Regular rate and rhythm with normal S1/S2 without murmurs, rubs or gallops.  Abdomen: Soft, non-tender  Musculoskeletal: No clubbing, cyanosis.  Bilateral lower extremity edema  Skin: Bilateral ankle ulcers  Neurologic: No focal weakness  Psychiatric: Alert and oriented  Capillary Refill: Brisk, 3 seconds, normal   Peripheral Pulses: +2 palpable, equal bilaterally       Medications:   Medications:    QUEtiapine  25 mg Oral Nightly    insulin lispro  0-4 Units SubCUTAneous 4x Daily WC    LORazepam  0.5 mg IntraVENous Once    amiodarone  200 mg Per NG tube BID    pantoprazole (PROTONIX) 40 mg in sodium chloride (PF) 0.9 % 10 mL injection  40 mg IntraVENous Q12H    [Held by provider] spironolactone  25 mg Oral Daily    [Held by provider] gabapentin  300 mg Oral Nightly    dilTIAZem  10 mg IntraVENous Once    [Held by provider] apixaban  5 mg Oral BID    aspirin  81 mg Oral Daily    atorvastatin  80 mg Oral Nightly    [Held by provider] metoprolol  TSH 0.05 12/12/2024 12:34 AM     Troponin: No results found for: \"TROPONINT\"  Lactic Acid: No results for input(s): \"LACTA\" in the last 72 hours.  BNP: No results for input(s): \"PROBNP\" in the last 72 hours.  UA:  Lab Results   Component Value Date/Time    NITRU POSITIVE 12/12/2024 06:45 PM    COLORU DARK YELLOW 12/12/2024 06:45 PM    PHUR 5.0 12/12/2024 06:45 PM    PHUR 7.0 08/03/2023 09:20 PM    PHUR 7.0 08/03/2023 09:20 PM    WBCUA 3-5 12/12/2024 06:45 PM    RBCUA 5-10 12/12/2024 06:45 PM    MUCUS 1+ 03/19/2022 05:10 PM    TRICHOMONAS None Seen 03/19/2022 05:10 PM    BACTERIA 2+ 12/12/2024 06:45 PM    CLARITYU CLOUDY 12/12/2024 06:45 PM    LEUKOCYTESUR SMALL 12/12/2024 06:45 PM    UROBILINOGEN 2.0 12/12/2024 06:45 PM    BILIRUBINUR MODERATE 12/12/2024 06:45 PM    BLOODU LARGE 12/12/2024 06:45 PM    GLUCOSEU Negative 12/12/2024 06:45 PM    KETUA TRACE 12/12/2024 06:45 PM     Urine Cultures: No results found for: \"LABURIN\"  Blood Cultures: No results found for: \"BC\"  No results found for: \"BLOODCULT2\"  Organism: No results found for: \"ORG\"      Electronically signed by Geovani Blanchard MD on 12/21/2024 at 9:05 AM  Comment: Please note this report has been produced using speech recognition software and may contain errors related to that system including errors in grammar, punctuation, and spelling, as well as words and phrases that may be inappropriate. If there are any questions or concerns, please feel free to contact the dictating provider for clarification.

## 2024-12-21 NOTE — PROGRESS NOTES
Pulmonary Progress Note    Date of Admission: 12/11/2024   LOS: 10 days     CC:  Chief Complaint   Patient presents with    Hypertension     Pt states he has BP of 210 systolic at home.      Dizziness     Pt complains of being lightheaded today.  Onset for \"some time\"   pt denies chest pain, denies shortness of breath           Assessment/Plan   Acute hypoxemic respiratory failure-continues to improve  -wean goal saturation 90%  -Aspiration precautions    Cardiogenic shock,  Cardiomyopathy, LVEF 15%  -Currently on fixed dose milrinone    Lethargy  -Improved    KENDRA requiring RRT  -CRRT per nephrology    Upper GI bleed  -Eliquis on hold, but is currently on a heparin drip  -Protonix twice daily  -Stable Hgb  -GI consulted      Acute encephalopathy-improved  -Seroquel 25 at night    No further inpatient recommendations, we will sign off at this time.  Please let us know if we can be of any further assistance.       HPI/Subjective  No events overnight.  Off pressors.  Oxygen requirement improving.    ROS:   Negative diarrhea  -Negative chest pain  -Negative nausea      Intake/Output Summary (Last 24 hours) at 12/21/2024 0855  Last data filed at 12/21/2024 0800  Gross per 24 hour   Intake 2508.05 ml   Output 3892 ml   Net -1383.95 ml         PHYSICAL EXAM:   Blood pressure (!) 103/58, pulse 74, temperature 98.7 °F (37.1 °C), temperature source Bladder, resp. rate (!) 8, height 1.854 m (6' 1\"), weight 128.4 kg (283 lb 1.1 oz), SpO2 94%.'  Gen:  No acute distress.   Resp:  No crackles. No wheezes. No rhonchi. No dullness on percussion.  CV: Regular rate. Regular rhythm. No murmur or rub. No edema.   M/S: No cyanosis. No clubbing.    Neuro: Awakens more interactive today          Labs reviewed:  CBC:   Recent Labs     12/19/24  0600 12/20/24  0615 12/21/24  0600   WBC 7.7 7.5 7.0   HGB 9.8* 10.1* 10.1*   HCT 31.2* 31.5* 30.9*   MCV 88.0 87.4 86.8    156 141     BMP:   Recent Labs     12/20/24  0615 12/20/24  1800

## 2024-12-21 NOTE — PROGRESS NOTES
Clinical Pharmacy Note  Heparin Dosing       Lab Results   Component Value Date/Time    ANTIXAUHEP 0.32 12/21/2024 06:00 AM      Lab Results   Component Value Date/Time    HGB 10.1 12/21/2024 06:00 AM    HCT 30.9 12/21/2024 06:00 AM     12/21/2024 06:00 AM    INR 1.70 12/12/2024 03:59 AM       Current Infusion Rate: 1840 units/hr    Plan:  Bolus: 0 units  Rate: 1840 units/hr  Next anti-Xa level: 0600 12/22/24    Pharmacy will continue to monitor and adjust based on anti-Xa results.    Mila Menon, PharmD

## 2024-12-21 NOTE — PROGRESS NOTES
[facility]   Daily Progress Note      Admit Date:  12/11/2024    Reason for follow up visit: Atrial fib; CHF    CC: \"I'm doing okay but my leg hurts at times.\"    60 y/o male with PMH notable for PAF, PAD, chronic systolic HF with LVEF 15-20% who presented to [facility] with dizziness and elevated BP. Noted to be in rapid atrial fib and placed on IV amiodarone. Has since returned to SR and on po amiodarone.Currently remains on Milrinone for his CHF.    Interval History:  Pt. seen and examined; records reviewed  Remains on Milrinone (0.2 mcg/kg/min) and heparin  Continues with CVVHD  Remains on O2 @ 2L  BP ranging 's/50-70's (off pressors)  ~ 4 L output yesterday (Net diuresis -3.8L since admit)  Maintaining SR  Denies chest pain, SOB, cough, palpitations or dizziness  + intermittent leg pain    Subjective:  Pt with no acute overnight cardiac events.     Objective:   BP (!) 103/58   Pulse 74   Temp 98.7 °F (37.1 °C) (Bladder)   Resp (!) 8   Ht 1.854 m (6' 1\")   Wt 128.4 kg (283 lb 1.1 oz)   SpO2 94%   BMI 37.35 kg/m²     Intake/Output Summary (Last 24 hours) at 12/21/2024 0820  Last data filed at 12/21/2024 0702  Gross per 24 hour   Intake 2471.01 ml   Output 3857 ml   Net -1385.99 ml     Wt Readings from Last 3 Encounters:   12/21/24 128.4 kg (283 lb 1.1 oz)   12/19/24 129.7 kg (286 lb)   10/29/24 131.8 kg (290 lb 9.1 oz)       Physical Exam:  General: In no acute distress. Awake, alert, and oriented X 3. Resting in bed  Skin:  Warm and dry. .   Neck:  Supple. No JVD  Chest:  Lungs clear with decreased breath sounds bilaterally. No wheezes/rhonchi/rales  Cardiovascular:  RRR. Normal S1 and S2; soft systolic murmur  Abdomen:  soft, nontender, +bowel sounds.   Extremities: Trace bilateral lower leg edema (L>R); 2+ bilateral radial/femoral pulses. Cap refill brisk    Medications:    QUEtiapine  25 mg Oral Nightly    insulin lispro  0-4 Units SubCUTAneous 4x Daily WC    LORazepam  0.5 mg IntraVENous  cc/hr x 8 hours  - Radial hemostasis band with 13cc air  - No further ischemic evaluation required at this time prior to planned vascular intervention       Add'l studies:  10/18/2024 Arterial LE dopplers    Patient was in A-fib throughout the test with a heart rate of 170+.    There is no focal obstruction noted in the femoral arteries in the right thigh.    The right popliteal artery appears chronically occluded.    The posterior tibial and peroneal arteries appear to be occluded.    The right anterior tibial artery is occluded with reconstitution just above the ankle. The distal anterior tibial artery and dorsalis pedis arteries exhibit severely hyeremic flow.    Velocities are reduced throughout the arterial system of the right lower extremity, this is likely secondary to cardiac output and heart rhythm.    Findings are consistent with chronic limb threatening ischemia in the right leg.    The left leg was not interrogated due to the patient's unstable vitals and need to return to the ED.     Summary:  Moderate to severe atherosclerotic plaque noted in the right lower extremity vessels.  No significant stenosis noted in the right common femoral or SFA based on velocity criteria.  Patient noted to be in A-fib with RVR which may be falsely decreasing the velocities.  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    Telemetry: SR; 6 beat NSVT noted (asymptomatic)  (Personally reviewed)    Assessment/Plan:    1) Atrial fib with RVR  -S/P FRANTZ/CVN on 12/19/24 and maintaining SR  -remains on heparin (Eliquis on hold)  -continue amiodarone    2) NSVT  -6 beat noted on telemetry during noc  -asymptomatic      3) Acute on chronic systolic HF  -LVEF 10-15%; NYHA class IV  -remains on milrinone  -remains on CVVHD for fluid removal  -GDMT remains on hold  -no indication for mechanical support at this juncture as he is stable    4) KENDRA on CKD  -nephrology

## 2024-12-21 NOTE — PROGRESS NOTES
(DL-SYNEPHRINE) 50 mg in sodium chloride 0.9 % 250 mL infusion Stopped (12/18/24 1918)    dexmedeTOMIDine Stopped (12/21/24 1300)    heparin (PORCINE) Infusion 1,810 Units/hr (12/21/24 1800)    prismaSATE BGK 4/2.5 2,000 mL/hr at 12/21/24 1340    prismaSATE BGK 4/2.5 500 mL/hr at 12/21/24 0550    prismaSATE BGK 4/2.5 1,000 mL/hr at 12/21/24 1340    milrinone 0.2 mcg/kg/min (12/21/24 1800)    sodium chloride 5 mL/hr at 12/21/24 1800       Lab Data:   CBC:   Recent Labs     12/20/24  0615 12/21/24  0600   WBC 7.5 7.0   HGB 10.1* 10.1*   HCT 31.5* 30.9*   MCV 87.4 86.8    141     BMP:    Recent Labs     12/21/24  0600 12/21/24  1745   * 136   K 3.9 3.9   CO2 24 26   BUN 13 12   CREATININE 1.2 1.2     LIVR: No results for input(s): \"AST\", \"ALT\" in the last 72 hours.  PT/INR: No results for input(s): \"INR\" in the last 72 hours.    Invalid input(s): \"PROT\"  APTT: No results for input(s): \"APTT\" in the last 72 hours.  ABG:   Recent Labs     12/19/24  1648   PHART 7.386   GUX5RSU 39.7   PO2ART 56.7*       Any consults during the shift? No    Any signed and held orders to be released?  No        4 Eyes Skin Assessment       The patient is being assessed for  Shift Handoff    I agree that at least one RN has performed a thorough Head to Toe Skin Assessment on the patient. ALL assessment sites listed below have been assessed.      Areas assessed by both nurses: Head, Face, Ears, Shoulders, Back, Chest, Arms, Elbows, Hands, Sacrum. Buttock, Coccyx, Ischium, Legs. Feet and Heels, and Under Medical Devices         Does the Patient have a Wound? Yes wound(s) were present on assessment. LDA wound assessment was Initiated and completed by RN    Wound Care Orders initiated by RN: Yes       Hudson Prevention initiated by RN: Yes    Pressure Injury (Stage 3,4, Unstageable, DTI, NWPT, and Complex wounds) if present, place Wound referral order by RN under : No    New Ostomies, if present place, Ostomy referral  order under : No     Nurse 1 eSignature: Electronically signed by Rosana Rodrigues RN on 12/21/24 at 7:24 PM EST    **SHARE this note so that the co-signing nurse can place an eSignature**    Nurse 2 eSignature: Electronically signed by ALANIS NEIL RN on 12/21/24 at 7:52 PM EST

## 2024-12-21 NOTE — PROGRESS NOTES
12/21/2024 0900  Gross per 24 hour   Intake 2071.26 ml   Output 3389 ml   Net -1317.74 ml       Patient Vitals for the past 96 hrs (Last 3 readings):   Weight   12/21/24 0600 128.4 kg (283 lb 1.1 oz)   12/20/24 0600 129.3 kg (285 lb 0.9 oz)   12/20/24 0521 129.3 kg (285 lb 0.9 oz)       General: Awake  HEENT: Normocephalic, atraumatic  Chest: diminished to auscultation  CVS: RRR  Abdomen: soft, non tender  Extremities: 2+ pitting edema  : castanon in place   Access: R [facility]       LAB DATA:    CBC:   Lab Results   Component Value Date/Time    WBC 7.0 12/21/2024 06:00 AM    RBC 3.56 12/21/2024 06:00 AM    HGB 10.1 12/21/2024 06:00 AM    HCT 30.9 12/21/2024 06:00 AM    MCV 86.8 12/21/2024 06:00 AM    MCH 28.4 12/21/2024 06:00 AM    MCHC 32.7 12/21/2024 06:00 AM    RDW 19.1 12/21/2024 06:00 AM     12/21/2024 06:00 AM    MPV 8.7 12/21/2024 06:00 AM     BMP:    Lab Results   Component Value Date/Time     12/21/2024 06:00 AM    K 3.9 12/21/2024 06:00 AM    K 4.3 12/20/2024 06:15 AM     12/21/2024 06:00 AM    CO2 24 12/21/2024 06:00 AM    BUN 13 12/21/2024 06:00 AM    CREATININE 1.2 12/21/2024 06:00 AM    CALCIUM 8.1 12/21/2024 06:00 AM    GFRAA >60 10/11/2022 05:03 AM    LABGLOM 69 12/21/2024 06:00 AM    LABGLOM 63 04/27/2024 05:03 AM    GLUCOSE 129 12/21/2024 06:00 AM     Ionized Calcium:  No components found for: \"IONCA\"  Magnesium:    Lab Results   Component Value Date/Time    MG 2.49 12/21/2024 06:00 AM     Phosphorus:    Lab Results   Component Value Date/Time    PHOS 1.7 12/21/2024 06:00 AM     U/A:    Lab Results   Component Value Date/Time    COLORU DARK YELLOW 12/12/2024 06:45 PM    PHUR 5.0 12/12/2024 06:45 PM    PHUR 7.0 08/03/2023 09:20 PM    PHUR 7.0 08/03/2023 09:20 PM    WBCUA 3-5 12/12/2024 06:45 PM    RBCUA 5-10 12/12/2024 06:45 PM    MUCUS 1+ 03/19/2022 05:10 PM    TRICHOMONAS None Seen 03/19/2022 05:10 PM    BACTERIA 2+ 12/12/2024 06:45 PM    CLARITYU CLOUDY 12/12/2024 06:45 PM     LEUKOCYTESUR SMALL 12/12/2024 06:45 PM    UROBILINOGEN 2.0 12/12/2024 06:45 PM    BILIRUBINUR MODERATE 12/12/2024 06:45 PM    BLOODU LARGE 12/12/2024 06:45 PM    GLUCOSEU Negative 12/12/2024 06:45 PM         IMPRESSION/RECOMMENDATIONS:      Anuric KENDRA on CKD 2: baseline Cr ~ 0.9-1.3 mg/dL. Etiology 2/2 cardiorenal syndrome vs cardiogenic shock.              - R temp vascath placed and CRRT initiated 12/13/2024              - Continue CRRT - increase net UF to 50 from 25 mL/hr -- Modified CRRT prescription to achieve adequate clearance 12/16/24               - Continue q12 labs     2. Cardiogenic shock / HFrEF. Wt was 131.5 kg on 9/25/2024 during last cardiology appt              - TTE (10/19/2024): LVEF 10-15%. Severe global hypokinesis. Indeterminate diastolic dysfx. LA severely dilated.               - Failed diuresis. Volume removal as tolerated with CRRT               - S/p Albumin to help support BP               - On milrinone and off pressors at the moment              - Strict I/Os. Daily weights.              - Cardiology and CC team following     3. Afib RVR              - Now in NSR s/p cardioversion 12/19/24              - Cardiology following     4. GI bleed              - Hgb 10.1 g/dL              - Iron sat 18; transferrin 195 -- s/p venofer    - B12 and folate adequate     5. Hypophosphatemia              - Replace phosphorus per CRRT PRN replacement protocol        Willem Rosario MD

## 2024-12-21 NOTE — PROGRESS NOTES
1L  Cognitive/behavioral/communication: oriented to self, place, date , alert, cooperative, verbally responsive, follows one step commands with cues , reduced recall  PO Trials of current diet consistencies: prolonged but adequate bolus formation and movement with soft solids; concern for prolonged prep with excess effort/labor with regular solids; suspect premature bolus loss to the pharynx with all; NO overt signs/symptoms of penetration/aspiration even with repeated trails of serial thin boluses via straw or regular textures. Patient's O2 remained between 83-90% throughout session   Recs: continue soft/bite-size diet texture with thin liquids as tolerated due to concern for excessive labor with regular texture; discontinue PO if concern for reduced tolerance arises; meds in puree/pudding; ST to continue to follow; suspected continued progress as med status continues to improve - okay to consider advancement as tolerated  Dysphagia therapy tolerance of exercises: NA    MEDICAL OR COGNITIVE/BEHAVIORAL FACTORS WHICH CAN EXACERBATE:   comorbidities, mental status, medical dx    Dysphagia Prognosis: good, guarded  Barriers to progress: co-morbidities, mental status, compliance/behavioral, medical dx    RECOMMENDATIONS     Recommended Diet and Intervention 12/21/2024:  Diet Solids Recommendation:  Soft and Bite-sized texture Liquid Consistency Recommendation:  Thin liquids   Recommended form of Meds: Whole in puree/pudding or Crushed in puree/pudding     Compensatory Swallowing Strategies:  Upright as possible with all PO intake , Small bites/sips , Eat/feed slowly, Remain upright 30-45 min     Eating Assistance Recommendation: Partial or moderate assistance    Discharge Recommendations: Recommend ongoing SLP for dysphagia therapy upon discharge from hospital       GOALS / PLAN     PLAN OF CARE: Speech therapy for dysphagia tx 2-5 times/week    SHORT TERM DYSPHAGIA GOALS  Pt will functionally tolerate recommended diet      Provided education regarding role of SLP, results of assessment, recommendations and general speech pathology plan of care.   Patient Response: Concern for reduced/recall insight, Needs reinforcement  Family education: Family not present/unavailable  Staff education: RN verbalizes understanding      If patient discharges prior to next visit, this note will serve as discharge.     Timed Code Minutes: 10  Total Treatment Minutes: 30    Electronically signed by:    Jia Blake MA, CCC-SLP #[id number]  Speech-Language Pathologist  On 12/21/24 at 12:16 pm

## 2024-12-21 NOTE — PROGRESS NOTES
Clinical Pharmacy Note  Heparin Dosing       Lab Results   Component Value Date/Time    ANTIXAUHEP 0.30 12/20/2024 07:00 PM      Lab Results   Component Value Date/Time    HGB 10.1 12/20/2024 06:15 AM    HCT 31.5 12/20/2024 06:15 AM     12/20/2024 06:15 AM    INR 1.70 12/12/2024 03:59 AM       Current Infusion Rate: 1840 units/hr    Plan:    Rate: 1840 units/hr  Next anti-Xa level: 12/21/24 0600    Pharmacy will continue to monitor and adjust based on anti-Xa results.     Goldie Oliver, PharmEVENS  12/20/2024 8:17 PM

## 2024-12-22 PROBLEM — I47.29 NSVT (NONSUSTAINED VENTRICULAR TACHYCARDIA) (HCC): Status: ACTIVE | Noted: 2024-12-22

## 2024-12-22 LAB
ALBUMIN SERPL-MCNC: 3 G/DL (ref 3.4–5)
ALBUMIN SERPL-MCNC: 3.2 G/DL (ref 3.4–5)
ANION GAP SERPL CALCULATED.3IONS-SCNC: 7 MMOL/L (ref 3–16)
ANION GAP SERPL CALCULATED.3IONS-SCNC: 8 MMOL/L (ref 3–16)
ANTI-XA UNFRAC HEPARIN: 0.12 IU/ML (ref 0.3–0.7)
ANTI-XA UNFRAC HEPARIN: 0.26 IU/ML (ref 0.3–0.7)
ANTI-XA UNFRAC HEPARIN: 0.48 IU/ML (ref 0.3–0.7)
BUN SERPL-MCNC: 8 MG/DL (ref 7–20)
BUN SERPL-MCNC: 9 MG/DL (ref 7–20)
CA-I BLD-SCNC: 1.11 MMOL/L (ref 1.12–1.32)
CA-I BLD-SCNC: 1.13 MMOL/L (ref 1.12–1.32)
CALCIUM SERPL-MCNC: 8 MG/DL (ref 8.3–10.6)
CALCIUM SERPL-MCNC: 8.1 MG/DL (ref 8.3–10.6)
CHLORIDE SERPL-SCNC: 103 MMOL/L (ref 99–110)
CHLORIDE SERPL-SCNC: 103 MMOL/L (ref 99–110)
CO2 SERPL-SCNC: 25 MMOL/L (ref 21–32)
CO2 SERPL-SCNC: 26 MMOL/L (ref 21–32)
CREAT SERPL-MCNC: 1.4 MG/DL (ref 0.8–1.3)
CREAT SERPL-MCNC: 1.5 MG/DL (ref 0.8–1.3)
DEPRECATED RDW RBC AUTO: 19.6 % (ref 12.4–15.4)
DEPRECATED RDW RBC AUTO: 20.2 % (ref 12.4–15.4)
GFR SERPLBLD CREATININE-BSD FMLA CKD-EPI: 53 ML/MIN/{1.73_M2}
GFR SERPLBLD CREATININE-BSD FMLA CKD-EPI: 57 ML/MIN/{1.73_M2}
GLUCOSE BLD-MCNC: 101 MG/DL (ref 70–99)
GLUCOSE BLD-MCNC: 79 MG/DL (ref 70–99)
GLUCOSE BLD-MCNC: 89 MG/DL (ref 70–99)
GLUCOSE BLD-MCNC: 92 MG/DL (ref 70–99)
GLUCOSE SERPL-MCNC: 94 MG/DL (ref 70–99)
GLUCOSE SERPL-MCNC: 95 MG/DL (ref 70–99)
HCT VFR BLD AUTO: 31 % (ref 40.5–52.5)
HCT VFR BLD AUTO: 33.6 % (ref 40.5–52.5)
HGB BLD-MCNC: 10.1 G/DL (ref 13.5–17.5)
HGB BLD-MCNC: 10.7 G/DL (ref 13.5–17.5)
MAGNESIUM SERPL-MCNC: 2.25 MG/DL (ref 1.8–2.4)
MAGNESIUM SERPL-MCNC: 2.34 MG/DL (ref 1.8–2.4)
MCH RBC QN AUTO: 28.1 PG (ref 26–34)
MCH RBC QN AUTO: 28.5 PG (ref 26–34)
MCHC RBC AUTO-ENTMCNC: 31.8 G/DL (ref 31–36)
MCHC RBC AUTO-ENTMCNC: 32.5 G/DL (ref 31–36)
MCV RBC AUTO: 87.7 FL (ref 80–100)
MCV RBC AUTO: 88.4 FL (ref 80–100)
PERFORMED ON: ABNORMAL
PERFORMED ON: NORMAL
PH BLDV: 7.4 [PH] (ref 7.35–7.45)
PH BLDV: 7.41 [PH] (ref 7.35–7.45)
PHOSPHATE SERPL-MCNC: 1.6 MG/DL (ref 2.5–4.9)
PHOSPHATE SERPL-MCNC: 1.7 MG/DL (ref 2.5–4.9)
PHOSPHATE SERPL-MCNC: 1.7 MG/DL (ref 2.5–4.9)
PHOSPHATE SERPL-MCNC: 1.9 MG/DL (ref 2.5–4.9)
PHOSPHATE SERPL-MCNC: 1.9 MG/DL (ref 2.5–4.9)
PLATELET # BLD AUTO: 179 K/UL (ref 135–450)
PLATELET # BLD AUTO: 192 K/UL (ref 135–450)
PMV BLD AUTO: 8.2 FL (ref 5–10.5)
PMV BLD AUTO: 8.9 FL (ref 5–10.5)
POTASSIUM SERPL-SCNC: 4.2 MMOL/L (ref 3.5–5.1)
POTASSIUM SERPL-SCNC: 4.2 MMOL/L (ref 3.5–5.1)
RBC # BLD AUTO: 3.54 M/UL (ref 4.2–5.9)
RBC # BLD AUTO: 3.81 M/UL (ref 4.2–5.9)
REASON FOR REJECTION: NORMAL
REJECTED TEST: NORMAL
SODIUM SERPL-SCNC: 136 MMOL/L (ref 136–145)
SODIUM SERPL-SCNC: 136 MMOL/L (ref 136–145)
WBC # BLD AUTO: 9.4 K/UL (ref 4–11)
WBC # BLD AUTO: 9.7 K/UL (ref 4–11)

## 2024-12-22 PROCEDURE — 6370000000 HC RX 637 (ALT 250 FOR IP): Performed by: INTERNAL MEDICINE

## 2024-12-22 PROCEDURE — 83735 ASSAY OF MAGNESIUM: CPT

## 2024-12-22 PROCEDURE — 6360000002 HC RX W HCPCS: Performed by: FAMILY MEDICINE

## 2024-12-22 PROCEDURE — 2500000003 HC RX 250 WO HCPCS: Performed by: INTERNAL MEDICINE

## 2024-12-22 PROCEDURE — 90945 DIALYSIS ONE EVALUATION: CPT

## 2024-12-22 PROCEDURE — 2580000003 HC RX 258

## 2024-12-22 PROCEDURE — 82330 ASSAY OF CALCIUM: CPT

## 2024-12-22 PROCEDURE — 84100 ASSAY OF PHOSPHORUS: CPT

## 2024-12-22 PROCEDURE — 99233 SBSQ HOSP IP/OBS HIGH 50: CPT | Performed by: NURSE PRACTITIONER

## 2024-12-22 PROCEDURE — 6360000002 HC RX W HCPCS: Performed by: INTERNAL MEDICINE

## 2024-12-22 PROCEDURE — 6370000000 HC RX 637 (ALT 250 FOR IP)

## 2024-12-22 PROCEDURE — 2500000003 HC RX 250 WO HCPCS

## 2024-12-22 PROCEDURE — 2700000000 HC OXYGEN THERAPY PER DAY

## 2024-12-22 PROCEDURE — 80069 RENAL FUNCTION PANEL: CPT

## 2024-12-22 PROCEDURE — 85027 COMPLETE CBC AUTOMATED: CPT

## 2024-12-22 PROCEDURE — 94761 N-INVAS EAR/PLS OXIMETRY MLT: CPT

## 2024-12-22 PROCEDURE — 2500000003 HC RX 250 WO HCPCS: Performed by: FAMILY MEDICINE

## 2024-12-22 PROCEDURE — 85520 HEPARIN ASSAY: CPT

## 2024-12-22 PROCEDURE — 2100000000 HC CCU R&B

## 2024-12-22 PROCEDURE — 2580000003 HC RX 258: Performed by: INTERNAL MEDICINE

## 2024-12-22 RX ORDER — HEPARIN SODIUM 1000 [USP'U]/ML
2000 INJECTION, SOLUTION INTRAVENOUS; SUBCUTANEOUS ONCE
Status: COMPLETED | OUTPATIENT
Start: 2024-12-23 | End: 2024-12-22

## 2024-12-22 RX ORDER — ACETAMINOPHEN 325 MG/1
650 TABLET ORAL EVERY 6 HOURS PRN
Status: DISCONTINUED | OUTPATIENT
Start: 2024-12-22 | End: 2025-01-03 | Stop reason: HOSPADM

## 2024-12-22 RX ORDER — HEPARIN SODIUM 1000 [USP'U]/ML
2000 INJECTION, SOLUTION INTRAVENOUS; SUBCUTANEOUS ONCE
Status: COMPLETED | OUTPATIENT
Start: 2024-12-22 | End: 2024-12-22

## 2024-12-22 RX ADMIN — HEPARIN SODIUM 2100 UNITS/HR: 10000 INJECTION, SOLUTION INTRAVENOUS at 14:22

## 2024-12-22 RX ADMIN — Medication: at 20:03

## 2024-12-22 RX ADMIN — SODIUM PHOSPHATE, MONOBASIC, MONOHYDRATE AND SODIUM PHOSPHATE, DIBASIC, ANHYDROUS 12 MMOL: 142; 276 INJECTION, SOLUTION INTRAVENOUS at 05:04

## 2024-12-22 RX ADMIN — MILRINONE LACTATE 0.2 MCG/KG/MIN: 0.2 INJECTION, SOLUTION INTRAVENOUS at 01:17

## 2024-12-22 RX ADMIN — Medication: at 02:49

## 2024-12-22 RX ADMIN — SODIUM CHLORIDE, PRESERVATIVE FREE 40 MG: 5 INJECTION INTRAVENOUS at 13:16

## 2024-12-22 RX ADMIN — Medication: at 00:11

## 2024-12-22 RX ADMIN — MILRINONE LACTATE 0.2 MCG/KG/MIN: 0.2 INJECTION, SOLUTION INTRAVENOUS at 11:41

## 2024-12-22 RX ADMIN — Medication 3 MG: at 21:58

## 2024-12-22 RX ADMIN — SODIUM CHLORIDE, PRESERVATIVE FREE 10 ML: 5 INJECTION INTRAVENOUS at 21:59

## 2024-12-22 RX ADMIN — ASPIRIN 81 MG 81 MG: 81 TABLET ORAL at 08:54

## 2024-12-22 RX ADMIN — HEPARIN SODIUM 2000 UNITS: 1000 INJECTION INTRAVENOUS; SUBCUTANEOUS at 07:01

## 2024-12-22 RX ADMIN — QUETIAPINE FUMARATE 25 MG: 25 TABLET ORAL at 21:58

## 2024-12-22 RX ADMIN — HEPARIN SODIUM 1810 UNITS/HR: 10000 INJECTION, SOLUTION INTRAVENOUS at 01:19

## 2024-12-22 RX ADMIN — ACETAMINOPHEN 650 MG: 325 TABLET ORAL at 11:57

## 2024-12-22 RX ADMIN — MILRINONE LACTATE 0.2 MCG/KG/MIN: 0.2 INJECTION, SOLUTION INTRAVENOUS at 23:20

## 2024-12-22 RX ADMIN — SODIUM PHOSPHATE, MONOBASIC, MONOHYDRATE AND SODIUM PHOSPHATE, DIBASIC, ANHYDROUS 18 MMOL: 142; 276 INJECTION, SOLUTION INTRAVENOUS at 14:14

## 2024-12-22 RX ADMIN — SODIUM CHLORIDE: 9 INJECTION, SOLUTION INTRAVENOUS at 01:18

## 2024-12-22 RX ADMIN — Medication: at 05:19

## 2024-12-22 RX ADMIN — AMIODARONE HYDROCHLORIDE 200 MG: 200 TABLET ORAL at 08:54

## 2024-12-22 RX ADMIN — Medication: at 22:36

## 2024-12-22 RX ADMIN — HEPARIN SODIUM 2000 UNITS: 1000 INJECTION INTRAVENOUS; SUBCUTANEOUS at 23:43

## 2024-12-22 RX ADMIN — Medication: at 00:10

## 2024-12-22 RX ADMIN — AMIODARONE HYDROCHLORIDE 200 MG: 200 TABLET ORAL at 21:58

## 2024-12-22 RX ADMIN — SODIUM CHLORIDE, PRESERVATIVE FREE 40 MG: 5 INJECTION INTRAVENOUS at 03:09

## 2024-12-22 RX ADMIN — ATORVASTATIN CALCIUM 80 MG: 80 TABLET, FILM COATED ORAL at 21:58

## 2024-12-22 ASSESSMENT — PAIN SCALES - GENERAL
PAINLEVEL_OUTOF10: 0
PAINLEVEL_OUTOF10: 0
PAINLEVEL_OUTOF10: 5
PAINLEVEL_OUTOF10: 0

## 2024-12-22 ASSESSMENT — PAIN DESCRIPTION - DESCRIPTORS: DESCRIPTORS: ACHING

## 2024-12-22 ASSESSMENT — PAIN DESCRIPTION - LOCATION: LOCATION: HEAD

## 2024-12-22 ASSESSMENT — PAIN DESCRIPTION - ORIENTATION: ORIENTATION: MID

## 2024-12-22 NOTE — PROGRESS NOTES
Clinical Pharmacy Note  Heparin Dosing       Lab Results   Component Value Date/Time    ANTIXAUHEP 0.12 12/22/2024 05:44 AM      Lab Results   Component Value Date/Time    HGB 10.1 12/22/2024 05:45 AM    HCT 31.0 12/22/2024 05:45 AM     12/22/2024 05:45 AM    INR 1.70 12/12/2024 03:59 AM       Current Infusion Rate: 1840 units/hr    Plan:  Bolus: 2000 units  Rate: 2100 units/hr  Next anti-Xa level: 1300 12/22/24    Pharmacy will continue to monitor and adjust based on anti-Xa results.    Mila Menon, PharmD

## 2024-12-22 NOTE — PROGRESS NOTES
Clinical Pharmacy Note  Heparin Dosing       Lab Results   Component Value Date/Time    ANTIXAUHEP 0.48 12/22/2024 01:00 PM      Lab Results   Component Value Date/Time    HGB 10.1 12/22/2024 05:45 AM    HCT 31.0 12/22/2024 05:45 AM     12/22/2024 05:45 AM    INR 1.70 12/12/2024 03:59 AM       Current Infusion Rate: 2100 units/hr    Plan:    Rate: 2100 units/hr  Next anti-Xa level: 2100 12/22/24      Aranza Deluca [facility]

## 2024-12-22 NOTE — PROGRESS NOTES
NAME:  Levy Vargas  YOB: 1965  MEDICAL RECORD NUMBER:  [id number]    Shift Summary: Patient continues on CRRT. VSS. Patient with minimal sleep this shift, however alert & oriented. Nausea early in shift, PRN zofran given.     Family updated: No    Rhythm: Sinus Tachycardia     Most recent vitals:   Visit Vitals  BP (!) 103/58   Pulse (!) 110   Temp 99.8 °F (37.7 °C) (Bladder)   Resp 19   Ht 1.854 m (6' 1\")   Wt 125.8 kg (277 lb 5.4 oz)   SpO2 99%   BMI 36.59 kg/m²      ABP (Arterial line BP): 143/76  ABP Mean (Arterial Line Mean): 95 mmHg    No data found.    No data found.      Respiratory support needed (if any):  - O2 - NC - 2 lpm    Admission weight Weight - Scale: (!) 145.4 kg (320 lb 8.8 oz) (12/11/24 2306)    Today's weight    Wt Readings from Last 1 Encounters:   12/22/24 125.8 kg (277 lb 5.4 oz)        Castanon need assessed each shift: YES -  - continue castanon r/t - accurate I&O  UOP >30ml/hr: NO - CRRT  Last documented BM (in last 48 hrs):  Patient Vitals for the past 48 hrs:   Last BM (including prior to admit)   12/20/24 0800 12/20/24 12/20/24 1922 12/20/24 12/21/24 0800 12/21/24 12/21/24 1200 12/21/24 12/21/24 1600 12/21/24 12/21/24 2000 12/21/24                Restraints (in use currently or dc'd in last 12 hrs): No    Order current and documentation up to date? No    Lines/Drains reviewed @ bedside.  CVC  12/13/24 Left Internal jugular (Active)   Number of days: 8       Peripheral IV 12/11/24 Posterior;Right Hand (Active)   Number of days: 10       Peripheral IV 12/11/24 Left;Posterior Hand (Active)   Number of days: 10       Arterial Line 12/13/24 Left Radial (Active)   Number of days: 8       Hemodialysis Central Access - Temporary Right Neck (Active)   Number of days: 8       Urinary Catheter 12/12/24 Castanon-Temperature (Active)   Number of days: 9         Drip rates at handoff:    phenylephrine (DL-SYNEPHRINE) 50 mg in sodium chloride 0.9 % 250 mL infusion Stopped  by ALANIS NEIL RN on 12/22/24 at 7:12 AM EST    **SHARE this note so that the co-signing nurse can place an eSignature**    Nurse 2 eSignature: Electronically signed by Rosana Rodrigues RN on 12/22/24 at 7:48 AM EST

## 2024-12-22 NOTE — PROGRESS NOTES
Infusions:    phenylephrine (DL-SYNEPHRINE) 50 mg in sodium chloride 0.9 % 250 mL infusion Stopped (12/18/24 1918)    dexmedeTOMIDine Stopped (12/21/24 1300)    heparin (PORCINE) Infusion 2,100 Units/hr (12/22/24 1000)    prismaSATE BGK 4/2.5 2,000 mL/hr at 12/22/24 0519    prismaSATE BGK 4/2.5 500 mL/hr at 12/22/24 0249    prismaSATE BGK 4/2.5 1,000 mL/hr at 12/22/24 0519    milrinone 0.2 mcg/kg/min (12/22/24 1000)    sodium chloride 5 mL/hr at 12/22/24 1000     PRN Meds: guaiFENesin-dextromethorphan, 5 mL, Q4H PRN  heparin (porcine), 1,100-1,900 Units, PRN  potassium chloride, 20 mEq, PRN  magnesium sulfate, 1,000 mg, PRN  calcium gluconate, 1,000 mg, PRN   Or  calcium gluconate, 2,000 mg, PRN   Or  calcium gluconate, 3,000 mg, PRN   Or  calcium gluconate, 4,000 mg, PRN  sodium phosphate 6 mmol in sodium chloride 0.9 % 250 mL IVPB, 6 mmol, PRN   Or  sodium phosphate 12 mmol in sodium chloride 0.9 % 250 mL IVPB, 12 mmol, PRN   Or  sodium phosphate 18 mmol in sodium chloride 0.9 % 500 mL IVPB, 18 mmol, PRN   Or  sodium phosphate 24 mmol in sodium chloride 0.9 % 500 mL IVPB, 24 mmol, PRN  sodium chloride flush, 1.1-1.9 mL, PRN   And  sodium chloride flush, 1.1-1.9 mL, PRN  pantoprazole, 40 mg, Daily PRN  sodium chloride flush, 5-40 mL, PRN  sodium chloride, , PRN  ondansetron, 4 mg, Q8H PRN   Or  ondansetron, 4 mg, Q6H PRN  polyethylene glycol, 17 g, Daily PRN        Labs and Imaging   FRANTZ with possible cardioversion (with contrast PRN)    Result Date: 12/19/2024    Left Ventricle: Severely reduced left ventricular systolic function with a visually estimated EF of 10 -15%. Severe global hypokinesis present. Indeterminate diastolic function due to atrial fibrillation.   Right Ventricle: Moderately reduced systolic function.   Left Atrium: Not assessed. Left atrium is moderately dilated. Normal sized appendage. No left atrial appendage thrombus noted. No left atrial appendage mass noted. Smoke seen.   Mitral Valve:  input(s): \"PROBNP\" in the last 72 hours.  UA:  Lab Results   Component Value Date/Time    NITRU POSITIVE 12/12/2024 06:45 PM    COLORU DARK YELLOW 12/12/2024 06:45 PM    PHUR 5.0 12/12/2024 06:45 PM    PHUR 7.0 08/03/2023 09:20 PM    PHUR 7.0 08/03/2023 09:20 PM    WBCUA 3-5 12/12/2024 06:45 PM    RBCUA 5-10 12/12/2024 06:45 PM    MUCUS 1+ 03/19/2022 05:10 PM    TRICHOMONAS None Seen 03/19/2022 05:10 PM    BACTERIA 2+ 12/12/2024 06:45 PM    CLARITYU CLOUDY 12/12/2024 06:45 PM    LEUKOCYTESUR SMALL 12/12/2024 06:45 PM    UROBILINOGEN 2.0 12/12/2024 06:45 PM    BILIRUBINUR MODERATE 12/12/2024 06:45 PM    BLOODU LARGE 12/12/2024 06:45 PM    GLUCOSEU Negative 12/12/2024 06:45 PM    KETUA TRACE 12/12/2024 06:45 PM     Urine Cultures: No results found for: \"LABURIN\"  Blood Cultures: No results found for: \"BC\"  No results found for: \"BLOODCULT2\"  Organism: No results found for: \"ORG\"      Electronically signed by Geovani Blanchard MD on 12/22/2024 at 10:44 AM  Comment: Please note this report has been produced using speech recognition software and may contain errors related to that system including errors in grammar, punctuation, and spelling, as well as words and phrases that may be inappropriate. If there are any questions or concerns, please feel free to contact the dictating provider for clarification.

## 2024-12-22 NOTE — PROGRESS NOTES
Nephrology Progress Note   [email or web address]      Reason for consultation:  KENDRA on CKD 2 -- baseline Cr ~ 0.9-1.3 mg/dL     HPI: Levy Vargas is a 58 yo male with a PMHx of CKD 2, h/o AKIs, HFrEF, non-compliance, DM, HTN, afib, h/o cocaine use, CAD, HLD. Patient presents to  ED on 12/11/2024 with complaints of BLE edema, lightheadedness, and weakness. Patient is currently drowsy and it is difficult to obtain history. Currently oriented x3. Patient tells me he has been taking his CHF medications everyday. Per chart review, there has been some questions regarding his outpatient compliance and not keeping in touch with his CHF nurses. Pt is currently grossly volume overloaded. He weighed 131.5 kg on 9/25/2024 during his last cardiology appt. He currently weighs 145.3 kg. Currently dyspneic and on 4 L NC. CXR is negative for acute findings. In afib RVR this admission. On amio gtt.     We have been consulted for KENDRA on CKD 2 management. Cr upon admission was 1.3 mg/dL. Today, Cr is 2.6 mg/dL. Patient has received a generous amount of IVP and oral diuretic yesterday and was also started on a lasix gtt at 10 mg/hr. Despite this, pt has remained anuric with 150 mL UOP/24 hrs and 25 mL UOP documented today. BP has been as low as 63/38 mmHg since admission. Started on milrinone gtt yesterday.     Subjective:    The patient has been seen and examined.   Labs and chart reviewed.   Remains on milrinone gtt.   UOP remains low  There were not complications overnight.  Patient review of systems: Denies SOB       Allergies:  No Known Allergies     Scheduled Meds:   QUEtiapine  25 mg Oral Nightly    insulin lispro  0-4 Units SubCUTAneous 4x Daily WC    LORazepam  0.5 mg IntraVENous Once    amiodarone  200 mg Per NG tube BID    pantoprazole (PROTONIX) 40 mg in sodium chloride (PF) 0.9 % 10 mL injection  40 mg IntraVENous Q12H    [Held by provider] spironolactone  25 mg Oral Daily    [Held by provider] gabapentin  300 mg Oral Nightly

## 2024-12-22 NOTE — PROGRESS NOTES
WC    LORazepam  0.5 mg IntraVENous Once    amiodarone  200 mg Per NG tube BID    pantoprazole (PROTONIX) 40 mg in sodium chloride (PF) 0.9 % 10 mL injection  40 mg IntraVENous Q12H    [Held by provider] spironolactone  25 mg Oral Daily    [Held by provider] gabapentin  300 mg Oral Nightly    dilTIAZem  10 mg IntraVENous Once    [Held by provider] apixaban  5 mg Oral BID    aspirin  81 mg Oral Daily    atorvastatin  80 mg Oral Nightly    [Held by provider] metoprolol succinate  25 mg Oral QPM    [Held by provider] sacubitril-valsartan  1 tablet Oral BID    sodium chloride flush  5-40 mL IntraVENous 2 times per day      phenylephrine (DL-SYNEPHRINE) 50 mg in sodium chloride 0.9 % 250 mL infusion Stopped (12/18/24 1918)    dexmedeTOMIDine Stopped (12/21/24 1300)    heparin (PORCINE) Infusion 2,100 Units/hr (12/22/24 0702)    prismaSATE BGK 4/2.5 2,000 mL/hr at 12/22/24 0519    prismaSATE BGK 4/2.5 500 mL/hr at 12/22/24 0249    prismaSATE BGK 4/2.5 1,000 mL/hr at 12/22/24 0519    milrinone 0.2 mcg/kg/min (12/22/24 0700)    sodium chloride Stopped (12/22/24 0504)     guaiFENesin-dextromethorphan, heparin (porcine), potassium chloride, magnesium sulfate, calcium gluconate **OR** calcium gluconate **OR** calcium gluconate **OR** calcium gluconate, sodium phosphate 6 mmol in sodium chloride 0.9 % 250 mL IVPB **OR** sodium phosphate 12 mmol in sodium chloride 0.9 % 250 mL IVPB **OR** sodium phosphate 18 mmol in sodium chloride 0.9 % 500 mL IVPB **OR** sodium phosphate 24 mmol in sodium chloride 0.9 % 500 mL IVPB, sodium chloride flush **AND** sodium chloride flush, pantoprazole, sodium chloride flush, sodium chloride, ondansetron **OR** ondansetron, polyethylene glycol    Lab Data:  CBC:   Recent Labs     12/20/24  0615 12/21/24  0600 12/22/24  0545   WBC 7.5 7.0 9.4   HGB 10.1* 10.1* 10.1*    141 192     BMP:    Recent Labs     12/21/24  0600 12/21/24  1745 12/22/24  0647   * 136 136   K 3.9 3.9 4.2   CO2 24  26 25   BUN 13 12 9   CREATININE 1.2 1.2 1.4*     12/19/2024 FRANTZ/DCCV    Left Ventricle: Severely reduced left ventricular systolic function with a visually estimated EF of 10 -15%. Severe global hypokinesis present. Indeterminate diastolic function due to atrial fibrillation.    Right Ventricle: Moderately reduced systolic function.    Left Atrium: Not assessed. Left atrium is moderately dilated. Normal sized appendage. No left atrial appendage thrombus noted. No left atrial appendage mass noted. Smoke seen.    Mitral Valve: Mildly thickened leaflets. Moderate leaflet prolapse noted. Mild regurgitation.     Successful synchronized cardioversion after FRANTZ showed no evidence of thrombus.     10/19/2024 TTE    Left Ventricle: Severely reduced left ventricular systolic function with a visually estimated EF of 10 -15%. Left ventricle is dilated. Mildly increased wall thickness. Severe global hypokinesis present. Indeterminate diastolic function due to abnormal heart rhythm.    Right Atrium: Single lead present in the right atrium. Right atrium is dilated.    Left Atrium: Not well visualized. Left atrium is severely dilated.    Right Ventricle: Right ventricle is mildly dilated. Moderately reduced systolic function.    Mitral Valve: Moderate regurgitation.    Tricuspid Valve: Moderate to severe regurgitation. The PASP cannot be evaluated due to reduced RV function and poor coaptation of tricuspid valve leaflets.        10/10/2022 Cardiac cath:  Findings:     Hemodynamics  LVEDP 3mmHg   Left Main  Normal bifurcation, no angiographically significant disease   LAD  Mild proximal non obstructive 30% stenosis, remaining vessel with luminal irregularities   Circ  Dominant, large vessel with luminal irregularities   RCA  Non-dominant, small vessel without angiographically significant disease   Closure Device  Radial hemostasis band   Complications  None      Conclusion:   - Mild non obstructive disease of the LAD  - Low LVEDP:

## 2024-12-23 LAB
ALBUMIN SERPL-MCNC: 2.9 G/DL (ref 3.4–5)
ANION GAP SERPL CALCULATED.3IONS-SCNC: 6 MMOL/L (ref 3–16)
ANTI-XA UNFRAC HEPARIN: 0.39 IU/ML (ref 0.3–0.7)
ANTI-XA UNFRAC HEPARIN: 0.66 IU/ML (ref 0.3–0.7)
ANTI-XA UNFRAC HEPARIN: 0.73 IU/ML (ref 0.3–0.7)
BUN SERPL-MCNC: 6 MG/DL (ref 7–20)
CA-I BLD-SCNC: 1.11 MMOL/L (ref 1.12–1.32)
CALCIUM SERPL-MCNC: 8.2 MG/DL (ref 8.3–10.6)
CHLORIDE SERPL-SCNC: 103 MMOL/L (ref 99–110)
CO2 SERPL-SCNC: 27 MMOL/L (ref 21–32)
CREAT SERPL-MCNC: 1.4 MG/DL (ref 0.8–1.3)
DEPRECATED RDW RBC AUTO: 19.8 % (ref 12.4–15.4)
GFR SERPLBLD CREATININE-BSD FMLA CKD-EPI: 57 ML/MIN/{1.73_M2}
GLUCOSE BLD-MCNC: 101 MG/DL (ref 70–99)
GLUCOSE BLD-MCNC: 89 MG/DL (ref 70–99)
GLUCOSE BLD-MCNC: 96 MG/DL (ref 70–99)
GLUCOSE SERPL-MCNC: 84 MG/DL (ref 70–99)
HCT VFR BLD AUTO: 31.4 % (ref 40.5–52.5)
HGB BLD-MCNC: 10.1 G/DL (ref 13.5–17.5)
MAGNESIUM SERPL-MCNC: 2.26 MG/DL (ref 1.8–2.4)
MCH RBC QN AUTO: 28.2 PG (ref 26–34)
MCHC RBC AUTO-ENTMCNC: 32.1 G/DL (ref 31–36)
MCV RBC AUTO: 87.9 FL (ref 80–100)
PERFORMED ON: ABNORMAL
PERFORMED ON: NORMAL
PERFORMED ON: NORMAL
PH BLDV: 7.36 [PH] (ref 7.35–7.45)
PHOSPHATE SERPL-MCNC: 1.6 MG/DL (ref 2.5–4.9)
PHOSPHATE SERPL-MCNC: 1.8 MG/DL (ref 2.5–4.9)
PHOSPHATE SERPL-MCNC: 1.8 MG/DL (ref 2.5–4.9)
PLATELET # BLD AUTO: 191 K/UL (ref 135–450)
PMV BLD AUTO: 8.4 FL (ref 5–10.5)
POTASSIUM SERPL-SCNC: 4.1 MMOL/L (ref 3.5–5.1)
RBC # BLD AUTO: 3.57 M/UL (ref 4.2–5.9)
REASON FOR REJECTION: NORMAL
REJECTED TEST: NORMAL
SODIUM SERPL-SCNC: 136 MMOL/L (ref 136–145)
WBC # BLD AUTO: 9.9 K/UL (ref 4–11)

## 2024-12-23 PROCEDURE — 6370000000 HC RX 637 (ALT 250 FOR IP): Performed by: INTERNAL MEDICINE

## 2024-12-23 PROCEDURE — 80069 RENAL FUNCTION PANEL: CPT

## 2024-12-23 PROCEDURE — 2500000003 HC RX 250 WO HCPCS: Performed by: FAMILY MEDICINE

## 2024-12-23 PROCEDURE — 51702 INSERT TEMP BLADDER CATH: CPT

## 2024-12-23 PROCEDURE — 99291 CRITICAL CARE FIRST HOUR: CPT | Performed by: STUDENT IN AN ORGANIZED HEALTH CARE EDUCATION/TRAINING PROGRAM

## 2024-12-23 PROCEDURE — 6360000002 HC RX W HCPCS: Performed by: INTERNAL MEDICINE

## 2024-12-23 PROCEDURE — 2580000003 HC RX 258: Performed by: INTERNAL MEDICINE

## 2024-12-23 PROCEDURE — 2700000000 HC OXYGEN THERAPY PER DAY

## 2024-12-23 PROCEDURE — 2500000003 HC RX 250 WO HCPCS: Performed by: INTERNAL MEDICINE

## 2024-12-23 PROCEDURE — 83735 ASSAY OF MAGNESIUM: CPT

## 2024-12-23 PROCEDURE — 90945 DIALYSIS ONE EVALUATION: CPT

## 2024-12-23 PROCEDURE — 94761 N-INVAS EAR/PLS OXIMETRY MLT: CPT

## 2024-12-23 PROCEDURE — 2100000000 HC CCU R&B

## 2024-12-23 PROCEDURE — 84100 ASSAY OF PHOSPHORUS: CPT

## 2024-12-23 PROCEDURE — 82330 ASSAY OF CALCIUM: CPT

## 2024-12-23 PROCEDURE — 36592 COLLECT BLOOD FROM PICC: CPT

## 2024-12-23 PROCEDURE — 92526 ORAL FUNCTION THERAPY: CPT

## 2024-12-23 PROCEDURE — 6370000000 HC RX 637 (ALT 250 FOR IP)

## 2024-12-23 PROCEDURE — 6360000002 HC RX W HCPCS: Performed by: FAMILY MEDICINE

## 2024-12-23 PROCEDURE — 2580000003 HC RX 258

## 2024-12-23 PROCEDURE — 85520 HEPARIN ASSAY: CPT

## 2024-12-23 PROCEDURE — 85027 COMPLETE CBC AUTOMATED: CPT

## 2024-12-23 PROCEDURE — 2500000003 HC RX 250 WO HCPCS

## 2024-12-23 RX ORDER — PHENYLEPHRINE HCL IN 0.9% NACL 50MG/250ML
10-300 PLASTIC BAG, INJECTION (ML) INTRAVENOUS CONTINUOUS
Status: DISCONTINUED | OUTPATIENT
Start: 2024-12-23 | End: 2024-12-31

## 2024-12-23 RX ADMIN — SODIUM CHLORIDE, PRESERVATIVE FREE 40 MG: 5 INJECTION INTRAVENOUS at 14:09

## 2024-12-23 RX ADMIN — Medication: at 16:36

## 2024-12-23 RX ADMIN — SODIUM PHOSPHATE, MONOBASIC, MONOHYDRATE AND SODIUM PHOSPHATE, DIBASIC, ANHYDROUS 12 MMOL: 142; 276 INJECTION, SOLUTION INTRAVENOUS at 23:32

## 2024-12-23 RX ADMIN — Medication 30 MCG/MIN: at 02:26

## 2024-12-23 RX ADMIN — SODIUM PHOSPHATE, MONOBASIC, MONOHYDRATE AND SODIUM PHOSPHATE, DIBASIC, ANHYDROUS 12 MMOL: 142; 276 INJECTION, SOLUTION INTRAVENOUS at 00:05

## 2024-12-23 RX ADMIN — Medication: at 21:47

## 2024-12-23 RX ADMIN — Medication: at 01:12

## 2024-12-23 RX ADMIN — Medication: at 06:13

## 2024-12-23 RX ADMIN — AMIODARONE HYDROCHLORIDE 200 MG: 200 TABLET ORAL at 08:16

## 2024-12-23 RX ADMIN — Medication: at 14:00

## 2024-12-23 RX ADMIN — AMIODARONE HYDROCHLORIDE 200 MG: 200 TABLET ORAL at 21:37

## 2024-12-23 RX ADMIN — ATORVASTATIN CALCIUM 80 MG: 80 TABLET, FILM COATED ORAL at 21:38

## 2024-12-23 RX ADMIN — QUETIAPINE FUMARATE 25 MG: 25 TABLET ORAL at 21:38

## 2024-12-23 RX ADMIN — ACETAMINOPHEN 650 MG: 325 TABLET ORAL at 23:03

## 2024-12-23 RX ADMIN — SODIUM CHLORIDE, PRESERVATIVE FREE 10 ML: 5 INJECTION INTRAVENOUS at 21:31

## 2024-12-23 RX ADMIN — Medication: at 11:30

## 2024-12-23 RX ADMIN — ACETAMINOPHEN 650 MG: 325 TABLET ORAL at 14:10

## 2024-12-23 RX ADMIN — ASPIRIN 81 MG 81 MG: 81 TABLET ORAL at 08:16

## 2024-12-23 RX ADMIN — HEPARIN SODIUM 2270 UNITS/HR: 10000 INJECTION, SOLUTION INTRAVENOUS at 14:07

## 2024-12-23 RX ADMIN — MILRINONE LACTATE 0.2 MCG/KG/MIN: 0.2 INJECTION, SOLUTION INTRAVENOUS at 12:00

## 2024-12-23 RX ADMIN — Medication: at 03:47

## 2024-12-23 RX ADMIN — SODIUM PHOSPHATE, MONOBASIC, MONOHYDRATE AND SODIUM PHOSPHATE, DIBASIC, ANHYDROUS 6 MMOL: 142; 276 INJECTION, SOLUTION INTRAVENOUS at 10:04

## 2024-12-23 RX ADMIN — Medication: at 16:37

## 2024-12-23 RX ADMIN — ACETAMINOPHEN 650 MG: 325 TABLET ORAL at 08:16

## 2024-12-23 RX ADMIN — HEPARIN SODIUM 2400 UNITS/HR: 10000 INJECTION, SOLUTION INTRAVENOUS at 02:32

## 2024-12-23 RX ADMIN — Medication: at 21:48

## 2024-12-23 RX ADMIN — Medication: at 09:00

## 2024-12-23 RX ADMIN — SODIUM PHOSPHATE, MONOBASIC, MONOHYDRATE AND SODIUM PHOSPHATE, DIBASIC, ANHYDROUS 12 MMOL: 142; 276 INJECTION, SOLUTION INTRAVENOUS at 15:11

## 2024-12-23 RX ADMIN — Medication: at 19:13

## 2024-12-23 RX ADMIN — SODIUM CHLORIDE, PRESERVATIVE FREE 40 MG: 5 INJECTION INTRAVENOUS at 03:01

## 2024-12-23 RX ADMIN — CALCIUM GLUCONATE 1000 MG: 20 INJECTION, SOLUTION INTRAVENOUS at 07:32

## 2024-12-23 ASSESSMENT — PAIN SCALES - GENERAL
PAINLEVEL_OUTOF10: 3
PAINLEVEL_OUTOF10: 5
PAINLEVEL_OUTOF10: 0
PAINLEVEL_OUTOF10: 5
PAINLEVEL_OUTOF10: 2
PAINLEVEL_OUTOF10: 3

## 2024-12-23 ASSESSMENT — PAIN DESCRIPTION - LOCATION
LOCATION: HEAD;LEG
LOCATION: HEAD;LEG
LOCATION: LEG

## 2024-12-23 ASSESSMENT — PAIN DESCRIPTION - DESCRIPTORS
DESCRIPTORS: TINGLING;THROBBING
DESCRIPTORS: ACHING;DISCOMFORT
DESCRIPTORS: NAGGING

## 2024-12-23 ASSESSMENT — PAIN DESCRIPTION - ORIENTATION: ORIENTATION: RIGHT

## 2024-12-23 NOTE — PROGRESS NOTES
NAME:  Levy Vargas  YOB: 1965  MEDICAL RECORD NUMBER:  [id number]    Shift Summary: CRRT continues, -75ml net loss/hr. Pt tolerating well. Phenylephrine off at 1042, oxygen off at 1100. Transitioned to regular diet. Pt has had headache and leg pain 4-6/10, tylenol and repositioning helps. Possibly discontinue CRRT tomorrow per nephro. Wound RN gave recommendations today.    Family updated: No    Rhythm: Sinus Tachycardia     Most recent vitals:   Visit Vitals  /72   Pulse (!) 109   Temp 99.7 °F (37.6 °C) (Bladder)   Resp 10   Ht 1.854 m (6' 1\")   Wt 124.8 kg (275 lb 2.2 oz)   SpO2 94%   BMI 36.30 kg/m²      ABP (Arterial line BP): 117/53  ABP Mean (Arterial Line Mean): 70 mmHg    No data found.    No data found.      Respiratory support needed (if any):  - RA    Admission weight Weight - Scale: (!) 145.4 kg (320 lb 8.8 oz) (12/11/24 2306)    Today's weight    Wt Readings from Last 1 Encounters:   12/23/24 124.8 kg (275 lb 2.2 oz)        Castanon need assessed each shift: YES -  - continue castanon r/t - 100ml total for shift  UOP >30ml/hr: NO - 8-20ml/hr  Last documented BM (in last 48 hrs):  Patient Vitals for the past 48 hrs:   Last BM (including prior to admit)   12/21/24 2000 12/21/24 12/22/24 0800 12/22/24                Restraints (in use currently or dc'd in last 12 hrs): No    Order current and documentation up to date? No    Lines/Drains reviewed @ bedside.  CVC  12/13/24 Left Internal jugular (Active)   Number of days: 10       Peripheral IV 12/11/24 Posterior;Right Hand (Active)   Number of days: 11       Peripheral IV 12/11/24 Left;Posterior Hand (Active)   Number of days: 11       Arterial Line 12/13/24 Left Radial (Active)   Number of days: 10       Hemodialysis Central Access - Temporary Right Neck (Active)   Number of days: 10       Urinary Catheter 12/12/24 Castanon-Temperature (Active)   Number of days: 11         Drip rates at handoff:    Phenylephrine Stopped (12/23/24 2902)     12/23/24 at 6:47 PM EST    **SHARE this note so that the co-signing nurse can place an eSignature**    Nurse 2 eSignature: Electronically signed by Avni Avila RN on 12/23/24 at 7:50 PM EST

## 2024-12-23 NOTE — PROGRESS NOTES
[facility]  [facility] DYSPHAGIA THERAPY    Patient: Levy Vargas   : 1965   MRN: [id number]    Treatment Date: 2024   Admitting Diagnosis:   A-fib (HCC) [I48.91]  Atrial fibrillation with rapid ventricular response (HCC) [I48.91]   has a past medical history of Abdominal pain (2021), Abnormal EKG (2016), Acute pancreatitis (2021), Atrial fibrillation (HCC), CHF (congestive heart failure) ([facility]), Cigarette smoker (2021), Cocaine abuse (HCC) (2015), Dehydration (2016), Diabetes mellitus ([facility]), History of cocaine abuse (HCC) (2016), History of MI (myocardial infarction) (2016), Hyperglycemia (2016), Hyperlipidemia, Hypertension, Morbid obesity with BMI of 45.0-49.9, adult (2016), Postural dizziness (2016), and PUD (peptic ulcer disease) (2019).   has a past surgical history that includes Cardiac catheterization; Upper gastrointestinal endoscopy; Leg Surgery (Right, 2023); and Cardiac procedure (N/A, 10/21/2024).  Allergies: NKA  Dysphagia History including instrumental assessment: 5/10/2024 seen by  at [facility] with rec for reg/thin  GI history: consults for GI bleeding; concerns for cirrhosis  ENT history: none on record  Baseline/Prior Level of Function: Living Status: admitted from home with family; Baseline diet: regular/thin    Onset: 2024     Treatment Diagnosis: Oropharyngeal dysphagia    CURRENT ENCOUNTER DIAGNOSTICS/COURSE OF ADMISSION     2024 admitted with c/o lightheadedness. MD ADMISSION H&P HPI: \"Levy Vargas is a 59 y.o. male with pmh of A-fib, hypertension, HFrEF, cirrhosis, hyperlipidemia, diabetes mellitus type II, CKD 3 baseline creatinine 1.3, COPD, tobacco dependence, history of cocaine abuse, history of MI, peptic ulcer disease  who presents with lightheadedness. Patient was seen in the room awake alert oriented to person place and situation.  Patient stated he had dizziness and generalized

## 2024-12-23 NOTE — PLAN OF CARE
Problem: Chronic Conditions and Co-morbidities  Goal: Patient's chronic conditions and co-morbidity symptoms are monitored and maintained or improved  12/23/2024 0832 by Marium Martin RN  Outcome: Progressing  12/23/2024 0400 by Gladys Echavarria RN  Outcome: Progressing  Flowsheets (Taken 12/22/2024 1600 by Rosana Rodrigues, RN)  Care Plan - Patient's Chronic Conditions and Co-Morbidity Symptoms are Monitored and Maintained or Improved:   Monitor and assess patient's chronic conditions and comorbid symptoms for stability, deterioration, or improvement   Collaborate with multidisciplinary team to address chronic and comorbid conditions and prevent exacerbation or deterioration     Problem: Pain  Goal: Verbalizes/displays adequate comfort level or baseline comfort level  12/23/2024 0832 by Marium Martin RN  Outcome: Progressing  12/23/2024 0400 by Gladys Echavarria RN  Outcome: Progressing  Flowsheets (Taken 12/20/2024 0800 by David Colon RN)  Verbalizes/displays adequate comfort level or baseline comfort level:   Encourage patient to monitor pain and request assistance   Assess pain using appropriate pain scale   Administer analgesics based on type and severity of pain and evaluate response   Implement non-pharmacological measures as appropriate and evaluate response   Consider cultural and social influences on pain and pain management   Notify Licensed Independent Practitioner if interventions unsuccessful or patient reports new pain     Problem: Safety - Adult  Goal: Free from fall injury  12/23/2024 0832 by Marium Martin RN  Outcome: Progressing  12/23/2024 0400 by Gladys Echavarria RN  Outcome: Progressing  Flowsheets (Taken 12/21/2024 0800 by Rosana Rodrigues, RN)  Free From Fall Injury:   Instruct family/caregiver on patient safety   Based on caregiver fall risk screen, instruct family/caregiver to ask for assistance with transferring infant if caregiver noted to have fall risk factors

## 2024-12-23 NOTE — CARE COORDINATION
Wound Referral Progress Note       NAME:  Levy Vargas  MEDICAL RECORD NUMBER:  [id number]  AGE: 59 y.o.   GENDER: male  : 1965  TODAY'S DATE:  2024    Subjective   Reason for WOCN Evaluation and Assessment: ? DTI. Pt has a mucosal injury to right nares.      Levy Vargas is a 59 y.o. male referred by:   Nursing    Wound Identification:  Wound Type:  moisture  Contributing Factors:  moisture, NG with bridle , venous-suspected    Wound History: noted on admit-leg wounds, nasal wound noted   Current Wound Care Treatment:  foam border to RLE, nose ROHIT    Patient Care Goal:  Wound Healing        PAST MEDICAL HISTORY        Diagnosis Date    Abdominal pain 2021    Abnormal EKG 2016    Acute pancreatitis 2021    Atrial fibrillation (HCC)     CHF (congestive heart failure) ([facility])     Cigarette smoker 2021    Cocaine abuse (HCC) 2015    Last Assessment & Plan:  Formatting of this note might be different from the original. Counseled on cessation as increases risk of coronary vasospasm and further worsening of heart function.    Dehydration 2016    Diabetes mellitus (HCC)     History of cocaine abuse (HCC) 2016    History of MI (myocardial infarction) 2016    Hyperglycemia 2016    Hyperlipidemia     Hypertension     Morbid obesity with BMI of 45.0-49.9, adult 2016    Postural dizziness 2016    PUD (peptic ulcer disease) 2019       PAST SURGICAL HISTORY    Past Surgical History:   Procedure Laterality Date    CARDIAC CATHETERIZATION      CARDIAC PROCEDURE N/A 10/21/2024    Right heart cath performed by Daniele Hernandez MD at [facility] CARDIAC CATH LAB    LEG SURGERY Right 2023    RIGHT THIGH INCISION AND DRAINAGE WITH DRAIN PLACEMENT performed by Dominick Cid DO at [facility] OR    UPPER GASTROINTESTINAL ENDOSCOPY         FAMILY HISTORY    No family history on file.    SOCIAL HISTORY    Social History     Tobacco Use    Smoking

## 2024-12-23 NOTE — CARE COORDINATION
Chart Reviewed.  Continues on Continuous CRRT but to potentially start Intermittent HD tomorrow.  Stopping Milrinone Drip.  From home, lives with brother.  Palliative Care following.   Will Need a New PCP at discharge.   Agreeable to having me set one up for him.  He would like a referral to [facility] for Aide/homemaker.  Would like a shower chair and TSF.  Following for DC needs.   CHLOE Orozco     Case Management   [phone]    12/23/2024  4:14 PM

## 2024-12-23 NOTE — PROGRESS NOTES
V2.0    [facility] [place] [place]      Name:  Levy Vargas /Age/Sex: 1965  (59 y.o. male)   MRN & CSN:  [id number] & [id number] Encounter Date/Time: 2024 8:57 AM EST   Location:  [place] [address] PCP: No primary care provider on file.     Attending:Geovani Blanchard MD       Hospital Day: 13    Assessment and Recommendations   Levy Vargas is a 59 y.o. male who presents with Cardiogenic shock      Plan:     Cardiogenic shock, with cardiomyopathy with EF of 15%, extubated, feeling better status post FRANTZ as patient was in A-fib also with RVR improved now continue to be on milrinone cardiology continue to follow  Acute on chronic congestive heart failure cardiology following, continue to be on milrinone drip.  Off pressors for now  A-fib with RVR status post FRANTZ continue to be on heparin  KENDRA, nephrology consulted continue to be on CRRT.  Acute metabolic encephalopathy with lethargy multifactorial fluctuating and currently improving  Anemia, no signs of active bleeding.   Coffee-ground emesis GI consulted [facility]ton[facility] GI consulted no immediate plan for EGD at this time  Acute respiratory failure currently extubated        Diet ADULT DIET; Dysphagia - Soft and Bite Sized   DVT Prophylaxis [] Lovenox, []  Heparin, [] SCDs, [] Ambulation,  [] Eliquis, [] Xarelto  [] Coumadin   Code Status Full Code             Personally reviewed Lab Studies and Imaging     Discussed management of the case with cardiology who recommended milrinone  Rhythm strip independently interpreted by myself heart rate 102 QTc 413 no ST elevation        Medical Decision Making:  The following items were considered in medical decision making:  Discussion of patient care with other providers  Reviewed clinical lab tests  Reviewed radiology tests  Reviewed other diagnostic tests/interventions  Independent review of radiologic images  Microbiology cultures and other micro tests reviewed      Subjective:     Chief Complaint: Weakness  10/19/2024 03:13 AM     TSH:   Lab Results   Component Value Date/Time    TSH 0.05 12/12/2024 12:34 AM     Troponin: No results found for: \"TROPONINT\"  Lactic Acid: No results for input(s): \"LACTA\" in the last 72 hours.  BNP: No results for input(s): \"PROBNP\" in the last 72 hours.  UA:  Lab Results   Component Value Date/Time    NITRU POSITIVE 12/12/2024 06:45 PM    COLORU DARK YELLOW 12/12/2024 06:45 PM    PHUR 5.0 12/12/2024 06:45 PM    PHUR 7.0 08/03/2023 09:20 PM    PHUR 7.0 08/03/2023 09:20 PM    WBCUA 3-5 12/12/2024 06:45 PM    RBCUA 5-10 12/12/2024 06:45 PM    MUCUS 1+ 03/19/2022 05:10 PM    TRICHOMONAS None Seen 03/19/2022 05:10 PM    BACTERIA 2+ 12/12/2024 06:45 PM    CLARITYU CLOUDY 12/12/2024 06:45 PM    LEUKOCYTESUR SMALL 12/12/2024 06:45 PM    UROBILINOGEN 2.0 12/12/2024 06:45 PM    BILIRUBINUR MODERATE 12/12/2024 06:45 PM    BLOODU LARGE 12/12/2024 06:45 PM    GLUCOSEU Negative 12/12/2024 06:45 PM    KETUA TRACE 12/12/2024 06:45 PM     Urine Cultures: No results found for: \"LABURIN\"  Blood Cultures: No results found for: \"BC\"  No results found for: \"BLOODCULT2\"  Organism: No results found for: \"ORG\"      Electronically signed by Geovani Blanchard MD on 12/23/2024 at 8:57 AM  Comment: Please note this report has been produced using speech recognition software and may contain errors related to that system including errors in grammar, punctuation, and spelling, as well as words and phrases that may be inappropriate. If there are any questions or concerns, please feel free to contact the dictating provider for clarification.

## 2024-12-23 NOTE — PROGRESS NOTES
Clinical Pharmacy Note  Heparin Dosing       Lab Results   Component Value Date/Time    ANTIXAUHEP 0.73 12/23/2024 12:30 PM      Lab Results   Component Value Date/Time    HGB 10.1 12/23/2024 06:00 AM    HCT 31.4 12/23/2024 06:00 AM     12/23/2024 06:00 AM    INR 1.70 12/12/2024 03:59 AM       Current Infusion Rate: 2400 units/hr    Plan:  Rate: decrease to 2270 units/hr  Next anti-Xa level: 2000 12/23/24    Pharmacy will continue to monitor and adjust based on anti-Xa results.  France Lyon [facility],12/23/2024,1:56 PM

## 2024-12-23 NOTE — PROGRESS NOTES
Clinical Pharmacy Note  Heparin Dosing       Lab Results   Component Value Date/Time    ANTIXAUHEP 0.39 12/23/2024 06:00 AM      Lab Results   Component Value Date/Time    HGB 10.1 12/23/2024 06:00 AM    HCT 31.4 12/23/2024 06:00 AM     12/23/2024 06:00 AM    INR 1.70 12/12/2024 03:59 AM       Current Infusion Rate: 2400 units/hr    Plan:  Rate: continue 2400 units/hr  Next anti-Xa level: 1200 12/23/24    Pharmacy will continue to monitor and adjust based on anti-Xa results.    Shelly Mendoza PharmD, BCPS  12/23/2024 7:39 AM

## 2024-12-23 NOTE — PROGRESS NOTES
Comprehensive Nutrition Assessment    Type and Reason for Visit:  Reassess    Nutrition Recommendations/Plan:   Diet textures per SLP  Glucerna chocolate or strawberry with meals  Will monitor nutritional adequacy, nutrition-related labs, weights, BMs, and clinical progress       Malnutrition Assessment:  Malnutrition Status:  At risk for malnutrition (12/19/24 1248)    Context:  Chronic Illness     Findings of the 6 clinical characteristics of malnutrition:  Energy Intake:  Mild decrease in energy intake  Weight Loss:   (weight fluctuating significantly)     Body Fat Loss:  Unable to assess     Muscle Mass Loss:  Unable to assess    Fluid Accumulation:  Unable to assess     Strength:  Not Performed    Nutrition Assessment:    Follow up:  Continues in CVU, on CRRT.  Rosalio at 20 mcg, off Levo and Vaso.  Diet progressed to soft and bite sized textures on 12/20 as recommended by SLP.  Patient feels like he could eat a turkey sandwich better.  The hot meals have been bothering stomach.  Nausea noted in flow sheets.  May need a scheduled bowel regimen.   RD called SLP, SLP to follow up with patient later today.  RD offered protein shakes due to increased protein needs due to dialysis and wound.  Patient very interested in chocolate and strawberry shakes, really enjoys them.  RD ordered chocolate and strawberry Glucerna shakes with history of diabetes and are lower in potassium than Ensure.    Nutrition Related Findings:    K 4.1, PHos 1.8 on 12/23; nauseated Wound Type: Wound Consult Pending       Current Nutrition Intake & Therapies:    Average Meal Intake: %, 51-75%  Average Supplements Intake: Unable to assess  ADULT DIET; Dysphagia - Soft and Bite Sized  ADULT ORAL NUTRITION SUPPLEMENT; Breakfast, Dinner, Lunch; Diabetic Oral Supplement    Anthropometric Measures:  Height: 185.4 cm (6' 1\")  Ideal Body Weight (IBW): 184 lbs (84 kg)       Current Body Weight: 124.8 kg (275 lb 2.2 oz),   IBW. Weight Source: Bed  scale  Current BMI (kg/m2): 36.3                             BMI Categories: Obese Class 2 (BMI 35.0 -39.9)    Estimated Daily Nutrient Needs:  Energy Requirements Based On: Kcal/kg  Weight Used for Energy Requirements: Current  Energy (kcal/day): 2538-3469 (15-18 kcal/130.1 kg)  Weight Used for Protein Requirements: Ideal  Protein (g/day): 100-125 (1.2-1.5 g/83.6 kg)  Method Used for Fluid Requirements: 1 ml/kcal  Fluid (ml/day):      Nutrition Diagnosis:   Increased nutrient needs related to increase demand for energy/nutrients as evidenced by dialysis, wounds    Nutrition Interventions:   Food and/or Nutrient Delivery: Continue Current Diet, Start Oral Nutrition Supplement  Nutrition Education/Counseling:  (monitor need)  Coordination of Nutrition Care: Continue to monitor while inpatient       Goals:  Goals: PO intake 75% or greater  Type of Goal: New goal       Nutrition Monitoring and Evaluation:      Food/Nutrient Intake Outcomes: Food and Nutrient Intake, Supplement Intake  Physical Signs/Symptoms Outcomes: Biochemical Data, Nutrition Focused Physical Findings, Weight, Skin    Discharge Planning:    Too soon to determine     JENNY YEUNG RD, LD  Contact: Office: [phone]; [place]: [phone]

## 2024-12-23 NOTE — PROGRESS NOTES
[facility]   Daily Progress Note      Admit Date:  12/11/2024    Reason for follow up visit: Atrial fib; CHF    CC: \"I feel okay except I want those padded boots off.\"    58 y/o male with PMH notable for PAF, PAD, chronic systolic HF with LVEF 15-20% who presented to [facility] with dizziness and elevated BP. Noted to be in rapid atrial fib and placed on IV amiodarone. Has since returned to SR and on po amiodarone.Currently remains on Milrinone for his CHF.     Interval History:  Remains on CRRT  Pulling 75cc neg /HR  Total 1.5L negative    Subjective:  Pt with no acute overnight cardiac events.     Objective:   /72   Pulse (!) 105   Temp 99.1 °F (37.3 °C)   Resp 15   Ht 1.854 m (6' 1\")   Wt 124.8 kg (275 lb 2.2 oz)   SpO2 94%   BMI 36.30 kg/m²     Intake/Output Summary (Last 24 hours) at 12/23/2024 1438  Last data filed at 12/23/2024 1304  Gross per 24 hour   Intake 1909.75 ml   Output 3506 ml   Net -1596.25 ml     Wt Readings from Last 3 Encounters:   12/23/24 124.8 kg (275 lb 2.2 oz)   12/19/24 129.7 kg (286 lb)   10/29/24 131.8 kg (290 lb 9.1 oz)       Physical Exam:  General: In no acute distress. Awake, alert, and oriented X 3. More alert and talkative today. Resting comfortably at present  Skin:  Warm and dry.    Neck:  Supple. No JVD appreciated.  Chest: Lungs clear to anterior auscultation; diminished breath sounds posterior bases. No wheezes/rhonchi/rales  Cardiovascular:  RRR. Normal S1 and S2; soft systolic murmur   Abdomen:  soft, nontender, +bowel sounds.   Extremities:  + minimal bilateral ankle edema     Medications:    QUEtiapine  25 mg Oral Nightly    insulin lispro  0-4 Units SubCUTAneous 4x Daily WC    amiodarone  200 mg Per NG tube BID    pantoprazole (PROTONIX) 40 mg in sodium chloride (PF) 0.9 % 10 mL injection  40 mg IntraVENous Q12H    [Held by provider] spironolactone  25 mg Oral Daily    [Held by provider] gabapentin  300 mg Oral Nightly    [Held by provider] apixaban  5  popliteal artery appears chronically occluded.    The posterior tibial and peroneal arteries appear to be occluded.    The right anterior tibial artery is occluded with reconstitution just above the ankle. The distal anterior tibial artery and dorsalis pedis arteries exhibit severely hyeremic flow.    Velocities are reduced throughout the arterial system of the right lower extremity, this is likely secondary to cardiac output and heart rhythm.    Findings are consistent with chronic limb threatening ischemia in the right leg.    The left leg was not interrogated due to the patient's unstable vitals and need to return to the ED.     Summary:  Moderate to severe atherosclerotic plaque noted in the right lower extremity vessels.  No significant stenosis noted in the right common femoral or SFA based on velocity criteria.  Patient noted to be in A-fib with RVR which may be falsely decreasing the velocities.  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    Telemetry: Sinus rhythm-sinus tachycardia with PAC's; no further atrial fib noted; no further NSVT  (Personally reviewed)    Assessment/Plan:    1) Atrial fib with RVR  -S/P FRANTZ/CVN on 12/19/24 and maintaining SR-ST  -PAC's noted, no further atrial fib  -continue heparin (Eliquis on hold)  -continue po amiodarone     2) NSVT  -6 beat noted on telemetry during noc 12/20  -asymptomatic  -no further episodes noted overnight      3) Acute on chronic systolic HF  Cardiogenic shock  NYHA IV  -LVEF 10-15%; NYHA class IV  -continues to improve  -Stop milrinone today, especially if planning for intermittent HD tomorrow to see if he tolerates it  -on CRRT for fluid removal  -GDMT remains on hold  -BP improved     4) KENDRA on CKD  -nephrology following  - Plan for intermittent HD tomorrow potentially     5) Anemia  -2/2 UGI bleed  -on PPI  -Hgb stable at 10.1  -Eliquis remains on hold (tolerating heparin)    I

## 2024-12-23 NOTE — PLAN OF CARE
Problem: Chronic Conditions and Co-morbidities  Goal: Patient's chronic conditions and co-morbidity symptoms are monitored and maintained or improved  Outcome: Progressing  Flowsheets (Taken 12/22/2024 1600 by Rosana Rodrigues RN)  Care Plan - Patient's Chronic Conditions and Co-Morbidity Symptoms are Monitored and Maintained or Improved:   Monitor and assess patient's chronic conditions and comorbid symptoms for stability, deterioration, or improvement   Collaborate with multidisciplinary team to address chronic and comorbid conditions and prevent exacerbation or deterioration     Problem: Pain  Goal: Verbalizes/displays adequate comfort level or baseline comfort level  Outcome: Progressing  Flowsheets (Taken 12/20/2024 0800 by David Colon, RN)  Verbalizes/displays adequate comfort level or baseline comfort level:   Encourage patient to monitor pain and request assistance   Assess pain using appropriate pain scale   Administer analgesics based on type and severity of pain and evaluate response   Implement non-pharmacological measures as appropriate and evaluate response   Consider cultural and social influences on pain and pain management   Notify Licensed Independent Practitioner if interventions unsuccessful or patient reports new pain     Problem: Safety - Adult  Goal: Free from fall injury  Outcome: Progressing  Flowsheets (Taken 12/21/2024 0800 by Rosana Rodrigues, RN)  Free From Fall Injury:   Instruct family/caregiver on patient safety   Based on caregiver fall risk screen, instruct family/caregiver to ask for assistance with transferring infant if caregiver noted to have fall risk factors     Problem: Respiratory - Adult  Goal: Achieves optimal ventilation and oxygenation  Outcome: Progressing  Flowsheets (Taken 12/20/2024 0800 by David Colon, RN)  Achieves optimal ventilation and oxygenation:   Assess for changes in respiratory status   Assess for changes in mentation and behavior   Position to  RN)  Hemodynamic stability and optimal renal function maintained:   Monitor labs and assess for signs and symptoms of volume excess or deficit   Monitor intake, output and patient weight   Monitor urine specific gravity, serum osmolarity and serum sodium as indicated or ordered  Goal: Glucose maintained within prescribed range  Outcome: Progressing  Flowsheets (Taken 12/22/2024 1600 by Rosana Rodrigues, RN)  Glucose maintained within prescribed range:   Monitor blood glucose as ordered   Assess for signs and symptoms of hyperglycemia and hypoglycemia   Assess barriers to adequate nutritional intake and initiate nutrition consult as needed   Administer ordered medications to maintain glucose within target range     Problem: Neurosensory - Adult  Goal: Achieves stable or improved neurological status  Outcome: Progressing  Flowsheets (Taken 12/22/2024 1600 by Rosana Rodrigues, RN)  Achieves stable or improved neurological status: Assess for and report changes in neurological status     Problem: Skin/Tissue Integrity - Adult  Goal: Skin integrity remains intact  Outcome: Progressing  Flowsheets (Taken 12/22/2024 1600 by Rosana Rodrigues, RN)  Skin Integrity Remains Intact:   Monitor for areas of redness and/or skin breakdown   Assess vascular access sites hourly   Every 4-6 hours minimum: Change oxygen saturation probe site  Goal: Incisions, wounds, or drain sites healing without S/S of infection  Outcome: Progressing  Flowsheets (Taken 12/22/2024 1600 by Rosana Rodrigues, RN)  Incisions, Wounds, or Drain Sites Healing Without Sign and Symptoms of Infection:   TWICE DAILY: Assess and document skin integrity   ADMISSION and DAILY: Assess and document risk factors for pressure ulcer development     Problem: Musculoskeletal - Adult  Goal: Return mobility to safest level of function  Outcome: Progressing  Flowsheets (Taken 12/20/2024 1922 by Marium Woods, RN)  Return Mobility to Safest Level of Function:   Assess patient

## 2024-12-23 NOTE — PROGRESS NOTES
Nephrology Progress Note   [employer]      Reason for consultation:  KENDRA on CKD 2 -- baseline Cr ~ 0.9-1.3 mg/dL     HPI: Levy Vargas is a 60 yo male with a PMHx of CKD 2, h/o AKIs, HFrEF, non-compliance, DM, HTN, afib, h/o cocaine use, CAD, HLD. Patient presents to  ED on 12/11/2024 with complaints of BLE edema, lightheadedness, and weakness. Patient is currently drowsy and it is difficult to obtain history. Currently oriented x3. Patient tells me he has been taking his CHF medications everyday. Per chart review, there has been some questions regarding his outpatient compliance and not keeping in touch with his CHF nurses. Pt is currently grossly volume overloaded. He weighed 131.5 kg on 9/25/2024 during his last cardiology appt. He currently weighs 145.3 kg. Currently dyspneic and on 4 L NC. CXR is negative for acute findings. In afib RVR this admission. On amio gtt.     We have been consulted for KENDRA on CKD 2 management. Cr upon admission was 1.3 mg/dL. Today, Cr is 2.6 mg/dL. Patient has received a generous amount of IVP and oral diuretic yesterday and was also started on a lasix gtt at 10 mg/hr. Despite this, pt has remained anuric with 150 mL UOP/24 hrs and 25 mL UOP documented today. BP has been as low as 63/38 mmHg since admission. Started on milrinone gtt yesterday.     Subjective:    The patient has been seen and examined. Labs and chart reviewed. Remains on milrinone gtt. Off neosynephrine.UOP remains low. Seen on CRRT. Net negative 2 L yesterday and 7.3 L since admission.     There were not complications overnight.    Patient review of systems: Denies SOB       Allergies:  No Known Allergies     Scheduled Meds:   QUEtiapine  25 mg Oral Nightly    insulin lispro  0-4 Units SubCUTAneous 4x Daily WC    LORazepam  0.5 mg IntraVENous Once    amiodarone  200 mg Per NG tube BID    pantoprazole (PROTONIX) 40 mg in sodium chloride (PF) 0.9 % 10 mL injection  40 mg IntraVENous Q12H    [Held by provider]  LEUKOCYTESUR SMALL 12/12/2024 06:45 PM    UROBILINOGEN 2.0 12/12/2024 06:45 PM    BILIRUBINUR MODERATE 12/12/2024 06:45 PM    BLOODU LARGE 12/12/2024 06:45 PM    GLUCOSEU Negative 12/12/2024 06:45 PM         IMPRESSION/RECOMMENDATIONS:      Anuric KENDRA on CKD 2: baseline Cr ~ 0.9-1.3 mg/dL. Etiology 2/2 cardiorenal syndrome vs cardiogenic shock.              - R temp vascath placed and CRRT initiated 12/13/2024              - Continue CRRT - increased net UF to 75 (on 12/22) from 50 from 25 mL/hr -- Modified CRRT prescription to achieve adequate clearance 12/16/24 - tolerating fluid removal - edema improving             - Continue q12 labs   - If BP remains stable, may be able to d/c CRRT tomorrow and  transition to iHD     2. Cardiogenic shock / HFrEF. Wt was 131.5 kg on 9/25/2024 during last cardiology appt              - TTE (10/19/2024): LVEF 10-15%. Severe global hypokinesis. Indeterminate diastolic dysfx. LA severely dilated.               - Failed diuresis. Volume removal as tolerated with CRRT               - S/p Albumin to help support BP               - On milrinone and off pressors at the moment              - Strict I/Os. Daily weights.              - Cardiology and CC team following     3. Afib RVR              - Now in NSR s/p cardioversion 12/19/24              - Cardiology following     4. GI bleed              - Hgb 10.1 g/dL              - Iron sat 18; transferrin 195 - s/p venofer    - B12 and folate adequate     5. Hypophosphatemia              - Replace phosphorus per CRRT PRN replacement protocol    Will discuss with nephrology attending physician, Dr. Underwood.  See attestation for additional recommendations.    Martine Larios, CONSUELO - CNP

## 2024-12-23 NOTE — PROGRESS NOTES
Clinical Pharmacy Note  Heparin Dosing       Lab Results   Component Value Date/Time    ANTIXAUHEP 0.26 12/22/2024 09:00 PM      Lab Results   Component Value Date/Time    HGB 10.7 12/22/2024 06:46 PM    HCT 33.6 12/22/2024 06:46 PM     12/22/2024 06:46 PM    INR 1.70 12/12/2024 03:59 AM       Current Infusion Rate: 2100 units/hr    Plan:  Bolus: 2000 units  Rate: 2400 units/hr  Next anti-Xa level: 0600 12/23/24    Pharmacy will continue to monitor and adjust based on anti-Xa results.    Mila Menon, PharmD

## 2024-12-23 NOTE — PROGRESS NOTES
Spiritual Health History and Assessment/Progress Note  [facility]    (P) Follow-up,  , Adjustment to illness,      Name: Levy Vargas MRN: [id number]    Age: 59 y.o.     Sex: male   Language: English   Sikhism: Yazidi   Cardiogenic shock     Date: 12/23/2024            Total Time Calculated: (P) 8 min              Spiritual Assessment began in [facility]        Referral/Consult From: (P) Rounding   Encounter Overview/Reason: (P) Follow-up  Service Provided For: (P) Patient    Staci, Belief, Meaning:   Patient identifies as spiritual and Other: Pt grew up Protestant, [place]  Family/Friends No family/friends present      Importance and Influence:  Patient has spiritual/personal beliefs that influence decisions regarding their health    Community:  Patient Other: Pt has children who are supportive.    Assessment and Plan of Care:   Patient Interventions include: Facilitated expression of thoughts and feelings and Other:  will follow up with pt    Patient Plan of Care: Other:  will follow up with pt      Electronically signed by CHRIS Fritz on 12/23/2024 at 12:36 PM

## 2024-12-23 NOTE — PROGRESS NOTES
NAME:  Levy Vargas  YOB: 1965  MEDICAL RECORD NUMBER:  [id number]    Shift Summary: patient remains on CRRT, goal -75 at -350 for the shift, VSS more oriented as shift progressed,     Family updated: Yes:  daughter in to see patient     Rhythm: Sinus Tachycardia     Most recent vitals:   Visit Vitals  BP (!) 103/58   Pulse (!) 110   Temp 98.7 °F (37.1 °C)   Resp 15   Ht 1.854 m (6' 1\")   Wt 125.8 kg (277 lb 5.4 oz)   SpO2 99%   BMI 36.59 kg/m²      ABP (Arterial line BP): (!) 115/93  ABP Mean (Arterial Line Mean): 103 mmHg    No data found.    No data found.      Respiratory support needed (if any):  - RA    Admission weight Weight - Scale: (!) 145.4 kg (320 lb 8.8 oz) (12/11/24 2306)    Today's weight    Wt Readings from Last 1 Encounters:   12/22/24 125.8 kg (277 lb 5.4 oz)        Castanon need assessed each shift: YES -  - continue castanon r/t - fluid volume management on crrt  UOP >30ml/hr: NO - oliguric  Last documented BM (in last 48 hrs):  Patient Vitals for the past 48 hrs:   Last BM (including prior to admit)   12/21/24 0800 12/21/24 12/21/24 1200 12/21/24 12/21/24 1600 12/21/24 12/21/24 2000 12/21/24 12/22/24 0800 12/22/24                Restraints (in use currently or dc'd in last 12 hrs): No    Order current and documentation up to date? No    Lines/Drains reviewed @ bedside.  CVC  12/13/24 Left Internal jugular (Active)   Number of days: 9       Peripheral IV 12/11/24 Posterior;Right Hand (Active)   Number of days: 11       Peripheral IV 12/11/24 Left;Posterior Hand (Active)   Number of days: 10       Arterial Line 12/13/24 Left Radial (Active)   Number of days: 9       Hemodialysis Central Access - Temporary Right Neck (Active)   Number of days: 9       Urinary Catheter 12/12/24 Castanon-Temperature (Active)   Number of days: 10         Drip rates at handoff:    phenylephrine (DL-SYNEPHRINE) 50 mg in sodium chloride 0.9 % 250 mL infusion Stopped (12/18/24 1918)    dexmedeTOMIDine

## 2024-12-23 NOTE — PROGRESS NOTES
NAME:  Levy Vargas  YOB: 1965  MEDICAL RECORD NUMBER:  [id number]    Shift Summary: Patient continues on CRRT. Rosalio restarted due to hypotension. Patient alert and oriented x4 this shift. Rectal tube removed.     Family updated: No    Rhythm: Sinus Tachycardia     Most recent vitals:   Visit Vitals  BP (!) 103/58   Pulse (!) 101   Temp 99.4 °F (37.4 °C)   Resp 10   Ht 1.854 m (6' 1\")   Wt 125.8 kg (277 lb 5.4 oz)   SpO2 93%   BMI 36.59 kg/m²      ABP (Arterial line BP): 92/48  ABP Mean (Arterial Line Mean): (!) 61 mmHg    No data found.    No data found.      Respiratory support needed (if any):  - O2 - NC - 2 lpm    Admission weight Weight - Scale: (!) 145.4 kg (320 lb 8.8 oz) (12/11/24 2306)    Today's weight    Wt Readings from Last 1 Encounters:   12/22/24 125.8 kg (277 lb 5.4 oz)        Castanon need assessed each shift: YES -  - continue castanon r/t - accurate I &O  UOP >30ml/hr: NO - CRRT  Last documented BM (in last 48 hrs):  Patient Vitals for the past 48 hrs:   Last BM (including prior to admit)   12/21/24 0800 12/21/24 12/21/24 1200 12/21/24 12/21/24 1600 12/21/24 12/21/24 2000 12/21/24 12/22/24 0800 12/22/24                Restraints (in use currently or dc'd in last 12 hrs): No    Order current and documentation up to date? No    Lines/Drains reviewed @ bedside.  CVC  12/13/24 Left Internal jugular (Active)   Number of days: 9       Peripheral IV 12/11/24 Posterior;Right Hand (Active)   Number of days: 11       Peripheral IV 12/11/24 Left;Posterior Hand (Active)   Number of days: 11       Arterial Line 12/13/24 Left Radial (Active)   Number of days: 9       Hemodialysis Central Access - Temporary Right Neck (Active)   Number of days: 9       Urinary Catheter 12/12/24 Castanon-Temperature (Active)   Number of days: 10         Drip rates at handoff:    Phenylephrine 40 mcg/min (12/23/24 0500)    dexmedeTOMIDine Stopped (12/21/24 1300)    heparin (PORCINE) Infusion 2,400 Units/hr (12/23/24

## 2024-12-24 LAB
ALBUMIN SERPL-MCNC: 3 G/DL (ref 3.4–5)
ALBUMIN SERPL-MCNC: 3.2 G/DL (ref 3.4–5)
ANION GAP SERPL CALCULATED.3IONS-SCNC: 5 MMOL/L (ref 3–16)
ANION GAP SERPL CALCULATED.3IONS-SCNC: 5 MMOL/L (ref 3–16)
ANTI-XA UNFRAC HEPARIN: 0.63 IU/ML (ref 0.3–0.7)
ANTI-XA UNFRAC HEPARIN: 0.7 IU/ML (ref 0.3–0.7)
ANTI-XA UNFRAC HEPARIN: 0.72 IU/ML (ref 0.3–0.7)
BUN SERPL-MCNC: 7 MG/DL (ref 7–20)
BUN SERPL-MCNC: 8 MG/DL (ref 7–20)
CALCIUM SERPL-MCNC: 8.2 MG/DL (ref 8.3–10.6)
CALCIUM SERPL-MCNC: 8.2 MG/DL (ref 8.3–10.6)
CHLORIDE SERPL-SCNC: 102 MMOL/L (ref 99–110)
CHLORIDE SERPL-SCNC: 103 MMOL/L (ref 99–110)
CO2 SERPL-SCNC: 27 MMOL/L (ref 21–32)
CO2 SERPL-SCNC: 27 MMOL/L (ref 21–32)
CREAT SERPL-MCNC: 1.4 MG/DL (ref 0.8–1.3)
CREAT SERPL-MCNC: 1.4 MG/DL (ref 0.8–1.3)
DEPRECATED RDW RBC AUTO: 20.1 % (ref 12.4–15.4)
GFR SERPLBLD CREATININE-BSD FMLA CKD-EPI: 57 ML/MIN/{1.73_M2}
GFR SERPLBLD CREATININE-BSD FMLA CKD-EPI: 57 ML/MIN/{1.73_M2}
GLUCOSE BLD-MCNC: 77 MG/DL (ref 70–99)
GLUCOSE BLD-MCNC: 80 MG/DL (ref 70–99)
GLUCOSE BLD-MCNC: 91 MG/DL (ref 70–99)
GLUCOSE BLD-MCNC: 98 MG/DL (ref 70–99)
GLUCOSE SERPL-MCNC: 110 MG/DL (ref 70–99)
GLUCOSE SERPL-MCNC: 83 MG/DL (ref 70–99)
HCT VFR BLD AUTO: 30.7 % (ref 40.5–52.5)
HGB BLD-MCNC: 10 G/DL (ref 13.5–17.5)
MCH RBC QN AUTO: 28.7 PG (ref 26–34)
MCHC RBC AUTO-ENTMCNC: 32.6 G/DL (ref 31–36)
MCV RBC AUTO: 88 FL (ref 80–100)
PERFORMED ON: NORMAL
PHOSPHATE SERPL-MCNC: 1.3 MG/DL (ref 2.5–4.9)
PHOSPHATE SERPL-MCNC: 1.6 MG/DL (ref 2.5–4.9)
PHOSPHATE SERPL-MCNC: 1.7 MG/DL (ref 2.5–4.9)
PLATELET # BLD AUTO: 158 K/UL (ref 135–450)
PMV BLD AUTO: 8.7 FL (ref 5–10.5)
POTASSIUM SERPL-SCNC: 4.2 MMOL/L (ref 3.5–5.1)
POTASSIUM SERPL-SCNC: 4.3 MMOL/L (ref 3.5–5.1)
RBC # BLD AUTO: 3.49 M/UL (ref 4.2–5.9)
SODIUM SERPL-SCNC: 134 MMOL/L (ref 136–145)
SODIUM SERPL-SCNC: 135 MMOL/L (ref 136–145)
WBC # BLD AUTO: 8.4 K/UL (ref 4–11)

## 2024-12-24 PROCEDURE — 85027 COMPLETE CBC AUTOMATED: CPT

## 2024-12-24 PROCEDURE — 6370000000 HC RX 637 (ALT 250 FOR IP): Performed by: INTERNAL MEDICINE

## 2024-12-24 PROCEDURE — 80069 RENAL FUNCTION PANEL: CPT

## 2024-12-24 PROCEDURE — 6360000002 HC RX W HCPCS: Performed by: FAMILY MEDICINE

## 2024-12-24 PROCEDURE — 99233 SBSQ HOSP IP/OBS HIGH 50: CPT | Performed by: STUDENT IN AN ORGANIZED HEALTH CARE EDUCATION/TRAINING PROGRAM

## 2024-12-24 PROCEDURE — 6360000002 HC RX W HCPCS: Performed by: INTERNAL MEDICINE

## 2024-12-24 PROCEDURE — 2580000003 HC RX 258: Performed by: INTERNAL MEDICINE

## 2024-12-24 PROCEDURE — 92526 ORAL FUNCTION THERAPY: CPT

## 2024-12-24 PROCEDURE — 37799 UNLISTED PX VASCULAR SURGERY: CPT

## 2024-12-24 PROCEDURE — 6360000002 HC RX W HCPCS

## 2024-12-24 PROCEDURE — 2500000003 HC RX 250 WO HCPCS: Performed by: INTERNAL MEDICINE

## 2024-12-24 PROCEDURE — 6370000000 HC RX 637 (ALT 250 FOR IP)

## 2024-12-24 PROCEDURE — 2500000003 HC RX 250 WO HCPCS: Performed by: FAMILY MEDICINE

## 2024-12-24 PROCEDURE — 2580000003 HC RX 258

## 2024-12-24 PROCEDURE — 85520 HEPARIN ASSAY: CPT

## 2024-12-24 PROCEDURE — 84100 ASSAY OF PHOSPHORUS: CPT

## 2024-12-24 PROCEDURE — 2100000000 HC CCU R&B

## 2024-12-24 RX ADMIN — Medication: at 08:12

## 2024-12-24 RX ADMIN — Medication: at 00:22

## 2024-12-24 RX ADMIN — SODIUM PHOSPHATE, MONOBASIC, MONOHYDRATE AND SODIUM PHOSPHATE, DIBASIC, ANHYDROUS 12 MMOL: 142; 276 INJECTION, SOLUTION INTRAVENOUS at 18:29

## 2024-12-24 RX ADMIN — Medication: at 03:03

## 2024-12-24 RX ADMIN — Medication: at 05:31

## 2024-12-24 RX ADMIN — SODIUM PHOSPHATE, MONOBASIC, MONOHYDRATE AND SODIUM PHOSPHATE, DIBASIC, ANHYDROUS 12 MMOL: 142; 276 INJECTION, SOLUTION INTRAVENOUS at 08:11

## 2024-12-24 RX ADMIN — ASPIRIN 81 MG 81 MG: 81 TABLET ORAL at 09:01

## 2024-12-24 RX ADMIN — QUETIAPINE FUMARATE 25 MG: 25 TABLET ORAL at 20:06

## 2024-12-24 RX ADMIN — Medication: at 03:00

## 2024-12-24 RX ADMIN — HEPARIN SODIUM 2270 UNITS/HR: 10000 INJECTION, SOLUTION INTRAVENOUS at 02:44

## 2024-12-24 RX ADMIN — SODIUM CHLORIDE, PRESERVATIVE FREE 40 MG: 5 INJECTION INTRAVENOUS at 02:44

## 2024-12-24 RX ADMIN — AMIODARONE HYDROCHLORIDE 200 MG: 200 TABLET ORAL at 09:01

## 2024-12-24 RX ADMIN — HEPARIN SODIUM 1200 UNITS: 1000 INJECTION INTRAVENOUS; SUBCUTANEOUS at 18:30

## 2024-12-24 RX ADMIN — ATORVASTATIN CALCIUM 80 MG: 80 TABLET, FILM COATED ORAL at 20:06

## 2024-12-24 RX ADMIN — Medication: at 08:11

## 2024-12-24 RX ADMIN — HEPARIN SODIUM 1300 UNITS: 1000 INJECTION INTRAVENOUS; SUBCUTANEOUS at 18:35

## 2024-12-24 RX ADMIN — HEPARIN SODIUM 2140 UNITS/HR: 10000 INJECTION, SOLUTION INTRAVENOUS at 14:05

## 2024-12-24 RX ADMIN — AMIODARONE HYDROCHLORIDE 200 MG: 200 TABLET ORAL at 20:06

## 2024-12-24 ASSESSMENT — PAIN SCALES - GENERAL
PAINLEVEL_OUTOF10: 0

## 2024-12-24 NOTE — PROGRESS NOTES
[facility]  SLP DYSPHAGIA THERAPY  DISCHARGE SUMMARY    Patient: Levy Vargas   : 1965   MRN: [id number]    Treatment Date: 2024   Admitting Diagnosis:   A-fib (HCC) [I48.91]  Atrial fibrillation with rapid ventricular response (HCC) [I48.91]   has a past medical history of Abdominal pain (2021), Abnormal EKG (2016), Acute pancreatitis (2021), Atrial fibrillation ([facility]), CHF (congestive heart failure) ([facility]), Cigarette smoker (2021), Cocaine abuse ([facility]) (2015), Dehydration (2016), Diabetes mellitus ([facility]), History of cocaine abuse ([facility]) (2016), History of MI (myocardial infarction) (2016), Hyperglycemia (2016), Hyperlipidemia, Hypertension, Morbid obesity with BMI of 45.0-49.9, adult (2016), Postural dizziness (2016), and PUD (peptic ulcer disease) (2019).   has a past surgical history that includes Cardiac catheterization; Upper gastrointestinal endoscopy; Leg Surgery (Right, 2023); and Cardiac procedure (N/A, 10/21/2024).  Allergies: NKA  Dysphagia History including instrumental assessment: 5/10/2024 seen by  at [facility] with rec for reg/thin  GI history: consults for GI bleeding; concerns for cirrhosis  ENT history: none on record  Baseline/Prior Level of Function: Living Status: admitted from home with family; Baseline diet: regular/thin    Onset: 2024     Treatment Diagnosis: Oropharyngeal dysphagia    CURRENT ENCOUNTER DIAGNOSTICS/COURSE OF ADMISSION     2024 admitted with c/o lightheadedness. MD ADMISSION H&P HPI: \"Levy Vargas is a 59 y.o. male with pmh of A-fib, hypertension, HFrEF, cirrhosis, hyperlipidemia, diabetes mellitus type II, CKD 3 baseline creatinine 1.3, COPD, tobacco dependence, history of cocaine abuse, history of MI, peptic ulcer disease  who presents with lightheadedness. Patient was seen in the room awake alert oriented to person place and situation.  Patient stated he had dizziness and  Positioning: Upright in bed     PO Trials:   IDDSI 0 (thin): no overt signs/symptoms of penetration/aspiration (no vocal or breathing quality changes, no throat clear, no cough)  IDDSI 2 (mildly thick): DNT  IDDSI 3 (moderately thick): DNT  IDDSI 4 (puree): DNT  IDDSI 5 (minced and moist): DNT  IDDSI 6 (soft and bite-sized): DNT  IDDSI 7 (regular): prolonged but adequate oral prep; no overt signs/symptoms of penetration/aspiration (no vocal or breathing quality changes, no throat clear, no cough)    Dysphagia Tx:   Direct Dysphagia tx: consistent with prior status  Dysphagia ex: Not completed   Training in compensatory strategies: set-up  Pt response to ex/training: Concern for reduced insight/compliance , Needs continued reinforcement/education    EDUCATION     Provided education regarding role of SLP, results of assessment, recommendations and general speech pathology plan of care.   Patient Response: Concern for reduced/recall insight, Needs reinforcement  Family education: Family not present/unavailable  Staff education: RN verbalizes understanding      If patient discharges prior to next visit, this note will serve as discharge.     Timed Code Minutes: 0  Total Treatment Minutes: 15    Electronically signed by:    Maruim Lopez MA, CCC-SLP, #[id number]  Speech-Language Pathologist  Portable phone: [phone]   12/24/24 at 2:40 PM

## 2024-12-24 NOTE — PROGRESS NOTES
V2.0    [facility] Progress Note      Name:  Levy Vargas /Age/Sex: 1965  (59 y.o. male)   MRN & CSN:  [id number] & [id number] Encounter Date/Time: 2024 10:10 AM EST   Location:  [address]- PCP: No primary care provider on file.     Attending:Geovani Blanchard MD       Hospital Day: 14    Assessment and Recommendations   Levy Vargas is a 59 y.o. male who presents with Cardiogenic shock      Plan:        Cardiogenic shock, with cardiomyopathy with EF of 15%, extubated, feeling better status post FRANTZ as patient was in A-fib also with RVR improved.  Off milrinone  Acute on chronic congestive heart failure cardiology following, off milrinone drip.  Off pressors for now  A-fib with RVR status post FRANTZ continue to be on heparin.  No acute changes  KENDRA, nephrology consulted continue to be on CRRT.  Acute metabolic encephalopathy with lethargy multifactorial fluctuating and currently improving  Anemia, no signs of active bleeding.   Coffee-ground emesis GI consulted Protonix GI consulted no immediate plan for EGD at this time  Acute respiratory failure currently extubated        Diet ADULT ORAL NUTRITION SUPPLEMENT; Breakfast, Dinner, Lunch; Diabetic Oral Supplement  ADULT DIET; Regular; No Added Salt (3-4 gm); 1500 ml   DVT Prophylaxis [] Lovenox, []  Heparin, [] SCDs, [] Ambulation,  [] Eliquis, [] Xarelto  [] Coumadin   Code Status Full Code             Personally reviewed Lab Studies and Imaging     Discussed management of the case with cardiology    Rhythm strip independently interpreted by myself A-fib no ST elevation        Drugs that require monitoring for toxicity include heparin and the method of monitoring was hemoglobin to follow on toxicity as anemia    Medical Decision Making:  The following items were considered in medical decision making:  Discussion of patient care with other providers  Reviewed clinical lab tests  Reviewed radiology tests  Reviewed other diagnostic    Lab Results   Component Value Date/Time    LABA1C 6.3 10/19/2024 03:13 AM     TSH:   Lab Results   Component Value Date/Time    TSH 0.05 12/12/2024 12:34 AM     Troponin: No results found for: \"TROPONINT\"  Lactic Acid: No results for input(s): \"LACTA\" in the last 72 hours.  BNP: No results for input(s): \"PROBNP\" in the last 72 hours.  UA:  Lab Results   Component Value Date/Time    NITRU POSITIVE 12/12/2024 06:45 PM    COLORU DARK YELLOW 12/12/2024 06:45 PM    PHUR 5.0 12/12/2024 06:45 PM    PHUR 7.0 08/03/2023 09:20 PM    PHUR 7.0 08/03/2023 09:20 PM    WBCUA 3-5 12/12/2024 06:45 PM    RBCUA 5-10 12/12/2024 06:45 PM    MUCUS 1+ 03/19/2022 05:10 PM    TRICHOMONAS None Seen 03/19/2022 05:10 PM    BACTERIA 2+ 12/12/2024 06:45 PM    CLARITYU CLOUDY 12/12/2024 06:45 PM    LEUKOCYTESUR SMALL 12/12/2024 06:45 PM    UROBILINOGEN 2.0 12/12/2024 06:45 PM    BILIRUBINUR MODERATE 12/12/2024 06:45 PM    BLOODU LARGE 12/12/2024 06:45 PM    GLUCOSEU Negative 12/12/2024 06:45 PM    KETUA TRACE 12/12/2024 06:45 PM     Urine Cultures: No results found for: \"LABURIN\"  Blood Cultures: No results found for: \"BC\"  No results found for: \"BLOODCULT2\"  Organism: No results found for: \"ORG\"      Electronically signed by Geovani Blanchard MD on 12/24/2024 at 10:10 AM  Comment: Please note this report has been produced using speech recognition software and may contain errors related to that system including errors in grammar, punctuation, and spelling, as well as words and phrases that may be inappropriate. If there are any questions or concerns, please feel free to contact the dictating provider for clarification.

## 2024-12-24 NOTE — PROGRESS NOTES
Clinical Pharmacy Note  Heparin Dosing       Lab Results   Component Value Date/Time    ANTIXAUHEP 0.72 12/24/2024 09:00 AM      Lab Results   Component Value Date/Time    HGB 10.0 12/24/2024 05:05 AM    HCT 30.7 12/24/2024 05:05 AM     12/24/2024 05:05 AM    INR 1.70 12/12/2024 03:59 AM       Current Infusion Rate: 2270 units/hr    Plan:  Rate: decrease to 2140 units/hr  Next anti-Xa level: 1700 12/24/24    Pharmacy will continue to monitor and adjust based on anti-Xa results.  France Lyon [facility],12/24/2024,11:00 AM

## 2024-12-24 NOTE — PROGRESS NOTES
NAME:  Levy Vargas  YOB: 1965  MEDICAL RECORD NUMBER:  [id number]    Shift Summary: CRRT is discontinued, patient tolerated with a total of 1177 off for the shift, tyl for low grade fever, phos replaced, plan for a tunneled cath placement on Thursday at 1100     Family updated: Yes:  in to visit    Rhythm: Sinus Tachycardia     Most recent vitals:   Visit Vitals  /63   Pulse (!) 101   Temp 100.2 °F (37.9 °C)   Resp 16   Ht 1.854 m (6' 1\")   Wt 123.5 kg (272 lb 4.3 oz)   SpO2 99%   BMI 35.92 kg/m²      ABP (Arterial line BP): 153/75  ABP Mean (Arterial Line Mean): 97 mmHg    No data found.    No data found.      Respiratory support needed (if any):  - RA    Admission weight Weight - Scale: (!) 145.4 kg (320 lb 8.8 oz) (12/11/24 2306)    Today's weight    Wt Readings from Last 1 Encounters:   12/24/24 123.5 kg (272 lb 4.3 oz)        Castanon need assessed each shift: YES -  - continue castanon r/t - fluid volum management   UOP >30ml/hr: NO - oliguric  Last documented BM (in last 48 hrs):  Patient Vitals for the past 48 hrs:   Last BM (including prior to admit) Stool Occurrence   12/23/24 2000 12/23/24 --   12/24/24 0300 12/24/24 2   12/24/24 0800 12/24/24 --                Restraints (in use currently or dc'd in last 12 hrs): No    Order current and documentation up to date? No    Lines/Drains reviewed @ bedside.  CVC  12/13/24 Left Internal jugular (Active)   Number of days: 11       Peripheral IV 12/11/24 Left;Posterior Hand (Active)   Number of days: 12       Arterial Line 12/13/24 Left Radial (Active)   Number of days: 11       Hemodialysis Central Access - Temporary Right Neck (Active)   Number of days: 11       Urinary Catheter 12/12/24 Castanon-Temperature (Active)   Number of days: 12         Drip rates at handoff:    Phenylephrine Stopped (12/23/24 1042)    dexmedeTOMIDine Stopped (12/21/24 1300)    heparin (PORCINE) Infusion 2,140 Units/hr (12/24/24 1800)    prismaSATE BGK 4/2.5 2,000 mL/hr  at 12/24/24 0812    prismaSATE BGK 4/2.5 500 mL/hr at 12/24/24 0303    prismaSATE BGK 4/2.5 1,000 mL/hr at 12/24/24 0811    milrinone Stopped (12/23/24 1229)    sodium chloride 5 mL/hr at 12/24/24 1800       Lab Data:   CBC:   Recent Labs     12/23/24  0600 12/24/24  0505   WBC 9.9 8.4   HGB 10.1* 10.0*   HCT 31.4* 30.7*   MCV 87.9 88.0    158     BMP:    Recent Labs     12/24/24  0504 12/24/24  1726   * 135*   K 4.2 4.3   CO2 27 27   BUN 7 8   CREATININE 1.4* 1.4*     LIVR: No results for input(s): \"AST\", \"ALT\" in the last 72 hours.  PT/INR: No results for input(s): \"INR\" in the last 72 hours.    Invalid input(s): \"PROT\"  APTT: No results for input(s): \"APTT\" in the last 72 hours.  ABG: No results for input(s): \"PHART\", \"[id number]\", \"PO2ART\" in the last 72 hours.    Any consults during the shift? Yes: Consults received: : IR    Any signed and held orders to be released?  No        4 Eyes Skin Assessment       The patient is being assessed for  Shift Handoff    I agree that at least one RN has performed a thorough Head to Toe Skin Assessment on the patient. ALL assessment sites listed below have been assessed.      Areas assessed by both nurses: Head, Face, Ears, Shoulders, Back, Chest, Arms, Elbows, Hands, Sacrum. Buttock, Coccyx, Ischium, Legs. Feet and Heels, and Under Medical Devices         Does the Patient have a Wound? Yes wound(s) were present on assessment. LDA wound assessment was Initiated and completed by RN    Wound Care Orders initiated by RN: Yes       Hudson Prevention initiated by RN: Yes    Pressure Injury (Stage 3,4, Unstageable, DTI, NWPT, and Complex wounds) if present, place Wound referral order by RN under : No    New Ostomies, if present place, Ostomy referral order under : No     Nurse 1 eSignature: Electronically signed by Rosana Rodrigues RN on 12/24/24 at 7:02 PM EST    **SHARE this note so that the co-signing nurse can place an eSignature**    Nurse 2

## 2024-12-24 NOTE — PLAN OF CARE
Problem: Chronic Conditions and Co-morbidities  Goal: Patient's chronic conditions and co-morbidity symptoms are monitored and maintained or improved  Outcome: Progressing  Flowsheets (Taken 12/24/2024 0800)  Care Plan - Patient's Chronic Conditions and Co-Morbidity Symptoms are Monitored and Maintained or Improved:   Monitor and assess patient's chronic conditions and comorbid symptoms for stability, deterioration, or improvement   Collaborate with multidisciplinary team to address chronic and comorbid conditions and prevent exacerbation or deterioration     Problem: Pain  Goal: Verbalizes/displays adequate comfort level or baseline comfort level  Outcome: Progressing     Problem: Safety - Adult  Goal: Free from fall injury  Outcome: Progressing     Problem: Respiratory - Adult  Goal: Achieves optimal ventilation and oxygenation  Outcome: Progressing     Problem: Cardiovascular - Adult  Goal: Maintains optimal cardiac output and hemodynamic stability  Outcome: Progressing  Goal: Absence of cardiac dysrhythmias or at baseline  Outcome: Progressing     Problem: Metabolic/Fluid and Electrolytes - Adult  Goal: Electrolytes maintained within normal limits  Outcome: Progressing  Flowsheets (Taken 12/24/2024 0800)  Electrolytes maintained within normal limits:   Monitor labs and assess patient for signs and symptoms of electrolyte imbalances   Administer electrolyte replacement as ordered   Monitor response to electrolyte replacements, including repeat lab results as appropriate  Goal: Hemodynamic stability and optimal renal function maintained  Outcome: Progressing  Flowsheets (Taken 12/24/2024 0800)  Hemodynamic stability and optimal renal function maintained:   Monitor labs and assess for signs and symptoms of volume excess or deficit   Monitor intake, output and patient weight   Monitor urine specific gravity, serum osmolarity and serum sodium as indicated or ordered  Goal: Glucose maintained within prescribed

## 2024-12-24 NOTE — PROGRESS NOTES
Nephrology Progress Note   [facility]      Reason for consultation:  KENDRA on CKD 2 -- baseline Cr ~ 0.9-1.3 mg/dL     HPI: Levy Vargas is a 60 yo male with a PMHx of CKD 2, h/o AKIs, HFrEF, non-compliance, DM, HTN, afib, h/o cocaine use, CAD, HLD. Patient presents to  ED on 12/11/2024 with complaints of BLE edema, lightheadedness, and weakness. Patient is currently drowsy and it is difficult to obtain history. Currently oriented x3. Patient tells me he has been taking his CHF medications everyday. Per chart review, there has been some questions regarding his outpatient compliance and not keeping in touch with his CHF nurses. Pt is currently grossly volume overloaded. He weighed 131.5 kg on 9/25/2024 during his last cardiology appt. He currently weighs 145.3 kg. Currently dyspneic and on 4 L NC. CXR is negative for acute findings. In afib RVR this admission. On amio gtt.     We have been consulted for KENDRA on CKD 2 management. Cr upon admission was 1.3 mg/dL. Today, Cr is 2.6 mg/dL. Patient has received a generous amount of IVP and oral diuretic yesterday and was also started on a lasix gtt at 10 mg/hr. Despite this, pt has remained anuric with 150 mL UOP/24 hrs and 25 mL UOP documented today. BP has been as low as 63/38 mmHg since admission. Started on milrinone gtt yesterday.     Subjective:    The patient has been seen and examined. Labs and chart reviewed.Off milrinone gtt. UOP remains low. Seen on CRRT. Net negative 2.3 L yesterday.    There were not complications overnight.    Patient review of systems: Denies SOB       Allergies:  No Known Allergies     Scheduled Meds:   QUEtiapine  25 mg Oral Nightly    insulin lispro  0-4 Units SubCUTAneous 4x Daily WC    amiodarone  200 mg Per NG tube BID    pantoprazole (PROTONIX) 40 mg in sodium chloride (PF) 0.9 % 10 mL injection  40 mg IntraVENous Q12H    [Held by provider] spironolactone  25 mg Oral Daily    [Held by provider] gabapentin  300 mg Oral Nightly     readings):   Weight   12/24/24 0600 123.5 kg (272 lb 4.3 oz)   12/23/24 0638 124.8 kg (275 lb 2.2 oz)   12/22/24 0141 125.8 kg (277 lb 5.4 oz)       General: Awake  HEENT: Normocephalic, atraumatic  Chest: diminished to auscultation  CVS: RRR  Abdomen: soft, non tender  Extremities: 1+ pitting edema  : castanon in place   Access: R [facility]       LAB DATA:    CBC:   Lab Results   Component Value Date/Time    WBC 8.4 12/24/2024 05:05 AM    RBC 3.49 12/24/2024 05:05 AM    HGB 10.0 12/24/2024 05:05 AM    HCT 30.7 12/24/2024 05:05 AM    MCV 88.0 12/24/2024 05:05 AM    MCH 28.7 12/24/2024 05:05 AM    MCHC 32.6 12/24/2024 05:05 AM    RDW 20.1 12/24/2024 05:05 AM     12/24/2024 05:05 AM    MPV 8.7 12/24/2024 05:05 AM     BMP:    Lab Results   Component Value Date/Time     12/24/2024 05:04 AM    K 4.2 12/24/2024 05:04 AM    K 4.3 12/20/2024 06:15 AM     12/24/2024 05:04 AM    CO2 27 12/24/2024 05:04 AM    BUN 7 12/24/2024 05:04 AM    CREATININE 1.4 12/24/2024 05:04 AM    CALCIUM 8.2 12/24/2024 05:04 AM    GFRAA >60 10/11/2022 05:03 AM    LABGLOM 57 12/24/2024 05:04 AM    LABGLOM 63 04/27/2024 05:03 AM    GLUCOSE 83 12/24/2024 05:04 AM     Ionized Calcium:  No components found for: \"IONCA\"  Magnesium:    Lab Results   Component Value Date/Time    MG 2.26 12/23/2024 08:29 AM     Phosphorus:    Lab Results   Component Value Date/Time    PHOS 1.7 12/24/2024 05:04 AM     U/A:    Lab Results   Component Value Date/Time    COLORU DARK YELLOW 12/12/2024 06:45 PM    PHUR 5.0 12/12/2024 06:45 PM    PHUR 7.0 08/03/2023 09:20 PM    PHUR 7.0 08/03/2023 09:20 PM    WBCUA 3-5 12/12/2024 06:45 PM    RBCUA 5-10 12/12/2024 06:45 PM    MUCUS 1+ 03/19/2022 05:10 PM    TRICHOMONAS None Seen 03/19/2022 05:10 PM    BACTERIA 2+ 12/12/2024 06:45 PM    CLARITYU CLOUDY 12/12/2024 06:45 PM    LEUKOCYTESUR SMALL 12/12/2024 06:45 PM    UROBILINOGEN 2.0 12/12/2024 06:45 PM    BILIRUBINUR MODERATE 12/12/2024 06:45 PM    BLOODU LARGE

## 2024-12-24 NOTE — PROGRESS NOTES
Clinical Pharmacy Note  Heparin Dosing       Lab Results   Component Value Date/Time    ANTIXAUHEP 0.63 12/24/2024 05:00 PM      Lab Results   Component Value Date/Time    HGB 10.0 12/24/2024 05:05 AM    HCT 30.7 12/24/2024 05:05 AM     12/24/2024 05:05 AM    INR 1.70 12/12/2024 03:59 AM       Current Infusion Rate: 2140 units/hr    Plan:  Rate: continue  2140 units/hr  Next anti-Xa level: 0000 12/25/24    Pharmacy will continue to monitor and adjust based on anti-Xa results.  Kirill Moraes RPH,12/24/2024,6:07 PM

## 2024-12-24 NOTE — PROGRESS NOTES
NAME:  Levy Vargas  YOB: 1965  MEDICAL RECORD NUMBER:  [id number]    Shift Summary:  no significant events overnight. Pressors able to be maintained off. Pt continues to have intermittent nose bleeds. 12mmol phos replaced. Pt was fully bathed, all linen changed. Pt was able to sleep through most of night. Pt did have low grade fever for majority of night. Castanon bag was changed.     CRRT: daily total fluid removed;( -2339 mL). Goal;(-2325 mL)    Family updated: Yes:  at bedside     Rhythm: Sinus Tachycardia     Most recent vitals:   Visit Vitals  /63   Pulse 97   Temp 99.2 °F (37.3 °C)   Resp 17   Ht 1.854 m (6' 1\")   Wt 124.8 kg (275 lb 2.2 oz)   SpO2 100%   BMI 36.30 kg/m²      ABP (Arterial line BP): 112/53  ABP Mean (Arterial Line Mean): 69 mmHg    No data found.    No data found.      Respiratory support needed (if any):  - RA    Admission weight Weight - Scale: (!) 145.4 kg (320 lb 8.8 oz) (12/11/24 2306)    Today's weight    Wt Readings from Last 1 Encounters:   12/23/24 124.8 kg (275 lb 2.2 oz)        Castanon need assessed each shift: YES -  - continue castanon r/t - CRRT  UOP >30ml/hr: NO - CRRT   Last documented BM (in last 48 hrs):  Patient Vitals for the past 48 hrs:   Last BM (including prior to admit) Stool Occurrence   12/22/24 0800 12/22/24 --   12/23/24 2000 12/23/24 --   12/24/24 0300 12/24/24 2                Restraints (in use currently or dc'd in last 12 hrs): No    Order current and documentation up to date? Yes    Lines/Drains reviewed @ bedside.  CVC  12/13/24 Left Internal jugular (Active)   Number of days: 10       Peripheral IV 12/11/24 Left;Posterior Hand (Active)   Number of days: 12       Arterial Line 12/13/24 Left Radial (Active)   Number of days: 10       Hemodialysis Central Access - Temporary Right Neck (Active)   Number of days: 10       Urinary Catheter 12/12/24 Castanon-Temperature (Active)   Number of days: 11         Drip rates at handoff:    Phenylephrine  Avni Avila RN on 12/24/24 at 6:12 AM EST    **SHARE this note so that the co-signing nurse can place an eSignature**    Nurse 2 eSignature: Electronically signed by Rosana Rodrigues RN on 12/24/24 at 7:52 AM EST

## 2024-12-24 NOTE — PROGRESS NOTES
SLP NOTE    Attempted to see patient for dysphagia treatment. Patient declining any PO intake at this time despite encouragement. ST to re-attempt as schedule permits otherwise will hold treatment until 12/26    Jia Blake MA, CCC-SLP #[id number]  Speech-Language Pathologist  On 12/24/24 at 11:50 am

## 2024-12-24 NOTE — PROGRESS NOTES
Clinical Pharmacy Note  Heparin Dosing       Lab Results   Component Value Date/Time    ANTIXAUHEP 0.66 12/23/2024 08:00 PM      Lab Results   Component Value Date/Time    HGB 10.1 12/23/2024 06:00 AM    HCT 31.4 12/23/2024 06:00 AM     12/23/2024 06:00 AM    INR 1.70 12/12/2024 03:59 AM       Current Infusion Rate: 2270 units/hr    Plan:  Rate: continue 2270 units/hr  Next anti-Xa level: 0200 12/24/24    Pharmacy will continue to monitor and adjust based on anti-Xa results.      Elizabeth Gresham RPH, PharmD 12/23/2024 9:27 PM

## 2024-12-24 NOTE — PROGRESS NOTES
Clinical Pharmacy Note  Heparin Dosing       Lab Results   Component Value Date/Time    ANTIXAUHEP 0.70 12/24/2024 02:00 AM      Lab Results   Component Value Date/Time    HGB 10.1 12/23/2024 06:00 AM    HCT 31.4 12/23/2024 06:00 AM     12/23/2024 06:00 AM    INR 1.70 12/12/2024 03:59 AM       Current Infusion Rate: 2270 units/hr    Plan:  Bolus: 0 units  Rate: 2270 units/hr  Next anti-Xa level: 0900 12/24/24    Pharmacy will continue to monitor and adjust based on anti-Xa results.    Mila Menon, PharmD

## 2024-12-24 NOTE — PROGRESS NOTES
[facility]   Daily Progress Note      Admit Date:  12/11/2024    Reason for follow up visit: Atrial fib; CHF    CC: \"I feel okay except I want those padded boots off.\"    60 y/o male with PMH notable for PAF, PAD, chronic systolic HF with LVEF 15-20% who presented to [facility] with dizziness and elevated BP. Noted to be in rapid atrial fib and placed on IV amiodarone. Has since returned to SR and on po amiodarone.Currently remains on Milrinone for his CHF.     Interval History:  Doing well off milrinone  BP increased today  Plan for iHD today  No acute complaints    Subjective:  Pt with no acute overnight cardiac events.     Objective:   /63   Pulse (!) 102   Temp 99 °F (37.2 °C)   Resp 12   Ht 1.854 m (6' 1\")   Wt 123.5 kg (272 lb 4.3 oz)   SpO2 100%   BMI 35.92 kg/m²     Intake/Output Summary (Last 24 hours) at 12/24/2024 1213  Last data filed at 12/24/2024 1100  Gross per 24 hour   Intake 1925.64 ml   Output 3511 ml   Net -1585.36 ml     Wt Readings from Last 3 Encounters:   12/24/24 123.5 kg (272 lb 4.3 oz)   12/19/24 129.7 kg (286 lb)   10/29/24 131.8 kg (290 lb 9.1 oz)       Physical Exam:  General: In no acute distress. Awake, alert, and oriented X 3. More alert and talkative today. Resting comfortably at present  Skin:  Warm and dry.    Neck:  Supple. No JVD appreciated.  Chest: Lungs clear to anterior auscultation; diminished breath sounds posterior bases. No wheezes/rhonchi/rales  Cardiovascular:  RRR. Normal S1 and S2; soft systolic murmur   Abdomen:  soft, nontender, +bowel sounds.   Extremities:  + minimal bilateral ankle edema     Medications:    QUEtiapine  25 mg Oral Nightly    insulin lispro  0-4 Units SubCUTAneous 4x Daily WC    amiodarone  200 mg Per NG tube BID    pantoprazole (PROTONIX) 40 mg in sodium chloride (PF) 0.9 % 10 mL injection  40 mg IntraVENous Q12H    [Held by provider] spironolactone  25 mg Oral Daily    [Held by provider] gabapentin  300 mg Oral Nightly    [Held

## 2024-12-25 LAB
ALBUMIN SERPL-MCNC: 3 G/DL (ref 3.4–5)
ANION GAP SERPL CALCULATED.3IONS-SCNC: 9 MMOL/L (ref 3–16)
ANTI-XA UNFRAC HEPARIN: 0.62 IU/ML (ref 0.3–0.7)
ANTI-XA UNFRAC HEPARIN: 0.64 IU/ML (ref 0.3–0.7)
BUN SERPL-MCNC: 14 MG/DL (ref 7–20)
CALCIUM SERPL-MCNC: 8.1 MG/DL (ref 8.3–10.6)
CHLORIDE SERPL-SCNC: 103 MMOL/L (ref 99–110)
CO2 SERPL-SCNC: 24 MMOL/L (ref 21–32)
CREAT SERPL-MCNC: 2.1 MG/DL (ref 0.8–1.3)
DEPRECATED RDW RBC AUTO: 20.9 % (ref 12.4–15.4)
GFR SERPLBLD CREATININE-BSD FMLA CKD-EPI: 35 ML/MIN/{1.73_M2}
GLUCOSE BLD-MCNC: 116 MG/DL (ref 70–99)
GLUCOSE BLD-MCNC: 78 MG/DL (ref 70–99)
GLUCOSE BLD-MCNC: 89 MG/DL (ref 70–99)
GLUCOSE BLD-MCNC: 90 MG/DL (ref 70–99)
GLUCOSE SERPL-MCNC: 97 MG/DL (ref 70–99)
HCT VFR BLD AUTO: 29.6 % (ref 40.5–52.5)
HGB BLD-MCNC: 9.8 G/DL (ref 13.5–17.5)
MCH RBC QN AUTO: 28.8 PG (ref 26–34)
MCHC RBC AUTO-ENTMCNC: 33 G/DL (ref 31–36)
MCV RBC AUTO: 87.2 FL (ref 80–100)
PERFORMED ON: ABNORMAL
PERFORMED ON: NORMAL
PHOSPHATE SERPL-MCNC: 1.6 MG/DL (ref 2.5–4.9)
PLATELET # BLD AUTO: 154 K/UL (ref 135–450)
PMV BLD AUTO: 8.5 FL (ref 5–10.5)
POTASSIUM SERPL-SCNC: 4.5 MMOL/L (ref 3.5–5.1)
RBC # BLD AUTO: 3.39 M/UL (ref 4.2–5.9)
SODIUM SERPL-SCNC: 136 MMOL/L (ref 136–145)
WBC # BLD AUTO: 7.3 K/UL (ref 4–11)

## 2024-12-25 PROCEDURE — 86704 HEP B CORE ANTIBODY TOTAL: CPT

## 2024-12-25 PROCEDURE — 6370000000 HC RX 637 (ALT 250 FOR IP): Performed by: INTERNAL MEDICINE

## 2024-12-25 PROCEDURE — 80069 RENAL FUNCTION PANEL: CPT

## 2024-12-25 PROCEDURE — 85520 HEPARIN ASSAY: CPT

## 2024-12-25 PROCEDURE — 2500000003 HC RX 250 WO HCPCS

## 2024-12-25 PROCEDURE — 6360000002 HC RX W HCPCS: Performed by: INTERNAL MEDICINE

## 2024-12-25 PROCEDURE — 94760 N-INVAS EAR/PLS OXIMETRY 1: CPT

## 2024-12-25 PROCEDURE — 6360000002 HC RX W HCPCS

## 2024-12-25 PROCEDURE — 2500000003 HC RX 250 WO HCPCS: Performed by: FAMILY MEDICINE

## 2024-12-25 PROCEDURE — 2100000000 HC CCU R&B

## 2024-12-25 PROCEDURE — 6360000002 HC RX W HCPCS: Performed by: FAMILY MEDICINE

## 2024-12-25 PROCEDURE — 51798 US URINE CAPACITY MEASURE: CPT

## 2024-12-25 PROCEDURE — 6370000000 HC RX 637 (ALT 250 FOR IP)

## 2024-12-25 PROCEDURE — 85027 COMPLETE CBC AUTOMATED: CPT

## 2024-12-25 RX ORDER — LIDOCAINE 4 G/G
1 PATCH TOPICAL DAILY
Status: DISCONTINUED | OUTPATIENT
Start: 2024-12-25 | End: 2025-01-03 | Stop reason: HOSPADM

## 2024-12-25 RX ADMIN — HEPARIN SODIUM 2140 UNITS/HR: 10000 INJECTION, SOLUTION INTRAVENOUS at 02:16

## 2024-12-25 RX ADMIN — ACETAMINOPHEN 650 MG: 325 TABLET ORAL at 22:24

## 2024-12-25 RX ADMIN — AMIODARONE HYDROCHLORIDE 200 MG: 200 TABLET ORAL at 07:48

## 2024-12-25 RX ADMIN — ONDANSETRON 4 MG: 2 INJECTION INTRAMUSCULAR; INTRAVENOUS at 07:48

## 2024-12-25 RX ADMIN — SODIUM CHLORIDE, PRESERVATIVE FREE 10 ML: 5 INJECTION INTRAVENOUS at 20:28

## 2024-12-25 RX ADMIN — SODIUM CHLORIDE, PRESERVATIVE FREE 40 MG: 5 INJECTION INTRAVENOUS at 16:44

## 2024-12-25 RX ADMIN — SODIUM CHLORIDE, PRESERVATIVE FREE 10 ML: 5 INJECTION INTRAVENOUS at 07:48

## 2024-12-25 RX ADMIN — ATORVASTATIN CALCIUM 80 MG: 80 TABLET, FILM COATED ORAL at 21:21

## 2024-12-25 RX ADMIN — QUETIAPINE FUMARATE 25 MG: 25 TABLET ORAL at 21:21

## 2024-12-25 RX ADMIN — ASPIRIN 81 MG 81 MG: 81 TABLET ORAL at 07:48

## 2024-12-25 RX ADMIN — SODIUM CHLORIDE, PRESERVATIVE FREE 10 ML: 5 INJECTION INTRAVENOUS at 07:49

## 2024-12-25 RX ADMIN — HEPARIN SODIUM 2140 UNITS/HR: 10000 INJECTION, SOLUTION INTRAVENOUS at 15:53

## 2024-12-25 RX ADMIN — AMIODARONE HYDROCHLORIDE 200 MG: 200 TABLET ORAL at 21:21

## 2024-12-25 RX ADMIN — SODIUM CHLORIDE, PRESERVATIVE FREE 40 MG: 5 INJECTION INTRAVENOUS at 02:15

## 2024-12-25 ASSESSMENT — PAIN SCALES - GENERAL
PAINLEVEL_OUTOF10: 0
PAINLEVEL_OUTOF10: 0
PAINLEVEL_OUTOF10: 1
PAINLEVEL_OUTOF10: 3

## 2024-12-25 ASSESSMENT — PAIN DESCRIPTION - LOCATION: LOCATION: BACK;LEG

## 2024-12-25 ASSESSMENT — PAIN DESCRIPTION - ORIENTATION: ORIENTATION: RIGHT;POSTERIOR

## 2024-12-25 ASSESSMENT — PAIN DESCRIPTION - DESCRIPTORS: DESCRIPTORS: SORE

## 2024-12-25 NOTE — PLAN OF CARE
Problem: Chronic Conditions and Co-morbidities  Goal: Patient's chronic conditions and co-morbidity symptoms are monitored and maintained or improved  12/25/2024 0315 by Minnie Robles RN  Outcome: Progressing  Flowsheets  Taken 12/24/2024 2000 by Minnie Robles RN  Care Plan - Patient's Chronic Conditions and Co-Morbidity Symptoms are Monitored and Maintained or Improved:   Monitor and assess patient's chronic conditions and comorbid symptoms for stability, deterioration, or improvement   Update acute care plan with appropriate goals if chronic or comorbid symptoms are exacerbated and prevent overall improvement and discharge  Taken 12/24/2024 1600 by Rosana Rodrigues RN  Care Plan - Patient's Chronic Conditions and Co-Morbidity Symptoms are Monitored and Maintained or Improved:   Monitor and assess patient's chronic conditions and comorbid symptoms for stability, deterioration, or improvement   Collaborate with multidisciplinary team to address chronic and comorbid conditions and prevent exacerbation or deterioration  12/24/2024 1345 by Rosana Rodrigues RN  Outcome: Progressing  Flowsheets (Taken 12/24/2024 0800)  Care Plan - Patient's Chronic Conditions and Co-Morbidity Symptoms are Monitored and Maintained or Improved:   Monitor and assess patient's chronic conditions and comorbid symptoms for stability, deterioration, or improvement   Collaborate with multidisciplinary team to address chronic and comorbid conditions and prevent exacerbation or deterioration     Problem: Pain  Goal: Verbalizes/displays adequate comfort level or baseline comfort level  12/25/2024 0315 by Minnie Robles RN  Outcome: Progressing  Flowsheets (Taken 12/24/2024 2000)  Verbalizes/displays adequate comfort level or baseline comfort level:   Encourage patient to monitor pain and request assistance   Assess pain using appropriate pain scale   Administer analgesics based on type and severity of pain and evaluate response   Implement  Without Sign and Symptoms of Infection:   TWICE DAILY: Assess and document skin integrity   ADMISSION and DAILY: Assess and document risk factors for pressure ulcer development  12/24/2024 1345 by Rosana Rodrigues RN  Outcome: Progressing  Flowsheets (Taken 12/24/2024 0800)  Incisions, Wounds, or Drain Sites Healing Without Sign and Symptoms of Infection:   TWICE DAILY: Assess and document skin integrity   ADMISSION and DAILY: Assess and document risk factors for pressure ulcer development     Problem: Musculoskeletal - Adult  Goal: Return mobility to safest level of function  12/25/2024 0315 by Minnie Robles RN  Outcome: Progressing  Flowsheets (Taken 12/24/2024 2000)  Return Mobility to Safest Level of Function: Assess patient stability and activity tolerance for standing, transferring and ambulating with or without assistive devices  12/24/2024 1345 by Rosana Rodrigues RN  Outcome: Progressing     Problem: Gastrointestinal - Adult  Goal: Minimal or absence of nausea and vomiting  12/25/2024 0315 by Minnie Robles RN  Outcome: Progressing  Flowsheets  Taken 12/24/2024 2000 by Minnie Robles RN  Minimal or absence of nausea and vomiting:   Administer IV fluids as ordered to ensure adequate hydration   Administer ordered antiemetic medications as needed   Advance diet as tolerated, if ordered  Taken 12/24/2024 1600 by Rosana Rodrigues RN  Minimal or absence of nausea and vomiting:   Administer IV fluids as ordered to ensure adequate hydration   Maintain NPO status until nausea and vomiting are resolved   Nasogastric tube to low intermittent suction as ordered  12/24/2024 1345 by Rosana Rodrigues RN  Outcome: Progressing  Flowsheets (Taken 12/24/2024 0800)  Minimal or absence of nausea and vomiting:   Administer IV fluids as ordered to ensure adequate hydration   Maintain NPO status until nausea and vomiting are resolved   Nasogastric tube to low intermittent suction as ordered     Problem: Genitourinary -

## 2024-12-25 NOTE — PROGRESS NOTES
[facility]  Cardiology Consult    Levy Vargas  1965 December 25, 2024      Reason for Referral: Atrial fibrillation    CC: Shortness of breath      Subjective:     History of Present Illness:    Levy Vargas is a 59 y.o. patient with a PMH significant for chronic kidney disease, chronic systolic heart failure presented with complaints of shortness of breath A-fib with RVR.      Past Medical History:   has a past medical history of Abdominal pain, Abnormal EKG, Acute pancreatitis, Atrial fibrillation (HCC), CHF (congestive heart failure) (HCC), Cigarette smoker, Cocaine abuse (HCC), Dehydration, Diabetes mellitus (HCC), History of cocaine abuse (HCC), History of MI (myocardial infarction), Hyperglycemia, Hyperlipidemia, Hypertension, Morbid obesity with BMI of 45.0-49.9, adult, Postural dizziness, and PUD (peptic ulcer disease).    Surgical History:   has a past surgical history that includes Cardiac catheterization; Upper gastrointestinal endoscopy; Leg Surgery (Right, 8/25/2023); and Cardiac procedure (N/A, 10/21/2024).     Social History:   reports that he has been smoking cigarettes. He has a 12.5 pack-year smoking history. He has never used smokeless tobacco. He reports that he does not currently use alcohol. He reports current drug use. Drug: Marijuana (Weed).     Family History:  family history is not on file.    Home Medications:  Were reviewed and are listed in nursing record and/or below  Prior to Admission medications    Medication Sig Start Date End Date Taking? Authorizing Provider   omeprazole (PRILOSEC) 20 MG delayed release capsule Take 1 capsule by mouth daily   Yes Provider, MD Tanesha   amiodarone (CORDARONE) 200 MG tablet Take 1 tablet by mouth daily 10/29/24  Yes Geovani Blanchard MD   metoprolol succinate (TOPROL XL) 25 MG extended release tablet Take 1 tablet by mouth every evening 10/29/24  Yes Geovani Blanchard MD   torsemide (DEMADEX) 10 MG tablet Take 5 tablets  secondary to blood pressure.    Anemia upper GI bleed.    Acute kidney injury nephrology following.    Thank you for allowing us to participate in the care of Levy Vargas.  Please do not hesitate to contact me if you have any questions.    Des Brown MD, MPH    [facility]  [address], [address]   [place] [address]  Ph: [phone]  Fax: [phone]    12/25/2024 8:12 AM

## 2024-12-25 NOTE — FLOWSHEET NOTE
12/25/24 0551   Vitals   BP (!) 135/92   MAP (Calculated) 106   MAP (mmHg) 104   BP Location Right upper arm   BP Method Automatic   Art Line   ABP (Arterial line BP) 142/76   ABP Mean (Arterial Line Mean) 97 mmHg     Cuff and arterial line pressures correlate fairly well this morning. May be able to potentially remove arterial line if no longer indicated now that patient is no longer on vasopressors and CRRT.

## 2024-12-25 NOTE — PROGRESS NOTES
NAME:  Levy Vargas  YOB: 1965  MEDICAL RECORD NUMBER:  [id number]    Shift Summary: Uneventful shift. Remains with nose bleeds sporadically throughout the night. Vitals stable. Minimal urine output.     Family updated: Yes:  family at bedside during the day    Rhythm: Sinus Tachycardia     Most recent vitals:   Visit Vitals  BP (!) 135/92   Pulse (!) 104   Temp 100 °F (37.8 °C)   Resp 14   Ht 1.854 m (6' 1\")   Wt 120.5 kg (265 lb 10.5 oz)   SpO2 97%   BMI 35.05 kg/m²      ABP (Arterial line BP): 150/79  ABP Mean (Arterial Line Mean): 100 mmHg    No data found.    No data found.      Respiratory support needed (if any):  - RA    Admission weight Weight - Scale: (!) 145.4 kg (320 lb 8.8 oz) (12/11/24 2306)    Today's weight    Wt Readings from Last 1 Encounters:   12/25/24 120.5 kg (265 lb 10.5 oz)        Castanon need assessed each shift: YES -  - continue castanon r/t - urinary retention.   UOP >30ml/hr: NO - dialysis patient. Oliguric  Last documented BM (in last 48 hrs):  Patient Vitals for the past 48 hrs:   Last BM (including prior to admit) Stool Occurrence   12/23/24 2000 12/23/24 --   12/24/24 0300 12/24/24 2   12/24/24 0800 12/24/24 --   12/24/24 2000 12/24/24 --   12/24/24 2200 12/24/24 --   12/25/24 0551 12/25/24 1                Restraints (in use currently or dc'd in last 12 hrs): No    Order current and documentation up to date? No    Lines/Drains reviewed @ bedside.  CVC  12/13/24 Left Internal jugular (Active)   Number of days: 11       Peripheral IV 12/11/24 Left;Posterior Hand (Active)   Number of days: 13       Arterial Line 12/13/24 Left Radial (Active)   Number of days: 11       Hemodialysis Central Access - Temporary Right Neck (Active)   Number of days: 11       Urinary Catheter 12/12/24 Castanon-Temperature (Active)   Number of days: 12         Drip rates at handoff:    Phenylephrine Stopped (12/23/24 1042)    dexmedeTOMIDine Stopped (12/21/24 1300)    heparin (PORCINE) Infusion 2,140  Units/hr (12/25/24 0411)    milrinone Stopped (12/23/24 1229)    sodium chloride 5 mL/hr at 12/25/24 0411       Lab Data:   CBC:   Recent Labs     12/24/24  0505 12/25/24  0529   WBC 8.4 7.3   HGB 10.0* 9.8*   HCT 30.7* 29.6*   MCV 88.0 87.2    154     BMP:    Recent Labs     12/24/24  0504 12/24/24  1726   * 135*   K 4.2 4.3   CO2 27 27   BUN 7 8   CREATININE 1.4* 1.4*     LIVR: No results for input(s): \"AST\", \"ALT\" in the last 72 hours.  PT/INR: No results for input(s): \"INR\" in the last 72 hours.    Invalid input(s): \"PROT\"  APTT: No results for input(s): \"APTT\" in the last 72 hours.  ABG: No results for input(s): \"PHART\", \"MAD1GUI\", \"PO2ART\" in the last 72 hours.    Any consults during the shift? No    Any signed and held orders to be released?  No        4 Eyes Skin Assessment       The patient is being assessed for  Shift Handoff    I agree that at least one RN has performed a thorough Head to Toe Skin Assessment on the patient. ALL assessment sites listed below have been assessed.      Areas assessed by both nurses: Head, Face, Ears, Shoulders, Back, Chest, Arms, Elbows, Hands, Sacrum. Buttock, Coccyx, Ischium, and Legs. Feet and Heels        Does the Patient have a Wound? Yes wound(s) were present on assessment. LDA wound assessment was Initiated and completed by RN Venous wound to RLE, MASD to buttocks    Wound Care Orders initiated by RN: No       Hudson Prevention initiated by RN: Yes    Pressure Injury (Stage 3,4, Unstageable, DTI, NWPT, and Complex wounds) if present, place Wound referral order by RN under : No    New Ostomies, if present place, Ostomy referral order under : No     Nurse 1 eSignature: Electronically signed by Minnie Robles RN on 12/25/24 at 6:04 AM EST    **SHARE this note so that the co-signing nurse can place an eSignature**    Nurse 2 eSignature: Electronically signed by Marium Martin RN on 12/25/24 at 7:35 AM EST

## 2024-12-25 NOTE — PROGRESS NOTES
mg Oral QPM    [Held by provider] sacubitril-valsartan  1 tablet Oral BID    sodium chloride flush  5-40 mL IntraVENous 2 times per day        Phenylephrine Stopped (24 1042)    dexmedeTOMIDine Stopped (24 1300)    heparin (PORCINE) Infusion 2,140 Units/hr (24 0411)    milrinone Stopped (24 1229)    sodium chloride 5 mL/hr at 24 0411       PRN Meds:acetaminophen, melatonin, guaiFENesin-dextromethorphan, heparin (porcine), pantoprazole, sodium chloride flush, sodium chloride, ondansetron **OR** ondansetron, polyethylene glycol    Physical Exam:    TEMPERATURE:  Current - Temp: 100 °F (37.8 °C); Max - Temp  Av.1 °F (37.8 °C)  Min: 99.8 °F (37.7 °C)  Max: 100.4 °F (38 °C)  RESPIRATIONS RANGE: Resp  Avg: 15.2  Min: 10  Max: 21  PULSE RANGE: Pulse  Av.3  Min: 98  Max: 106  BLOOD PRESSURE RANGE:  Systolic (24hrs), Av , Min:130 , Max:141   ; Diastolic (24hrs), Av, Min:65, Max:92    24HR INTAKE/OUTPUT:    Intake/Output Summary (Last 24 hours) at 2024 1213  Last data filed at 2024 0551  Gross per 24 hour   Intake 968.95 ml   Output 1629 ml   Net -660.05 ml       Patient Vitals for the past 96 hrs (Last 3 readings):   Weight   24 0551 120.5 kg (265 lb 10.5 oz)   24 0600 123.5 kg (272 lb 4.3 oz)   24 0638 124.8 kg (275 lb 2.2 oz)       General: Awake  HEENT: Normocephalic, atraumatic  Chest: diminished to auscultation  CVS: RRR  Abdomen: soft, non tender  Extremities: trace pitting edema  : castanon in place   Access: R temp vascat       LAB DATA:    CBC:   Lab Results   Component Value Date/Time    WBC 7.3 2024 05:29 AM    RBC 3.39 2024 05:29 AM    HGB 9.8 2024 05:29 AM    HCT 29.6 2024 05:29 AM    MCV 87.2 2024 05:29 AM    MCH 28.8 2024 05:29 AM    MCHC 33.0 2024 05:29 AM    RDW 20.9 2024 05:29 AM     2024 05:29 AM    MPV 8.5 2024 05:29 AM     BMP:    Lab Results   Component

## 2024-12-25 NOTE — PROGRESS NOTES
Clinical Pharmacy Note  Heparin Dosing       Lab Results   Component Value Date/Time    ANTIXAUHEP 0.62 12/25/2024 07:00 AM      Lab Results   Component Value Date/Time    HGB 9.8 12/25/2024 05:29 AM    HCT 29.6 12/25/2024 05:29 AM     12/25/2024 05:29 AM    INR 1.70 12/12/2024 03:59 AM       Current Infusion Rate: 2140 units/hr    Plan:  Rate: Continue 2140 units/hr  Next anti-Xa level: 0600  12/26    Pharmacy will continue to monitor and adjust based on anti-Xa results.    Darnell Hay [facility]  12/25/2024 8:17 AM

## 2024-12-25 NOTE — PROGRESS NOTES
[address] [address]  Dept: [phone]  Dept Fax: [phone]  Loc: [phone]    Visit Date: 7/30/2020    Yoel Alexander is a 49 y.o. female who presents todayfor:  Chief Complaint   Patient presents with    1 Year Follow Up    Hypertension    Check-Up    Atrial Fibrillation   admitted for palpitation  Found to have A fib   Still smoking lately   Started on medical Rx  Did well with the new med s   Minimal palpitation   No chest pain   No changes in breathing  Weight issues  No know CAD   Suann Better is lower today   No dizziness  No syncope  HPI:  HPI  Past Medical History:   Diagnosis Date    Afib ([address]     Arthritis     CAD (coronary artery disease)     GERD (gastroesophageal reflux disease)     Hyperlipidemia     Hypertension     Hypothyroid     Pneumonia       Past Surgical History:   Procedure Laterality Date    BREAST LUMPECTOMY Left 1999    CARDIAC CATHETERIZATION      CHOLECYSTECTOMY      CORONARY ANGIOPLASTY      KNEE ARTHROSCOPY Right     08    MOUTH SURGERY       Family History   Problem Relation Age of Onset    Heart Disease Mother     Diabetes Mother     Cancer Mother         liposarcoma    Kidney Disease Mother     Heart Disease Father     Diabetes Father     Kidney Disease Father     Cancer Father      Social History     Tobacco Use    Smoking status: Current Every Day Smoker     Packs/day: 0.50     Years: 30.00     Pack years: 15.00     Types: Cigarettes     Start date: 12/4/1987    Smokeless tobacco: Never Used   Substance Use Topics    Alcohol use: Yes     Comment: rare      Current Outpatient Medications   Medication Sig Dispense Refill    ibuprofen (ADVIL;MOTRIN) 800 MG tablet Take 800 mg by mouth every 6 hours as needed for Pain      metoprolol succinate (TOPROL XL) 50 MG extended release tablet TAKE 1 AND 1/2 TABLETS BY MOUTH EVERY  tablet 0    atorvastatin NAME:  Levy Vargas  YOB: 1965  MEDICAL RECORD NUMBER:  [id number]    Shift Summary: NPO at midnight for tunneled HD line placement tomorrow, pt sat at edge of bed with assistance for 10 mins. Castanon and art. Line removed today.     Family updated: Yes:  pt updated family     Rhythm: Normal Sinus Rhythm     Most recent vitals:   Visit Vitals  BP (!) 135/92   Pulse (!) 102   Temp 100 °F (37.8 °C) (Axillary)   Resp 11   Ht 1.854 m (6' 1\")   Wt 120.5 kg (265 lb 10.5 oz)   SpO2 100%   BMI 35.05 kg/m²      ABP (Arterial line BP): (!) 104/98  ABP Mean (Arterial Line Mean): 101 mmHg    No data found.    No data found.      Respiratory support needed (if any):  - RA    Admission weight Weight - Scale: (!) 145.4 kg (320 lb 8.8 oz) (12/11/24 2306)    Today's weight    Wt Readings from Last 1 Encounters:   12/25/24 120.5 kg (265 lb 10.5 oz)        Castanon need assessed each shift: N/A - no castanon present  UOP >30ml/hr: NO - 30ml this shift  Last documented BM (in last 48 hrs):  Patient Vitals for the past 48 hrs:   Last BM (including prior to admit) Stool Occurrence   12/23/24 2000 12/23/24 --   12/24/24 0300 12/24/24 2   12/24/24 0800 12/24/24 --   12/24/24 2000 12/24/24 --   12/24/24 2200 12/24/24 --   12/25/24 0551 12/25/24 1   12/25/24 0800 12/25/24 --                Restraints (in use currently or dc'd in last 12 hrs): No    Order current and documentation up to date? No    Lines/Drains reviewed @ bedside.  CVC  12/13/24 Left Internal jugular (Active)   Number of days: 12       Peripheral IV 12/11/24 Left;Posterior Hand (Active)   Number of days: 13       Hemodialysis Central Access - Temporary Right Neck (Active)   Number of days: 12         Drip rates at handoff:    Phenylephrine Stopped (12/23/24 1042)    dexmedeTOMIDine Stopped (12/21/24 1300)    heparin (PORCINE) Infusion 2,140 Units/hr (12/25/24 1553)    milrinone Stopped (12/23/24 1229)    sodium chloride 5 mL/hr at 12/25/24 0411       Lab Data:    (LIPITOR) 40 MG tablet Take 1 tablet by mouth daily 90 tablet 3    apixaban (ELIQUIS) 5 MG TABS tablet Take 1 tablet by mouth 2 times daily 180 tablet 3    flecainide (TAMBOCOR) 100 MG tablet Take 1 tablet by mouth 2 times daily 180 tablet 3    omeprazole (PRILOSEC) 10 MG delayed release capsule Take 10 mg by mouth daily      nitroGLYCERIN (NITROSTAT) 0.4 MG SL tablet Place 1 tablet under the tongue every 5 minutes as needed for Chest pain 25 tablet 3    aspirin 81 MG chewable tablet Take 1 tablet by mouth daily. 30 tablet 3    levothyroxine (SYNTHROID) 100 MCG tablet Take 125 mcg by mouth Daily        No current facility-administered medications for this visit.       Allergies   Allergen Reactions    Codeine Hives and Swelling    Cortisone Swelling     Apparently tolerates topical and IV with benadryl well    Darvocet A500 [Propoxyphene N-Acetaminophen] Nausea And Vomiting    Sulfa Antibiotics Nausea And Vomiting and Swelling     Not throat swelling     Health Maintenance   Topic Date Due    Pneumococcal 0-64 years Vaccine (1 of 1 - PPSV23) 06/29/1978    HIV screen  06/29/1987    DTaP/Tdap/Td vaccine (1 - Tdap) 06/29/1991    Cervical cancer screen  06/29/1993    Flu vaccine (1) 09/01/2020    Lipid screen  12/05/2020    TSH testing  12/05/2020    Potassium monitoring  12/05/2020    Creatinine monitoring  12/05/2020    Hepatitis A vaccine  Aged Out    Hepatitis B vaccine  Aged Out    Hib vaccine  Aged Out    Meningococcal (ACWY) vaccine  Aged Out       Subjective:  Review of Systems  General:   No fever, no chills, No fatigue or weight loss  Pulmonary:    some dyspnea, no wheezing  Cardiac:    Denies recent chest pain,   GI:     No nausea or vomiting, no abdominal pain  Neuro:    No dizziness or light headedness,   Musculoskeletal:  No recent active issues  Extremities:   No edema, no obvious claudication       Objective:  Physical Exam  BP 96/62   Pulse 68   Ht 6' (1.829 m)   Wt 292 lb 12.8 oz (132.8 kg)   BMI 39.71 kg/m²   General:   Well developed, well nourished  Lungs:   Clear to auscultation  Heart:    Normal S1 S2, Slight murmur. no rubs, no gallops  Abdomen:   Soft, non tender, no organomegalies, positive bowel sounds  Extremities:   No edema, no cyanosis, good peripheral pulses  Neurological:   Awake, alert, oriented. No obvious focal deficits  Musculoskelatal:  No obvious deformities    Assessment:      Diagnosis Orders   1. Smoking     2. Essential hypertension     3. Paroxysmal atrial fibrillation (HCC)     cardiac fair for now   High caffeine intake  Lower BP at times   Smoking: discussed with the patient the importance of smoke cessation especially with the risk of CAD. Likely sleep apnea     Plan:  No follow-ups on file. Discussed  Monitor the BP  Consider sleep apnea   Cut down on caffeine   Smoking: discussed with the patient the importance of smoke cessation especially with the risk of CAD. Continue risk factor modification and medical management  Thank you for allowing me to participate in the care of your patient. Please don't hesitate to contact me regarding any further issues related to the patient care      Orders Placed:  No orders of the defined types were placed in this encounter. Medications Prescribed:  No orders of the defined types were placed in this encounter. Discussed use, benefit, and side effects of prescribed medications. All patient questions answered. Pt voicedunderstanding. Instructed to continue current medications, diet and exercise. Continue risk factor modification and medical management. Patient agreed with treatment plan. Follow up as directed.     Electronically signedby Bridgett Curiel MD on 7/30/2020 at 3:05 PM

## 2024-12-25 NOTE — PLAN OF CARE
Problem: Chronic Conditions and Co-morbidities  Goal: Patient's chronic conditions and co-morbidity symptoms are monitored and maintained or improved  12/25/2024 1049 by Marium Martin RN  Outcome: Progressing  12/25/2024 0315 by Minnie Robles RN  Outcome: Progressing  Flowsheets  Taken 12/24/2024 2000 by Minnie Robles RN  Care Plan - Patient's Chronic Conditions and Co-Morbidity Symptoms are Monitored and Maintained or Improved:   Monitor and assess patient's chronic conditions and comorbid symptoms for stability, deterioration, or improvement   Update acute care plan with appropriate goals if chronic or comorbid symptoms are exacerbated and prevent overall improvement and discharge  Taken 12/24/2024 1600 by Rosana Rodrigues RN  Care Plan - Patient's Chronic Conditions and Co-Morbidity Symptoms are Monitored and Maintained or Improved:   Monitor and assess patient's chronic conditions and comorbid symptoms for stability, deterioration, or improvement   Collaborate with multidisciplinary team to address chronic and comorbid conditions and prevent exacerbation or deterioration     Problem: Pain  Goal: Verbalizes/displays adequate comfort level or baseline comfort level  12/25/2024 1049 by Marium Martin RN  Outcome: Progressing  12/25/2024 0315 by Minnie Robles RN  Outcome: Progressing  Flowsheets (Taken 12/24/2024 2000)  Verbalizes/displays adequate comfort level or baseline comfort level:   Encourage patient to monitor pain and request assistance   Assess pain using appropriate pain scale   Administer analgesics based on type and severity of pain and evaluate response   Implement non-pharmacological measures as appropriate and evaluate response   Consider cultural and social influences on pain and pain management   Notify Licensed Independent Practitioner if interventions unsuccessful or patient reports new pain     Problem: Safety - Adult  Goal: Free from fall injury  12/25/2024 1049 by Mario  Rosana Rodrigues RN  Incisions, Wounds, or Drain Sites Healing Without Sign and Symptoms of Infection:   TWICE DAILY: Assess and document skin integrity   ADMISSION and DAILY: Assess and document risk factors for pressure ulcer development     Problem: Musculoskeletal - Adult  Goal: Return mobility to safest level of function  12/25/2024 0315 by Minnie Robles RN  Outcome: Progressing  Flowsheets (Taken 12/24/2024 2000)  Return Mobility to Safest Level of Function: Assess patient stability and activity tolerance for standing, transferring and ambulating with or without assistive devices     Problem: Gastrointestinal - Adult  Goal: Minimal or absence of nausea and vomiting  12/25/2024 0315 by Minnie Robles RN  Outcome: Progressing  Flowsheets  Taken 12/24/2024 2000 by Minnie Robles RN  Minimal or absence of nausea and vomiting:   Administer IV fluids as ordered to ensure adequate hydration   Administer ordered antiemetic medications as needed   Advance diet as tolerated, if ordered  Taken 12/24/2024 1600 by Rosana Rodrigues RN  Minimal or absence of nausea and vomiting:   Administer IV fluids as ordered to ensure adequate hydration   Maintain NPO status until nausea and vomiting are resolved   Nasogastric tube to low intermittent suction as ordered     Problem: Genitourinary - Adult  Goal: Urinary catheter remains patent  12/25/2024 0315 by Minnie Robles RN  Outcome: Progressing  Flowsheets  Taken 12/24/2024 2000 by Minnie Robles RN  Urinary catheter remains patent: Assess patency of urinary catheter  Taken 12/24/2024 1600 by Rosana Rodrigues RN  Urinary catheter remains patent:   Assess patency of urinary catheter   Irrigate catheter per Licensed Independent Practitioner order if indicated and notify Licensed Independent Practitioner if unable to irrigate   Assess need for a larger catheter size or a 3-way catheter for continuous bladder irrigation     Problem: ABCDS Injury Assessment  Goal: Absence of

## 2024-12-25 NOTE — FLOWSHEET NOTE
12/25/24 0551   Vitals   BP (!) 135/92   MAP (Calculated) 106   MAP (mmHg) 104   BP Location Left upper arm   BP Method Automatic   Art Line   ABP (Arterial line BP) 142/76   ABP Mean (Arterial Line Mean) 97 mmHg     Cuff and arterial line pressure correlate fairly well this morning. Might be able to remove arterial line today if indicated now that pt is not on any pressors and off CRRT

## 2024-12-25 NOTE — PROGRESS NOTES
Clinical Pharmacy Note  Heparin Dosing       Lab Results   Component Value Date/Time    ANTIXAUHEP 0.64 12/25/2024 12:00 AM      Lab Results   Component Value Date/Time    HGB 10.0 12/24/2024 05:05 AM    HCT 30.7 12/24/2024 05:05 AM     12/24/2024 05:05 AM    INR 1.70 12/12/2024 03:59 AM       Current Infusion Rate: 2140 units/hr    Plan:  Bolus: 0 units  Rate: 2140 units/hr  Next anti-Xa level: 0700 12/25/24    Pharmacy will continue to monitor and adjust based on anti-Xa results.    Mila Menon, PharmD

## 2024-12-25 NOTE — PROGRESS NOTES
V2.0    [facility] [place] Progress Note      Name:  Levy Vargas /Age/Sex: 1965  (59 y.o. male)   MRN & CSN:  [id number] & [id number] Encounter Date/Time: 2024 9:15 AM EST   Location:  [place] [address] PCP: No primary care provider on file.     Attending:Geovani Blanchard MD       Hospital Day: 15    Assessment and Recommendations   Levy Vargas is a 59 y.o. male who presents with Cardiogenic shock      Plan:   Cardiogenic shock, with cardiomyopathy with EF of 15%, extubated, feeling better status post FRANTZ as patient was in A-fib also with RVR improved.  Off milrinone for now  Acute on chronic congestive heart failure cardiology following, off milrinone drip.  Off pressors for now continue to feel more energetic  A-fib with RVR status post FRANTZ continue to be on heparin.  No acute changes  KENDRA, nephrology consulted continue to be on CRRT.  Acute metabolic encephalopathy with lethargy multifactorial fluctuating and currently improving  Anemia, no signs of active bleeding.   Coffee-ground emesis GI consulted [facility] GI consulted no immediate plan for EGD at this time  Acute respiratory failure currently extubated        Diet ADULT DIET; Regular; No Added Salt (3-4 gm); 1500 ml  ADULT ORAL NUTRITION SUPPLEMENT; Breakfast, Lunch, Dinner; Diabetic Oral Supplement  Diet NPO   DVT Prophylaxis [] Lovenox, []  Heparin, [] SCDs, [] Ambulation,  [] Eliquis, [] Xarelto  [] Coumadin   Code Status Full Code             Personally reviewed Lab Studies and Imaging     Discussed management of the case with cardiology   Rhythm strip independently interpreted by myself heart rate 103    Medical Decision Making:  The following items were considered in medical decision making:  Discussion of patient care with other providers  Reviewed clinical lab tests  Reviewed radiology tests  Reviewed other diagnostic tests/interventions  Independent review of radiologic images  Microbiology cultures and other micro tests reviewed

## 2024-12-26 ENCOUNTER — APPOINTMENT (OUTPATIENT)
Dept: INTERVENTIONAL RADIOLOGY/VASCULAR | Age: 59
End: 2024-12-26
Payer: COMMERCIAL

## 2024-12-26 LAB
ALBUMIN SERPL-MCNC: 3.1 G/DL (ref 3.4–5)
ANION GAP SERPL CALCULATED.3IONS-SCNC: 9 MMOL/L (ref 3–16)
ANTI-XA UNFRAC HEPARIN: 0.56 IU/ML (ref 0.3–0.7)
BUN SERPL-MCNC: 23 MG/DL (ref 7–20)
CALCIUM SERPL-MCNC: 8.7 MG/DL (ref 8.3–10.6)
CHLORIDE SERPL-SCNC: 101 MMOL/L (ref 99–110)
CO2 SERPL-SCNC: 24 MMOL/L (ref 21–32)
CREAT SERPL-MCNC: 3 MG/DL (ref 0.8–1.3)
DEPRECATED RDW RBC AUTO: 21 % (ref 12.4–15.4)
GFR SERPLBLD CREATININE-BSD FMLA CKD-EPI: 23 ML/MIN/{1.73_M2}
GLUCOSE BLD-MCNC: 154 MG/DL (ref 70–99)
GLUCOSE BLD-MCNC: 69 MG/DL (ref 70–99)
GLUCOSE BLD-MCNC: 84 MG/DL (ref 70–99)
GLUCOSE BLD-MCNC: 96 MG/DL (ref 70–99)
GLUCOSE BLD-MCNC: 98 MG/DL (ref 70–99)
GLUCOSE SERPL-MCNC: 76 MG/DL (ref 70–99)
HAV IGM SERPL QL IA: NORMAL
HBV CORE AB SERPL QL IA: NEGATIVE
HBV CORE IGM SERPL QL IA: NORMAL
HBV SURFACE AB SERPL IA-ACNC: <3.5 MIU/ML
HBV SURFACE AG SERPL QL IA: NORMAL
HCT VFR BLD AUTO: 28.2 % (ref 40.5–52.5)
HCV AB SERPL QL IA: NORMAL
HGB BLD-MCNC: 9.1 G/DL (ref 13.5–17.5)
INR PPP: 1.21 (ref 0.85–1.15)
MCH RBC QN AUTO: 28.3 PG (ref 26–34)
MCHC RBC AUTO-ENTMCNC: 32.3 G/DL (ref 31–36)
MCV RBC AUTO: 87.6 FL (ref 80–100)
PERFORMED ON: ABNORMAL
PERFORMED ON: ABNORMAL
PERFORMED ON: NORMAL
PHOSPHATE SERPL-MCNC: 1.8 MG/DL (ref 2.5–4.9)
PLATELET # BLD AUTO: 152 K/UL (ref 135–450)
PMV BLD AUTO: 8.3 FL (ref 5–10.5)
POTASSIUM SERPL-SCNC: 4.7 MMOL/L (ref 3.5–5.1)
PROTHROMBIN TIME: 15.5 SEC (ref 11.9–14.9)
RBC # BLD AUTO: 3.22 M/UL (ref 4.2–5.9)
SODIUM SERPL-SCNC: 134 MMOL/L (ref 136–145)
WBC # BLD AUTO: 6 K/UL (ref 4–11)

## 2024-12-26 PROCEDURE — 80069 RENAL FUNCTION PANEL: CPT

## 2024-12-26 PROCEDURE — 6370000000 HC RX 637 (ALT 250 FOR IP): Performed by: INTERNAL MEDICINE

## 2024-12-26 PROCEDURE — 80074 ACUTE HEPATITIS PANEL: CPT

## 2024-12-26 PROCEDURE — 97162 PT EVAL MOD COMPLEX 30 MIN: CPT

## 2024-12-26 PROCEDURE — 85027 COMPLETE CBC AUTOMATED: CPT

## 2024-12-26 PROCEDURE — 85610 PROTHROMBIN TIME: CPT

## 2024-12-26 PROCEDURE — 2500000003 HC RX 250 WO HCPCS: Performed by: FAMILY MEDICINE

## 2024-12-26 PROCEDURE — 6360000002 HC RX W HCPCS: Performed by: FAMILY MEDICINE

## 2024-12-26 PROCEDURE — 36556 INSERT NON-TUNNEL CV CATH: CPT

## 2024-12-26 PROCEDURE — 2500000003 HC RX 250 WO HCPCS: Performed by: INTERNAL MEDICINE

## 2024-12-26 PROCEDURE — 6360000002 HC RX W HCPCS: Performed by: RADIOLOGY

## 2024-12-26 PROCEDURE — 0JH63XZ INSERTION OF TUNNELED VASCULAR ACCESS DEVICE INTO CHEST SUBCUTANEOUS TISSUE AND FASCIA, PERCUTANEOUS APPROACH: ICD-10-PCS | Performed by: RADIOLOGY

## 2024-12-26 PROCEDURE — 51798 US URINE CAPACITY MEASURE: CPT

## 2024-12-26 PROCEDURE — 36592 COLLECT BLOOD FROM PICC: CPT

## 2024-12-26 PROCEDURE — 6360000002 HC RX W HCPCS: Performed by: INTERNAL MEDICINE

## 2024-12-26 PROCEDURE — 2100000000 HC CCU R&B

## 2024-12-26 PROCEDURE — C1894 INTRO/SHEATH, NON-LASER: HCPCS

## 2024-12-26 PROCEDURE — 86706 HEP B SURFACE ANTIBODY: CPT

## 2024-12-26 PROCEDURE — 02H633Z INSERTION OF INFUSION DEVICE INTO RIGHT ATRIUM, PERCUTANEOUS APPROACH: ICD-10-PCS | Performed by: RADIOLOGY

## 2024-12-26 PROCEDURE — 6360000002 HC RX W HCPCS

## 2024-12-26 PROCEDURE — 2580000003 HC RX 258: Performed by: INTERNAL MEDICINE

## 2024-12-26 PROCEDURE — 2500000003 HC RX 250 WO HCPCS

## 2024-12-26 PROCEDURE — 6370000000 HC RX 637 (ALT 250 FOR IP)

## 2024-12-26 PROCEDURE — 90935 HEMODIALYSIS ONE EVALUATION: CPT

## 2024-12-26 PROCEDURE — 85520 HEPARIN ASSAY: CPT

## 2024-12-26 PROCEDURE — 97530 THERAPEUTIC ACTIVITIES: CPT

## 2024-12-26 PROCEDURE — 5A1D70Z PERFORMANCE OF URINARY FILTRATION, INTERMITTENT, LESS THAN 6 HOURS PER DAY: ICD-10-PCS

## 2024-12-26 PROCEDURE — 2709999900 IR TUNNELED CVC PLACE WO SQ PORT/PUMP > 5 YEARS

## 2024-12-26 RX ORDER — FENTANYL CITRATE 50 UG/ML
INJECTION, SOLUTION INTRAMUSCULAR; INTRAVENOUS PRN
Status: COMPLETED | OUTPATIENT
Start: 2024-12-26 | End: 2024-12-26

## 2024-12-26 RX ORDER — HEPARIN SODIUM 1000 [USP'U]/ML
3600 INJECTION, SOLUTION INTRAVENOUS; SUBCUTANEOUS PRN
Status: DISCONTINUED | OUTPATIENT
Start: 2024-12-26 | End: 2025-01-03 | Stop reason: HOSPADM

## 2024-12-26 RX ADMIN — SODIUM CHLORIDE, PRESERVATIVE FREE 10 ML: 5 INJECTION INTRAVENOUS at 20:28

## 2024-12-26 RX ADMIN — AMIODARONE HYDROCHLORIDE 200 MG: 200 TABLET ORAL at 08:48

## 2024-12-26 RX ADMIN — AMIODARONE HYDROCHLORIDE 200 MG: 200 TABLET ORAL at 20:27

## 2024-12-26 RX ADMIN — SODIUM PHOSPHATE, MONOBASIC, MONOHYDRATE AND SODIUM PHOSPHATE, DIBASIC, ANHYDROUS 15 MMOL: 142; 276 INJECTION, SOLUTION INTRAVENOUS at 13:48

## 2024-12-26 RX ADMIN — FENTANYL CITRATE 50 MCG: 50 INJECTION INTRAMUSCULAR; INTRAVENOUS at 11:29

## 2024-12-26 RX ADMIN — SODIUM CHLORIDE, PRESERVATIVE FREE 40 MG: 5 INJECTION INTRAVENOUS at 03:00

## 2024-12-26 RX ADMIN — QUETIAPINE FUMARATE 25 MG: 25 TABLET ORAL at 20:27

## 2024-12-26 RX ADMIN — FENTANYL CITRATE 50 MCG: 50 INJECTION INTRAMUSCULAR; INTRAVENOUS at 11:24

## 2024-12-26 RX ADMIN — ONDANSETRON 4 MG: 2 INJECTION INTRAMUSCULAR; INTRAVENOUS at 22:12

## 2024-12-26 RX ADMIN — SODIUM CHLORIDE, PRESERVATIVE FREE 10 ML: 5 INJECTION INTRAVENOUS at 08:14

## 2024-12-26 RX ADMIN — ATORVASTATIN CALCIUM 80 MG: 80 TABLET, FILM COATED ORAL at 20:27

## 2024-12-26 RX ADMIN — HEPARIN SODIUM 2140 UNITS/HR: 10000 INJECTION, SOLUTION INTRAVENOUS at 05:13

## 2024-12-26 RX ADMIN — SODIUM CHLORIDE, PRESERVATIVE FREE 40 MG: 5 INJECTION INTRAVENOUS at 18:16

## 2024-12-26 RX ADMIN — EPOETIN ALFA-EPBX 10000 UNITS: 10000 INJECTION, SOLUTION INTRAVENOUS; SUBCUTANEOUS at 16:22

## 2024-12-26 RX ADMIN — APIXABAN 5 MG: 5 TABLET, FILM COATED ORAL at 18:18

## 2024-12-26 RX ADMIN — HEPARIN SODIUM 3600 UNITS: 1000 INJECTION INTRAVENOUS; SUBCUTANEOUS at 16:21

## 2024-12-26 ASSESSMENT — PAIN SCALES - GENERAL
PAINLEVEL_OUTOF10: 0

## 2024-12-26 NOTE — PROGRESS NOTES
Physical Therapy  Facility/Department: [address] CVICU  Physical Therapy Initial Assessment    Name: Levy Vargas  : 1965  MRN: [id number]  Date of Service: 2024    Discharge Recommendations:  Continue to assess pending progress, Subacute/Skilled Nursing Facility   PT Equipment Recommendations  Other: Will monitor for potential equipt needs.      Levy Vargas scored a 10/24 on the AM-PAC short mobility form. Current research shows that an AM-PAC score of 17 or less is typically not associated with a discharge to the patient's home setting. Based on the patient's AM-PAC score and their current functional mobility deficits, it is recommended that the patient have 3-5 sessions per week of Physical Therapy at d/c to increase the patient's independence.  Please see assessment section for further patient specific details.    If patient discharges prior to next session this note will serve as a discharge summary.  Please see below for the latest assessment towards goals.      Assessment  Body Structures, Functions, Activity Limitations Requiring Skilled Therapeutic Intervention: Decreased functional mobility ;Decreased strength;Decreased endurance;Decreased balance  Assessment: 60 y/o male admit 2024 with Cardiogenic Shock, A-Fib with RVR, Acute Respiratory, KENDRA. -2024 Pt Intubated. -2024 CRRT. 2024 Cardioversion. PMH as noted including CAD, A-Fib, Cardiomyopathy, Bradycardia, ICD (2024), CKD, PVD, CVA. PTA pt  staying with brother following acute care/SNF stay; house with few steps to enter and 1st floor bed/bath.  Reports independent daily care and functional mobility (with Cane/Walker prn).   Pt currently requiring Min/Mod assist with bed mob to/from eob; attempt Transfers via Stedy from eob although unsuccessful despite assist x 2 and elevated surface.  At this time, would recommend cont PT (3-5) upon d/c.  Will cont to monitor pt's progress.  Therapy Prognosis:

## 2024-12-26 NOTE — CARE COORDINATION
Chart Reviewed.  Therapy recommends SNF for discharge plan.  Met with patient to discuss.  Provided a list of CMS star rated agencies.  He states he was in [facility] in the past and he would like to return there.  The Plan for Transition of Care is related to the following treatment goals: to continue his progress in personal care and ambulation, medication oversight in SNF setting.     The Patient  was provided with a choice of provider and agrees   with the discharge plan. [x] Yes [] No    Freedom of choice list was provided with basic dialogue that supports the patient's individualized plan of care/goals, treatment preferences and shares the quality data associated with the providers. [x] Yes [] No    Referral initiated.    CHLOE Orozco     Case Management   [phone]    12/26/2024  1:00 PM

## 2024-12-26 NOTE — BRIEF OP NOTE
Brief Postoperative Note      Patient: Levy Vargas  YOB: 1965  MRN: [id number]    Date of Procedure: 12/26/2024    Renal Failure    Post-Op Diagnosis: Same       Tunneled Dialysis Catheter Placement    Surgeon(s):  Sunny Diaz MD    Anesthesia: None.    Estimated Blood Loss (mL): Minimal    Complications: None    Implants:  Right IJ 14.5 fr x 23 cm TDC        Findings:  Successful right IJ tunneled dialysis catheter placement. Ok to use immediately.    Electronically signed by Sunny Diaz MD on 12/26/2024 at 11:44 AM

## 2024-12-26 NOTE — CARE COORDINATION
Initiated HD referral to [facility].  CHLOE Orozco     Case Management   [phone]    12/26/2024  3:36 PM

## 2024-12-26 NOTE — PROGRESS NOTES
Treatment time: 3 hours    Net UF: 2 liters as ordered    Pre weight: 119.5 kg  Post weight: 117.5 kg  EDW: TBD    Access used: Right CVC  Access function: Access site had some swelling as it was just inserted today. Dressing was cleaned, dry and intact. Both ports had good flow upon patency checked done as was confirmed by an x-ray. No signs of infection noted. No redness, hematoma nor discharges was seen.     Medications or blood products given: heparin dwell: Arterial: 1.8 ml, Venous: 1.8 ml    Regular outpatient schedule: TUES, THURS, SAT.    Summary of response to treatment: 13:30: Treatment set at 2 liters for 3 hours as ordered via Right CVC just inserted today. Access site had some swelling as it was just inserted today. Dressing was cleaned, dry and intact. Both ports had good flow upon patency checked done as was confirmed by an x-ray. No signs of infection noted. No redness, hematoma nor discharges was seen. Parameters set accordingly. Hooked to HD machine. Monitored from time to time. 16:33: Treatment Completed. Returned. Blood aseptically.     Copy of dialysis treatment record placed in chart, to be scanned into EMR.

## 2024-12-26 NOTE — PROGRESS NOTES
V2.0    [facility] [place] Progress Note      Name:  Levy Vargas /Age/Sex: 1965  (59 y.o. male)   MRN & CSN:  [id number] & [id number] Encounter Date/Time: 2024 8:04 AM EST   Location:  [place] [address] PCP: No primary care provider on file.     Attending:Geovani Blanchard MD       Hospital Day: 16    Assessment and Recommendations   Levy Vargas is a 59 y.o. male who presents with Cardiogenic shock      Plan:   Cardiogenic shock, with cardiomyopathy with EF of 15%, extubated, feeling better status post FRANTZ as patient was in A-fib also with RVR improved.  To note that patient was on milrinone drip off for now  Acute on chronic congestive heart failure cardiology following, off milrinone drip.  Off pressors for now.  A-fib with RVR status post FRANTZ continue to be on heparin.  No acute changes  KENDRA, nephrology consulted continue to be on CRRT.  Acute metabolic encephalopathy with lethargy multifactorial fluctuating and currently continue to improve  Anemia, no signs of active bleeding.   Coffee-ground emesis GI consulted [facility] consulted no immediate plan for EGD at this time  KENDRA due to cardiogenic shock, was on CRRT still oliguric plan for dialysis today, creatinine 3.0  Acute respiratory failure currently extubated        Diet Diet NPO   DVT Prophylaxis [] Lovenox, []  Heparin, [] SCDs, [] Ambulation,  [] Eliquis, [] Xarelto  [] Coumadin   Code Status Full Code             Personally reviewed Lab Studies and Imaging     Discussed management of the case with nephrology who recommended dialysis    Rhythm strip independently interpreted by myself heart rate 94 QTc 453 no ST elevation      Medical Decision Making:  The following items were considered in medical decision making:  Discussion of patient care with other providers  Reviewed clinical lab tests  Reviewed radiology tests  Reviewed other diagnostic tests/interventions  Independent review of radiologic images  Microbiology cultures and other micro  \"ALB\"  Lipids:   Lab Results   Component Value Date/Time    CHOL 79 10/19/2024 03:13 AM    HDL 17 10/19/2024 03:13 AM    TRIG 68 12/18/2024 04:04 AM     Hemoglobin A1C:   Lab Results   Component Value Date/Time    LABA1C 6.3 10/19/2024 03:13 AM     TSH:   Lab Results   Component Value Date/Time    TSH 0.05 12/12/2024 12:34 AM     Troponin: No results found for: \"TROPONINT\"  Lactic Acid: No results for input(s): \"LACTA\" in the last 72 hours.  BNP: No results for input(s): \"PROBNP\" in the last 72 hours.  UA:  Lab Results   Component Value Date/Time    NITRU POSITIVE 12/12/2024 06:45 PM    COLORU DARK YELLOW 12/12/2024 06:45 PM    PHUR 5.0 12/12/2024 06:45 PM    PHUR 7.0 08/03/2023 09:20 PM    PHUR 7.0 08/03/2023 09:20 PM    WBCUA 3-5 12/12/2024 06:45 PM    RBCUA 5-10 12/12/2024 06:45 PM    MUCUS 1+ 03/19/2022 05:10 PM    TRICHOMONAS None Seen 03/19/2022 05:10 PM    BACTERIA 2+ 12/12/2024 06:45 PM    CLARITYU CLOUDY 12/12/2024 06:45 PM    LEUKOCYTESUR SMALL 12/12/2024 06:45 PM    UROBILINOGEN 2.0 12/12/2024 06:45 PM    BILIRUBINUR MODERATE 12/12/2024 06:45 PM    BLOODU LARGE 12/12/2024 06:45 PM    GLUCOSEU Negative 12/12/2024 06:45 PM    KETUA TRACE 12/12/2024 06:45 PM     Urine Cultures: No results found for: \"LABURIN\"  Blood Cultures: No results found for: \"BC\"  No results found for: \"BLOODCULT2\"  Organism: No results found for: \"ORG\"      Electronically signed by Geovani Blanchard MD on 12/26/2024 at 8:04 AM  Comment: Please note this report has been produced using speech recognition software and may contain errors related to that system including errors in grammar, punctuation, and spelling, as well as words and phrases that may be inappropriate. If there are any questions or concerns, please feel free to contact the dictating provider for clarification.

## 2024-12-26 NOTE — PLAN OF CARE
Problem: Chronic Conditions and Co-morbidities  Goal: Patient's chronic conditions and co-morbidity symptoms are monitored and maintained or improved  12/26/2024 1110 by Irasema Montero RN  Outcome: Progressing  Flowsheets (Taken 12/26/2024 0800)  Care Plan - Patient's Chronic Conditions and Co-Morbidity Symptoms are Monitored and Maintained or Improved:   Monitor and assess patient's chronic conditions and comorbid symptoms for stability, deterioration, or improvement   Collaborate with multidisciplinary team to address chronic and comorbid conditions and prevent exacerbation or deterioration   Update acute care plan with appropriate goals if chronic or comorbid symptoms are exacerbated and prevent overall improvement and discharge     Problem: Pain  Goal: Verbalizes/displays adequate comfort level or baseline comfort level  12/26/2024 1110 by Irasema Montero RN  Outcome: Progressing     Problem: Safety - Adult  Goal: Free from fall injury  12/26/2024 1110 by Irasema Montero RN  Outcome: Progressing     Problem: Respiratory - Adult  Goal: Achieves optimal ventilation and oxygenation  12/26/2024 1110 by Irasema Montero RN  Outcome: Progressing     Problem: Cardiovascular - Adult  Goal: Maintains optimal cardiac output and hemodynamic stability  12/26/2024 1110 by Irasema Montero RN  Outcome: Progressing  Flowsheets (Taken 12/26/2024 0800)  Maintains optimal cardiac output and hemodynamic stability:   Monitor blood pressure and heart rate   Monitor urine output and notify Licensed Independent Practitioner for values outside of normal range   Assess for signs of decreased cardiac output     Problem: Cardiovascular - Adult  Goal: Absence of cardiac dysrhythmias or at baseline  Outcome: Progressing  Flowsheets (Taken 12/26/2024 0800)  Absence of cardiac dysrhythmias or at baseline:   Monitor cardiac rate and rhythm   Assess for signs of decreased cardiac output   Administer antiarrhythmia medication and electrolyte  vomiting: Provide nonpharmacologic comfort measures as appropriate     Problem: Genitourinary - Adult  Goal: Urinary catheter remains patent  12/26/2024 1110 by Irasema Montero RN  Outcome: Progressing     Problem: ABCDS Injury Assessment  Goal: Absence of physical injury  12/26/2024 1110 by Irasema Montero, RN  Outcome: Progressing     Problem: Nutrition Deficit:  Goal: Optimize nutritional status  12/26/2024 1110 by Irasema Montero, RN  Outcome: Progressing

## 2024-12-26 NOTE — PROGRESS NOTES
Occupational Therapy Attempt  Levy Vargas    OT order noted. Attempted to see pt for OT. Pt leaving floor for tunneled cath placement. Will re-attempt as able.    Electronically signed by Leslie Nelson OT on 12/26/24 at 10:57 AM EST

## 2024-12-26 NOTE — PROGRESS NOTES
NAME:  Levy Vargas  YOB: 1965  MEDICAL RECORD NUMBER:  [id number]    Shift Summary: IR for tunneled HD line (permacath) placement in R subclavian. Old drainage noted. RIJ Vas cath and LIJ CVC removed after dialysis. HD UF: 2L. Phos replaced.     Family updated: Yes:  per patient     Rhythm: Normal Sinus Rhythm     Most recent vitals:   Visit Vitals  BP (!) 141/100   Pulse (!) 101   Temp 97.6 °F (36.4 °C)   Resp 17   Ht 1.854 m (6' 1\")   Wt 119.5 kg (263 lb 7.2 oz)   SpO2 98%   BMI 34.76 kg/m²      ABP (Arterial line BP): (!) 104/98  ABP Mean (Arterial Line Mean): 101 mmHg    No data found.    No data found.      Respiratory support needed (if any):  - RA    Admission weight Weight - Scale: (!) 145.4 kg (320 lb 8.8 oz) (12/11/24 2306)    Today's weight    Wt Readings from Last 1 Encounters:   12/26/24 119.5 kg (263 lb 7.2 oz)        Castanon need assessed each shift: N/A - no castanon present  UOP >30ml/hr: No.   Last documented BM (in last 48 hrs):  Patient Vitals for the past 48 hrs:   Last BM (including prior to admit) Stool Occurrence   12/24/24 2000 12/24/24 --   12/24/24 2200 12/24/24 --   12/25/24 0551 12/25/24 1   12/25/24 0800 12/25/24 --   12/25/24 2000 12/25/24 --   12/25/24 2230 12/25/24 1   12/26/24 0800 12/25/24 --                Restraints (in use currently or dc'd in last 12 hrs): No    Order current and documentation up to date? No    Lines/Drains reviewed @ bedside.  Peripheral IV 12/11/24 Left;Posterior Hand (Active)   Number of days: 14       Hemodialysis Central Access - Permanent/Tunneled Right Subclavian (Active)   Number of days: 0         Drip rates at handoff:    Phenylephrine Stopped (12/23/24 1042)    dexmedeTOMIDine Stopped (12/21/24 1300)    milrinone Stopped (12/23/24 1229)    sodium chloride Stopped (12/25/24 0969)       Lab Data:   CBC:   Recent Labs     12/25/24  0529 12/26/24  0505   WBC 7.3 6.0   HGB 9.8* 9.1*   HCT 29.6* 28.2*   MCV 87.2 87.6    152     BMP:

## 2024-12-26 NOTE — PROGRESS NOTES
Clinical Pharmacy Note  Heparin Dosing       Lab Results   Component Value Date/Time    ANTIXAUHEP 0.56 12/26/2024 05:05 AM      Lab Results   Component Value Date/Time    HGB 9.1 12/26/2024 05:05 AM    HCT 28.2 12/26/2024 05:05 AM     12/26/2024 05:05 AM    INR 1.70 12/12/2024 03:59 AM       Current Infusion Rate: 2140 units/hr    Plan:  Rate: continue at 2140 units/hr  Next anti-Xa level: 0600 12/27/24    Pharmacy will continue to monitor and adjust based on anti-Xa results.  Mateo Bob, PharmD

## 2024-12-26 NOTE — PROGRESS NOTES
NAME:  Levy Vargas  YOB: 1965  MEDICAL RECORD NUMBER:  [id number]    Shift Summary: no significant events overnight. Pt continues to have intermittent nose bleed. Pt has yet to void, was bladder scanned x2 less than 200mL found. Pt was CHG bathed, linen changed and pre opt check list completed. NPO since midnight.     Family updated: Yes:  at bedside     Rhythm: Normal Sinus Rhythm     Most recent vitals:   Visit Vitals  BP (!) 126/92   Pulse 94   Temp 99.6 °F (37.6 °C) (Oral)   Resp 10   Ht 1.854 m (6' 1\")   Wt 119.5 kg (263 lb 7.2 oz)   SpO2 97%   BMI 34.76 kg/m²      ABP (Arterial line BP): (!) 104/98  ABP Mean (Arterial Line Mean): 101 mmHg    No data found.    No data found.      Respiratory support needed (if any):  - RA    Admission weight Weight - Scale: (!) 145.4 kg (320 lb 8.8 oz) (12/11/24 2306)    Today's weight    Wt Readings from Last 1 Encounters:   12/26/24 119.5 kg (263 lb 7.2 oz)        Castanon need assessed each shift: N/A - no castanon present  UOP >30ml/hr: NO - oliguric   Last documented BM (in last 48 hrs):  Patient Vitals for the past 48 hrs:   Last BM (including prior to admit) Stool Occurrence   12/24/24 0800 12/24/24 --   12/24/24 2000 12/24/24 --   12/24/24 2200 12/24/24 --   12/25/24 0551 12/25/24 1   12/25/24 0800 12/25/24 --   12/25/24 2230 12/25/24 1                Restraints (in use currently or dc'd in last 12 hrs): No    Order current and documentation up to date? Yes    Lines/Drains reviewed @ bedside.  CVC  12/13/24 Left Internal jugular (Active)   Number of days: 12       Peripheral IV 12/11/24 Left;Posterior Hand (Active)   Number of days: 14       Hemodialysis Central Access - Temporary Right Neck (Active)   Number of days: 12         Drip rates at handoff:    Phenylephrine Stopped (12/23/24 1042)    dexmedeTOMIDine Stopped (12/21/24 1300)    heparin (PORCINE) Infusion 2,140 Units/hr (12/26/24 0513)    milrinone Stopped (12/23/24 1229)    sodium chloride Stopped  (12/25/24 0948)       Lab Data:   CBC:   Recent Labs     12/25/24  0529 12/26/24  0505   WBC 7.3 6.0   HGB 9.8* 9.1*   HCT 29.6* 28.2*   MCV 87.2 87.6    152     BMP:    Recent Labs     12/24/24  1726 12/25/24  0527   * 136   K 4.3 4.5   CO2 27 24   BUN 8 14   CREATININE 1.4* 2.1*     LIVR: No results for input(s): \"AST\", \"ALT\" in the last 72 hours.  PT/INR: No results for input(s): \"INR\" in the last 72 hours.    Invalid input(s): \"PROT\"  APTT: No results for input(s): \"APTT\" in the last 72 hours.  ABG: No results for input(s): \"PHART\", \"ZVI2ZJY\", \"PO2ART\" in the last 72 hours.    Any consults during the shift? No    Any signed and held orders to be released?  Yes        4 Eyes Skin Assessment       The patient is being assessed for  Shift Handoff    I agree that at least one RN has performed a thorough Head to Toe Skin Assessment on the patient. ALL assessment sites listed below have been assessed.      Areas assessed by both nurses: Head, Face, Ears, Shoulders, Back, Chest, Arms, Elbows, Hands, Sacrum. Buttock, Coccyx, Ischium, Legs. Feet and Heels, and Under Medical Devices         Does the Patient have a Wound? Yes wound(s) were present on assessment. LDA wound assessment was Initiated and completed by RN    Wound Care Orders initiated by RN: Yes       Hudson Prevention initiated by RN: Yes    Pressure Injury (Stage 3,4, Unstageable, DTI, NWPT, and Complex wounds) if present, place Wound referral order by RN under : No    New Ostomies, if present place, Ostomy referral order under : No     Nurse 1 eSignature: Electronically signed by Avni Avila RN on 12/26/24 at 6:41 AM EST    **SHARE this note so that the co-signing nurse can place an eSignature**    Nurse 2 eSignature: Electronically signed by Irasema Montero RN on 12/26/24 at 8:42 AM EST

## 2024-12-26 NOTE — DISCHARGE INSTR - COC
Continuity of Care Form    Patient Name: Levy Vargas   :  1965  MRN:  [id number]    Admit date:  2024  Discharge date:  1/3/2025    Code Status Order: Full Code   Advance Directives:   Advance Care Flowsheet Documentation        Date/Time Healthcare Directive Type of Healthcare Directive Copy in Chart Healthcare Agent Appointed Healthcare Agent's Name Healthcare Agent's Phone Number    24 0633 No, patient does not have an advance directive for healthcare treatment  --  --  --  --  --                     Admitting Physician:  Nubia Burch MD  PCP: No primary care provider on file.    Discharging Nurse: MAURISIO Mason  Discharging Hospital Unit/Room#: D7Z-2759/1310-01  Discharging Unit Phone Number: [phone]    Emergency Contact:   Extended Emergency Contact Information  Primary Emergency Contact: SteveIan  Home Phone: [phone]  Mobile Phone: [phone]  Relation: Child  Secondary Emergency Contact: Sammi Wilson  Home Phone: [phone]  Mobile Phone: [phone]  Relation: Child   needed? No    Past Surgical History:  Past Surgical History:   Procedure Laterality Date    CARDIAC CATHETERIZATION      CARDIAC PROCEDURE N/A 10/21/2024    Right heart cath performed by Daniele Hernandez MD at [facility] CARDIAC CATH LAB    IR TUNNELED CVC PLACE WO SQ PORT/PUMP > 5 YEARS  2024    IR TUNNELED CVC PLACE WO SQ PORT/PUMP > 5 YEARS 2024 [facility] SPECIAL PROCEDURES    LEG SURGERY Right 2023    RIGHT THIGH INCISION AND DRAINAGE WITH DRAIN PLACEMENT performed by Dominick Cid DO at [facility] OR    UPPER GASTROINTESTINAL ENDOSCOPY         Immunization History:   Immunization History   Administered Date(s) Administered    Hep A, HAVRIX, VAQTA, (age 19y+), IM, 1mL 2019    Hepatitis B 2013    Influenza Virus Vaccine 2012, 2017, 2019    Pneumococcal, PPSV23, PNEUMOVAX 23, (age 2y+), SC/IM, 0.5mL 2012    TDaP, ADACEL (age

## 2024-12-26 NOTE — PROGRESS NOTES
Nephrology Progress Note   [address]      Reason for consultation:  KENDRA on CKD 2 -- baseline Cr ~ 0.9-1.3 mg/dL     HPI: Levy Vargas is a 58 yo male with a PMHx of CKD 2, h/o AKIs, HFrEF, non-compliance, DM, HTN, afib, h/o cocaine use, CAD, HLD. Patient presents to  ED on 12/11/2024 with complaints of BLE edema, lightheadedness, and weakness. Patient is currently drowsy and it is difficult to obtain history. Currently oriented x3. Patient tells me he has been taking his CHF medications everyday. Per chart review, there has been some questions regarding his outpatient compliance and not keeping in touch with his CHF nurses. Pt is currently grossly volume overloaded. He weighed 131.5 kg on 9/25/2024 during his last cardiology appt. He currently weighs 145.3 kg. Currently dyspneic and on 4 L NC. CXR is negative for acute findings. In afib RVR this admission. On amio gtt.     We have been consulted for KENDRA on CKD 2 management. Cr upon admission was 1.3 mg/dL. Today, Cr is 2.6 mg/dL. Patient received a generous amount of IVP and oral diuretics and was also started on a lasix gtt at 10 mg/hr. Despite this, pt has remained anuric    CRRT initiated 12/13/24   CRRT stopped 12/24/24    Subjective:    The patient has been seen and examined. Labs and chart reviewed.remains oliguric     There were not complications overnight.  TDC placed this AM appreciate help from IR     Patient review of systems: Denies SOB   BP stable remains oliguric, Cr increasing to 3 mg   Off pressors      Allergies:  No Known Allergies     Scheduled Meds:   lidocaine  1 patch TransDERmal Daily    QUEtiapine  25 mg Oral Nightly    insulin lispro  0-4 Units SubCUTAneous 4x Daily WC    amiodarone  200 mg Per NG tube BID    pantoprazole (PROTONIX) 40 mg in sodium chloride (PF) 0.9 % 10 mL injection  40 mg IntraVENous Q12H    [Held by provider] spironolactone  25 mg Oral Daily    [Held by provider] gabapentin  300 mg Oral Nightly    [Held by                            - Off milrinone gtt     3. Afib RVR              -  s/p cardioversion 12/19/24              - Cardiology following     4. GI bleed              - Hgb 9.1 g/dL              - Iron sat 18; transferrin 195 - s/p venofer    - B12 and folate adequate start AL     5. Hypophosphatemia              - Replace phosphorus per replacement protocol      Farrukh Underwood MD

## 2024-12-27 LAB
ALBUMIN SERPL-MCNC: 3.3 G/DL (ref 3.4–5)
ANION GAP SERPL CALCULATED.3IONS-SCNC: 8 MMOL/L (ref 3–16)
ANTI-XA UNFRAC HEPARIN: >1.1 IU/ML (ref 0.3–0.7)
BUN SERPL-MCNC: 19 MG/DL (ref 7–20)
CALCIUM SERPL-MCNC: 8.8 MG/DL (ref 8.3–10.6)
CHLORIDE SERPL-SCNC: 103 MMOL/L (ref 99–110)
CO2 SERPL-SCNC: 23 MMOL/L (ref 21–32)
CREAT SERPL-MCNC: 3 MG/DL (ref 0.8–1.3)
DEPRECATED RDW RBC AUTO: 20.8 % (ref 12.4–15.4)
GFR SERPLBLD CREATININE-BSD FMLA CKD-EPI: 23 ML/MIN/{1.73_M2}
GLUCOSE BLD-MCNC: 102 MG/DL (ref 70–99)
GLUCOSE BLD-MCNC: 123 MG/DL (ref 70–99)
GLUCOSE BLD-MCNC: 140 MG/DL (ref 70–99)
GLUCOSE SERPL-MCNC: 125 MG/DL (ref 70–99)
HCT VFR BLD AUTO: 28.2 % (ref 40.5–52.5)
HGB BLD-MCNC: 9.2 G/DL (ref 13.5–17.5)
MCH RBC QN AUTO: 28.7 PG (ref 26–34)
MCHC RBC AUTO-ENTMCNC: 32.7 G/DL (ref 31–36)
MCV RBC AUTO: 87.9 FL (ref 80–100)
PERFORMED ON: ABNORMAL
PHOSPHATE SERPL-MCNC: 2.2 MG/DL (ref 2.5–4.9)
PLATELET # BLD AUTO: 166 K/UL (ref 135–450)
PMV BLD AUTO: 8.8 FL (ref 5–10.5)
POTASSIUM SERPL-SCNC: 4.3 MMOL/L (ref 3.5–5.1)
RBC # BLD AUTO: 3.21 M/UL (ref 4.2–5.9)
SODIUM SERPL-SCNC: 134 MMOL/L (ref 136–145)
WBC # BLD AUTO: 6.6 K/UL (ref 4–11)

## 2024-12-27 PROCEDURE — 6370000000 HC RX 637 (ALT 250 FOR IP): Performed by: INTERNAL MEDICINE

## 2024-12-27 PROCEDURE — 97530 THERAPEUTIC ACTIVITIES: CPT

## 2024-12-27 PROCEDURE — 97166 OT EVAL MOD COMPLEX 45 MIN: CPT

## 2024-12-27 PROCEDURE — 36415 COLL VENOUS BLD VENIPUNCTURE: CPT

## 2024-12-27 PROCEDURE — 2060000000 HC ICU INTERMEDIATE R&B

## 2024-12-27 PROCEDURE — 99232 SBSQ HOSP IP/OBS MODERATE 35: CPT | Performed by: NURSE PRACTITIONER

## 2024-12-27 PROCEDURE — 85027 COMPLETE CBC AUTOMATED: CPT

## 2024-12-27 PROCEDURE — 6370000000 HC RX 637 (ALT 250 FOR IP)

## 2024-12-27 PROCEDURE — 80069 RENAL FUNCTION PANEL: CPT

## 2024-12-27 PROCEDURE — 2500000003 HC RX 250 WO HCPCS

## 2024-12-27 PROCEDURE — 2500000003 HC RX 250 WO HCPCS: Performed by: FAMILY MEDICINE

## 2024-12-27 PROCEDURE — 6360000002 HC RX W HCPCS: Performed by: FAMILY MEDICINE

## 2024-12-27 PROCEDURE — 6370000000 HC RX 637 (ALT 250 FOR IP): Performed by: NURSE PRACTITIONER

## 2024-12-27 PROCEDURE — 1200000000 HC SEMI PRIVATE

## 2024-12-27 PROCEDURE — 94761 N-INVAS EAR/PLS OXIMETRY MLT: CPT

## 2024-12-27 PROCEDURE — 36556 INSERT NON-TUNNEL CV CATH: CPT

## 2024-12-27 PROCEDURE — 85520 HEPARIN ASSAY: CPT

## 2024-12-27 RX ADMIN — SODIUM CHLORIDE, PRESERVATIVE FREE 40 MG: 5 INJECTION INTRAVENOUS at 02:30

## 2024-12-27 RX ADMIN — SODIUM CHLORIDE, PRESERVATIVE FREE 40 MG: 5 INJECTION INTRAVENOUS at 14:59

## 2024-12-27 RX ADMIN — APIXABAN 5 MG: 5 TABLET, FILM COATED ORAL at 20:30

## 2024-12-27 RX ADMIN — ATORVASTATIN CALCIUM 80 MG: 80 TABLET, FILM COATED ORAL at 20:30

## 2024-12-27 RX ADMIN — AMIODARONE HYDROCHLORIDE 200 MG: 200 TABLET ORAL at 08:37

## 2024-12-27 RX ADMIN — QUETIAPINE FUMARATE 25 MG: 25 TABLET ORAL at 20:30

## 2024-12-27 RX ADMIN — METOPROLOL SUCCINATE 25 MG: 25 TABLET, EXTENDED RELEASE ORAL at 18:15

## 2024-12-27 RX ADMIN — AMIODARONE HYDROCHLORIDE 200 MG: 200 TABLET ORAL at 20:30

## 2024-12-27 RX ADMIN — POTASSIUM & SODIUM PHOSPHATES POWDER PACK 280-160-250 MG 250 MG: 280-160-250 PACK at 18:15

## 2024-12-27 RX ADMIN — SODIUM CHLORIDE, PRESERVATIVE FREE 10 ML: 5 INJECTION INTRAVENOUS at 08:38

## 2024-12-27 RX ADMIN — APIXABAN 5 MG: 5 TABLET, FILM COATED ORAL at 08:37

## 2024-12-27 RX ADMIN — ASPIRIN 81 MG 81 MG: 81 TABLET ORAL at 08:37

## 2024-12-27 ASSESSMENT — PAIN SCALES - GENERAL
PAINLEVEL_OUTOF10: 0
PAINLEVEL_OUTOF10: 0

## 2024-12-27 NOTE — PROGRESS NOTES
Nephrology Progress Note   [employer]      Reason for consultation:  KENDRA on CKD 2 -- baseline Cr ~ 0.9-1.3 mg/dL     HPI: Levy Vargas is a 58 yo male with a PMHx of CKD 2, h/o AKIs, HFrEF, non-compliance, DM, HTN, afib, h/o cocaine use, CAD, HLD. Patient presents to  ED on 12/11/2024 with complaints of BLE edema, lightheadedness, and weakness. Patient is currently drowsy and it is difficult to obtain history. Currently oriented x3. Patient tells me he has been taking his CHF medications everyday. Per chart review, there has been some questions regarding his outpatient compliance and not keeping in touch with his CHF nurses. Pt is currently grossly volume overloaded. He weighed 131.5 kg on 9/25/2024 during his last cardiology appt. He currently weighs 145.3 kg. Currently dyspneic and on 4 L NC. CXR is negative for acute findings. In afib RVR this admission. On amio gtt.     We have been consulted for KENDRA on CKD 2 management. Cr upon admission was 1.3 mg/dL. Today, Cr is 2.6 mg/dL. Patient received a generous amount of IVP and oral diuretics and was also started on a lasix gtt at 10 mg/hr. Despite this, pt has remained anuric    CRRT initiated 12/13/24   CRRT stopped 12/24/24    Subjective:    The patient has been seen and examined. Labs and chart reviewed    TDC placed 12/26/24  Had HD yesterday Tolerated well    Patient review of systems: Denies SOB remains oliguric        Allergies:  No Known Allergies     Scheduled Meds:   epoetin iván-epbx  10,000 Units SubCUTAneous Once per day on Tuesday Thursday Saturday    apixaban  5 mg Oral BID    lidocaine  1 patch TransDERmal Daily    QUEtiapine  25 mg Oral Nightly    insulin lispro  0-4 Units SubCUTAneous 4x Daily WC    amiodarone  200 mg Per NG tube BID    pantoprazole (PROTONIX) 40 mg in sodium chloride (PF) 0.9 % 10 mL injection  40 mg IntraVENous Q12H    [Held by provider] spironolactone  25 mg Oral Daily    [Held by provider] gabapentin  300 mg Oral Nightly     aspirin  81 mg Oral Daily    atorvastatin  80 mg Oral Nightly    metoprolol succinate  25 mg Oral QPM    [Held by provider] sacubitril-valsartan  1 tablet Oral BID    sodium chloride flush  5-40 mL IntraVENous 2 times per day        Phenylephrine Stopped (24 1042)    dexmedeTOMIDine Stopped (24 1300)    milrinone Stopped (24 1229)    sodium chloride Stopped (24 0948)       PRN Meds:heparin (porcine), acetaminophen, melatonin, guaiFENesin-dextromethorphan, pantoprazole, sodium chloride flush, sodium chloride, ondansetron **OR** ondansetron, polyethylene glycol    Physical Exam:    TEMPERATURE:  Current - Temp: 97.8 °F (36.6 °C); Max - Temp  Av.2 °F (36.8 °C)  Min: 97.6 °F (36.4 °C)  Max: 98.9 °F (37.2 °C)  RESPIRATIONS RANGE: Resp  Av.9  Min: 10  Max: 21  PULSE RANGE: Pulse  Av.8  Min: 91  Max: 102  BLOOD PRESSURE RANGE:  Systolic (24hrs), Av , Min:106 , Max:141   ; Diastolic (24hrs), Av, Min:54, Max:100    24HR INTAKE/OUTPUT:    Intake/Output Summary (Last 24 hours) at 2024 1505  Last data filed at 2024 1000  Gross per 24 hour   Intake 950.33 ml   Output --   Net 950.33 ml       Patient Vitals for the past 96 hrs (Last 3 readings):   Weight   24 0600 118.5 kg (261 lb 3.9 oz)   24 1900 117.5 kg (259 lb 0.7 oz)   24 1315 119.5 kg (263 lb 7.2 oz)       General: Awake  HEENT: Normocephalic, atraumatic  Chest: diminished to auscultation  CVS: RRR  Abdomen: soft, non tender  Extremities: trace pitting edema  R TDC in place       LAB DATA:    CBC:   Lab Results   Component Value Date/Time    WBC 6.6 2024 04:09 AM    RBC 3.21 2024 04:09 AM    HGB 9.2 2024 04:09 AM    HCT 28.2 2024 04:09 AM    MCV 87.9 2024 04:09 AM    MCH 28.7 2024 04:09 AM    MCHC 32.7 2024 04:09 AM    RDW 20.8 2024 04:09 AM     2024 04:09 AM    MPV 8.8 2024 04:09 AM     BMP:    Lab Results   Component Value

## 2024-12-27 NOTE — CARE COORDINATION
I have a Tentative Chair Time:    Iliana Shayan Atkinson  TTS  somewhere between 5:30 am - 7:30 am  STILL PENDING    Call placed to Rep Leandro at [facility] to inquire if they are accepting this referral.  He will need a Precert with [facility].    Call placed to [facility]  to inquire if Leandro is working today.  They referred me to Alma at [phone].  Spoke with Alma, she will review and call me back.    CHLOE Orozco     Case Management   [phone]    12/27/2024  12:36 PM

## 2024-12-27 NOTE — PROGRESS NOTES
V2.0    [facility] Progress Note      Name:  Levy Vargas /Age/Sex: 1965  (59 y.o. male)   MRN & CSN:  [id number] & [id number] Encounter Date/Time: 2024 10:39 AM EST   Location:  [place] [address] PCP: No primary care provider on file.     Attending:Geovani Blanchard MD       Hospital Day: 17    Assessment and Recommendations   Levy Vargas is a 59 y.o. male who presents with Cardiogenic shock      Plan:     Cardiogenic shock, with cardiomyopathy with EF of 15%, extubated, feeling better status post FRANTZ as patient was in A-fib also with RVR improved.  To note that patient was on milrinone drip off for now, restarted on beta-blockers per cardiology  Acute on chronic congestive heart failure cardiology following, off milrinone drip.  Off pressors for now.  A-fib with RVR status post FRANTZ, was on heparin changed to Eliquis per cardiology needs to follow-up as outpatient to consider Watchman  KENDRA, nephrology consulted plan for dialysis  Acute metabolic encephalopathy with lethargy multifactorial fluctuating and currently continue to improve  Anemia, no signs of active bleeding.   Coffee-ground emesis GI consulted [facility] consulted no immediate plan for EGD at this time  KENDRA plan for dialysis  Acute respiratory failure currently extubated        Diet ADULT DIET; Regular; No Added Salt (3-4 gm); 1500 ml   DVT Prophylaxis [] Lovenox, []  Heparin, [] SCDs, [] Ambulation,  [] Eliquis, [] Xarelto  [] Coumadin   Code Status Full Code             Personally reviewed Lab Studies and Imaging     Discussed management of the case with cardiology who recommended Eliquis    Rhythm strip independently interpreted by myself heart rate 102 QTc 0 47      Medical Decision Making:  The following items were considered in medical decision making:  Discussion of patient care with other providers  Reviewed clinical lab tests  Reviewed radiology tests  Reviewed other diagnostic tests/interventions  Independent review of  hours.  UA:  Lab Results   Component Value Date/Time    NITRU POSITIVE 12/12/2024 06:45 PM    COLORU DARK YELLOW 12/12/2024 06:45 PM    PHUR 5.0 12/12/2024 06:45 PM    PHUR 7.0 08/03/2023 09:20 PM    PHUR 7.0 08/03/2023 09:20 PM    WBCUA 3-5 12/12/2024 06:45 PM    RBCUA 5-10 12/12/2024 06:45 PM    MUCUS 1+ 03/19/2022 05:10 PM    TRICHOMONAS None Seen 03/19/2022 05:10 PM    BACTERIA 2+ 12/12/2024 06:45 PM    CLARITYU CLOUDY 12/12/2024 06:45 PM    LEUKOCYTESUR SMALL 12/12/2024 06:45 PM    UROBILINOGEN 2.0 12/12/2024 06:45 PM    BILIRUBINUR MODERATE 12/12/2024 06:45 PM    BLOODU LARGE 12/12/2024 06:45 PM    GLUCOSEU Negative 12/12/2024 06:45 PM    KETUA TRACE 12/12/2024 06:45 PM     Urine Cultures: No results found for: \"LABURIN\"  Blood Cultures: No results found for: \"BC\"  No results found for: \"BLOODCULT2\"  Organism: No results found for: \"ORG\"      Electronically signed by Geovani Blanchard MD on 12/27/2024 at 10:39 AM  Comment: Please note this report has been produced using speech recognition software and may contain errors related to that system including errors in grammar, punctuation, and spelling, as well as words and phrases that may be inappropriate. If there are any questions or concerns, please feel free to contact the dictating provider for clarification.

## 2024-12-27 NOTE — PROGRESS NOTES
Occupational Therapy  Facility/Department: [address] CVU  Occupational Therapy Initial Assessment    Name: Levy Vargas  : 1965  MRN: [id number]  Date of Service: 2024    Discharge Recommendations:  3-5 sessions per week, Patient would benefit from continued therapy after discharge     Levy Vargas scored a 14/ on the AM-PAC ADL Inpatient form. Current research shows that an AM-PAC score of 17 or less is typically not associated with a discharge to the patient's home setting. Based on the patient's AM-PAC score and their current ADL deficits, it is recommended that the patient have 3-5 sessions per week of Occupational Therapy at d/c to increase the patient's independence.  Please see assessment section for further patient specific details.    If patient discharges prior to next session this note will serve as a discharge summary.  Please see below for the latest assessment towards goals.      Patient Diagnosis(es): The primary encounter diagnosis was Atrial fibrillation with rapid ventricular response (HCC). A diagnosis of Persistent atrial fibrillation (HCC) was also pertinent to this visit.  Past Medical History:  has a past medical history of Abdominal pain, Abnormal EKG, Acute pancreatitis, Atrial fibrillation (HCC), CHF (congestive heart failure) (HCC), Cigarette smoker, Cocaine abuse (HCC), Dehydration, Diabetes mellitus (HCC), History of cocaine abuse (HCC), History of MI (myocardial infarction), Hyperglycemia, Hyperlipidemia, Hypertension, Morbid obesity with BMI of 45.0-49.9, adult, Postural dizziness, and PUD (peptic ulcer disease).  Past Surgical History:  has a past surgical history that includes Cardiac catheterization; Upper gastrointestinal endoscopy; Leg Surgery (Right, 2023); Cardiac procedure (N/A, 10/21/2024); and IR TUNNELED CVC PLACE WO SQ PORT/PUMP > 5 YEARS (2024).    Treatment Diagnosis: Decreased: ADLs, functional transfers/mobility      Assessment  Performance

## 2024-12-27 NOTE — PROGRESS NOTES
[facility] [place]   Daily Progress Note      Admit Date:  12/11/2024    CC: Dizziness    HPI:   Levy Vargas is a 59 y.o. male with PMH PAF, PAD, SHF.  Adm 10/2024 with decompensated HF. Follows with advanced HF at  ([facility])    Adm to [facility] with dizziness and SBP 200s  Cardiology consulted for AF RVR and cardiogenic shock requiring Amio gtt, inotropes, vasopressors and intubated/sedated.  Renal failure requiring CRRT 12/13-12/24/2024.  Now on HD.     Now extubated off all vasopressors and on RA.   Sitting up in chair today.   Eating breakfast.   C/O generalized weakness and fatigue.   SOB with activity- OOB with lift.   Denies CP.    Review of Systems:   General: Denies fever, chills  RESP: Denies cough, sputum, dyspnea, wheeze, snoring  CARD: Denies palpitations,  murmur  GI:Denies nausea, vomiting  NEURO: Denies numbness, tingling  HEME: Denies abn bruising, bleeding, anemia    Objective:   /87   Pulse 95   Temp 98.9 °F (37.2 °C) (Oral)   Resp 21   Ht 1.854 m (6' 1\")   Wt 118.5 kg (261 lb 3.9 oz)   SpO2 98%   BMI 34.47 kg/m²         Intake/Output Summary (Last 24 hours) at 12/27/2024 0825  Last data filed at 12/27/2024 0400  Gross per 24 hour   Intake 953.15 ml   Output --   Net 953.15 ml     I/O since adm: Neg 11L    WEIGHT:Admit Weight - Scale: (!) 145.4 kg (320 lb 8.8 oz)         Today  Weight - Scale: 118.5 kg (261 lb 3.9 oz)   DRY WEIGHT:  Wt Readings from Last 3 Encounters:   12/27/24 118.5 kg (261 lb 3.9 oz)   12/19/24 129.7 kg (286 lb)   10/29/24 131.8 kg (290 lb 9.1 oz)       Physical Exam:  GEN: Appears obese, fatigued  SKIN: Brown, warm, dry.   LUNG: AP diameter normal. Diminished.  HEART: S1S2 A/IRR. No carotid bruit. No murmur, rub or gallop.  ABD: Soft, nontender. +BS X 4 quads. No hepatomegaly.   EXT: Radial and pedal pulses 2+ and symmetric. Without varicosities. + generalized  edema.  MUSCSKEL: Good ROM X4 extremities. No deformity.   NEURO: A/O X3. Calm and cooperative.

## 2024-12-27 NOTE — PROGRESS NOTES
Physical Therapy  Facility/Department: [address] CVICU  Physical Therapy Treatment Note    Name: Levy Vargas  : 1965  MRN: [id number]  Date of Service: 2024    Discharge Recommendations:  Continue to assess pending progress, Subacute/Skilled Nursing Facility   PT Equipment Recommendations  Other: Will monitor for potential equipt needs.      Levy Vargas scored a  on the AM-PAC short mobility form. Current research shows that an AM-PAC score of 17 or less is typically not associated with a discharge to the patient's home setting. Based on the patient's AM-PAC score and their current functional mobility deficits, it is recommended that the patient have 3-5 sessions per week of Physical Therapy at d/c to increase the patient's independence.  Please see assessment section for further patient specific details.    If patient discharges prior to next session this note will serve as a discharge summary.  Please see below for the latest assessment towards goals.      Assessment  Body Structures, Functions, Activity Limitations Requiring Skilled Therapeutic Intervention: Decreased functional mobility ;Decreased strength;Decreased endurance;Decreased balance  Assessment: 58 y/o male admit 2024 with Cardiogenic Shock, A-Fib with RVR, Acute Respiratory, KENDRA. -2024 Pt Intubated. -2024 CRRT. 2024 Cardioversion. PMH as noted including CAD, A-Fib, Cardiomyopathy, Bradycardia, ICD (2024), CKD, PVD, CVA. PTA pt  staying with brother following acute care/SNF stay; house with few steps to enter and 1st floor bed/bath.  Reports independent daily care and functional mobility (with Cane/Walker prn).   Pt currently appears more fatigued, diminished engage with functional activities.  Pt  requiring Mod assist x 2 with moving to eob.  Pt currently requiring Max assist x 2 for Transfers via Stedy; sign assist/effort to upright to engage/disengage seat of Stedy.    At this time, would

## 2024-12-27 NOTE — PROGRESS NOTES
NAME:  Levy Vargas  YOB: 1965  MEDICAL RECORD NUMBER:  [id number]    Shift Summary: no signifigant events overnight. Pt had one episode of emesis, and three watery stools. Full linen change and bath done x2. Zofran given x1, pt states he thinks it ws the food his family brought in for him. Pt slept through night without issues     Family updated: Yes:  at bedside     Rhythm: Normal Sinus Rhythm     Most recent vitals:   Visit Vitals  /87   Pulse 95   Temp 98.9 °F (37.2 °C) (Oral)   Resp 21   Ht 1.854 m (6' 1\")   Wt 118.5 kg (261 lb 3.9 oz)   SpO2 98%   BMI 34.47 kg/m²      ABP (Arterial line BP): (!) 104/98  ABP Mean (Arterial Line Mean): 101 mmHg    No data found.    No data found.      Respiratory support needed (if any):  - RA    Admission weight Weight - Scale: (!) 145.4 kg (320 lb 8.8 oz) (12/11/24 2306)    Today's weight    Wt Readings from Last 1 Encounters:   12/27/24 118.5 kg (261 lb 3.9 oz)        Castanon need assessed each shift: N/A - no castanon present  UOP >30ml/hr: NO - oliguric   Last documented BM (in last 48 hrs):  Patient Vitals for the past 48 hrs:   Last BM (including prior to admit) Stool Occurrence   12/25/24 0800 12/25/24 --   12/25/24 2000 12/25/24 --   12/25/24 2230 12/25/24 1   12/26/24 0800 12/25/24 --   12/26/24 2000 12/26/24 --   12/26/24 2200 12/26/24 1   12/26/24 2300 -- 2                Restraints (in use currently or dc'd in last 12 hrs): No    Order current and documentation up to date? Yes    Lines/Drains reviewed @ bedside.  Peripheral IV 12/11/24 Left;Posterior Hand (Active)   Number of days: 15       Hemodialysis Central Access - Permanent/Tunneled Right Subclavian (Active)   Number of days: 0         Drip rates at handoff:    Phenylephrine Stopped (12/23/24 1042)    dexmedeTOMIDine Stopped (12/21/24 1300)    milrinone Stopped (12/23/24 1229)    sodium chloride Stopped (12/25/24 0927)       Lab Data:   CBC:   Recent Labs     12/26/24  0505 12/27/24  0404    WBC 6.0 6.6   HGB 9.1* 9.2*   HCT 28.2* 28.2*   MCV 87.6 87.9    166     BMP:    Recent Labs     12/26/24  0505 12/27/24  0409   * 134*   K 4.7 4.3   CO2 24 23   BUN 23* 19   CREATININE 3.0* 3.0*     LIVR: No results for input(s): \"AST\", \"ALT\" in the last 72 hours.  PT/INR:   Recent Labs     12/26/24  0804   INR 1.21*     APTT: No results for input(s): \"APTT\" in the last 72 hours.  ABG: No results for input(s): \"PHART\", \"VUO8AFE\", \"PO2ART\" in the last 72 hours.    Any consults during the shift? No    Any signed and held orders to be released?  No        4 Eyes Skin Assessment       The patient is being assessed for  Shift Handoff    I agree that at least one RN has performed a thorough Head to Toe Skin Assessment on the patient. ALL assessment sites listed below have been assessed.      Areas assessed by both nurses: Head, Face, Ears, Shoulders, Back, Chest, Arms, Elbows, Hands, Sacrum. Buttock, Coccyx, Ischium, Legs. Feet and Heels, and Under Medical Devices         Does the Patient have a Wound? Yes wound(s) were present on assessment. LDA wound assessment was Initiated and completed by RN    Wound Care Orders initiated by RN: Yes       Hudson Prevention initiated by RN: Yes    Pressure Injury (Stage 3,4, Unstageable, DTI, NWPT, and Complex wounds) if present, place Wound referral order by RN under : No    New Ostomies, if present place, Ostomy referral order under : No     Nurse 1 eSignature: Electronically signed by Avni Avila RN on 12/27/24 at 6:35 AM EST    **SHARE this note so that the co-signing nurse can place an eSignature**    Nurse 2 eSignature: Electronically signed by EUGENIE ALLEN RN on 12/27/24 at 5:13 PM EST

## 2024-12-28 LAB
ALBUMIN SERPL-MCNC: 3.3 G/DL (ref 3.4–5)
ANION GAP SERPL CALCULATED.3IONS-SCNC: 9 MMOL/L (ref 3–16)
BUN SERPL-MCNC: 26 MG/DL (ref 7–20)
CALCIUM SERPL-MCNC: 8.8 MG/DL (ref 8.3–10.6)
CHLORIDE SERPL-SCNC: 103 MMOL/L (ref 99–110)
CO2 SERPL-SCNC: 23 MMOL/L (ref 21–32)
CREAT SERPL-MCNC: 4 MG/DL (ref 0.8–1.3)
DEPRECATED RDW RBC AUTO: 21.4 % (ref 12.4–15.4)
GFR SERPLBLD CREATININE-BSD FMLA CKD-EPI: 16 ML/MIN/{1.73_M2}
GLUCOSE BLD-MCNC: 144 MG/DL (ref 70–99)
GLUCOSE BLD-MCNC: 59 MG/DL (ref 70–99)
GLUCOSE BLD-MCNC: 66 MG/DL (ref 70–99)
GLUCOSE BLD-MCNC: 69 MG/DL (ref 70–99)
GLUCOSE BLD-MCNC: 86 MG/DL (ref 70–99)
GLUCOSE BLD-MCNC: 88 MG/DL (ref 70–99)
GLUCOSE SERPL-MCNC: 79 MG/DL (ref 70–99)
HCT VFR BLD AUTO: 28.9 % (ref 40.5–52.5)
HGB BLD-MCNC: 9.4 G/DL (ref 13.5–17.5)
MCH RBC QN AUTO: 28.8 PG (ref 26–34)
MCHC RBC AUTO-ENTMCNC: 32.5 G/DL (ref 31–36)
MCV RBC AUTO: 88.4 FL (ref 80–100)
PERFORMED ON: ABNORMAL
PERFORMED ON: NORMAL
PERFORMED ON: NORMAL
PHOSPHATE SERPL-MCNC: 2.4 MG/DL (ref 2.5–4.9)
PLATELET # BLD AUTO: 175 K/UL (ref 135–450)
PMV BLD AUTO: 8.7 FL (ref 5–10.5)
POTASSIUM SERPL-SCNC: 4.7 MMOL/L (ref 3.5–5.1)
RBC # BLD AUTO: 3.27 M/UL (ref 4.2–5.9)
SODIUM SERPL-SCNC: 135 MMOL/L (ref 136–145)
WBC # BLD AUTO: 5.5 K/UL (ref 4–11)

## 2024-12-28 PROCEDURE — 2500000003 HC RX 250 WO HCPCS

## 2024-12-28 PROCEDURE — 6370000000 HC RX 637 (ALT 250 FOR IP)

## 2024-12-28 PROCEDURE — 6370000000 HC RX 637 (ALT 250 FOR IP): Performed by: INTERNAL MEDICINE

## 2024-12-28 PROCEDURE — 90935 HEMODIALYSIS ONE EVALUATION: CPT

## 2024-12-28 PROCEDURE — 6370000000 HC RX 637 (ALT 250 FOR IP): Performed by: NURSE PRACTITIONER

## 2024-12-28 PROCEDURE — 80069 RENAL FUNCTION PANEL: CPT

## 2024-12-28 PROCEDURE — 2500000003 HC RX 250 WO HCPCS: Performed by: FAMILY MEDICINE

## 2024-12-28 PROCEDURE — 36556 INSERT NON-TUNNEL CV CATH: CPT

## 2024-12-28 PROCEDURE — 6360000002 HC RX W HCPCS: Performed by: FAMILY MEDICINE

## 2024-12-28 PROCEDURE — 85027 COMPLETE CBC AUTOMATED: CPT

## 2024-12-28 PROCEDURE — 6360000002 HC RX W HCPCS: Performed by: INTERNAL MEDICINE

## 2024-12-28 PROCEDURE — 2060000000 HC ICU INTERMEDIATE R&B

## 2024-12-28 PROCEDURE — 94760 N-INVAS EAR/PLS OXIMETRY 1: CPT

## 2024-12-28 PROCEDURE — 36415 COLL VENOUS BLD VENIPUNCTURE: CPT

## 2024-12-28 RX ORDER — AMIODARONE HYDROCHLORIDE 200 MG/1
200 TABLET ORAL 2 TIMES DAILY
Qty: 60 TABLET | Refills: 0
Start: 2024-12-28

## 2024-12-28 RX ORDER — PANTOPRAZOLE SODIUM 40 MG/1
40 TABLET, DELAYED RELEASE ORAL 2 TIMES DAILY
Qty: 30 TABLET | Refills: 0
Start: 2024-12-28

## 2024-12-28 RX ORDER — DEXTROSE MONOHYDRATE 100 MG/ML
INJECTION, SOLUTION INTRAVENOUS CONTINUOUS PRN
Status: DISCONTINUED | OUTPATIENT
Start: 2024-12-28 | End: 2025-01-03 | Stop reason: HOSPADM

## 2024-12-28 RX ORDER — PANTOPRAZOLE SODIUM 40 MG/1
40 TABLET, DELAYED RELEASE ORAL
Status: DISCONTINUED | OUTPATIENT
Start: 2024-12-29 | End: 2025-01-03 | Stop reason: HOSPADM

## 2024-12-28 RX ORDER — GLUCAGON 1 MG/ML
1 KIT INJECTION PRN
Status: DISCONTINUED | OUTPATIENT
Start: 2024-12-28 | End: 2025-01-03 | Stop reason: HOSPADM

## 2024-12-28 RX ADMIN — SODIUM CHLORIDE, PRESERVATIVE FREE 10 ML: 5 INJECTION INTRAVENOUS at 08:35

## 2024-12-28 RX ADMIN — QUETIAPINE FUMARATE 25 MG: 25 TABLET ORAL at 21:32

## 2024-12-28 RX ADMIN — Medication 16 G: at 18:04

## 2024-12-28 RX ADMIN — Medication 16 G: at 18:24

## 2024-12-28 RX ADMIN — APIXABAN 5 MG: 5 TABLET, FILM COATED ORAL at 21:32

## 2024-12-28 RX ADMIN — AMIODARONE HYDROCHLORIDE 200 MG: 200 TABLET ORAL at 21:32

## 2024-12-28 RX ADMIN — ATORVASTATIN CALCIUM 80 MG: 80 TABLET, FILM COATED ORAL at 21:32

## 2024-12-28 RX ADMIN — SODIUM CHLORIDE, PRESERVATIVE FREE 40 MG: 5 INJECTION INTRAVENOUS at 14:59

## 2024-12-28 RX ADMIN — ASPIRIN 81 MG 81 MG: 81 TABLET ORAL at 08:27

## 2024-12-28 RX ADMIN — SODIUM CHLORIDE, PRESERVATIVE FREE 40 MG: 5 INJECTION INTRAVENOUS at 02:33

## 2024-12-28 RX ADMIN — EPOETIN ALFA-EPBX 10000 UNITS: 10000 INJECTION, SOLUTION INTRAVENOUS; SUBCUTANEOUS at 13:16

## 2024-12-28 RX ADMIN — METOPROLOL SUCCINATE 25 MG: 25 TABLET, EXTENDED RELEASE ORAL at 18:05

## 2024-12-28 RX ADMIN — APIXABAN 5 MG: 5 TABLET, FILM COATED ORAL at 08:27

## 2024-12-28 RX ADMIN — SODIUM CHLORIDE, PRESERVATIVE FREE 10 ML: 5 INJECTION INTRAVENOUS at 21:35

## 2024-12-28 RX ADMIN — AMIODARONE HYDROCHLORIDE 200 MG: 200 TABLET ORAL at 08:27

## 2024-12-28 RX ADMIN — POTASSIUM & SODIUM PHOSPHATES POWDER PACK 280-160-250 MG 250 MG: 280-160-250 PACK at 08:27

## 2024-12-28 NOTE — PLAN OF CARE
Problem: Pain  Goal: Verbalizes/displays adequate comfort level or baseline comfort level  Outcome: Progressing     Problem: Safety - Adult  Goal: Free from fall injury  Outcome: Progressing     Problem: Skin/Tissue Integrity - Adult  Goal: Skin integrity remains intact  Outcome: Progressing     Problem: ABCDS Injury Assessment  Goal: Absence of physical injury  Outcome: Progressing     Problem: Discharge Planning  Goal: Discharge to home or other facility with appropriate resources  Outcome: Progressing

## 2024-12-28 NOTE — DISCHARGE SUMMARY
terminates in the inferior right atrium         XR CHEST PORTABLE   Final Result   No acute process.      Stable cardiomegaly                Consults:     IP CONSULT TO CARDIOLOGY  IP CONSULT TO NEPHROLOGY  IP CONSULT TO PULMONOLOGY  IP CONSULT TO GI  IP CONSULT TO PALLIATIVE CARE    Disposition: SNF    Condition at Discharge: Stable    Discharge Instructions/Follow-up: PCP, cardiology, gastroenterology, nephrology    Code Status:  Full Code     Activity: activity as tolerated    Diet: Cardiac diabetic      Discharge Medications:     Current Discharge Medication List             Details   amiodarone (CORDARONE) 200 MG tablet Take 1 tablet by mouth 2 times daily  Qty: 60 tablet, Refills: 0      pantoprazole (PROTONIX) 40 MG tablet Take 1 tablet by mouth in the morning and at bedtime  Qty: 30 tablet, Refills: 0                Details   metoprolol succinate (TOPROL XL) 25 MG extended release tablet Take 1 tablet by mouth every evening  Qty: 30 tablet, Refills: 0      aspirin ([employer] ASPIRIN ADULT LOW DOSE) 81 MG chewable tablet Take 1 tablet by mouth daily  Qty: 30 tablet, Refills: 0    Associated Diagnoses: CHF (congestive heart failure), NYHA class I, acute on chronic, combined ([facility]); Diabetes mellitus with coincident hypertension ([facility]); Chronic congestive heart failure, unspecified heart failure type ([facility])      apixaban (ELIQUIS) 5 MG TABS tablet Take 1 tablet by mouth 2 times daily  Qty: 60 tablet, Refills: 0    Comments: Resume on 10/28/2022  Associated Diagnoses: PAF (paroxysmal atrial fibrillation) ([facility])      atorvastatin (LIPITOR) 80 MG tablet Take 1 tablet by mouth nightly  Qty: 30 tablet, Refills: 0    Associated Diagnoses: CHF (congestive heart failure), NYHA class I, acute on chronic, combined ([facility]); Diabetes mellitus with coincident hypertension ([facility])      nitroGLYCERIN (NITROSTAT) 0.4 MG SL tablet Place 1 tablet under the tongue every 5 minutes as needed for Chest pain up to max of 3 total doses. If no  relief after 1 dose, call 911.  Qty: 25 tablet, Refills: 0             Time Spent on discharge: 35 minutes in the examination, evaluation, counseling and review of medications and discharge plan.      Signed:    Geovani Blanchard MD   12/28/2024      Thank you No primary care provider on file. for the opportunity to be involved in this patient's care. If you have any questions or concerns, please feel free to contact me at [phone].    Comment: Please note this report has been produced using speech recognition software and may contain errors related to that system including errors in grammar, punctuation, and spelling, as well as words and phrases that may be inappropriate. If there are any questions or concerns, please feel free to contact the dictating provider for clarification.

## 2024-12-28 NOTE — PROGRESS NOTES
Nephrology Progress Note   [email or web address]      Reason for consultation:  KENDRA on CKD 2 -- baseline Cr ~ 0.9-1.3 mg/dL     HPI: Levy Vargas is a 60 yo male with a PMHx of CKD 2, h/o AKIs, HFrEF, non-compliance, DM, HTN, afib, h/o cocaine use, CAD, HLD. Patient presents to  ED on 12/11/2024 with complaints of BLE edema, lightheadedness, and weakness. Patient is currently drowsy and it is difficult to obtain history. Currently oriented x3. Patient tells me he has been taking his CHF medications everyday. Per chart review, there has been some questions regarding his outpatient compliance and not keeping in touch with his CHF nurses. Pt is currently grossly volume overloaded. He weighed 131.5 kg on 9/25/2024 during his last cardiology appt. He currently weighs 145.3 kg. Currently dyspneic and on 4 L NC. CXR is negative for acute findings. In afib RVR this admission. On amio gtt.     We have been consulted for KENRDA on CKD 2 management. Cr upon admission was 1.3 mg/dL. Today, Cr is 2.6 mg/dL. Patient received a generous amount of IVP and oral diuretics and was also started on a lasix gtt at 10 mg/hr. Despite this, pt has remained anuric    CRRT initiated 12/13/24   CRRT stopped 12/24/24    Subjective:    The patient has been seen and examined. Labs and chart reviewed    TDC placed 12/26/24  Undergoing HD at this time Feels OK    Denies SOB   remains oliguric        Allergies:  No Known Allergies     Scheduled Meds:   epoetin iván-epbx  10,000 Units SubCUTAneous Once per day on Tuesday Thursday Saturday    apixaban  5 mg Oral BID    lidocaine  1 patch TransDERmal Daily    QUEtiapine  25 mg Oral Nightly    insulin lispro  0-4 Units SubCUTAneous 4x Daily WC    amiodarone  200 mg Per NG tube BID    pantoprazole (PROTONIX) 40 mg in sodium chloride (PF) 0.9 % 10 mL injection  40 mg IntraVENous Q12H    [Held by provider] spironolactone  25 mg Oral Daily    [Held by provider] gabapentin  300 mg Oral Nightly    aspirin  81 mg  12/28/2024 06:32 AM     BMP:    Lab Results   Component Value Date/Time     12/28/2024 06:32 AM    K 4.7 12/28/2024 06:32 AM    K 4.3 12/20/2024 06:15 AM     12/28/2024 06:32 AM    CO2 23 12/28/2024 06:32 AM    BUN 26 12/28/2024 06:32 AM    CREATININE 4.0 12/28/2024 06:32 AM    CALCIUM 8.8 12/28/2024 06:32 AM    GFRAA >60 10/11/2022 05:03 AM    LABGLOM 16 12/28/2024 06:32 AM    LABGLOM 63 04/27/2024 05:03 AM    GLUCOSE 79 12/28/2024 06:32 AM     Ionized Calcium:  No components found for: \"IONCA\"  Magnesium:    Lab Results   Component Value Date/Time    MG 2.26 12/23/2024 08:29 AM     Phosphorus:    Lab Results   Component Value Date/Time    PHOS 2.4 12/28/2024 06:32 AM     U/A:    Lab Results   Component Value Date/Time    COLORU DARK YELLOW 12/12/2024 06:45 PM    PHUR 5.0 12/12/2024 06:45 PM    PHUR 7.0 08/03/2023 09:20 PM    PHUR 7.0 08/03/2023 09:20 PM    WBCUA 3-5 12/12/2024 06:45 PM    RBCUA 5-10 12/12/2024 06:45 PM    MUCUS 1+ 03/19/2022 05:10 PM    TRICHOMONAS None Seen 03/19/2022 05:10 PM    BACTERIA 2+ 12/12/2024 06:45 PM    CLARITYU CLOUDY 12/12/2024 06:45 PM    LEUKOCYTESUR SMALL 12/12/2024 06:45 PM    UROBILINOGEN 2.0 12/12/2024 06:45 PM    BILIRUBINUR MODERATE 12/12/2024 06:45 PM    BLOODU LARGE 12/12/2024 06:45 PM    GLUCOSEU Negative 12/12/2024 06:45 PM         IMPRESSION/RECOMMENDATIONS:      Anuric KENDRA on CKD 2: baseline Cr ~ 0.9-1.3 mg/dL. Etiology 2/2 cardiorenal syndrome vs cardiogenic shock.              - R temp vascath placed and CRRT initiated 12/13/2024             - Stopped CRRT 12/24/24              TDC placed 12/26 and started iHD               HD in progress On 3 K bath UF Goal 0.5 kg                Continue HD TTS              - Out pateint HD spot  pending     2. Cardiogenic shock / HFrEF. Wt was 131.5 kg on 9/25/2024 during last cardiology appt              - TTE (10/19/2024): LVEF 10-15%. Severe global hypokinesis. Indeterminate diastolic dysfx. LA severely dilated.

## 2024-12-28 NOTE — PROGRESS NOTES
Treatment time: 3 Hours    Net UF: 500 ml based on dry weight as ordered    Pre weight: 118 kg  Post weight: 117.5 kg  EDW: 117.5 kg    Access used: Right CVC  Access function: Access site located on the left chest wall had clean dry and intact CVC dressing. No signs of infection noted. No redness, hematoma, nor swelling was observed. No discharges was seen. Both ports had good flow.     Medications or blood products given: Retacrit 10,000 units    Regular outpatient schedule: Tues, Thurs, Sat.    Summary of response to treatment: 10:30: Treatment set at 500 ml based on his dry weight as ordered via Right CVC. Access site located on the Right chest wall, had clean dry and intact CVC dressing. NO signs of infection noted. No redness, hematoma, nor swelling was observed. No discharges was seen, Both ports had good flow. Parameters set accordingly. Hooked to HD machine. Monitored from time to time. 12:00 Seen and assessed by Ravi Underwood. 13:33: Treatment completed. Returned Blood aseptically. HD tolerated.    Copy of dialysis treatment record placed in chart, to be scanned into EMR.

## 2024-12-28 NOTE — PLAN OF CARE
Problem: Chronic Conditions and Co-morbidities  Goal: Patient's chronic conditions and co-morbidity symptoms are monitored and maintained or improved  Outcome: Progressing     Problem: Pain  Goal: Verbalizes/displays adequate comfort level or baseline comfort level  Outcome: Progressing     Problem: Safety - Adult  Goal: Free from fall injury  Outcome: Progressing     Problem: Respiratory - Adult  Goal: Achieves optimal ventilation and oxygenation  Outcome: Progressing     Problem: Cardiovascular - Adult  Goal: Maintains optimal cardiac output and hemodynamic stability  Outcome: Progressing  Goal: Absence of cardiac dysrhythmias or at baseline  Outcome: Progressing     Problem: Metabolic/Fluid and Electrolytes - Adult  Goal: Electrolytes maintained within normal limits  Outcome: Progressing  Goal: Hemodynamic stability and optimal renal function maintained  Outcome: Progressing  Goal: Glucose maintained within prescribed range  Outcome: Progressing     Problem: Neurosensory - Adult  Goal: Achieves stable or improved neurological status  Outcome: Progressing     Problem: Skin/Tissue Integrity - Adult  Goal: Skin integrity remains intact  Outcome: Progressing

## 2024-12-28 NOTE — PROGRESS NOTES
Pt's dinner blood sugar was 66, the PCA gave cranberry juice with sugar packets, RN waited 15 minutes to recheck pt and the blood sugar dropped down to 59. RN gave the pt glucose tablets with two cups of apple juice, RN rechecked blood sugar in 15 minutes blood sugar went up 69. This RN then gave another dose of the glucose tablets and rechecked the blood sugar in 15 minutes the blood sugar went up to 88. Pt is asymptomatic and tolerated well. Pt denies any further needs at this time MD aware.

## 2024-12-29 LAB
DEPRECATED RDW RBC AUTO: 22.2 % (ref 12.4–15.4)
GLUCOSE BLD-MCNC: 102 MG/DL (ref 70–99)
GLUCOSE BLD-MCNC: 109 MG/DL (ref 70–99)
GLUCOSE BLD-MCNC: 117 MG/DL (ref 70–99)
GLUCOSE BLD-MCNC: 96 MG/DL (ref 70–99)
HCT VFR BLD AUTO: 27.4 % (ref 40.5–52.5)
HGB BLD-MCNC: 8.9 G/DL (ref 13.5–17.5)
MCH RBC QN AUTO: 28.7 PG (ref 26–34)
MCHC RBC AUTO-ENTMCNC: 32.5 G/DL (ref 31–36)
MCV RBC AUTO: 88.3 FL (ref 80–100)
PERFORMED ON: ABNORMAL
PERFORMED ON: NORMAL
PLATELET # BLD AUTO: 180 K/UL (ref 135–450)
PMV BLD AUTO: 9 FL (ref 5–10.5)
RBC # BLD AUTO: 3.1 M/UL (ref 4.2–5.9)
WBC # BLD AUTO: 6 K/UL (ref 4–11)

## 2024-12-29 PROCEDURE — 2060000000 HC ICU INTERMEDIATE R&B

## 2024-12-29 PROCEDURE — 6370000000 HC RX 637 (ALT 250 FOR IP): Performed by: INTERNAL MEDICINE

## 2024-12-29 PROCEDURE — 2500000003 HC RX 250 WO HCPCS

## 2024-12-29 PROCEDURE — 51798 US URINE CAPACITY MEASURE: CPT

## 2024-12-29 PROCEDURE — 85027 COMPLETE CBC AUTOMATED: CPT

## 2024-12-29 PROCEDURE — 51701 INSERT BLADDER CATHETER: CPT

## 2024-12-29 PROCEDURE — 36415 COLL VENOUS BLD VENIPUNCTURE: CPT

## 2024-12-29 PROCEDURE — 6370000000 HC RX 637 (ALT 250 FOR IP): Performed by: NURSE PRACTITIONER

## 2024-12-29 PROCEDURE — 6370000000 HC RX 637 (ALT 250 FOR IP)

## 2024-12-29 RX ADMIN — SODIUM CHLORIDE, PRESERVATIVE FREE 10 ML: 5 INJECTION INTRAVENOUS at 21:10

## 2024-12-29 RX ADMIN — AMIODARONE HYDROCHLORIDE 200 MG: 200 TABLET ORAL at 08:20

## 2024-12-29 RX ADMIN — APIXABAN 5 MG: 5 TABLET, FILM COATED ORAL at 21:10

## 2024-12-29 RX ADMIN — METOPROLOL SUCCINATE 25 MG: 25 TABLET, EXTENDED RELEASE ORAL at 18:30

## 2024-12-29 RX ADMIN — ASPIRIN 81 MG 81 MG: 81 TABLET ORAL at 08:20

## 2024-12-29 RX ADMIN — APIXABAN 5 MG: 5 TABLET, FILM COATED ORAL at 08:20

## 2024-12-29 RX ADMIN — SODIUM CHLORIDE, PRESERVATIVE FREE 10 ML: 5 INJECTION INTRAVENOUS at 08:20

## 2024-12-29 RX ADMIN — QUETIAPINE FUMARATE 25 MG: 25 TABLET ORAL at 21:10

## 2024-12-29 RX ADMIN — PANTOPRAZOLE SODIUM 40 MG: 40 TABLET, DELAYED RELEASE ORAL at 05:46

## 2024-12-29 RX ADMIN — AMIODARONE HYDROCHLORIDE 200 MG: 200 TABLET ORAL at 21:10

## 2024-12-29 RX ADMIN — ATORVASTATIN CALCIUM 80 MG: 80 TABLET, FILM COATED ORAL at 21:10

## 2024-12-29 NOTE — PROGRESS NOTES
V2.0    [facility] Progress Note      Name:  Levy Vargas /Age/Sex: 1965  (59 y.o. male)   MRN & CSN:  [id number] & [id number] Encounter Date/Time: 2024 10:40 AM EST   Location:  [id number] PCP: No primary care provider on file.     Attending:Geovani Blanchard MD       Hospital Day: 19    Assessment and Recommendations   Levy Vargas is a 59 y.o. male who presents with Cardiogenic shock      Plan:    Cardiogenic shock, with cardiomyopathy with EF of 15%, extubated, feeling better status post FRANTZ as patient was in A-fib also with RVR improved.  To note that patient was on milrinone drip off for now, restarted on beta-blockers per cardiology.  Tolerating very well at this time  Acute on chronic congestive heart failure cardiology following, off milrinone drip.  Off pressors for now.  Patient will be discharged on beta-blockers as recommended per cardiology Entresto spironolactone and diuretics on hold per cardiology recommendations.  A-fib with RVR status post FRANTZ, was on heparin changed to Eliquis per cardiology needs to follow-up as outpatient to consider Watchman.  Heart rate controlled at this time 63.  KENDRA, nephrology consulted dialysis arranged needs to continue follow-up as outpatient  Acute metabolic encephalopathy with lethargy multifactorial fluctuating and currently improved and appears at baseline  Anemia, no signs of active bleeding.  Hemoglobin 8.9 today from 9.4 yesterday.  Coffee-ground emesis GI consulted Protonix GI consulted no immediate plan for EGD at this time follow-up as outpatient        Diet ADULT DIET; Regular; No Added Salt (3-4 gm); 1500 ml   DVT Prophylaxis [] Lovenox, []  Heparin, [] SCDs, [] Ambulation,  [] Eliquis, [] Xarelto  [] Coumadin   Code Status Full Code             Personally reviewed Lab Studies and Imaging         Medical Decision Making:  The following items were considered in medical decision making:  Discussion of patient care with other  aspects of maximum sterile barrier technique were used including washing hands with conventional soap and water or with alcohol-based hand rubs (ABHR), skin preparation, cap, mask, sterile gown, sterile gloves, and sterile full body drape. Local anesthesia was achieved with lidocaine. A micropuncture needle was used to access the right internal jugular vein using ultrasound guidance.  An ultrasound image demonstrating patency of the vein with needle tip located within it was obtained and stored in PACS.  A 0.035 guidewire was used to place a peel-away sheath.  A subcutaneous tunnel was created to the infraclavicular region and a tunneled 14.5 Tristanian by 23 cm dialysis catheter was pulled through the subcutaneous tunnel to the venotomy site and advanced through the peel-away sheath under fluoroscopic guidance to the right atrium. The catheter flushed easily and there was a good blood return.  The catheter was sutured to the skin.  The catheter was locked with heparinized saline. The patient tolerated the procedure well and there were no immediate complications. FINDINGS: Fluoroscopic image demonstrates the tip of the catheter in the right atrium.     Successful ultrasound and fluoroscopy guided tunneled catheter placement.       CBC:   Recent Labs     12/27/24  0409 12/28/24  0632 12/29/24  0441   WBC 6.6 5.5 6.0   HGB 9.2* 9.4* 8.9*    175 180     BMP:    Recent Labs     12/27/24  0409 12/28/24  0632   * 135*   K 4.3 4.7    103   CO2 23 23   BUN 19 26*   CREATININE 3.0* 4.0*   GLUCOSE 125* 79     Hepatic: No results for input(s): \"AST\", \"ALT\", \"BILITOT\", \"ALKPHOS\" in the last 72 hours.    Invalid input(s): \"ALB\"  Lipids:   Lab Results   Component Value Date/Time    CHOL 79 10/19/2024 03:13 AM    HDL 17 10/19/2024 03:13 AM    TRIG 68 12/18/2024 04:04 AM     Hemoglobin A1C:   Lab Results   Component Value Date/Time    LABA1C 6.3 10/19/2024 03:13 AM     TSH:   Lab Results   Component Value Date/Time

## 2024-12-29 NOTE — PROGRESS NOTES
Nephrology Progress Note   [email or web address]      Reason for consultation:  KENDRA on CKD 2 -- baseline Cr ~ 0.9-1.3 mg/dL     HPI: Levy Vargas is a 58 yo male with a PMHx of CKD 2, h/o AKIs, HFrEF, non-compliance, DM, HTN, afib, h/o cocaine use, CAD, HLD. Patient presents to  ED on 12/11/2024 with complaints of BLE edema, lightheadedness, and weakness. Patient is currently drowsy and it is difficult to obtain history. Currently oriented x3. Patient tells me he has been taking his CHF medications everyday. Per chart review, there has been some questions regarding his outpatient compliance and not keeping in touch with his CHF nurses. Pt is currently grossly volume overloaded. He weighed 131.5 kg on 9/25/2024 during his last cardiology appt. He currently weighs 145.3 kg. Currently dyspneic and on 4 L NC. CXR is negative for acute findings. In afib RVR this admission. On amio gtt.     We have been consulted for KENDRA on CKD 2 management. Cr upon admission was 1.3 mg/dL. Today, Cr is 2.6 mg/dL. Patient received a generous amount of IVP and oral diuretics and was also started on a lasix gtt at 10 mg/hr. Despite this, pt has remained anuric    CRRT initiated 12/13/24   CRRT stopped 12/24/24    Subjective:      TDC placed 12/26/24   Feels OK BS low last night   Has the urge to urinate but not able to void   Bladder scan revealed 583 ml PVR         Allergies:  No Known Allergies     Scheduled Meds:   pantoprazole  40 mg Oral QAM AC    epoetin iván-epbx  10,000 Units SubCUTAneous Once per day on Tuesday Thursday Saturday    apixaban  5 mg Oral BID    lidocaine  1 patch TransDERmal Daily    QUEtiapine  25 mg Oral Nightly    insulin lispro  0-4 Units SubCUTAneous 4x Daily WC    amiodarone  200 mg Per NG tube BID    [Held by provider] spironolactone  25 mg Oral Daily    [Held by provider] gabapentin  300 mg Oral Nightly    aspirin  81 mg Oral Daily    atorvastatin  80 mg Oral Nightly    metoprolol succinate  25 mg Oral QPM     transferrin 195 - s/p venofer                AL with HD      5. Hypophosphatemia              - monitor Increase intake in diet    6 Urine retention ISC prn       Farrukh Underwood MD

## 2024-12-29 NOTE — PLAN OF CARE
Problem: Chronic Conditions and Co-morbidities  Goal: Patient's chronic conditions and co-morbidity symptoms are monitored and maintained or improved  Outcome: Progressing     Problem: Pain  Goal: Verbalizes/displays adequate comfort level or baseline comfort level  12/29/2024 0154 by Amanda Soriano RN  Outcome: Progressing  12/28/2024 1728 by Erica Brownlee RN  Outcome: Progressing     Problem: Safety - Adult  Goal: Free from fall injury  12/29/2024 0154 by Amanda Soriano RN  Outcome: Progressing  12/28/2024 1728 by Erica Brownlee RN  Outcome: Progressing     Problem: Respiratory - Adult  Goal: Achieves optimal ventilation and oxygenation  Outcome: Progressing     Problem: Cardiovascular - Adult  Goal: Maintains optimal cardiac output and hemodynamic stability  Outcome: Progressing  Goal: Absence of cardiac dysrhythmias or at baseline  Outcome: Progressing     Problem: Metabolic/Fluid and Electrolytes - Adult  Goal: Electrolytes maintained within normal limits  Outcome: Progressing  Goal: Hemodynamic stability and optimal renal function maintained  Outcome: Progressing  Goal: Glucose maintained within prescribed range  Outcome: Progressing     Problem: Neurosensory - Adult  Goal: Achieves stable or improved neurological status  Outcome: Progressing     Problem: Skin/Tissue Integrity - Adult  Goal: Skin integrity remains intact  12/29/2024 0154 by Amanda Soriano RN  Outcome: Progressing  12/28/2024 1728 by Erica Brownlee RN  Outcome: Progressing  Goal: Incisions, wounds, or drain sites healing without S/S of infection  Outcome: Progressing     Problem: Musculoskeletal - Adult  Goal: Return mobility to safest level of function  Outcome: Progressing     Problem: Gastrointestinal - Adult  Goal: Minimal or absence of nausea and vomiting  Outcome: Progressing     Problem: Genitourinary - Adult  Goal: Urinary catheter remains patent  Outcome: Progressing     Problem: ABCDS Injury Assessment  Goal: Absence of

## 2024-12-30 LAB
ALBUMIN SERPL-MCNC: 3.3 G/DL (ref 3.4–5)
ANION GAP SERPL CALCULATED.3IONS-SCNC: 12 MMOL/L (ref 3–16)
BUN SERPL-MCNC: 23 MG/DL (ref 7–20)
CALCIUM SERPL-MCNC: 8.8 MG/DL (ref 8.3–10.6)
CHLORIDE SERPL-SCNC: 100 MMOL/L (ref 99–110)
CO2 SERPL-SCNC: 22 MMOL/L (ref 21–32)
CREAT SERPL-MCNC: 3.7 MG/DL (ref 0.8–1.3)
DEPRECATED RDW RBC AUTO: 22.6 % (ref 12.4–15.4)
GFR SERPLBLD CREATININE-BSD FMLA CKD-EPI: 18 ML/MIN/{1.73_M2}
GLUCOSE BLD-MCNC: 106 MG/DL (ref 70–99)
GLUCOSE BLD-MCNC: 109 MG/DL (ref 70–99)
GLUCOSE BLD-MCNC: 112 MG/DL (ref 70–99)
GLUCOSE BLD-MCNC: 54 MG/DL (ref 70–99)
GLUCOSE BLD-MCNC: 65 MG/DL (ref 70–99)
GLUCOSE BLD-MCNC: 66 MG/DL (ref 70–99)
GLUCOSE BLD-MCNC: 79 MG/DL (ref 70–99)
GLUCOSE BLD-MCNC: 96 MG/DL (ref 70–99)
GLUCOSE SERPL-MCNC: 118 MG/DL (ref 70–99)
HCT VFR BLD AUTO: 26.4 % (ref 40.5–52.5)
HGB BLD-MCNC: 8.7 G/DL (ref 13.5–17.5)
MCH RBC QN AUTO: 29.3 PG (ref 26–34)
MCHC RBC AUTO-ENTMCNC: 33.1 G/DL (ref 31–36)
MCV RBC AUTO: 88.5 FL (ref 80–100)
PERFORMED ON: ABNORMAL
PERFORMED ON: NORMAL
PERFORMED ON: NORMAL
PHOSPHATE SERPL-MCNC: 2 MG/DL (ref 2.5–4.9)
PLATELET # BLD AUTO: 191 K/UL (ref 135–450)
PMV BLD AUTO: 8.9 FL (ref 5–10.5)
POTASSIUM SERPL-SCNC: 4 MMOL/L (ref 3.5–5.1)
RBC # BLD AUTO: 2.99 M/UL (ref 4.2–5.9)
SODIUM SERPL-SCNC: 134 MMOL/L (ref 136–145)
WBC # BLD AUTO: 4.9 K/UL (ref 4–11)

## 2024-12-30 PROCEDURE — 85027 COMPLETE CBC AUTOMATED: CPT

## 2024-12-30 PROCEDURE — 6370000000 HC RX 637 (ALT 250 FOR IP)

## 2024-12-30 PROCEDURE — 6370000000 HC RX 637 (ALT 250 FOR IP): Performed by: INTERNAL MEDICINE

## 2024-12-30 PROCEDURE — 36415 COLL VENOUS BLD VENIPUNCTURE: CPT

## 2024-12-30 PROCEDURE — 51798 US URINE CAPACITY MEASURE: CPT

## 2024-12-30 PROCEDURE — 90935 HEMODIALYSIS ONE EVALUATION: CPT

## 2024-12-30 PROCEDURE — 80069 RENAL FUNCTION PANEL: CPT

## 2024-12-30 PROCEDURE — 6360000002 HC RX W HCPCS: Performed by: INTERNAL MEDICINE

## 2024-12-30 PROCEDURE — 2060000000 HC ICU INTERMEDIATE R&B

## 2024-12-30 PROCEDURE — 97535 SELF CARE MNGMENT TRAINING: CPT

## 2024-12-30 PROCEDURE — 2500000003 HC RX 250 WO HCPCS

## 2024-12-30 PROCEDURE — 2580000003 HC RX 258

## 2024-12-30 PROCEDURE — 97530 THERAPEUTIC ACTIVITIES: CPT

## 2024-12-30 RX ADMIN — AMIODARONE HYDROCHLORIDE 200 MG: 200 TABLET ORAL at 20:52

## 2024-12-30 RX ADMIN — SODIUM CHLORIDE, PRESERVATIVE FREE 10 ML: 5 INJECTION INTRAVENOUS at 09:56

## 2024-12-30 RX ADMIN — PANTOPRAZOLE SODIUM 40 MG: 40 TABLET, DELAYED RELEASE ORAL at 06:28

## 2024-12-30 RX ADMIN — SODIUM PHOSPHATE, MONOBASIC, MONOHYDRATE AND SODIUM PHOSPHATE, DIBASIC, ANHYDROUS 15 MMOL: 142; 276 INJECTION, SOLUTION INTRAVENOUS at 11:04

## 2024-12-30 RX ADMIN — SODIUM CHLORIDE, PRESERVATIVE FREE 10 ML: 5 INJECTION INTRAVENOUS at 20:52

## 2024-12-30 RX ADMIN — APIXABAN 5 MG: 5 TABLET, FILM COATED ORAL at 20:52

## 2024-12-30 RX ADMIN — QUETIAPINE FUMARATE 25 MG: 25 TABLET ORAL at 20:52

## 2024-12-30 RX ADMIN — ASPIRIN 81 MG 81 MG: 81 TABLET ORAL at 09:55

## 2024-12-30 RX ADMIN — APIXABAN 5 MG: 5 TABLET, FILM COATED ORAL at 09:55

## 2024-12-30 RX ADMIN — AMIODARONE HYDROCHLORIDE 200 MG: 200 TABLET ORAL at 09:55

## 2024-12-30 RX ADMIN — ATORVASTATIN CALCIUM 80 MG: 80 TABLET, FILM COATED ORAL at 20:52

## 2024-12-30 RX ADMIN — GLUCAGON 1 MG: KIT at 22:06

## 2024-12-30 ASSESSMENT — PAIN SCALES - GENERAL: PAINLEVEL_OUTOF10: 0

## 2024-12-30 NOTE — CARE COORDINATION
CLYDE has final acceptance at [facility]. It will be TTS 0600, however, he won't be able to start until Thursday 1/2 due to pre-cert being started today and SNF needing to work on transportation.     CLYDE spoke with Leandro in admissions at [facility] and he is aware. CLYDE faxed him a copy of the acceptance letter and will deliver a copy to the patient at bedside today.     Respectfully submitted,    Vannessa Sterling-Nazia SONI, ERIN  [facility]   [phone]    Electronically signed by NAE Sims, CHLOE on 12/30/2024 at 8:46 AM

## 2024-12-30 NOTE — PROGRESS NOTES
Comprehensive Nutrition Assessment    Type and Reason for Visit:  Reassess    Nutrition Recommendations/Plan:   No added salt diet, 1500 ml fluid restriction  Glucerna chocolate with meals  Will monitor nutritional adequacy, nutrition-related labs, weights, BMs, and clinical progress       Malnutrition Assessment:  Malnutrition Status:  At risk for malnutrition (12/19/24 1248)    Context:  Chronic Illness     Findings of the 6 clinical characteristics of malnutrition:  Energy Intake:  Mild decrease in energy intake  Weight Loss:   (weight fluctuating significantly)     Body Fat Loss:  Unable to assess     Muscle Mass Loss:  Unable to assess    Fluid Accumulation:  Unable to assess     Strength:  Not Performed    Nutrition Assessment:    Follow up: Patient reported eating well.  No questions at this time.  RD re-ordered chocolate Glucerna with meals.  Patient had questions about discharge plans.  RD spoke with , Vannessa, will follow up as time allows later today.    Nutrition Related Findings:    Phos 2.2 this am Wound Type: Wound Consult Pending       Current Nutrition Intake & Therapies:    Average Meal Intake: 51-75%, %  Average Supplements Intake: Unable to assess  ADULT DIET; Regular; No Added Salt (3-4 gm); 1500 ml  ADULT ORAL NUTRITION SUPPLEMENT; Breakfast, Lunch, Dinner; Diabetic Oral Supplement    Anthropometric Measures:  Height: 185.4 cm (6' 0.99\")  Ideal Body Weight (IBW): 184 lbs (84 kg)       Current Body Weight: 124.8 kg (275 lb 2.2 oz),   IBW. Weight Source: Bed scale  Current BMI (kg/m2): 36.3                             BMI Categories: Obese Class 2 (BMI 35.0 -39.9)    Estimated Daily Nutrient Needs:  Energy Requirements Based On: Kcal/kg  Weight Used for Energy Requirements: Current  Energy (kcal/day): 8488-9294 (15-18 kcal/130.1 kg)  Weight Used for Protein Requirements: Ideal  Protein (g/day): 100-125 (1.2-1.5 g/83.6 kg)  Method Used for Fluid Requirements: 1  Topical Retinoid Pregnancy And Lactation Text: This medication is Pregnancy Category C. It is unknown if this medication is excreted in breast milk.

## 2024-12-30 NOTE — PLAN OF CARE
Problem: Chronic Conditions and Co-morbidities  Goal: Patient's chronic conditions and co-morbidity symptoms are monitored and maintained or improved  12/30/2024 1143 by Russ Wasserman RN  Outcome: Progressing     Problem: Pain  Goal: Verbalizes/displays adequate comfort level or baseline comfort level  12/30/2024 1143 by Russ Wasserman RN  Outcome: Progressing     Problem: Safety - Adult  Goal: Free from fall injury  12/30/2024 1143 by Russ Wasserman RN  Outcome: Progressing     Problem: Respiratory - Adult  Goal: Achieves optimal ventilation and oxygenation  12/30/2024 1143 by Russ Wasserman RN  Outcome: Progressing     Problem: Cardiovascular - Adult  Goal: Maintains optimal cardiac output and hemodynamic stability  12/30/2024 1143 by Russ Wasserman RN  Outcome: Progressing     Problem: Cardiovascular - Adult  Goal: Absence of cardiac dysrhythmias or at baseline  12/30/2024 1143 by Russ Wasserman RN  Outcome: Progressing     Problem: Metabolic/Fluid and Electrolytes - Adult  Goal: Electrolytes maintained within normal limits  12/30/2024 1143 by Russ Wasserman RN  Outcome: Progressing     Problem: Metabolic/Fluid and Electrolytes - Adult  Goal: Hemodynamic stability and optimal renal function maintained  12/30/2024 1143 by Russ Wasserman RN  Outcome: Progressing     Problem: Metabolic/Fluid and Electrolytes - Adult  Goal: Glucose maintained within prescribed range  12/30/2024 1143 by Russ Wasserman RN  Outcome: Progressing     Problem: Neurosensory - Adult  Goal: Achieves stable or improved neurological status  12/30/2024 1143 by Russ Wasserman RN  Outcome: Progressing     Problem: Skin/Tissue Integrity - Adult  Goal: Skin integrity remains intact  12/30/2024 1143 by Russ Wasserman RN  Outcome: Progressing     Problem: Skin/Tissue Integrity - Adult  Goal: Incisions, wounds, or drain sites healing without S/S of infection  12/30/2024 1143 by Russ Wasserman RN  Outcome: Progressing

## 2024-12-30 NOTE — CARE COORDINATION
12/28/24 0849   Avoidable Days   Community/External       Dialysis   Payor SNF Auth       Respectfully submitted,    Vannessa SONI, ERIN  [facility]   [phone]    Electronically signed by NAE Sims, LSW on 12/30/2024 at 8:49 AM

## 2024-12-30 NOTE — CARE COORDINATION
CLYDE received call from Leandro at [facility] inquiring about patient's home care plans. He stated that there was a time where patient was homeless prior to a previous admission.     CLYDE placed phone call to brother to clarify that when patient is discharged he will not be able to come to his home. He stated that he's had a cardiac device installed and per cardiology he can't lift anything more than about 10lbs. He stated that prior to this admit patient needed a great deal of care and the kind that could not be done with one caregiver alone.    CLYDE called Leandro [facility] admissions back at [phone] to share this info. He stated that he will speak with the brother. Per therapy patient needs ambulance transport to get back and forth to outpatient dialysis. Leandro stated that he was working on this as well. Initially he had it set up for wheelchair transport to HD.    Respectfully submitted,    ERIN Ramírez  [facility]   [phone]    Electronically signed by NAE Sims, CHLOE on 12/30/2024 at 11:44 AM

## 2024-12-30 NOTE — CARE COORDINATION
Discharge order noted. HENS completed. Document ID number is [id number]. SW will place a copy on his soft chart momentarily.     We are waiting on updated therapies for pre-cert.     Respectfully submitted,    Vannessa SONI, CHEYANNES  [facility]   [phone]    Electronically signed by NAE Sims, CHLOE on 12/30/2024 at 8:40 AM

## 2024-12-30 NOTE — PROGRESS NOTES
Physical Therapy  Facility/Department: [address] PROGRESSIVE CARE  Physical Therapy Treatment session     Name: Levy Vargas  : 1965  MRN: [id number]  Date of Service: 2024    Discharge Recommendations:  Continue to assess pending progress, Patient would benefit from continued therapy after discharge, Therapy recommended at discharge (3-5 X frequency)   PT Equipment Recommendations  Other: Will monitor for potential equipt needs.    Levy Vargas scored a  on the AM-PAC short mobility form. Current research shows that an AM-PAC score of 17 or less is typically not associated with a discharge to the patient's home setting. Based on the patient's AM-PAC score and their current functional mobility deficits, it is recommended that the patient have 3-5 sessions per week of Physical Therapy at d/c to increase the patient's independence.  Please see assessment section for further patient specific details.    If patient discharges prior to next session this note will serve as a discharge summary.  Please see below for the latest assessment towards goals.     Patient Diagnosis(es): The primary encounter diagnosis was Atrial fibrillation with rapid ventricular response (HCC). A diagnosis of Persistent atrial fibrillation (HCC) was also pertinent to this visit.  Past Medical History:  has a past medical history of Abdominal pain, Abnormal EKG, Acute pancreatitis, Atrial fibrillation (HCC), CHF (congestive heart failure) (HCC), Cigarette smoker, Cocaine abuse (HCC), Dehydration, Diabetes mellitus (HCC), History of cocaine abuse (HCC), History of MI (myocardial infarction), Hyperglycemia, Hyperlipidemia, Hypertension, Morbid obesity with BMI of 45.0-49.9, adult, Postural dizziness, and PUD (peptic ulcer disease).  Past Surgical History:  has a past surgical history that includes Cardiac catheterization; Upper gastrointestinal endoscopy; Leg Surgery (Right, 2023); Cardiac procedure (N/A, 10/21/2024); and IR  recent acute care admits/SNF setting.  Prior, living with girlfriend in house with 5 steps to enter and 2nd floor bed/bath.  Vision/Hearing  Vision  Vision: Within Functional Limits  Hearing  Hearing: Within functional limits    Cognition   Orientation  Overall Orientation Status: Within Functional Limits  Cognition  Overall Cognitive Status: Exceptions  Following Commands: Follows one step commands consistently  Safety Judgement: Decreased awareness of need for safety;Decreased awareness of need for assistance  Insights: Decreased awareness of deficits  Initiation: Requires cues for some  Cognition Comment: Flat affect, decreased engagement/pt initiation with ex/oob activities.    Objective          Gross Assessment  AROM: Generally decreased, functional  Strength: Grossly decreased, non-functional (Grossly 3 3+/5; sign weak LEs.)                   Bed mobility  Supine to Sit: Unable to assess (sitting EOB upon arrival)  Sit to Supine: Unable to assess (OOB to recliner at completion of session)  Transfers  Sit to Stand: Moderate Assistance;Maximum Assistance;2 Person Assistance (mod assist X 2 EOB elevated, max assist X 2 from standard commode (placement of raises toilet seat for future transfers))  Stand to Sit: Moderate Assistance;2 Person Assistance (poor eccentric control)  Bed to Chair: Dependent/Total (EOB to standard commode to recliner)  Ambulation  Assistance: Unable to assess  Quality of Gait: poor upright standing posture with mod-max assist, not safe to attempt at this time     Balance  Posture: Fair  Comments: EOB static sitting Slight mod I after set up and supervision on commode.  MOd-max assist while performing self care at toilet including assistance for oneyda care in standing, min assist in grazyna stedy to wash hands at sink. Flexed standing posture with poor tolerance. Difficulty reaching outside [place] for soap and paper towels requires increased assistance.          AM-PAC - Mobility    AM-PAC Basic

## 2024-12-30 NOTE — CARE COORDINATION
SW attempted to meet with patient to review the plan for continued care, however, he was away for testing and/or a procedure.     We still don't have precert. SW will visit with patient in the morning.     Respectfully submitted,    Vannessa SONI, ERIN  [facility]   [phone]    Electronically signed by NAE Sims, CHLOE on 12/30/2024 at 5:14 PM

## 2024-12-30 NOTE — PROGRESS NOTES
Refill: Brisk, 3 seconds, normal   Peripheral Pulses: +2 palpable, equal bilaterally       Medications:   Medications:    sodium phosphate IVPB (PERIPHERAL line)  15 mmol IntraVENous Once    pantoprazole  40 mg Oral QAM AC    epoetin iván-epbx  10,000 Units SubCUTAneous Once per day on Tuesday Thursday Saturday    apixaban  5 mg Oral BID    lidocaine  1 patch TransDERmal Daily    QUEtiapine  25 mg Oral Nightly    insulin lispro  0-4 Units SubCUTAneous 4x Daily WC    amiodarone  200 mg Per NG tube BID    [Held by provider] spironolactone  25 mg Oral Daily    [Held by provider] gabapentin  300 mg Oral Nightly    aspirin  81 mg Oral Daily    atorvastatin  80 mg Oral Nightly    metoprolol succinate  25 mg Oral QPM    [Held by provider] sacubitril-valsartan  1 tablet Oral BID    sodium chloride flush  5-40 mL IntraVENous 2 times per day      Infusions:    dextrose      Phenylephrine Stopped (12/23/24 1042)    milrinone Stopped (12/23/24 1229)    sodium chloride Stopped (12/25/24 0948)     PRN Meds: glucose, 4 tablet, PRN  dextrose bolus, 125 mL, PRN   Or  dextrose bolus, 250 mL, PRN  glucagon (rDNA), 1 mg, PRN  dextrose, , Continuous PRN  heparin (porcine), 3,600 Units, PRN  acetaminophen, 650 mg, Q6H PRN  melatonin, 3 mg, Nightly PRN  guaiFENesin-dextromethorphan, 5 mL, Q4H PRN  pantoprazole, 40 mg, Daily PRN  sodium chloride flush, 5-40 mL, PRN  sodium chloride, , PRN  ondansetron, 4 mg, Q8H PRN   Or  ondansetron, 4 mg, Q6H PRN  polyethylene glycol, 17 g, Daily PRN        Labs and Imaging   IR TUNNELED CVC PLACE WO SQ PORT/PUMP > 5 YEARS    Result Date: 12/26/2024  PROCEDURE: ULTRASOUND GUIDED VASCULAR ACCESS. FLUOROSCOPY GUIDED PLACEMENT OF A TUNNELED CATHETER. 12/26/2024. HISTORY: ORDERING SYSTEM PROVIDED HISTORY: TDC for HD TECHNOLOGIST PROVIDED HISTORY: Reason for exam:->TDC for HD How many lumens are being requested?->2 What side should this line be placed?->Either What site is the preferred site?->Internal  \"ALB\"  Lipids:   Lab Results   Component Value Date/Time    CHOL 79 10/19/2024 03:13 AM    HDL 17 10/19/2024 03:13 AM    TRIG 68 12/18/2024 04:04 AM     Hemoglobin A1C:   Lab Results   Component Value Date/Time    LABA1C 6.3 10/19/2024 03:13 AM     TSH:   Lab Results   Component Value Date/Time    TSH 0.05 12/12/2024 12:34 AM     Troponin: No results found for: \"TROPONINT\"  Lactic Acid: No results for input(s): \"LACTA\" in the last 72 hours.  BNP: No results for input(s): \"PROBNP\" in the last 72 hours.  UA:  Lab Results   Component Value Date/Time    NITRU POSITIVE 12/12/2024 06:45 PM    COLORU DARK YELLOW 12/12/2024 06:45 PM    PHUR 5.0 12/12/2024 06:45 PM    PHUR 7.0 08/03/2023 09:20 PM    PHUR 7.0 08/03/2023 09:20 PM    WBCUA 3-5 12/12/2024 06:45 PM    RBCUA 5-10 12/12/2024 06:45 PM    MUCUS 1+ 03/19/2022 05:10 PM    TRICHOMONAS None Seen 03/19/2022 05:10 PM    BACTERIA 2+ 12/12/2024 06:45 PM    CLARITYU CLOUDY 12/12/2024 06:45 PM    LEUKOCYTESUR SMALL 12/12/2024 06:45 PM    UROBILINOGEN 2.0 12/12/2024 06:45 PM    BILIRUBINUR MODERATE 12/12/2024 06:45 PM    BLOODU LARGE 12/12/2024 06:45 PM    GLUCOSEU Negative 12/12/2024 06:45 PM    KETUA TRACE 12/12/2024 06:45 PM     Urine Cultures: No results found for: \"LABURIN\"  Blood Cultures: No results found for: \"BC\"  No results found for: \"BLOODCULT2\"  Organism: No results found for: \"ORG\"      Electronically signed by Geovani Blanchard MD on 12/30/2024 at 1:49 PM  Comment: Please note this report has been produced using speech recognition software and may contain errors related to that system including errors in grammar, punctuation, and spelling, as well as words and phrases that may be inappropriate. If there are any questions or concerns, please feel free to contact the dictating provider for clarification.

## 2024-12-30 NOTE — PROGRESS NOTES
Occupational Therapy  Facility/Department: [address] PROGRESSIVE CARE  Occupational Therapy Daily Treatment    Name: Levy Vargas  : 1965  MRN: [id number]  Date of Service: 2024    Discharge Recommendations:  3-5 sessions per week, Patient would benefit from continued therapy after discharge  Levy Vargas scored a 15/ on the AM-PAC ADL Inpatient form. Current research shows that an AM-PAC score of 17 or less is typically not associated with a discharge to the patient's home setting. Based on the patient's AM-PAC score and their current ADL deficits, it is recommended that the patient have 3-5 sessions per week of Occupational Therapy at d/c to increase the patient's independence.  Please see assessment section for further patient specific details.    If patient discharges prior to next session this note will serve as a discharge summary.  Please see below for the latest assessment towards goals.          Patient Diagnosis(es): The primary encounter diagnosis was Atrial fibrillation with rapid ventricular response (HCC). A diagnosis of Persistent atrial fibrillation (HCC) was also pertinent to this visit.  Past Medical History:  has a past medical history of Abdominal pain, Abnormal EKG, Acute pancreatitis, Atrial fibrillation (HCC), CHF (congestive heart failure) (HCC), Cigarette smoker, Cocaine abuse (HCC), Dehydration, Diabetes mellitus (HCC), History of cocaine abuse (HCC), History of MI (myocardial infarction), Hyperglycemia, Hyperlipidemia, Hypertension, Morbid obesity with BMI of 45.0-49.9, adult, Postural dizziness, and PUD (peptic ulcer disease).  Past Surgical History:  has a past surgical history that includes Cardiac catheterization; Upper gastrointestinal endoscopy; Leg Surgery (Right, 2023); Cardiac procedure (N/A, 10/21/2024); and IR TUNNELED CVC PLACE WO SQ PORT/PUMP > 5 YEARS (2024).    Treatment Diagnosis: Decreased: ADLs, functional

## 2024-12-30 NOTE — CARE COORDINATION
12/30/24 0851   Avoidable Days   Payor SNF Auth       SW spoke to Leandro at [facility]. They need updated therapy notes to begin pre-cert.     Respectfully submitted,    Vannessa SONI, ERIN  [facility]   [phone]    Electronically signed by NAE Sims, CHLOE on 12/30/2024 at 8:51 AM

## 2024-12-30 NOTE — PROGRESS NOTES
Nephrology Progress Note   [facility]      Reason for consultation:  KEDNRA on CKD 2 -- baseline Cr ~ 0.9-1.3 mg/dL     HPI: Levy Vargas is a 60 yo male with a PMHx of CKD 2, h/o AKIs, HFrEF, non-compliance, DM, HTN, afib, h/o cocaine use, CAD, HLD. Patient presents to  ED on 12/11/2024 with complaints of BLE edema, lightheadedness, and weakness. Patient is currently drowsy and it is difficult to obtain history. Currently oriented x3. Patient tells me he has been taking his CHF medications everyday. Per chart review, there has been some questions regarding his outpatient compliance and not keeping in touch with his CHF nurses. Pt is currently grossly volume overloaded. He weighed 131.5 kg on 9/25/2024 during his last cardiology appt. He currently weighs 145.3 kg. Currently dyspneic and on 4 L NC. CXR is negative for acute findings. In afib RVR this admission. On amio gtt.     We have been consulted for KENDRA on CKD 2 management. Cr upon admission was 1.3 mg/dL. Today, Cr is 2.6 mg/dL. Patient received a generous amount of IVP and oral diuretics and was also started on a lasix gtt at 10 mg/hr. Despite this, pt has remained anuric    CRRT initiated 12/13/24   CRRT stopped 12/24/24    Subjective:      TDC placed 12/26/24. Required straight catheterization yesterday with 475 mL UOP. Will receive HD today d/t holiday schedule.     Allergies:  No Known Allergies     Scheduled Meds:   sodium phosphate IVPB (PERIPHERAL line)  15 mmol IntraVENous Once    pantoprazole  40 mg Oral QAM AC    epoetin iván-epbx  10,000 Units SubCUTAneous Once per day on Tuesday Thursday Saturday    apixaban  5 mg Oral BID    lidocaine  1 patch TransDERmal Daily    QUEtiapine  25 mg Oral Nightly    insulin lispro  0-4 Units SubCUTAneous 4x Daily WC    amiodarone  200 mg Per NG tube BID    [Held by provider] spironolactone  25 mg Oral Daily    [Held by provider] gabapentin  300 mg Oral Nightly    aspirin  81 mg Oral Daily    atorvastatin  80 mg

## 2024-12-31 LAB
ALBUMIN SERPL-MCNC: 3.2 G/DL (ref 3.4–5)
ANION GAP SERPL CALCULATED.3IONS-SCNC: 11 MMOL/L (ref 3–16)
BUN SERPL-MCNC: 20 MG/DL (ref 7–20)
CALCIUM SERPL-MCNC: 8.5 MG/DL (ref 8.3–10.6)
CHLORIDE SERPL-SCNC: 97 MMOL/L (ref 99–110)
CO2 SERPL-SCNC: 24 MMOL/L (ref 21–32)
CREAT SERPL-MCNC: 2.9 MG/DL (ref 0.8–1.3)
DEPRECATED RDW RBC AUTO: 22.7 % (ref 12.4–15.4)
GFR SERPLBLD CREATININE-BSD FMLA CKD-EPI: 24 ML/MIN/{1.73_M2}
GLUCOSE BLD-MCNC: 112 MG/DL (ref 70–99)
GLUCOSE BLD-MCNC: 132 MG/DL (ref 70–99)
GLUCOSE BLD-MCNC: 147 MG/DL (ref 70–99)
GLUCOSE BLD-MCNC: 97 MG/DL (ref 70–99)
GLUCOSE BLD-MCNC: 99 MG/DL (ref 70–99)
GLUCOSE SERPL-MCNC: 116 MG/DL (ref 70–99)
HCT VFR BLD AUTO: 26.3 % (ref 40.5–52.5)
HGB BLD-MCNC: 8.8 G/DL (ref 13.5–17.5)
MCH RBC QN AUTO: 29.5 PG (ref 26–34)
MCHC RBC AUTO-ENTMCNC: 33.4 G/DL (ref 31–36)
MCV RBC AUTO: 88.3 FL (ref 80–100)
PERFORMED ON: ABNORMAL
PERFORMED ON: NORMAL
PERFORMED ON: NORMAL
PHOSPHATE SERPL-MCNC: 2.3 MG/DL (ref 2.5–4.9)
PLATELET # BLD AUTO: 177 K/UL (ref 135–450)
PMV BLD AUTO: 8.8 FL (ref 5–10.5)
POTASSIUM SERPL-SCNC: 4.7 MMOL/L (ref 3.5–5.1)
RBC # BLD AUTO: 2.98 M/UL (ref 4.2–5.9)
SODIUM SERPL-SCNC: 132 MMOL/L (ref 136–145)
WBC # BLD AUTO: 6.7 K/UL (ref 4–11)

## 2024-12-31 PROCEDURE — 6370000000 HC RX 637 (ALT 250 FOR IP)

## 2024-12-31 PROCEDURE — 97530 THERAPEUTIC ACTIVITIES: CPT

## 2024-12-31 PROCEDURE — 6370000000 HC RX 637 (ALT 250 FOR IP): Performed by: INTERNAL MEDICINE

## 2024-12-31 PROCEDURE — 6360000002 HC RX W HCPCS: Performed by: INTERNAL MEDICINE

## 2024-12-31 PROCEDURE — 97110 THERAPEUTIC EXERCISES: CPT

## 2024-12-31 PROCEDURE — 80069 RENAL FUNCTION PANEL: CPT

## 2024-12-31 PROCEDURE — 6370000000 HC RX 637 (ALT 250 FOR IP): Performed by: NURSE PRACTITIONER

## 2024-12-31 PROCEDURE — 36415 COLL VENOUS BLD VENIPUNCTURE: CPT

## 2024-12-31 PROCEDURE — 2500000003 HC RX 250 WO HCPCS

## 2024-12-31 PROCEDURE — 2060000000 HC ICU INTERMEDIATE R&B

## 2024-12-31 PROCEDURE — 85027 COMPLETE CBC AUTOMATED: CPT

## 2024-12-31 RX ADMIN — ASPIRIN 81 MG 81 MG: 81 TABLET ORAL at 07:53

## 2024-12-31 RX ADMIN — AMIODARONE HYDROCHLORIDE 200 MG: 200 TABLET ORAL at 20:01

## 2024-12-31 RX ADMIN — AMIODARONE HYDROCHLORIDE 200 MG: 200 TABLET ORAL at 07:53

## 2024-12-31 RX ADMIN — APIXABAN 5 MG: 5 TABLET, FILM COATED ORAL at 07:53

## 2024-12-31 RX ADMIN — APIXABAN 5 MG: 5 TABLET, FILM COATED ORAL at 20:01

## 2024-12-31 RX ADMIN — EPOETIN ALFA-EPBX 10000 UNITS: 10000 INJECTION, SOLUTION INTRAVENOUS; SUBCUTANEOUS at 14:53

## 2024-12-31 RX ADMIN — ATORVASTATIN CALCIUM 80 MG: 80 TABLET, FILM COATED ORAL at 20:01

## 2024-12-31 RX ADMIN — QUETIAPINE FUMARATE 25 MG: 25 TABLET ORAL at 20:01

## 2024-12-31 RX ADMIN — PANTOPRAZOLE SODIUM 40 MG: 40 TABLET, DELAYED RELEASE ORAL at 05:55

## 2024-12-31 RX ADMIN — POLYETHYLENE GLYCOL 3350 17 G: 17 POWDER, FOR SOLUTION ORAL at 20:00

## 2024-12-31 RX ADMIN — SODIUM CHLORIDE, PRESERVATIVE FREE 10 ML: 5 INJECTION INTRAVENOUS at 07:55

## 2024-12-31 RX ADMIN — METOPROLOL SUCCINATE 25 MG: 25 TABLET, EXTENDED RELEASE ORAL at 18:12

## 2024-12-31 RX ADMIN — SODIUM CHLORIDE, PRESERVATIVE FREE 10 ML: 5 INJECTION INTRAVENOUS at 20:02

## 2024-12-31 NOTE — PROGRESS NOTES
Physical Therapy  Second session    Pt sitting EOB.  Wants to lay down.   PT provided Mod A to move pt to supine.  Bed placed in trendellenberg position.  Pt able to scoot up to HOB with Min A for LE stabilization.  Pt positioned for comfort with HOB elevated, call light in reach, alarm in place.     Time In: 1400  Time coded tx: 8 min.     Electronically signed by Juan Jose Daniels, PT [id number] on 12/31/2024 at 2:09 PM

## 2024-12-31 NOTE — PROGRESS NOTES
(12/25/24 0948)     PRN Meds: glucose, 4 tablet, PRN  dextrose bolus, 125 mL, PRN   Or  dextrose bolus, 250 mL, PRN  glucagon (rDNA), 1 mg, PRN  dextrose, , Continuous PRN  heparin (porcine), 3,600 Units, PRN  acetaminophen, 650 mg, Q6H PRN  melatonin, 3 mg, Nightly PRN  guaiFENesin-dextromethorphan, 5 mL, Q4H PRN  pantoprazole, 40 mg, Daily PRN  sodium chloride flush, 5-40 mL, PRN  sodium chloride, , PRN  ondansetron, 4 mg, Q8H PRN   Or  ondansetron, 4 mg, Q6H PRN  polyethylene glycol, 17 g, Daily PRN        Labs      Recent Results (from the past 24 hour(s))   POCT Glucose    Collection Time: 12/30/24  5:26 PM   Result Value Ref Range    POC Glucose 79 70 - 99 mg/dl    Performed on ACCU-CHEK    POCT Glucose    Collection Time: 12/30/24  8:54 PM   Result Value Ref Range    POC Glucose 66 (L) 70 - 99 mg/dl    Performed on ACCU-CHEK    POCT Glucose    Collection Time: 12/30/24  9:19 PM   Result Value Ref Range    POC Glucose 65 (L) 70 - 99 mg/dl    Performed on ACCU-CHEK    POCT Glucose    Collection Time: 12/30/24 10:03 PM   Result Value Ref Range    POC Glucose 54 (L) 70 - 99 mg/dl    Performed on ACCU-CHEK    POCT Glucose    Collection Time: 12/30/24 10:30 PM   Result Value Ref Range    POC Glucose 106 (H) 70 - 99 mg/dl    Performed on ACCU-CHEK    POCT Glucose    Collection Time: 12/30/24 10:49 PM   Result Value Ref Range    POC Glucose 109 (H) 70 - 99 mg/dl    Performed on ACCU-CHEK    POCT Glucose    Collection Time: 12/31/24  1:55 AM   Result Value Ref Range    POC Glucose 147 (H) 70 - 99 mg/dl    Performed on ACCU-CHEK    CBC    Collection Time: 12/31/24  4:33 AM   Result Value Ref Range    WBC 6.7 4.0 - 11.0 K/uL    RBC 2.98 (L) 4.20 - 5.90 M/uL    Hemoglobin 8.8 (L) 13.5 - 17.5 g/dL    Hematocrit 26.3 (L) 40.5 - 52.5 %    MCV 88.3 80.0 - 100.0 fL    MCH 29.5 26.0 - 34.0 pg    MCHC 33.4 31.0 - 36.0 g/dL    RDW 22.7 (H) 12.4 - 15.4 %    Platelets 177 135 - 450 K/uL    MPV 8.8 5.0 - 10.5 fL   Renal Function  created to the infraclavicular region and a tunneled 14.5 Sinhala by 23 cm dialysis catheter was pulled through the subcutaneous tunnel to the venotomy site and advanced through the peel-away sheath under fluoroscopic guidance to the right atrium. The catheter flushed easily and there was a good blood return.  The catheter was sutured to the skin.  The catheter was locked with heparinized saline. The patient tolerated the procedure well and there were no immediate complications. FINDINGS: Fluoroscopic image demonstrates the tip of the catheter in the right atrium.     Successful ultrasound and fluoroscopy guided tunneled catheter placement.       Assessment and Plan:   Levy Vargas is a 59 y.o. male     Conditions under treatment:    # Cardiogenic shock  # Atrial fibrillation with rapid ventricle response  # Anuric KENDRA on CKD stage II  # Acute metabolic encephalopathy  # Acute blood loss anemia  # Coffee-ground emesis  # Generalized deconditioning  # Acute urinary retention     Plan:    -Patient had a prolonged hospital stay, today will be hospital day 20, my first day seeing this gentleman, he has an EF of 10-15% with severe global hypokinesis, cardiology was following, at 1 point patient was on the milrinone drip.  Currently medically optimized, on HD for volume removal, on a low-sodium and 1.5 L fluid restricted diet  -Patient was on CRRT, currently on intermittent hemodialysis, has a temporary dialysis catheter in place  -Patient is status post cardioversion for A-fib RVR, remains in sinus rhythm  -Hide GI bleed, that is resolved, was seen by GI, no interventions  -Intermittent straight cath for urinary retention  -Patient medically optimized, awaiting precertification approval before the patient could be transferred to    Personally reviewed Lab Studies and Imaging     Electronically signed by Odilon Chawla MD on 12/31/2024 at 12:43 PM    This not was generated completely or in part using Dragon, speech

## 2024-12-31 NOTE — PROGRESS NOTES
Physical Therapy  Facility/Department: [address] PROGRESSIVE CARE  Daily Treatment Note  NAME: Levy Vargas  : 1965  MRN: [id number]    Date of Service: 2024    Discharge Recommendations:  Subacute/Skilled Nursing Facility, Patient would benefit from continued therapy after discharge        Patient Diagnosis(es): The primary encounter diagnosis was Atrial fibrillation with rapid ventricular response (HCC). A diagnosis of Persistent atrial fibrillation (HCC) was also pertinent to this visit.    Assessment  Assessment: Pt agreeable to activity, but unable to achieve full upright stand to stedy in spite of multiple attempts with Max A from elevated EOB.  Pt limited by RLE pain, cannot tolerate much weight through RLE.  Pt requires additional therapy in the acute setting to improve strength, balance, endurance, and independence with mobility tasks.  Pt would benefit from continued therapy at low-moderate frequency upon D/C to continue to address these deficits.     Levy Vargas scored a  on the AM-PAC short mobility form. Current research shows that an AM-PAC score of 17 or less is typically not associated with a discharge to the patient's home setting. Based on the patient's AM-PAC score and their current functional mobility deficits, it is recommended that the patient have 3-5 sessions per week of Physical Therapy at d/c to increase the patient's independence.  Please see assessment section for further patient specific details.    If patient discharges prior to next session this note will serve as a discharge summary.  Please see below for the latest assessment towards goals.         Plan  Physical Therapy Plan  General Plan: 3-5 times per week  Current Treatment Recommendations: Strengthening;Therapeutic activities;Balance training;Functional mobility training;Transfer training;Gait training;Safety education & training;Patient/Caregiver education & training;Endurance training;Neuromuscular

## 2024-12-31 NOTE — CARE COORDINATION
Discharge order acknowledged.   Discharging to Facility/ Agency   Name: [facility]   Address:  [place] [address]  Phone:  [phone]   Fax:  [phone]     Called to Leandro in admissions #[phone], confirmed precert is pending at this time.   Confirmed Leandro is working on medical transport for patient to and from dialysis.      NAE Gregg, LSW, Social Work/Case Management   [phone]  Electronically signed by NAE Gregg on 12/31/2024 at 10:26 AM

## 2024-12-31 NOTE — PROGRESS NOTES
transfers/mobility      Assessment  Performance deficits / Impairments: Decreased functional mobility ;Decreased ADL status;Decreased endurance;Decreased strength;Decreased cognition;Decreased balance;Decreased ROM  Assessment: 60 y/o male with PMHx of CKD 2, h/o AKIs, HFrEF, non-compliance, DM, HTN, afib, h/o cocaine use, CAD, HLD who was admitted 12/11/2024 with Cardiogenic Shock, A-Fib with RVR, Acute Respiratory, KENDRA. 12/13-12/24/2024 Pt Intubated. 12/13-12/24/2024 CRRT. 12/19/2024 Cardioversion. At baseline, pt has recently been staying with brother/family following previous hospital and SNF stays where he reports being independent in ADLs and completes functional mobility mod I using RW.  Today- pt unable to stand fully upright to stedy despite Max Ax2 and elevated bed height. He was limited by his generalized weakness, endurance, and pain in RLE (RN aware of pain). Pt reporting arms felt weak, provided w/ blue theraband and exercises to improve BUE strength and endurance for ADLs and fxl transfers. He declined ADLs this date. Pt is anticipated to require up to Max-DEP for full ADLs. He will cont to benefit from cont therapy in acute setting. Anticipate need for ongoing skilled OT 3-5x/wk at d/c to improve his safety, independence, and promote return to PLOF.  Treatment Diagnosis: Decreased: ADLs, functional transfers/mobility  Prognosis: Good  REQUIRES OT FOLLOW-UP: Yes  Activity Tolerance  Activity Tolerance: Patient limited by fatigue;Patient limited by pain;Treatment limited secondary to decreased cognition  Activity Tolerance Comments: RN aware of pain when standing     Plan  Occupational Therapy Plan  Times Per Week: 3-5  Times Per Day: Once a day  Current Treatment Recommendations: Strengthening, ROM, Balance training, Functional mobility training, Endurance training, Equipment evaluation, education, & procurement, Self-Care / ADL, Patient/Caregiver education & training, Safety education &  reach;Bed alarm in place;Left in bed;Nurse notified;Gait belt  Restraints  Restraints Initially in Place: No  Bed Mobility Training  Bed Mobility Training: Yes  Sit to Supine: Moderate assistance  Scooting: Minimum assistance (Bed in trendellenberg, increased time to complete)  Balance  Sitting: With support  Standing: With support  Transfer Training  Transfer Training: Yes  Overall Level of Assistance: Total assistance;Maximum assistance  Sit to Stand: Total assistance;Maximum assistance (Pt attempted to stand elevated EOB to stedy x 5 trials, Max/total assist.  Able to minimally clear buttocks from EOB, but could not achieve full stand.)        ADL  Toileting Skilled Clinical Factors: pt denied the need to void  Functional Mobility: Unable to assess (Comment)  Functional Mobility Skilled Clinical Factors: pt unable to stand to stedy, declined sitting in recliner, anticipate need for maxi Lift for safe mob  Additional Comments: Pt is anticipated to require up to Max-DEP for full ADLs based on his current strength, cognition, and balance  Product Used : N/A        Bed mobility  Supine to Sit: Unable to assess (sitting EOB upon arrival)  Sit to Supine: Unable to assess (pt seated EOB w/ call light within reach and bed alarm activated)  Transfers  Sit to stand: Maximum assistance;2 Person assistance  Stand to sit: Maximum assistance;2 Person assistance  Transfer Comments: to/from stedy for x3 attempts, pt unable to fully stand upright, even w/ bed height elevated. Limited by his generalized weakness, endurance, and pain in RLE     Orientation  Overall Orientation Status: Within Functional Limits               Exercise Treatment: Pt provided w/ blue resistance band, completed 1x10 shoulder press, chest press, and tricep ext w/ good tolerance. Edu to complete 2-3 sets throughout the day, but stop the exercises if he experiences pain.  Education Given To: Patient  Education Provided: Role of Therapy;Plan of Care;Transfer

## 2024-12-31 NOTE — PROGRESS NOTES
Nephrology Progress Note   [email or web address]      Reason for consultation:  KENDRA on CKD 2 -- baseline Cr ~ 0.9-1.3 mg/dL     HPI: Levy Vargas is a 60 yo male with a PMHx of CKD 2, h/o AKIs, HFrEF, non-compliance, DM, HTN, afib, h/o cocaine use, CAD, HLD. Patient presents to  ED on 12/11/2024 with complaints of BLE edema, lightheadedness, and weakness. Patient is currently drowsy and it is difficult to obtain history. Currently oriented x3. Patient tells me he has been taking his CHF medications everyday. Per chart review, there has been some questions regarding his outpatient compliance and not keeping in touch with his CHF nurses. Pt is currently grossly volume overloaded. He weighed 131.5 kg on 9/25/2024 during his last cardiology appt. He currently weighs 145.3 kg. Currently dyspneic and on 4 L NC. CXR is negative for acute findings. In afib RVR this admission. On amio gtt.     We have been consulted for KENDRA on CKD 2 management. Cr upon admission was 1.3 mg/dL. Today, Cr is 2.6 mg/dL. Patient received a generous amount of IVP and oral diuretics and was also started on a lasix gtt at 10 mg/hr. Despite this, pt has remained anuric    CRRT initiated 12/13/24   CRRT stopped 12/24/24    Subjective:      TDC placed 12/26/24.  Hd YESTERDAY 3 L removed  Out patient HD [facility]    Allergies:  No Known Allergies     Scheduled Meds:   pantoprazole  40 mg Oral QAM AC    epoetin iván-epbx  10,000 Units SubCUTAneous Once per day on Tuesday Thursday Saturday    apixaban  5 mg Oral BID    lidocaine  1 patch TransDERmal Daily    QUEtiapine  25 mg Oral Nightly    insulin lispro  0-4 Units SubCUTAneous 4x Daily WC    amiodarone  200 mg Per NG tube BID    [Held by provider] spironolactone  25 mg Oral Daily    [Held by provider] gabapentin  300 mg Oral Nightly    aspirin  81 mg Oral Daily    atorvastatin  80 mg Oral Nightly    metoprolol succinate  25 mg Oral QPM    [Held by provider] sacubitril-valsartan  1 tablet Oral BID  Phos low 2.3 mg Encourage intake and monitor No binders    6 Urine retention voided OK today   - ISC prn     Farrukh Underwood MD

## 2024-12-31 NOTE — PLAN OF CARE
Problem: Chronic Conditions and Co-morbidities  Goal: Patient's chronic conditions and co-morbidity symptoms are monitored and maintained or improved  12/31/2024 0808 by Russ Wasserman RN  Outcome: Progressing     Problem: Pain  Goal: Verbalizes/displays adequate comfort level or baseline comfort level  12/31/2024 0808 by Russ Wasserman RN  Outcome: Progressing     Problem: Safety - Adult  Goal: Free from fall injury  12/31/2024 0808 by Russ Wasserman RN  Outcome: Progressing  Flowsheets (Taken 12/31/2024 0022 by Richard Ross RN)  Free From Fall Injury:   Instruct family/caregiver on patient safety   Based on caregiver fall risk screen, instruct family/caregiver to ask for assistance with transferring infant if caregiver noted to have fall risk factors     Problem: Respiratory - Adult  Goal: Achieves optimal ventilation and oxygenation  12/31/2024 0808 by Russ Wasserman RN  Outcome: Progressing     Problem: Cardiovascular - Adult  Goal: Maintains optimal cardiac output and hemodynamic stability  12/31/2024 0808 by Russ Wasserman RN  Outcome: Progressing     Problem: Cardiovascular - Adult  Goal: Absence of cardiac dysrhythmias or at baseline  12/31/2024 0808 by Russ Wasserman RN  Outcome: Progressing     Problem: Metabolic/Fluid and Electrolytes - Adult  Goal: Electrolytes maintained within normal limits  12/31/2024 0808 by Russ Wasserman RN  Outcome: Progressing     Problem: Metabolic/Fluid and Electrolytes - Adult  Goal: Hemodynamic stability and optimal renal function maintained  12/31/2024 0808 by Russ Wasserman RN  Outcome: Progressing     Problem: Metabolic/Fluid and Electrolytes - Adult  Goal: Glucose maintained within prescribed range  12/31/2024 0808 by Russ Wasserman RN  Outcome: Progressing     Problem: Neurosensory - Adult  Goal: Achieves stable or improved neurological status  12/31/2024 0808 by Russ Wasserman RN  Outcome: Progressing     Problem: Skin/Tissue Integrity -

## 2024-12-31 NOTE — PLAN OF CARE
Problem: Chronic Conditions and Co-morbidities  Goal: Patient's chronic conditions and co-morbidity symptoms are monitored and maintained or improved  12/30/2024 2341 by Richard Ross RN  Outcome: Progressing  12/30/2024 1143 by Russ Wasserman RN  Outcome: Progressing     Problem: Pain  Goal: Verbalizes/displays adequate comfort level or baseline comfort level  12/30/2024 2341 by Richard Ross RN  Outcome: Progressing  12/30/2024 1143 by Russ Wasserman RN  Outcome: Progressing     Problem: Safety - Adult  Goal: Free from fall injury  12/30/2024 2341 by Richard Ross RN  Outcome: Progressing  12/30/2024 1143 by Russ Wasserman RN  Outcome: Progressing     Problem: Respiratory - Adult  Goal: Achieves optimal ventilation and oxygenation  12/30/2024 2341 by Richard Ross RN  Outcome: Progressing  12/30/2024 1143 by Russ Wasserman RN  Outcome: Progressing     Problem: Cardiovascular - Adult  Goal: Maintains optimal cardiac output and hemodynamic stability  12/30/2024 2341 by Richard Ross RN  Outcome: Progressing  12/30/2024 1143 by Russ Wasserman RN  Outcome: Progressing  Goal: Absence of cardiac dysrhythmias or at baseline  12/30/2024 2341 by Richard Ross RN  Outcome: Progressing  12/30/2024 1143 by Russ Wasserman RN  Outcome: Progressing     Problem: Cardiovascular - Adult  Goal: Maintains optimal cardiac output and hemodynamic stability  12/30/2024 2341 by Richard Ross RN  Outcome: Progressing  12/30/2024 1143 by Russ Wasserman RN  Outcome: Progressing  Goal: Absence of cardiac dysrhythmias or at baseline  12/30/2024 2341 by Richard Ross RN  Outcome: Progressing  12/30/2024 1143 by Russ Wasserman RN  Outcome: Progressing     Problem: Metabolic/Fluid and Electrolytes - Adult  Goal: Electrolytes maintained within normal limits  12/30/2024 2341 by Richard Ross RN  Outcome: Progressing  12/30/2024 1143 by Russ Wasserman RN  Outcome: Progressing  Goal: Hemodynamic stability and

## 2025-01-01 LAB
ALBUMIN SERPL-MCNC: 3.5 G/DL (ref 3.4–5)
ANION GAP SERPL CALCULATED.3IONS-SCNC: 14 MMOL/L (ref 3–16)
BUN SERPL-MCNC: 28 MG/DL (ref 7–20)
CALCIUM SERPL-MCNC: 8.5 MG/DL (ref 8.3–10.6)
CHLORIDE SERPL-SCNC: 94 MMOL/L (ref 99–110)
CO2 SERPL-SCNC: 23 MMOL/L (ref 21–32)
CREAT SERPL-MCNC: 3.3 MG/DL (ref 0.8–1.3)
DEPRECATED RDW RBC AUTO: 23.9 % (ref 12.4–15.4)
GFR SERPLBLD CREATININE-BSD FMLA CKD-EPI: 20 ML/MIN/{1.73_M2}
GLUCOSE BLD-MCNC: 112 MG/DL (ref 70–99)
GLUCOSE BLD-MCNC: 114 MG/DL (ref 70–99)
GLUCOSE BLD-MCNC: 122 MG/DL (ref 70–99)
GLUCOSE BLD-MCNC: 87 MG/DL (ref 70–99)
GLUCOSE SERPL-MCNC: 98 MG/DL (ref 70–99)
HCT VFR BLD AUTO: 26.6 % (ref 40.5–52.5)
HGB BLD-MCNC: 8.7 G/DL (ref 13.5–17.5)
MCH RBC QN AUTO: 28.8 PG (ref 26–34)
MCHC RBC AUTO-ENTMCNC: 32.5 G/DL (ref 31–36)
MCV RBC AUTO: 88.7 FL (ref 80–100)
PERFORMED ON: ABNORMAL
PERFORMED ON: NORMAL
PHOSPHATE SERPL-MCNC: 2 MG/DL (ref 2.5–4.9)
PLATELET # BLD AUTO: 189 K/UL (ref 135–450)
PMV BLD AUTO: 8.6 FL (ref 5–10.5)
POTASSIUM SERPL-SCNC: 4.2 MMOL/L (ref 3.5–5.1)
RBC # BLD AUTO: 3 M/UL (ref 4.2–5.9)
SODIUM SERPL-SCNC: 131 MMOL/L (ref 136–145)
WBC # BLD AUTO: 4.7 K/UL (ref 4–11)

## 2025-01-01 PROCEDURE — 6370000000 HC RX 637 (ALT 250 FOR IP): Performed by: INTERNAL MEDICINE

## 2025-01-01 PROCEDURE — 6370000000 HC RX 637 (ALT 250 FOR IP): Performed by: NURSE PRACTITIONER

## 2025-01-01 PROCEDURE — 80069 RENAL FUNCTION PANEL: CPT

## 2025-01-01 PROCEDURE — 36415 COLL VENOUS BLD VENIPUNCTURE: CPT

## 2025-01-01 PROCEDURE — 6370000000 HC RX 637 (ALT 250 FOR IP)

## 2025-01-01 PROCEDURE — 2580000003 HC RX 258: Performed by: INTERNAL MEDICINE

## 2025-01-01 PROCEDURE — 2500000003 HC RX 250 WO HCPCS: Performed by: INTERNAL MEDICINE

## 2025-01-01 PROCEDURE — 94760 N-INVAS EAR/PLS OXIMETRY 1: CPT

## 2025-01-01 PROCEDURE — 97530 THERAPEUTIC ACTIVITIES: CPT

## 2025-01-01 PROCEDURE — 85027 COMPLETE CBC AUTOMATED: CPT

## 2025-01-01 PROCEDURE — 2500000003 HC RX 250 WO HCPCS

## 2025-01-01 PROCEDURE — 2060000000 HC ICU INTERMEDIATE R&B

## 2025-01-01 RX ADMIN — AMIODARONE HYDROCHLORIDE 200 MG: 200 TABLET ORAL at 08:37

## 2025-01-01 RX ADMIN — SODIUM PHOSPHATE, MONOBASIC, MONOHYDRATE AND SODIUM PHOSPHATE, DIBASIC, ANHYDROUS 20 MMOL: 142; 276 INJECTION, SOLUTION INTRAVENOUS at 16:54

## 2025-01-01 RX ADMIN — ASPIRIN 81 MG 81 MG: 81 TABLET ORAL at 08:37

## 2025-01-01 RX ADMIN — APIXABAN 5 MG: 5 TABLET, FILM COATED ORAL at 20:10

## 2025-01-01 RX ADMIN — AMIODARONE HYDROCHLORIDE 200 MG: 200 TABLET ORAL at 20:11

## 2025-01-01 RX ADMIN — SODIUM CHLORIDE, PRESERVATIVE FREE 10 ML: 5 INJECTION INTRAVENOUS at 20:12

## 2025-01-01 RX ADMIN — ACETAMINOPHEN 650 MG: 325 TABLET ORAL at 08:40

## 2025-01-01 RX ADMIN — ATORVASTATIN CALCIUM 80 MG: 80 TABLET, FILM COATED ORAL at 20:10

## 2025-01-01 RX ADMIN — QUETIAPINE FUMARATE 25 MG: 25 TABLET ORAL at 20:10

## 2025-01-01 RX ADMIN — SODIUM CHLORIDE, PRESERVATIVE FREE 10 ML: 5 INJECTION INTRAVENOUS at 08:41

## 2025-01-01 RX ADMIN — APIXABAN 5 MG: 5 TABLET, FILM COATED ORAL at 08:37

## 2025-01-01 RX ADMIN — POLYETHYLENE GLYCOL 3350 17 G: 17 POWDER, FOR SOLUTION ORAL at 08:38

## 2025-01-01 RX ADMIN — METOPROLOL SUCCINATE 25 MG: 25 TABLET, EXTENDED RELEASE ORAL at 18:09

## 2025-01-01 RX ADMIN — PANTOPRAZOLE SODIUM 40 MG: 40 TABLET, DELAYED RELEASE ORAL at 05:25

## 2025-01-01 RX ADMIN — SODIUM CHLORIDE, PRESERVATIVE FREE 10 ML: 5 INJECTION INTRAVENOUS at 08:38

## 2025-01-01 ASSESSMENT — PAIN DESCRIPTION - LOCATION: LOCATION: LEG

## 2025-01-01 ASSESSMENT — PAIN DESCRIPTION - ORIENTATION: ORIENTATION: RIGHT

## 2025-01-01 ASSESSMENT — PAIN SCALES - GENERAL
PAINLEVEL_OUTOF10: 0
PAINLEVEL_OUTOF10: 0

## 2025-01-01 ASSESSMENT — PAIN DESCRIPTION - DESCRIPTORS: DESCRIPTORS: ACHING;DISCOMFORT

## 2025-01-01 NOTE — CARE COORDINATION
SW received call back from Leandro at [facility] stating that they have the precert back. They now need ambulance transportation vs wheelchair to get to HD treatment. They sent a referral packet to a provider and they will likely not hear back from them today. If they call today and confirm he will call us back so they can set up transport.     Respectfully submitted,    Vannessa SONI, ERIN  [facility]   [phone]    Electronically signed by NAE Sims, CHLOE on 1/1/2025 at 12:15 PM

## 2025-01-01 NOTE — PLAN OF CARE
Problem: Chronic Conditions and Co-morbidities  Goal: Patient's chronic conditions and co-morbidity symptoms are monitored and maintained or improved  Outcome: Progressing     Problem: Pain  Goal: Verbalizes/displays adequate comfort level or baseline comfort level  Outcome: Progressing     Problem: Safety - Adult  Goal: Free from fall injury  Outcome: Progressing     Problem: Respiratory - Adult  Goal: Achieves optimal ventilation and oxygenation  Outcome: Progressing     Problem: Cardiovascular - Adult  Goal: Maintains optimal cardiac output and hemodynamic stability  Outcome: Progressing     Problem: Cardiovascular - Adult  Goal: Absence of cardiac dysrhythmias or at baseline  Outcome: Progressing     Problem: Metabolic/Fluid and Electrolytes - Adult  Goal: Electrolytes maintained within normal limits  Outcome: Progressing     Problem: Metabolic/Fluid and Electrolytes - Adult  Goal: Hemodynamic stability and optimal renal function maintained  Outcome: Progressing     Problem: Metabolic/Fluid and Electrolytes - Adult  Goal: Glucose maintained within prescribed range  Outcome: Progressing     Problem: Neurosensory - Adult  Goal: Achieves stable or improved neurological status  Outcome: Progressing     Problem: Skin/Tissue Integrity - Adult  Goal: Skin integrity remains intact  Outcome: Progressing  Flowsheets (Taken 1/1/2025 1047)  Skin Integrity Remains Intact: Monitor for areas of redness and/or skin breakdown     Problem: Skin/Tissue Integrity - Adult  Goal: Incisions, wounds, or drain sites healing without S/S of infection  Outcome: Progressing  Flowsheets (Taken 1/1/2025 1047)  Incisions, Wounds, or Drain Sites Healing Without Sign and Symptoms of Infection: ADMISSION and DAILY: Assess and document risk factors for pressure ulcer development     Problem: Musculoskeletal - Adult  Goal: Return mobility to safest level of function  Outcome: Progressing     Problem: Gastrointestinal - Adult  Goal: Minimal or  absence of nausea and vomiting  Outcome: Progressing     Problem: Genitourinary - Adult  Goal: Urinary catheter remains patent  Outcome: Progressing     Problem: ABCDS Injury Assessment  Goal: Absence of physical injury  Outcome: Progressing     Problem: Nutrition Deficit:  Goal: Optimize nutritional status  Outcome: Progressing     Problem: Discharge Planning  Goal: Discharge to home or other facility with appropriate resources  Outcome: Progressing

## 2025-01-01 NOTE — CARE COORDINATION
01/01/25 0907   Avoidable Days   [facility] sent a message through the [facility] AND left a message for [facility] advising that the patient will likely not start services with them until Saturday. CLYDE advised that we were still waiting on insurance authorization for nursing home placement.     CLYDE reached out to Leandro this morning at [facility] to let them know that we cancelled dialysis treatment at [facility] for Thursday. His next treatment is not due until Saturday.     [facility]    Respectfully submitted,    Vannessa SONI, ERIN  [facility]   [phone]    Electronically signed by NAE Sims, CHLOE on 1/1/2025 at 9:04 AM

## 2025-01-01 NOTE — PROGRESS NOTES
Nephrology Progress Note   [employer]      Reason for consultation:  KENDRA on CKD 2 -- baseline Cr ~ 0.9-1.3 mg/dL     HPI: Levy Vargas is a 58 yo male with a PMHx of CKD 2, h/o AKIs, HFrEF, non-compliance, DM, HTN, afib, h/o cocaine use, CAD, HLD. Patient presents to  ED on 12/11/2024 with complaints of BLE edema, lightheadedness, and weakness. Patient is currently drowsy and it is difficult to obtain history. Currently oriented x3. Patient tells me he has been taking his CHF medications everyday. Per chart review, there has been some questions regarding his outpatient compliance and not keeping in touch with his CHF nurses. Pt is currently grossly volume overloaded. He weighed 131.5 kg on 9/25/2024 during his last cardiology appt. He currently weighs 145.3 kg. Currently dyspneic and on 4 L NC. CXR is negative for acute findings. In afib RVR this admission. On amio gtt.     We have been consulted for KENDRA on CKD 2 management. Cr upon admission was 1.3 mg/dL. Today, Cr is 2.6 mg/dL. Patient received a generous amount of IVP and oral diuretics and was also started on a lasix gtt at 10 mg/hr. Despite this, pt has remained anuric    CRRT initiated 12/13/24   CRRT stopped 12/24/24    Subjective:    TDC placed 12/26/24; Out patient HD [facility]    Allergies:  No Known Allergies     Scheduled Meds:   pantoprazole  40 mg Oral QAM AC    epoetin iván-epbx  10,000 Units SubCUTAneous Once per day on Tuesday Thursday Saturday    apixaban  5 mg Oral BID    lidocaine  1 patch TransDERmal Daily    QUEtiapine  25 mg Oral Nightly    insulin lispro  0-4 Units SubCUTAneous 4x Daily WC    amiodarone  200 mg Per NG tube BID    [Held by provider] spironolactone  25 mg Oral Daily    [Held by provider] gabapentin  300 mg Oral Nightly    aspirin  81 mg Oral Daily    atorvastatin  80 mg Oral Nightly    metoprolol succinate  25 mg Oral QPM    [Held by provider] sacubitril-valsartan  1 tablet Oral BID    sodium chloride flush  5-40

## 2025-01-01 NOTE — PROGRESS NOTES
Physical Therapy  Facility/Department: [address] PROGRESSIVE CARE  Daily Treatment Note  NAME: Levy Vargas  : 1965  MRN: [id number]    Date of Service: 2025    Discharge Recommendations:  Subacute/Skilled Nursing Facility, Patient would benefit from continued therapy after discharge   PT Equipment Recommendations  Other: Will monitor for potential equipt needs.    Patient Diagnosis(es): The primary encounter diagnosis was Atrial fibrillation with rapid ventricular response (HCC). A diagnosis of Persistent atrial fibrillation (HCC) was also pertinent to this visit.    Assessment  Assessment: Pt required Mod encouragement to transfer to bedside chair.  Maxi Move required for transfer.  Pt was encouraged to sit up for 1-2 hours.  Pt requires additional therapy in the acute setting to improve strength, balance, endurance, and independence with mobility tasks.  Pt would benefit from continued therapy at low-moderate frequency upon D/C to continue to address these deficits.     Levy Vargas scored a  on the AM-PAC short mobility form. Current research shows that an AM-PAC score of 17 or less is typically not associated with a discharge to the patient's home setting. Based on the patient's AM-PAC score and their current functional mobility deficits, it is recommended that the patient have 3-5 sessions per week of Physical Therapy at d/c to increase the patient's independence.  Please see assessment section for further patient specific details.    If patient discharges prior to next session this note will serve as a discharge summary.  Please see below for the latest assessment towards goals.       Other: Will monitor for potential equipt needs.    Plan  Physical Therapy Plan  General Plan: 3-5 times per week  Current Treatment Recommendations: Strengthening;Therapeutic activities;Balance training;Functional mobility training;Transfer training;Gait training;Safety education & training;Patient/Caregiver

## 2025-01-01 NOTE — PROGRESS NOTES
V2.0  [facility] Hospitalist Progress Note      Name:  Levy Vargas /Age/Sex: 1965  (59 y.o. male)   MRN & CSN:  [id number] & [id number] Encounter Date/Time: 2025 12:13 PM EST    Location:  [id number]-[id number] PCP: No primary care provider on file.       Hospital Day:   Subjective:   Chief Complaint: f/u sob    Patient denies any new complaints, knows that we are waiting for precertification approval    Review of Systems:    Review of Systems    10 point ROS negative except as stated above in \"subjective\" section    Objective:     Intake/Output Summary (Last 24 hours) at 2025 1213  Last data filed at 2025 0645  Gross per 24 hour   Intake 840 ml   Output 0 ml   Net 840 ml      Vitals:   Vitals:    25 1117   BP: 97/67   Pulse: 70   Resp: 18   Temp: 98.6 °F (37 °C)   SpO2: 97%     Physical Exam:   General: Awake, alert and oriented, NAD  Cardiovascular: S1S2 present, regular rate and rhythm, no murmurs  Respiratory: Diminished at bases bilaterally  Gastrointestinal: Soft, non tender, positive bowel sounds   Genitourinary: no suprapubic tenderness  Musculoskeletal: No edema  Neuro: Alert.    Medications:     Medications:    pantoprazole  40 mg Oral QAM AC    epoetin iván-epbx  10,000 Units SubCUTAneous Once per day on     apixaban  5 mg Oral BID    lidocaine  1 patch TransDERmal Daily    QUEtiapine  25 mg Oral Nightly    insulin lispro  0-4 Units SubCUTAneous 4x Daily WC    amiodarone  200 mg Per NG tube BID    [Held by provider] spironolactone  25 mg Oral Daily    [Held by provider] gabapentin  300 mg Oral Nightly    aspirin  81 mg Oral Daily    atorvastatin  80 mg Oral Nightly    metoprolol succinate  25 mg Oral QPM    [Held by provider] sacubitril-valsartan  1 tablet Oral BID    sodium chloride flush  5-40 mL IntraVENous 2 times per day      Infusions:    dextrose      milrinone Stopped (24 1229)    sodium chloride Stopped (24 0948)     PRN Meds: glucose, 4

## 2025-01-01 NOTE — PLAN OF CARE
Problem: Chronic Conditions and Co-morbidities  Goal: Patient's chronic conditions and co-morbidity symptoms are monitored and maintained or improved  Outcome: Progressing     Problem: Pain  Goal: Verbalizes/displays adequate comfort level or baseline comfort level  Outcome: Progressing     Problem: Safety - Adult  Goal: Free from fall injury  Outcome: Progressing     Problem: Respiratory - Adult  Goal: Achieves optimal ventilation and oxygenation  Outcome: Progressing     Problem: Cardiovascular - Adult  Goal: Maintains optimal cardiac output and hemodynamic stability  Outcome: Progressing  Goal: Absence of cardiac dysrhythmias or at baseline  Outcome: Progressing     Problem: Metabolic/Fluid and Electrolytes - Adult  Goal: Electrolytes maintained within normal limits  Outcome: Progressing  Goal: Hemodynamic stability and optimal renal function maintained  Outcome: Progressing  Goal: Glucose maintained within prescribed range  Outcome: Progressing     Problem: Neurosensory - Adult  Goal: Achieves stable or improved neurological status  Outcome: Progressing     Problem: Skin/Tissue Integrity - Adult  Goal: Skin integrity remains intact  Outcome: Progressing  Goal: Incisions, wounds, or drain sites healing without S/S of infection  Outcome: Progressing     Problem: Musculoskeletal - Adult  Goal: Return mobility to safest level of function  Outcome: Progressing     Problem: Gastrointestinal - Adult  Goal: Minimal or absence of nausea and vomiting  Outcome: Progressing     Problem: Genitourinary - Adult  Goal: Urinary catheter remains patent  Outcome: Progressing     Problem: ABCDS Injury Assessment  Goal: Absence of physical injury  Outcome: Progressing     Problem: Nutrition Deficit:  Goal: Optimize nutritional status  Outcome: Progressing     Problem: Discharge Planning  Goal: Discharge to home or other facility with appropriate resources  Outcome: Progressing

## 2025-01-01 NOTE — CARE COORDINATION
01/01/25 1338   Avoidable Days   Community/External       Dialysis     Dialysis related transportation. Waiting on confirmation that it's set up.     Respectfully submitted,    Vannessa SONI, ERIN  [facility]   [phone]    Electronically signed by NAE Sims, LSW on 1/1/2025 at 1:38 PM

## 2025-01-02 LAB
ALBUMIN SERPL-MCNC: 3.5 G/DL (ref 3.4–5)
ANION GAP SERPL CALCULATED.3IONS-SCNC: 14 MMOL/L (ref 3–16)
BUN SERPL-MCNC: 35 MG/DL (ref 7–20)
CALCIUM SERPL-MCNC: 8.6 MG/DL (ref 8.3–10.6)
CHLORIDE SERPL-SCNC: 96 MMOL/L (ref 99–110)
CO2 SERPL-SCNC: 22 MMOL/L (ref 21–32)
CREAT SERPL-MCNC: 3.5 MG/DL (ref 0.8–1.3)
DEPRECATED RDW RBC AUTO: 24.4 % (ref 12.4–15.4)
GFR SERPLBLD CREATININE-BSD FMLA CKD-EPI: 19 ML/MIN/{1.73_M2}
GLUCOSE BLD-MCNC: 105 MG/DL (ref 70–99)
GLUCOSE BLD-MCNC: 120 MG/DL (ref 70–99)
GLUCOSE BLD-MCNC: 132 MG/DL (ref 70–99)
GLUCOSE BLD-MCNC: 84 MG/DL (ref 70–99)
GLUCOSE SERPL-MCNC: 126 MG/DL (ref 70–99)
HCT VFR BLD AUTO: 26.7 % (ref 40.5–52.5)
HGB BLD-MCNC: 8.7 G/DL (ref 13.5–17.5)
MCH RBC QN AUTO: 28.9 PG (ref 26–34)
MCHC RBC AUTO-ENTMCNC: 32.5 G/DL (ref 31–36)
MCV RBC AUTO: 89.1 FL (ref 80–100)
PERFORMED ON: ABNORMAL
PERFORMED ON: NORMAL
PHOSPHATE SERPL-MCNC: 3.1 MG/DL (ref 2.5–4.9)
PLATELET # BLD AUTO: 192 K/UL (ref 135–450)
PMV BLD AUTO: 9 FL (ref 5–10.5)
POTASSIUM SERPL-SCNC: 4.6 MMOL/L (ref 3.5–5.1)
RBC # BLD AUTO: 3 M/UL (ref 4.2–5.9)
SODIUM SERPL-SCNC: 132 MMOL/L (ref 136–145)
WBC # BLD AUTO: 5.3 K/UL (ref 4–11)

## 2025-01-02 PROCEDURE — 90935 HEMODIALYSIS ONE EVALUATION: CPT

## 2025-01-02 PROCEDURE — 6370000000 HC RX 637 (ALT 250 FOR IP): Performed by: INTERNAL MEDICINE

## 2025-01-02 PROCEDURE — 2500000003 HC RX 250 WO HCPCS

## 2025-01-02 PROCEDURE — 85027 COMPLETE CBC AUTOMATED: CPT

## 2025-01-02 PROCEDURE — 36415 COLL VENOUS BLD VENIPUNCTURE: CPT

## 2025-01-02 PROCEDURE — 6370000000 HC RX 637 (ALT 250 FOR IP)

## 2025-01-02 PROCEDURE — 6360000002 HC RX W HCPCS: Performed by: INTERNAL MEDICINE

## 2025-01-02 PROCEDURE — 6370000000 HC RX 637 (ALT 250 FOR IP): Performed by: NURSE PRACTITIONER

## 2025-01-02 PROCEDURE — 2060000000 HC ICU INTERMEDIATE R&B

## 2025-01-02 PROCEDURE — 80069 RENAL FUNCTION PANEL: CPT

## 2025-01-02 PROCEDURE — 94760 N-INVAS EAR/PLS OXIMETRY 1: CPT

## 2025-01-02 RX ADMIN — QUETIAPINE FUMARATE 25 MG: 25 TABLET ORAL at 20:52

## 2025-01-02 RX ADMIN — HEPARIN SODIUM 3600 UNITS: 1000 INJECTION INTRAVENOUS; SUBCUTANEOUS at 09:26

## 2025-01-02 RX ADMIN — METOPROLOL SUCCINATE 25 MG: 25 TABLET, EXTENDED RELEASE ORAL at 18:34

## 2025-01-02 RX ADMIN — AMIODARONE HYDROCHLORIDE 200 MG: 200 TABLET ORAL at 20:52

## 2025-01-02 RX ADMIN — APIXABAN 5 MG: 5 TABLET, FILM COATED ORAL at 20:51

## 2025-01-02 RX ADMIN — ACETAMINOPHEN 650 MG: 325 TABLET ORAL at 20:50

## 2025-01-02 RX ADMIN — SODIUM CHLORIDE, PRESERVATIVE FREE 10 ML: 5 INJECTION INTRAVENOUS at 12:21

## 2025-01-02 RX ADMIN — ATORVASTATIN CALCIUM 80 MG: 80 TABLET, FILM COATED ORAL at 20:52

## 2025-01-02 RX ADMIN — SODIUM CHLORIDE, PRESERVATIVE FREE 10 ML: 5 INJECTION INTRAVENOUS at 20:52

## 2025-01-02 RX ADMIN — PANTOPRAZOLE SODIUM 40 MG: 40 TABLET, DELAYED RELEASE ORAL at 05:48

## 2025-01-02 RX ADMIN — EPOETIN ALFA-EPBX 10000 UNITS: 10000 INJECTION, SOLUTION INTRAVENOUS; SUBCUTANEOUS at 09:26

## 2025-01-02 ASSESSMENT — PAIN SCALES - GENERAL
PAINLEVEL_OUTOF10: 4
PAINLEVEL_OUTOF10: 4
PAINLEVEL_OUTOF10: 6

## 2025-01-02 ASSESSMENT — PAIN DESCRIPTION - LOCATION: LOCATION: BACK

## 2025-01-02 ASSESSMENT — PAIN DESCRIPTION - ORIENTATION: ORIENTATION: MID

## 2025-01-02 ASSESSMENT — PAIN DESCRIPTION - DESCRIPTORS: DESCRIPTORS: PRESSURE

## 2025-01-02 ASSESSMENT — PAIN - FUNCTIONAL ASSESSMENT: PAIN_FUNCTIONAL_ASSESSMENT: PREVENTS OR INTERFERES SOME ACTIVE ACTIVITIES AND ADLS

## 2025-01-02 NOTE — PROGRESS NOTES
V2.0  [facility] Hospitalist Progress Note      Name:  Levy Vargas /Age/Sex: 1965  (59 y.o. male)   MRN & CSN:  [id number] & [id number] Encounter Date/Time: 2025 1:27 PM EST    Location:  [id number] PCP: No primary care provider on file.       Hospital Day:   Subjective:   Chief Complaint: Follow-up shortness of breath    Patient seen and evaluated bedside, denies any complaints, underwent dialysis, saw him after dialysis.    Review of Systems:    Review of Systems    10 point ROS negative except as stated above in \"subjective\" section    Objective:     Intake/Output Summary (Last 24 hours) at 2025 1327  Last data filed at 2025 2118  Gross per 24 hour   Intake --   Output 600 ml   Net -600 ml      Vitals:   Vitals:    25 1110   BP: 123/75   Pulse: 65   Resp: 18   Temp: 97.5 °F (36.4 °C)   SpO2: 95%     Physical Exam:   General: Awake, alert and oriented, NAD  Cardiovascular: S1S2 present, regular rate and rhythm, no murmurs  Respiratory: Clear to auscultation  Gastrointestinal: Soft, non tender, positive bowel sounds   Genitourinary: no suprapubic tenderness    Medications:     Medications:    pantoprazole  40 mg Oral QAM AC    epoetin iván-epbx  10,000 Units SubCUTAneous Once per day on     apixaban  5 mg Oral BID    lidocaine  1 patch TransDERmal Daily    QUEtiapine  25 mg Oral Nightly    insulin lispro  0-4 Units SubCUTAneous 4x Daily WC    amiodarone  200 mg Per NG tube BID    [Held by provider] spironolactone  25 mg Oral Daily    [Held by provider] gabapentin  300 mg Oral Nightly    aspirin  81 mg Oral Daily    atorvastatin  80 mg Oral Nightly    metoprolol succinate  25 mg Oral QPM    [Held by provider] sacubitril-valsartan  1 tablet Oral BID    sodium chloride flush  5-40 mL IntraVENous 2 times per day      Infusions:    dextrose      milrinone Stopped (24 1229)    sodium chloride Stopped (24 0948)     PRN Meds: glucose, 4 tablet,

## 2025-01-02 NOTE — PLAN OF CARE
Problem: Chronic Conditions and Co-morbidities  Goal: Patient's chronic conditions and co-morbidity symptoms are monitored and maintained or improved  1/2/2025 1023 by Russ Wasserman RN  Outcome: Progressing     Problem: Pain  Goal: Verbalizes/displays adequate comfort level or baseline comfort level  1/2/2025 1023 by Russ Wasserman RN  Outcome: Progressing     Problem: Safety - Adult  Goal: Free from fall injury  1/2/2025 1023 by Russ Wasserman RN  Outcome: Progressing     Problem: Respiratory - Adult  Goal: Achieves optimal ventilation and oxygenation  1/2/2025 1023 by Russ Wasserman RN  Outcome: Progressing     Problem: Cardiovascular - Adult  Goal: Maintains optimal cardiac output and hemodynamic stability  1/2/2025 1023 by Russ Wasserman RN  Outcome: Progressing     Problem: Cardiovascular - Adult  Goal: Absence of cardiac dysrhythmias or at baseline  1/2/2025 1023 by Russ Wasserman RN  Outcome: Progressing     Problem: Metabolic/Fluid and Electrolytes - Adult  Goal: Electrolytes maintained within normal limits  1/2/2025 1023 by Russ Wasserman RN  Outcome: Progressing     Problem: Metabolic/Fluid and Electrolytes - Adult  Goal: Hemodynamic stability and optimal renal function maintained  1/2/2025 1023 by Russ Wasserman RN  Outcome: Progressing     Problem: Metabolic/Fluid and Electrolytes - Adult  Goal: Glucose maintained within prescribed range  1/2/2025 1023 by Russ Wasserman RN  Outcome: Progressing     Problem: Neurosensory - Adult  Goal: Achieves stable or improved neurological status  1/2/2025 1023 by Russ Wasserman RN  Outcome: Progressing     Problem: Skin/Tissue Integrity - Adult  Goal: Skin integrity remains intact  1/2/2025 1023 by Russ Wasserman RN  Outcome: Progressing     Problem: Skin/Tissue Integrity - Adult  Goal: Incisions, wounds, or drain sites healing without S/S of infection  1/2/2025 1023 by Russ Wasserman RN  Outcome: Progressing     Problem: Musculoskeletal -

## 2025-01-02 NOTE — FLOWSHEET NOTE
Controlled on current medication.  Continue effexor and Abilify.    Pt back from HD. Vitals listed below. Fall precautions in place. Call light within reach. Care ongoing. Electronically signed by Russ Wasserman RN on 1/2/25 at 11:11 AM EST    01/02/25 1110   Vital Signs   Temp 97.5 °F (36.4 °C)   Temp Source Oral   Pulse 65   Heart Rate Source Monitor   Respirations 18   /75   MAP (Calculated) 91   BP Location Right upper arm   BP Method Automatic   Patient Position Sitting   Pain Assessment   Pain Assessment None - Denies Pain   Oxygen Therapy   SpO2 95 %   Pulse Oximetry Type Intermittent   Pulse Oximeter Device Mode Intermittent   Pulse Oximeter Device Location Finger   O2 Device None (Room air)

## 2025-01-02 NOTE — CARE COORDINATION
Spoke to Leandro at [facility], informs me they are still awaiting transportation to & from  to be approved by insurance. Says he can't accept until this gets approved and finalized.     Electronically signed by Eileen Joy RN Case Management on 1/2/2025 at 9:29 AM

## 2025-01-02 NOTE — PLAN OF CARE
Problem: Chronic Conditions and Co-morbidities  Goal: Patient's chronic conditions and co-morbidity symptoms are monitored and maintained or improved  1/1/2025 2119 by Ngco Calvin RN  Outcome: Progressing  1/1/2025 2119 by Ngoc Calvin RN  Outcome: Progressing  1/1/2025 1216 by Tanya Jones RN  Outcome: Progressing     Problem: Pain  Goal: Verbalizes/displays adequate comfort level or baseline comfort level  1/1/2025 2119 by Ngoc Calvin RN  Outcome: Progressing  1/1/2025 2119 by Ngoc Calvin RN  Outcome: Progressing  1/1/2025 1216 by Tanya Jones RN  Outcome: Progressing     Problem: Safety - Adult  Goal: Free from fall injury  1/1/2025 2119 by Ngoc Calvin RN  Outcome: Progressing  1/1/2025 2119 by Ngoc Calvin RN  Outcome: Progressing  1/1/2025 1216 by Tanya Jones RN  Outcome: Progressing     Problem: Respiratory - Adult  Goal: Achieves optimal ventilation and oxygenation  1/1/2025 2119 by Ngoc Calvin RN  Outcome: Progressing  1/1/2025 2119 by Ngoc Calvin RN  Outcome: Progressing  1/1/2025 1216 by Tanya Jones RN  Outcome: Progressing     Problem: Cardiovascular - Adult  Goal: Maintains optimal cardiac output and hemodynamic stability  1/1/2025 2119 by Ngoc Calvin RN  Outcome: Progressing  1/1/2025 2119 by Ngoc Calvin RN  Outcome: Progressing  1/1/2025 1216 by Tanya Jones RN  Outcome: Progressing  Goal: Absence of cardiac dysrhythmias or at baseline  1/1/2025 2119 by Ngoc Calvin RN  Outcome: Progressing  1/1/2025 2119 by Ngoc Calvin RN  Outcome: Progressing  1/1/2025 1216 by Tanya Jones RN  Outcome: Progressing     Problem: Metabolic/Fluid and Electrolytes - Adult  Goal: Electrolytes maintained within normal limits  1/1/2025 2119 by Ngoc Calvin RN  Outcome: Progressing  1/1/2025 2119 by Nikunj, Ngoc, RN  Outcome: Progressing  1/1/2025 1216 by Tanya Jones, RN  Outcome: Progressing  Goal:

## 2025-01-02 NOTE — PROGRESS NOTES
Physical Therapy  Attempt    Pt eating lunch.  Wants to finish lunch.     Electronically signed by Juan Jose Daniels, PT [id number] on 1/2/2025 at 1:46 PM

## 2025-01-02 NOTE — PROGRESS NOTES
Treatment time: 3 hours    Net UF: 3 liters as he was 20.3  kilos above dry weight, doctor Kj was aware of it.    Pre weight: 137.8 kg  Post weight: 134.8 kg  EDW: 117.5 kg    Access used: Right CVC  Access function: Access site located on the Right chest wall. Had clean dry and intact CVC dressing. No signs of infection noted. No redness, hematoma, nor swelling was observed. No discharges was seen. Both ports had good flow. Cleaned hub aseptically by Starr.     Medications or blood products given: Retacrit 10, 000 units. Heparin dwell. Arterial: 1.8 ml, Venous: 1.8 ml    Regular outpatient schedule: Tues, Thurs, Sat.    Summary of response to treatment: 07:37: Treatment set at 3 liters for 3 hours via Right CVC. Access site located on the Right chest wall. Had clean dry and intact CVC dressing. No signs of infection noted. No redness, hematoma, nor swelling was observed. No discharges was seen. Both ports had good flow. Cleaned hub aseptically by Starr. Parameters set accordingly. Hooked to HD machine by Starr. Monitored from time to time.10:38: Treatment completed Returned Blood aseptically. Hd tolerated well.     Copy of dialysis treatment record placed in chart, to be scanned into EMR.

## 2025-01-02 NOTE — PROGRESS NOTES
Nephrology Progress Note   [email or web address]      Reason for consultation:  KENDRA on CKD 2 -- baseline Cr ~ 0.9-1.3 mg/dL     HPI: Levy Vargas is a 58 yo male with a PMHx of CKD 2, h/o AKIs, HFrEF, non-compliance, DM, HTN, afib, h/o cocaine use, CAD, HLD. Patient presents to  ED on 12/11/2024 with complaints of BLE edema, lightheadedness, and weakness. Patient is currently drowsy and it is difficult to obtain history. Currently oriented x3. Patient tells me he has been taking his CHF medications everyday. Per chart review, there has been some questions regarding his outpatient compliance and not keeping in touch with his CHF nurses. Pt is currently grossly volume overloaded. He weighed 131.5 kg on 9/25/2024 during his last cardiology appt. He currently weighs 145.3 kg. Currently dyspneic and on 4 L NC. CXR is negative for acute findings. In afib RVR this admission. On amio gtt.     We have been consulted for KENDRA on CKD 2 management. Cr upon admission was 1.3 mg/dL. Today, Cr is 2.6 mg/dL. Patient received a generous amount of IVP and oral diuretics and was also started on a lasix gtt at 10 mg/hr. Despite this, pt has remained anuric    CRRT initiated 12/13/24   CRRT stopped 12/24/24    Subjective:    Labs and chart reviewed. Seen on HD -- net 3.5 L UF and 3 K bath. Wts today are inaccurate. Planning on d/c to [facility] and HD at [facility] -- awaiting transportation approval to HD.     Allergies:  No Known Allergies     Scheduled Meds:   pantoprazole  40 mg Oral QAM AC    epoetin iván-epbx  10,000 Units SubCUTAneous Once per day on Tuesday Thursday Saturday    apixaban  5 mg Oral BID    lidocaine  1 patch TransDERmal Daily    QUEtiapine  25 mg Oral Nightly    insulin lispro  0-4 Units SubCUTAneous 4x Daily WC    amiodarone  200 mg Per NG tube BID    [Held by provider] spironolactone  25 mg Oral Daily    [Held by provider] gabapentin  300 mg Oral Nightly    aspirin  81 mg Oral Daily    atorvastatin  80 mg  Oral Nightly    metoprolol succinate  25 mg Oral QPM    [Held by provider] sacubitril-valsartan  1 tablet Oral BID    sodium chloride flush  5-40 mL IntraVENous 2 times per day        dextrose      milrinone Stopped (24 1229)    sodium chloride Stopped (24 0948)       PRN Meds:glucose, dextrose bolus **OR** dextrose bolus, glucagon (rDNA), dextrose, heparin (porcine), acetaminophen, melatonin, guaiFENesin-dextromethorphan, pantoprazole, sodium chloride flush, sodium chloride, ondansetron **OR** ondansetron, polyethylene glycol    Physical Exam:    TEMPERATURE:  Current - Temp: 98.2 °F (36.8 °C); Max - Temp  Av.2 °F (36.8 °C)  Min: 97.3 °F (36.3 °C)  Max: 98.6 °F (37 °C)  RESPIRATIONS RANGE: Resp  Av  Min: 18  Max: 18  PULSE RANGE: Pulse  Av.2  Min: 70  Max: 77  BLOOD PRESSURE RANGE:  Systolic (24hrs), Av , Min:97 , Max:130   ; Diastolic (24hrs), Av, Min:60, Max:83    24HR INTAKE/OUTPUT:    Intake/Output Summary (Last 24 hours) at 2025 1014  Last data filed at 2025 2118  Gross per 24 hour   Intake --   Output 600 ml   Net -600 ml       Patient Vitals for the past 96 hrs (Last 3 readings):   Weight   25 0732 (!) 137.8 kg (303 lb 12.7 oz)   25 0402 (!) 137.8 kg (303 lb 12.7 oz)   25 0418 (!) 137.5 kg (303 lb 2.1 oz)       General: Awake  HEENT: Normocephalic, atraumatic  Chest: diminished to auscultation  CVS: RRR  Abdomen: soft, non tender  Extremities: 1+ pitting edema to ankles  R TDC in place       LAB DATA:    CBC:   Lab Results   Component Value Date/Time    WBC 5.3 2025 04:18 AM    RBC 3.00 2025 04:18 AM    HGB 8.7 2025 04:18 AM    HCT 26.7 2025 04:18 AM    MCV 89.1 2025 04:18 AM    MCH 28.9 2025 04:18 AM    MCHC 32.5 2025 04:18 AM    RDW 24.4 2025 04:18 AM     2025 04:18 AM    MPV 9.0 2025 04:18 AM     BMP:    Lab Results   Component Value Date/Time     2025 04:18 AM    K

## 2025-01-03 VITALS
DIASTOLIC BLOOD PRESSURE: 50 MMHG | TEMPERATURE: 98.1 F | WEIGHT: 301.94 LBS | SYSTOLIC BLOOD PRESSURE: 90 MMHG | OXYGEN SATURATION: 93 % | RESPIRATION RATE: 18 BRPM | HEIGHT: 73 IN | HEART RATE: 73 BPM | BODY MASS INDEX: 40.02 KG/M2

## 2025-01-03 LAB
ALBUMIN SERPL-MCNC: 3.4 G/DL (ref 3.4–5)
ANION GAP SERPL CALCULATED.3IONS-SCNC: 15 MMOL/L (ref 3–16)
BUN SERPL-MCNC: 28 MG/DL (ref 7–20)
CALCIUM SERPL-MCNC: 8.3 MG/DL (ref 8.3–10.6)
CHLORIDE SERPL-SCNC: 98 MMOL/L (ref 99–110)
CO2 SERPL-SCNC: 26 MMOL/L (ref 21–32)
CREAT SERPL-MCNC: 2.8 MG/DL (ref 0.8–1.3)
DEPRECATED RDW RBC AUTO: 24.6 % (ref 12.4–15.4)
GFR SERPLBLD CREATININE-BSD FMLA CKD-EPI: 25 ML/MIN/{1.73_M2}
GLUCOSE BLD-MCNC: 103 MG/DL (ref 70–99)
GLUCOSE BLD-MCNC: 115 MG/DL (ref 70–99)
GLUCOSE SERPL-MCNC: 133 MG/DL (ref 70–99)
HCT VFR BLD AUTO: 25.2 % (ref 40.5–52.5)
HGB BLD-MCNC: 8.2 G/DL (ref 13.5–17.5)
MCH RBC QN AUTO: 29 PG (ref 26–34)
MCHC RBC AUTO-ENTMCNC: 32.6 G/DL (ref 31–36)
MCV RBC AUTO: 89 FL (ref 80–100)
PERFORMED ON: ABNORMAL
PERFORMED ON: ABNORMAL
PHOSPHATE SERPL-MCNC: 2.1 MG/DL (ref 2.5–4.9)
PLATELET # BLD AUTO: 169 K/UL (ref 135–450)
PMV BLD AUTO: 8.9 FL (ref 5–10.5)
POTASSIUM SERPL-SCNC: 3.9 MMOL/L (ref 3.5–5.1)
RBC # BLD AUTO: 2.83 M/UL (ref 4.2–5.9)
SODIUM SERPL-SCNC: 139 MMOL/L (ref 136–145)
WBC # BLD AUTO: 5.8 K/UL (ref 4–11)

## 2025-01-03 PROCEDURE — 6370000000 HC RX 637 (ALT 250 FOR IP)

## 2025-01-03 PROCEDURE — 80069 RENAL FUNCTION PANEL: CPT

## 2025-01-03 PROCEDURE — 2500000003 HC RX 250 WO HCPCS

## 2025-01-03 PROCEDURE — 6370000000 HC RX 637 (ALT 250 FOR IP): Performed by: INTERNAL MEDICINE

## 2025-01-03 PROCEDURE — 2580000003 HC RX 258

## 2025-01-03 PROCEDURE — 36415 COLL VENOUS BLD VENIPUNCTURE: CPT

## 2025-01-03 PROCEDURE — 94760 N-INVAS EAR/PLS OXIMETRY 1: CPT

## 2025-01-03 PROCEDURE — 85027 COMPLETE CBC AUTOMATED: CPT

## 2025-01-03 PROCEDURE — 9990000010 HC NO CHARGE VISIT

## 2025-01-03 RX ADMIN — ASPIRIN 81 MG 81 MG: 81 TABLET ORAL at 08:49

## 2025-01-03 RX ADMIN — SODIUM PHOSPHATE, MONOBASIC, MONOHYDRATE AND SODIUM PHOSPHATE, DIBASIC, ANHYDROUS 15 MMOL: 142; 276 INJECTION, SOLUTION INTRAVENOUS at 11:16

## 2025-01-03 RX ADMIN — PANTOPRAZOLE SODIUM 40 MG: 40 TABLET, DELAYED RELEASE ORAL at 05:29

## 2025-01-03 RX ADMIN — SODIUM CHLORIDE, PRESERVATIVE FREE 10 ML: 5 INJECTION INTRAVENOUS at 08:50

## 2025-01-03 RX ADMIN — AMIODARONE HYDROCHLORIDE 200 MG: 200 TABLET ORAL at 08:49

## 2025-01-03 RX ADMIN — APIXABAN 5 MG: 5 TABLET, FILM COATED ORAL at 08:49

## 2025-01-03 ASSESSMENT — PAIN SCALES - WONG BAKER: WONGBAKER_NUMERICALRESPONSE: NO HURT

## 2025-01-03 ASSESSMENT — PAIN SCALES - GENERAL: PAINLEVEL_OUTOF10: 0

## 2025-01-03 NOTE — PROGRESS NOTES
Report called to [facility], all questions answered and call back number given if questions arise. PIV removed, belongings packed and sent with patient, patient dressed and en route to [facility].     Electronically signed by Isabella Castro RN on 1/3/2025 at 4:50 PM

## 2025-01-03 NOTE — CARE COORDINATION
Case Management Discharge Note          Date / Time of Note: 1/3/2025 11:32 AM                  Patient Name: Levy Vargas   YOB: 1965  Diagnosis: A-fib (HCC) [I48.91]  Atrial fibrillation with rapid ventricular response (HCC) [I48.91]   Date / Time: 12/11/2024  6:20 PM    Financial:  Payor: [facility] / Plan: [facility] MEDICAID / Product Type: *No Product type* /      Pharmacy:    [facility] - [place], [place] - [address] - P [phone] - F [phone]  [address] [facility] [address]  Phone: [phone] Fax: [phone]    [id number] - [place], [place] - [address] - P [phone] - F [phone]  [facility]  [facility] [address]  Phone: [phone] Fax: [phone]    [facility] [id number] - [place], [place] - [address]  - P [phone] - F [phone]  [facility] [address]  Phone: [phone] Fax: [phone]      Assistance purchasing medications?: Potential Assistance Purchasing Medications: No  Assistance provided by Case Management: None at this time    DISCHARGE Disposition: Skilled Nursing Facility (SNF)    Nursing Facility:   [facility]  Address: [address] [place] [address]  Phone: [phone]    LOC at discharge: Skilled Nursing  SHAHZAD Completed: Yes             NURSING REPORT NUMBER: [phone]               NURSING FAX NUMBER: [phone]    Notification completed in HENS/PAS?:  yes    Dialysis Facility:  Name: [facility]  Address:  [address] [place] [address]   Dialysis Schedule:TTS 0600  Phone: [phone]   Fax: [phone]     Transportation:  Transportation PLAN for discharge: EMS transportation   Mode of Transport: Ambulance stretcher - BLS  Reason for medical transport: Severe muscular weakness and de-conditioned state due to cardiogenic shock and requires ambulance transport due to high fall risk & inability to ambulate and/or  transfer without max 2 person assist  Name of Transport Company: [facility] [phone]  Time of Transport: 4pm    Transport form completed: Yes    IMM Completed:   Not Indicated    Additional CM Notes: Patient in agreement with discharge to [facility]. Spoke to Leandro at [facility], confirms pre cert obtained & transportation set up to & from dialysis; AVS faxed. Spoke to Carolina at [facility] to inform patient will be starting OP HD tomorrow, AVS faxed. MAURISIO Mason aware of dc plan.    The Plan for Transition of Care is related to the following treatment goals of A-fib (HCC) [I48.91]  Atrial fibrillation with rapid ventricular response (HCC) [I48.91]    The Patient and/or patient representative Levy and his family were provided with a choice of provider and agrees with the discharge plan Yes    Freedom of choice list was provided with basic dialogue that supports the patient's individualized plan of care/goals and shares the quality data associated with the providers. Yes    Eileen Joy RN  [facility]   Case Management Department  Ph: [phone]

## 2025-01-03 NOTE — PROGRESS NOTES
Occupational Therapy Attempt  Levy Vargas  1/3/2025  [id number]    Pt attempted, but eating breakfast and declining therapy at this time. Will follow up as therapy schedule allows.     Electronically signed by CRAIG Reyes/L [id number] on 1/3/25 at 8:55 AM EST

## 2025-01-03 NOTE — PROGRESS NOTES
Nephrology Progress Note   [employer]      Reason for consultation:  KENDRA on CKD 2 -- baseline Cr ~ 0.9-1.3 mg/dL     HPI: Levy Vargas is a 60 yo male with a PMHx of CKD 2, h/o AKIs, HFrEF, non-compliance, DM, HTN, afib, h/o cocaine use, CAD, HLD. Patient presents to  ED on 12/11/2024 with complaints of BLE edema, lightheadedness, and weakness. Patient is currently drowsy and it is difficult to obtain history. Currently oriented x3. Patient tells me he has been taking his CHF medications everyday. Per chart review, there has been some questions regarding his outpatient compliance and not keeping in touch with his CHF nurses. Pt is currently grossly volume overloaded. He weighed 131.5 kg on 9/25/2024 during his last cardiology appt. He currently weighs 145.3 kg. Currently dyspneic and on 4 L NC. CXR is negative for acute findings. In afib RVR this admission. On amio gtt.     We have been consulted for KENDRA on CKD 2 management. Cr upon admission was 1.3 mg/dL. Today, Cr is 2.6 mg/dL. Patient received a generous amount of IVP and oral diuretics and was also started on a lasix gtt at 10 mg/hr. Despite this, pt has remained anuric    CRRT initiated 12/13/24   CRRT stopped 12/24/24    Subjective:    Labs and chart reviewed. Planning on d/c to [facility] and HD at [facility] -- awaiting transportation approval to HD. Received HD yesterday with net 3 L UF. UOP is picking up with 700 mL documented yesterday.     Allergies:  No Known Allergies     Scheduled Meds:   sodium phosphate IVPB (PERIPHERAL line)  15 mmol IntraVENous Once    pantoprazole  40 mg Oral QAM AC    epoetin iván-epbx  10,000 Units SubCUTAneous Once per day on Tuesday Thursday Saturday    apixaban  5 mg Oral BID    lidocaine  1 patch TransDERmal Daily    QUEtiapine  25 mg Oral Nightly    insulin lispro  0-4 Units SubCUTAneous 4x Daily WC    amiodarone  200 mg Per NG tube BID    [Held by provider] spironolactone  25 mg Oral Daily    [Held by

## 2025-01-03 NOTE — PLAN OF CARE
Problem: Safety - Adult  Goal: Free from fall injury  1/2/2025 2119 by Steffany Cheng, RN  Outcome: Progressing  Note: Pt is a maxi move and high fall risk. He is oriented to his abilities and compliant with using the call light for his needs. Fall risk sign and socks in place. Bed in locked, low position with side rails up X 3. Bed alarm deferred while pt sitting on the EOB to eat a snack.      Problem: Musculoskeletal - Adult  Goal: Return mobility to safest level of function  1/2/2025 2119 by Steffany Cheng, RN  Outcome: Not Progressing     Problem: Pain  Goal: Verbalizes/displays adequate comfort level or baseline comfort level  1/2/2025 2119 by Steffany Cheng, RN  Outcome: Progressing  Note: Pt c/o chronic mid back pain 6/10 with goal of 2/10. RN medicated with prn tylenol. Pt had a lidocaine patch in place from day shift. See chart for pain reassessment after tylenol.

## 2025-01-03 NOTE — DISCHARGE SUMMARY
V2.0  Discharge Summary    Name:  Levy Vargas /Age/Sex: 1965 (59 y.o. male)   Admit Date: 2024  Discharge Date: 1/3/25    MRN & CSN:  [id number] & [id number] Encounter Date and Time 1/3/25 12:15 PM EST    Attending:  Odilon Chawla MD Discharging Provider: Odilon Chawla MD     Discharge Diagnosis:     # Cardiogenic shock  # Atrial fibrillation with rapid ventricle response  # Anuric KENDRA on CKD stage II (currently on HD)  # Acute metabolic encephalopathy, resolved  # Acute blood loss anemia  # Coffee-ground emesis  # Generalized deconditioning  # Acute urinary retention     Consultants:  IP CONSULT TO CARDIOLOGY  IP CONSULT TO NEPHROLOGY  IP CONSULT TO PULMONOLOGY  IP CONSULT TO GI  IP CONSULT TO PALLIATIVE CARE    Brief HPI:    Per admitting H and P...\" Levy Vargas is a 59 y.o. male with pmh of A-fib, hypertension, HFrEF, cirrhosis, hyperlipidemia, diabetes mellitus type II, CKD 3 baseline creatinine 1.3, COPD, tobacco dependence, history of cocaine abuse, history of MI, peptic ulcer disease  who presents with lightheadedness.  Patient was seen in the room awake alert oriented to person place and situation.  Patient stated he had dizziness and generalized weakness going on for last week.  He also felt his dizziness was getting worse especially with positional change.  He also had bilateral lower leg swelling which has also slightly increased recently.  Patient did not take his A-fib medication and hypertension medication he ran out of it.  He denies any chest pain, palpitation, shortness of breath, nausea, vomiting, diaphoresis, abdominal pain, diarrhea or constipation.  Denies any easy urinary symptoms such as dysuria, hematuria, urgency or frequency.  Patient continues to smoke a half a pack a day, denies alcohol or marijuana or IV drug abuse.  In the ED patient was found to be in A-fib RVR at the rate 150s he was initially started on diltiazem infusion.  Labs hypokalemia potassium 2.7, BNP 6743,

## 2025-01-03 NOTE — PROGRESS NOTES
Physical Therapy  PT to bedside to cont current POC.  Pt request later, eating breakfast.  Janey Sandoval, PT
